# Patient Record
Sex: FEMALE | Race: BLACK OR AFRICAN AMERICAN | NOT HISPANIC OR LATINO | Employment: OTHER | URBAN - METROPOLITAN AREA
[De-identification: names, ages, dates, MRNs, and addresses within clinical notes are randomized per-mention and may not be internally consistent; named-entity substitution may affect disease eponyms.]

---

## 2017-01-04 ENCOUNTER — CLINICAL SUPPORT (OUTPATIENT)
Dept: REHABILITATION | Facility: HOSPITAL | Age: 72
End: 2017-01-04
Attending: ORTHOPAEDIC SURGERY
Payer: MEDICARE

## 2017-01-04 DIAGNOSIS — G89.29 CHRONIC PAIN OF LEFT KNEE: Primary | ICD-10-CM

## 2017-01-04 DIAGNOSIS — M25.562 CHRONIC PAIN OF LEFT KNEE: Primary | ICD-10-CM

## 2017-01-04 PROCEDURE — G8990 OTHER PT/OT CURRENT STATUS: HCPCS | Mod: CK

## 2017-01-04 PROCEDURE — G8991 OTHER PT/OT GOAL STATUS: HCPCS | Mod: CJ

## 2017-01-04 PROCEDURE — 97162 PT EVAL MOD COMPLEX 30 MIN: CPT

## 2017-01-04 NOTE — PROGRESS NOTES
Physical Therapy Evaluation    Name: Rosmery Cody [facility] Number: [id number]        Diagnosis: No diagnosis found.  Physician: Saulo Mendoza MD  Treatment Orders: PT Eval and Treat    Past Medical History   Diagnosis Date    Allergy     Anxiety     Cataract     Edema of leg 10/5/2012     bilateral     Fractures 2011     thumb R    GERD (gastroesophageal reflux disease)     Glaucoma     History of colonic polyps     Hx-TIA (transient ischemic attack) 8/13/2012 Jan 2012: LLE and left facial numbness lasting 1 hour     Hyperlipidemia     Hypertension     Left shoulder tendonitis 8/8/2015    Primary open-angle glaucoma, mild stage - Both Eyes 6/3/2013    TIA (transient ischemic attack) Jan 2012     LLE and left facial numbness lasting 1 hour     Current Outpatient Prescriptions   Medication Sig    aspirin (ECOTRIN) 81 MG EC tablet Take 81 mg by mouth once daily.      calcium 500 mg Tab Take 500 mg by mouth once daily.     CYANOCOBALAMIN, VITAMIN B-12, ORAL Place 1 drop under the tongue.    fexofenadine (ALLEGRA) 30 MG tablet Take 30 mg by mouth once daily.     fluticasone (FLONASE) 50 mcg/actuation nasal spray 2 sprays by Nasal route daily as needed.     hydrochlorothiazide (MICROZIDE) 12.5 mg capsule Take 1 capsule (12.5 mg total) by mouth once daily.    krill oil-omega-3-dha-epa 150-450 mg CpDR Take 1 tablet by mouth once daily.    lorazepam (ATIVAN) 1 MG tablet TAKE 1 TABLET (1 MG TOTAL) BY MOUTH NIGHTLY AS NEEDED (INSOMNIA).    losartan (COZAAR) 50 MG tablet TAKE 1 TABLET BY MOUTH EVERY DAY    moxifloxacin (VIGAMOX) 0.5 % ophthalmic solution Place 1 drop into the right eye 4 (four) times daily.    multivitamin Chew 1 lozenge.    prednisoLONE acetate (PRED FORTE) 1 % DrpS Place 1 drop into the right eye 4 (four) times daily.    timolol maleate 0.5% (TIMOPTIC-XE) 0.5 % SolG Place 1 drop into both eyes every morning. Timoptic XE or timolol GFS (gel forming solution)     XALATAN 0.005 % ophthalmic solution Place 1 drop into the right eye every evening. BRAND NAME XALATAN      No current facility-administered medications for this visit.      Review of patient's allergies indicates:   Allergen Reactions    Lisinopril Swelling    Amlodipine Edema    Combigan [brimonidine-timolol] Other (See Comments)     Causes blepharitis and itchy eyelids/contact dermatitis    Cosopt [dorzolamide-timolol] Other (See Comments)     Causes angular blepharitis around eyes    Pravastatin Other (See Comments)     Myalgia and elevated CPK     Precautions: standard    Evaluation Date: 01/04/2017  Visit # authorized: 1/1  Authorization expiration: 12/22/2017  Plan of Care expiration: 02/15/2017    Subjective     Onset/FAITH: sudden and gradual    Primary concern/ Chief complaints:    Rosmery is a 71 y.o. female with a PMH of anxiety, TIA, and bilateral lower extremity edema that presents to [facility] secondary to left knee pain.  Injury occurred on November 2016.  X-ray and MRI was taken and revealed DJD and a 0.7 cm cartilage defect in the weight-bearing portion of the lateral tibial condyle with underlying subchondral edema. Pt has been feeling better since she had a steroid shot on 12/22/2016, but was having buckling in the knee up to just before the steroid injection. Previous treatment included ibuprofen, steroid shot (hasn't been taking ibuprofen since she got her steroid shot), ice, and heat. Pt reports that before her steroid shot, long distance walking for exercise and stair management increases left knee pain and reports left knee pain at worst is a 1-2 on the VAS. Pt uses rest, ice and heat to control L knee symptoms. Pt has a decreased ability to perform ADLs such as walking for exercise, stair ascent in the community. Pt reports intermittent numbness and tingling in the RLE, but hasn't felt symptoms for over a year. No cultural, environmental, or spiritual barriers  identified to treatment or learning.    Pain Scale: Rosmery rates pain on a scale of 0-10 to be 10 at worst; n/a currently; n/a at best .    Stairs in the home: 0    Patient Goals: Pt would like to decrease pain and increase function so she can return to pain-free completion of all normal daily activities.    Objective     Observation: ambulated independently to clinic. Gait deviations include antalgic gait    Posture: WNL    Pitting edema: none    Range of Motion:   Knee Left Active Left Passive Right Active Right Passive   Flexion 120*stretching pain at kneecap 125*stretching pain at kneecap 120 126   Extension -1*aching pain -1 -1 -1   Patellar mobility WNL LLE in all directions, though infomcortable with inferior and mild medial patellar pain with anterior glide    Lower Extremity Strength  Right LE  Left LE    Knee extension: 4+/5 Knee extension: 4/5   Knee flexion: 5/5 Knee flexion: 5/5   Hip flexion: 4/5 Hip flexion:  *pain in L patella 4/5   Hip extension:  4+/5 Hip extension: 4+/5   Hip abduction: 4+/5 Hip abduction: 4+/5   Hip adduction: 4+/5 Hip adduction 4+/5   Ankle dorsiflexion: 5/5 Ankle dorsiflexion: 5/5   Ankle plantarflexion: 5/5 Ankle plantarflexion: 5/5     Special Tests:   Left Right   Valgus Stress Test - -   Varus Stress test - -   Lachman's test - -   Posterior Lachman - -     Joint Mobility: WNL into flexion and extension.  Patellar mobility WNL all directions in the LLE, though tender with inferior and superior patellar glides    Palpation: ttp inferomedially to the L patella    Sensation: WNL    Flexibility: decreased into knee flexion at end range due to stiffness in the L quadriceps and L patellar discomfort    G-code Reporting:  Category: Mobilty (Mobility, Self-Care, etc. )  Tool: FOTO knee  Score: 43% impairment  Goal:  CJ ( 20%-40% impairment)  Current:  CK ( 40%-60% impairment)    Patient History  (high) Examination  (high) Clinical Presentation  (mod) Clinical Decision  Making   Comorbidities:  Anxiety    Lower extremity edema    Personal Factors:  Decreased attendance with previous PT plans of care    Increased age     Activity and Participation Restriction:  Impaired community ambulation  Impaired ability to exercise recreationally    Body Systems:  MSK: ROM and strength  CV: Edema    Body Regions:  Lower extremities Evolving clinical presentation with changing clinical characteristics  (due to progression of knee pain and changing of edema presentation on a day to day basis)     Low   Mod  High         PT Evaluation Completed? Yes  Discussed Plan of Care with patient: Yes    TREATMENT:  Rosmery received therapeutic exercises to develop strength and endurance, flexibility for 15 minutes including:   HSS with strap 2x30 sec BLE  Heel slides with strap and 3 second overpressure stretch 2x10 LLE  Mini squats 1x8 with vc's and demonstration for correct technique    Instructed pt. Regarding: Proper technique with all exercises. Pt demo good understanding of the education provided. Rosmery demonstrated good return demonstration of activities.    Assessment     This is a 71 y.o. female referred to outpatient physical therapy and presents with a medical diagnosis of left knee pain and demonstrates limitations as described in the problem list. Pt will benefit from physical therapy services in order to maximize pain free and/or functional use of left knee. The following goals were discussed with the patient and patient is in agreement with them as to be addressed in the treatment plan. Pt was given a HEP consisting of heel slides, hamstring stretches and mini-squats (code - [id number]). Pt verbally understood the instructions as they were given and demonstrated proper form and technique during therapy. Pt was advised to perform these exercises free of pain, and to stop performing them if pain occurs.     Pt presents with significantly decreased strength at the L knee, and decreased range  of motion both into flexion and extension of the left knee. Pt would benefit from stretching of quadriceps and hamstrings in order to decrease patellar compression in the knee joint and to allow for increases of active and passive range of motion. Due to decreased activity tolerance with land activity, pt would benefit from a trial of aquatic therapy with the goal of progressing to land therapy as soon as possible for continued strengthening and return to completing land ADLs with decreased pain.     Patient History  (high) Examination  (high) Clinical Presentation  (mod) Clinical Decision Making   Comorbidities:  Anxiety    Lower extremity edema    Personal Factors:  Decreased attendance with previous PT plans of care    Increased age     Activity and Participation Restriction:  Impaired community ambulation  Impaired ability to exercise recreationally    Body Systems:  MSK: ROM and strength  CV: Edema    Body Regions:  Lower extremities Evolving clinical presentation with changing clinical characteristics  (due to progression of knee pain and changing of edema presentation on a day to day basis)     Low   Mod  High         Medical necessity is demonstrated by the following IMPAIRMENTS/PROBLEM LIST:   1)Increase in pain level limiting function   2)Decreased range of motion limiting function   3)Decreased strength limiting function   4)Impaired gait pattern   5)Lack of HEP    GOALS: Short Term Goals:  3 weeks  1. Report decreased left knee  pain  <   / =  5 /10 with community ambulation to demonstrate increased tolerance for completion of ADLs.  2. Pt to achieve pain free WNL flexion AROM in the L knee to demonstrate improvements in functional mobility.  3. Increased MMT for knee extension to 4+/5 in the LLE  to increase tolerance for ADL and work activities.  4. Pt to report a decreased amount of pain with stair ascent in order to demonstrate improvements in functional mobility.  5. Pt to tolerate HEP to improve  ROM and independence with ADL's    Long Term Goals: 6 weeks  1. Report decreased    Left knee    pain  <   / =  4  /10  with community ambulation to demonstrate increased tolerance for completion of ADLs.  2. Increased MMT  for  Knee extension to 5/5  to increase tolerance for ADL and work activities.  3. Increased MMT  for  Hip flexion to 4+/5  to increase tolerance for ADL and work activities.  4. Pt will report at CJ level (20%-40% impaired) on FOTO knee questionnaire to demonstrate increase in LE function with every day tasks.   5. Pt to be Independent with HEP to improve ROM and independence with ADL's      Plan     Pt will be treated by physical therapy 1-3 times a week for 6 weeks for Pt Education, HEP, therapeutic exercises, neuromuscular re-education, joint mobilizations, modalities prn to achieve established goals. brock may at times be seen by a PTA as part of the Rehab Team.     Cont PT for  6         weeks.     Sabrina Castaneda, DPT  1/4/2016    I certify the need for these services furnished under this plan of treatment and while under my care.______________________________ Physician/Referring Practitioner  Date of Signature

## 2017-01-05 ENCOUNTER — OFFICE VISIT (OUTPATIENT)
Dept: OPHTHALMOLOGY | Facility: CLINIC | Age: 72
End: 2017-01-05
Payer: MEDICARE

## 2017-01-05 DIAGNOSIS — H04.123 DRY EYE SYNDROME, BILATERAL: ICD-10-CM

## 2017-01-05 DIAGNOSIS — H40.1111 PRIMARY OPEN ANGLE GLAUCOMA OF RIGHT EYE, MILD STAGE: ICD-10-CM

## 2017-01-05 DIAGNOSIS — Z96.1 PSEUDOPHAKIA OF BOTH EYES: ICD-10-CM

## 2017-01-05 DIAGNOSIS — Z48.89 ENCOUNTER FOR POSTOPERATIVE CARE: Primary | ICD-10-CM

## 2017-01-05 DIAGNOSIS — H40.1122 PRIMARY OPEN ANGLE GLAUCOMA OF LEFT EYE, MODERATE STAGE: ICD-10-CM

## 2017-01-05 DIAGNOSIS — H01.009 ANGULAR BLEPHARITIS, UNSPECIFIED LATERALITY: ICD-10-CM

## 2017-01-05 PROBLEM — G89.29 CHRONIC PAIN OF LEFT KNEE: Status: ACTIVE | Noted: 2017-01-05

## 2017-01-05 PROBLEM — M25.562 CHRONIC PAIN OF LEFT KNEE: Status: ACTIVE | Noted: 2017-01-05

## 2017-01-05 PROCEDURE — 99499 UNLISTED E&M SERVICE: CPT | Mod: S$GLB,,, | Performed by: OPHTHALMOLOGY

## 2017-01-05 PROCEDURE — 99024 POSTOP FOLLOW-UP VISIT: CPT | Mod: S$GLB,,, | Performed by: OPHTHALMOLOGY

## 2017-01-05 PROCEDURE — 99999 PR PBB SHADOW E&M-EST. PATIENT-LVL III: CPT | Mod: PBBFAC,,, | Performed by: OPHTHALMOLOGY

## 2017-01-05 NOTE — PROGRESS NOTES
HPI     DLS: 12/08/16    Pt here for post phaco w/IOL OD- 11/30/16  S/P phaco w/IOL and [place] trabeculotomy- 11/30/16  Pt states no pain or discomfort to the OD but yesterday her OS felt like   something was in it and was painful no problems with it this am. Pt states   vision seems to be doing good at a distance but not too good up close.    Meds: Timolol GFS qam ou     1. Post operative stage  2. Primary open angle glaucoma of right eye, mild stage  3. Primary open angle glaucoma of left eye, moderate stage  4. Angular blepharitis, unspecified laterality  5. Dry eye syndrome, bilateral  6. Pseudophakia, both eyes        Last edited by Ana Aquino on 1/5/2017  8:52 AM.         Assessment /Plan     For exam results, see Encounter Report.    Encounter for postoperative care    Primary open angle glaucoma of right eye, mild stage    Primary open angle glaucoma of left eye, moderate stage    Dry eye syndrome, bilateral    Angular blepharitis, unspecified laterality    Pseudophakia of both eyes      Change in last name from Glo or Glo-ashish to Ramirez - 3/3/2014    Old pt of Dr. Eber Crouch  Sister is Marina Cortez (my pt.) she sent her to me      1. Early POAG-  Both Eyes   Dx. Years ago with Dr. Eber Crouch  -Strong family hx   -IOP previously well controlled (mid teens) on Xalatan but elevated on generic and switched to lumigan    Family history   STRONG (Mom,Dad, and 5 sisters)  Glaucoma meds   lumigan qhs OU, timolol gfs QAM OU  H/O adverse rxn to glaucoma drops - combigan - itchy lids  // cosopt - angular blepharitis   LASERS   S/p SLT 4/24/14 OD //  5/8/14 OS (good response 21/22 --> 12/15)  GLAUCOMA SURGERIES   None   OTHER EYE SURGERIES   none  CDR  0.8 / 0.7  Tbase  ??  Tmax  26 OU off all gtts (5/24/10)    Ttarget   16 OU  HVF - 5 VF '12 -16 -  OD IAD x3 now ; OS: IAD x 3 now  Gonio +3 OU  /540  OCT 3  test 2011 to 2016  - RNFL - OD:dec. S, shannon N/I // OS:shannon S/N (+ prog ou)   HRT  3 test -  2012 to 2016 -MR -  Bord S/N/I od // dec. S, bord N/T os /// CDR 0.69 od // 0.73 os  Disc photos  2012, 2015 - OIS     Ttoday 22/22  Test done today - pre-o phaco /IOL OD - 2nd eye // possible [place]     2. Nuclear sclerosis - Both Eyes  -VA 20/40; 20/30  -High BAT to 20/60 // 20/100   3. Hyperopia - Both Eyes -mild   4. Epiphora  - C/O constant tearing of the right eye x 6 months (8/2015      Plan:  -IOP 18 ou (target is 16 ou)   Still above target with addition of timolol gfs (she likes this gtt)    Off combigan - intolerant - itchy lids / blepharitis   Intol to cosopt - angular blepharitis   -Strong family hx  -Goal IOP 16 OU // above target and + VF prog     + IAD ou on VF last visit - (x 3 )     CSM    Target is 16 ou     Pt does not like generics - but is using latanoprost without problems (actually prefers it to lumigan)   Intol to combigan -blepharitis / itch lids   Intol to cosopt - angular blepharitis     Tolerating  timolol gfs // timoptic XE ou q am - but IOP still above target     Pt has tried name brand xalatan for 1 month but IOP still above target   Cont timoptic XE / gfs     F/U     Phaco / IOL OS Date: 10/6/2016 - CE alone - declined combined surgery // PCB00 24.0  POM # 2-3  - phaco/IOL   Doing well  Off steroids   Cont  AT's 4 x day     Cont timoptic XE ou q am    If IOP goes up can try switching pred acetate to vexol and adding diamox    S/P phaco/IOL and  [place] trabeculotomy OD 11/30/2016 - PCB00 22.5   Phaco / IOL OD Date: 11/30/2016  (combined [place])   POM # 1 - phaco/IOL - od - 2nd eye // [place]   Doing well  Begin Pred Acetate - finished   Begin vigamox QID - finish   Shield at night - 1 more week   AT's prn     F/U 1 month -  - final  AR/MR ou - still has a vicryl suture od - so will wait till next time for final refraction - wants bifocals     (( Her sister had  a DSEK with [place]raine in her 2nd eye - her sister  has glaucoma shunts - says she is not doing to well post op  ))    I have seen  and personally examined the patient.  I agree with the findings, assessment and plan of the resident and/or fellow.     Yesica Mike MD

## 2017-01-06 NOTE — PLAN OF CARE
Physical Therapy Evaluation    Name: Rosmery Cody [facility] Number: [id number]        Diagnosis: No diagnosis found.  Physician: Saulo Mendoza MD  Treatment Orders: PT Eval and Treat    Past Medical History   Diagnosis Date    Allergy     Anxiety     Cataract     Edema of leg 10/5/2012     bilateral     Fractures 2011     thumb R    GERD (gastroesophageal reflux disease)     Glaucoma     History of colonic polyps     Hx-TIA (transient ischemic attack) 8/13/2012 Jan 2012: LLE and left facial numbness lasting 1 hour     Hyperlipidemia     Hypertension     Left shoulder tendonitis 8/8/2015    Primary open-angle glaucoma, mild stage - Both Eyes 6/3/2013    TIA (transient ischemic attack) Jan 2012     LLE and left facial numbness lasting 1 hour     Current Outpatient Prescriptions   Medication Sig    aspirin (ECOTRIN) 81 MG EC tablet Take 81 mg by mouth once daily.      calcium 500 mg Tab Take 500 mg by mouth once daily.     CYANOCOBALAMIN, VITAMIN B-12, ORAL Place 1 drop under the tongue.    fexofenadine (ALLEGRA) 30 MG tablet Take 30 mg by mouth once daily.     fluticasone (FLONASE) 50 mcg/actuation nasal spray 2 sprays by Nasal route daily as needed.     hydrochlorothiazide (MICROZIDE) 12.5 mg capsule Take 1 capsule (12.5 mg total) by mouth once daily.    krill oil-omega-3-dha-epa 150-450 mg CpDR Take 1 tablet by mouth once daily.    lorazepam (ATIVAN) 1 MG tablet TAKE 1 TABLET (1 MG TOTAL) BY MOUTH NIGHTLY AS NEEDED (INSOMNIA).    losartan (COZAAR) 50 MG tablet TAKE 1 TABLET BY MOUTH EVERY DAY    moxifloxacin (VIGAMOX) 0.5 % ophthalmic solution Place 1 drop into the right eye 4 (four) times daily.    multivitamin Chew 1 lozenge.    prednisoLONE acetate (PRED FORTE) 1 % DrpS Place 1 drop into the right eye 4 (four) times daily.    timolol maleate 0.5% (TIMOPTIC-XE) 0.5 % SolG Place 1 drop into both eyes every morning. Timoptic XE or timolol GFS (gel forming solution)     XALATAN 0.005 % ophthalmic solution Place 1 drop into the right eye every evening. BRAND NAME XALATAN      No current facility-administered medications for this visit.      Review of patient's allergies indicates:   Allergen Reactions    Lisinopril Swelling    Amlodipine Edema    Combigan [brimonidine-timolol] Other (See Comments)     Causes blepharitis and itchy eyelids/contact dermatitis    Cosopt [dorzolamide-timolol] Other (See Comments)     Causes angular blepharitis around eyes    Pravastatin Other (See Comments)     Myalgia and elevated CPK     Precautions: standard    Evaluation Date: 01/04/2017  Visit # authorized: 1/1  Authorization expiration: 12/22/2017  Plan of Care expiration: 02/15/2017    Subjective     Onset/FAITH: sudden and gradual    Primary concern/ Chief complaints:    Rosmery is a 71 y.o. female with a PMH of anxiety, TIA, and bilateral lower extremity edema that presents to [facility] secondary to left knee pain.  Injury occurred on November 2016.  X-ray and MRI was taken and revealed DJD and a 0.7 cm cartilage defect in the weight-bearing portion of the lateral tibial condyle with underlying subchondral edema. Pt has been feeling better since she had a steroid shot on 12/22/2016, but was having buckling in the knee up to just before the steroid injection. Previous treatment included ibuprofen, steroid shot (hasn't been taking ibuprofen since she got her steroid shot), ice, and heat. Pt reports that before her steroid shot, long distance walking for exercise and stair management increases left knee pain and reports left knee pain at worst is a 1-2 on the VAS. Pt uses rest, ice and heat to control L knee symptoms. Pt has a decreased ability to perform ADLs such as walking for exercise, stair ascent in the community. Pt reports intermittent numbness and tingling in the RLE, but hasn't felt symptoms for over a year. No cultural, environmental, or spiritual barriers  identified to treatment or learning.    Pain Scale: Rosmery rates pain on a scale of 0-10 to be 10 at worst; n/a currently; n/a at best .    Stairs in the home: 0    Patient Goals: Pt would like to decrease pain and increase function so she can return to pain-free completion of all normal daily activities.    Objective     Observation: ambulated independently to clinic. Gait deviations include antalgic gait    Posture: WNL    Pitting edema: none    Range of Motion:   Knee Left Active Left Passive Right Active Right Passive   Flexion 120*stretching pain at kneecap 125*stretching pain at kneecap 120 126   Extension -1*aching pain -1 -1 -1   Patellar mobility WNL LLE in all directions, though infomcortable with inferior and mild medial patellar pain with anterior glide    Lower Extremity Strength  Right LE  Left LE    Knee extension: 4+/5 Knee extension: 4/5   Knee flexion: 5/5 Knee flexion: 5/5   Hip flexion: 4/5 Hip flexion:  *pain in L patella 4/5   Hip extension:  4+/5 Hip extension: 4+/5   Hip abduction: 4+/5 Hip abduction: 4+/5   Hip adduction: 4+/5 Hip adduction 4+/5   Ankle dorsiflexion: 5/5 Ankle dorsiflexion: 5/5   Ankle plantarflexion: 5/5 Ankle plantarflexion: 5/5     Special Tests:   Left Right   Valgus Stress Test - -   Varus Stress test - -   Lachman's test - -   Posterior Lachman - -     Joint Mobility: WNL into flexion and extension.  Patellar mobility WNL all directions in the LLE, though tender with inferior and superior patellar glides    Palpation: ttp inferomedially to the L patella    Sensation: WNL    Flexibility: decreased into knee flexion at end range due to stiffness in the L quadriceps and L patellar discomfort    G-code Reporting:  Category: Mobilty (Mobility, Self-Care, etc. )  Tool: FOTO knee  Score: 43% impairment  Goal:  CJ ( 20%-40% impairment)  Current:  CK ( 40%-60% impairment)    Patient History  (high) Examination  (high) Clinical Presentation  (mod) Clinical Decision  Making   Comorbidities:  Anxiety    Lower extremity edema    Personal Factors:  Decreased attendance with previous PT plans of care    Increased age     Activity and Participation Restriction:  Impaired community ambulation  Impaired ability to exercise recreationally    Body Systems:  MSK: ROM and strength  CV: Edema    Body Regions:  Lower extremities Evolving clinical presentation with changing clinical characteristics  (due to progression of knee pain and changing of edema presentation on a day to day basis)     Low   Mod  High         PT Evaluation Completed? Yes  Discussed Plan of Care with patient: Yes    TREATMENT:  Rosmery received therapeutic exercises to develop strength and endurance, flexibility for 15 minutes including:   HSS with strap 2x30 sec BLE  Heel slides with strap and 3 second overpressure stretch 2x10 LLE  Mini squats 1x8 with vc's and demonstration for correct technique    Instructed pt. Regarding: Proper technique with all exercises. Pt demo good understanding of the education provided. Rosmery demonstrated good return demonstration of activities.    Assessment     This is a 71 y.o. female referred to outpatient physical therapy and presents with a medical diagnosis of left knee pain and demonstrates limitations as described in the problem list. Pt will benefit from physical therapy services in order to maximize pain free and/or functional use of left knee. The following goals were discussed with the patient and patient is in agreement with them as to be addressed in the treatment plan. Pt was given a HEP consisting of heel slides, hamstring stretches and mini-squats (code - [id number]). Pt verbally understood the instructions as they were given and demonstrated proper form and technique during therapy. Pt was advised to perform these exercises free of pain, and to stop performing them if pain occurs.     Pt presents with significantly decreased strength at the L knee, and decreased range  of motion both into flexion and extension of the left knee. Pt would benefit from stretching of quadriceps and hamstrings in order to decrease patellar compression in the knee joint and to allow for increases of active and passive range of motion. Due to decreased activity tolerance with land activity, pt would benefit from a trial of aquatic therapy with the goal of progressing to land therapy as soon as possible for continued strengthening and return to completing land ADLs with decreased pain.     Patient History  (high) Examination  (high) Clinical Presentation  (mod) Clinical Decision Making   Comorbidities:  Anxiety    Lower extremity edema    Personal Factors:  Decreased attendance with previous PT plans of care    Increased age     Activity and Participation Restriction:  Impaired community ambulation  Impaired ability to exercise recreationally    Body Systems:  MSK: ROM and strength  CV: Edema    Body Regions:  Lower extremities Evolving clinical presentation with changing clinical characteristics  (due to progression of knee pain and changing of edema presentation on a day to day basis)     Low   Mod  High         Medical necessity is demonstrated by the following IMPAIRMENTS/PROBLEM LIST:   1)Increase in pain level limiting function   2)Decreased range of motion limiting function   3)Decreased strength limiting function   4)Impaired gait pattern   5)Lack of HEP    GOALS: Short Term Goals:  3 weeks  1. Report decreased left knee  pain  <   / =  5 /10 with community ambulation to demonstrate increased tolerance for completion of ADLs.  2. Pt to achieve pain free WNL flexion AROM in the L knee to demonstrate improvements in functional mobility.  3. Increased MMT for knee extension to 4+/5 in the LLE  to increase tolerance for ADL and work activities.  4. Pt to report a decreased amount of pain with stair ascent in order to demonstrate improvements in functional mobility.  5. Pt to tolerate HEP to improve  ROM and independence with ADL's    Long Term Goals: 6 weeks  1. Report decreased    Left knee    pain  <   / =  4  /10  with community ambulation to demonstrate increased tolerance for completion of ADLs.  2. Increased MMT  for  Knee extension to 5/5  to increase tolerance for ADL and work activities.  3. Increased MMT  for  Hip flexion to 4+/5  to increase tolerance for ADL and work activities.  4. Pt will report at CJ level (20%-40% impaired) on FOTO knee questionnaire to demonstrate increase in LE function with every day tasks.   5. Pt to be Independent with HEP to improve ROM and independence with ADL's      Plan     Pt will be treated by physical therapy 1-3 times a week for 6 weeks for Pt Education, HEP, therapeutic exercises, neuromuscular re-education, joint mobilizations, modalities prn to achieve established goals. brock may at times be seen by a PTA as part of the Rehab Team.     Cont PT for  6         weeks.     Sabrina Castaneda, DPT  1/4/2016    I certify the need for these services furnished under this plan of treatment and while under my care.______________________________ Physician/Referring Practitioner  Date of Signature

## 2017-01-11 ENCOUNTER — PATIENT MESSAGE (OUTPATIENT)
Dept: INTERNAL MEDICINE | Facility: CLINIC | Age: 72
End: 2017-01-11

## 2017-01-14 ENCOUNTER — IMMUNIZATION (OUTPATIENT)
Dept: INTERNAL MEDICINE | Facility: CLINIC | Age: 72
End: 2017-01-14
Payer: MEDICARE

## 2017-01-14 PROCEDURE — G0008 ADMIN INFLUENZA VIRUS VAC: HCPCS | Mod: S$GLB,,, | Performed by: FAMILY MEDICINE

## 2017-01-14 PROCEDURE — 90662 IIV NO PRSV INCREASED AG IM: CPT | Mod: S$GLB,,, | Performed by: FAMILY MEDICINE

## 2017-01-18 ENCOUNTER — CLINICAL SUPPORT (OUTPATIENT)
Dept: REHABILITATION | Facility: HOSPITAL | Age: 72
End: 2017-01-18
Attending: ORTHOPAEDIC SURGERY
Payer: MEDICARE

## 2017-01-18 DIAGNOSIS — M25.562 CHRONIC PAIN OF LEFT KNEE: Primary | ICD-10-CM

## 2017-01-18 DIAGNOSIS — G89.29 CHRONIC PAIN OF LEFT KNEE: Primary | ICD-10-CM

## 2017-01-18 PROCEDURE — 97113 AQUATIC THERAPY/EXERCISES: CPT

## 2017-01-18 NOTE — PROGRESS NOTES
"Total treatment time: 60    Time In: 7:00  Time Out: 8:00    Subjective  Rosmery states "that she is w/o knee pn at this time, and that she is performing HEP w/o difficulty.      Objective    Treatment: Rosmery was instructed in and performed therapeutic exercises to develop strength, endurance, ROM, flexibility, balance, posture and core stabilization for 60 minutes. Patient performed therapeutic exercises consisting of warm up laps without resistance, PROM/Stretching, LE strengthening, balance exercises, isometrics, Endurance, bike, march, step-ups and cool down.    Warm-up Laps 3 x each  FWD, BWD, Side    Stretches 3 x 20 sec ea.  HSS  Quad  GSS    LE exs 20x each  Mini Squats with QS  Heel Raise with GS  Hip flex/ext  Hip ABD/ADD  HS Curl  Step-ups  LAQ    Endurance 3 min each  Bicycle  Marching    Cool Down Laps 1 x each  FWD,BWD, Side     Patient was not issued HEP for pool.      Assessment  Patient's tolerance to treatment was good. Pt demonstrated good return with proper form, ROM, tech with exs post instruction. This is a 71 y.o. female referred to outpatient physical therapy and presents with a medical diagnosis of left knee pain and demonstrates limitations as described in the problem list. Pt will benefit from physical therapy services in order to maximize pain free and/or functional use of left knee..   Patient will continue to benefit from skilled PT intervention.    Rosmery is making good progress towards established goals.      Plan  Continue 2x per week.    "

## 2017-01-20 ENCOUNTER — CLINICAL SUPPORT (OUTPATIENT)
Dept: REHABILITATION | Facility: HOSPITAL | Age: 72
End: 2017-01-20
Attending: ORTHOPAEDIC SURGERY
Payer: MEDICARE

## 2017-01-20 DIAGNOSIS — G89.29 CHRONIC PAIN OF LEFT KNEE: Primary | ICD-10-CM

## 2017-01-20 DIAGNOSIS — M25.562 CHRONIC PAIN OF LEFT KNEE: Primary | ICD-10-CM

## 2017-01-20 PROCEDURE — 97113 AQUATIC THERAPY/EXERCISES: CPT

## 2017-01-20 NOTE — PROGRESS NOTES
"Total treatment time: 60    Time In: 7:00  Time Out: 8:00    Subjective  Rosmery again states "that she is w/o knee pn at this time, and that she is performing HEP w/o difficulty.      Objective    Treatment: Rosmery was instructed in and performed therapeutic exercises to develop strength, endurance, ROM, flexibility, balance, posture and core stabilization for 60 minutes, progressing with reps on this date w/o difficulty or complaint. Patient performed therapeutic exercises consisting of warm up laps without resistance, PROM/Stretching, LE strengthening, balance exercises, isometrics, Endurance, bike, march, step-ups and cool down.    Warm-up Laps 3 x each  FWD, BWD, Side    Stretches 3 x 20 sec ea.  HSS  Quad  GSS    LE exs 30x each  Mini Squats with QS  Heel Raise with GS  Hip flex/ext  Hip ABD/ADD  HS Curl  Step-ups  LAQ    Endurance 4 min each  Bicycle  Marching    Cool Down Laps 1 x each  FWD,BWD, Side     Patient was not issued HEP for pool.      Assessment  Patient's tolerance to treatment with progression of ther ex  was good. Pt demonstrated good return with proper form, ROM, tech with exs post instruction. This is a 71 y.o. female referred to outpatient physical therapy and presents with a medical diagnosis of left knee pain and demonstrates limitations as described in the problem list. Pt will benefit from physical therapy services in order to maximize pain free and/or functional use of left knee..   Patient will continue to benefit from skilled PT intervention.    Rosmery is making good progress towards established goals.      Plan  Continue 2x per week.  "

## 2017-01-23 ENCOUNTER — ANESTHESIA (OUTPATIENT)
Dept: ENDOSCOPY | Facility: HOSPITAL | Age: 72
End: 2017-01-23
Payer: MEDICARE

## 2017-01-23 ENCOUNTER — SURGERY (OUTPATIENT)
Age: 72
End: 2017-01-23

## 2017-01-23 ENCOUNTER — ANESTHESIA EVENT (OUTPATIENT)
Dept: ENDOSCOPY | Facility: HOSPITAL | Age: 72
End: 2017-01-23
Payer: MEDICARE

## 2017-01-23 VITALS — RESPIRATION RATE: 17 BRPM

## 2017-01-23 PROBLEM — Z86.0100 HISTORY OF COLON POLYPS: Status: ACTIVE | Noted: 2017-01-23

## 2017-01-23 PROBLEM — Z86.010 HISTORY OF COLON POLYPS: Status: ACTIVE | Noted: 2017-01-23

## 2017-01-23 PROCEDURE — 63600175 PHARM REV CODE 636 W HCPCS: Performed by: NURSE ANESTHETIST, CERTIFIED REGISTERED

## 2017-01-23 PROCEDURE — D9220A PRA ANESTHESIA: Mod: 33,ANES,, | Performed by: ANESTHESIOLOGY

## 2017-01-23 PROCEDURE — D9220A PRA ANESTHESIA: Mod: 33,CRNA,, | Performed by: NURSE ANESTHETIST, CERTIFIED REGISTERED

## 2017-01-23 PROCEDURE — 25000003 PHARM REV CODE 250: Performed by: NURSE ANESTHETIST, CERTIFIED REGISTERED

## 2017-01-23 RX ORDER — LIDOCAINE HCL/PF 100 MG/5ML
SYRINGE (ML) INTRAVENOUS
Status: DISCONTINUED | OUTPATIENT
Start: 2017-01-23 | End: 2017-01-23

## 2017-01-23 RX ORDER — PROPOFOL 10 MG/ML
INJECTION, EMULSION INTRAVENOUS CONTINUOUS PRN
Status: DISCONTINUED | OUTPATIENT
Start: 2017-01-23 | End: 2017-01-23

## 2017-01-23 RX ORDER — PROPOFOL 10 MG/ML
INJECTION, EMULSION INTRAVENOUS
Status: DISCONTINUED | OUTPATIENT
Start: 2017-01-23 | End: 2017-01-23

## 2017-01-23 RX ADMIN — PROPOFOL 150 MCG/KG/MIN: 10 INJECTION, EMULSION INTRAVENOUS at 08:01

## 2017-01-23 RX ADMIN — PROPOFOL 60 MG: 10 INJECTION, EMULSION INTRAVENOUS at 08:01

## 2017-01-23 RX ADMIN — LIDOCAINE HYDROCHLORIDE 50 MG: 20 INJECTION, SOLUTION INTRAVENOUS at 08:01

## 2017-01-23 NOTE — ANESTHESIA RELEASE NOTE
Anesthesia Release from PACU Note    Patient: Rosmery Cody Ramirez    Procedure(s) Performed: Procedure(s) (LRB):  COLONOSCOPY (N/A)    Anesthesia type: General    Post pain: Adequate analgesia    Post assessment: no apparent anesthetic complications    Last Vitals:   Vitals:    01/23/17 0846   BP: 133/77   Pulse: 61   Resp: 18   Temp:    SpO2: 95%       Post vital signs: stable    Level of consciousness: awake    Complications: none    Airway Patency: patent    Respiratory: spontaneous    Cardiovascular: stable    Hydration: euvolemic

## 2017-01-23 NOTE — ANESTHESIA PREPROCEDURE EVALUATION
01/23/2017  Rosmery Ramirez is a 71 y.o., female.    [facility] Anesthesia Evaluation         Review of Systems  Anesthesia Hx:  No problems with previous Anesthesia   Social:  Non-Smoker    Cardiovascular:   Exercise tolerance: good Hypertension Denies CAD.     Denies Angina.  Functional Capacity Can you climb two flights of stairs? ==> Yes    Pulmonary:   Denies Asthma.  Denies Recent URI.  Denies Sleep Apnea.    Renal/:  Renal/ Normal     Hepatic/GI:   Denies PUD. Denies Hiatal Hernia.  Denies GERD. Denies Liver Disease.  Denies Hepatitis.    Neurological:   Denies CVA. Denies Seizures.    Endocrine:   Denies Diabetes. Denies Hypothyroidism.        Physical Exam  General:  Well nourished    Airway/Jaw/Neck:  Airway Findings: Mouth Opening: Normal Tongue: Normal  General Airway Assessment: Adult  Mallampati: I  TM Distance: Normal, at least 6 cm  Jaw/Neck Findings:  Neck ROM: Normal ROM  Neck Findings:     Eyes/Ears/Nose:  EYES/EARS/NOSE FINDINGS: Normal   Dental:  Dental Findings: In tact   Chest/Lungs:  Chest/Lungs Findings: Clear to auscultation     Heart/Vascular:  Heart Findings: Rate: Normal  Rhythm: Regular Rhythm  Sounds: Normal        Mental Status:  Mental Status Findings:  Alert and Oriented         Anesthesia Plan  Type of Anesthesia, risks & benefits discussed:  Anesthesia Type:  general, MAC  Patient's Preference: Proceed with anesthesia understanding that the risks are very small but could be serious or life threatening.  Intra-op Monitoring Plan:   Intra-op Monitoring Plan Comments:   Post Op Pain Control Plan:   Post Op Pain Control Plan Comments:   Induction:   IV  Beta Blocker:  Patient is not currently on a Beta-Blocker (No further documentation required).       Informed Consent: Patient understands risks and agrees with Anesthesia plan.  Questions answered. Anesthesia consent  signed with patient.  ASA Score: 2     Day of Surgery Review of History & Physical: I have interviewed and examined the patient. I have reviewed the patient's H&P dated:            Ready For Surgery From Anesthesia Perspective.

## 2017-01-23 NOTE — TRANSFER OF CARE
"Anesthesia Transfer of Care Note    Patient: Rosmery Ramirez    Procedure(s) Performed: Procedure(s) (LRB):  COLONOSCOPY (N/A)    Patient location: PACU    Anesthesia Type: general    Transport from OR: Transported from OR on room air with adequate spontaneous ventilation    Post pain: adequate analgesia    Post assessment: no apparent anesthetic complications and tolerated procedure well    Post vital signs: stable    Level of consciousness: awake, alert and oriented    Nausea/Vomiting: no nausea/vomiting    Complications: none          Last vitals:   Visit Vitals    BP (!) 119/59 (BP Location: Left arm, Patient Position: Lying, BP Method: Automatic)    Pulse 70    Temp 36.6 °C (97.9 °F) (Axillary)    Resp 16    Ht 5' 5" (1.651 m)    Wt 94.8 kg (209 lb)    SpO2 95%    Breastfeeding No    BMI 34.78 kg/m2     "

## 2017-01-23 NOTE — ANESTHESIA POSTPROCEDURE EVALUATION
"Anesthesia Post Evaluation    Patient: Rosmery Ramirez    Procedure(s) Performed: Procedure(s) (LRB):  COLONOSCOPY (N/A)    Final Anesthesia Type: general  Patient location during evaluation: GI PACU  Patient participation: Yes- Able to Participate  Level of consciousness: awake and alert  Post-procedure vital signs: reviewed and stable  Pain management: adequate  Airway patency: patent  PONV status at discharge: No PONV  Anesthetic complications: no      Cardiovascular status: blood pressure returned to baseline  Respiratory status: unassisted  Hydration status: euvolemic  Follow-up not needed.        Visit Vitals    /77 (BP Location: Left arm, Patient Position: Sitting, BP Method: Automatic)    Pulse 61    Temp 36.6 °C (97.9 °F) (Axillary)    Resp 18    Ht 5' 5" (1.651 m)    Wt 94.8 kg (209 lb)    SpO2 95%    Breastfeeding No    BMI 34.78 kg/m2       Pain/Nichole Score: Pain Assessment Performed: Yes (1/23/2017  8:46 AM)  Presence of Pain: denies (1/23/2017  8:46 AM)  Pain Rating Prior to Med Admin: 0 (1/23/2017  7:10 AM)  Nichole Score: 9 (1/23/2017  8:16 AM)      "

## 2017-01-25 ENCOUNTER — CLINICAL SUPPORT (OUTPATIENT)
Dept: REHABILITATION | Facility: HOSPITAL | Age: 72
End: 2017-01-25
Attending: ORTHOPAEDIC SURGERY
Payer: MEDICARE

## 2017-01-25 DIAGNOSIS — M25.562 CHRONIC PAIN OF LEFT KNEE: Primary | ICD-10-CM

## 2017-01-25 DIAGNOSIS — G89.29 CHRONIC PAIN OF LEFT KNEE: Primary | ICD-10-CM

## 2017-01-25 PROCEDURE — 97113 AQUATIC THERAPY/EXERCISES: CPT

## 2017-01-25 NOTE — PROGRESS NOTES
"Total treatment time: 60    Time In: 7:00  Time Out: 8:00    Subjective  Rosmery again states "that she is w/o knee pn at this time, .      Objective    Treatment: Rosmery was instructed in and performed therapeutic exercises to develop strength, endurance, ROM, flexibility, balance, posture and core stabilization for 60 minutes, progressing with resistance on this date w/o difficulty or complaint. Patient performed therapeutic exercises consisting of warm up laps without resistance, PROM/Stretching, LE strengthening, balance exercises, isometrics, Endurance, bike, march, step-ups and cool down.    Warm-up Laps 3 x each  FWD, BWD, Side    Stretches 3 x 20 sec ea.  HSS  Quad  GSS    LE exs 30x each 2#  Mini Squats with QS  Heel Raise with GS  Hip flex/ext  Hip ABD/ADD  HS Curl  Step-ups  LAQ    Endurance 5 min each  Bicycle  Marching    Cool Down Laps 1 x each  FWD,BWD, Side     Patient was not issued HEP for pool.      Assessment  Patient's tolerance to treatment with progression of ther ex  was good. Pt demonstrated good return with proper form, ROM, tech with exs post instruction. This is a 71 y.o. female referred to outpatient physical therapy and presents with a medical diagnosis of left knee pain and demonstrates limitations as described in the problem list. Pt will benefit from physical therapy services in order to maximize pain free and/or functional use of left knee..   Patient will continue to benefit from skilled PT intervention.    Rosmery is making good progress towards established goals.      Plan  Pt to be reassessed by PT..  "

## 2017-01-30 ENCOUNTER — TELEPHONE (OUTPATIENT)
Dept: ENDOSCOPY | Facility: HOSPITAL | Age: 72
End: 2017-01-30

## 2017-02-10 ENCOUNTER — OFFICE VISIT (OUTPATIENT)
Dept: OPHTHALMOLOGY | Facility: CLINIC | Age: 72
End: 2017-02-10
Payer: MEDICARE

## 2017-02-10 DIAGNOSIS — H04.123 DRY EYE SYNDROME, BILATERAL: ICD-10-CM

## 2017-02-10 DIAGNOSIS — H01.009 ANGULAR BLEPHARITIS, UNSPECIFIED LATERALITY: ICD-10-CM

## 2017-02-10 DIAGNOSIS — H40.1122 PRIMARY OPEN ANGLE GLAUCOMA OF LEFT EYE, MODERATE STAGE: ICD-10-CM

## 2017-02-10 DIAGNOSIS — Z96.1 PSEUDOPHAKIA OF BOTH EYES: ICD-10-CM

## 2017-02-10 DIAGNOSIS — H40.1111 PRIMARY OPEN ANGLE GLAUCOMA OF RIGHT EYE, MILD STAGE: Primary | ICD-10-CM

## 2017-02-10 PROCEDURE — 99999 PR PBB SHADOW E&M-EST. PATIENT-LVL III: CPT | Mod: PBBFAC,,, | Performed by: OPHTHALMOLOGY

## 2017-02-10 PROCEDURE — 99024 POSTOP FOLLOW-UP VISIT: CPT | Mod: S$GLB,,, | Performed by: OPHTHALMOLOGY

## 2017-02-10 PROCEDURE — 99499 UNLISTED E&M SERVICE: CPT | Mod: S$GLB,,, | Performed by: OPHTHALMOLOGY

## 2017-02-10 RX ORDER — TIMOLOL MALEATE 5 MG/ML
1 SOLUTION OPHTHALMIC EVERY MORNING
Qty: 5 ML | Refills: 12 | Status: SHIPPED | OUTPATIENT
Start: 2017-02-10 | End: 2017-07-03 | Stop reason: SDUPTHER

## 2017-02-10 NOTE — PROGRESS NOTES
HPI     DLS: 1/05/17    Pt here for post phaco w/IOL OD- 11/30/16    Meds: Timolol GFS qam ou    1. Post operative state  2. Primary open angle glaucoma of right eye, mild stage  3. Primary open angle glaucoma of left eye, moderate stage  4. Angular blepharitis, unspecified laterality  5. Dry eye syndrome, bilateral  6. Pseudophakia, both eyes          Last edited by Ana Aquino on 2/10/2017  3:04 PM.         Assessment /Plan     For exam results, see Encounter Report.    Primary open angle glaucoma of right eye, mild stage  -     timolol maleate 0.5% (TIMOPTIC-XE) 0.5 % SolG; Place 1 drop into both eyes every morning. Timoptic XE or timolol GFS (gel forming solution)  Dispense: 5 mL; Refill: 12  -     [facility] (HRT) - OU - Both Eyes; Future    Primary open angle glaucoma of left eye, moderate stage  -     timolol maleate 0.5% (TIMOPTIC-XE) 0.5 % SolG; Place 1 drop into both eyes every morning. Timoptic XE or timolol GFS (gel forming solution)  Dispense: 5 mL; Refill: 12  -     [facility] (HRT) - OU - Both Eyes; Future    Dry eye syndrome, bilateral    Angular blepharitis, unspecified laterality    Pseudophakia of both eyes      Change in last name from Glo or Glo-ashish to Ramirez - 3/3/2014    Old pt of Dr. Eber Crouch  Sister is Marina Cortez (my pt.) she sent her to me      1. Early POAG-  Both Eyes   Dx. Years ago with Dr. Eber Crouch  -Strong family hx   -IOP previously well controlled (mid teens) on Xalatan but elevated on generic and switched to lumigan    Family history   STRONG (Mom,Dad, and 5 sisters)  Glaucoma meds   lumigan qhs OU, timolol gfs QAM OU  H/O adverse rxn to glaucoma drops - combigan - itchy lids  // cosopt - angular blepharitis   LASERS   S/p SLT 4/24/14 OD //  5/8/14 OS (good response 21/22 --> 12/15)  GLAUCOMA SURGERIES   [place] od 11/30/2016   OTHER EYE SURGERIES  Phaco /IOL os 10/6/2016 // phaco/IOL / [place] od 11/30/2016   CDR  0.8 /  0.7  Tbase  ??  Tmax  26 OU off all gtts (5/24/10)    Ttarget   16 OU  HVF - 5 VF '12 -16 -  OD IAD x3 now ; OS: IAD x 3 now  Gonio +3 OU  /540  OCT 3  test 2011 to 2016  - RNFL - OD:dec. S, bord N/I // OS:bord S/N (+ prog ou)   HRT  3 test - 2012 to 2016 -MR -  Bord S/N/I od // dec. S, bord N/T os /// CDR 0.69 od // 0.73 os  Disc photos  2012, 2015 - OIS     Ttoday 14/15  Test done today - post op phaco/[place] od    2. Nuclear sclerosis - Both Eyes  -VA 20/40; 20/30  -High BAT to 20/60 // 20/100   3. Hyperopia - Both Eyes -mild   4. Epiphora  - C/O constant tearing of the right eye x 6 months (8/2015      Plan:  -IOP 18 ou (target is 16 ou)   Still above target with addition of timolol gfs (she likes this gtt)    Off combigan - intolerant - itchy lids / blepharitis   Intol to cosopt - angular blepharitis   -Strong family hx  -Goal IOP 16 OU // above target and + VF prog     + IAD ou on VF last visit - (x 3 )     CSM    Target is 16 ou     Pt does not like generics - but is using latanoprost without problems (actually prefers it to lumigan)   Intol to combigan -blepharitis / itch lids   Intol to cosopt - angular blepharitis     Tolerating  timolol gfs // timoptic XE ou q am - but IOP still above target     Pt has tried name brand xalatan for 1 month but IOP still above target   Cont timoptic XE / gfs     F/U     Phaco / IOL OS Date: 10/6/2016 - CE alone - declined combined surgery // PCB00 24.0  POM # 5  - phaco/IOL   Doing well  Off steroids   Cont  AT's 4 x day     Cont timoptic XE ou q am      S/P phaco/IOL and  [place] trabeculotomy OD 11/30/2016 - PCB00 22.5   Phaco / IOL OD Date: 11/30/2016  (combined [place])   POM # 2.5 - phaco/IOL - od - 2nd eye // [place]   Doing well    Rx glasses given 2/2017     F/u with HRT / gonio - 4 months -- update photo file with surgeries ou - if not yet done        (( Her sister had  a DSEK with [place] in her 2nd eye - her sister  has glaucoma shunts - says she is not doing to  well post op  ))    I have seen and personally examined the patient.  I agree with the findings, assessment and plan of the resident and/or fellow.     Yesica Mike MD

## 2017-03-10 NOTE — PROGRESS NOTES
Name: Radhagaurav Cody James  Referring Provider:  PT Order: PT evaluate and treat  Clinical #: [id number]  Discharge Summary Date: 3/10/2017  Diagnosis: left knee pain    Patient was seen for 4 OP PT visits from 1/4/2017 to 1/25/2017. Pt missed/no visit 8 scheduled sessions. Treatment included: evaluation, HEP, pt education, aquatic therapy, joint mobilizations, ther ex, and stretching. PT unable to fully assess goal achievement as pt did not return for follow up sessions/did not reschedule follow up visits. This patient is discharged from OP PT Services.    Sabrina Castaneda DPT  3/10/2017

## 2017-03-23 RX ORDER — PANTOPRAZOLE SODIUM 40 MG/1
40 TABLET, DELAYED RELEASE ORAL DAILY
Refills: 6 | COMMUNITY
Start: 2017-03-08 | End: 2018-01-29

## 2017-03-30 ENCOUNTER — OFFICE VISIT (OUTPATIENT)
Dept: INTERNAL MEDICINE | Facility: CLINIC | Age: 72
End: 2017-03-30
Attending: FAMILY MEDICINE
Payer: MEDICARE

## 2017-03-30 VITALS
HEART RATE: 68 BPM | SYSTOLIC BLOOD PRESSURE: 138 MMHG | HEIGHT: 65 IN | WEIGHT: 211.19 LBS | BODY MASS INDEX: 35.18 KG/M2 | DIASTOLIC BLOOD PRESSURE: 80 MMHG

## 2017-03-30 DIAGNOSIS — E78.2 MIXED HYPERLIPIDEMIA: ICD-10-CM

## 2017-03-30 DIAGNOSIS — I10 ESSENTIAL HYPERTENSION: Primary | ICD-10-CM

## 2017-03-30 DIAGNOSIS — J06.9 ACUTE URI: ICD-10-CM

## 2017-03-30 PROCEDURE — 99999 PR PBB SHADOW E&M-EST. PATIENT-LVL III: CPT | Mod: PBBFAC,,, | Performed by: FAMILY MEDICINE

## 2017-03-30 PROCEDURE — 1160F RVW MEDS BY RX/DR IN RCRD: CPT | Mod: S$GLB,,, | Performed by: FAMILY MEDICINE

## 2017-03-30 PROCEDURE — 1159F MED LIST DOCD IN RCRD: CPT | Mod: S$GLB,,, | Performed by: FAMILY MEDICINE

## 2017-03-30 PROCEDURE — 1157F ADVNC CARE PLAN IN RCRD: CPT | Mod: S$GLB,,, | Performed by: FAMILY MEDICINE

## 2017-03-30 PROCEDURE — 96372 THER/PROPH/DIAG INJ SC/IM: CPT | Mod: S$GLB,,, | Performed by: FAMILY MEDICINE

## 2017-03-30 PROCEDURE — 1125F AMNT PAIN NOTED PAIN PRSNT: CPT | Mod: S$GLB,,, | Performed by: FAMILY MEDICINE

## 2017-03-30 PROCEDURE — 3079F DIAST BP 80-89 MM HG: CPT | Mod: S$GLB,,, | Performed by: FAMILY MEDICINE

## 2017-03-30 PROCEDURE — 99499 UNLISTED E&M SERVICE: CPT | Mod: S$GLB,,, | Performed by: FAMILY MEDICINE

## 2017-03-30 PROCEDURE — 99214 OFFICE O/P EST MOD 30 MIN: CPT | Mod: 25,S$GLB,, | Performed by: FAMILY MEDICINE

## 2017-03-30 PROCEDURE — 3075F SYST BP GE 130 - 139MM HG: CPT | Mod: S$GLB,,, | Performed by: FAMILY MEDICINE

## 2017-03-30 RX ORDER — AMOXICILLIN AND CLAVULANATE POTASSIUM 875; 125 MG/1; MG/1
1 TABLET, FILM COATED ORAL 2 TIMES DAILY
Qty: 20 TABLET | Refills: 0 | Status: SHIPPED | OUTPATIENT
Start: 2017-03-30 | End: 2018-02-05 | Stop reason: ALTCHOICE

## 2017-03-30 RX ORDER — TRIAMCINOLONE ACETONIDE 40 MG/ML
40 INJECTION, SUSPENSION INTRA-ARTICULAR; INTRAMUSCULAR
Status: COMPLETED | OUTPATIENT
Start: 2017-03-30 | End: 2017-03-30

## 2017-03-30 RX ADMIN — TRIAMCINOLONE ACETONIDE 40 MG: 40 INJECTION, SUSPENSION INTRA-ARTICULAR; INTRAMUSCULAR at 03:03

## 2017-03-30 NOTE — PROGRESS NOTES
Patient, Rosmery Ramirez (MRN #[id number]), presented with a recorded BMI of 35.15 kg/m^2 and a documented comorbidity(s):  - Hypertension  - Hyperlipidemia  to which the severe obesity is a contributing factor. This is consistent with the definition of severe obesity (BMI 35.0-35.9) with comorbidity (ICD-10 E66.01, Z68.35). The patient's severe obesity was monitored, evaluated, addressed and/or treated. This addendum to the medical record is made on 03/30/2017.

## 2017-03-30 NOTE — PROGRESS NOTES
"CHIEF COMPLAINT: 2 weeks of nasal congestion and cough in a patient with elevated blood pressure and bradycardia    HISTORY OF PRESENT ILLNESS: The patient is a 71 year-old black female.  The patient has been having a 2 week history of nasal congestion, chest congestion and just not feeling well.  No fevers chills nausea vomiting blood in the stool are noted.  Over-the-counter medications are not helping.  Overall things are getting worse.      The patient has a history of stable hypertension on current medications.  Patient denies chest pain or shortness of breath today.    The patient has a history of stable hyperlipidemia on current medications.  The patient denies chest pain or shortness of breath today.  The patient denies muscle aches or myalgias suggestive of myositis.    REVIEW OF SYSTEMS:  GENERAL: No fever, chills or weight loss.  SKIN: No rashes, itching or changes in color or texture of skin.  HEAD: No headaches or recent head trauma.  EYES: No photophobia, ocular pain or diplopia.  EARS: Denies ear pain, discharge or vertigo.  NOSE: No loss of smell, no epistaxis or postnasal drip.  MOUTH & THROAT: No hoarseness or change in voice. No excessive gum bleeding.  NODES: Denies swollen glands.  CHEST: Denies PARKS, cyanosis, wheezing, cough and sputum production.  CARDIOVASCULAR: Denies chest pain, PND, orthopnea or reduced exercise tolerance.  ABDOMEN: Appetite fine. No weight loss. Denies diarrhea, abdominal pain, hematemesis or blood in stool.  URINARY: No flank pain, dysuria or hematuria.  PERIPHERAL VASCULAR: No claudication or cyanosis.  MUSCULOSKELETAL: No joint stiffness or swelling. Denies back pain.  NEUROLOGIC: No history of seizures, paralysis, alteration of gait or coordination.    SOCIAL HISTORY: The patient does not smoke.  The patient consumes alcohol socially.      PHYSICAL EXAMINATION:     Blood pressure 138/80, pulse 68, height 5' 5" (1.651 m), weight 95.8 kg (211 lb 3.2 oz).    APPEARANCE: " Well nourished, well developed, in no acute distress.    HEAD: Normocephalic, atraumatic.  EYES: PERRL. EOMI.  Conjunctivae without injection and  anicteric  EARS: TM's intact. Light reflex normal. No retraction or perforation.    NOSE: Mucosa pink. Airway clear.  MOUTH & THROAT: No tonsillar enlargement. No pharyngeal erythema or exudate. No stridor.  NECK: Supple.   NODES: No cervical, axillary or inguinal lymph node enlargement.  CHEST: Lungs clear to auscultation.  No retractions are noted.  No rales or rhonchi are present.  CARDIOVASCULAR: Normal S1, S2. No rubs, murmurs or gallops.  ABDOMEN: Bowel sounds normal. Not distended. Soft. No tenderness or masses.  No ascites is noted.  MUSCULOSKELETAL:  There is no clubbing, cyanosis, or edema of the extremities x4.  There is full range of motion of the lumbar spine.  There is full range of motion of the extremities x4.  There is no deformity noted.    NEUROLOGIC:       Normal speech development.      Hearing normal.      Normal gait.      Motor and sensory exams grossly normal.      DTR's normal.  PSYCHIATRIC: Patient is alert and oriented x3.  Thought processes are all normal.  There is no homicidality.  There is no suicidality.  There is no evidence of psychosis.    LABORATORY/RADIOLOGY:   Chart reviewed.      ASSESSMENT:   URI symptoms for 2 weeks  ACE - I cough  Hypertension, condition is well controlled on current medication regimen  Hyperlipidemia, condition is well controlled on current medication regimen  Palpitations  History of TIA    PLAN:  A Kenalog injection was given in the clinic today.  I discussed the risks and benefits of steroid injection with the patient.  The risks include liponecrosis, exacerbation of diabetes, specifically elevated blood sugars, adrenal suppression, and markedly elevated mood and energy.  The patient is aware of and accepts these risks.  Augmentin    Losartan 50 mg by mouth daily  Return to clinic in 6 months

## 2017-03-30 NOTE — MR AVS SNAPSHOT
[facility]  [address]  [place] [address]  Phone: [phone]  Fax: [phone]                  Rosmery Ramirez   3/30/2017 2:40 PM   Office Visit    Description:  Female : 1945   Provider:  Deangelo Gann MD   Department:  [facility]           Reason for Visit     Fatigue     Nasal Congestion     Chest Congestion           Diagnoses this Visit        Comments    Essential hypertension    -  Primary     Acute URI         Mixed hyperlipidemia                To Do List           Future Appointments        Provider Department Dept Phone    3/31/2017 10:00 AM Deangelo Gann MD [facility] [phone]    2017 7:45 AM [facility] [phone]    2017 8:00 AM Yesica Mike MD [facility] [phone]      Goals (5 Years of Data)     None      Follow-Up and Disposition     Return in about 6 months (around 2017).       These Medications        Disp Refills Start End    amoxicillin-clavulanate 875-125mg (AUGMENTIN) 875-125 mg per tablet 20 tablet 0 3/30/2017     Take 1 tablet by mouth 2 (two) times daily. - Oral    Pharmacy: [facility]/pharmacy #[id number] - [place] - [address]  #: [phone]         [facility]     [facility] Nurse Care Line -  Assistance  Unless otherwise directed by your provider, please contact [facility], our nurse care line that is available for  assistance.     Registered nurses in the [facility] provide: appointment scheduling, clinical advisement, health education, and other advisory services.  Call: [phone] (toll free)               Medications           Message regarding Medications     Verify the changes and/or additions to your medication regime listed below are the same as discussed with your clinician today.  If any of these changes or additions are incorrect, please notify your  healthcare provider.        START taking these NEW medications        Refills    amoxicillin-clavulanate 875-125mg (AUGMENTIN) 875-125 mg per tablet 0    Sig: Take 1 tablet by mouth 2 (two) times daily.    Class: Normal    Route: Oral      These medications were administered today        Dose Freq    triamcinolone acetonide injection 40 mg 40 mg Clinic/HOD 1 time    Sig: Inject 1 mL (40 mg total) into the muscle one time.    Class: Normal    Route: Intramuscular           Verify that the below list of medications is an accurate representation of the medications you are currently taking.  If none reported, the list may be blank. If incorrect, please contact your healthcare provider. Carry this list with you in case of emergency.           Current Medications     aspirin (ECOTRIN) 81 MG EC tablet Take 81 mg by mouth once daily.      calcium 500 mg Tab Take 500 mg by mouth once daily.     CYANOCOBALAMIN, VITAMIN B-12, ORAL Place 1 drop under the tongue.    fexofenadine (ALLEGRA) 30 MG tablet Take 30 mg by mouth once daily.     fluticasone (FLONASE) 50 mcg/actuation nasal spray 2 sprays by Nasal route daily as needed.     hydrochlorothiazide (MICROZIDE) 12.5 mg capsule Take 1 capsule (12.5 mg total) by mouth once daily.    krill oil-omega-3-dha-epa 150-450 mg CpDR Take 1 tablet by mouth once daily.    lorazepam (ATIVAN) 1 MG tablet TAKE 1 TABLET (1 MG TOTAL) BY MOUTH NIGHTLY AS NEEDED (INSOMNIA).    losartan (COZAAR) 50 MG tablet TAKE 1 TABLET BY MOUTH EVERY DAY    multivitamin Chew 1 lozenge.    pantoprazole (PROTONIX) 40 MG tablet Take 40 mg by mouth once daily.    timolol maleate 0.5% (TIMOPTIC-XE) 0.5 % SolG Place 1 drop into both eyes every morning. Timoptic XE or timolol GFS (gel forming solution)    amoxicillin-clavulanate 875-125mg (AUGMENTIN) 875-125 mg per tablet Take 1 tablet by mouth 2 (two) times daily.           Clinical Reference Information           Your Vitals Were     BP Pulse Height Weight BMI     "138/80 68 5' 5" (1.651 m) 95.8 kg (211 lb 3.2 oz) 35.15 kg/m2      Blood Pressure          Most Recent Value    BP  138/80      Allergies as of 3/30/2017     Lisinopril    Amlodipine    Combigan [Brimonidine-timolol]    Cosopt [Dorzolamide-timolol]    Pravastatin      Immunizations Administered on Date of Encounter - 3/30/2017     None      Administrations This Visit     triamcinolone acetonide injection 40 mg     Admin Date Action Dose Route Administered By             03/30/2017 Given 40 mg Intramuscular Greta Campoverde LPN                      Instructions    Your test results will be communicated to you via: [facility], Telephone or Letter.  If you have not received your test results within one week. Please contact the clinic.           Language Assistance Services     ATTENTION: Language assistance services are available, free of charge. Please call [phone].      ATENCIÓN: Si habla español, tiene a rand disposición servicios gratuitos de asistencia lingüística. Llame al [phone].     CHÚ Ý: N?u b?n nói Ti?ng Vi?t, có các d?ch v? h? tr? ngôn ng? mi?n phí dành cho b?n. G?i s? [phone].         [facility] complies with applicable Federal civil rights laws and does not discriminate on the basis of race, color, national origin, age, disability, or sex.        "

## 2017-03-30 NOTE — PROGRESS NOTES
Patient given Kenalog 40mg IM in the [facility]. Patient tolerated well and Band-Aid was applied. Lot#[id number] Exp:9/2018. Patient advised to wait in the lobby for 15 min to make sure no adverse reactions occur. Patient states verbal understanding and has no further questions.

## 2017-06-09 ENCOUNTER — CLINICAL SUPPORT (OUTPATIENT)
Dept: OPHTHALMOLOGY | Facility: CLINIC | Age: 72
End: 2017-06-09
Attending: OPHTHALMOLOGY
Payer: MEDICARE

## 2017-06-09 DIAGNOSIS — H01.009 ANGULAR BLEPHARITIS, UNSPECIFIED LATERALITY: ICD-10-CM

## 2017-06-09 DIAGNOSIS — H40.1122 PRIMARY OPEN ANGLE GLAUCOMA OF LEFT EYE, MODERATE STAGE: ICD-10-CM

## 2017-06-09 DIAGNOSIS — H04.123 DRY EYE SYNDROME, BILATERAL: ICD-10-CM

## 2017-06-09 DIAGNOSIS — H40.1111 PRIMARY OPEN ANGLE GLAUCOMA OF RIGHT EYE, MILD STAGE: ICD-10-CM

## 2017-06-09 DIAGNOSIS — H04.201 EPIPHORA, RIGHT: ICD-10-CM

## 2017-06-09 DIAGNOSIS — Z96.1 PSEUDOPHAKIA OF BOTH EYES: ICD-10-CM

## 2017-06-09 DIAGNOSIS — H40.1111 PRIMARY OPEN ANGLE GLAUCOMA OF RIGHT EYE, MILD STAGE: Primary | ICD-10-CM

## 2017-06-09 PROCEDURE — 92012 INTRM OPH EXAM EST PATIENT: CPT | Mod: S$GLB,,, | Performed by: OPHTHALMOLOGY

## 2017-06-09 PROCEDURE — 99999 PR PBB SHADOW E&M-EST. PATIENT-LVL II: CPT | Mod: PBBFAC,,, | Performed by: OPHTHALMOLOGY

## 2017-06-09 PROCEDURE — 92134 CPTRZ OPH DX IMG PST SGM RTA: CPT | Mod: S$GLB,,, | Performed by: OPHTHALMOLOGY

## 2017-06-09 RX ORDER — IRBESARTAN 150 MG/1
TABLET ORAL
COMMUNITY
Start: 2017-04-17 | End: 2018-01-29

## 2017-06-09 RX ORDER — BENZONATATE 100 MG/1
CAPSULE ORAL
COMMUNITY
Start: 2017-06-03 | End: 2018-01-29

## 2017-06-09 RX ORDER — IRBESARTAN 300 MG/1
300 TABLET ORAL DAILY
COMMUNITY
Start: 2017-05-25 | End: 2018-07-03 | Stop reason: ALTCHOICE

## 2017-06-09 RX ORDER — AMOXICILLIN 875 MG/1
TABLET, FILM COATED ORAL
COMMUNITY
Start: 2017-06-03 | End: 2018-02-05 | Stop reason: ALTCHOICE

## 2017-06-09 RX ORDER — MINOCYCLINE HYDROCHLORIDE 100 MG/1
CAPSULE ORAL
COMMUNITY
Start: 2017-04-17 | End: 2018-01-29

## 2017-06-09 NOTE — PROGRESS NOTES
HPI     DLS: 2/10/17    Pt here for HRT review;    Meds: Timolol GFS qam ou    1. Primary open angle glaucoma of right eye, mild stage  2. Primary open angle glaucoma of left eye, moderate stage  3. Dry eye syndrome, bilateral  4. Angular blepharitis, unspecified laterality  5. Pseudophakia, both eyes      Last edited by Ana Aquino on 6/9/2017  8:07 AM. (History)            Assessment /Plan     For exam results, see Encounter Report.    Primary open angle glaucoma of right eye, mild stage    Primary open angle glaucoma of left eye, moderate stage    Dry eye syndrome, bilateral    Angular blepharitis, unspecified laterality    Pseudophakia of both eyes    Nuclear sclerosis, right    Pseudophakia of left eye    Epiphora, right    Old pt of Dr. Eber Crouch  Sister is Marina Cortez (my pt.) she sent her to me      1. Early POAG-  Both Eyes   Dx. Years ago with Dr. Eber Crouch  -Strong family hx   -IOP previously well controlled (mid teens) on Xalatan but elevated on generic and switched to lumigan    Family history   STRONG (Mom,Dad, and 5 sisters)  Glaucoma meds   lumigan qhs OU, timolol gfs QAM OU  H/O adverse rxn to glaucoma drops - combigan - itchy lids  // cosopt - angular blepharitis   LASERS   S/p SLT 4/24/14 OD //  5/8/14 OS (good response 21/22 --> 12/15)  GLAUCOMA SURGERIES   [place] od 11/30/2016   OTHER EYE SURGERIES  Phaco /IOL os 10/6/2016 // phaco/IOL / [place] od 11/30/2016   CDR  0.8 / 0.7  Tbase  ??  Tmax  26 OU off all gtts (5/24/10)    Ttarget   16 OU  HVF - 5 VF '12 -16 -  OD IAD x3 now ; OS: IAD x 3 now  Gonio +3 OU  /540  OCT 3  test 2011 to 2016  - RNFL - OD:dec. S, shannon N/I // OS:bord S/N (+ prog ou)   HRT  4 test - 2012 to 2017 - MR -  Dec. I, bord S/N od // dec. S, borjoe N/T os /// CDR 0.71 od // 0.73 os  Disc photos  2012, 2015 - OIS     Ttoday 17/16  Test done today - post op phaco/[place] od    2. Nuclear sclerosis - Both Eyes  -VA 20/40; 20/30  -High BAT to 20/60 // 20/100   3.  Hyperopia - Both Eyes -mild   4. Epiphora  - C/O constant tearing of the right eye x 6 months (8/2015      Plan:  -IOP 17/16 ou (target is 16 ou)   Still above target with addition of timolol gfs (she likes this gtt)    Off combigan - intolerant - itchy lids / blepharitis   Intol to cosopt - angular blepharitis   -Strong family hx  -Goal IOP 16 OU // above target and + VF prog     + IAD ou on VF last visit - (x 3 )     CSM    Target is 16 ou     Pt does not like generics - but is using latanoprost without problems (actually prefers it to lumigan)   Intol to combigan -blepharitis / itch lids   Intol to cosopt - angular blepharitis     Tolerating  timolol gfs // timoptic XE ou q am - but IOP still above target     Pt has tried name brand xalatan for 1 month but IOP still above target   Cont timoptic XE / gfs     F/U     Phaco / IOL OS Date: 10/6/2016 - CE alone - declined combined surgery // PCB00 24.0  POM # 5  - phaco/IOL   Doing well  Off steroids   Cont  AT's 4 x day     Cont timoptic XE ou q am      S/P phaco/IOL and  [place] trabeculotomy OD 11/30/2016 - PCB00 22.5   Phaco / IOL OD Date: 11/30/2016  (combined [place])   POM # 2.5 - phaco/IOL - od - 2nd eye // [place]   Doing well    Rx glasses given 2/2017     F/u with HRT / gonio - 4 months -- update photo file with surgeries ou - if not yet done        (( Her sister had  a DSEK with [place] in her 2nd eye - her sister  has glaucoma shunts - says she is not doing to well post op  ))    I have seen and personally examined the patient.  I agree with the findings, assessment and plan of the resident and/or fellow.     Yesica Mike MD

## 2017-07-03 ENCOUNTER — PATIENT MESSAGE (OUTPATIENT)
Dept: OPHTHALMOLOGY | Facility: CLINIC | Age: 72
End: 2017-07-03

## 2017-07-03 DIAGNOSIS — H40.1122 PRIMARY OPEN ANGLE GLAUCOMA OF LEFT EYE, MODERATE STAGE: ICD-10-CM

## 2017-07-03 DIAGNOSIS — H40.1111 PRIMARY OPEN ANGLE GLAUCOMA OF RIGHT EYE, MILD STAGE: ICD-10-CM

## 2017-07-03 RX ORDER — TIMOLOL MALEATE 5 MG/ML
1 SOLUTION OPHTHALMIC EVERY MORNING
Qty: 5 ML | Refills: 12 | Status: SHIPPED | OUTPATIENT
Start: 2017-07-03 | End: 2018-07-03 | Stop reason: SDUPTHER

## 2017-08-23 ENCOUNTER — PATIENT MESSAGE (OUTPATIENT)
Dept: OPHTHALMOLOGY | Facility: CLINIC | Age: 72
End: 2017-08-23

## 2017-08-24 ENCOUNTER — PATIENT MESSAGE (OUTPATIENT)
Dept: OPHTHALMOLOGY | Facility: CLINIC | Age: 72
End: 2017-08-24

## 2017-10-06 ENCOUNTER — OFFICE VISIT (OUTPATIENT)
Dept: OPHTHALMOLOGY | Facility: CLINIC | Age: 72
End: 2017-10-06
Attending: OPHTHALMOLOGY
Payer: MEDICARE

## 2017-10-06 DIAGNOSIS — H40.1111 PRIMARY OPEN ANGLE GLAUCOMA OF RIGHT EYE, MILD STAGE: ICD-10-CM

## 2017-10-06 DIAGNOSIS — H40.1122 PRIMARY OPEN ANGLE GLAUCOMA OF LEFT EYE, MODERATE STAGE: ICD-10-CM

## 2017-10-06 PROCEDURE — 92014 COMPRE OPH EXAM EST PT 1/>: CPT | Mod: S$GLB,,, | Performed by: OPHTHALMOLOGY

## 2017-10-06 PROCEDURE — 92083 EXTENDED VISUAL FIELD XM: CPT | Mod: S$GLB,,, | Performed by: OPHTHALMOLOGY

## 2017-10-06 PROCEDURE — 99999 PR PBB SHADOW E&M-EST. PATIENT-LVL II: CPT | Mod: PBBFAC,,, | Performed by: OPHTHALMOLOGY

## 2017-10-06 PROCEDURE — 92133 CPTRZD OPH DX IMG PST SGM ON: CPT | Mod: S$GLB,,, | Performed by: OPHTHALMOLOGY

## 2017-10-06 NOTE — PROGRESS NOTES
HPI     DLS: 6/09/17    Pt here for HVF review;    Meds: Timolol GFS qam ou            AT's prn ou    1. Primary open angle glaucoma of right eye, mild stage  2. Primary open angle glaucoma of left eye, moderate stage  3. Dry eye syndrome, bilateral  4. Angular blepharitis, unspecified laterality  5. Nuclear sclerosis, right   6. Pseudophakia, left eye  7. Epiphora, right     Last edited by Ana Aquino on 10/6/2017  9:13 AM. (History)            Assessment /Plan     For exam results, see Encounter Report.    Primary open angle glaucoma of right eye, mild stage  -     Posterior Segment OCT Optic Nerve- Both eyes    Primary open angle glaucoma of left eye, moderate stage  -     Posterior Segment OCT Optic Nerve- Both eyes    Old pt of Dr. Eber Crouch  Sister is Marina Cortez (my pt.) she sent her to me      1. Early POAG-  Both Eyes   Dx. Years ago with Dr. Eber Crouch  -Strong family hx   -IOP previously well controlled (mid teens) on Xalatan but elevated on generic and switched to lumigan    Family history   STRONG (Mom,Dad, and 5 sisters)  Glaucoma meds   lumigan qhs OU, timolol gfs QAM OU  H/O adverse rxn to glaucoma drops - combigan - itchy lids  // cosopt - angular blepharitis   LASERS   S/p SLT 4/24/14 OD //  5/8/14 OS (good response 21/22 --> 12/15)  GLAUCOMA SURGERIES   [place] od 11/30/2016   OTHER EYE SURGERIES  Phaco /IOL os 10/6/2016 // phaco/IOL / [place] od 11/30/2016   CDR  0.8 / 0.75  Tbase  ??  Tmax  26 OU off all gtts (5/24/10)    Ttarget   16 OU  HVF - 6 VF '12 -17 -  OD IAD x3 now ; OS: IAD x 3 now  Gonio +3 OU  /540  OCT 4  test 2011 to 2017  - RNFL - OD:dec. S/N/I  // OS dec S. Brianna N (+ prog ou)   HRT  5 test - 2012 to 2017 - MR -  Dec. Ibrianna S/N od // dec. Sbrianna N/T os /// CDR 0.71 od // 0.73 os  Disc photos  2012, 2015 - OIS     Ttoday 17/16  Test done today -, HVFs, DFE, OCT  IOP check   2. H/O - hyperopia - Both Eyes -mild - pre-cataract surgery    3. Epiphora  - C/O constant  tearing of the right eye x 6 months (8/2015   4.  PC IOL   OD - phaco/IOL / [place] 11/30 /2016 - PCB00 22.5  OS - Phaco/IOL - NO COLTON's - pt declined - 10/6/2016 - PCB00 24.0       Plan:  -IOP 19/17 ou (target is 16 ou)   Still above target with addition of timolol gfs (she likes this gtt)  -  She did not take her gtts this morning  Off combigan - intolerant - itchy lids / blepharitis   Intol to cosopt - angular blepharitis   -Strong family hx  -Goal IOP 16 OU // above target and + however no HVF progression noted today OU.    + IAD ou on VF last visit - (x 3 )     CSM    Target is 16 ou     Pt does not like generics - but has used  Latanoprost in past  without problems (actually prefers it to lumigan)   Intol to combigan -blepharitis / itch lids   Intol to cosopt - angular blepharitis     Tolerating  timolol gfs // timoptic XE ou q am - IOP higher than target today but forgot to take her drops this morning     Cont timoptic XE / gfs     Rx glasses given 2/2017     F/u  - 4 months -- IOP check other test are up to date - if IOP above target can add latanoprost back (has used in past) or try azopt - her sister is using azopt with good results and tolerance

## 2017-10-09 RX ORDER — HYDROCHLOROTHIAZIDE 12.5 MG/1
12.5 CAPSULE ORAL DAILY
Qty: 90 CAPSULE | Refills: 3 | Status: SHIPPED | OUTPATIENT
Start: 2017-10-09 | End: 2018-10-30 | Stop reason: SDUPTHER

## 2018-01-08 ENCOUNTER — PATIENT MESSAGE (OUTPATIENT)
Dept: OPHTHALMOLOGY | Facility: CLINIC | Age: 73
End: 2018-01-08

## 2018-01-15 ENCOUNTER — TELEPHONE (OUTPATIENT)
Dept: SPORTS MEDICINE | Facility: CLINIC | Age: 73
End: 2018-01-15

## 2018-01-15 DIAGNOSIS — Z71.3 NUTRITIONAL COUNSELING: Primary | ICD-10-CM

## 2018-01-23 ENCOUNTER — PATIENT MESSAGE (OUTPATIENT)
Dept: OPHTHALMOLOGY | Facility: CLINIC | Age: 73
End: 2018-01-23

## 2018-01-29 ENCOUNTER — OFFICE VISIT (OUTPATIENT)
Dept: UROLOGY | Facility: CLINIC | Age: 73
End: 2018-01-29
Payer: MEDICARE

## 2018-01-29 VITALS
HEIGHT: 65 IN | HEART RATE: 68 BPM | SYSTOLIC BLOOD PRESSURE: 139 MMHG | BODY MASS INDEX: 33.46 KG/M2 | WEIGHT: 200.81 LBS | DIASTOLIC BLOOD PRESSURE: 75 MMHG

## 2018-01-29 DIAGNOSIS — R10.2 PELVIC PAIN: Primary | ICD-10-CM

## 2018-01-29 PROCEDURE — 99999 PR PBB SHADOW E&M-EST. PATIENT-LVL III: CPT | Mod: PBBFAC,,, | Performed by: UROLOGY

## 2018-01-29 PROCEDURE — 99203 OFFICE O/P NEW LOW 30 MIN: CPT | Mod: S$GLB,,, | Performed by: UROLOGY

## 2018-01-29 PROCEDURE — 87086 URINE CULTURE/COLONY COUNT: CPT

## 2018-01-29 NOTE — PROGRESS NOTES
Subjective:       Patient ID: Rosmery Ramirez is a 72 y.o. female.    Chief Complaint: new patient (c/o urine hurts and she feel like here bladder is not empty out completely. she state her bladder feel full all the time.)    HPI patient is several day history of pelvic pain with bloating.  Feels that she doesn't empty her bladder however her urinalysis is clear and her postvoid residual is minimal.  She's not having any flank pain.  No recurrent urinary tract infections.  No fever chills dysuria.  Patient is taking Pyridium without much improvement.  She denies stress or urge incontinence or any other irritative symptoms.  No history of interstitial cystitis    Past Medical History:   Diagnosis Date    Allergy     Anxiety     Cataract     Edema of leg 10/5/2012    bilateral     Fractures 2011    thumb R    GERD (gastroesophageal reflux disease)     Glaucoma     History of colonic polyps     Hx-TIA (transient ischemic attack) 8/13/2012 Jan 2012: LLE and left facial numbness lasting 1 hour     Hyperlipidemia     Hypertension     Left shoulder tendonitis 8/8/2015    Left shoulder tendonitis     Primary open-angle glaucoma, mild stage - Both Eyes 6/3/2013    TIA (transient ischemic attack) Jan 2012    LLE and left facial numbness lasting 1 hour       Past Surgical History:   Procedure Laterality Date    BREAST SURGERY Left     lumpectomy    CATARACT EXTRACTION W/  INTRAOCULAR LENS IMPLANT Left 10/05/2016    OS (Dr. Mike)    CATARACT EXTRACTION W/  INTRAOCULAR LENS IMPLANT Right 11/30/2016    OD (Dr. Mike)    COLONOSCOPY N/A 1/23/2017    Procedure: COLONOSCOPY;  Surgeon: Hany Mosquera MD;  Location: [address]);  Service: Endoscopy;  Laterality: N/A;    HAND SURGERY Left 2011    thumb    HYSTERECTOMY  1980's    Total;    Left Breast Lumpectomy Left     performed in 1960s    SELECTIVE LASER TRABECUPLASTY  05/2014    OU w/ Dr. Mike    TUBAL LIGATION  1970's     VAGINAL DELIVERY      x3       Family History   Problem Relation Age of Onset    Breast cancer Mother     Glaucoma Mother     Alzheimer's disease Mother     Blindness Mother     Cataracts Mother     Glaucoma Father     Prostate cancer Father     Blindness Father     Cataracts Father     Glaucoma Sister     No Known Problems Brother     No Known Problems Brother     Alzheimer's disease Maternal Grandfather     Macular degeneration Neg Hx     Retinal detachment Neg Hx     Strabismus Neg Hx     Amblyopia Neg Hx     Heart attack Neg Hx     Heart disease Neg Hx        Social History     Social History    Marital status:      Spouse name: N/A    Number of children: 3    Years of education: N/A     Occupational History    Not on file.     Social History Main Topics    Smoking status: Never Smoker    Smokeless tobacco: Never Used    Alcohol use 0.0 oz/week      Comment: Rarely; 1 glass of wine    Drug use: No    Sexual activity: Yes     Birth control/ protection: Post-menopausal, See Surgical Hx     Other Topics Concern    Not on file     Social History Narrative    No narrative on file       Allergies:  Lisinopril; Amlodipine; Combigan [brimonidine-timolol]; Cosopt [dorzolamide-timolol]; and Pravastatin    Medications:    Current Outpatient Prescriptions:     aspirin (ECOTRIN) 81 MG EC tablet, Take 81 mg by mouth once daily.  , Disp: , Rfl:     calcium 500 mg Tab, Take 500 mg by mouth once daily. , Disp: , Rfl:     CYANOCOBALAMIN, VITAMIN B-12, ORAL, Place 1 drop under the tongue., Disp: , Rfl:     hydrochlorothiazide (MICROZIDE) 12.5 mg capsule, TAKE 1 CAPSULE (12.5 MG TOTAL) BY MOUTH ONCE DAILY., Disp: 90 capsule, Rfl: 3    irbesartan (AVAPRO) 300 MG tablet, , Disp: , Rfl:     krill oil-omega-3-dha-epa 150-450 mg CpDR, Take 1 tablet by mouth once daily., Disp: , Rfl:     lorazepam (ATIVAN) 1 MG tablet, TAKE 1 TABLET (1 MG TOTAL) BY MOUTH NIGHTLY AS NEEDED (INSOMNIA)., Disp: 30  tablet, Rfl: 0    losartan (COZAAR) 50 MG tablet, TAKE 1 TABLET BY MOUTH EVERY DAY, Disp: 90 tablet, Rfl: 3    multivitamin Chew, 1 lozenge., Disp: , Rfl:     timolol maleate 0.5% (TIMOPTIC-XE) 0.5 % SolG, Place 1 drop into both eyes every morning. Timoptic XE or timolol GFS (gel forming solution), Disp: 5 mL, Rfl: 12    amoxicillin (AMOXIL) 875 MG tablet, , Disp: , Rfl:     amoxicillin-clavulanate 875-125mg (AUGMENTIN) 875-125 mg per tablet, Take 1 tablet by mouth 2 (two) times daily., Disp: 20 tablet, Rfl: 0    Review of Systems   Constitutional: Negative.    HENT: Negative.    Eyes: Negative.    Respiratory: Negative.    Endocrine: Negative.    Genitourinary: Positive for pelvic pain.   Musculoskeletal: Negative.    Allergic/Immunologic: Negative.    Neurological: Negative.    Hematological: Negative.    Psychiatric/Behavioral: Negative.        Objective:      Physical Exam    Assessment:       1. Pelvic pain        Plan:       Rosmery was seen today for new patient.    Diagnoses and all orders for this visit:    Pelvic pain  -     US Retroperitoneal Complete (Kidney and; Future  -     US Pelvis Complete Non OB; Future  -     Cystoscopy; Future  -     Urine culture; Future        patient is status post hysterectomy and there is no evidence of cystocele

## 2018-01-30 LAB — BACTERIA UR CULT: NORMAL

## 2018-02-05 ENCOUNTER — OFFICE VISIT (OUTPATIENT)
Dept: OPHTHALMOLOGY | Facility: CLINIC | Age: 73
End: 2018-02-05
Payer: MEDICARE

## 2018-02-05 DIAGNOSIS — H40.1111 PRIMARY OPEN ANGLE GLAUCOMA OF RIGHT EYE, MILD STAGE: Primary | ICD-10-CM

## 2018-02-05 DIAGNOSIS — H04.201 EPIPHORA, RIGHT: ICD-10-CM

## 2018-02-05 DIAGNOSIS — H04.123 DRY EYE SYNDROME, BILATERAL: ICD-10-CM

## 2018-02-05 DIAGNOSIS — H40.1122 PRIMARY OPEN ANGLE GLAUCOMA OF LEFT EYE, MODERATE STAGE: ICD-10-CM

## 2018-02-05 DIAGNOSIS — Z96.1 PSEUDOPHAKIA OF BOTH EYES: ICD-10-CM

## 2018-02-05 DIAGNOSIS — H01.009 ANGULAR BLEPHARITIS, UNSPECIFIED LATERALITY: ICD-10-CM

## 2018-02-05 DIAGNOSIS — H40.89 OTHER GLAUCOMA OF BOTH EYES: ICD-10-CM

## 2018-02-05 PROCEDURE — 99999 PR PBB SHADOW E&M-EST. PATIENT-LVL II: CPT | Mod: PBBFAC,,, | Performed by: OPHTHALMOLOGY

## 2018-02-05 PROCEDURE — 92012 INTRM OPH EXAM EST PATIENT: CPT | Mod: S$GLB,,, | Performed by: OPHTHALMOLOGY

## 2018-02-05 NOTE — PROGRESS NOTES
HPI     Glaucoma    Additional comments: 4 month IOP ck today           Comments   DLS: 10/6/17    1) POAG  2) PCIOL OU  3) Hx Hyperopia  4) Epiphora    MEDS:  T1/2 GFS QAM OU  Systane PRN OU       Last edited by Alicia Felix MA on 2/5/2018  8:22 AM. (History)            Assessment /Plan     For exam results, see Encounter Report.    Primary open angle glaucoma of right eye, mild stage    Primary open angle glaucoma of left eye, moderate stage    Dry eye syndrome, bilateral    Angular blepharitis, unspecified laterality    Pseudophakia of both eyes    Epiphora, right        Old pt of Dr. Eber Crouch  Sister is Marina Cortez (my pt.) she sent her to me      1. Early POAG-  Both Eyes   Dx. Years ago with Dr. Eber Crouch  -Strong family hx   -IOP previously well controlled (mid teens) on Xalatan but elevated on generic and switched to lumigan    Family history   STRONG (Mom,Dad, and 5 sisters)  Glaucoma meds   lumigan qhs OU, timolol gfs QAM OU  H/O adverse rxn to glaucoma drops - combigan - itchy lids  // cosopt - angular blepharitis   LASERS   S/p SLT 4/24/14 OD //  5/8/14 OS (good response 21/22 --> 12/15)  GLAUCOMA SURGERIES   [place] od 11/30/2016   OTHER EYE SURGERIES  Phaco /IOL os 10/6/2016 // phaco/IOL / [place] od 11/30/2016   CDR  0.8 / 0.75  Tbase  ??  Tmax  26 OU off all gtts (5/24/10)    Ttarget   16 OU  HVF - 6 VF '12 -17 -  OD IAD x3 now ; OS: IAD x 3 now  Gonio +3 OU  /540  OCT 4  test 2011 to 2017  - RNFL - OD:dec. S/N/I  // OS dec S. Brianna N (+ prog ou)   HRT  5 test - 2012 to 2017 - MR -  Dec. Ibrianna S/N od // dec. Sbrianna N/T os /// CDR 0.71 od // 0.73 os  Disc photos  2012, 2015 - OIS     Ttoday 16/17  Test done today -  IOP check   2. H/O - hyperopia - Both Eyes -mild - pre-cataract surgery    3. Epiphora  - C/O constant tearing of the right eye x 6 months (8/2015   4.  PC IOL   OD - phaco/IOL / [place] 11/30 /2016 - PCB00 22.5  OS - Phaco/IOL - NO COLTON's - pt declined - 10/6/2016 - PCB00 24.0        Plan:  -IOP 16/17ou (target is 16 ou)   Off combigan - intolerant - itchy lids / blepharitis   Intol to cosopt - angular blepharitis   -Strong family hx  -Goal IOP 16 OU    CSM    Target is 16 ou     Pt does not like generics - but has used  Latanoprost in past  without problems (actually prefers it to lumigan)   Intol to combigan -blepharitis / itch lids   Intol to cosopt - angular blepharitis     Tolerating  timolol gfs // timoptic XE ou q am - IOP higher than target today but forgot to take her drops this morning     Cont timoptic XE / gfs     Rx glasses given 2/2017     F/u  - 4 months -- HRT / Gonio - if IOP above target can add latanoprost back (has used in past) or try azopt - her sister is using azopt with good results and tolerance

## 2018-02-15 NOTE — PROGRESS NOTES
This note was created by combination of typed  and Dragon dictation.  Transcription errors may be present.  If there are any questions, please contact me.    Assessment / Plan:   Lightheadedness  Vertigo  -By history she has symptoms that either indicate vertigo or lightheadedness, perhaps some combination thereof.  She was worried this was related to hypotension and her blood pressures are actually at goal and I would not decrease her blood pressure medicines further.  This does not sound like TIA type symptoms.  Nonetheless she was counseled that if she expenses unilateral weakness or focal neural deficits that she needs to call 911 immediately.  I gave her a home modified Epley maneuver handout in case this is vertigo.    Essential hypertension-stable on current regimen, no change, irbesartan 300, HCTZ 12.5    Need for shingles vaccine-prescription sent to pharmacy  -     zoster vaccine live, PF, (ZOSTAVAX, PF,) 19,400 unit/0.65 mL injection; Inject 19,400 Units into the skin once.  Dispense: 1 vial; Refill: 0              There are no discontinued medications.  Modified Medications    No medications on file     New Prescriptions    No medications on file         Follow Up: No Follow-up on file.      Subjective:     Chief Complaint   Patient presents with    Hypertension     running low due to weight loss       HPI  Rosmery is a 72 y.o. female, last appointment with this clinic was Visit date not found.    No LMP recorded. Patient has had a hysterectomy.    Has been seeing family medicine at the [facility].  Essential hypertension, losartan.  HCTZ.  History of irbesartan.  Mixed hyperlipidemia; hx pravastatin but notes SE so no longer on statin.  Obesity  Status post SANTY  History of colon polyps.    11/7/2011 colonoscopy normal repeat 5 years   1/23/2017 colonoscopy normal repeat 5 years  Glaucoma followed by ophthalmology  Pelvic pain and incomplete void sensation, recently seen by  urology.    Has been purposefully losing weight.  Smaller portions and better food choices. Motivated by abn labs done recently.  With [facility].  Estimated GFR was now in the high 50s (before was > 60).  Saw dietician with [facility].    But now more lightheaded feeling.  No longer eating sweets, drinking soft drinks.  With waking up lightheaded, and on occasion during the day lightheaded.  Home reading cuff < 120.  Here was elevated. The lightheaded sensation can last for 30 min and sometimes sensation can be hours.  Walking more.  But with walking the other day felt like she was lightheaded severely and felt like she might fall over.  She tells me she has an occasional feeling or she may want to fall to her left side.  Does not see stars.  On discussion she feels like an unsteadiness, as if the ground was unsteady.  She has no unilateral tingling or weakness, no slurred speech.  She has a remote history of TIA type symptoms manifesting with numbness on one side of the face and this is not consistent with such.    But then she also notes that this feeling may happen when she bends over to tie her shoes and when she sits back up she will get this sensation.  So she has features consistent with both vertigo but also lightheadedness.    Answers for HPI/ROS submitted by the patient on 2/10/2018   Hypertension  Chronicity: recurrent  Onset: more than 1 year ago  Progression since onset: gradually improving  Condition status: resistant  anxiety: No  peripheral edema: No  sweats: No  Agents associated with hypertension: no associated agents  CAD risks: diabetes mellitus, dyslipidemia, obesity  Compliance problems: diet  Past treatments: angiotensin blockers, diuretics  Improvement on treatment: no improvement      Patient Care Team:  Primary Doctor No as PCP - General    Patient Active Problem List    Diagnosis Date Noted    History of colon polyps 01/23/2017    Chronic pain of left knee 01/05/2017    Nuclear  sclerosis 11/30/2016    Cataract 09/07/2016    Obesity, Class II, BMI 35-39.9, with comorbidity 07/20/2016    Angular blepharitis 01/05/2016    POAG (primary open-angle glaucoma) 01/05/2016    HTN (hypertension) 08/13/2012    Hyperlipidemia 08/13/2012       PAST MEDICAL HISTORY:  Past Medical History:   Diagnosis Date    Allergy     Anxiety     Edema of leg 10/5/2012    bilateral     Fractures 2011    thumb R    GERD (gastroesophageal reflux disease)     Glaucoma     History of colonic polyps     Hx-TIA (transient ischemic attack) 8/13/2012 Jan 2012: LLE and left facial numbness lasting 1 hour     Hyperlipidemia     Hypertension     Left shoulder tendonitis 8/8/2015    Left shoulder tendonitis     Primary open-angle glaucoma, mild stage - Both Eyes 6/3/2013    TIA (transient ischemic attack) Jan 2012    LLE and left facial numbness lasting 1 hour       PAST SURGICAL HISTORY:  Past Surgical History:   Procedure Laterality Date    BREAST SURGERY Left     lumpectomy    CATARACT EXTRACTION W/  INTRAOCULAR LENS IMPLANT Left 10/05/2016    OS (Dr. Mike)    CATARACT EXTRACTION W/  INTRAOCULAR LENS IMPLANT Right 11/30/2016    OD (Dr. Mike)    COLONOSCOPY N/A 1/23/2017    Procedure: COLONOSCOPY;  Surgeon: Hany Mosquera MD;  Location: [facility] ([address] [place] [address]);  Service: Endoscopy;  Laterality: N/A;    HAND SURGERY Left 2011    thumb    HYSTERECTOMY  1980's    Total;    Left Breast Lumpectomy Left     performed in 1960s    SELECTIVE LASER TRABECUPLASTY  05/2014    OU w/ Dr. Mike    TUBAL LIGATION  1970's    VAGINAL DELIVERY      x3       SOCIAL HISTORY:  Social History     Social History    Marital status:      Spouse name: N/A    Number of children: 3    Years of education: N/A     Occupational History    Not on file.     Social History Main Topics    Smoking status: Never Smoker    Smokeless tobacco: Never Used    Alcohol use 0.0 oz/week      Comment: Rarely; 1 glass of  wine    Drug use: No    Sexual activity: Yes     Birth control/ protection: Post-menopausal, See Surgical Hx     Other Topics Concern    Not on file     Social History Narrative    No narrative on file       ALLERGIES AND MEDICATIONS: updated and reviewed.  Review of patient's allergies indicates:   Allergen Reactions    Lisinopril Swelling    Amlodipine Edema    Combigan [brimonidine-timolol]      Causes blepharitis and itchy eyelids/contact dermatitis    Cosopt [dorzolamide-timolol] Other (See Comments)     Causes angular blepharitis around eyes    Pravastatin      Myalgia and elevated CPK     Current Outpatient Prescriptions   Medication Sig Dispense Refill    aspirin (ECOTRIN) 81 MG EC tablet Take 81 mg by mouth once daily.        calcium 500 mg Tab Take 500 mg by mouth once daily.       CYANOCOBALAMIN, VITAMIN B-12, ORAL Place 1 drop under the tongue.      hydrochlorothiazide (MICROZIDE) 12.5 mg capsule TAKE 1 CAPSULE (12.5 MG TOTAL) BY MOUTH ONCE DAILY. 90 capsule 3    irbesartan (AVAPRO) 300 MG tablet Take 300 mg by mouth once daily.       krill oil-omega-3-dha-epa 150-450 mg CpDR Take 1 tablet by mouth once daily.      lorazepam (ATIVAN) 1 MG tablet TAKE 1 TABLET (1 MG TOTAL) BY MOUTH NIGHTLY AS NEEDED (INSOMNIA). 30 tablet 0    multivitamin Chew Take 1 tablet by mouth once daily.       timolol maleate 0.5% (TIMOPTIC-XE) 0.5 % SolG Place 1 drop into both eyes every morning. Timoptic XE or timolol GFS (gel forming solution) 5 mL 12    losartan (COZAAR) 50 MG tablet TAKE 1 TABLET BY MOUTH EVERY DAY 90 tablet 3     No current facility-administered medications for this visit.        Review of Systems   Constitutional: Negative for malaise/fatigue.   Eyes: Negative for blurred vision.   Respiratory: Negative for shortness of breath.    Cardiovascular: Negative for chest pain, palpitations, orthopnea and PND.   Musculoskeletal: Negative for neck pain.   Neurological: Negative for headaches.  "      Objective:   Physical Exam   Vitals:    02/16/18 1347 02/16/18 1413   BP: (!) 140/70 120/70   BP Location:  Left arm   Patient Position:  Sitting   BP Method:  Large (Manual)   Pulse: 70    Temp: 98.2 °F (36.8 °C)    SpO2: 95%    Weight: 90.5 kg (199 lb 6.5 oz)    Height: 5' 5" (1.651 m)     Body mass index is 33.18 kg/m².  Weight: 90.5 kg (199 lb 6.5 oz)   Height: 5' 5" (165.1 cm)     Physical Exam   Constitutional: She is oriented to person, place, and time. She appears well-developed and well-nourished. No distress.   HENT:   TMs grey/clear bilaterally.  OP no erythema no exudates   Eyes: EOM are normal.   Neck: Neck supple.   Cardiovascular: Normal rate, regular rhythm and normal heart sounds.    No murmur heard.  Pulmonary/Chest: Effort normal and breath sounds normal. She has no wheezes.   Musculoskeletal: Normal range of motion.   Lymphadenopathy:     She has no cervical adenopathy.   Neurological: She is alert and oriented to person, place, and time. Coordination normal.   CN 2-12 intact  everardo hallpike negative  Romberg negative, pronator drift negative.   Skin: Skin is warm and dry.   Psychiatric: She has a normal mood and affect. Her behavior is normal. Thought content normal.     "

## 2018-02-16 ENCOUNTER — OFFICE VISIT (OUTPATIENT)
Dept: FAMILY MEDICINE | Facility: CLINIC | Age: 73
End: 2018-02-16
Payer: MEDICARE

## 2018-02-16 VITALS
TEMPERATURE: 98 F | SYSTOLIC BLOOD PRESSURE: 120 MMHG | BODY MASS INDEX: 33.23 KG/M2 | DIASTOLIC BLOOD PRESSURE: 70 MMHG | HEIGHT: 65 IN | OXYGEN SATURATION: 95 % | HEART RATE: 70 BPM | WEIGHT: 199.44 LBS

## 2018-02-16 DIAGNOSIS — R42 LIGHTHEADEDNESS: Primary | ICD-10-CM

## 2018-02-16 DIAGNOSIS — Z23 NEED FOR SHINGLES VACCINE: ICD-10-CM

## 2018-02-16 DIAGNOSIS — I10 ESSENTIAL HYPERTENSION: ICD-10-CM

## 2018-02-16 DIAGNOSIS — R42 VERTIGO: ICD-10-CM

## 2018-02-16 PROCEDURE — 99999 PR PBB SHADOW E&M-EST. PATIENT-LVL III: CPT | Mod: PBBFAC,,, | Performed by: INTERNAL MEDICINE

## 2018-02-16 PROCEDURE — 3008F BODY MASS INDEX DOCD: CPT | Mod: S$GLB,,, | Performed by: INTERNAL MEDICINE

## 2018-02-16 PROCEDURE — 1159F MED LIST DOCD IN RCRD: CPT | Mod: S$GLB,,, | Performed by: INTERNAL MEDICINE

## 2018-02-16 PROCEDURE — 1126F AMNT PAIN NOTED NONE PRSNT: CPT | Mod: S$GLB,,, | Performed by: INTERNAL MEDICINE

## 2018-02-16 PROCEDURE — 99213 OFFICE O/P EST LOW 20 MIN: CPT | Mod: S$GLB,,, | Performed by: INTERNAL MEDICINE

## 2018-02-28 ENCOUNTER — OFFICE VISIT (OUTPATIENT)
Dept: PODIATRY | Facility: CLINIC | Age: 73
End: 2018-02-28
Payer: MEDICARE

## 2018-02-28 VITALS
SYSTOLIC BLOOD PRESSURE: 135 MMHG | WEIGHT: 195 LBS | BODY MASS INDEX: 32.49 KG/M2 | HEART RATE: 59 BPM | RESPIRATION RATE: 18 BRPM | DIASTOLIC BLOOD PRESSURE: 73 MMHG | HEIGHT: 65 IN

## 2018-02-28 DIAGNOSIS — D17.20 LIPOMA OF LOWER EXTREMITY, UNSPECIFIED LATERALITY: Primary | ICD-10-CM

## 2018-02-28 PROCEDURE — 99203 OFFICE O/P NEW LOW 30 MIN: CPT | Mod: S$GLB,,, | Performed by: PODIATRIST

## 2018-02-28 PROCEDURE — 99999 PR PBB SHADOW E&M-EST. PATIENT-LVL III: CPT | Mod: PBBFAC,,, | Performed by: PODIATRIST

## 2018-03-01 NOTE — PROGRESS NOTES
Subjective:      Patient ID: Rosmery Ramirez is a 72 y.o. female.    Chief Complaint: PCP (Ancelmo Sumner MD 2/16/18) and Foot Problem (both ankles )    Rosmery is a 72 y.o. female who presents to the podiatry clinic  with complaint of  bilateral ankle swelling. Onset of the symptoms was several months ago. Precipitating event: none known. Current symptoms include: none. Aggravating factors: none. Symptoms have stabilized. Patient has had no prior foot problems. Evaluation to date: none. Treatment to date: none. Patients rates pain 0/10 on pain scale.              Patient Active Problem List   Diagnosis    HTN (hypertension)    Hyperlipidemia    Angular blepharitis    POAG (primary open-angle glaucoma)    Obesity, Class II, BMI 35-39.9, with comorbidity    Cataract    Nuclear sclerosis    Chronic pain of left knee    History of colon polyps       Current Outpatient Prescriptions on File Prior to Visit   Medication Sig Dispense Refill    aspirin (ECOTRIN) 81 MG EC tablet Take 81 mg by mouth once daily.        calcium 500 mg Tab Take 500 mg by mouth once daily.       CYANOCOBALAMIN, VITAMIN B-12, ORAL Place 1 drop under the tongue.      hydrochlorothiazide (MICROZIDE) 12.5 mg capsule TAKE 1 CAPSULE (12.5 MG TOTAL) BY MOUTH ONCE DAILY. 90 capsule 3    irbesartan (AVAPRO) 300 MG tablet Take 300 mg by mouth once daily.       krill oil-omega-3-dha-epa 150-450 mg CpDR Take 1 tablet by mouth once daily.      lorazepam (ATIVAN) 1 MG tablet TAKE 1 TABLET (1 MG TOTAL) BY MOUTH NIGHTLY AS NEEDED (INSOMNIA). 30 tablet 0    multivitamin Chew Take 1 tablet by mouth once daily.       timolol maleate 0.5% (TIMOPTIC-XE) 0.5 % SolG Place 1 drop into both eyes every morning. Timoptic XE or timolol GFS (gel forming solution) 5 mL 12     No current facility-administered medications on file prior to visit.        Review of patient's allergies indicates:   Allergen Reactions    Lisinopril Swelling     Amlodipine Edema    Combigan [brimonidine-timolol]      Causes blepharitis and itchy eyelids/contact dermatitis    Cosopt [dorzolamide-timolol] Other (See Comments)     Causes angular blepharitis around eyes    Pravastatin      Myalgia and elevated CPK       Past Surgical History:   Procedure Laterality Date    BREAST SURGERY Left     lumpectomy    CATARACT EXTRACTION W/  INTRAOCULAR LENS IMPLANT Left 10/05/2016    OS (Dr. Mike)    CATARACT EXTRACTION W/  INTRAOCULAR LENS IMPLANT Right 11/30/2016    OD (Dr. Mike)    COLONOSCOPY N/A 1/23/2017    Procedure: COLONOSCOPY;  Surgeon: Hany Mosquera MD;  [facility] ([facility]);  Service: Endoscopy;  Laterality: N/A;    HAND SURGERY Left 2011    thumb    HYSTERECTOMY  1980's    Total;    Left Breast Lumpectomy Left     performed in 1960s    SELECTIVE LASER TRABECUPLASTY  05/2014    OU w/ Dr. Mike    TUBAL LIGATION  1970's    VAGINAL DELIVERY      x3       Family History   Problem Relation Age of Onset    Breast cancer Mother     Glaucoma Mother     Alzheimer's disease Mother     Blindness Mother     Cataracts Mother     Glaucoma Father     Prostate cancer Father     Blindness Father     Cataracts Father     Glaucoma Sister     No Known Problems Brother     No Known Problems Brother     Alzheimer's disease Maternal Grandfather     Macular degeneration Neg Hx     Retinal detachment Neg Hx     Strabismus Neg Hx     Amblyopia Neg Hx     Heart attack Neg Hx     Heart disease Neg Hx        Social History     Social History    Marital status:      Spouse name: N/A    Number of children: 3    Years of education: N/A     Occupational History    retired - formerly pastoral care with [employer]      Social History Main Topics    Smoking status: Never Smoker    Smokeless tobacco: Never Used    Alcohol use 0.0 oz/week      Comment: Rarely; 1 glass of wine    Drug use: No    Sexual activity: Yes     Birth control/  "protection: Post-menopausal, See Surgical Hx     Other Topics Concern    Not on file     Social History Narrative    No narrative on file           Review of Systems   Constitution: Negative for chills, decreased appetite and fever.   Cardiovascular: Negative for leg swelling.   Musculoskeletal: Negative for arthritis, joint pain, joint swelling and myalgias.   Gastrointestinal: Negative for nausea and vomiting.   Neurological: Negative for loss of balance, numbness and paresthesias.           Objective:       Vitals:    02/28/18 0909   BP: 135/73   Pulse: (!) 59   Resp: 18   Weight: 88.5 kg (195 lb)   Height: 5' 5" (1.651 m)   PainSc: 0-No pain        Physical Exam   Constitutional: She is oriented to person, place, and time. She appears well-developed and well-nourished.   Cardiovascular:   Pulses:       Dorsalis pedis pulses are 2+ on the right side, and 2+ on the left side.        Posterior tibial pulses are 2+ on the right side, and 2+ on the left side.   Musculoskeletal: She exhibits no edema or tenderness.        Right ankle: Normal.        Left ankle: Normal.        Right foot: There is no swelling, no crepitus and no deformity.        Left foot: There is no swelling, no crepitus and no deformity.        Feet:    Adequate joint range of motion without pain, limitation, nor crepitation Bilateral feet and ankle joints. Muscle strength is 5/5 in all groups bilaterally.         Lymphadenopathy:   No palpable lymph nodes   Neurological: She is alert and oriented to person, place, and time. She has normal strength.   Skin: Skin is warm, dry and intact. No rash noted. No erythema. Nails show no clubbing.   Psychiatric: She has a normal mood and affect. Her behavior is normal.             Assessment:       Encounter Diagnosis   Name Primary?    Lipoma of lower extremity, unspecified laterality Yes         Plan:       Rosmery was seen today for pcp and foot problem.    Diagnoses and all orders for this " visit:    Lipoma of lower extremity, unspecified laterality      I counseled the patient on her conditions, their implications and medical management.      Stable lateral ankle lipoma  No pain caused. Pt not interested in seeking further treatment options   Patient assurance

## 2018-03-05 ENCOUNTER — HOSPITAL ENCOUNTER (OUTPATIENT)
Dept: RADIOLOGY | Facility: HOSPITAL | Age: 73
Discharge: HOME | End: 2018-03-05
Attending: UROLOGY
Payer: MEDICARE

## 2018-03-05 ENCOUNTER — HOSPITAL ENCOUNTER (OUTPATIENT)
Dept: UROLOGY | Facility: HOSPITAL | Age: 73
Discharge: HOME | End: 2018-03-05
Attending: UROLOGY
Payer: MEDICARE

## 2018-03-05 VITALS
BODY MASS INDEX: 32.51 KG/M2 | RESPIRATION RATE: 18 BRPM | WEIGHT: 195.13 LBS | HEART RATE: 59 BPM | SYSTOLIC BLOOD PRESSURE: 136 MMHG | TEMPERATURE: 98 F | DIASTOLIC BLOOD PRESSURE: 75 MMHG | HEIGHT: 65 IN

## 2018-03-05 DIAGNOSIS — R10.2 PELVIC PAIN: ICD-10-CM

## 2018-03-05 PROCEDURE — 76830 TRANSVAGINAL US NON-OB: CPT | Mod: 26,,, | Performed by: INTERNAL MEDICINE

## 2018-03-05 PROCEDURE — 52000 CYSTOURETHROSCOPY: CPT | Mod: ,,, | Performed by: UROLOGY

## 2018-03-05 PROCEDURE — 76830 TRANSVAGINAL US NON-OB: CPT | Mod: TC

## 2018-03-05 PROCEDURE — 76770 US EXAM ABDO BACK WALL COMP: CPT | Mod: TC

## 2018-03-05 PROCEDURE — 76856 US EXAM PELVIC COMPLETE: CPT | Mod: 26,,, | Performed by: INTERNAL MEDICINE

## 2018-03-05 PROCEDURE — 74018 RADEX ABDOMEN 1 VIEW: CPT | Mod: 26,,, | Performed by: RADIOLOGY

## 2018-03-05 PROCEDURE — 76770 US EXAM ABDO BACK WALL COMP: CPT | Mod: 26,,, | Performed by: INTERNAL MEDICINE

## 2018-03-05 PROCEDURE — 74018 RADEX ABDOMEN 1 VIEW: CPT | Mod: TC,FY

## 2018-03-05 PROCEDURE — 52000 CYSTOURETHROSCOPY: CPT

## 2018-03-05 RX ORDER — LIDOCAINE HYDROCHLORIDE 20 MG/ML
JELLY TOPICAL ONCE
Status: COMPLETED | OUTPATIENT
Start: 2018-03-05 | End: 2018-03-05

## 2018-03-05 RX ORDER — CIPROFLOXACIN 500 MG/1
500 TABLET ORAL ONCE
Status: COMPLETED | OUTPATIENT
Start: 2018-03-05 | End: 2018-03-05

## 2018-03-05 RX ADMIN — LIDOCAINE HYDROCHLORIDE: 20 JELLY TOPICAL at 02:03

## 2018-03-05 RX ADMIN — CIPROFLOXACIN 500 MG: 500 TABLET ORAL at 02:03

## 2018-03-05 NOTE — PATIENT INSTRUCTIONS
What to Expect After a Cystoscopy  For the next 24-48 hours, you may feel a mild burning when you urinate. This burning is normal and expected. Drink plenty of water to dilute the urine to help relieve the burning sensation. You may also see a small amount of blood in your urine after the procedure.    Unless you are already taking antibiotics, you may be given an antibiotic after the test to prevent infection.    Signs and Symptoms to Report  Call the [facility] at [phone] if you develop any of the following:  · Fever of 101 degrees or higher  · Chills or persistent bleeding  · Inability to urinate

## 2018-03-07 NOTE — OP NOTE
DATE OF PROCEDURE:  03/05/2018.    PREOPERATIVE DIAGNOSIS:  Pelvic pain.    POSTOPERATIVE DIAGNOSIS:  Pelvic pain.    OPERATION PERFORMED:  Flexible cystoscopy.    INDICATIONS FOR PROCEDURE:  This patient had normal pelvic ultrasound and normal   upper tracts by ultrasound.    PROCEDURE IN DETAIL:  She was prepped and draped in dorsal lithotomy position.    Xylocaine jelly was instilled per urethra.  Her urine was clear today.  The   urethra was normal.  There were no diverticula noted.  Bladder neck was normal.    Both ureteral orifices were normal in size, shape, and position.  There were no   stones, tumors, foreign bodies, or active infections noted.  On examination of   the patient, she had some tenderness at her pubic symphysis.    IMPRESSION:  Pelvic pain, non-urological in etiology.    RECOMMENDATIONS:  We will check a KUB to rule out osteitis pubis.  Otherwise,   the patient may return to see her PCP.  I cannot identify any urological   etiology for her pain, but she does state that it is getting better.  I would   recommend NSAIDs of choice.      ZEE  dd: 03/05/2018 14:46:53 (CST)  td: 03/05/2018 17:04:28 (CST)  Doc ID   #[id number]  Job ID #[id number]    CC:

## 2018-03-19 ENCOUNTER — OFFICE VISIT (OUTPATIENT)
Dept: FAMILY MEDICINE | Facility: CLINIC | Age: 73
End: 2018-03-19
Payer: MEDICARE

## 2018-03-19 VITALS
SYSTOLIC BLOOD PRESSURE: 120 MMHG | DIASTOLIC BLOOD PRESSURE: 86 MMHG | WEIGHT: 194.75 LBS | BODY MASS INDEX: 32.45 KG/M2 | HEIGHT: 65 IN | HEART RATE: 59 BPM | OXYGEN SATURATION: 97 % | TEMPERATURE: 99 F

## 2018-03-19 DIAGNOSIS — S83.411A SPRAIN OF MEDIAL COLLATERAL LIGAMENT OF RIGHT KNEE, INITIAL ENCOUNTER: Primary | ICD-10-CM

## 2018-03-19 PROCEDURE — 3079F DIAST BP 80-89 MM HG: CPT | Mod: CPTII,S$GLB,, | Performed by: FAMILY MEDICINE

## 2018-03-19 PROCEDURE — 99213 OFFICE O/P EST LOW 20 MIN: CPT | Mod: S$GLB,,, | Performed by: FAMILY MEDICINE

## 2018-03-19 PROCEDURE — 3074F SYST BP LT 130 MM HG: CPT | Mod: CPTII,S$GLB,, | Performed by: FAMILY MEDICINE

## 2018-03-19 PROCEDURE — 99999 PR PBB SHADOW E&M-EST. PATIENT-LVL III: CPT | Mod: PBBFAC,,, | Performed by: FAMILY MEDICINE

## 2018-03-19 NOTE — PROGRESS NOTES
Office Visit    Patient Name: Rosmery Ramirez    : 1945  MRN: [id number]      Assessment/Plan:  Rosmery Ramirez is a 72 y.o. female who presents today for :    Sprain of medial collateral ligament of right knee, initial encounter    -activity modification d/w patient: avoid prolonged standing, avoid heavy lifting and provocative movements, advised icing and elevation  -knee ROM stretching and strengthening exercises demonstrated in clinic and handouts given to patient  -may use brace for additional support  -RTC in 4-6 weeks if not better, or sooner if pain worsens.    -patient advised to use OTC Tylenol (325mg tablets) once every 4-6 hours as needed for pain - avoid regular use of NSAIDs due to potential GI/BP side effects (may take a short-course and then stop).     F/u w/ Sports medicine physician      Follow-up for any evaluation as needed.     This note was created by combination of typed  and Dragon dictation.  Transcription errors may be present.  If there are any questions, please contact me.        ----------------------------------------------------------------------------------------------------------------------      HPI:  Rosmery is a 72 y.o. female who presents today for:    Right knee pain        This patient is new to me     This patient has multiple medical diagnoses as noted below.      Patient is here today for  Right knee pain  that started the past week. She has instituted a new exercise regimen the past month where she walks a mile daily.  She doesn't recall any recent injury/trauma to area. No slips/falls.  Pain has a sharp quality on the inner aspect of the right knee, worse with putting weight and walking. At worse, the pain is 5/10, tolerable for the most part. She has been taking Aleve twice daily  Resting makes the pain better  Pt is still able to comfortably perform daily routine  changes/tingling/numbness/weakness.  Denies pain radiation    Additional  ROS    No CP/SOB/palpitations/swelling  No cough/wheezing/SOB  No nausea/vomiting/abd pain  No rash, no history of allergies to any specific substances    Patient Active Problem List   Diagnosis    HTN (hypertension)    Hyperlipidemia    Angular blepharitis    POAG (primary open-angle glaucoma)    Obesity, Class II, BMI 35-39.9, with comorbidity    Cataract    Nuclear sclerosis    Chronic pain of left knee    History of colon polyps       Current Medications  Medications reviewed/updated.     Current Outpatient Prescriptions on File Prior to Visit   Medication Sig Dispense Refill    aspirin (ECOTRIN) 81 MG EC tablet Take 81 mg by mouth once daily.        calcium 500 mg Tab Take 500 mg by mouth once daily.       CYANOCOBALAMIN, VITAMIN B-12, ORAL Place 1 drop under the tongue.      hydrochlorothiazide (MICROZIDE) 12.5 mg capsule TAKE 1 CAPSULE (12.5 MG TOTAL) BY MOUTH ONCE DAILY. 90 capsule 3    irbesartan (AVAPRO) 300 MG tablet Take 300 mg by mouth once daily.       krill oil-omega-3-dha-epa 150-450 mg CpDR Take 1 tablet by mouth once daily.      lorazepam (ATIVAN) 1 MG tablet TAKE 1 TABLET (1 MG TOTAL) BY MOUTH NIGHTLY AS NEEDED (INSOMNIA). 30 tablet 0    multivitamin Chew Take 1 tablet by mouth once daily.       timolol maleate 0.5% (TIMOPTIC-XE) 0.5 % SolG Place 1 drop into both eyes every morning. Timoptic XE or timolol GFS (gel forming solution) 5 mL 12     No current facility-administered medications on file prior to visit.        Past Surgical History:   Procedure Laterality Date    BREAST SURGERY Left     lumpectomy    CATARACT EXTRACTION W/  INTRAOCULAR LENS IMPLANT Left 10/05/2016    OS (Dr. Mike)    CATARACT EXTRACTION W/  INTRAOCULAR LENS IMPLANT Right 11/30/2016    OD (Dr. Mike)    COLONOSCOPY N/A 1/23/2017    Procedure: COLONOSCOPY;  Surgeon: Hany Mosquera MD;  Location: [facility] ([address] [place] [address]);  Service: Endoscopy;  Laterality: N/A;    HAND SURGERY Left 2011    thumb     "HYSTERECTOMY  1980's    Total;    Left Breast Lumpectomy Left     performed in 1960s    SELECTIVE LASER TRABECUPLASTY  05/2014    OU w/ Dr. Mike    TUBAL LIGATION  1970's    VAGINAL DELIVERY      x3       Family History   Problem Relation Age of Onset    Breast cancer Mother     Glaucoma Mother     Alzheimer's disease Mother     Blindness Mother     Cataracts Mother     Glaucoma Father     Prostate cancer Father     Blindness Father     Cataracts Father     Glaucoma Sister     No Known Problems Brother     No Known Problems Brother     Alzheimer's disease Maternal Grandfather     Macular degeneration Neg Hx     Retinal detachment Neg Hx     Strabismus Neg Hx     Amblyopia Neg Hx     Heart attack Neg Hx     Heart disease Neg Hx        Social History     Social History    Marital status:      Spouse name: N/A    Number of children: 3    Years of education: N/A     Occupational History    retired - formerly pastoral care with [employer]      Social History Main Topics    Smoking status: Never Smoker    Smokeless tobacco: Never Used    Alcohol use 0.0 oz/week      Comment: Rarely; 1 glass of wine    Drug use: No    Sexual activity: Yes     Birth control/ protection: Post-menopausal, See Surgical Hx     Other Topics Concern    Not on file     Social History Narrative    No narrative on file           Allergies   Review of patient's allergies indicates:   Allergen Reactions    Lisinopril Swelling    Amlodipine Edema    Combigan [brimonidine-timolol]      Causes blepharitis and itchy eyelids/contact dermatitis    Cosopt [dorzolamide-timolol] Other (See Comments)     Causes angular blepharitis around eyes    Pravastatin      Myalgia and elevated CPK             Review of Systems  See HPI      Physical Exam  /86   Pulse (!) 59   Temp 98.5 °F (36.9 °C) (Oral)   Ht 5' 5" (1.651 m)   Wt 88.3 kg (194 lb 12.4 oz)   SpO2 97%   BMI 32.41 kg/m²     GEN: NAD, well " developed, pleasant, well nourished  LUNGS: CTAB, no w/r/r, no increased work of breathing   HEART: RRR, normal S1 and S2, no m/r/g, no edema  ABD: s/nt/nd, NABS  SKIN: normal turgor, no rashes  PSYCH: AOx3, appropriate mood and affect  MSK: warm/well perfused, normal ROM in all 4 extremities, no c/c/e.  KNEE: no gross abn on visual exam, bilateral knee with FROM.  RIGHT knee with mild tenderness to deep palpation over the MCL and medial joint line. +pain with Valgus stress on Right knee. NEG anterior/posterior drawer and Lachman's test. No laxity. Slight limping gait but able to walk across room on pt's own weight. No palpable chords/calf TTP  No swelling/redness.

## 2018-04-01 ENCOUNTER — PATIENT MESSAGE (OUTPATIENT)
Dept: OPHTHALMOLOGY | Facility: CLINIC | Age: 73
End: 2018-04-01

## 2018-04-02 ENCOUNTER — PATIENT MESSAGE (OUTPATIENT)
Dept: OPHTHALMOLOGY | Facility: CLINIC | Age: 73
End: 2018-04-02

## 2018-04-06 ENCOUNTER — OFFICE VISIT (OUTPATIENT)
Dept: OPHTHALMOLOGY | Facility: CLINIC | Age: 73
End: 2018-04-06
Payer: MEDICARE

## 2018-04-06 DIAGNOSIS — H04.123 DRY EYE SYNDROME, BILATERAL: ICD-10-CM

## 2018-04-06 DIAGNOSIS — H04.201 EPIPHORA, RIGHT: ICD-10-CM

## 2018-04-06 DIAGNOSIS — H01.009 ANGULAR BLEPHARITIS, UNSPECIFIED LATERALITY: ICD-10-CM

## 2018-04-06 DIAGNOSIS — H02.051 TRICHIASIS OF RIGHT UPPER EYELID: Primary | ICD-10-CM

## 2018-04-06 DIAGNOSIS — H40.1111 PRIMARY OPEN ANGLE GLAUCOMA OF RIGHT EYE, MILD STAGE: ICD-10-CM

## 2018-04-06 DIAGNOSIS — H40.1122 PRIMARY OPEN ANGLE GLAUCOMA OF LEFT EYE, MODERATE STAGE: ICD-10-CM

## 2018-04-06 DIAGNOSIS — Z96.1 PSEUDOPHAKIA OF BOTH EYES: ICD-10-CM

## 2018-04-06 PROCEDURE — 99499 UNLISTED E&M SERVICE: CPT | Mod: S$PBB,,, | Performed by: OPHTHALMOLOGY

## 2018-04-06 PROCEDURE — 99999 PR PBB SHADOW E&M-EST. PATIENT-LVL II: CPT | Mod: PBBFAC,,, | Performed by: OPHTHALMOLOGY

## 2018-04-06 PROCEDURE — 92012 INTRM OPH EXAM EST PATIENT: CPT | Mod: S$GLB,,, | Performed by: OPHTHALMOLOGY

## 2018-04-06 NOTE — PROGRESS NOTES
HPI     DLS: 2/05/18    Pt c/o on Sunday it felt like something got into her OD, she flushed it   out and was rubbing the eye and used Systane but really didn't help much.   Pt states on Monday it felt better with the AT's but still have the   scratchy feeling. Pt denies any vision changes.  Pt states the OD also tears constantly.     Meds: Systane bid ou            T 1/2 GFS qam ou    1) POAG   2) PCIOL OU   3) Hx Hyperopia   4) Epiphora       Last edited by Ana Aquino on 4/6/2018  3:09 PM. (History)            Assessment /Plan     For exam results, see Encounter Report.    Trichiasis of right upper eyelid    Primary open angle glaucoma of right eye, mild stage    Primary open angle glaucoma of left eye, moderate stage    Dry eye syndrome, bilateral    Angular blepharitis, unspecified laterality    Pseudophakia of both eyes    Epiphora, right        Old pt of Dr. Eber Crouch  Sister is Marina Dana (my pt.) she sent her to me      1. Early POAG-  Both Eyes   Dx. Years ago with Dr. Eber Crouch  -Strong family hx   -IOP previously well controlled (mid teens) on Xalatan but elevated on generic and switched to lumigan    Family history   STRONG (Mom,Dad, and 5 sisters)  Glaucoma meds   lumigan qhs OU, timolol gfs QAM OU  H/O adverse rxn to glaucoma drops - combigan - itchy lids  // cosopt - angular blepharitis   LASERS   S/p SLT 4/24/14 OD //  5/8/14 OS (good response 21/22 --> 12/15)  GLAUCOMA SURGERIES   [place] od 11/30/2016   OTHER EYE SURGERIES  Phaco /IOL os 10/6/2016 // phaco/IOL / [place] od 11/30/2016   CDR  0.8 / 0.75  Tbase  ??  Tmax  26 OU off all gtts (5/24/10)    Ttarget   16 OU  HVF - 6 VF '12 -17 -  OD IAD x3 now ; OS: IAD x 3 now  Gonio +3 OU  /540  OCT 4  test 2011 to 2017  - RNFL - OD:dec. S/N/I  // OS dec SFranco JACOBS (+ prog ou)   HRT  5 test - 2012 to 2017 - MR -  Dec. Ishannon S/N od // dec. S, shannon N/T os /// CDR 0.71 od // 0.73 os  Disc photos  2012, 2015 - OIS     Ttoday 16/17  Test done  today -  IOP check   2. H/O - hyperopia - Both Eyes -mild - pre-cataract surgery    3. Epiphora  - C/O constant tearing of the right eye x 6 months (8/2015   4.  PC IOL   OD - phaco/IOL / [place] 11/30 /2016 - PCB00 22.5  OS - Phaco/IOL - NO COLTON's - pt declined - 10/6/2016 - PCB00 24.0       Plan:  -IOP 16/17ou (target is 16 ou)   Off combigan - intolerant - itchy lids / blepharitis   Intol to cosopt - angular blepharitis   -Strong family hx  -Goal IOP 16 OU    CSM    Target is 16 ou     Pt does not like generics - but has used  Latanoprost in past  without problems (actually prefers it to lumigan)   Intol to combigan -blepharitis / itch lids   Intol to cosopt - angular blepharitis     Tolerating  timolol gfs // timoptic XE ou q am - IOP higher than target today but forgot to take her drops this morning     Cont timoptic XE / gfs     Rx glasses given 2/2017     UCARE - IRRITATED - TEARING EYE X 2 DAYS OD    ONE ABERRANT LASH - REMOVED WITH FORCEPS    USE AT'S QID X 2 WEEKS       KEEP PERVIOUS SCHEDULED APPT - 4 MONTHS FROM LAST VISIT FOR   F/u  - 4 months -- HRT / Gonio - if IOP above target can add latanoprost back (has used in past) or try azopt - her sister is using azopt with good results and tolerance

## 2018-04-27 ENCOUNTER — TELEPHONE (OUTPATIENT)
Dept: FAMILY MEDICINE | Facility: CLINIC | Age: 73
End: 2018-04-27

## 2018-04-27 DIAGNOSIS — Z12.31 ENCOUNTER FOR SCREENING MAMMOGRAM FOR MALIGNANT NEOPLASM OF BREAST: Primary | ICD-10-CM

## 2018-04-27 NOTE — TELEPHONE ENCOUNTER
----- Message from Elizabet Benedict sent at 4/27/2018  8:50 AM CDT -----  Contact: self  Patient requesting a mammogram. Please contact her at [phone].    Thanks!

## 2018-04-27 NOTE — TELEPHONE ENCOUNTER
----- Message from Elizabet Benedict sent at 4/27/2018  8:50 AM CDT -----  Contact: self  Patient requesting a mammogram. Please contact her at [phone].    Thanks!

## 2018-05-01 ENCOUNTER — HOSPITAL ENCOUNTER (OUTPATIENT)
Dept: RADIOLOGY | Facility: HOSPITAL | Age: 73
Discharge: HOME | End: 2018-05-01
Attending: INTERNAL MEDICINE
Payer: MEDICARE

## 2018-05-01 DIAGNOSIS — Z12.31 ENCOUNTER FOR SCREENING MAMMOGRAM FOR MALIGNANT NEOPLASM OF BREAST: ICD-10-CM

## 2018-05-01 PROCEDURE — 77067 SCR MAMMO BI INCL CAD: CPT | Mod: TC

## 2018-05-01 PROCEDURE — 77063 BREAST TOMOSYNTHESIS BI: CPT | Mod: 26,,, | Performed by: RADIOLOGY

## 2018-05-01 PROCEDURE — 77067 SCR MAMMO BI INCL CAD: CPT | Mod: 26,,, | Performed by: RADIOLOGY

## 2018-06-04 ENCOUNTER — OFFICE VISIT (OUTPATIENT)
Dept: OBSTETRICS AND GYNECOLOGY | Facility: CLINIC | Age: 73
End: 2018-06-04
Attending: OBSTETRICS & GYNECOLOGY
Payer: MEDICARE

## 2018-06-04 VITALS
DIASTOLIC BLOOD PRESSURE: 68 MMHG | BODY MASS INDEX: 31.96 KG/M2 | HEIGHT: 65 IN | WEIGHT: 191.81 LBS | SYSTOLIC BLOOD PRESSURE: 128 MMHG

## 2018-06-04 DIAGNOSIS — Z01.419 ENCOUNTER FOR GYNECOLOGICAL EXAMINATION WITHOUT ABNORMAL FINDING: Primary | ICD-10-CM

## 2018-06-04 PROCEDURE — G0101 CA SCREEN;PELVIC/BREAST EXAM: HCPCS | Mod: S$GLB,,, | Performed by: OBSTETRICS & GYNECOLOGY

## 2018-06-04 NOTE — PROGRESS NOTES
SUBJECTIVE:   72 y.o. female   for annual routine  checkup. No LMP recorded. Patient has had a hysterectomy..  She has no unusual complaints and has questions about her mammogram.        Past Medical History:   Diagnosis Date    Allergy     Anxiety     Breast cyst     Edema of leg 10/5/2012    bilateral     Fractures     thumb R    GERD (gastroesophageal reflux disease)     Glaucoma     History of colonic polyps     Hx-TIA (transient ischemic attack) 2012: LLE and left facial numbness lasting 1 hour     Hyperlipidemia     Hypertension     Left shoulder tendonitis 2015    Left shoulder tendonitis     Primary open-angle glaucoma, mild stage - Both Eyes 6/3/2013    TIA (transient ischemic attack) 2012    LLE and left facial numbness lasting 1 hour     Past Surgical History:   Procedure Laterality Date    BREAST BIOPSY Left     core    BREAST SURGERY Left     lumpectomy    CATARACT EXTRACTION W/  INTRAOCULAR LENS IMPLANT Left 10/05/2016    OS (Dr. Mike)    CATARACT EXTRACTION W/  INTRAOCULAR LENS IMPLANT Right 2016    OD (Dr. Mike)    COLONOSCOPY N/A 2017    Procedure: COLONOSCOPY;  Surgeon: Hany Mosquera MD;  Location: [facility] ([address] [place] [address]);  Service: Endoscopy;  Laterality: N/A;    HAND SURGERY Left     thumb    HYSTERECTOMY  's    Total;    Left Breast Lumpectomy Left     performed in 1960s    OOPHORECTOMY      SELECTIVE LASER TRABECUPLASTY  2014    OU w/ Dr. Mike    TUBAL LIGATION  s    VAGINAL DELIVERY      x3     Social History     Social History    Marital status:      Spouse name: N/A    Number of children: 3    Years of education: N/A     Occupational History    retired - formerly pastoral care with [employer]      Social [facility] Main Topics    Smoking status: Never Smoker    Smokeless tobacco: Never Used    Alcohol use 0.0 oz/week      Comment: Rarely; 1 glass of wine    Drug use: No     Sexual activity: Yes     Birth control/ protection: Post-menopausal, See Surgical Hx     Other Topics Concern    Not on file     Social History Narrative    No narrative on file     Family History   Problem Relation Age of Onset    Breast cancer Mother 50    Glaucoma Mother     Alzheimer's disease Mother     Blindness Mother     Cataracts Mother     Glaucoma Father     Prostate cancer Father     Blindness Father     Cataracts Father     Cancer Father 62        prostate    Glaucoma Sister     No Known Problems Brother     No Known Problems Brother     Alzheimer's disease Maternal Grandfather     Macular degeneration Neg Hx     Retinal detachment Neg Hx     Strabismus Neg Hx     Amblyopia Neg Hx     Heart attack Neg Hx     Heart disease Neg Hx     Ovarian cancer Neg Hx     Colon cancer Neg Hx      OB History    Para Term  AB Living   8 7 4   1     SAB TAB Ectopic Multiple Live Births   1              # Outcome Date GA Lbr Jose/2nd Weight Sex Delivery Anes PTL Lv   8 Term            7 Term            6 Term            5 Term            4 Para      Vag-Spont      3 Para      Vag-Spont      2 Para      Vag-Spont      1 SAB                       Current Outpatient Prescriptions   Medication Sig Dispense Refill    aspirin (ECOTRIN) 81 MG EC tablet Take 81 mg by mouth once daily.        calcium 500 mg Tab Take 500 mg by mouth once daily.       hydrochlorothiazide (MICROZIDE) 12.5 mg capsule TAKE 1 CAPSULE (12.5 MG TOTAL) BY MOUTH ONCE DAILY. 90 capsule 3    irbesartan (AVAPRO) 300 MG tablet Take 300 mg by mouth once daily.       krill oil-omega-3-dha-epa 150-450 mg CpDR Take 1 tablet by mouth once daily.      lorazepam (ATIVAN) 1 MG tablet TAKE 1 TABLET (1 MG TOTAL) BY MOUTH NIGHTLY AS NEEDED (INSOMNIA). 30 tablet 0    multivitamin Chew Take 1 tablet by mouth once daily.       timolol maleate 0.5% (TIMOPTIC-XE) 0.5 % SolG Place 1 drop into both eyes every morning. Timoptic XE or  timolol GFS (gel forming solution) 5 mL 12     No current facility-administered medications for this visit.      Allergies: Lisinopril; Amlodipine; Combigan [brimonidine-timolol]; Cosopt [dorzolamide-timolol]; and Pravastatin     The 10-year ASCVD risk score (Carly WASSERMAN Jr., et al., 2013) is: 9.9%    Values used to calculate the score:      Age: 72 years      Sex: Female      Is Non- : Yes      Diabetic: No      Tobacco smoker: No      Systolic Blood Pressure: 128 mmHg      Is BP treated: Yes      HDL Cholesterol: 39 mg/dL      Total Cholesterol: 162 mg/dL      ROS:  Constitutional: no weight loss, weight gain, fever, fatigue  Eyes:  No vision changes, glasses/contacts  ENT/Mouth: No ulcers, sinus problems, ears ringing, headache  Cardiovascular: No inability to lie flat, chest pain, exercise intolerance, swelling, heart palpitations  Respiratory: No wheezing, coughing blood, shortness of breath, or cough  Gastrointestinal: No diarrhea, bloody stool, nausea/vomiting, constipation, gas, hemorrhoids  Genitourinary: No blood in urine, painful urination, urgency of urination, frequency of urination, incomplete emptying, incontinence, abnormal bleeding, painful periods, heavy periods, vaginal discharge, vaginal odor, painful intercourse, sexual problems, bleeding after intercourse.  Musculoskeletal: No muscle weakness  Skin/Breast: No painful breasts, nipple discharge, masses, rash, ulcers  Neurological: No passing out, seizures, numbness, headache  Endocrine: No diabetes, hypothyroid, hyperthyroid, hot flashes, hair loss, abnormal hair growth, acne  Psychiatric: No depression, crying  Hematologic: No bruises, bleeding, swollen lymph nodes, anemia.      Physical Exam:   Constitutional: She is oriented to person, place, and time. She appears well-developed and well-nourished.      Neck: Normal range of motion. No tracheal deviation present. No thyromegaly present.    Cardiovascular: Exam reveals no  edema.     Pulmonary/Chest: Effort normal. She exhibits no mass, no tenderness, no deformity and no retraction. Right breast exhibits no inverted nipple, no mass, no nipple discharge, no skin change, no tenderness, presence, no bleeding and no swelling. Left breast exhibits no inverted nipple, no mass, no nipple discharge, no skin change, no tenderness, presence, no bleeding and no swelling. Breasts are symmetrical.        Abdominal: Soft. She exhibits no distension and no mass. There is no tenderness. There is no rebound and no guarding. No hernia. Hernia confirmed negative in the left inguinal area.     Genitourinary: Rectal exam shows no external hemorrhoid. There is no rash, tenderness or lesion on the right labia. There is no rash, tenderness or lesion on the left labia. Uterus is absent. No no adexnal prolapse. Right adnexum displays no mass, no tenderness and no fullness. Left adnexum displays no mass, no tenderness and no fullness. No tenderness, bleeding, rectocele, cystocele or unspecified prolapse of vaginal walls in the vagina. No vaginal discharge found. Vaginal cuff normal.Cervix exhibits absence.           Musculoskeletal: Normal range of motion and moves all extremeties. She exhibits no edema.      Lymphadenopathy:        Right: No inguinal adenopathy present.        Left: No inguinal adenopathy present.    Neurological: She is alert and oriented to person, place, and time.    Skin: No rash noted. No erythema. No pallor.    Psychiatric: She has a normal mood and affect. Her behavior is normal. Judgment and thought content normal.         ASSESSMENT:   well woman    PLAN:   Mammogram  Breast density and [place]- [employer] model explained- all of her questions were answered  return annually or prn

## 2018-06-19 ENCOUNTER — TELEPHONE (OUTPATIENT)
Dept: FAMILY MEDICINE | Facility: CLINIC | Age: 73
End: 2018-06-19

## 2018-06-19 ENCOUNTER — HOSPITAL ENCOUNTER (OUTPATIENT)
Dept: RADIOLOGY | Facility: HOSPITAL | Age: 73
Discharge: HOME | End: 2018-06-19
Attending: FAMILY MEDICINE
Payer: MEDICARE

## 2018-06-19 ENCOUNTER — OFFICE VISIT (OUTPATIENT)
Dept: FAMILY MEDICINE | Facility: CLINIC | Age: 73
End: 2018-06-19
Payer: MEDICARE

## 2018-06-19 VITALS
RESPIRATION RATE: 16 BRPM | HEIGHT: 65 IN | SYSTOLIC BLOOD PRESSURE: 124 MMHG | WEIGHT: 191 LBS | DIASTOLIC BLOOD PRESSURE: 66 MMHG | OXYGEN SATURATION: 96 % | TEMPERATURE: 98 F | BODY MASS INDEX: 31.82 KG/M2 | HEART RATE: 60 BPM
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
## 2018-06-19 DIAGNOSIS — R07.81 RIB PAIN ON LEFT SIDE: ICD-10-CM

## 2018-06-19 DIAGNOSIS — N28.1 RENAL CYST: ICD-10-CM

## 2018-06-19 PROCEDURE — 3078F DIAST BP <80 MM HG: CPT | Mod: CPTII,S$GLB,, | Performed by: FAMILY MEDICINE

## 2018-06-19 PROCEDURE — 99214 OFFICE O/P EST MOD 30 MIN: CPT | Mod: S$GLB,,, | Performed by: FAMILY MEDICINE

## 2018-06-19 PROCEDURE — 71100 X-RAY EXAM RIBS UNI 2 VIEWS: CPT | Mod: 26,,, | Performed by: RADIOLOGY

## 2018-06-19 PROCEDURE — 71100 X-RAY EXAM RIBS UNI 2 VIEWS: CPT | Mod: TC,FY,PO

## 2018-06-19 PROCEDURE — 3074F SYST BP LT 130 MM HG: CPT | Mod: CPTII,S$GLB,, | Performed by: FAMILY MEDICINE

## 2018-06-19 PROCEDURE — 99999 PR PBB SHADOW E&M-EST. PATIENT-LVL IV: CPT | Mod: PBBFAC,,, | Performed by: FAMILY MEDICINE

## 2018-06-19 NOTE — PROGRESS NOTES
Chief Complaint   Patient presents with    Rib Injury      L side all x 1 week    Flank Pain    Fall       HPI  Rosmery Ramirez is a 72 y.o. female with multiple medical diagnoses as listed in the medical history and problem list that presents for evaluation for one week of left rib pain that occurred one week ago while she was crossing her dog gate and her dog was caught. She fell on her left side and hit her face, head, and arm along with her side on the tile. She was taking aleve for several days but the pain went away the returned. She has pain with taking a deep breath or moving.     PAST MEDICAL HISTORY:  Past Medical History:   Diagnosis Date    Allergy     Anxiety     Breast cyst     Edema of leg 10/5/2012    bilateral     Fractures 2011    thumb R    GERD (gastroesophageal reflux disease)     Glaucoma     History of colonic polyps     Hx-TIA (transient ischemic attack) 8/13/2012 Jan 2012: LLE and left facial numbness lasting 1 hour     Hyperlipidemia     Hypertension     Left shoulder tendonitis 8/8/2015    Left shoulder tendonitis     Primary open-angle glaucoma, mild stage - Both Eyes 6/3/2013    TIA (transient ischemic attack) Jan 2012    LLE and left facial numbness lasting 1 hour       PAST SURGICAL HISTORY:  Past Surgical History:   Procedure Laterality Date    BREAST BIOPSY Left     core    BREAST SURGERY Left     lumpectomy    CATARACT EXTRACTION W/  INTRAOCULAR LENS IMPLANT Left 10/05/2016    OS (Dr. Mike)    CATARACT EXTRACTION W/  INTRAOCULAR LENS IMPLANT Right 11/30/2016    OD (Dr. Mike)    COLONOSCOPY N/A 1/23/2017    Procedure: COLONOSCOPY;  Surgeon: Hany Mosquera MD;  Location: [address]);  Service: Endoscopy;  Laterality: N/A;    HAND SURGERY Left 2011    thumb    HYSTERECTOMY  1980's    Total;    Left Breast Lumpectomy Left     performed in 1960s    OOPHORECTOMY      SELECTIVE LASER TRABECUPLASTY  05/2014    OU w/ Dr. Mike     TUBAL LIGATION  1970's    VAGINAL DELIVERY      x3       SOCIAL HISTORY:  Social History     Social History    Marital status:      Spouse name: N/A    Number of children: 3    Years of education: N/A     Occupational History    retired - formerly pastoral care with [employer]      Social History Main Topics    Smoking status: Never Smoker    Smokeless tobacco: Never Used    Alcohol use 0.0 oz/week      Comment: Rarely; 1 glass of wine    Drug use: No    Sexual activity: Yes     Birth control/ protection: Post-menopausal, See Surgical Hx     Other Topics Concern    Not on file     Social History Narrative    No narrative on file       FAMILY HISTORY:  Family History   Problem Relation Age of Onset    Breast cancer Mother 50    Glaucoma Mother     Alzheimer's disease Mother     Blindness Mother     Cataracts Mother     Glaucoma Father     Prostate cancer Father     Blindness Father     Cataracts Father     Cancer Father 62        prostate    Glaucoma Sister     No Known Problems Brother     No Known Problems Brother     Alzheimer's disease Maternal Grandfather     Macular degeneration Neg Hx     Retinal detachment Neg Hx     Strabismus Neg Hx     Amblyopia Neg Hx     Heart attack Neg Hx     Heart disease Neg Hx     Ovarian cancer Neg Hx     Colon cancer Neg Hx        ALLERGIES AND MEDICATIONS: updated and reviewed.  Review of patient's allergies indicates:   Allergen Reactions    Lisinopril Swelling    Amlodipine Edema    Combigan [brimonidine-timolol]      Causes blepharitis and itchy eyelids/contact dermatitis    Cosopt [dorzolamide-timolol] Other (See Comments)     Causes angular blepharitis around eyes    Pravastatin      Myalgia and elevated CPK     Current Outpatient Prescriptions   Medication Sig Dispense Refill    aspirin (ECOTRIN) 81 MG EC tablet Take 81 mg by mouth once daily.        calcium 500 mg Tab Take 500 mg by mouth once daily.        "hydrochlorothiazide (MICROZIDE) 12.5 mg capsule TAKE 1 CAPSULE (12.5 MG TOTAL) BY MOUTH ONCE DAILY. 90 capsule 3    irbesartan (AVAPRO) 300 MG tablet Take 300 mg by mouth once daily.       krill oil-omega-3-dha-epa 150-450 mg CpDR Take 1 tablet by mouth once daily.      lorazepam (ATIVAN) 1 MG tablet TAKE 1 TABLET (1 MG TOTAL) BY MOUTH NIGHTLY AS NEEDED (INSOMNIA). 30 tablet 0    multivitamin Chew Take 1 tablet by mouth once daily.       timolol maleate 0.5% (TIMOPTIC-XE) 0.5 % SolG Place 1 drop into both eyes every morning. Timoptic XE or timolol GFS (gel forming solution) 5 mL 12     No current facility-administered medications for this visit.        ROS  Review of Systems   Constitutional: Negative for chills, diaphoresis, fatigue, fever and unexpected weight change.   HENT: Negative for rhinorrhea, sinus pressure, sore throat and tinnitus.    Eyes: Negative for photophobia and visual disturbance.   Respiratory: Negative for cough, shortness of breath and wheezing.    Cardiovascular: Negative for chest pain and palpitations.   Gastrointestinal: Negative for abdominal pain, blood in stool, constipation, diarrhea, nausea and vomiting.   Genitourinary: Negative for dysuria, flank pain, frequency and vaginal discharge.   Musculoskeletal: Positive for myalgias. Negative for arthralgias and joint swelling.   Skin: Negative for rash.   Neurological: Negative for speech difficulty, weakness, light-headedness and headaches.   Psychiatric/Behavioral: Negative for behavioral problems and dysphoric mood.       Physical Exam  Vitals:    06/19/18 1022   BP: 124/66   Pulse: 60   Resp: 16   Temp: 98.1 °F (36.7 °C)   TempSrc: Oral   SpO2: 96%   Weight: 86.6 kg (191 lb)   Height: 5' 5" (1.651 m)    Body mass index is 31.78 kg/m².  Weight: 86.6 kg (191 lb)   Height: 5' 5" (165.1 cm)     Physical Exam   Constitutional: She is oriented to person, place, and time. She appears well-developed and well-nourished. No distress. "   Eyes: EOM are normal.   Neck: Neck supple.   Cardiovascular: Normal rate and regular rhythm.  Exam reveals no gallop and no friction rub.    No murmur heard.  Pulmonary/Chest: Effort normal and breath sounds normal. No respiratory distress. She has no wheezes. She has no rales. She exhibits tenderness.       Pain with palpation over ribs   Abdominal: Soft. Bowel sounds are normal.   Lymphadenopathy:     She has no cervical adenopathy.   Neurological: She is alert and oriented to person, place, and time.   Skin: Skin is warm and dry. No rash noted.   Psychiatric: She has a normal mood and affect. Her behavior is normal.   Nursing note and vitals reviewed.      Health Maintenance       Date Due Completion Date    Zoster Vaccine 06/28/2006 ---    Lipid Panel 07/26/2017 7/26/2016    Influenza Vaccine 08/01/2018 1/29/2018    DEXA SCAN 07/26/2019 7/26/2016    Mammogram 05/01/2020 5/1/2018    TETANUS VACCINE 07/20/2026 7/20/2016    Colonoscopy 01/23/2027 1/23/2017            ASSESSMENT     1. Fall, initial encounter    2. Rib pain on left side    3. Renal cyst        PLAN:     Problem List Items Addressed This Visit     None      Visit Diagnoses     Fall, initial encounter    -  Primary  -she is healing well, rule out a fracture or a contusion    Rib pain on left side      -apply ice, may continue NSAIDs for short term use, tylenol for pain    Relevant Orders    X-Ray Ribs 2 View Left (Completed)    Renal cyst      -reviewed imaging study from urology, discussed subcentimeter cyst and benign nature, she would like to see a specialist for further evaluation, will refer per pt request    Relevant Orders    Ambulatory consult to Nephrology            Reyna Marte MD  06/19/2018 10:56 AM        Follow-up if symptoms worsen or fail to improve.

## 2018-06-27 ENCOUNTER — HOSPITAL ENCOUNTER (OUTPATIENT)
Dept: CARDIOLOGY | Facility: CLINIC | Age: 73
Discharge: HOME | End: 2018-06-27
Payer: MEDICARE

## 2018-06-27 ENCOUNTER — OFFICE VISIT (OUTPATIENT)
Dept: NEPHROLOGY | Facility: CLINIC | Age: 73
End: 2018-06-27
Payer: MEDICARE

## 2018-06-27 VITALS
OXYGEN SATURATION: 98 % | SYSTOLIC BLOOD PRESSURE: 138 MMHG | HEART RATE: 58 BPM | HEIGHT: 65 IN | BODY MASS INDEX: 31.82 KG/M2 | WEIGHT: 191 LBS | DIASTOLIC BLOOD PRESSURE: 80 MMHG

## 2018-06-27 DIAGNOSIS — R00.1 BRADYCARDIA: ICD-10-CM

## 2018-06-27 DIAGNOSIS — I10 ESSENTIAL (PRIMARY) HYPERTENSION: ICD-10-CM

## 2018-06-27 DIAGNOSIS — N18.2 CKD (CHRONIC KIDNEY DISEASE) STAGE 2, GFR 60-89 ML/MIN: ICD-10-CM

## 2018-06-27 DIAGNOSIS — N18.2 CKD (CHRONIC KIDNEY DISEASE) STAGE 2, GFR 60-89 ML/MIN: Primary | ICD-10-CM

## 2018-06-27 DIAGNOSIS — R73.03 PRE-DIABETES: ICD-10-CM

## 2018-06-27 PROCEDURE — 93000 ELECTROCARDIOGRAM COMPLETE: CPT | Mod: S$GLB,,, | Performed by: INTERNAL MEDICINE

## 2018-06-27 PROCEDURE — 99201 PR OFFICE/OUTPT VISIT,NEW,LEVL I: CPT | Mod: GC,S$GLB,, | Performed by: INTERNAL MEDICINE

## 2018-06-27 PROCEDURE — 3075F SYST BP GE 130 - 139MM HG: CPT | Mod: CPTII,GC,S$GLB, | Performed by: INTERNAL MEDICINE

## 2018-06-27 PROCEDURE — 99999 PR PBB SHADOW E&M-EST. PATIENT-LVL III: CPT | Mod: PBBFAC,GC,, | Performed by: INTERNAL MEDICINE

## 2018-06-27 PROCEDURE — 3079F DIAST BP 80-89 MM HG: CPT | Mod: CPTII,GC,S$GLB, | Performed by: INTERNAL MEDICINE

## 2018-06-27 NOTE — PATIENT INSTRUCTIONS
Follow low sodium diet   Repeat labs Renal panel, CBC, lipid panel,   Repeat renal ultrasound in one year   Avoid NSAIDS  (ALEVE advil, ibuprofen, BC powder)  For pain use Tylenol  EKG for recent bradycardia   Cardiology follow up   Follow up depending on lab results

## 2018-06-27 NOTE — PROGRESS NOTES
Nephrology Consult  H&P      Consult Requested By: No att. providers found  Reason for Consult: Evaluation of renal ultrasound which presented with renal cyst     SUBJECTIVE:     History of Present Illness:  Patient is a 72 y.o. female presents for very first time to this clinic for evaluation of renal cyst on ultrasound and apparently had increased in sCr and decrease BUN on outside hospital lab work. She has a past medical history of HTN and HLD.  Renal ultrasound was done on 6/29/18 with simple renal cyst within the midpole of the right kidney, no evidence of hydronephrosis or mass. She doesn't have previous BMP which presented with increased creatinine. Denies any  fever, chills, cough, nausea, vomiting, diarrhea, dysuria or hematuria . Has used NSAIDs on occasions.      Review Of Systems:  Review of Systems - History obtained from chart review  General ROS: negative  ENT ROS: negative  Allergy and Immunology ROS: negative  Hematological and Lymphatic ROS: negative  Respiratory ROS: no cough, shortness of breath, or wheezing  Cardiovascular ROS: no chest pain or dyspnea on exertion  Gastrointestinal ROS: no abdominal pain, change in bowel habits, or black or bloody stools  Genito-Urinary ROS: no dysuria, trouble voiding, or hematuria  Musculoskeletal ROS: negative  Neurological ROS: no TIA or stroke symptoms      Past Medical History:   Diagnosis Date    Allergy     Anxiety     Breast cyst     Edema of leg 10/5/2012    bilateral     Fractures 2011    thumb R    GERD (gastroesophageal reflux disease)     Glaucoma     History of colonic polyps     Hx-TIA (transient ischemic attack) 8/13/2012 Jan 2012: LLE and left facial numbness lasting 1 hour     Hyperlipidemia     Hypertension     Left shoulder tendonitis 8/8/2015    Left shoulder tendonitis     Primary open-angle glaucoma, mild stage - Both Eyes 6/3/2013    TIA (transient ischemic attack) Jan 2012    LLE and left facial numbness lasting 1  hour     Past Surgical History:   Procedure Laterality Date    BREAST BIOPSY Left     core    BREAST SURGERY Left     lumpectomy    CATARACT EXTRACTION W/  INTRAOCULAR LENS IMPLANT Left 10/05/2016    OS (Dr. Mike)    CATARACT EXTRACTION W/  INTRAOCULAR LENS IMPLANT Right 11/30/2016    OD (Dr. Mike)    COLONOSCOPY N/A 1/23/2017    Procedure: COLONOSCOPY;  Surgeon: Hany Mosquera MD;  Location: [address]);  Service: Endoscopy;  Laterality: N/A;    HAND SURGERY Left 2011    thumb    HYSTERECTOMY  1980's    Total;    Left Breast Lumpectomy Left     performed in 1960s    OOPHORECTOMY      SELECTIVE LASER TRABECUPLASTY  05/2014    OU w/ Dr. Mike    TUBAL LIGATION  1970's    VAGINAL DELIVERY      x3     Family History   Problem Relation Age of Onset    Breast cancer Mother 50    Glaucoma Mother     Alzheimer's disease Mother     Blindness Mother     Cataracts Mother     Glaucoma Father     Prostate cancer Father     Blindness Father     Cataracts Father     Cancer Father 62        prostate    Glaucoma Sister     No Known Problems Brother     No Known Problems Brother     Alzheimer's disease Maternal Grandfather     Macular degeneration Neg Hx     Retinal detachment Neg Hx     Strabismus Neg Hx     Amblyopia Neg Hx     Heart attack Neg Hx     Heart disease Neg Hx     Ovarian cancer Neg Hx     Colon cancer Neg Hx      Social History   Substance Use Topics    Smoking status: Never Smoker    Smokeless tobacco: Never Used    Alcohol use 0.0 oz/week      Comment: Rarely; 1 glass of wine       Review of patient's allergies indicates:   Allergen Reactions    Lisinopril Swelling    Amlodipine Edema    Combigan [brimonidine-timolol]      Causes blepharitis and itchy eyelids/contact dermatitis    Cosopt [dorzolamide-timolol] Other (See Comments)     Causes angular blepharitis around eyes    Pravastatin      Myalgia and elevated CPK            OBJECTIVE:     Vital Signs  (Most Recent)  There were no vitals filed for this visit.      Medications:  Current Outpatient Prescriptions on File Prior to Visit   Medication Sig Dispense Refill    aspirin (ECOTRIN) 81 MG EC tablet Take 81 mg by mouth once daily.        calcium 500 mg Tab Take 500 mg by mouth once daily.       hydrochlorothiazide (MICROZIDE) 12.5 mg capsule TAKE 1 CAPSULE (12.5 MG TOTAL) BY MOUTH ONCE DAILY. 90 capsule 3    irbesartan (AVAPRO) 300 MG tablet Take 300 mg by mouth once daily.       krill oil-omega-3-dha-epa 150-450 mg CpDR Take 1 tablet by mouth once daily.      lorazepam (ATIVAN) 1 MG tablet TAKE 1 TABLET (1 MG TOTAL) BY MOUTH NIGHTLY AS NEEDED (INSOMNIA). 30 tablet 0    multivitamin Chew Take 1 tablet by mouth once daily.       timolol maleate 0.5% (TIMOPTIC-XE) 0.5 % SolG Place 1 drop into both eyes every morning. Timoptic XE or timolol GFS (gel forming solution) 5 mL 12     No current facility-administered medications on file prior to visit.            Physical Exam:  General appearance: well developed, well nourished  Head: normocephalic, atraumatic  Eyes:  conjunctivae/corneas clear. PERRL.  Neck: supple, symmetrical, trachea midline, no JVD and thyroid not enlarged, symmetric, no tenderness/mass/nodules  Lungs:  clear to auscultation bilaterally and normal respiratory effort  Chest wall: no tenderness,   Heart: regular rate and rhythm, S1, S2 normal, no murmur, click, rub or gallop + Bradycardia   Abdomen: soft, non-tender non-distented; bowel sounds normal; no masses,  no organomegaly  Extremities: no cyanosis or edema, or clubbing  Pulses: 2+ and symmetric  Skin: Skin color, texture, turgor normal. No rashes or lesions  Neurologic: Normal strength and tone. No focal numbness or weakness      Diagnostic Results:  X-Ray: Reviewed  US: Reviewed  Echo: Reviewed    ACCESS    ASSESSMENT/PLAN:     Patient Active Problem List   Diagnosis    HTN (hypertension)    Hyperlipidemia    Angular blepharitis     POAG (primary open-angle glaucoma)    Obesity, Class II, BMI 35-39.9, with comorbidity    Cataract    Nuclear sclerosis    Chronic pain of left knee    History of colon polyps     1) Patient will have Renal panel done today for evaluation if any evidence of CKD, in which results turned with no change of sCr from baseline of 0.8. Was oriented to avoid NSAIDs and if any pain try to use tylenol.     BMP  Lab Results   Component Value Date     06/30/2018    K 4.0 06/30/2018     06/30/2018    CO2 27 06/30/2018    BUN 16 06/30/2018    CREATININE 0.8 06/30/2018    CALCIUM 10.1 06/30/2018    ANIONGAP 9 06/30/2018    ESTGFRAFRICA >60.0 06/30/2018    EGFRNONAA >60.0 06/30/2018     2) HTN;  - Blood pressures stable 138/80 mmHg  - States that blood pressures at home have been stable also  - Continue to follow low sodium diet   - Continue with irbesartan and HCTZ     3) Renal cyst :  - Will only require to be monitored in a year if any change in size   - Renal ultrasound ordered to be done in One year    4) Bradycardia:  - Will send referral to cardiologist since this is something new for her.   - Ordered EKG to be evaluated by cardiologist    5) Pre diabetes:  - Follow diabetic diet, continue with exercise  - Manage per PCP     Lab Results   Component Value Date    HGBA1C 5.9 (H) 06/27/2018     6) HLD:    Lab Results   Component Value Date    CHOL 166 06/27/2018    CHOL 162 07/26/2016    CHOL 180 07/20/2015     Lab Results   Component Value Date    HDL 39 (L) 06/27/2018    HDL 39 (L) 07/26/2016    HDL 43 07/20/2015     Lab Results   Component Value Date    LDLCALC 89.8 06/27/2018    LDLCALC 102.4 07/26/2016    LDLCALC 110.4 07/20/2015     Lab Results   Component Value Date    TRIG 186 (H) 06/27/2018    TRIG 103 07/26/2016    TRIG 133 07/20/2015     Lab Results   Component Value Date    CHOLHDL 23.5 06/27/2018    CHOLHDL 24.1 07/26/2016    CHOLHDL 23.9 07/20/2015           Follow up in one year     Fernando  Glenda Russo  Nephrology  Fellow  [facility]    Pager [phone]

## 2018-06-28 ENCOUNTER — HOSPITAL ENCOUNTER (OUTPATIENT)
Dept: CARDIOLOGY | Facility: CLINIC | Age: 73
Discharge: HOME | End: 2018-06-28
Attending: INTERNAL MEDICINE
Payer: MEDICARE

## 2018-06-28 ENCOUNTER — OFFICE VISIT (OUTPATIENT)
Dept: CARDIOLOGY | Facility: CLINIC | Age: 73
End: 2018-06-28
Payer: MEDICARE

## 2018-06-28 VITALS
WEIGHT: 190.94 LBS | BODY MASS INDEX: 31.77 KG/M2 | DIASTOLIC BLOOD PRESSURE: 64 MMHG | HEART RATE: 64 BPM | OXYGEN SATURATION: 96 % | SYSTOLIC BLOOD PRESSURE: 102 MMHG | RESPIRATION RATE: 16 BRPM

## 2018-06-28 DIAGNOSIS — N18.2 CKD (CHRONIC KIDNEY DISEASE) STAGE 2, GFR 60-89 ML/MIN: ICD-10-CM

## 2018-06-28 DIAGNOSIS — R42 DIZZY: ICD-10-CM

## 2018-06-28 DIAGNOSIS — E78.2 MIXED HYPERLIPIDEMIA: ICD-10-CM

## 2018-06-28 DIAGNOSIS — R00.1 BRADYCARDIA: ICD-10-CM

## 2018-06-28 DIAGNOSIS — R00.1 BRADYCARDIA: Primary | ICD-10-CM

## 2018-06-28 DIAGNOSIS — I10 ESSENTIAL HYPERTENSION: ICD-10-CM

## 2018-06-28 LAB
DIASTOLIC DYSFUNCTION: NO
ESTIMATED PA SYSTOLIC PRESSURE: 17.29
MITRAL VALVE REGURGITATION: NORMAL
RETIRED EF AND QEF - SEE NOTES: 65 (ref 55–65)

## 2018-06-28 PROCEDURE — 99999 PR PBB SHADOW E&M-EST. PATIENT-LVL III: CPT | Mod: PBBFAC,,, | Performed by: INTERNAL MEDICINE

## 2018-06-28 PROCEDURE — 99499 UNLISTED E&M SERVICE: CPT | Mod: S$PBB,,, | Performed by: INTERNAL MEDICINE

## 2018-06-28 PROCEDURE — 3078F DIAST BP <80 MM HG: CPT | Mod: CPTII,S$GLB,, | Performed by: INTERNAL MEDICINE

## 2018-06-28 PROCEDURE — 99204 OFFICE O/P NEW MOD 45 MIN: CPT | Mod: S$GLB,,, | Performed by: INTERNAL MEDICINE

## 2018-06-28 PROCEDURE — 93000 ELECTROCARDIOGRAM COMPLETE: CPT | Mod: S$GLB,,, | Performed by: INTERNAL MEDICINE

## 2018-06-28 PROCEDURE — 93325 DOPPLER ECHO COLOR FLOW MAPG: CPT | Mod: S$GLB,,, | Performed by: INTERNAL MEDICINE

## 2018-06-28 PROCEDURE — 3074F SYST BP LT 130 MM HG: CPT | Mod: CPTII,S$GLB,, | Performed by: INTERNAL MEDICINE

## 2018-06-28 PROCEDURE — 93320 DOPPLER ECHO COMPLETE: CPT | Mod: S$GLB,,, | Performed by: INTERNAL MEDICINE

## 2018-06-28 PROCEDURE — 93351 STRESS TTE COMPLETE: CPT | Mod: S$GLB,,, | Performed by: INTERNAL MEDICINE

## 2018-06-28 NOTE — LETTER
June 28, 2018      Fernando Doshi MD  [address]  [facility] [address]           [facility] - Cardiology  [address] [facility] [address]  [place] [address]  Phone: [phone]          Patient: Rosmery Ramirez   MR Number: [id number]   YOB: 1945   Date of Visit: 6/28/2018       Dear Dr. Fernando Doshi:    Thank you for referring Rosmery Ramirez to me for evaluation. Attached you will find relevant portions of my assessment and plan of care.    If you have questions, please do not hesitate to call me. I look forward to following Rosmery Ramirez along with you.    Sincerely,    Michael Le MD    Enclosure  CC:  No Recipients    If you would like to receive this communication electronically, please contact [email or web address] or [phone] to request more information on [employer] access.    For providers and/or their staff who would like to refer a patient to [facility], please contact us through our one-stop-shop provider referral line, [facility], at [phone].    If you feel you have received this communication in error or would no longer like to receive these types of communications, please e-mail [email or web address]

## 2018-06-28 NOTE — PROGRESS NOTES
Subjective:    Patient ID:  Rosmery Ramirez is a 73 y.o. female who presents for evaluation of Consult (low heart rate )      HPI  Patient is here for cardiac evaluation.  She was referred from primary care physician's office for bradycardia and recent dizziness.  She says this mostly occurred after her blood pressure medications were adjusted.  She says her blood pressure is been running on the low side and she's been a little lightheaded.  She says she doesn't have some exertional shortness of breath has had chest pains on and off in the past.  Most recently she's had chest pains after a fall on her left side.  She said she had some bruises ribs but no other problems.  She describes no PND, orthopnea or lower edema.  She's described no dizziness to the point of presyncope or syncope.  She says she's had on and off palpitations in the past more Holter monitor in 2014 which was fairly unremarkable.  She says that she had diagnostic testing over 6 years ago when she worked at [employer].    Review of Systems   Constitution: Negative.   HENT: Negative.    Eyes: Negative.    Cardiovascular: Positive for dyspnea on exertion. Negative for chest pain, irregular heartbeat, leg swelling, near-syncope, orthopnea, palpitations, paroxysmal nocturnal dyspnea and syncope.   Respiratory: Negative for shortness of breath.    Skin: Negative.    Musculoskeletal: Negative.    Gastrointestinal: Negative for abdominal pain, constipation and diarrhea.   Genitourinary: Negative for dysuria.   Neurological: Positive for dizziness and light-headedness.   Psychiatric/Behavioral: Negative.      Past Medical History:   Diagnosis Date    Allergy     Anxiety     Breast cyst     Edema of leg 10/5/2012    bilateral     Fractures 2011    thumb R    GERD (gastroesophageal reflux disease)     Glaucoma     History of colonic polyps     Hx-TIA (transient ischemic attack) 8/13/2012 Jan 2012: LLE and left facial numbness  lasting 1 hour     Hyperlipidemia     Hypertension     Left shoulder tendonitis 8/8/2015    Left shoulder tendonitis     Primary open-angle glaucoma, mild stage - Both Eyes 6/3/2013    TIA (transient ischemic attack) Jan 2012    LLE and left facial numbness lasting 1 hour     Past Surgical History:   Procedure Laterality Date    BREAST BIOPSY Left     core    BREAST SURGERY Left     lumpectomy    CATARACT EXTRACTION W/  INTRAOCULAR LENS IMPLANT Left 10/05/2016    OS (Dr. Mike)    CATARACT EXTRACTION W/  INTRAOCULAR LENS IMPLANT Right 11/30/2016    OD (Dr. Mike)    COLONOSCOPY N/A 1/23/2017    Procedure: COLONOSCOPY;  Surgeon: Hany Mosquera MD;  Location: [facility] ENDO ([address] [place] [address]);  Service: Endoscopy;  Laterality: N/A;    HAND SURGERY Left 2011    thumb    HYSTERECTOMY  1980's    Total;    Left Breast Lumpectomy Left     performed in 1960s    OOPHORECTOMY      SELECTIVE LASER TRABECUPLASTY  05/2014    OU w/ Dr. Mike    TUBAL LIGATION  1970's    VAGINAL DELIVERY      x3     Social History   Substance Use Topics    Smoking status: Never Smoker    Smokeless tobacco: Never Used    Alcohol use 0.0 oz/week      Comment: Rarely; 1 glass of wine     Family History   Problem Relation Age of Onset    Breast cancer Mother 50    Glaucoma Mother     Alzheimer's disease Mother     Blindness Mother     Cataracts Mother     Glaucoma Father     Prostate cancer Father     Blindness Father     Cataracts Father     Cancer Father 62        prostate    Glaucoma Sister     No Known Problems Brother     No Known Problems Brother     Alzheimer's disease Maternal Grandfather     Macular degeneration Neg Hx     Retinal detachment Neg Hx     Strabismus Neg Hx     Amblyopia Neg Hx     Heart attack Neg Hx     Heart disease Neg Hx     Ovarian cancer Neg Hx     Colon cancer Neg Hx         Objective:    Physical Exam   Constitutional: She is oriented to person, place, and time. She appears  well-developed and well-nourished.   HENT:   Head: Normocephalic and atraumatic.   Eyes: Conjunctivae and EOM are normal. Pupils are equal, round, and reactive to light.   Neck: Normal range of motion. Neck supple. No thyromegaly present.   Cardiovascular: Normal rate and regular rhythm.    No murmur heard.  Pulmonary/Chest: Effort normal and breath sounds normal. No respiratory distress.   Abdominal: Soft. Bowel sounds are normal.   Musculoskeletal: She exhibits no edema.   Neurological: She is alert and oriented to person, place, and time.   Skin: Skin is warm and dry.   Psychiatric: She has a normal mood and affect. Her behavior is normal.       ekg sinus bradycardia    Assessment:       1. Bradycardia    2. CKD (chronic kidney disease) stage 2, GFR 60-89 ml/min    3. Essential hypertension    4. Mixed hyperlipidemia    5. Obesity, Class II, BMI 35-39.9, with comorbidity         Plan:       -Current symptoms and risk factors plan for baseline testing including stress echo and Holter  -Encouraged continued exercise  -Decrease irbesartan to 150 daily and continue hydrochlorothiazide  -Discussed baby aspirin    Return to clinic in one month with testing ASAP

## 2018-06-29 ENCOUNTER — HOSPITAL ENCOUNTER (OUTPATIENT)
Dept: CARDIOLOGY | Facility: HOSPITAL | Age: 73
Discharge: HOME | End: 2018-06-29
Attending: INTERNAL MEDICINE
Payer: MEDICARE

## 2018-06-29 ENCOUNTER — HOSPITAL ENCOUNTER (OUTPATIENT)
Dept: RADIOLOGY | Facility: HOSPITAL | Age: 73
Discharge: HOME | End: 2018-06-29
Attending: INTERNAL MEDICINE
Payer: MEDICARE

## 2018-06-29 DIAGNOSIS — N18.2 CKD (CHRONIC KIDNEY DISEASE) STAGE 2, GFR 60-89 ML/MIN: ICD-10-CM

## 2018-06-29 PROCEDURE — 93227 XTRNL ECG REC<48 HR R&I: CPT | Mod: ,,, | Performed by: INTERNAL MEDICINE

## 2018-06-29 PROCEDURE — 93225 XTRNL ECG REC<48 HRS REC: CPT

## 2018-06-29 PROCEDURE — 76770 US EXAM ABDO BACK WALL COMP: CPT | Mod: 26,,, | Performed by: RADIOLOGY

## 2018-06-29 PROCEDURE — 76770 US EXAM ABDO BACK WALL COMP: CPT | Mod: TC

## 2018-06-29 RX ORDER — IRBESARTAN 150 MG/1
150 TABLET ORAL NIGHTLY
Qty: 90 TABLET | Refills: 2 | Status: SHIPPED | OUTPATIENT
Start: 2018-06-29 | End: 2018-10-30 | Stop reason: SDUPTHER

## 2018-06-29 NOTE — TELEPHONE ENCOUNTER
----- Message from Cindy Caceres sent at 6/29/2018  9:40 AM CDT -----  Contact: Self   Patient says she need to speak with the doctor regarding her medication changes. Please call at [phone].

## 2018-06-30 ENCOUNTER — HOSPITAL ENCOUNTER (EMERGENCY)
Facility: HOSPITAL | Age: 73
Discharge: HOME | End: 2018-06-30
Attending: EMERGENCY MEDICINE
Payer: MEDICARE

## 2018-06-30 VITALS
TEMPERATURE: 99 F | WEIGHT: 190 LBS | SYSTOLIC BLOOD PRESSURE: 166 MMHG | RESPIRATION RATE: 16 BRPM | OXYGEN SATURATION: 97 % | BODY MASS INDEX: 31.65 KG/M2 | HEIGHT: 65 IN | HEART RATE: 54 BPM | DIASTOLIC BLOOD PRESSURE: 78 MMHG

## 2018-06-30 DIAGNOSIS — R07.9 CHEST PAIN: Primary | ICD-10-CM

## 2018-06-30 LAB
ANION GAP SERPL CALC-SCNC: 9 MMOL/L
BASOPHILS # BLD AUTO: 0.06 K/UL
BASOPHILS NFR BLD: 1 %
BUN SERPL-MCNC: 16 MG/DL
CALCIUM SERPL-MCNC: 10.1 MG/DL
CHLORIDE SERPL-SCNC: 103 MMOL/L
CO2 SERPL-SCNC: 27 MMOL/L
CREAT SERPL-MCNC: 0.8 MG/DL
D DIMER PPP IA.FEU-MCNC: 0.49 MG/L FEU
DIFFERENTIAL METHOD: NORMAL
EOSINOPHIL # BLD AUTO: 0.1 K/UL
EOSINOPHIL NFR BLD: 1.5 %
ERYTHROCYTE [DISTWIDTH] IN BLOOD BY AUTOMATED COUNT: 12.4 %
EST. GFR  (AFRICAN AMERICAN): >60 ML/MIN/1.73 M^2
EST. GFR  (NON AFRICAN AMERICAN): >60 ML/MIN/1.73 M^2
GLUCOSE SERPL-MCNC: 85 MG/DL
HCT VFR BLD AUTO: 38.7 %
HGB BLD-MCNC: 12.7 G/DL
IMM GRANULOCYTES # BLD AUTO: 0.02 K/UL
IMM GRANULOCYTES NFR BLD AUTO: 0.3 %
LYMPHOCYTES # BLD AUTO: 2.1 K/UL
LYMPHOCYTES NFR BLD: 34.1 %
MCH RBC QN AUTO: 29.2 PG
MCHC RBC AUTO-ENTMCNC: 32.8 G/DL
MCV RBC AUTO: 89 FL
MONOCYTES # BLD AUTO: 0.7 K/UL
MONOCYTES NFR BLD: 10.7 %
NEUTROPHILS # BLD AUTO: 3.2 K/UL
NEUTROPHILS NFR BLD: 52.4 %
NRBC BLD-RTO: 0 /100 WBC
PLATELET # BLD AUTO: 293 K/UL
PMV BLD AUTO: 10 FL
POTASSIUM SERPL-SCNC: 4 MMOL/L
RBC # BLD AUTO: 4.35 M/UL
SODIUM SERPL-SCNC: 139 MMOL/L
TROPONIN I SERPL DL<=0.01 NG/ML-MCNC: <0.006 NG/ML
WBC # BLD AUTO: 6.15 K/UL

## 2018-06-30 PROCEDURE — 99285 EMERGENCY DEPT VISIT HI MDM: CPT | Mod: ,,, | Performed by: EMERGENCY MEDICINE

## 2018-06-30 PROCEDURE — 99284 EMERGENCY DEPT VISIT MOD MDM: CPT | Mod: 25

## 2018-06-30 PROCEDURE — 93010 ELECTROCARDIOGRAM REPORT: CPT | Mod: ,,, | Performed by: INTERNAL MEDICINE

## 2018-06-30 PROCEDURE — 93005 ELECTROCARDIOGRAM TRACING: CPT

## 2018-06-30 PROCEDURE — 85379 FIBRIN DEGRADATION QUANT: CPT

## 2018-06-30 PROCEDURE — 80048 BASIC METABOLIC PNL TOTAL CA: CPT

## 2018-06-30 PROCEDURE — 85025 COMPLETE CBC W/AUTO DIFF WBC: CPT

## 2018-06-30 PROCEDURE — 84484 ASSAY OF TROPONIN QUANT: CPT

## 2018-06-30 NOTE — ED NOTES
Pt resting on stretcher in NAD, respirations even and unlabored. Stretcher locked and in lowest position, side rails x 2, call bell within reach. Cardiac monitor, pulse ox and BP cuff applied cycling Q 30 minute vitals. Pt updated on wait for lab results and reevaluation. Will continue to monitor and update pt on plan of care.

## 2018-06-30 NOTE — ED TRIAGE NOTES
Pt c/o chest pain, dizziness, and diaphoresis. Pt states pain is mid-sternal, intermittent, sharp, and radiating to back; Started about 9 a.m. This morning. Pt A&O x4 during triage. Pt denies any SOB, abdominal pain, N/V/D, fevers, or chills at home. Pt states dizziness has been intermittent for past few weeks. States only occurs when she stands from sitting or laying position. Has had recent blood work and stress test.

## 2018-06-30 NOTE — PROVIDER PROGRESS NOTES - EMERGENCY DEPT.
Encounter Date: 6/30/2018    ED Physician Progress Notes         EKG - STEMI Decision  Initial Reading: No STEMI present.      SCRIBE NOTE: Himanshu SMITH, am scribing for, and in the presence of, Harpal Chandler MD.

## 2018-06-30 NOTE — ED PROVIDER NOTES
SCRIBE #1 NOTE: I, Bernadette Mabry, am scribing for, and in the presence of, Fern Bonilla MD.       CC: Chest Pain (diaphoretic and dizzy. Off and  this last week - seen in clinic and just removed a holter monitor.C/O sternal CP radiating thru to back. Pain is intermittent. Pain occurs during rest. Had a stress test 2 days ago. States pain is different today b/c it goes thru to her back. )      HPI: Rosmery Ramirez is a 73 y.o.  female with HTN, HLD, GERD, anxiety and history of TIA who presents to the ED complaining of episodic left-sided chest pain that lasts for a couple of minutes since Mon. Pain is described as positional, non-exertional, mostly instigated with movement, non-radiating, reproducible with pressing over area, and insensitive to tylenol. Also, patient states CP comes without movement at times. Associated symptoms include lightheadedness on standing and diaphoresis. At last appointment with PCP approximately 10 days ago, patient described similar complains and reported a fall onto her left side. Trauma was thought to be the cause of her pain; CXR was negative for fracture. Also, patient was noted to be bradycardic and dizzy and so referred to Cardiology; work up was without acute abnormality. Patient's irbesartan decreased to 150mg daily at night, with which she endorses compliance. She denies SOB/PARKS, back pain, arm pain, lower extremity edema, HA, syncope, or palpitations in the last 5 days.         Past Medical History:   Diagnosis Date    Allergy     Anxiety     Breast cyst     Edema of leg 10/5/2012    bilateral     Fractures 2011    thumb R    GERD (gastroesophageal reflux disease)     Glaucoma     History of colonic polyps     Hx-TIA (transient ischemic attack) 8/13/2012 Jan 2012: LLE and left facial numbness lasting 1 hour     Hyperlipidemia     Hypertension     Left shoulder tendonitis 8/8/2015    Left shoulder tendonitis     Primary open-angle glaucoma, mild stage  - Both Eyes 6/3/2013    TIA (transient ischemic attack) Jan 2012    LLE and left facial numbness lasting 1 hour     Past Surgical History:   Procedure Laterality Date    BREAST BIOPSY Left     core    BREAST SURGERY Left     lumpectomy    CATARACT EXTRACTION W/  INTRAOCULAR LENS IMPLANT Left 10/05/2016    OS (Dr. Mike)    CATARACT EXTRACTION W/  INTRAOCULAR LENS IMPLANT Right 11/30/2016    OD (Dr. Mike)    COLONOSCOPY N/A 1/23/2017    Procedure: COLONOSCOPY;  Surgeon: Hany Mosquera MD;  Location: [facility] ([address] [place] [address]);  Service: Endoscopy;  Laterality: N/A;    HAND SURGERY Left 2011    thumb    HYSTERECTOMY  1980's    Total;    Left Breast Lumpectomy Left     performed in 1960s    OOPHORECTOMY      SELECTIVE LASER TRABECUPLASTY  05/2014    OU w/ Dr. Mike    TUBAL LIGATION  1970's    VAGINAL DELIVERY      x3     Family History   Problem Relation Age of Onset    Breast cancer Mother 50    Glaucoma Mother     Alzheimer's disease Mother     Blindness Mother     Cataracts Mother     Glaucoma Father     Prostate cancer Father     Blindness Father     Cataracts Father     Cancer Father 62        prostate    Glaucoma Sister     No Known Problems Brother     No Known Problems Brother     Alzheimer's disease Maternal Grandfather     Macular degeneration Neg Hx     Retinal detachment Neg Hx     Strabismus Neg Hx     Amblyopia Neg Hx     Heart attack Neg Hx     Heart disease Neg Hx     Ovarian cancer Neg Hx     Colon cancer Neg Hx      No current facility-administered medications on file prior to encounter.      Current Outpatient Prescriptions on File Prior to Encounter   Medication Sig Dispense Refill    aspirin (ECOTRIN) 81 MG EC tablet Take 81 mg by mouth once daily.        calcium 500 mg Tab Take 500 mg by mouth once daily.       hydrochlorothiazide (MICROZIDE) 12.5 mg capsule TAKE 1 CAPSULE (12.5 MG TOTAL) BY MOUTH ONCE DAILY. 90 capsule 3    irbesartan (AVAPRO) 150 MG  tablet Take 1 tablet (150 mg total) by mouth every evening. 90 tablet 2    irbesartan (AVAPRO) 300 MG tablet Take 300 mg by mouth once daily.       krill oil-omega-3-dha-epa 150-450 mg CpDR Take 1 tablet by mouth once daily.      lorazepam (ATIVAN) 1 MG tablet TAKE 1 TABLET (1 MG TOTAL) BY MOUTH NIGHTLY AS NEEDED (INSOMNIA). 30 tablet 0    multivitamin Chew Take 1 tablet by mouth once daily.       timolol maleate 0.5% (TIMOPTIC-XE) 0.5 % SolG Place 1 drop into both eyes every morning. Timoptic XE or timolol GFS (gel forming solution) 5 mL 12     Lisinopril; Amlodipine; Combigan [brimonidine-timolol]; Cosopt [dorzolamide-timolol]; and Pravastatin  Social History     Social History    Marital status:      Spouse name: N/A    Number of children: 3    Years of education: N/A     Occupational History    retired - formerly pastoral care with [employer]      Social History Main Topics    Smoking status: Never Smoker    Smokeless tobacco: Never Used    Alcohol use 0.0 oz/week      Comment: Rarely; 1 glass of wine    Drug use: No    Sexual activity: Yes     Birth control/ protection: Post-menopausal, See Surgical Hx     Other Topics Concern    None     Social History Narrative    None       ROS:     Constitutional: Diaphoresis.  HEENT neg  Resp: Left-sided chest pain.   Cardiac  neg  GI neg   neg  Neuro Lightheadedness on standing.   Heme/Immune: neg  Endo neg  Skin neg    PHYSICAL EXAM:  Vitals:    06/30/18 1456   BP: (!) 166/78   Pulse: (!) 54   Resp:    Temp:          PHYSICAL EXAM:   general: comfortable, in no acute distress  VS: triage VS reviewed  HEENT: NC/AT, PERRL, EOMI  Neck: trachea midline  CV: RRR, no m/r/g, no LE pitting edema  Resp: CTAB  ABD:  ND, + normal BS, NT  Renal: No CVAT  Neuro: AAO x 3, 5/5 muscle strength in upper and lower extremities, sensation grossly intact, face symmetric, speech normal  Ext: no deformity, +2 symmetrical peripheral pulses      DATA &  INTERVENTIONS:    LABS reviewed:  Labs Reviewed   BASIC METABOLIC PANEL   CBC W/ AUTO DIFFERENTIAL   TROPONIN I   D DIMER, QUANTITATIVE       RADIOLOGY reviewed:  Imaging Results          X-Ray Chest PA And Lateral (Final result)  Result time 06/30/18 15:19:11    Final result by Treasure Medina MD (06/30/18 15:19:11)                 Impression:      Normal exam.      Electronically signed by: Treasure Medina MD  Date:    06/30/2018  Time:    15:19             Narrative:    EXAMINATION:  XR CHEST PA AND LATERAL    CLINICAL HISTORY:  Chest pain, unspecified    TECHNIQUE:  PA and lateral views of the chest were performed.    COMPARISON:  06/19/2018    FINDINGS:  Stable elevation of the right hemidiaphragm.The lungs are clear, with normal appearance of pulmonary vasculature and no pleural effusion or pneumothorax.    The cardiac silhouette is normal in size. The hilar and mediastinal contours are unremarkable.    The osseous and soft tissue structures are normal.                                MEDICATIONS/FLUIDS:  Medications - No data to display      MDM: Patient is a 73 y.o. female presenting with lightheadness, diaphoresis, and intermitent CP for few miniutes every 15 to 20 minutes. Pain is not exertional, but positional and thought to be related to recent fall. Lightheadedness is noted only with standing.   Ddx:   Will order cardiac work up, ddimer, and CXR.      EKG interpreted; shows Q-wave in V3 and poor R-wave progression in lateral leads. NSR at rate of 60 bpm. Findings are comparable to previous EKGs on 6/27/18 and 6/28/2018, which showed Q-waves in V3 and aVF.    1640:  Labs reviewed; all are WNL (including trop and d-dimer). CXR interpreted; reveals no acute abnormalities. Presentation unlikely ao dissection. Chest pain reproduced by movement and by direct palpation. Will discharge with follow up instructions and return precautions.      Chart reviewed: On 6/28/2018, patient was evaluated by Cardiology  for bradycardia and episodic lightheadedness. Plan was for stress echo and Holter monitoring as reduction in daily irbesartan to 150 mg. Stress echo on 6/28 demonstrated: max HR of 133 and negative for ischemia. LVEF is normal.       IMPRESSION:  1.) Chest pain, non-cardiac      Dispo: Discharge       Fern Bonilla MD  07/01/18 1103

## 2018-07-01 NOTE — PROGRESS NOTES
Assessment /Plan     For exam results, see Encounter Report.    Primary open angle glaucoma of right eye, mild stage    Primary open angle glaucoma of left eye, moderate stage    Dry eye syndrome, bilateral    Angular blepharitis, unspecified laterality    Epiphora, right    Trichiasis of right upper eyelid    Pseudophakia of both eyes      Old pt of Dr. Eber Crouch  Sister is Marina Cortez (my pt.) she sent her to me      1. Early POAG-  Both Eyes   Dx. Years ago with Dr. Eber Crouch  -Strong family hx   -IOP previously well controlled (mid teens) on Xalatan but elevated on generic and switched to lumigan    Family history   STRONG (Mom,Dad, and 5 sisters)  Glaucoma meds   lumigan qhs OU, timolol gfs QAM OU  H/O adverse rxn to glaucoma drops - combigan - itchy lids  // cosopt - angular blepharitis   LASERS   S/p SLT 4/24/14 OD //  5/8/14 OS (good response 21/22 --> 12/15)  GLAUCOMA SURGERIES   [place] od 11/30/2016   OTHER EYE SURGERIES  Phaco /IOL os 10/6/2016 // phaco/IOL / [place] od 11/30/2016   CDR  0.8 / 0.75  Tbase  ??  Tmax  26 OU off all gtts (5/24/10)    Ttarget   16 OU  HVF - 6 VF '12 -17 -  OD IAD x3 now ; OS: IAD x 3 now  Gonio +3 OU  /540  OCT 4  test 2011 to 2017  - RNFL - OD:dec. S/N/I  // OS dec SFranco Reed N (+ prog ou)   HRT  6 test - 2012 to 2018 -   Disc photos  2012, 2015 - MR -  Bord S/N/I od // dec. Sshannon I/N/T os /// CDR 0.684 od // 0.748 osOIS     Ttoday 24/20  Test done today -  IOP check  / HRT    2. H/O - hyperopia - Both Eyes -mild - pre-cataract surgery    3. Epiphora  - C/O constant tearing of the right eye x 6 months (8/2015   4.  PC IOL   OD - phaco/IOL / [place] 11/30 /2016 - PCB00 22.5  OS - Phaco/IOL - NO COLTON's - pt declined - 10/6/2016 - PCB00 24.0       Plan:  -IOP 24/20 ou (target is 16 ou)   Off combigan - intolerant - itchy lids / blepharitis   Intol to cosopt - angular blepharitis   -Strong family hx  -Goal IOP 16 OU    CSM    Target is 16 ou    Pt does not like  generics - but has used  Latanoprost in past  without problems (actually prefers it to lumigan)   Intol to combigan -blepharitis / itch lids   Intol to cosopt - angular blepharitis     Tolerating  timolol gfs // timoptic XE ou q am - IOP higher than target today   Add back lumigan ou q hs - used pre - surgery withut difficulty     Rx glasses given 2/2017     KEEP PERVIOUS SCHEDULED APPT - 4 MONTHS FROM LAST VISIT FOR   F/u  - 4 months -- with HVF / DFE / photos // IOP check back on lumigan

## 2018-07-03 ENCOUNTER — OFFICE VISIT (OUTPATIENT)
Dept: OPHTHALMOLOGY | Facility: CLINIC | Age: 73
End: 2018-07-03
Payer: MEDICARE

## 2018-07-03 ENCOUNTER — CLINICAL SUPPORT (OUTPATIENT)
Dept: OPHTHALMOLOGY | Facility: CLINIC | Age: 73
End: 2018-07-03
Payer: MEDICARE

## 2018-07-03 DIAGNOSIS — H04.123 DRY EYE SYNDROME, BILATERAL: ICD-10-CM

## 2018-07-03 DIAGNOSIS — H04.201 EPIPHORA, RIGHT: ICD-10-CM

## 2018-07-03 DIAGNOSIS — H40.89 OTHER GLAUCOMA OF BOTH EYES: ICD-10-CM

## 2018-07-03 DIAGNOSIS — H01.009 ANGULAR BLEPHARITIS, UNSPECIFIED LATERALITY: ICD-10-CM

## 2018-07-03 DIAGNOSIS — Z96.1 PSEUDOPHAKIA OF BOTH EYES: ICD-10-CM

## 2018-07-03 DIAGNOSIS — H40.1122 PRIMARY OPEN ANGLE GLAUCOMA OF LEFT EYE, MODERATE STAGE: ICD-10-CM

## 2018-07-03 DIAGNOSIS — H40.1111 PRIMARY OPEN ANGLE GLAUCOMA OF RIGHT EYE, MILD STAGE: Primary | ICD-10-CM

## 2018-07-03 DIAGNOSIS — H02.051 TRICHIASIS OF RIGHT UPPER EYELID: ICD-10-CM

## 2018-07-03 DIAGNOSIS — H40.1111 PRIMARY OPEN ANGLE GLAUCOMA OF RIGHT EYE, MILD STAGE: ICD-10-CM

## 2018-07-03 PROCEDURE — 92133 CPTRZD OPH DX IMG PST SGM ON: CPT | Mod: S$GLB,,, | Performed by: OPHTHALMOLOGY

## 2018-07-03 PROCEDURE — 92012 INTRM OPH EXAM EST PATIENT: CPT | Mod: S$GLB,,, | Performed by: OPHTHALMOLOGY

## 2018-07-03 PROCEDURE — 99999 PR PBB SHADOW E&M-EST. PATIENT-LVL II: CPT | Mod: PBBFAC,,, | Performed by: OPHTHALMOLOGY

## 2018-07-03 RX ORDER — TIMOLOL MALEATE 5 MG/ML
1 SOLUTION OPHTHALMIC EVERY MORNING
Qty: 5 ML | Refills: 12 | Status: SHIPPED | OUTPATIENT
Start: 2018-07-03 | End: 2018-09-17 | Stop reason: SDUPTHER

## 2018-07-03 RX ORDER — BIMATOPROST 0.1 MG/ML
1 SOLUTION/ DROPS OPHTHALMIC NIGHTLY
Qty: 2.5 ML | Refills: 12 | Status: SHIPPED | OUTPATIENT
Start: 2018-07-03 | End: 2019-01-07 | Stop reason: SDUPTHER

## 2018-07-05 ENCOUNTER — TELEPHONE (OUTPATIENT)
Dept: NEPHROLOGY | Facility: HOSPITAL | Age: 73
End: 2018-07-05

## 2018-07-05 NOTE — TELEPHONE ENCOUNTER
Patient was called to discuss result of renal ultrasound in which present normal size kidney with a right simple cyst. Will require to have follow up renal ultrasound in one year.     Fernando Russo  Nephrology  Fellow  [facility]    Pager [phone]

## 2018-07-20 PROBLEM — Z90.710 STATUS POST HYSTERECTOMY: Status: ACTIVE | Noted: 2018-07-20

## 2018-07-20 PROBLEM — G72.0 STATIN MYOPATHY: Status: ACTIVE | Noted: 2018-07-20

## 2018-07-20 PROBLEM — T46.6X5A STATIN MYOPATHY: Status: ACTIVE | Noted: 2018-07-20

## 2018-07-20 PROBLEM — R00.1 BRADYCARDIA: Status: ACTIVE | Noted: 2018-07-20

## 2018-07-27 ENCOUNTER — OFFICE VISIT (OUTPATIENT)
Dept: CARDIOLOGY | Facility: CLINIC | Age: 73
End: 2018-07-27
Payer: MEDICARE

## 2018-07-27 VITALS
RESPIRATION RATE: 20 BRPM | OXYGEN SATURATION: 98 % | SYSTOLIC BLOOD PRESSURE: 122 MMHG | DIASTOLIC BLOOD PRESSURE: 64 MMHG | BODY MASS INDEX: 31.55 KG/M2 | HEART RATE: 69 BPM | WEIGHT: 189.63 LBS

## 2018-07-27 DIAGNOSIS — E78.2 MIXED HYPERLIPIDEMIA: ICD-10-CM

## 2018-07-27 DIAGNOSIS — R42 DIZZY: ICD-10-CM

## 2018-07-27 DIAGNOSIS — R00.1 BRADYCARDIA: Primary | ICD-10-CM

## 2018-07-27 PROCEDURE — 3074F SYST BP LT 130 MM HG: CPT | Mod: CPTII,S$GLB,, | Performed by: INTERNAL MEDICINE

## 2018-07-27 PROCEDURE — 99999 PR PBB SHADOW E&M-EST. PATIENT-LVL III: CPT | Mod: PBBFAC,,, | Performed by: INTERNAL MEDICINE

## 2018-07-27 PROCEDURE — 99214 OFFICE O/P EST MOD 30 MIN: CPT | Mod: S$GLB,,, | Performed by: INTERNAL MEDICINE

## 2018-07-27 PROCEDURE — 3078F DIAST BP <80 MM HG: CPT | Mod: CPTII,S$GLB,, | Performed by: INTERNAL MEDICINE

## 2018-07-27 NOTE — PROGRESS NOTES
Subjective:    Patient ID:  Rosmery Ramirez is a 73 y.o. female who presents for follow-up of No chief complaint on file.      HPI  Patient is here for follow-up of bradycardia and dizziness.  She underwent diagnostic testing as below.  This is within normal limits.  She has had no worsening cardiopulmonary complaints.  Mainly provided reassurance.  Summer bradycardia could be related to ophthalmological drops otherwise she has been stable.  She had nice average heart rate and fair exercise tolerance for her age.  She denies any PND, orthopnea or lower [place].  She has not experienced any dizziness to the point of presyncope or syncope.    Review of Systems   Constitution: Negative.   HENT: Negative.    Eyes: Negative.    Cardiovascular: Negative for chest pain, dyspnea on exertion, irregular heartbeat, leg swelling, near-syncope, orthopnea, palpitations, paroxysmal nocturnal dyspnea and syncope.   Respiratory: Negative for shortness of breath.    Skin: Negative.    Musculoskeletal: Negative.    Gastrointestinal: Negative for abdominal pain, constipation and diarrhea.   Genitourinary: Negative for dysuria.   Neurological: Positive for dizziness.   Psychiatric/Behavioral: Negative.         Objective:    Physical Exam   Constitutional: She is oriented to person, place, and time. She appears well-developed and well-nourished.   HENT:   Head: Normocephalic and atraumatic.   Eyes: Conjunctivae and EOM are normal. Pupils are equal, round, and reactive to light.   Neck: Normal range of motion. Neck supple. No thyromegaly present.   Cardiovascular: Normal rate and regular rhythm.    No murmur heard.  Pulmonary/Chest: Effort normal and breath sounds normal. No respiratory distress.   Abdominal: Soft. Bowel sounds are normal.   Musculoskeletal: She exhibits no edema.   Neurological: She is alert and oriented to person, place, and time.   Skin: Skin is warm and dry.   Psychiatric: She has a normal mood and affect. Her  behavior is normal.       REINA:  7-18  CONCLUSIONS     1 - Normal left ventricular systolic function (EF 60-65%).     2 - No wall motion abnormalities.     3 - Normal left ventricular diastolic function.     4 - Normal right ventricular systolic function .     5 - Trivial mitral regurgitation.     6 - The estimated PA systolic pressure is 17 mmHg.     No evidence of stress induced myocardial ischemia.   *ran for 6 min without symptoms    Holter within normal limits    Assessment:       1. Bradycardia    2. Mixed hyperlipidemia    3. Obesity, Class II, BMI 35-39.9, with comorbidity    4. Dizzy         Plan:       -mainly reassurance in light of testing  -continue risk factor modification Ie. diet and exercise  -call for any worsening symptoms    Return to clinic in 6 months

## 2018-09-17 DIAGNOSIS — H40.1122 PRIMARY OPEN ANGLE GLAUCOMA OF LEFT EYE, MODERATE STAGE: ICD-10-CM

## 2018-09-17 DIAGNOSIS — H40.1111 PRIMARY OPEN ANGLE GLAUCOMA OF RIGHT EYE, MILD STAGE: ICD-10-CM

## 2018-09-17 RX ORDER — TIMOLOL MALEATE 5 MG/ML
1 SOLUTION OPHTHALMIC EVERY MORNING
Qty: 5 ML | Refills: 4 | Status: SHIPPED | OUTPATIENT
Start: 2018-09-17 | End: 2019-01-07 | Stop reason: SDUPTHER

## 2018-09-21 ENCOUNTER — OFFICE VISIT (OUTPATIENT)
Dept: FAMILY MEDICINE | Facility: CLINIC | Age: 73
End: 2018-09-21
Payer: MEDICARE

## 2018-09-21 VITALS
HEART RATE: 61 BPM | OXYGEN SATURATION: 96 % | WEIGHT: 189.06 LBS | DIASTOLIC BLOOD PRESSURE: 90 MMHG | HEIGHT: 65 IN | TEMPERATURE: 98 F | BODY MASS INDEX: 31.5 KG/M2 | SYSTOLIC BLOOD PRESSURE: 136 MMHG

## 2018-09-21 DIAGNOSIS — I10 ESSENTIAL HYPERTENSION: ICD-10-CM

## 2018-09-21 DIAGNOSIS — R51.9 LEFT-SIDED HEADACHE: Primary | ICD-10-CM

## 2018-09-21 PROCEDURE — 1100F PTFALLS ASSESS-DOCD GE2>/YR: CPT | Mod: CPTII,,, | Performed by: FAMILY MEDICINE

## 2018-09-21 PROCEDURE — 3080F DIAST BP >= 90 MM HG: CPT | Mod: CPTII,,, | Performed by: FAMILY MEDICINE

## 2018-09-21 PROCEDURE — 3075F SYST BP GE 130 - 139MM HG: CPT | Mod: CPTII,,, | Performed by: FAMILY MEDICINE

## 2018-09-21 PROCEDURE — 99999 PR PBB SHADOW E&M-EST. PATIENT-LVL III: CPT | Mod: PBBFAC,,, | Performed by: FAMILY MEDICINE

## 2018-09-21 PROCEDURE — 99213 OFFICE O/P EST LOW 20 MIN: CPT | Mod: PBBFAC,PO | Performed by: FAMILY MEDICINE

## 2018-09-21 PROCEDURE — 99214 OFFICE O/P EST MOD 30 MIN: CPT | Mod: S$PBB,,, | Performed by: FAMILY MEDICINE

## 2018-09-21 PROCEDURE — 3288F FALL RISK ASSESSMENT DOCD: CPT | Mod: CPTII,,, | Performed by: FAMILY MEDICINE

## 2018-09-21 NOTE — PROGRESS NOTES
Office Visit    Patient Name: Rosmery Ramirez    : 1945  MRN: [id number]      Assessment/Plan:  Rosmery Ramirez is a 73 y.o. female who presents today for :    Left-sided headache  -resolved, neuro exam unremarkable, given history, may likely be due to timing of taking her medications  -no red flags, will monitor closely  -advised adequate water intake daily  -may use PRN medications (Tylenol/NSAIDs) for Sx management    Essential hypertension  - continue current medication  - ?advised DASH diet, portion control, regular cardiovascular exercises  - ?counseled on weight loss  -f/u with BP logs in 2 weeks if BP is not consistently <150/90    Follow-up for worsening Sx. Urgent care/ED precautions provided.     This note was created by combination of typed  and Dragon dictation.  Transcription errors may be present.  If there are any questions, please contact me.          At least 25 minutes were spent today with the patient in the office, which more than half the time was spent in counseling regarding management as above, as well as answering questions and addressing patient's concerns. Pt voices understanding of what was discussed and has no other questions at this time.      ----------------------------------------------------------------------------------------------------------------------      HPI:  Rosmery is a 73 y.o. female who presents today for:  Headache        Patient Care Team:  Ancelmo Sumner MD as PCP - General (Internal Medicine)  Ancelmo Sumner MD as PCP - Erika BLANCO/Magnus Attributed    This patient has multiple medical diagnoses as noted below.    This patient is new to me     Patient is here today for eval of   Headache - had one episode yesterday with a sharp left side headache over the top of her head that lasted for about 15 minutes while she was driving. Patient states that when she got home, she took her Ativan and went to sleep and the HA has resolved. No  apparent triggers - no radiation. Patient denies any new medications. However, she did note that she took her usual morning medications in the afternoon yesterday, as well as consuming a soda for lunch that she previously has been off of for the past several months, and started to have her HA sx about an hour and a half later. Since that episode, she has not had any HA. She denies any F/C/N/V/palpitations/CP/PARKS/SOB/vision changes/photophobia or phonophobia/syncope/facial or muscular weakness/neck stiffness/tingling/numbness/abd pain/swelling/bowel or bladder changes.   No slurring of speech/confusion/falling or gait changes. Her BP at home is usually in the 130s/80s and she has been compliant with her medications as prescribed      Additional ROS  No vision changes  No F/C/wt changes/fatigue  No dysphagia/sore throat/rhinorrhea  No CP/SOB/palpitations/swelling  No cough/wheezing/SOB  No nausea/vomiting/abd pain  No muscle aches/joint pain   No rashes  No weakness/tingling/numbness  No anxiety/depression        Patient Active Problem List   Diagnosis    Essential hypertension    Hyperlipidemia; statin myalgias; 6/28/2018 exercise stress echo neg for ischemia    Angular blepharitis    POAG (primary open-angle glaucoma)    Obesity, Class II, BMI 35-39.9, with comorbidity    Cataract    Nuclear sclerosis    Chronic pain of left knee    History of colon polyps; 1/23/2017 colonoscopy normal repeat 5 years    Bradycardia 6/2018 holter negative    Status post hysterectomy    Statin myopathy       Current Medications  Medications reviewed and updated.       Current Outpatient Medications:     aspirin (ECOTRIN) 81 MG EC tablet, Take 81 mg by mouth once daily.  , Disp: , Rfl:     calcium 500 mg Tab, Take 500 mg by mouth once daily. , Disp: , Rfl:     hydrochlorothiazide (MICROZIDE) 12.5 mg capsule, TAKE 1 CAPSULE (12.5 MG TOTAL) BY MOUTH ONCE DAILY., Disp: 90 capsule, Rfl: 3    irbesartan (AVAPRO) 150 MG  tablet, Take 1 tablet (150 mg total) by mouth every evening., Disp: 90 tablet, Rfl: 2    krill oil-omega-3-dha-epa 150-450 mg CpDR, Take 1 tablet by mouth once daily., Disp: , Rfl:     lorazepam (ATIVAN) 1 MG tablet, TAKE 1 TABLET (1 MG TOTAL) BY MOUTH NIGHTLY AS NEEDED (INSOMNIA)., Disp: 30 tablet, Rfl: 0    LUMIGAN 0.01 % Drop, Place 1 drop into both eyes every evening., Disp: 2.5 mL, Rfl: 12    multivitamin Chew, Take 1 tablet by mouth once daily. , Disp: , Rfl:     timolol maleate 0.5% (TIMOPTIC-XE) 0.5 % SolG, PLACE 1 DROP INTO BOTH EYES EVERY MORNING. TIMOPTIC XE OR TIMOLOL GFS (GEL FORMING SOLUTION), Disp: 5 mL, Rfl: 4    Past Surgical History:   Procedure Laterality Date    BREAST BIOPSY Left     core    BREAST SURGERY Left     lumpectomy    CATARACT EXTRACTION W/  INTRAOCULAR LENS IMPLANT Left 10/05/2016    Dr. Mike    CATARACT EXTRACTION W/  INTRAOCULAR LENS IMPLANT Right 11/30/2016    With Elizabethoonidhi (Dr. Mike)    COLONOSCOPY N/A 1/23/2017    Procedure: COLONOSCOPY;  Surgeon: Hany Mosquera MD;  Location: [facility] (4TH FLR);  Service: Endoscopy;  Laterality: N/A;    COLONOSCOPY N/A 1/23/2017    Performed by Hany Mosquera MD at [facility] (4TH FLR)    HAND SURGERY Left 2011    thumb    HYSTERECTOMY  1980's    Total;    INSERTION-INTRAOCULAR LENS (IOL) Right 11/30/2016    [facility]formed [facility] at [facility] OR 1ST FLR    INSERTION-INTRAOCULAR LENS (IOL) Left 10/5/2016    [facility] at [facility] F[facility]    [place] GONIOTOMY Right 11/30/2016    WITH CE ()    Left Breast Lumpectomy Left     performed in 1960s    OOPHORECTOMY      PHACOEMULSIFICATION-ASPIRATION-CATARACT Left 10/5/2016    [facility] FLR    PHACOEMULSIFICATION-ASPIRATION-CATARACT WITH TRABEULOTOMY Right 11/30/2016    [facility] at [facility]ST FLR    SELECTIVE LASER TRABECUPLASTY  05/2014    OU w/ Dr. Mike     TUBAL LIGATION  1970's    VAGINAL DELIVERY      x3       Family History   Problem Relation Age of Onset    Breast cancer Mother 50    Glaucoma Mother     Alzheimer's disease Mother     Blindness Mother     Cataracts Mother     Glaucoma Father     Prostate cancer Father     Blindness Father     Cataracts Father     Cancer Father 62        prostate    Glaucoma Sister     No Known Problems Brother     No Known Problems Brother     Alzheimer's disease Maternal Grandfather     Macular degeneration Neg Hx     Retinal detachment Neg Hx     Strabismus Neg Hx     Amblyopia Neg Hx     Heart attack Neg Hx     Heart disease Neg Hx     Ovarian cancer Neg Hx     Colon cancer Neg Hx        Social History     Socioeconomic History    Marital status:      Spouse name: Not on file    Number of children: 3    Years of education: Not on file    Highest education level: Not on file   Social Needs    Financial resource strain: Not on file    Food insecurity - worry: Not on file    Food insecurity - inability: Not on file    Transportation needs - medical: Not on file    Transportation needs - non-medical: Not on file   Occupational History    Occupation: retired - formerly pastoral care with [employer]   Tobacco Use    Smoking status: Never Smoker    Smokeless tobacco: Never Used   Substance and Sexual Activity    Alcohol use: Yes     Alcohol/week: 0.0 oz     Comment: Rarely; 1 glass of wine    Drug use: No    Sexual activity: Yes     Birth control/protection: Post-menopausal, See Surgical Hx   Other Topics Concern    Not on file   Social History Narrative    Not on file           Allergies   Review of patient's allergies indicates:   Allergen Reactions    Lisinopril Swelling    Amlodipine Edema    Combigan [brimonidine-timolol]      Causes itchy eyelids and contact dermatitis    Cosopt [dorzolamide-timolol]      Causes angular blepharitis of eyelids    Pravastatin      Myalgia and  "elevated CPK             Review of Systems  See HPI      Physical Exam  BP (!) 136/90   Pulse 61   Temp 97.9 °F (36.6 °C) (Oral)   Ht 5' 5" (1.651 m)   Wt 85.7 kg (189 lb 0.7 oz)   SpO2 96%   BMI 31.46 kg/m²     GEN: NAD, well developed, pleasant, well nourished  HEENT: NCAT, PERRLA, EOMI, sclera clear, anicteric, O/P clear, MMM with no lesions  NECK: normal, supple with midline trachea, no LAD, no thyromegaly  LUNGS: CTAB, no w/r/r, no increased work of breathing   HEART: RRR, normal S1 and S2, no m/r/g, no edema  ABD: s/nt/nd, NABS  SKIN: normal turgor, no rashes  PSYCH: AOx3, appropriate mood and affect  MSK: warm/well perfused, normal ROM in all extremities, no c/c/e.  NEURO: normal without focal findings, CN II-XII are intact.  Sensation/strength grossly normal, gait and station normal. Normal hearing test. No light sensitivity. No facial muscle weakness or drooping.  Reflexes 2+ for biceps, brachial, patellar and achilles bilateral and symmetric, finger to nose and cerebellar exam normal, no tremors.      "

## 2018-10-30 ENCOUNTER — OFFICE VISIT (OUTPATIENT)
Dept: FAMILY MEDICINE | Facility: CLINIC | Age: 73
End: 2018-10-30
Payer: MEDICARE

## 2018-10-30 VITALS
TEMPERATURE: 98 F | BODY MASS INDEX: 31.5 KG/M2 | HEART RATE: 78 BPM | WEIGHT: 189.06 LBS | OXYGEN SATURATION: 98 % | HEIGHT: 65 IN | DIASTOLIC BLOOD PRESSURE: 80 MMHG | SYSTOLIC BLOOD PRESSURE: 136 MMHG

## 2018-10-30 DIAGNOSIS — S39.012A STRAIN OF LUMBAR REGION, INITIAL ENCOUNTER: Primary | ICD-10-CM

## 2018-10-30 DIAGNOSIS — Z23 NEEDS FLU SHOT: ICD-10-CM

## 2018-10-30 DIAGNOSIS — Z23 NEED FOR SHINGLES VACCINE: ICD-10-CM

## 2018-10-30 DIAGNOSIS — I10 ESSENTIAL HYPERTENSION: ICD-10-CM

## 2018-10-30 PROCEDURE — 3075F SYST BP GE 130 - 139MM HG: CPT | Mod: CPTII,HCNC,S$GLB, | Performed by: INTERNAL MEDICINE

## 2018-10-30 PROCEDURE — G0008 ADMIN INFLUENZA VIRUS VAC: HCPCS | Mod: HCNC,S$GLB,, | Performed by: INTERNAL MEDICINE

## 2018-10-30 PROCEDURE — 99999 PR PBB SHADOW E&M-EST. PATIENT-LVL IV: CPT | Mod: PBBFAC,HCNC,, | Performed by: INTERNAL MEDICINE

## 2018-10-30 PROCEDURE — 99214 OFFICE O/P EST MOD 30 MIN: CPT | Mod: HCNC,25,S$GLB, | Performed by: INTERNAL MEDICINE

## 2018-10-30 PROCEDURE — 1101F PT FALLS ASSESS-DOCD LE1/YR: CPT | Mod: CPTII,HCNC,S$GLB, | Performed by: INTERNAL MEDICINE

## 2018-10-30 PROCEDURE — 90662 IIV NO PRSV INCREASED AG IM: CPT | Mod: HCNC,S$GLB,, | Performed by: INTERNAL MEDICINE

## 2018-10-30 PROCEDURE — 3079F DIAST BP 80-89 MM HG: CPT | Mod: CPTII,HCNC,S$GLB, | Performed by: INTERNAL MEDICINE

## 2018-10-30 RX ORDER — HYDROCHLOROTHIAZIDE 12.5 MG/1
12.5 CAPSULE ORAL DAILY
Qty: 90 CAPSULE | Refills: 3 | Status: SHIPPED | OUTPATIENT
Start: 2018-10-30 | End: 2019-01-31 | Stop reason: SDUPTHER

## 2018-10-30 RX ORDER — IRBESARTAN 150 MG/1
150 TABLET ORAL NIGHTLY
Qty: 90 TABLET | Refills: 3 | Status: SHIPPED | OUTPATIENT
Start: 2018-10-30 | End: 2019-01-31 | Stop reason: SDUPTHER

## 2018-10-30 RX ORDER — TIZANIDINE 2 MG/1
2 TABLET ORAL EVERY 8 HOURS PRN
Qty: 15 TABLET | Refills: 0 | Status: SHIPPED | OUTPATIENT
Start: 2018-10-30 | End: 2018-11-09

## 2018-10-30 NOTE — PROGRESS NOTES
This note was created by combination of typed  and Dragon dictation.  Transcription errors may be present.  If there are any questions, please contact me.    Assessment / Plan:   Strain of lumbar region, initial encounter  -she'd like to minimize meds.  Tylenol. Tizanidine for PRN use. Referral to PT/OT.  -     Ambulatory Referral to Physical/Occupational Therapy  -     tiZANidine (ZANAFLEX) 2 MG tablet; Take 1 tablet (2 mg total) by mouth every 8 (eight) hours as needed.  Dispense: 15 tablet; Refill: 0    Needs flu shot  -     Influenza - High Dose (65+) (PF) (IM)    Essential hypertension  -refilled HCTZ and irbesartan. Losing weight purposefully with diet and physical activity modification  -     hydroCHLOROthiazide (MICROZIDE) 12.5 mg capsule; Take 1 capsule (12.5 mg total) by mouth once daily.  Dispense: 90 capsule; Refill: 3  -     irbesartan (AVAPRO) 150 MG tablet; Take 1 tablet (150 mg total) by mouth every evening.  Dispense: 90 tablet; Refill: 3    Need for shingles vaccine  -     varicella-zoster gE-AS01B, PF, (SHINGRIX, PF,) 50 mcg/0.5 mL injection; Inject 0.5 mLs into the muscle once. Repeat in 2 months for 1 dose  Dispense: 0.5 mL; Refill: 1    Insomnia - periodic use of alprazolam, she'll call for refills.       Medications Discontinued During This Encounter   Medication Reason    hydrochlorothiazide (MICROZIDE) 12.5 mg capsule Reorder    irbesartan (AVAPRO) 150 MG tablet Reorder       meds sent this encounter:  Medications Ordered This Encounter   Medications    hydroCHLOROthiazide (MICROZIDE) 12.5 mg capsule     Sig: Take 1 capsule (12.5 mg total) by mouth once daily.     Dispense:  90 capsule     Refill:  3    irbesartan (AVAPRO) 150 MG tablet     Sig: Take 1 tablet (150 mg total) by mouth every evening.     Dispense:  90 tablet     Refill:  3    tiZANidine (ZANAFLEX) 2 MG tablet     Sig: Take 1 tablet (2 mg total) by mouth every 8 (eight) hours as needed.     Dispense:  15 tablet      Refill:  0    varicella-zoster gE-AS01B, PF, (SHINGRIX, PF,) 50 mcg/0.5 mL injection     Sig: Inject 0.5 mLs into the muscle once. Repeat in 2 months for 1 dose     Dispense:  0.5 mL     Refill:  1       Follow Up: No Follow-up on file.      Subjective:     Chief Complaint   Patient presents with    Back Pain       OVI Botello is a 73 y.o. female, last appointment with this clinic was 9/21/2018.    No LMP recorded. Patient has had a hysterectomy.    Essential hypertension, losartan.  HCTZ.  History of irbesartan.  Mixed hyperlipidemia; hx pravastatin but notes SE so no longer on statin.  Obesity  Status post SANTY  History of colon polyps.    11/7/2011 colonoscopy normal repeat 5 years   1/23/2017 colonoscopy normal repeat 5 years  Glaucoma followed by ophthalmology  Pelvic pain and incomplete void sensation, recently seen by urology.  Renal cyst seen on US  Abn glucose 6/2018  Hx of breast lumpectomy  Bradycardia/lightheadedness, 6/29/18 stress echo neg for ischemia LVEF 60-65%; holter negative.    Seen by cardiology - lightheadedness, bradycardia.  Irbesartan dropped from 300 to 150    Back pain x Sunday. Stiff across back down to buttocks.  Triggered by a lot of house cleaning.  No fevers no chills no abdominal pain no dysuria.  No radiation past the buttocks.  Took ibuprofen that day but has not been continuing to take so because she was concerned about adverse reaction.  Compared to Sunday it is much less intense.  She would be interested in physical therapy.  She does do stretching on a fairly regular basis.    She has lost weight through diet and exercise modification.  Walking for physical activity.    She needs a refill on her blood pressure medications.  Denies any obvious side effects of the medicines.    She has insomnia and takes intermittent benzos at night.  Thirty pills last 60-90 days.  She does not need a refill at this time.    Patient Care Team:  Ancelmo Sumner MD as PCP - General (Internal  Medicine)  Ancelmo Sumner MD as PCP - Erika BLANCO/Magnus Attributed    Patient Active Problem List    Diagnosis Date Noted    Bradycardia 6/2018 holter negative 07/20/2018    Status post hysterectomy 07/20/2018    Statin myopathy 07/20/2018    History of colon polyps; 1/23/2017 colonoscopy normal repeat 5 years 01/23/2017    Chronic pain of left knee 01/05/2017    Nuclear sclerosis 11/30/2016    Cataract 09/07/2016    Obesity, Class II, BMI 35-39.9, with comorbidity 07/20/2016    Angular blepharitis 01/05/2016    POAG (primary open-angle glaucoma) 01/05/2016    Essential hypertension 08/13/2012    Hyperlipidemia; statin myalgias; 6/28/2018 exercise stress echo neg for ischemia 08/13/2012 6/28/2018 exercise stress echo CONCLUSIONS   1 - Normal left ventricular systolic function (EF 60-65%).   2 - No wall motion abnormalities.   3 - Normal left ventricular diastolic function.   4 - Normal right ventricular systolic function .          PAST MEDICAL HISTORY:  Past Medical History:   Diagnosis Date    Allergy     Anxiety     Breast cyst     Edema of leg 10/5/2012    bilateral     Fractures 2011    thumb R    GERD (gastroesophageal reflux disease)     Glaucoma     History of colonic polyps     Hx-TIA (transient ischemic attack) 8/13/2012 Jan 2012: LLE and left facial numbness lasting 1 hour     Hyperlipidemia     Hypertension     Left shoulder tendonitis 8/8/2015    Primary open-angle glaucoma, mild stage - Both Eyes 6/3/2013       PAST SURGICAL HISTORY:  Past Surgical History:   Procedure Laterality Date    BREAST BIOPSY Left     core    BREAST SURGERY Left     lumpectomy    CATARACT EXTRACTION W/  INTRAOCULAR LENS IMPLANT Left 10/05/2016    Dr. Mike    CATARACT EXTRACTION W/  INTRAOCULAR LENS IMPLANT Right 11/30/2016    With Quinten (Dr. Mike)    COLONOSCOPY N/A 1/23/2017    Procedure: COLONOSCOPY;  Surgeon: Hany Mosquera MD;  Location: [facility] ([address] [place] [address]);  Service: Endoscopy;   Laterality: N/A;    COLONOSCOPY N/A 1/23/2017    Performed by [facility]era [facility] at [facility] (4TH FLR)    HAND SURGERY Left 2011    thumb    HYSTERECTOMY  1980's    Total;    INSERTION-INTRAOCULAR LENS (IOL) Right 11/30/2016    Performed by Yesica Mike MD at [facility] OR 1ST FLR    INSERTION-INTRAOCULAR LENS (IOL) Left 10/5/2016    [facility]ica [facility] at [facility] 1ST FLR    KAHOOK GONIOTOMY Right 11/30/2016    WITH CE ()    Left Breast Lumpectomy Left     performed in 1960s    OOPHORECTOMY      PHACOEMULSIFICATION-ASPIRATION-CATARACT Left 10/5/2016    Per[facility]ica Miek [facility] at [facility] 1ST FLR    PHACOEMULSIFICATION-ASPIRATION-CATARACT WITH TRABEULOTOMY Right 11/30/2016    Per[facility]ica Mike [facility] at [facility] 1ST FLR    SELECTIVE LASER TRABECUPLASTY  05/2014    [facility] w/ Dr. Mike    TUBAL LIGATION  1970's    VAGINAL DELIVERY      x3       SOCIAL HISTORY:  Social History     Socioeconomic History    Marital status:      Spouse name: Not on file    Number of children: 3    Years of education: Not on file    Highest education level: Not on file   Social Needs    Financial resource strain: Not on file    Food insecurity - worry: Not on file    Food insecurity - inability: Not on file    Transportation needs - medical: Not on file    Transportation needs - non-medical: Not on file   Occupational History    Occupation: retired - formerly pastoral care with [employer]   Tobacco Use    Smoking status: Never Smoker    Smokeless tobacco: Never Used   Substance and Sexual Activity    Alcohol use: Yes     Alcohol/week: 0.0 oz     Comment: Rarely; 1 glass of wine    Drug use: No    Sexual activity: Yes     Birth control/protection: Post-menopausal, See Surgical Hx   Other Topics Concern    Not on file   Social History Narrative    Not on file       ALLERGIES AND MEDICATIONS: updated and reviewed.  Review of patient's allergies  "indicates:   Allergen Reactions    Lisinopril Swelling    Amlodipine Edema    Combigan [brimonidine-timolol]      Causes itchy eyelids and contact dermatitis    Cosopt [dorzolamide-timolol]      Causes angular blepharitis of eyelids    Pravastatin      Myalgia and elevated CPK     Current Outpatient Medications   Medication Sig Dispense Refill    aspirin (ECOTRIN) 81 MG EC tablet Take 81 mg by mouth once daily.        calcium 500 mg Tab Take 500 mg by mouth once daily.       hydrochlorothiazide (MICROZIDE) 12.5 mg capsule TAKE 1 CAPSULE (12.5 MG TOTAL) BY MOUTH ONCE DAILY. 90 capsule 3    irbesartan (AVAPRO) 150 MG tablet Take 1 tablet (150 mg total) by mouth every evening. 90 tablet 2    krill oil-omega-3-dha-epa 150-450 mg CpDR Take 1 tablet by mouth once daily.      lorazepam (ATIVAN) 1 MG tablet TAKE 1 TABLET (1 MG TOTAL) BY MOUTH NIGHTLY AS NEEDED (INSOMNIA). 30 tablet 0    LUMIGAN 0.01 % Drop Place 1 drop into both eyes every evening. 2.5 mL 12    multivitamin Chew Take 1 tablet by mouth once daily.       timolol maleate 0.5% (TIMOPTIC-XE) 0.5 % SolG PLACE 1 DROP INTO BOTH EYES EVERY MORNING. TIMOPTIC XE OR TIMOLOL GFS (GEL FORMING SOLUTION) 5 mL 4     No current facility-administered medications for this visit.        Review of Systems   All other systems reviewed and are negative.      Objective:   Physical Exam   Vitals:    10/30/18 0839 10/30/18 0915   BP: (!) 144/94 136/80   BP Location:  Left arm   Patient Position:  Sitting   BP Method:  Large (Manual)   Pulse: 78    Temp: 98.3 °F (36.8 °C)    SpO2: 98%    Weight: 85.7 kg (189 lb 0.7 oz)    Height: 5' 5" (1.651 m)     Body mass index is 31.46 kg/m².  Weight: 85.7 kg (189 lb 0.7 oz)   Height: 5' 5" (165.1 cm)     Physical Exam   Constitutional: She is oriented to person, place, and time. She appears well-developed and well-nourished. No distress.   HENT:   Head: Normocephalic and atraumatic.   Eyes: No scleral icterus.   Pulmonary/Chest: " Effort normal.   Musculoskeletal:   She has some mild tenderness on palpation of the lower lumbar spine but more so in the paraspinal muscles left and right.  Straight leg raise without pain.  Able to ambulate on her tiptoes and able to ambulate on her heels without issue.  No deformity, no swelling.   Neurological: She is alert and oriented to person, place, and time.   Skin: Skin is warm and dry.   Psychiatric: She has a normal mood and affect. Her behavior is normal. Thought content normal.

## 2018-10-30 NOTE — MEDICAL/APP STUDENT
Subjective:       Patient ID: Rosmery Ramirez is a 73 y.o. female.    Chief Complaint: Back Pain    Ms. Rosmery Ramirez is a 74 y/o female who presents with 3d of back pain.     Patient was cleaning and doing other housework over the weekend, then woke up Sunday with 10/10 back pain. She describes the pain as dull, and states that the pain is across her lower back, radiating down to her pelvic area. The pain is worse with movement and resolves with sitting still. She has had this pain periodically before over the last several years, always brought on by an increase in activity and relieved by rest and NSAIDs. The pain has never radiated down her legs, she denies bowel and bladder incontinence or saddle anesthesia, she further denies recent fevers, chills, and weight loss.     Of note she was recently seen by nephrology and urology for an increase in creatinine on routine labs and UTI sxs and found to have a renal cyst on renal u/s. She was advised to avoid NSAIDs and follow up in a year to monitor the cyst. In addition, a KUB was performed which showed some incidental degenerative disc disease.     She has previously been treated for knee pain with PT and found it to be helpful, and asks about seeing PT for her back.       Review of Systems   Constitutional: Negative for activity change, appetite change, chills, diaphoresis, fatigue, fever and unexpected weight change.   Respiratory: Negative for cough, shortness of breath and wheezing.    Cardiovascular: Negative for chest pain, palpitations and leg swelling.   Gastrointestinal: Negative for abdominal pain, constipation, diarrhea, nausea and vomiting.   Genitourinary: Negative for dysuria and hematuria.   Musculoskeletal: Positive for back pain. Negative for gait problem.   Neurological: Negative for dizziness, syncope, weakness and headaches.       Objective:      Physical Exam   Constitutional: She is oriented to person, place, and time. No distress.    Cardiovascular: Normal rate, regular rhythm, normal heart sounds and intact distal pulses.   No murmur heard.  Pulmonary/Chest: Effort normal and breath sounds normal. No respiratory distress. She has no wheezes.   Musculoskeletal: Normal range of motion. She exhibits tenderness (some tenderness with palpation of the lumbar spine ). She exhibits no edema or deformity.   Neurological: She is alert and oriented to person, place, and time. She has normal strength. She displays no atrophy. No sensory deficit (in the lower limbs ). She exhibits normal muscle tone. Gait normal. GCS eye subscore is 4. GCS verbal subscore is 5. GCS motor subscore is 6.   Reflex Scores:       Patellar reflexes are 2+ on the right side and 2+ on the left side.  Skin: She is not diaphoretic.   Psychiatric: She has a normal mood and affect. Her behavior is normal. Judgment and thought content normal.       Assessment:       1. LBP sxs consistent with muscular strain; ddx includes OA of the spine, lumbar stenosis, and compression # of the lumbar spine    Plan:       1. LBP   -Recent renal function tests have been WNL, advised pt that she can take Tylenol or NSAIDs for acute pain relief   -tizanidine 2mg q8h PRN   -Ambulatory referral to PT  -Heat and stretching as needed   -No alarm sxs on presentation, patient does not require imaging at this time; however consider imaging in the future if sxs become worse or do not resolve with conservative mgmt    Bernadette Perry MS4

## 2018-11-02 ENCOUNTER — CLINICAL SUPPORT (OUTPATIENT)
Dept: REHABILITATION | Facility: OTHER | Age: 73
End: 2018-11-02
Attending: INTERNAL MEDICINE
Payer: MEDICARE

## 2018-11-02 DIAGNOSIS — M89.9 PUBIC BONE PAIN: ICD-10-CM

## 2018-11-02 DIAGNOSIS — G89.29 CHRONIC BILATERAL LOW BACK PAIN WITHOUT SCIATICA: ICD-10-CM

## 2018-11-02 DIAGNOSIS — M54.50 CHRONIC BILATERAL LOW BACK PAIN WITHOUT SCIATICA: ICD-10-CM

## 2018-11-02 PROCEDURE — 97110 THERAPEUTIC EXERCISES: CPT | Mod: 59,HCNC,PN | Performed by: PHYSICAL THERAPIST

## 2018-11-02 PROCEDURE — G8979 MOBILITY GOAL STATUS: HCPCS | Mod: CK,HCNC,PN | Performed by: PHYSICAL THERAPIST

## 2018-11-02 PROCEDURE — 97161 PT EVAL LOW COMPLEX 20 MIN: CPT | Mod: HCNC,PN | Performed by: PHYSICAL THERAPIST

## 2018-11-02 PROCEDURE — G8978 MOBILITY CURRENT STATUS: HCPCS | Mod: CL,HCNC,PN | Performed by: PHYSICAL THERAPIST

## 2018-11-02 NOTE — PLAN OF CARE
[facility]  Physical Therapy Initial Evaluation    Name: Rosmery Ramirez  Clinic Number: [id number]    Therapy Diagnosis:   Encounter Diagnoses   Name Primary?    Pubic bone pain     Chronic bilateral low back pain without sciatica      Physician: Ancelmo Sumner MD    Physician Orders: PT Eval and Treat   Medical Diagnosis from Referral: S39.012A (ICD-10-CM) - Strain of lumbar region, initial encounter     Evaluation Date: 11/2/2018  Authorization Period Expiration: 12/31/2018   Plan of Care Expiration: 12/28/2018  Visit # / Visits authorized: 1/ 20    Time In: 1010  Time Out: 1100  Total Billable Time: 50 minutes    Precautions: Standard    Subjective   Date of onset: on and off for years with recent exacerbation on Sunday  History of current condition - Rosmery reports: When the pain first started it was in the front near pubic bone and went to gyn urologist. Cleared of UTI.  All test came back clear with exception of renal cyst. Pain wraps around across low back. Recent exacerbation with cleaning house on Sunday. She has been unable to do her usually walking routine and disturbs her sleep at night. She is a belly sleeper but it feels more comfortable to lie on her back. Denies any saddle anesthesia, B/B dysfunction, weakness.      Past Medical History:   Diagnosis Date    Allergy     Anxiety     Breast cyst     Edema of leg 10/5/2012    bilateral     Fractures 2011    thumb R    GERD (gastroesophageal reflux disease)     Glaucoma     History of colonic polyps     Hx-TIA (transient ischemic attack) 8/13/2012 Jan 2012: LLE and left facial numbness lasting 1 hour     Hyperlipidemia     Hypertension     Left shoulder tendonitis 8/8/2015    Primary open-angle glaucoma, mild stage - Both Eyes 6/3/2013     Rosmery Ramirez  has a past surgical history that includes Left Breast Lumpectomy (Left); Hand surgery (Left, 2011); SELECTIVE LASER TRABECUPLASTY  (05/2014); Hysterectomy (1980's); Tubal ligation (1970's); Vaginal delivery; Breast surgery (Left); Breast biopsy (Left); Oophorectomy; Cataract extraction w/  intraocular lens implant (Left, 10/05/2016); Cataract extraction w/  intraocular lens implant (Right, 11/30/2016); KAHOOK GONIOTOMY (Right, 11/30/2016); COLONOSCOPY (N/A, 1/23/2017); PHACOEMULSIFICATION-ASPIRATION-CATARACT WITH TRABEULOTOMY (Right, 11/30/2016); INSERTION-INTRAOCULAR LENS (IOL) (Right, 11/30/2016); PHACOEMULSIFICATION-ASPIRATION-CATARACT (Left, 10/5/2016); and INSERTION-INTRAOCULAR LENS (IOL) (Left, 10/5/2016).    Rosmery has a current medication list which includes the following prescription(s): aspirin, calcium, hydrochlorothiazide, irbesartan, lorazepam, lumigan, timolol maleate 0.5%, and tizanidine.    Review of patient's allergies indicates:   Allergen Reactions    Lisinopril Swelling    Amlodipine Edema    Combigan [brimonidine-timolol]      Causes itchy eyelids and contact dermatitis    Cosopt [dorzolamide-timolol]      Causes angular blepharitis of eyelids    Pravastatin      Myalgia and elevated CPK        Imaging,none    Prior Therapy: yes for her knee  Social History:  Adult children  Occupation: works in billing at medical office  Prior Level of Function: independent, walking 30 minutes 3 times per week. Member at [employer]  Current Level of Function: difficulty with house chores, long walks, sleep disturbed     Pain:  Current 310, worst 10/10, best 0/10   Location: bilateral low back, pubic   Description: Dull and Throbbing  Aggravating Factors: lying on stomach, housechores  Easing Factors: supine, sitting    Pts goals: To get back to her usual walking routine    Objective     Posture: anterior pelvic tilt  Palpation: TTP pubic symphysis, PSIS, spinous process L3-5.     Range of Motion/Strength:      Lumbar Spine Active ROM, measured in degrees with inclinometers at T12/L1 and S2, * Indicates pain with  "movement    Flexion: 90/60: 30*  Extension: 20/10: 10  Left Side Bend: 20  Right Side Bend: 15*    Thoracolumbar rotation: 30% L and 50% R and painful B    MMT    Left  Right    Hip:  Flexion    4/5  4-/5  Extension   4/5  4/5  Abduction   4/5  4-/5  Adduction   4/5  4-/5  External Rotation  4+/5  4+/5  Internal rotation  5/5  5/5    Knee:  Flexion    5/5  5/5  Extension   5/5  5/5    Ankle:  Dorsiflexion   5/5  5/5    Flexibility: Hamstring flexibility: good  Rectus femoris: moderate tightness R, minimal tightness L    Gait: Without AD  Analysis: Assistance indepedent  Bed Mobility:Independent  Transfers: Independent  Special Tests:   SLR: negative  RIAN:positive B  FADIR: positive B  Sacral thrust: positive  SI jt distraction/ compression: positive  Supine to sit: positive for R anterior rotated innominate       CMS Impairment/Limitation/Restriction for FOTO lumbar Survey    Therapist reviewed FOTO scores for Rosmery Ramirez on 11/2/2018.   FOTO documents entered into [employer] - see Media section.    Limitation Score: 63%  Category: Mobility    Current : CL = least 60% but < 80% impaired, limited or restricted  Goal: CK = at least 40% but < 60% impaired, limited or restricted           TREATMENT   Treatment Time In: 1045  Treatment Time Out: 1100  Total Treatment time separate from Evaluation: 15 minutes    Rosmery received therapeutic exercises to develop strength, posture and core stabilization for 15 minutes including:  Piriformis stretch 15" x 4  HL hip adductor with Tva 10" x 10  HL clams orange TB x 3 min  MET R anterior rotated innominate 6 sec x 3    Home Exercises and Patient Education Provided    Education provided:   - workplace posture    Written Home Exercises Provided: yes.  Exercises were reviewed and Rosmery was able to demonstrate them prior to the end of the session.  Rosmery demonstrated good  understanding of the education provided.     See EMR under Patient Instructions for " exercises provided 11/2/2018.    Assessment   Rosmery is a 73 y.o. female referred to outpatient Physical Therapy with a medical diagnosis of lumbar strain and main complaint low back and pubic pain. Pt presents with SI joint dysfunction, decreased lumbar spine AROM, poor postural awareness, core weakness, and decreased B hip ROM.     Pt prognosis is Good.   Pt will benefit from skilled outpatient Physical Therapy to address the deficits stated above and in the chart below, provide pt/family education, and to maximize pt's level of independence.     Plan of care discussed with patient: Yes  Pt's spiritual, cultural and educational needs considered and patient is agreeable to the plan of care and goals as stated below:     Anticipated Barriers for therapy: none    Medical Necessity is demonstrated by the following  History  Co-morbidities and personal factors that may impact the plan of care Co-morbidities:   None      Personal Factors:   no deficits     low   Examination  Body Structures and Functions, activity limitations and participation restrictions that may impact the plan of care Body Regions:   lower extremities  trunk    Body Systems:    ROM  transfers    Participation Restrictions:   Walking, sleeping    Activity limitations:   Learning and applying knowledge  no deficits    General Tasks and Commands  no deficits    Communication  no deficits    Mobility  lifting and carrying objects  walking    Self care  no deficits    Domestic Life  doing house work (cleaning house, washing dishes, laundry)    Interactions/Relationships  no deficits    Life Areas  no deficits    Community and Social Life  recreation and leisure         high   Clinical Presentation evolving clinical presentation with changing clinical characteristics moderate   Decision Making/ Complexity Score: low     Goals:  Short Term GOALS: 4 weeks  1. Patient demonstrates independence with Postural Awareness.   2. Patient demonstrates  independence with body mechanics.   3. Patient to report decreased pain in low back and pelvis region by 30% or greater    Long Term GOALS: 8 weeks  1. Patient demonstrates increased lumbar spine AROM to WFL to improve tolerance to functional activities pain free.   2. Patient demonstrates increased strength BLE's to 5/5 or greater to improve tolerance to functional activities pain free.   3. Patient to have decreased subjective report of disability as noted by a score of 40% or less on the FOTO questionnaire   4. Patient to be independent with home exercise program for improved self management of condition    Plan   Plan of care Certification: 11/2/2018 to 12/28/2018.    Outpatient Physical Therapy 2 times weekly for 8 weeks to include the following interventions: Manual Therapy, Moist Heat/ Ice, Neuromuscular Re-ed, Patient Education, Therapeutic Activites, Therapeutic Exercise and dry needling.     Yesica Leyva, PT

## 2018-11-06 ENCOUNTER — CLINICAL SUPPORT (OUTPATIENT)
Dept: OPHTHALMOLOGY | Facility: CLINIC | Age: 73
End: 2018-11-06
Payer: MEDICARE

## 2018-11-06 ENCOUNTER — OFFICE VISIT (OUTPATIENT)
Dept: OPHTHALMOLOGY | Facility: CLINIC | Age: 73
End: 2018-11-06
Payer: MEDICARE

## 2018-11-06 DIAGNOSIS — H04.123 DRY EYE SYNDROME, BILATERAL: ICD-10-CM

## 2018-11-06 DIAGNOSIS — H40.1111 PRIMARY OPEN ANGLE GLAUCOMA OF RIGHT EYE, MILD STAGE: Primary | ICD-10-CM

## 2018-11-06 DIAGNOSIS — Z96.1 PSEUDOPHAKIA OF BOTH EYES: ICD-10-CM

## 2018-11-06 DIAGNOSIS — H02.051 TRICHIASIS OF RIGHT UPPER EYELID: ICD-10-CM

## 2018-11-06 DIAGNOSIS — H40.1122 PRIMARY OPEN ANGLE GLAUCOMA OF LEFT EYE, MODERATE STAGE: ICD-10-CM

## 2018-11-06 DIAGNOSIS — H04.201 EPIPHORA, RIGHT: ICD-10-CM

## 2018-11-06 DIAGNOSIS — H01.009 ANGULAR BLEPHARITIS, UNSPECIFIED LATERALITY: ICD-10-CM

## 2018-11-06 PROCEDURE — 92014 COMPRE OPH EXAM EST PT 1/>: CPT | Mod: HCNC,S$GLB,, | Performed by: OPHTHALMOLOGY

## 2018-11-06 PROCEDURE — 99999 PR PBB SHADOW E&M-EST. PATIENT-LVL II: CPT | Mod: PBBFAC,HCNC,, | Performed by: OPHTHALMOLOGY

## 2018-11-06 PROCEDURE — 92083 EXTENDED VISUAL FIELD XM: CPT | Mod: HCNC,S$GLB,, | Performed by: OPHTHALMOLOGY

## 2018-11-06 PROCEDURE — 92250 FUNDUS PHOTOGRAPHY W/I&R: CPT | Mod: HCNC,S$GLB,, | Performed by: OPHTHALMOLOGY

## 2018-11-06 NOTE — PROGRESS NOTES
HPI     DLS: 7/03/18    Pt here for HVF review;  Pt c/o having trouble seeing up close with her reading materials.     Meds: T1/2 GFS QAM OU   Systane PRN OU     1) POAG   2) PCIOL OU   3) Hx Hyperopia   4) Epiphora     Last edited by Ana Aquino on 11/6/2018  9:01 AM. (History)            Assessment /Plan     For exam results, see Encounter Report.    Primary open angle glaucoma of right eye, mild stage    Primary open angle glaucoma of left eye, moderate stage    Dry eye syndrome, bilateral    Angular blepharitis, unspecified laterality    Epiphora, right    Trichiasis of right upper eyelid    Pseudophakia of both eyes        Old pt of Dr. Eber Crouch  Sister is Marina Dana (my pt.) she sent her to me      1. Early POAG-  Both Eyes   Dx. Years ago with Dr. Eber Crouch  -Strong family hx   -IOP previously well controlled (mid teens) on Xalatan but elevated on generic and switched to lumigan    Family history   STRONG (Mom,Dad, and 5 sisters)  Glaucoma meds   lumigan qhs OU, timolol gfs QAM OU  H/O adverse rxn to glaucoma drops - combigan - itchy lids  // cosopt - angular blepharitis   LASERS   S/p SLT 4/24/14 OD //  5/8/14 OS (good response 21/22 --> 12/15)  GLAUCOMA SURGERIES   [place] od 11/30/2016   OTHER EYE SURGERIES  Phaco /IOL os 10/6/2016 // phaco/IOL / [place] od 11/30/2016   CDR  0.75 / 0.7  Tbase  ??  Tmax  26 OU off all gtts (5/24/10)    Ttarget   16 OU  HVF - 7 VF '12 -18 -  OD SNS/IAD ; OS: IAD   Gonio +3 OU  /540  OCT 4  test 2011 to 2017  - RNFL - OD:dec. S/N/I  // OS dec SFranco JACOBS (+ prog ou)   HRT  6 test - 2012 to 2018 -   Disc photos  2012, 2015 - MR -  Brianna S/N/I od // dec. Sbrianna I/N/T os /// CDR 0.684 od // 0.748 osOIS     Ttoday 15/15  Test done today -  HVF / DFE / photos //      2. H/O - hyperopia - Both Eyes -mild - pre-cataract surgery    3. Epiphora  - C/O constant tearing of the right eye x 6 months (8/2015   4.  PC IOL   OD - phaco/IOL / [place] 11/30 /2016 - PCB00 22.5  OS -  Phaco/IOL - NO COLTON's - pt declined - 10/6/2016 - PCB00 24.0       Plan:  -IOP 24/20 ou (target is 16 ou)   Off combigan - intolerant - itchy lids / blepharitis   Intol to cosopt - angular blepharitis   -Strong family hx  -Goal IOP 16 OU    CSM    Target is 16 ou    Pt does not like generics - but has used  Latanoprost in past  without problems (actually prefers it to lumigan)   Intol to combigan -blepharitis / itch lids   Intol to cosopt - angular blepharitis     Tolerating  timolol gfs // timoptic XE ou q am - IOP higher than target today   Add back lumigan ou q hs - used pre - surgery withut difficulty     Rx glasses given 2/2017     F/U yag cap os - ?? Affecting her near vision os // may need refraction after that

## 2018-11-16 ENCOUNTER — CLINICAL SUPPORT (OUTPATIENT)
Dept: REHABILITATION | Facility: OTHER | Age: 73
End: 2018-11-16
Payer: MEDICARE

## 2018-11-16 DIAGNOSIS — M89.9 PUBIC BONE PAIN: ICD-10-CM

## 2018-11-16 DIAGNOSIS — M54.50 CHRONIC BILATERAL LOW BACK PAIN WITHOUT SCIATICA: ICD-10-CM

## 2018-11-16 DIAGNOSIS — G89.29 CHRONIC BILATERAL LOW BACK PAIN WITHOUT SCIATICA: ICD-10-CM

## 2018-11-16 PROCEDURE — 97110 THERAPEUTIC EXERCISES: CPT | Mod: HCNC,PN

## 2018-11-16 NOTE — PROGRESS NOTES
"  Physical Therapy Daily Treatment Note     Name: Rosmery [place]  Clinic Number: [id number]    Therapy Diagnosis:   Encounter Diagnoses   Name Primary?    Pubic bone pain     Chronic bilateral low back pain without sciatica      Physician: Ancelmo Sumner MD    Visit Date: 11/16/2018    Physician Orders: PT Eval and Treat  Medical Diagnosis: S39.012A (ICD-10-CM) - Strain of lumbar region, initial encounter    Evaluation Date: 11/02/18  Authorization Period Expiration: 12/31/2018  Plan of Care Certification Period: 12/28/2018  Visit #/Visits authorized: 2/20     Time In: 9:05 AM  Time Out: 9:55 AM  Total Billable Time: 50 minutes    Precautions: Standard    Subjective     Pt reports: Pt stated that she does not have pain while she is in supine position, but only when she is walking or doing chores. Stated that her R lumbar region is in more pain than L.   She was compliant with home exercise program.  Response to previous treatment: good, relief  Functional change: no new changes    Pain: 3/10  Location: right back / pubic bone    Objective     Rosmery received therapeutic exercises to develop strength, endurance, ROM, flexibility, posture and core stabilization for 50 minutes including:    Piriformis stretch 15" x 4  HL hip adductor with Tva 10" x 10  HL clams orange TB x 3 min  SLR 2 x 10  Bridge 5" x 10  SL clamshells 5" x 10  SL hip abd 10x  Push / Pull 6 secs x 3    Rosmery received the following manual therapy techniques: Joint mobilizations, Myofacial release and Soft tissue Mobilization were applied to the: 0 for 0 minutes, including:  --    Home Exercises Provided and Patient Education Provided     Education provided:   - Push / Pull to perform at home  - Proper technique and form for hip therex    Written Home Exercises Provided: Patient instructed to cont prior HEP.  Exercises were reviewed and Rosmery was able to demonstrate them prior to the end of the session.  Rosmery demonstrated good "  understanding of the education provided.     See EMR under Media for exercises provided prior visit.    Assessment     Patient completed treatment session without any increased pain to yana lumbar region. Pt arrived to treatment session with pelvic malalignment (R anteriorly innominate, L posteriorly innominate). Pelvic in alignment after push / pull. Biomechanical faults identified, pelvis rolling back during SL clamshells and corrected with tactile / verbal cueing. Reports of decr pain following intervention.    Rosmery is progressing well towards her goals.   Pt prognosis is Good.     Pt will continue to benefit from skilled outpatient physical therapy to address the deficits listed in the problem list box on initial evaluation, provide pt/family education and to maximize pt's level of independence in the home and community environment.     Pt's spiritual, cultural and educational needs considered and pt agreeable to plan of care and goals.    Anticipated barriers to physical therapy: none    Goals:     Short Term GOALS: 4 weeks  1. Patient demonstrates independence with Postural Awareness. (progressing, not met)  2. Patient demonstrates independence with body mechanics. (progressing, not met)   3. Patient to report decreased pain in low back and pelvis region by 30% or greater (progressing, not met)     Long Term GOALS: 8 weeks   1. Patient demonstrates increased lumbar spine AROM to WFL to improve tolerance to functional activities pain free. (progressing, not met)    2. Patient demonstrates increased strength BLE's to 5/5 or greater to improve tolerance to functional activities pain free. (progressing, not met)   3. Patient to have decreased subjective report of disability as noted by a score of 40% or less on the FOTO questionnaire  (progressing, not met)   4. Patient to be independent with home exercise program for improved self management of condition (progressing, not met)     Plan     Continue with  established PT Plan of Care and focus on core stabilization / hip strengthening.    Khai Guzman, PTA

## 2018-11-26 ENCOUNTER — CLINICAL SUPPORT (OUTPATIENT)
Dept: REHABILITATION | Facility: OTHER | Age: 73
End: 2018-11-26
Payer: MEDICARE

## 2018-11-26 DIAGNOSIS — M89.9 PUBIC BONE PAIN: ICD-10-CM

## 2018-11-26 DIAGNOSIS — G89.29 CHRONIC BILATERAL LOW BACK PAIN WITHOUT SCIATICA: ICD-10-CM

## 2018-11-26 DIAGNOSIS — M54.50 CHRONIC BILATERAL LOW BACK PAIN WITHOUT SCIATICA: ICD-10-CM

## 2018-11-26 PROCEDURE — 97110 THERAPEUTIC EXERCISES: CPT | Mod: HCNC,PN

## 2018-11-26 NOTE — PROGRESS NOTES
"  Physical Therapy Daily Treatment Note     Name: Rosmery Cody [place]  Clinic Number: [id number]    Therapy Diagnosis:   Encounter Diagnoses   Name Primary?    Pubic bone pain     Chronic bilateral low back pain without sciatica      Physician: Ancelmo Sumner MD    Visit Date: 11/26/2018    Physician Orders: PT Eval and Treat  Medical Diagnosis: S39.012A (ICD-10-CM) - Strain of lumbar region, initial encounter    Evaluation Date: 11/02/18  Authorization Period Expiration: 12/31/2018  Plan of Care Certification Period: 12/28/2018  Visit #/Visits authorized: 3/20    Time In: 3:00 PM  Time Out: 4:00 PM  Total Billable Time: 55 minutes    Precautions: Standard    Subjective     Pt reports: Pt stated that her pain has gotten better and it only hurts when she is constantly on her feet doing housework.  She was compliant with home exercise program.  Response to previous treatment: good, less pain  Functional change: no new changes    Pain: 1/10  Location: right back / pubic bone    Objective     Rosmery received therapeutic exercises to develop strength, endurance, ROM, flexibility, posture and core stabilization for 55 minutes including:    Piriformis stretch 15" x 4  HL hip adductor with Tva 10" x 10  Knee fall-outs w/ OTB 2 x 10  HL clams orange TB x 3 min  SLR 2 x 10  Bridge 5" x 10  SL clamshells w/ OTB 5" x 10  Reverse clamshells 5"x 10  SL hip abd 10x  Push / Pull 6 secs x 3    Rosmery received the following manual therapy techniques: Joint mobilizations, Myofacial release and Soft tissue Mobilization were applied to the: 0 for 0 minutes, including:  --    Home Exercises Provided and Patient Education Provided     Education provided:   - Push / Pull to perform at home  - Proper technique and form for hip therex    Written Home Exercises Provided: Patient instructed to cont prior HEP.  Exercises were reviewed and Rosmery was able to demonstrate them prior to the end of the session.  Rosmery demonstrated " good  understanding of the education provided.     See EMR under Media for exercises provided prior visit.    Assessment   Pt arrived to treatment session with pelvic malalignment (R anteriorly innominate, L posteriorly innominate). Push/pull to promote pelvic alignment. Pt with c/o of muscular fatigue / soreness following intervention.    Rosmery is progressing well towards her goals.   Pt prognosis is Good.     Pt will continue to benefit from skilled outpatient physical therapy to address the deficits listed in the problem list box on initial evaluation, provide pt/family education and to maximize pt's level of independence in the home and community environment.     Pt's spiritual, cultural and educational needs considered and pt agreeable to plan of care and goals.    Anticipated barriers to physical therapy: none    Goals:     Short Term GOALS: 4 weeks  1. Patient demonstrates independence with Postural Awareness. (progressing, not met)  2. Patient demonstrates independence with body mechanics. (progressing, not met)   3. Patient to report decreased pain in low back and pelvis region by 30% or greater (progressing, not met)     Long Term GOALS: 8 weeks   1. Patient demonstrates increased lumbar spine AROM to WFL to improve tolerance to functional activities pain free. (progressing, not met)    2. Patient demonstrates increased strength BLE's to 5/5 or greater to improve tolerance to functional activities pain free. (progressing, not met)   3. Patient to have decreased subjective report of disability as noted by a score of 40% or less on the FOTO questionnaire  (progressing, not met)   4. Patient to be independent with home exercise program for improved self management of condition (progressing, not met)     Plan     Continue with established PT Plan of Care and focus on core stabilization / hip strengthening.    Khai Guzman, PTA

## 2018-11-28 NOTE — PROGRESS NOTES
Assessment /Plan     For exam results, see Encounter Report.    Primary open angle glaucoma of right eye, mild stage    Primary open angle glaucoma of left eye, moderate stage    Dry eye syndrome, bilateral    Angular blepharitis, unspecified laterality    Epiphora, right    Trichiasis of right upper eyelid    Pseudophakia of both eyes      Old pt of Dr. Eber Crouch  Sister is Marina Cortez (my pt.) she sent her to me      1. Early POAG-  Both Eyes   Dx. Years ago with Dr. Eber Crouch  -Strong family hx   -IOP previously well controlled (mid teens) on Xalatan but elevated on generic and switched to lumigan    Family history   STRONG (Mom,Dad, and 5 sisters)  Glaucoma meds   lumigan qhs OU, timolol gfs QAM OU  H/O adverse rxn to glaucoma drops - combigan - itchy lids  // cosopt - angular blepharitis   LASERS   S/p SLT 4/24/14 OD //  5/8/14 OS (good response 21/22 --> 12/15)  GLAUCOMA SURGERIES   [place] od 11/30/2016   OTHER EYE SURGERIES  [place]aco /IOL os 10/6/2016 // phaco/IOL / [place] od 11/30/2016   CDR  0.75 / 0.7  Tbase  ??  Tmax  26 OU off all gtts (5/24/10)    Ttarget   16 OU  HVF - 7 VF '12 -18 -  OD SNS/IAD ; OS: IAD   Gonio +3 OU  /540  OCT 4  test 2011 to 2017  - RNFL - OD:dec. S/N/I  // OS dec S. Brianna N (+ prog ou)   HRT  6 test - 2012 to 2018 -   Disc photos  2012, 2015 - MR -  Bord S/N/I od // dec. Sbrianna I/N/T os /// CDR 0.684 od // 0.748 osOIS     Ttoday 15/15  Test done today -  HVF / DFE / photos //      2. H/O - hyperopia - Both Eyes -mild - pre-cataract surgery    3. Epiphora  - C/O constant tearing of the right eye x 6 months (8/2015   4.  PC IOL   OD - phaco/IOL / [place] 11/30 /2016 - PCB00 22.5  OS - Phaco/IOL - NO COLTON's - pt declined - 10/6/2016 - PCB00 24.0       Plan:  -IOP 24/20 ou (target is 16 ou)   Off combigan - intolerant - itchy lids / blepharitis   Intol to cosopt - angular blepharitis   -Strong family hx  -Goal IOP 16 OU    CSM    Target is 16 ou    Pt does not like  generics - but has used  Latanoprost in past  without problems (actually prefers it to lumigan)   Intol to combigan -blepharitis / itch lids   Intol to cosopt - angular blepharitis     Tolerating  timolol gfs // timoptic XE ou q am - IOP higher than target today   Add back lumigan ou q hs - used pre - surgery withut difficulty     Rx glasses given 2/2017     F/U yag cap os - ?? Affecting her near vision os // may need refraction after that     YAG CAP OS -  - done 11/29/2018   Steroid taper - durezol 3 x day for 2 days then 1 x day until sample runs out     F/U 2 months with AR/MR post yag cap os - ? If vision improved

## 2018-11-29 ENCOUNTER — OFFICE VISIT (OUTPATIENT)
Dept: OPHTHALMOLOGY | Facility: CLINIC | Age: 73
End: 2018-11-29
Payer: MEDICARE

## 2018-11-29 VITALS — DIASTOLIC BLOOD PRESSURE: 74 MMHG | SYSTOLIC BLOOD PRESSURE: 128 MMHG | HEART RATE: 57 BPM

## 2018-11-29 DIAGNOSIS — Z96.1 PSEUDOPHAKIA OF BOTH EYES: ICD-10-CM

## 2018-11-29 DIAGNOSIS — H01.009 ANGULAR BLEPHARITIS, UNSPECIFIED LATERALITY: ICD-10-CM

## 2018-11-29 DIAGNOSIS — H26.492 PCO (POSTERIOR CAPSULAR OPACIFICATION), LEFT: Primary | ICD-10-CM

## 2018-11-29 DIAGNOSIS — H40.1111 PRIMARY OPEN ANGLE GLAUCOMA OF RIGHT EYE, MILD STAGE: ICD-10-CM

## 2018-11-29 DIAGNOSIS — H04.201 EPIPHORA, RIGHT: ICD-10-CM

## 2018-11-29 DIAGNOSIS — H04.123 DRY EYE SYNDROME, BILATERAL: ICD-10-CM

## 2018-11-29 DIAGNOSIS — H40.1122 PRIMARY OPEN ANGLE GLAUCOMA OF LEFT EYE, MODERATE STAGE: ICD-10-CM

## 2018-11-29 DIAGNOSIS — H02.051 TRICHIASIS OF RIGHT UPPER EYELID: ICD-10-CM

## 2018-11-29 PROCEDURE — 99499 UNLISTED E&M SERVICE: CPT | Mod: HCNC,S$GLB,, | Performed by: OPHTHALMOLOGY

## 2018-11-29 PROCEDURE — 66821 AFTER CATARACT LASER SURGERY: CPT | Mod: HCNC,LT,S$GLB, | Performed by: OPHTHALMOLOGY

## 2018-11-29 PROCEDURE — 99999 PR PBB SHADOW E&M-EST. PATIENT-LVL II: CPT | Mod: PBBFAC,HCNC,, | Performed by: OPHTHALMOLOGY

## 2018-12-11 ENCOUNTER — PATIENT MESSAGE (OUTPATIENT)
Dept: OPHTHALMOLOGY | Facility: CLINIC | Age: 73
End: 2018-12-11

## 2019-01-02 ENCOUNTER — PATIENT MESSAGE (OUTPATIENT)
Dept: FAMILY MEDICINE | Facility: CLINIC | Age: 74
End: 2019-01-02

## 2019-01-03 ENCOUNTER — TELEPHONE (OUTPATIENT)
Dept: FAMILY MEDICINE | Facility: CLINIC | Age: 74
End: 2019-01-03

## 2019-01-03 NOTE — TELEPHONE ENCOUNTER
----- Message from Marley Jackson sent at 1/3/2019  9:41 AM CST -----  Contact: self  Pt calling to discuss blood pressure medications. Please call [phone].

## 2019-01-07 ENCOUNTER — OFFICE VISIT (OUTPATIENT)
Dept: OPHTHALMOLOGY | Facility: CLINIC | Age: 74
End: 2019-01-07
Payer: MEDICARE

## 2019-01-07 DIAGNOSIS — H40.1122 PRIMARY OPEN ANGLE GLAUCOMA OF LEFT EYE, MODERATE STAGE: ICD-10-CM

## 2019-01-07 DIAGNOSIS — H01.009 ANGULAR BLEPHARITIS, UNSPECIFIED LATERALITY: ICD-10-CM

## 2019-01-07 DIAGNOSIS — Z96.1 PSEUDOPHAKIA OF BOTH EYES: ICD-10-CM

## 2019-01-07 DIAGNOSIS — H04.123 DRY EYE SYNDROME, BILATERAL: ICD-10-CM

## 2019-01-07 DIAGNOSIS — H40.1111 PRIMARY OPEN ANGLE GLAUCOMA OF RIGHT EYE, MILD STAGE: Primary | ICD-10-CM

## 2019-01-07 PROCEDURE — 92012 PR EYE EXAM, EST PATIENT,INTERMED: ICD-10-PCS | Mod: HCNC,S$GLB,, | Performed by: OPHTHALMOLOGY

## 2019-01-07 PROCEDURE — 99999 PR PBB SHADOW E&M-EST. PATIENT-LVL II: CPT | Mod: PBBFAC,HCNC,, | Performed by: OPHTHALMOLOGY

## 2019-01-07 PROCEDURE — 99499 RISK ADDL DX/OHS AUDIT: ICD-10-PCS | Mod: HCNC,S$GLB,, | Performed by: OPHTHALMOLOGY

## 2019-01-07 PROCEDURE — 92012 INTRM OPH EXAM EST PATIENT: CPT | Mod: HCNC,S$GLB,, | Performed by: OPHTHALMOLOGY

## 2019-01-07 PROCEDURE — 99999 PR PBB SHADOW E&M-EST. PATIENT-LVL II: ICD-10-PCS | Mod: PBBFAC,HCNC,, | Performed by: OPHTHALMOLOGY

## 2019-01-07 PROCEDURE — 99499 UNLISTED E&M SERVICE: CPT | Mod: HCNC,S$GLB,, | Performed by: OPHTHALMOLOGY

## 2019-01-07 RX ORDER — BIMATOPROST 0.1 MG/ML
1 SOLUTION/ DROPS OPHTHALMIC NIGHTLY
Qty: 2.5 ML | Refills: 12 | Status: SHIPPED | OUTPATIENT
Start: 2019-01-07 | End: 2019-12-09 | Stop reason: SDUPTHER

## 2019-01-07 RX ORDER — TIMOLOL MALEATE 5 MG/ML
1 SOLUTION OPHTHALMIC EVERY MORNING
Qty: 5 ML | Refills: 12 | Status: SHIPPED | OUTPATIENT
Start: 2019-01-07 | End: 2019-12-09 | Stop reason: SDUPTHER

## 2019-01-07 NOTE — PROGRESS NOTES
HPI     Glaucoma      Additional comments: 2 month ck today              Comments     DLS: 11/29/18    1) POAG OU  2) PCIOL OU  3) Hx Hyperopia  4) Epiphora    MEDS:  T1/2 GFS QAM OU  Lumigan QHS OU  Systane PRN OU          Last edited by Alicia Felix MA on 1/7/2019  8:16 AM. (History)            Assessment /Plan     For exam results, see Encounter Report.    Primary open angle glaucoma of right eye, mild stage    Primary open angle glaucoma of left eye, moderate stage    Dry eye syndrome, bilateral    Angular blepharitis, unspecified laterality    Pseudophakia of both eyes        Old pt of Dr. Eber Crouch  Sister is Marina Cortez (my pt.) she sent her to me      1. Early POAG-  Both Eyes   Dx. Years ago with Dr. Eber Crouch  -Strong family hx   -IOP previously well controlled (mid teens) on Xalatan but elevated on generic and switched to lumigan    Family history   STRONG (Mom,Dad, and 5 sisters)  Glaucoma meds   lumigan qhs OU, timolol gfs QAM OU  H/O adverse rxn to glaucoma drops - combigan - itchy lids  // cosopt - angular blepharitis   LASERS   S/p SLT 4/24/14 OD //  5/8/14 OS (good response 21/22 --> 12/15)  GLAUCOMA SURGERIES   [place] od 11/30/2016   OTHER EYE SURGERIES  [place]aco /IOL os 10/6/2016 // phaco/IOL / [place] od 11/30/2016   CDR  0.75 / 0.7  Tbase  ??  Tmax  26 OU off all gtts (5/24/10)    Ttarget   16 OU  HVF - 7 VF '12 -18 -  OD SNS/IAD ; OS: IAD   Gonio +3 OU  /540  OCT 4  test 2011 to 2017  - RNFL - OD:dec. S/N/I  // OS dec S. Brianna N (+ prog ou)   HRT  6 test - 2012 to 2018 -   Disc photos  2012, 2015 - MR -  Bord S/N/I od // dec. Sbrianna I/N/T os /// CDR 0.684 od // 0.748 osOIS     Ttoday 16/16  Test done today -  AR/MR/ s/p YAG      2. H/O - hyperopia - Both Eyes -mild - pre-cataract surgery    3. Epiphora  - C/O constant tearing of the right eye x 6 months (8/2015   4.  PC IOL   OD - phaco/IOL / [place] 11/30 /2016 - PCB00 22.5  OS - Phaco/IOL - NO COLTON's - pt declined - 10/6/2016 - PCB00  24.0       Plan:  -IOP 24/20 ou (target is 16 ou)   Off combigan - intolerant - itchy lids / blepharitis   Intol to cosopt - angular blepharitis   -Strong family hx  -Goal IOP 16 OU    CSM    Target is 16 ou    Pt does not like generics - but has used  Latanoprost in past  without problems (actually prefers it to lumigan)   Intol to combigan -blepharitis / itch lids   Intol to cosopt - angular blepharitis     Tolerating  timolol gfs // timoptic XE ou q am - IOP higher than target today   Add back lumigan ou q hs - used pre - surgery withut difficulty     Rx glasses given 2/2017     F/U yag cap os - ?? Affecting her near vision os // may need refraction after that     YAG CAP OS -  - done 11/29/2018   Steroid taper - durezol 3 x day for 2 days then 1 x day until sample runs out     F/U 4 months IOP/Gonio

## 2019-01-29 ENCOUNTER — TELEPHONE (OUTPATIENT)
Dept: OPHTHALMOLOGY | Facility: CLINIC | Age: 74
End: 2019-01-29

## 2019-01-29 NOTE — TELEPHONE ENCOUNTER
Faxed ok for 3 refills w/90 day supply for lumigan, and timolol to [facility] pharmacy, [phone]

## 2019-01-31 DIAGNOSIS — I10 ESSENTIAL HYPERTENSION: ICD-10-CM

## 2019-01-31 RX ORDER — IRBESARTAN 150 MG/1
150 TABLET ORAL NIGHTLY
Qty: 90 TABLET | Refills: 2 | Status: SHIPPED | OUTPATIENT
Start: 2019-01-31 | End: 2019-02-06 | Stop reason: SDUPTHER

## 2019-01-31 RX ORDER — HYDROCHLOROTHIAZIDE 12.5 MG/1
12.5 CAPSULE ORAL DAILY
Qty: 90 CAPSULE | Refills: 2 | Status: SHIPPED | OUTPATIENT
Start: 2019-01-31 | End: 2019-02-06 | Stop reason: SDUPTHER

## 2019-02-01 ENCOUNTER — OFFICE VISIT (OUTPATIENT)
Dept: CARDIOLOGY | Facility: CLINIC | Age: 74
End: 2019-02-01
Payer: MEDICARE

## 2019-02-01 VITALS
HEART RATE: 66 BPM | RESPIRATION RATE: 20 BRPM | DIASTOLIC BLOOD PRESSURE: 72 MMHG | BODY MASS INDEX: 31.92 KG/M2 | SYSTOLIC BLOOD PRESSURE: 126 MMHG | OXYGEN SATURATION: 98 % | WEIGHT: 191.81 LBS

## 2019-02-01 DIAGNOSIS — N18.2 CKD (CHRONIC KIDNEY DISEASE) STAGE 2, GFR 60-89 ML/MIN: ICD-10-CM

## 2019-02-01 DIAGNOSIS — R42 DIZZY: ICD-10-CM

## 2019-02-01 DIAGNOSIS — I10 ESSENTIAL HYPERTENSION: ICD-10-CM

## 2019-02-01 DIAGNOSIS — E78.2 MIXED HYPERLIPIDEMIA: Primary | ICD-10-CM

## 2019-02-01 DIAGNOSIS — R00.1 BRADYCARDIA: ICD-10-CM

## 2019-02-01 PROCEDURE — 3078F PR MOST RECENT DIASTOLIC BLOOD PRESSURE < 80 MM HG: ICD-10-PCS | Mod: HCNC,CPTII,S$GLB, | Performed by: INTERNAL MEDICINE

## 2019-02-01 PROCEDURE — 99214 PR OFFICE/OUTPT VISIT, EST, LEVL IV, 30-39 MIN: ICD-10-PCS | Mod: HCNC,S$GLB,, | Performed by: INTERNAL MEDICINE

## 2019-02-01 PROCEDURE — 3074F PR MOST RECENT SYSTOLIC BLOOD PRESSURE < 130 MM HG: ICD-10-PCS | Mod: HCNC,CPTII,S$GLB, | Performed by: INTERNAL MEDICINE

## 2019-02-01 PROCEDURE — 99999 PR PBB SHADOW E&M-EST. PATIENT-LVL III: ICD-10-PCS | Mod: PBBFAC,HCNC,, | Performed by: INTERNAL MEDICINE

## 2019-02-01 PROCEDURE — 1101F PR PT FALLS ASSESS DOC 0-1 FALLS W/OUT INJ PAST YR: ICD-10-PCS | Mod: HCNC,CPTII,S$GLB, | Performed by: INTERNAL MEDICINE

## 2019-02-01 PROCEDURE — 99214 OFFICE O/P EST MOD 30 MIN: CPT | Mod: HCNC,S$GLB,, | Performed by: INTERNAL MEDICINE

## 2019-02-01 PROCEDURE — 1101F PT FALLS ASSESS-DOCD LE1/YR: CPT | Mod: HCNC,CPTII,S$GLB, | Performed by: INTERNAL MEDICINE

## 2019-02-01 PROCEDURE — 93000 ELECTROCARDIOGRAM COMPLETE: CPT | Mod: HCNC,S$GLB,, | Performed by: INTERNAL MEDICINE

## 2019-02-01 PROCEDURE — 93000 EKG 12-LEAD: ICD-10-PCS | Mod: HCNC,S$GLB,, | Performed by: INTERNAL MEDICINE

## 2019-02-01 PROCEDURE — 99999 PR PBB SHADOW E&M-EST. PATIENT-LVL III: CPT | Mod: PBBFAC,HCNC,, | Performed by: INTERNAL MEDICINE

## 2019-02-01 PROCEDURE — 3074F SYST BP LT 130 MM HG: CPT | Mod: HCNC,CPTII,S$GLB, | Performed by: INTERNAL MEDICINE

## 2019-02-01 PROCEDURE — 3078F DIAST BP <80 MM HG: CPT | Mod: HCNC,CPTII,S$GLB, | Performed by: INTERNAL MEDICINE

## 2019-02-01 NOTE — PROGRESS NOTES
Subjective:    Patient ID:  Rosmery Ramirez is a 73 y.o. female who presents for follow-up of Bradycardia (6 mo fu)      HPI   Previous history:  Patient is here for follow-up of bradycardia and dizziness.  She underwent diagnostic testing as below.  This is within normal limits.  She has had no worsening cardiopulmonary complaints.  Mainly provided reassurance.  Summer bradycardia could be related to ophthalmological drops otherwise she has been stable.  She had nice average heart rate and fair exercise tolerance for her age.  She denies any PND, orthopnea or lower [place].  She has not experienced any dizziness to the point of presyncope or syncope.    Today:  Here for follow-up of bradycardia and dizziness.  She has had no cardiopulmonary complaints.  She has been dual all the daily activities without any problems.  She denies any chest pain, shortness of breath or palpitations.  She has experienced no PND, orthopnea or lower extremity edema.  She denies any dizziness, presyncope or syncope.  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    Review of Systems   Constitution: Negative.   HENT: Negative.    Eyes: Negative.    Cardiovascular: Negative for chest pain, dyspnea on exertion, irregular heartbeat, leg swelling, near-syncope, orthopnea, palpitations, paroxysmal nocturnal dyspnea and syncope.   Respiratory: Negative for shortness of breath.    Skin: Negative.    Musculoskeletal: Negative.    Gastrointestinal: Negative for abdominal pain, constipation and diarrhea.   Genitourinary: Negative for dysuria.   Neurological: Positive for dizziness.   Psychiatric/Behavioral: Negative.         Objective:    Physical Exam   Constitutional: She is oriented to person, place, and time. She appears well-developed and well-nourished.   HENT:   Head: Normocephalic and atraumatic.   Eyes: Conjunctivae and EOM are normal. Pupils are equal, round, and reactive to light.   Neck: Normal range of  motion. Neck supple. No thyromegaly present.   Cardiovascular: Normal rate and regular rhythm.   No murmur heard.  Pulmonary/Chest: Effort normal and breath sounds normal. No respiratory distress.   Abdominal: Soft. Bowel sounds are normal.   Musculoskeletal: She exhibits no edema.   Neurological: She is alert and oriented to person, place, and time.   Skin: Skin is warm and dry.   Psychiatric: She has a normal mood and affect. Her behavior is normal.       REINA:  7-18  CONCLUSIONS     1 - Normal left ventricular systolic function (EF 60-65%).     2 - No wall motion abnormalities.     3 - Normal left ventricular diastolic function.     4 - Normal right ventricular systolic function .     5 - Trivial mitral regurgitation.     6 - The estimated PA systolic pressure is 17 mmHg.     No evidence of stress induced myocardial ischemia.   *ran for 6 min without symptoms    Holter within normal limits    LDL-90   6-18    Assessment:       1. Mixed hyperlipidemia    2. Dizzy    3. CKD (chronic kidney disease) stage 2, GFR 60-89 ml/min    4. Essential hypertension    5. Bradycardia         Plan:       -mainly reassurance in light of testing  -continue risk factor modification Ie. diet and exercise  -call for any worsening symptoms    Return to clinic in 12 months

## 2019-02-06 DIAGNOSIS — I10 ESSENTIAL HYPERTENSION: ICD-10-CM

## 2019-02-07 RX ORDER — HYDROCHLOROTHIAZIDE 12.5 MG/1
12.5 CAPSULE ORAL DAILY
Qty: 90 CAPSULE | Refills: 3 | Status: SHIPPED | OUTPATIENT
Start: 2019-02-07 | End: 2020-03-10

## 2019-02-07 RX ORDER — IRBESARTAN 150 MG/1
150 TABLET ORAL NIGHTLY
Qty: 90 TABLET | Refills: 3 | Status: SHIPPED | OUTPATIENT
Start: 2019-02-07 | End: 2019-05-03 | Stop reason: SDUPTHER

## 2019-02-11 ENCOUNTER — OFFICE VISIT (OUTPATIENT)
Dept: FAMILY MEDICINE | Facility: CLINIC | Age: 74
End: 2019-02-11
Payer: MEDICARE

## 2019-02-11 VITALS
OXYGEN SATURATION: 96 % | WEIGHT: 189.94 LBS | SYSTOLIC BLOOD PRESSURE: 130 MMHG | HEIGHT: 65 IN | BODY MASS INDEX: 31.65 KG/M2 | HEART RATE: 88 BPM | DIASTOLIC BLOOD PRESSURE: 80 MMHG | TEMPERATURE: 100 F

## 2019-02-11 DIAGNOSIS — E78.2 MIXED HYPERLIPIDEMIA: ICD-10-CM

## 2019-02-11 DIAGNOSIS — R68.89 FLU-LIKE SYMPTOMS: Primary | ICD-10-CM

## 2019-02-11 DIAGNOSIS — T46.6X5A STATIN MYOPATHY: ICD-10-CM

## 2019-02-11 DIAGNOSIS — G72.0 STATIN MYOPATHY: ICD-10-CM

## 2019-02-11 PROBLEM — M89.9 PUBIC BONE PAIN: Status: RESOLVED | Noted: 2018-11-02 | Resolved: 2019-02-11

## 2019-02-11 PROCEDURE — 3075F PR MOST RECENT SYSTOLIC BLOOD PRESS GE 130-139MM HG: ICD-10-PCS | Mod: HCNC,CPTII,S$GLB, | Performed by: INTERNAL MEDICINE

## 2019-02-11 PROCEDURE — 99214 PR OFFICE/OUTPT VISIT, EST, LEVL IV, 30-39 MIN: ICD-10-PCS | Mod: HCNC,S$GLB,, | Performed by: INTERNAL MEDICINE

## 2019-02-11 PROCEDURE — 3079F DIAST BP 80-89 MM HG: CPT | Mod: HCNC,CPTII,S$GLB, | Performed by: INTERNAL MEDICINE

## 2019-02-11 PROCEDURE — 99214 OFFICE O/P EST MOD 30 MIN: CPT | Mod: HCNC,S$GLB,, | Performed by: INTERNAL MEDICINE

## 2019-02-11 PROCEDURE — 3075F SYST BP GE 130 - 139MM HG: CPT | Mod: HCNC,CPTII,S$GLB, | Performed by: INTERNAL MEDICINE

## 2019-02-11 PROCEDURE — 99999 PR PBB SHADOW E&M-EST. PATIENT-LVL IV: ICD-10-PCS | Mod: PBBFAC,HCNC,, | Performed by: INTERNAL MEDICINE

## 2019-02-11 PROCEDURE — 3079F PR MOST RECENT DIASTOLIC BLOOD PRESSURE 80-89 MM HG: ICD-10-PCS | Mod: HCNC,CPTII,S$GLB, | Performed by: INTERNAL MEDICINE

## 2019-02-11 PROCEDURE — 1101F PR PT FALLS ASSESS DOC 0-1 FALLS W/OUT INJ PAST YR: ICD-10-PCS | Mod: HCNC,CPTII,S$GLB, | Performed by: INTERNAL MEDICINE

## 2019-02-11 PROCEDURE — 99999 PR PBB SHADOW E&M-EST. PATIENT-LVL IV: CPT | Mod: PBBFAC,HCNC,, | Performed by: INTERNAL MEDICINE

## 2019-02-11 PROCEDURE — 1101F PT FALLS ASSESS-DOCD LE1/YR: CPT | Mod: HCNC,CPTII,S$GLB, | Performed by: INTERNAL MEDICINE

## 2019-02-11 RX ORDER — CHOLECALCIFEROL (VITAMIN D3) 25 MCG
1000 TABLET ORAL DAILY
COMMUNITY
End: 2020-08-18 | Stop reason: ALTCHOICE

## 2019-02-11 RX ORDER — IPRATROPIUM BROMIDE 42 UG/1
2 SPRAY, METERED NASAL 3 TIMES DAILY
Qty: 30 ML | Refills: 0 | Status: SHIPPED | OUTPATIENT
Start: 2019-02-11 | End: 2019-02-18

## 2019-02-11 RX ORDER — OSELTAMIVIR PHOSPHATE 75 MG/1
75 CAPSULE ORAL 2 TIMES DAILY
Qty: 10 CAPSULE | Refills: 0 | Status: SHIPPED | OUTPATIENT
Start: 2019-02-11 | End: 2019-02-16

## 2019-02-11 RX ORDER — BENZONATATE 100 MG/1
100 CAPSULE ORAL 3 TIMES DAILY PRN
Qty: 30 CAPSULE | Refills: 0 | Status: SHIPPED | OUTPATIENT
Start: 2019-02-11 | End: 2019-02-21

## 2019-02-11 NOTE — PROGRESS NOTES
This note was created by combination of typed  and Dragon dictation.  Transcription errors may be present.  If there are any questions, please contact me.    Assessment / Plan:   Flu-like symptoms  -with known sick contact with influenza.  Empiric treatment with Tamiflu.  Tessalon and Atrovent for cough/supportive care.  -     oseltamivir (TAMIFLU) 75 MG capsule; Take 1 capsule (75 mg total) by mouth 2 (two) times daily. for 5 days  Dispense: 10 capsule; Refill: 0  -     benzonatate (TESSALON) 100 MG capsule; Take 1 capsule (100 mg total) by mouth 3 (three) times daily as needed for Cough.  Dispense: 30 capsule; Refill: 0  -     ipratropium (ATROVENT) 42 mcg (0.06 %) nasal spray; 2 sprays by Nasal route 3 (three) times daily. AS NEEDED FOR NASAL CONGESTION AND DRIP for 7 days  Dispense: 30 mL; Refill: 0    Statin myopathy  Mixed hyperlipidemia  -intolerant of statin therapy historically.  Has been taking vitamin D 1000 units daily.  No recent vitamin-D level in lab testing.  With next labs I would check vitamin-D level and if low increase the dose.  She is currently on a 1000.  In which case I may rechallenge her on statin therapy.          There are no discontinued medications.    meds sent this encounter:  Medications Ordered This Encounter   Medications    benzonatate (TESSALON) 100 MG capsule     Sig: Take 1 capsule (100 mg total) by mouth 3 (three) times daily as needed for Cough.     Dispense:  30 capsule     Refill:  0    ipratropium (ATROVENT) 42 mcg (0.06 %) nasal spray     Si sprays by Nasal route 3 (three) times daily. AS NEEDED FOR NASAL CONGESTION AND DRIP for 7 days     Dispense:  30 mL     Refill:  0    oseltamivir (TAMIFLU) 75 MG capsule     Sig: Take 1 capsule (75 mg total) by mouth 2 (two) times daily. for 5 days     Dispense:  10 capsule     Refill:  0       Follow Up: No Follow-up on file.      Subjective:     Chief Complaint   Patient presents with    Cough    Shortness of  Breath    Sore Throat    Fever       OVI Botello is a 73 y.o. female, last appointment with this clinic was 10/30/2018.    No LMP recorded. Patient has had a hysterectomy.    Sister with flu dx'd last week; pt started with sx on Saturday morning and worsening sx last night. Initial sore throat and headache and the face discomfort then running temp.  otc tylenol and robitussin for cough. Throat hurts. Last tylenol was last night. Cough with chest congestion; sinus congestion. Some dyspnea.     Statin myalgias.  Takes vit D3 1000 IU and has done that longstanding.  Even with statin challenge.  But no level tests in system.    Patient Care Team:  Ancelmo Sumner MD as PCP - General (Internal Medicine)    Patient Active Problem List    Diagnosis Date Noted    Pubic bone pain 11/02/2018    Chronic bilateral low back pain without sciatica 11/02/2018    Bradycardia 6/2018 holter negative 07/20/2018    Status post hysterectomy 07/20/2018    Statin myopathy 07/20/2018    History of colon polyps; 1/23/2017 colonoscopy normal repeat 5 years 01/23/2017    Chronic pain of left knee 01/05/2017    Nuclear sclerosis 11/30/2016    Cataract 09/07/2016    Obesity, Class II, BMI 35-39.9, with comorbidity 07/20/2016    Angular blepharitis 01/05/2016    POAG (primary open-angle glaucoma) 01/05/2016    Essential hypertension 08/13/2012    Hyperlipidemia; statin myalgias; 6/28/2018 exercise stress echo neg for ischemia 08/13/2012 6/28/2018 exercise stress echo CONCLUSIONS   1 - Normal left ventricular systolic function (EF 60-65%).   2 - No wall motion abnormalities.   3 - Normal left ventricular diastolic function.   4 - Normal right ventricular systolic function .          PAST MEDICAL HISTORY:  Past Medical History:   Diagnosis Date    Allergy     Anxiety     Breast cyst     Edema of leg 10/5/2012    bilateral     Fractures 2011    thumb R    GERD (gastroesophageal reflux disease)     Glaucoma     History  of colonic polyps     Hx-TIA (transient ischemic attack) 8/13/2012 Jan 2012: LLE and left facial numbness lasting 1 hour     Hyperlipidemia     Hypertension     Left shoulder tendonitis 8/8/2015    Primary open-angle glaucoma, mild stage - Both Eyes 6/3/2013       PAST SURGICAL HISTORY:  Past Surgical History:   Procedure Laterality Date    BREAST BIOPSY Left     core    BREAST SURGERY Left     lumpectomy    CATARACT EXTRACTION W/  INTRAOCULAR LENS IMPLANT Left 10/05/2016    Dr. Mike    CATARACT EXTRACTION W/  INTRAOCULAR LENS IMPLANT Right 11/30/2016    With [place] (Dr. Mike)    COLONOSCOPY N/A 1/23/2017    [facility] (4TH FLR)    HAND SURGERY Left 2011    thumb    HYSTERECTOMY  1980's    Total;    INSERTION-INTRAOCULAR LENS (IOL) Right 11/30/2016    Performed by Yesica [facility] at [facility] OR 1ST FLR    INSERTION-INTRAOCULAR LENS (IOL) Left 10/5/2016    [facility] at [facility]ST FLR    KAHOOK GONIOTOMY Right 11/30/2016    WITH CE ()    Left Breast Lumpectomy Left     performed in 1960s    OOPHORECTOMY      PHACOEMULSIFICATION-ASPIRATION-CATARACT Left 10/5/2016    [facility] at [facility] FLR    PHACOEMULSIFICATION-ASPIRATION-CATARACT WITH TRABEULOTOMY Right 11/30/2016    [facility]formed [facility] at [facility]ST FLR    SELECTIVE LASER TRABECUPLASTY  05/2014    OU w/ Dr. Mike    TUBAL LIGATION  1970's    VAGINAL DELIVERY      x3    YAG CAPSULOTOMY Left 11/29/2018           SOCIAL HISTORY:  Social History     Socioeconomic History    Marital status:      Spouse name: Not on file    Number of children: 3    Years of education: Not on file    Highest education level: Not on file   Social Needs    Financial resource strain: Not on file    Food insecurity - worry: Not on file    Food insecurity - inability: Not on file    Transportation needs -  medical: Not on file    Transportation needs - non-medical: Not on file   Occupational History    Occupation: retired - formerly pastoral care with [employer]   Tobacco Use    Smoking status: Never Smoker    Smokeless tobacco: Never Used   Substance and Sexual Activity    Alcohol use: Yes     Alcohol/week: 0.0 oz     Comment: Rarely; 1 glass of wine    Drug use: No    Sexual activity: Yes     Birth control/protection: Post-menopausal, See Surgical Hx   Other Topics Concern    Not on file   Social History Narrative    Not on file       ALLERGIES AND MEDICATIONS: updated and reviewed.  Review of patient's allergies indicates:   Allergen Reactions    Lisinopril Swelling    Amlodipine Edema    Combigan [brimonidine-timolol]      Causes itchy eyelids and contact dermatitis    Cosopt [dorzolamide-timolol]      Causes angular blepharitis of eyelids    Pravastatin      Myalgia and elevated CPK     Current Outpatient Medications   Medication Sig Dispense Refill    aspirin (ECOTRIN) 81 MG EC tablet Take 81 mg by mouth once daily.        calcium 500 mg Tab Take 500 mg by mouth once daily.       hydroCHLOROthiazide (MICROZIDE) 12.5 mg capsule Take 1 capsule (12.5 mg total) by mouth once daily. 90 capsule 3    irbesartan (AVAPRO) 150 MG tablet Take 1 tablet (150 mg total) by mouth every evening. 90 tablet 3    LUMIGAN 0.01 % Drop Place 1 drop into both eyes every evening. 2.5 mL 12    timolol maleate 0.5% (TIMOPTIC-XE) 0.5 % SolG Place 1 drop into both eyes every morning. Timoptic XE or timolol GFS (gel forming solution) 5 mL 12     No current facility-administered medications for this visit.        Review of Systems   Constitutional: Positive for chills. Negative for fever and weight loss.   HENT: Positive for sore throat. Negative for ear pain.    Respiratory: Positive for wheezing. Negative for hemoptysis and shortness of breath.    Cardiovascular: Negative for chest pain.   Gastrointestinal: Negative  "for heartburn.   Musculoskeletal: Positive for myalgias.   Skin: Negative for rash.   Neurological: Negative for headaches.   Endo/Heme/Allergies: Positive for environmental allergies.       Objective:   Physical Exam   Vitals:    02/11/19 1104 02/11/19 1117 02/11/19 1127   BP: (!) 130/90 130/80    BP Location:  Left arm    Patient Position:  Sitting    BP Method:  Large (Manual)    Pulse: 88     Temp: (!) 100.4 °F (38 °C)     SpO2: (!) 93%  96%   Weight: 86.1 kg (189 lb 14.8 oz)     Height: 5' 5" (1.651 m)      Body mass index is 31.61 kg/m².  Weight: 86.1 kg (189 lb 14.8 oz)   Height: 5' 5" (165.1 cm)     Physical Exam   Constitutional: She is oriented to person, place, and time. She appears well-developed and well-nourished.   HENT:   TMs grey/clear bilaterally.  OP no erythema no exudates   Eyes: EOM are normal.   Neck: Neck supple.   Cardiovascular: Normal rate, regular rhythm and normal heart sounds.   Pulmonary/Chest: Effort normal and breath sounds normal. She has no wheezes.   Lymphadenopathy:     She has no cervical adenopathy.   Neurological: She is alert and oriented to person, place, and time.   Skin: Skin is warm and dry.   Psychiatric: She has a normal mood and affect. Her behavior is normal.     "

## 2019-03-07 ENCOUNTER — DOCUMENTATION ONLY (OUTPATIENT)
Dept: REHABILITATION | Facility: OTHER | Age: 74
End: 2019-03-07

## 2019-03-07 NOTE — PROGRESS NOTES
Outpatient Therapy Discharge Summary     Name: Rosmery Ramirez  Clinic Number: [id number]    Therapy Diagnosis:    Pubic bone pain     Chronic bilateral low back pain without sciatica      Physician: Ancelmo Sumner MD      Physician Orders: PT Eval and Treat  Medical Diagnosis: S39.012A (ICD-10-CM) - Strain of lumbar region, initial encounter    Evaluation Date: 11/02/18      Date of Last visit: 11/26/2018  Total Visits Received: 3  Cancelled Visits: 4  No Show Visits: 0    Assessment    Goals: not met    Discharge reason: Patient has not attended therapy since 11/26/2018    Plan   This patient is discharged from Physical Therapy    Yesica Leyva, PT

## 2019-03-19 RX ORDER — IRBESARTAN 150 MG/1
150 TABLET ORAL NIGHTLY
Qty: 90 TABLET | Refills: 2 | Status: SHIPPED | OUTPATIENT
Start: 2019-03-19 | End: 2019-12-06 | Stop reason: SDUPTHER

## 2019-04-02 ENCOUNTER — OFFICE VISIT (OUTPATIENT)
Dept: OBSTETRICS AND GYNECOLOGY | Facility: CLINIC | Age: 74
End: 2019-04-02
Attending: OBSTETRICS & GYNECOLOGY
Payer: MEDICARE

## 2019-04-02 VITALS
DIASTOLIC BLOOD PRESSURE: 70 MMHG | BODY MASS INDEX: 31.48 KG/M2 | SYSTOLIC BLOOD PRESSURE: 140 MMHG | HEIGHT: 65 IN | WEIGHT: 188.94 LBS

## 2019-04-02 DIAGNOSIS — N64.4 BREAST PAIN IN FEMALE: Primary | ICD-10-CM

## 2019-04-02 PROCEDURE — 3078F DIAST BP <80 MM HG: CPT | Mod: HCNC,CPTII,S$GLB, | Performed by: OBSTETRICS & GYNECOLOGY

## 2019-04-02 PROCEDURE — 3078F PR MOST RECENT DIASTOLIC BLOOD PRESSURE < 80 MM HG: ICD-10-PCS | Mod: HCNC,CPTII,S$GLB, | Performed by: OBSTETRICS & GYNECOLOGY

## 2019-04-02 PROCEDURE — 1101F PT FALLS ASSESS-DOCD LE1/YR: CPT | Mod: HCNC,CPTII,S$GLB, | Performed by: OBSTETRICS & GYNECOLOGY

## 2019-04-02 PROCEDURE — 99999 PR PBB SHADOW E&M-EST. PATIENT-LVL III: CPT | Mod: PBBFAC,HCNC,, | Performed by: OBSTETRICS & GYNECOLOGY

## 2019-04-02 PROCEDURE — 99213 OFFICE O/P EST LOW 20 MIN: CPT | Mod: HCNC,S$GLB,, | Performed by: OBSTETRICS & GYNECOLOGY

## 2019-04-02 PROCEDURE — 3077F PR MOST RECENT SYSTOLIC BLOOD PRESSURE >= 140 MM HG: ICD-10-PCS | Mod: HCNC,CPTII,S$GLB, | Performed by: OBSTETRICS & GYNECOLOGY

## 2019-04-02 PROCEDURE — 99213 PR OFFICE/OUTPT VISIT, EST, LEVL III, 20-29 MIN: ICD-10-PCS | Mod: HCNC,S$GLB,, | Performed by: OBSTETRICS & GYNECOLOGY

## 2019-04-02 PROCEDURE — 1101F PR PT FALLS ASSESS DOC 0-1 FALLS W/OUT INJ PAST YR: ICD-10-PCS | Mod: HCNC,CPTII,S$GLB, | Performed by: OBSTETRICS & GYNECOLOGY

## 2019-04-02 PROCEDURE — 99999 PR PBB SHADOW E&M-EST. PATIENT-LVL III: ICD-10-PCS | Mod: PBBFAC,HCNC,, | Performed by: OBSTETRICS & GYNECOLOGY

## 2019-04-02 PROCEDURE — 3077F SYST BP >= 140 MM HG: CPT | Mod: HCNC,CPTII,S$GLB, | Performed by: OBSTETRICS & GYNECOLOGY

## 2019-04-02 RX ORDER — MENTHOL 5.8 MG/1
LOZENGE ORAL
COMMUNITY
Start: 2019-03-01 | End: 2020-08-18 | Stop reason: ALTCHOICE

## 2019-04-02 RX ORDER — NEOMYCIN/POLYMYXIN B/PRAMOXINE 3.5-10K-1
CREAM (GRAM) TOPICAL
COMMUNITY
Start: 2019-03-01 | End: 2019-04-22

## 2019-04-02 NOTE — PROGRESS NOTES
Subjective:       Patient ID: Rosmery Ramirez is a 73 y.o. female.    Chief Complaint:  Breast Pain (left breast pain on and off, sharp)      History of Present Illness  HPI  The patient presents today with complaints of intermittent sharp Left breast pain which has occurred intermittently during the past year. She reports that it is not persistent and denies any palpable masses or breast discharge. She has had a normal mammogram in May 2018.    GYN & OB History  No LMP recorded. Patient has had a hysterectomy.   Date of Last Pap: No result found    OB History    Para Term  AB Living   8 7 4   1     SAB TAB Ectopic Multiple Live Births   1              # Outcome Date GA Lbr Jose/2nd Weight Sex Delivery Anes PTL Lv   8 Term            7 Term            6 Term            5 Term            4 Para      Vag-Spont      3 Para      Vag-Spont      2 Para      Vag-Spont      1 SAB                Past Medical History:   Diagnosis Date    Allergy     Anxiety     Breast cyst     Edema of leg 10/5/2012    bilateral     Fractures     thumb R    GERD (gastroesophageal reflux disease)     Glaucoma     History of colonic polyps     Hx-TIA (transient ischemic attack) 2012: LLE and left facial numbness lasting 1 hour     Hyperlipidemia     Hypertension     Left shoulder tendonitis 2015    Primary open-angle glaucoma, mild stage - Both Eyes 6/3/2013       Past Surgical History:   Procedure Laterality Date    BREAST BIOPSY Left     core    BREAST SURGERY Left     lumpectomy    CATARACT EXTRACTION W/  INTRAOCULAR LENS IMPLANT Left 10/05/2016    Dr. Mike    CATARACT EXTRACTION W/  INTRAOCULAR LENS IMPLANT Right 2016    With [place] (Dr. Mike)    COLONOSCOPY N/A 2017    [facility] (4[facility] F[facility])    HAND SURGERY Left     thumb    HYSTERECTOMY      Total;    INSERTION-INTRAOCULAR LENS (IOL) Right 2016    Performed  "by [facility] at [facility]ST FLR    INSERTION-INTRAOCULAR LENS (IOL) Left 10/5/2016    [facility]formed [facility] at [facility] 1ST FLR    KAHOOK GONIOTOMY Right 11/30/2016    WITH CE ()    Left Breast Lumpectomy Left     performed in 1960s    OOPHORECTOMY      PHACOEMULSIFICATION-ASPIRATION-CATARACT Left 10/5/2016    [facility] at [facility] 1ST FLR    PHACOEMULSIFICATION-ASPIRATION-CATARACT WITH TRABEULOTOMY Right 11/30/2016    [facility]formed [facility] at [facility] 1ST FLR    SELECTIVE LASER TRABECUPLASTY  05/2014    OU w/ Dr. Mike    TUBAL LIGATION  1970's    VAGINAL DELIVERY      x3    YAG CAPSULOTOMY Left 11/29/2018           Review of Systems  Review of Systems   Integumentary:  Negative for breast mass and nipple discharge.   Breast: Positive for breast self exam and mastodynia.Negative for lump, mass and nipple discharge          Objective:      BP (!) 140/70   Ht 5' 5" (1.651 m)   Wt 85.7 kg (188 lb 15 oz)   BMI 31.44 kg/m²   Physical Exam:        Pulmonary/Chest:   BREASTS:  no mass, no tenderness, no deformity and no retraction. Right breast exhibits no inverted nipple, no mass, no nipple discharge, no skin change, no tenderness, no bleeding and no swelling. Left breast exhibits no inverted nipple, no mass, no nipple discharge, no skin change, no tenderness, no bleeding and no swelling. Breasts are symmetrical.      Large and pendulous bilaterally.                                  Assessment:        1. Breast pain in female               Plan:      1. Breast pain in female    - US Breast Left Limited; Future  - Mammo Digital Diagnostic Left w/ [place]; Future       Follow up if symptoms worsen or fail to improve.       "

## 2019-04-04 ENCOUNTER — PATIENT MESSAGE (OUTPATIENT)
Dept: OPHTHALMOLOGY | Facility: CLINIC | Age: 74
End: 2019-04-04

## 2019-04-04 ENCOUNTER — HOSPITAL ENCOUNTER (OUTPATIENT)
Dept: RADIOLOGY | Facility: HOSPITAL | Age: 74
Discharge: HOME | End: 2019-04-04
Attending: OBSTETRICS & GYNECOLOGY
Payer: MEDICARE

## 2019-04-04 DIAGNOSIS — N64.4 BREAST PAIN IN FEMALE: ICD-10-CM

## 2019-04-04 PROBLEM — E55.9 VITAMIN D DEFICIENCY: Status: ACTIVE | Noted: 2019-04-04

## 2019-04-04 PROCEDURE — 77061 BREAST TOMOSYNTHESIS UNI: CPT | Mod: 26,HCNC,LT, | Performed by: RADIOLOGY

## 2019-04-04 PROCEDURE — 76642 ULTRASOUND BREAST LIMITED: CPT | Mod: 26,HCNC,LT, | Performed by: RADIOLOGY

## 2019-04-04 PROCEDURE — 77061 MAMMO DIGITAL DIAGNOSTIC LEFT WITH TOMOSYNTHESIS_CAD: ICD-10-PCS | Mod: 26,HCNC,LT, | Performed by: RADIOLOGY

## 2019-04-04 PROCEDURE — 77065 DX MAMMO INCL CAD UNI: CPT | Mod: TC,HCNC,PO,LT

## 2019-04-04 PROCEDURE — 77065 MAMMO DIGITAL DIAGNOSTIC LEFT WITH TOMOSYNTHESIS_CAD: ICD-10-PCS | Mod: 26,HCNC,LT, | Performed by: RADIOLOGY

## 2019-04-04 PROCEDURE — 76642 US BREAST LEFT LIMITED: ICD-10-PCS | Mod: 26,HCNC,LT, | Performed by: RADIOLOGY

## 2019-04-04 PROCEDURE — 77065 DX MAMMO INCL CAD UNI: CPT | Mod: 26,HCNC,LT, | Performed by: RADIOLOGY

## 2019-04-04 PROCEDURE — 76642 ULTRASOUND BREAST LIMITED: CPT | Mod: TC,HCNC,PO,LT

## 2019-04-04 PROCEDURE — 77061 BREAST TOMOSYNTHESIS UNI: CPT | Mod: TC,HCNC,PO,LT

## 2019-04-18 PROBLEM — R73.09 ABNORMAL GLUCOSE: Status: ACTIVE | Noted: 2019-04-18

## 2019-04-18 NOTE — PROGRESS NOTES
This note was created by combination of typed  and Dragon dictation.  Transcription errors may be present.  If there are any questions, please contact me.    Assessment / Plan:   Normal physical exam    Abnormal glucose  -she has made improvements in overall diet.  Check A1c  -     Hemoglobin A1c; Future; Expected date: 04/22/2019    Other hyperlipidemia  -history of myalgias.  Check vitamin-D.  If low, increase vitamin-D but I would also challenge her on low-dose Lipitor 20.  If she develops myalgias despite adequate levels of vitamin-D, next step would be coenzyme Q10.  -     Lipid panel; Future; Expected date: 04/22/2019  -     Basic metabolic panel; Future; Expected date: 04/22/2019    Essential hypertension  -component of white coat syndrome?  Not quite to goal today.  Has a home brachial cuff.  Nurse visit in 2 weeks to verify BP cuff.  If accurate, white coat syndrome.  If inaccurate increase the irbesartan.  -     Basic metabolic panel; Future; Expected date: 04/22/2019    Vitamin D deficiency  -check levels on 1000 daily.  Advised to take a calcium vitamin-D combination supplement as she does not sound like she is getting enough calcium in her daily diet.  -     Vitamin D; Future; Expected date: 04/22/2019    Thyroid nodule  -incidental as part of workup for sensation dysphagia with ENT.  Maximum size nodule 1.2, recommendations on the reports were to monitor, FNA only if greater than 1.5 cm.  Plan to repeat thyroid ultrasound next year    Breast cyst, left  -stable since 2014 but she has a family history of breast cancer and she is continued concerned about this.  Referral to breast specialist for evaluation  -     Ambulatory Referral to Breast Surgery      Medications Discontinued During This Encounter   Medication Reason    melatonin 5 mg/15 mL Liqd        meds sent this encounter:       Follow Up: No follow-ups on file. plan for repeat labs 6 months.      Subjective:     Chief Complaint  "  Patient presents with    Annual Exam       HPI  Rosmery is a 73 y.o. female, last appointment with this clinic was 2/11/2019.    No LMP recorded. Patient has had a hysterectomy.    Statin myalgias, plan is replete vitamin-D and then rechallenge on statin.  Abnormal glucose last A1c in June 5.9    Allergies acting up - she saw ENT - did imaging of the sinuses - normal - thinks it's allergies.  Dr. Valentin.    Ordered thyroid US - 4/12/19 showed 2 nodules max is 1.2 cm TR4 which means rec's are FNA only for 1.5 cm or higher.    Left breast pain - saw [facility] - went to get mammo and US. Reportedly had a "lump"/abn finding and rec's were repeat 1 year.  But she had hx of lumpectomy on the same breast.  Sister and mother s/p mastectomy.  The US report: There is a 3 mm complicated cyst in the left breast retroareolar region that has been stable mammographically since at least 2014 exam.    flonase and zyrtec.    BP high on intake. Home readings systolic 130s. Has not verified cuff accuracy. Home brachial cuff.    Never tried CoQ10.    Diet - improved - less sugar/no soda.  No salt added/sean Chachere salt free.  Physical activity - walking TIW    She is taking vit D3 only.  Rare calcium.   Silk milk - 1 glass daily.  So sounds like inadequate calcium dietaryintake.    BP high on intake high on repeat.  Need to verify cuff.     Patient Care Team:  Ancelmo Sumner MD as PCP - General (Internal Medicine)    Patient Active Problem List    Diagnosis Date Noted    Abnormal glucose 04/18/2019    Vitamin D deficiency 04/04/2019    Chronic bilateral low back pain without sciatica 11/02/2018    Bradycardia 6/2018 holter negative 07/20/2018    Status post hysterectomy 07/20/2018    Statin myopathy 07/20/2018    History of colon polyps; 1/23/2017 colonoscopy normal repeat 5 years 01/23/2017    Chronic pain of left knee 01/05/2017    Nuclear sclerosis 11/30/2016    Cataract 09/07/2016    Obesity, Class II, BMI 35-39.9, " with comorbidity 07/20/2016    POAG (primary open-angle glaucoma) 01/05/2016    Essential hypertension 08/13/2012    Hyperlipidemia; statin myalgias; 6/28/2018 exercise stress echo neg for ischemia 08/13/2012 6/28/2018 exercise stress echo CONCLUSIONS   1 - Normal left ventricular systolic function (EF 60-65%).   2 - No wall motion abnormalities.   3 - Normal left ventricular diastolic function.   4 - Normal right ventricular systolic function .          PAST MEDICAL HISTORY:  Past Medical History:   Diagnosis Date    Allergy     Anxiety     Breast cyst     Edema of leg 10/5/2012    bilateral     Fractures 2011    thumb R    GERD (gastroesophageal reflux disease)     Glaucoma     History of colonic polyps     Hx-TIA (transient ischemic attack) 8/13/2012 Jan 2012: LLE and left facial numbness lasting 1 hour     Hyperlipidemia     Hypertension     Left shoulder tendonitis 8/8/2015    Primary open-angle glaucoma, mild stage - Both Eyes 6/3/2013       PAST SURGICAL HISTORY:  Past Surgical History:   Procedure Laterality Date    BREAST BIOPSY Left     core    BREAST SURGERY Left     lumpectomy    CATARACT EXTRACTION W/  INTRAOCULAR LENS IMPLANT Left 10/05/2016    Dr. Mike    CATARACT EXTRACTION W/  INTRAOCULAR LENS IMPLANT Right 11/30/2016    With [facility] (Dr. Mike)    COLONOSCOPY N/A 1/23/2017    [facility] (4TH FLR)    HAND SURGERY Left 2011    thumb    HYSTERECTOMY  1980's    Total;    INSERTION-INTRAOCULAR LENS (IOL) Right 11/30/2016    [facility] at [facility] OR 1ST FLR    INSERTION-INTRAOCULAR LENS (IOL) Left 10/5/2016    [facility] at [facility] 1ST FLR    [facility] GONIOTOMY Right 11/30/2016    WITH CE ()    Left Breast Lumpectomy Left     performed in 1960s    OOPHORECTOMY      PHACOEMULSIFICATION-ASPIRATION-CATARACT Left 10/5/2016    [facility] at [facility] OR 1ST  FLR    PHACOEMULSIFICATION-ASPIRATION-CATARACT WITH TRABEULOTOMY Right 11/30/2016    Performed [facility] 1ST FLR    SELECTIVE LASER TRABECUPLASTY  05/2014    [facility] w/ Dr. Mike    TUBAL LIGATION  1970's    VAGINAL DELIVERY      x3    YAG CAPSULOTOMY Left 11/29/2018           SOCIAL HISTORY:  Social History     Socioeconomic History    Marital status:      Spouse name: Not on file    Number of children: 3    Years of education: Not on file    Highest education level: Not on file   Occupational History    Occupation: retired - formerly pastoral care with [employer]    Financial resource strain: Not on file    Food insecurity:     Worry: Not on file     Inability: Not on file    Transportation needs:     Medical: Not on file     Non-medical: Not on file   Tobacco Use    Smoking status: Never Smoker    Smokeless tobacco: Never Used   Substance and Sexual Activity    Alcohol use: Yes     Alcohol/week: 0.0 oz     Comment: Rarely; 1 glass of wine    Drug use: No    Sexual activity: Yes     Partners: Female, Male     Birth control/protection: Post-menopausal, See Surgical Hx   Lifestyle    Physical activity:     Days per week: Not on file     Minutes per session: Not on file    Stress: Not on file   Relationships    Social connections:     Talks on phone: Not on file     Gets together: Not on file     Attends Yazdanism service: Not on file     Active member of club or organization: Not on file     Attends meetings of clubs or organizations: Not on file     Relationship status: Not on file   Other Topics Concern    Not on file   Social History Narrative    Not on file        ALLERGIES AND MEDICATIONS: updated and reviewed.  Review of patient's allergies indicates:   Allergen Reactions    Lisinopril Swelling    Amlodipine Edema    Combigan [brimonidine-timolol]      Causes itchy eyelids and contact dermatitis    Cosopt  "[dorzolamide-timolol]      Causes angular blepharitis of eyelids    Pravastatin      Myalgia and elevated CPK     Current Outpatient Medications   Medication Sig Dispense Refill    aspirin (ECOTRIN) 81 MG EC tablet Take 81 mg by mouth once daily.        calcium 500 mg Tab Take 500 mg by mouth once daily.       hydroCHLOROthiazide (MICROZIDE) 12.5 mg capsule Take 1 capsule (12.5 mg total) by mouth once daily. 90 capsule 3    irbesartan (AVAPRO) 150 MG tablet Take 1 tablet (150 mg total) by mouth every evening. 90 tablet 3    irbesartan (AVAPRO) 150 MG tablet TAKE 1 TABLET (150 MG TOTAL) BY MOUTH EVERY EVENING. 90 tablet 2    LUMIGAN 0.01 % Drop Place 1 drop into both eyes every evening. 2.5 mL 12    timolol maleate 0.5% (TIMOPTIC-XE) 0.5 % SolG Place 1 drop into both eyes every morning. Timoptic XE or timolol GFS (gel forming solution) 5 mL 12    VITAMIN B-12 100 MCG tablet       vitamin D (VITAMIN D3) 1000 units Tab Take 1,000 Units by mouth once daily.      fluticasone (FLONASE) 50 mcg/actuation nasal spray        No current facility-administered medications for this visit.        Review of Systems   Constitutional: Negative for fever, malaise/fatigue and weight loss.   HENT: Negative for congestion.    Eyes: Negative for blurred vision and pain.   Respiratory: Negative for shortness of breath and wheezing.    Cardiovascular: Negative for chest pain, palpitations and leg swelling.   Gastrointestinal: Negative for abdominal pain, blood in stool, constipation, diarrhea and heartburn.   Genitourinary: Negative for dysuria.   Neurological: Negative for headaches.       Objective:   Physical Exam   Vitals:    04/22/19 0851   BP: (!) 142/86   Temp: 98.2 °F (36.8 °C)   TempSrc: Oral   Weight: 86.7 kg (191 lb 4 oz)   Height: 5' 5" (1.651 m)    Body mass index is 31.83 kg/m².  Weight: 86.7 kg (191 lb 4 oz)   Height: 5' 5" (165.1 cm)     Physical Exam   Constitutional: She is oriented to person, place, and time. " She appears well-developed and well-nourished. No distress.   Eyes: EOM are normal.   Neck: No thyromegaly present.   Cardiovascular: Normal rate, regular rhythm and normal heart sounds.   No murmur heard.  Pulmonary/Chest: Effort normal and breath sounds normal.   Abdominal: She exhibits no distension and no mass. There is no tenderness. There is no guarding.   Musculoskeletal: Normal range of motion. She exhibits no edema.   Lymphadenopathy:     She has no cervical adenopathy.   Neurological: She is alert and oriented to person, place, and time. Coordination normal.   Skin: Skin is warm and dry.   Psychiatric: She has a normal mood and affect. Her behavior is normal. Thought content normal.

## 2019-04-22 ENCOUNTER — LAB VISIT (OUTPATIENT)
Dept: LAB | Facility: HOSPITAL | Age: 74
End: 2019-04-22
Attending: INTERNAL MEDICINE
Payer: MEDICARE

## 2019-04-22 ENCOUNTER — OFFICE VISIT (OUTPATIENT)
Dept: FAMILY MEDICINE | Facility: CLINIC | Age: 74
End: 2019-04-22
Payer: MEDICARE

## 2019-04-22 VITALS
DIASTOLIC BLOOD PRESSURE: 86 MMHG | TEMPERATURE: 98 F | BODY MASS INDEX: 31.86 KG/M2 | WEIGHT: 191.25 LBS | HEIGHT: 65 IN | SYSTOLIC BLOOD PRESSURE: 142 MMHG

## 2019-04-22 DIAGNOSIS — E55.9 VITAMIN D DEFICIENCY: ICD-10-CM

## 2019-04-22 DIAGNOSIS — E78.49 OTHER HYPERLIPIDEMIA: ICD-10-CM

## 2019-04-22 DIAGNOSIS — I10 ESSENTIAL HYPERTENSION: ICD-10-CM

## 2019-04-22 DIAGNOSIS — N60.02 BREAST CYST, LEFT: ICD-10-CM

## 2019-04-22 DIAGNOSIS — Z00.00 NORMAL PHYSICAL EXAM: Primary | ICD-10-CM

## 2019-04-22 DIAGNOSIS — E04.1 THYROID NODULE: ICD-10-CM

## 2019-04-22 DIAGNOSIS — R73.09 ABNORMAL GLUCOSE: ICD-10-CM

## 2019-04-22 LAB
25(OH)D3+25(OH)D2 SERPL-MCNC: 36 NG/ML (ref 30–96)
ANION GAP SERPL CALC-SCNC: 12 MMOL/L (ref 8–16)
BUN SERPL-MCNC: 14 MG/DL (ref 8–23)
CALCIUM SERPL-MCNC: 10.1 MG/DL (ref 8.7–10.5)
CHLORIDE SERPL-SCNC: 104 MMOL/L (ref 95–110)
CHOLEST SERPL-MCNC: 165 MG/DL (ref 120–199)
CHOLEST/HDLC SERPL: 3.4 {RATIO} (ref 2–5)
CO2 SERPL-SCNC: 23 MMOL/L (ref 23–29)
CREAT SERPL-MCNC: 0.8 MG/DL (ref 0.5–1.4)
EST. GFR  (AFRICAN AMERICAN): >60 ML/MIN/1.73 M^2
EST. GFR  (NON AFRICAN AMERICAN): >60 ML/MIN/1.73 M^2
ESTIMATED AVG GLUCOSE: 120 MG/DL (ref 68–131)
GLUCOSE SERPL-MCNC: 94 MG/DL (ref 70–110)
HBA1C MFR BLD HPLC: 5.8 % (ref 4–5.6)
HDLC SERPL-MCNC: 49 MG/DL (ref 40–75)
HDLC SERPL: 29.7 % (ref 20–50)
LDLC SERPL CALC-MCNC: 99.8 MG/DL (ref 63–159)
NONHDLC SERPL-MCNC: 116 MG/DL
POTASSIUM SERPL-SCNC: 4 MMOL/L (ref 3.5–5.1)
SODIUM SERPL-SCNC: 139 MMOL/L (ref 136–145)
TRIGL SERPL-MCNC: 81 MG/DL (ref 30–150)

## 2019-04-22 PROCEDURE — 80048 BASIC METABOLIC PNL TOTAL CA: CPT | Mod: HCNC

## 2019-04-22 PROCEDURE — 3077F SYST BP >= 140 MM HG: CPT | Mod: HCNC,CPTII,S$GLB, | Performed by: INTERNAL MEDICINE

## 2019-04-22 PROCEDURE — 80061 LIPID PANEL: CPT | Mod: HCNC

## 2019-04-22 PROCEDURE — 3079F DIAST BP 80-89 MM HG: CPT | Mod: HCNC,CPTII,S$GLB, | Performed by: INTERNAL MEDICINE

## 2019-04-22 PROCEDURE — 3079F PR MOST RECENT DIASTOLIC BLOOD PRESSURE 80-89 MM HG: ICD-10-PCS | Mod: HCNC,CPTII,S$GLB, | Performed by: INTERNAL MEDICINE

## 2019-04-22 PROCEDURE — 99999 PR PBB SHADOW E&M-EST. PATIENT-LVL IV: CPT | Mod: PBBFAC,HCNC,, | Performed by: INTERNAL MEDICINE

## 2019-04-22 PROCEDURE — 83036 HEMOGLOBIN GLYCOSYLATED A1C: CPT | Mod: HCNC

## 2019-04-22 PROCEDURE — 99397 PER PM REEVAL EST PAT 65+ YR: CPT | Mod: HCNC,S$GLB,, | Performed by: INTERNAL MEDICINE

## 2019-04-22 PROCEDURE — 1101F PR PT FALLS ASSESS DOC 0-1 FALLS W/OUT INJ PAST YR: ICD-10-PCS | Mod: HCNC,CPTII,S$GLB, | Performed by: INTERNAL MEDICINE

## 2019-04-22 PROCEDURE — 99397 PR PREVENTIVE VISIT,EST,65 & OVER: ICD-10-PCS | Mod: HCNC,S$GLB,, | Performed by: INTERNAL MEDICINE

## 2019-04-22 PROCEDURE — 99999 PR PBB SHADOW E&M-EST. PATIENT-LVL IV: ICD-10-PCS | Mod: PBBFAC,HCNC,, | Performed by: INTERNAL MEDICINE

## 2019-04-22 PROCEDURE — 36415 COLL VENOUS BLD VENIPUNCTURE: CPT | Mod: HCNC,PO

## 2019-04-22 PROCEDURE — 3077F PR MOST RECENT SYSTOLIC BLOOD PRESSURE >= 140 MM HG: ICD-10-PCS | Mod: HCNC,CPTII,S$GLB, | Performed by: INTERNAL MEDICINE

## 2019-04-22 PROCEDURE — 82306 VITAMIN D 25 HYDROXY: CPT | Mod: HCNC

## 2019-04-22 PROCEDURE — 1101F PT FALLS ASSESS-DOCD LE1/YR: CPT | Mod: HCNC,CPTII,S$GLB, | Performed by: INTERNAL MEDICINE

## 2019-04-22 RX ORDER — FLUTICASONE PROPIONATE 50 MCG
1 SPRAY, SUSPENSION (ML) NASAL DAILY PRN
COMMUNITY
Start: 2019-04-03 | End: 2019-06-03

## 2019-04-22 NOTE — PATIENT INSTRUCTIONS
You should be getting 1200 mg of calcium every day, either in your food or with a supplement.  Dairy (milk, cheese, yogurt, etc.) is the main source of calcium. A serving of dairy has about 300-400 mg of calcium.  So you should be getting 3-4 servings of dairy every day.  If you are not able to, then you should take a calcium supplement to get to 1200 mg total.  For example, if you drink 2 cups of milk in a day (600 mg of calcium), you should take a calcium pill 600 mg a day.  If you do not eat or drink any dairy, you should take 2 calcium pills to make 1200 mg a day.  \

## 2019-04-23 DIAGNOSIS — E78.00 PURE HYPERCHOLESTEROLEMIA: Primary | ICD-10-CM

## 2019-04-23 RX ORDER — ATORVASTATIN CALCIUM 10 MG/1
10 TABLET, FILM COATED ORAL DAILY
Qty: 90 TABLET | Refills: 3 | Status: SHIPPED | OUTPATIENT
Start: 2019-04-23 | End: 2019-04-24 | Stop reason: SDUPTHER

## 2019-04-23 NOTE — PROGRESS NOTES
Lipid profile ok  BMP normal  Vitamin-D good  A1c pre diabetes 5.8 from 5.9    Plan at ov was rechallenge on statin if vit d good.  Will start low dose lipitor 10.  If myalgias - try otc coQ10. OV 6 months fasting.

## 2019-04-24 ENCOUNTER — PATIENT MESSAGE (OUTPATIENT)
Dept: FAMILY MEDICINE | Facility: CLINIC | Age: 74
End: 2019-04-24

## 2019-04-24 ENCOUNTER — TELEPHONE (OUTPATIENT)
Dept: FAMILY MEDICINE | Facility: CLINIC | Age: 74
End: 2019-04-24

## 2019-04-24 DIAGNOSIS — E78.00 PURE HYPERCHOLESTEROLEMIA: Primary | ICD-10-CM

## 2019-04-24 DIAGNOSIS — E78.00 PURE HYPERCHOLESTEROLEMIA: ICD-10-CM

## 2019-04-24 RX ORDER — ATORVASTATIN CALCIUM 10 MG/1
10 TABLET, FILM COATED ORAL DAILY
Qty: 90 TABLET | Refills: 3 | Status: SHIPPED | OUTPATIENT
Start: 2019-04-24 | End: 2019-05-24 | Stop reason: SDUPTHER

## 2019-04-24 RX ORDER — ATORVASTATIN CALCIUM 10 MG/1
10 TABLET, FILM COATED ORAL DAILY
Qty: 90 TABLET | Refills: 3 | Status: SHIPPED | OUTPATIENT
Start: 2019-04-24 | End: 2019-04-24

## 2019-04-24 NOTE — TELEPHONE ENCOUNTER
----- Message from Layne Fonseca sent at 4/24/2019  2:09 PM CDT -----  Contact: self  Type:  New Prescription for Lipitor     Patient need to speak with nurse regarding new prescription      Patient states received message from [facility] that doctor is starting her on Lipitor   Patient states need doctor to call prescription  for Lipitor to [facility] in [place].     Patient states need brand name    Refill or New Rx:new   RX Name and Strength:Lipitor  How is the patient currently taking it? (ex. 1XDay)  Is this a 30 day or 90 day RX    Preferred Pharmacy with phone number:     [facility] [address] [address] phone number [phone]    Patient states need brand name medication      Ordering Provider: Dr Sumner  Would the patient rather a call back or a response via [facility]?call  Best Call Back Number: [phone]  Additional Information:

## 2019-04-25 ENCOUNTER — TELEPHONE (OUTPATIENT)
Dept: SURGERY | Facility: CLINIC | Age: 74
End: 2019-04-25

## 2019-04-25 NOTE — TELEPHONE ENCOUNTER
----- Message from Cherise Burch sent at 4/25/2019  1:18 PM CDT -----  Patient Requesting Sooner Appointment.     Reason for sooner appt.:Natividad fernando  When is the first available appointment?8/22/19  Communication Preference:[phone]  Additional Information:Pt prefer [facility] location.

## 2019-04-26 NOTE — TELEPHONE ENCOUNTER
Returned call to patient, pt c/o left breast pain near nipple, pt denies redness/warmth/swelling or nipple discharge, pt had negative imaging 4-4-19 showing simple cyst, Dr. Delong referring patient to breast surgery, appt scheduled and confirmed

## 2019-04-29 ENCOUNTER — PATIENT MESSAGE (OUTPATIENT)
Dept: FAMILY MEDICINE | Facility: CLINIC | Age: 74
End: 2019-04-29

## 2019-05-03 ENCOUNTER — OFFICE VISIT (OUTPATIENT)
Dept: OPHTHALMOLOGY | Facility: CLINIC | Age: 74
End: 2019-05-03
Payer: MEDICARE

## 2019-05-03 DIAGNOSIS — H04.123 DRY EYE SYNDROME, BILATERAL: ICD-10-CM

## 2019-05-03 DIAGNOSIS — Z96.1 PSEUDOPHAKIA OF BOTH EYES: ICD-10-CM

## 2019-05-03 DIAGNOSIS — H01.009 ANGULAR BLEPHARITIS, UNSPECIFIED LATERALITY: ICD-10-CM

## 2019-05-03 DIAGNOSIS — H40.043 STEROID RESPONDER, BILATERAL: ICD-10-CM

## 2019-05-03 DIAGNOSIS — H40.1122 PRIMARY OPEN ANGLE GLAUCOMA OF LEFT EYE, MODERATE STAGE: ICD-10-CM

## 2019-05-03 DIAGNOSIS — H40.1111 PRIMARY OPEN ANGLE GLAUCOMA OF RIGHT EYE, MILD STAGE: Primary | ICD-10-CM

## 2019-05-03 PROCEDURE — 92012 INTRM OPH EXAM EST PATIENT: CPT | Mod: HCNC,S$GLB,, | Performed by: OPHTHALMOLOGY

## 2019-05-03 PROCEDURE — 92020 PR SPECIAL EYE EVAL,GONIOSCOPY: ICD-10-PCS | Mod: HCNC,S$GLB,, | Performed by: OPHTHALMOLOGY

## 2019-05-03 PROCEDURE — 92020 GONIOSCOPY: CPT | Mod: HCNC,S$GLB,, | Performed by: OPHTHALMOLOGY

## 2019-05-03 PROCEDURE — 99999 PR PBB SHADOW E&M-EST. PATIENT-LVL II: ICD-10-PCS | Mod: PBBFAC,HCNC,, | Performed by: OPHTHALMOLOGY

## 2019-05-03 PROCEDURE — 99999 PR PBB SHADOW E&M-EST. PATIENT-LVL II: CPT | Mod: PBBFAC,HCNC,, | Performed by: OPHTHALMOLOGY

## 2019-05-03 PROCEDURE — 92012 PR EYE EXAM, EST PATIENT,INTERMED: ICD-10-PCS | Mod: HCNC,S$GLB,, | Performed by: OPHTHALMOLOGY

## 2019-05-03 RX ORDER — UBIDECARENONE 30 MG
100 CAPSULE ORAL DAILY
COMMUNITY
Start: 2019-04-24 | End: 2022-03-14

## 2019-05-03 NOTE — PROGRESS NOTES
HPI     Glaucoma      Additional comments: 4 month ck today              Comments     DLS: 1/7/19    1) POAG OU  2) PCIOL OU  3) Hx Hyperopia  4) Epiphora    MEDS:  T1/2 GFS QAM OU  Lumigan QHS OU  Systane PRN OU          Last edited by Alicia Felix MA on 5/3/2019  2:34 PM. (History)            Assessment /Plan     For exam results, see Encounter Report.    Primary open angle glaucoma of right eye, mild stage    Primary open angle glaucoma of left eye, moderate stage    Dry eye syndrome, bilateral    Angular blepharitis, unspecified laterality    Pseudophakia of both eyes    Steroid responder, bilateral          Old pt of Dr. Eber Crouch  Sister is Marina Cortez (my pt.) she sent her to me      1. Early POAG-  Both Eyes   Dx. Years ago with Dr. Eber Crouch  -Strong family hx   -IOP previously well controlled (mid teens) on Xalatan but elevated on generic and switched to lumigan    Family history   STRONG (Mom,Dad, and 5 sisters)  Glaucoma meds   lumigan qhs OU, timolol gfs QAM OU  H/O adverse rxn to glaucoma drops - combigan - itchy lids  // cosopt - angular blepharitis   LASERS   S/p SLT 4/24/14 OD //  5/8/14 OS (good response 21/22 --> 12/15)// yag cap OS 11/29/2018   GLAUCOMA SURGERIES   [facility] od 11/30/2016   OTHER EYE SURGERIES  Phaco /IOL os 10/6/2016 // phaco/IOL / [facility] od 11/30/2016   CDR  0.75 / 0.7  Tbase  ??  Tmax  26 OU off all gtts (5/24/10)    Ttarget   16 OU  HVF - 7 VF '12 -18 -  OD SNS/IAD ; OS: IAD   Gonio +3 OU  /540  OCT 4  test 2011 to 2017  - RNFL - OD:dec. S/N/I  // OS dec SFranco JACOBS (+ prog ou)   HRT  6 test - 2012 to 2018 -   Disc photos  2012, 2015 - MR -  Brianna S/N/I od // dec. Sbrianna I/N/T os /// CDR 0.684 od // 0.748 osOIS     Ttoday 20-21 / 19-20 (using flonase prn)   Test done today - gonio      2. H/O - hyperopia - Both Eyes -mild - pre-cataract surgery    3. Epiphora  - C/O constant tearing of the right eye x 6 months (8/2015   4.  PC IOL   OD - phaco/IOL / [facility] 11/30 /2016  - PCB00 22.5  OS - Phaco/IOL - NO COLTON's - pt declined - 10/6/2016 - PCB00 24.0       Plan:  -IOP 20-21 / 19-20  ou (target is 16 ou)   ++ use of flonase - inhaled steroids - ?? Steroid response   Off combigan - intolerant - itchy lids / blepharitis   Intol to cosopt - angular blepharitis   -Strong family hx  -Goal IOP 16 OU    CSM  Above target - using flonase - ? steroid response .   Target is 16 ou    Pt does not like generics - but has used  Latanoprost in past  without problems (actually prefers it to lumigan)   Intol to combigan -blepharitis / itch lids   Intol to cosopt - angular blepharitis     Tolerating  timolol gfs // timoptic XE ou q am - IOP higher than target today   Add back lumigan ou q hs - used pre - surgery withut difficulty     Rx glasses given 2/2017     F/U 4 months HRT - pt is going to try to stay away from flonase or other steroid inhalers

## 2019-05-06 ENCOUNTER — OFFICE VISIT (OUTPATIENT)
Dept: SURGERY | Facility: CLINIC | Age: 74
End: 2019-05-06
Payer: MEDICARE

## 2019-05-06 ENCOUNTER — TELEPHONE (OUTPATIENT)
Dept: OBSTETRICS AND GYNECOLOGY | Facility: CLINIC | Age: 74
End: 2019-05-06

## 2019-05-06 ENCOUNTER — CLINICAL SUPPORT (OUTPATIENT)
Dept: FAMILY MEDICINE | Facility: CLINIC | Age: 74
End: 2019-05-06
Payer: MEDICARE

## 2019-05-06 VITALS
TEMPERATURE: 98 F | SYSTOLIC BLOOD PRESSURE: 138 MMHG | DIASTOLIC BLOOD PRESSURE: 80 MMHG | HEIGHT: 65 IN | BODY MASS INDEX: 31.85 KG/M2 | WEIGHT: 191.13 LBS | HEART RATE: 78 BPM

## 2019-05-06 VITALS — HEART RATE: 57 BPM | SYSTOLIC BLOOD PRESSURE: 120 MMHG | OXYGEN SATURATION: 98 % | DIASTOLIC BLOOD PRESSURE: 74 MMHG

## 2019-05-06 DIAGNOSIS — I10 ESSENTIAL HYPERTENSION: Primary | ICD-10-CM

## 2019-05-06 DIAGNOSIS — Z12.31 SCREENING MAMMOGRAM, ENCOUNTER FOR: Primary | ICD-10-CM

## 2019-05-06 DIAGNOSIS — N64.4 BREAST PAIN: Primary | ICD-10-CM

## 2019-05-06 PROCEDURE — 99204 OFFICE O/P NEW MOD 45 MIN: CPT | Mod: HCNC,S$GLB,, | Performed by: SURGERY

## 2019-05-06 PROCEDURE — 99499 NO LOS: ICD-10-PCS | Mod: HCNC,S$GLB,, | Performed by: INTERNAL MEDICINE

## 2019-05-06 PROCEDURE — 99499 UNLISTED E&M SERVICE: CPT | Mod: HCNC,S$GLB,, | Performed by: INTERNAL MEDICINE

## 2019-05-06 PROCEDURE — 99999 PR PBB SHADOW E&M-EST. PATIENT-LVL III: ICD-10-PCS | Mod: PBBFAC,HCNC,,

## 2019-05-06 PROCEDURE — 99999 PR PBB SHADOW E&M-EST. PATIENT-LVL III: CPT | Mod: PBBFAC,HCNC,,

## 2019-05-06 PROCEDURE — 3079F DIAST BP 80-89 MM HG: CPT | Mod: HCNC,CPTII,S$GLB, | Performed by: SURGERY

## 2019-05-06 PROCEDURE — 99204 PR OFFICE/OUTPT VISIT, NEW, LEVL IV, 45-59 MIN: ICD-10-PCS | Mod: HCNC,S$GLB,, | Performed by: SURGERY

## 2019-05-06 PROCEDURE — 3075F PR MOST RECENT SYSTOLIC BLOOD PRESS GE 130-139MM HG: ICD-10-PCS | Mod: HCNC,CPTII,S$GLB, | Performed by: SURGERY

## 2019-05-06 PROCEDURE — 99999 PR PBB SHADOW E&M-EST. PATIENT-LVL III: ICD-10-PCS | Mod: PBBFAC,HCNC,, | Performed by: SURGERY

## 2019-05-06 PROCEDURE — 1101F PT FALLS ASSESS-DOCD LE1/YR: CPT | Mod: HCNC,CPTII,S$GLB, | Performed by: SURGERY

## 2019-05-06 PROCEDURE — 3079F PR MOST RECENT DIASTOLIC BLOOD PRESSURE 80-89 MM HG: ICD-10-PCS | Mod: HCNC,CPTII,S$GLB, | Performed by: SURGERY

## 2019-05-06 PROCEDURE — 3075F SYST BP GE 130 - 139MM HG: CPT | Mod: HCNC,CPTII,S$GLB, | Performed by: SURGERY

## 2019-05-06 PROCEDURE — 1101F PR PT FALLS ASSESS DOC 0-1 FALLS W/OUT INJ PAST YR: ICD-10-PCS | Mod: HCNC,CPTII,S$GLB, | Performed by: SURGERY

## 2019-05-06 PROCEDURE — 99999 PR PBB SHADOW E&M-EST. PATIENT-LVL III: CPT | Mod: PBBFAC,HCNC,, | Performed by: SURGERY

## 2019-05-06 NOTE — LETTER
May 6, 2019      Brooke Delong MD  [address]  [address]  [place] [address]           [facility]  [address] [facility] [address]  [facility] [facility] [address]  Phone: [phone]  Fax: [phone]          Patient: Rosmery Ramirez   MR Number: [id number]   YOB: 1945   Date of Visit: 5/6/2019       Dear Dr. Brooke Delong:    Thank you for referring Rosmery Ramirez to me for evaluation. Attached you will find relevant portions of my assessment and plan of care.    If you have questions, please do not hesitate to call me. I look forward to following Rosmery Ramirez along with you.    Sincerely,    Michelle Dalal MD    Enclosure  CC:  No Recipients    If you would like to receive this communication electronically, please contact [email or web address] or [phone] to request more information on [employer] access.    For providers and/or their staff who would like to refer a patient to [facility], please contact us through our one-stop-shop provider referral line, [facility], at [phone].    If you feel you have received this communication in error or would no longer like to receive these types of communications, please e-mail [email or web address]

## 2019-05-06 NOTE — PROGRESS NOTES
New Patient Evaluation  Date of Service: 2019    Chief complaint:  Left breast pain  Chief Complaint   Patient presents with    Breast Pain    Breast Mass       HISTORY OF PRESENT ILLNESS:   Rosmery Ramirez is a 73 y.o. female who presents for evaluation of left breast pain for about 1 year on and off.  Worse recently. Had MMG and u/s showed stable cysts since .      Patient has not noted palpable masses, nipple or skin changes, or nipple discharge. Patient denies previous breast biopsy. Patient denies a personal history of breast cancer.    Mother breast cancer 45,  80.  Sister breast cancer 76, genetics negative.  TC score 8.1% calculated by me.    GYNECOLOGIC HISTORY:   Patient underwent menarche at age 18.Age of menopause was 36. Patient reports hormonal therapy, stopped after about 5 years (around age 41).   She is  and first live birth was at age 21.      IMAGING:   reviewed     HISTORY:     Past Medical History:   Diagnosis Date    Allergy     Anxiety     Breast cyst     Edema of leg 10/5/2012    bilateral     Fractures     thumb R    GERD (gastroesophageal reflux disease)     Glaucoma     History of colonic polyps     Hx-TIA (transient ischemic attack) 2012: LLE and left facial numbness lasting 1 hour     Hyperlipidemia     Hypertension     Left shoulder tendonitis 2015    Primary open-angle glaucoma, mild stage - Both Eyes 6/3/2013     Past Surgical History:   Procedure Laterality Date    BREAST BIOPSY Left     core    BREAST SURGERY Left     lumpectomy    CATARACT EXTRACTION W/  INTRAOCULAR LENS IMPLANT Left 10/05/2016    Dr. Mike    CATARACT EXTRACTION W/  INTRAOCULAR LENS IMPLANT Right 2016    With [place] (Dr. Mike)    COLONOSCOPY N/A 2017    [facility] ([facility] F[facility])    HAND SURGERY Left     thumb    HYSTERECTOMY  's    Total;    INSERTION-INTRAOCULAR LENS (IOL) Right 2016     [facility] at [facility] FLR    INSERTION-INTRAOCULAR LENS (IOL) Left 10/5/2016    [facility] at [facility]ST FLR    KAHOOK GONIOTOMY Right 11/30/2016    WITH CE ()    Left Breast Lumpectomy Left     performed in 1960s    OOPHORECTOMY      PHACOEMULSIFICATION-ASPIRATION-CATARACT Left 10/5/2016    [facility] at [facility]ST [facility]LR    PHACOEMULSIFICATION-ASPIRATION-CATARACT WITH TRABEULOTOMY Right 11/30/2016    Performed [facility] at [facility] 1ST FLR    SELECTIVE LASER TRABECUPLASTY  05/2014    [facility] w/ Dr. Mike    TUBAL LIGATION  1970's    VAGINAL DELIVERY      x3    YAG CAPSULOTOMY Left 11/29/2018         Current Outpatient Medications on File Prior to Visit   Medication Sig Dispense Refill    aspirin (ECOTRIN) 81 MG EC tablet Take 81 mg by mouth once daily.        atorvastatin (LIPITOR) 10 MG tablet Take 1 tablet (10 mg total) by mouth once daily. Brand only 90 tablet 3    calcium 500 mg Tab Take 500 mg by mouth once daily.       co-enzyme Q-10 30 mg capsule Take 100 mg by mouth once daily.       fluticasone (FLONASE) 50 mcg/actuation nasal spray 1 spray by Each Nare route daily as needed.       hydroCHLOROthiazide (MICROZIDE) 12.5 mg capsule Take 1 capsule (12.5 mg total) by mouth once daily. 90 capsule 3    irbesartan (AVAPRO) 150 MG tablet TAKE 1 TABLET (150 MG TOTAL) BY MOUTH EVERY EVENING. 90 tablet 2    LUMIGAN 0.01 % Drop Place 1 drop into both eyes every evening. 2.5 mL 12    timolol maleate 0.5% (TIMOPTIC-XE) 0.5 % SolG Place 1 drop into both eyes every morning. Timoptic XE or timolol GFS (gel forming solution) 5 mL 12    VITAMIN B-12 100 MCG tablet       vitamin D (VITAMIN D3) 1000 units Tab Take 1,000 Units by mouth once daily.       No current facility-administered medications on file prior to visit.      Social History     Socioeconomic History    Marital status:       Spouse name: Not on file    Number of children: 3    Years of education: Not on file    Highest education level: Not on file   Occupational History    Occupation: retired - formerly pastoral care with [employer]   Social Needs    Financial resource strain: Not on file    Food insecurity:     Worry: Not on file     Inability: Not on file    Transportation needs:     Medical: Not on file     Non-medical: Not on file   Tobacco Use    Smoking status: Never Smoker    Smokeless tobacco: Never Used   Substance and Sexual Activity    Alcohol use: Yes     Alcohol/week: 0.0 oz     Comment: Rarely; 1 glass of wine    Drug use: No    Sexual activity: Yes     Partners: Female, Male     Birth control/protection: Post-menopausal, See Surgical Hx   Lifestyle    Physical activity:     Days per week: Not on file     Minutes per session: Not on file    Stress: Not on file   Relationships    Social connections:     Talks on phone: Not on file     Gets together: Not on file     Attends Jewish service: Not on file     Active member of club or organization: Not on file     Attends meetings of clubs or organizations: Not on file     Relationship status: Not on file   Other Topics Concern    Not on file   Social History Narrative    Not on file     Family History   Problem Relation Age of Onset    Breast cancer Mother 50    Glaucoma Mother     Alzheimer's disease Mother     Blindness Mother     Cataracts Mother     Glaucoma Father     Prostate cancer Father     Blindness Father     Cataracts Father     Cancer Father 62        prostate    Glaucoma Sister     No Known Problems Brother     No Known Problems Brother     Alzheimer's disease Maternal Grandfather     Macular degeneration Neg Hx     Retinal detachment Neg Hx     Strabismus Neg Hx     Amblyopia Neg Hx     Heart attack Neg Hx     Heart disease Neg Hx     Ovarian cancer Neg Hx     Colon cancer Neg Hx         REVIEW OF SYSTEMS:  "  Review of Systems    PHYSICAL EXAM:   /80 (BP Location: Left arm, Patient Position: Sitting, BP Method: Large (Manual))   Pulse 78   Temp 98.3 °F (36.8 °C) (Oral)   Ht 5' 5" (1.651 m)   Wt 86.7 kg (191 lb 2.2 oz)   BMI 31.81 kg/m²   General: The patient appears well and is in no acute distress.   Neuro: Alert & oriented x3.   HEENT: nontraumatic   Cardiovascular: Regular rate  Breasts: The exam was done with the patient seated and supine. No nipple retraction or dimpling, No nipple discharge or bleeding, No axillary or supraclavicular adenopathy, positive findings: nothing suspicious.  Pulmonary: normal effort, nonlabored breathing  Abdomen: soft, nontender  Extremities:  No pedal edema.    ASSESSMENT:     This is a 73 y.o. female with left breast pain.     PLAN:     We discussed the options for management of breast pain.    We discussed supportive measures with a good support bra, anti-inflammatories (ibuprofen) as needed, warm compresses.  Reassurance was given.    Also, discussed evening primrose oil supplement.  Explained that this will take abut 6-8 weeks to see effect.  If it works, it tends to work very well, however it does not work for everyone.  We will try evening primrose oil and I will see her back in 3 months to reassess.  "

## 2019-05-06 NOTE — PROGRESS NOTES
Rosmery Ramirez 73 y.o. female is here today for Blood Pressure check.   History of HTN yes.    Review of patient's allergies indicates:   Allergen Reactions    Lisinopril Swelling    Amlodipine Edema    Combigan [brimonidine-timolol]      Causes itchy eyelids and contact dermatitis    Cosopt [dorzolamide-timolol]      Causes angular blepharitis of eyelids    Pravastatin      Myalgia and elevated CPK     Creatinine   Date Value Ref Range Status   04/22/2019 0.8 0.5 - 1.4 mg/dL Final     Sodium   Date Value Ref Range Status   04/22/2019 139 136 - 145 mmol/L Final     Potassium   Date Value Ref Range Status   04/22/2019 4.0 3.5 - 5.1 mmol/L Final   ]  Patient verifies taking blood pressure medications on a regular basis at the same time of the day.     Current Outpatient Medications:     aspirin (ECOTRIN) 81 MG EC tablet, Take 81 mg by mouth once daily.  , Disp: , Rfl:     atorvastatin (LIPITOR) 10 MG tablet, Take 1 tablet (10 mg total) by mouth once daily. Brand only, Disp: 90 tablet, Rfl: 3    calcium 500 mg Tab, Take 500 mg by mouth once daily. , Disp: , Rfl:     co-enzyme Q-10 30 mg capsule, Take 1 tablet by mouth once daily., Disp: , Rfl:     hydroCHLOROthiazide (MICROZIDE) 12.5 mg capsule, Take 1 capsule (12.5 mg total) by mouth once daily., Disp: 90 capsule, Rfl: 3    irbesartan (AVAPRO) 150 MG tablet, TAKE 1 TABLET (150 MG TOTAL) BY MOUTH EVERY EVENING., Disp: 90 tablet, Rfl: 2    LUMIGAN 0.01 % Drop, Place 1 drop into both eyes every evening., Disp: 2.5 mL, Rfl: 12    timolol maleate 0.5% (TIMOPTIC-XE) 0.5 % SolG, Place 1 drop into both eyes every morning. Timoptic XE or timolol GFS (gel forming solution), Disp: 5 mL, Rfl: 12    VITAMIN B-12 100 MCG tablet, , Disp: , Rfl:     vitamin D (VITAMIN D3) 1000 units Tab, Take 1,000 Units by mouth once daily., Disp: , Rfl:     fluticasone (FLONASE) 50 mcg/actuation nasal spray, 1 spray by Each Nare route daily as needed. , Disp: , Rfl:   Does  patient have record of home blood pressure readings yes. Readings have been averaging 130's/70's.   Last dose of blood pressure medication was taken at approx 8 am.  Patient is asymptomatic.   Complains of none.    Vitals:    05/06/19 0923   BP: 120/74   BP Location: Left arm   Patient Position: Sitting   BP Method: Medium (Manual)   Pulse: (!) 57   SpO2: 98%     Patient's home monitor read pressure as 137/72 pulse 57. After letting patient sit a few minutes I retook her pressure manually.     Dr. Sumner notified.

## 2019-05-06 NOTE — TELEPHONE ENCOUNTER
----- Message from Latoya Kasper sent at 5/6/2019 12:11 PM CDT -----  Contact: Pt   Name of Who is Calling: LAUREN PHILLIPS [[id number]]    What is the request in detail: Pt is requesting an order for her mammo. Please advise.     Can the clinic reply by [facility]: No     What [facility] to [facility] Back [facility]: [phone]

## 2019-05-14 ENCOUNTER — TELEPHONE (OUTPATIENT)
Dept: FAMILY MEDICINE | Facility: CLINIC | Age: 74
End: 2019-05-14

## 2019-05-20 ENCOUNTER — HOSPITAL ENCOUNTER (OUTPATIENT)
Dept: RADIOLOGY | Facility: HOSPITAL | Age: 74
Discharge: HOME | End: 2019-05-20
Attending: OBSTETRICS & GYNECOLOGY
Payer: MEDICARE

## 2019-05-20 DIAGNOSIS — Z12.31 SCREENING MAMMOGRAM, ENCOUNTER FOR: ICD-10-CM

## 2019-05-20 PROCEDURE — 77067 MAMMO DIGITAL SCREENING BILAT WITH TOMOSYNTHESIS_CAD: ICD-10-PCS | Mod: 26,HCNC,, | Performed by: RADIOLOGY

## 2019-05-20 PROCEDURE — 77067 SCR MAMMO BI INCL CAD: CPT | Mod: TC,HCNC

## 2019-05-20 PROCEDURE — 77063 MAMMO DIGITAL SCREENING BILAT WITH TOMOSYNTHESIS_CAD: ICD-10-PCS | Mod: 26,HCNC,, | Performed by: RADIOLOGY

## 2019-05-20 PROCEDURE — 77063 BREAST TOMOSYNTHESIS BI: CPT | Mod: 26,HCNC,, | Performed by: RADIOLOGY

## 2019-05-20 PROCEDURE — 77067 SCR MAMMO BI INCL CAD: CPT | Mod: 26,HCNC,, | Performed by: RADIOLOGY

## 2019-05-22 ENCOUNTER — PATIENT MESSAGE (OUTPATIENT)
Dept: OPHTHALMOLOGY | Facility: CLINIC | Age: 74
End: 2019-05-22

## 2019-05-24 DIAGNOSIS — E78.00 PURE HYPERCHOLESTEROLEMIA: ICD-10-CM

## 2019-05-24 RX ORDER — ATORVASTATIN CALCIUM 10 MG/1
10 TABLET, FILM COATED ORAL DAILY
Qty: 90 TABLET | Refills: 3 | Status: SHIPPED | OUTPATIENT
Start: 2019-05-24 | End: 2019-10-07 | Stop reason: SDUPTHER

## 2019-06-03 ENCOUNTER — OFFICE VISIT (OUTPATIENT)
Dept: FAMILY MEDICINE | Facility: CLINIC | Age: 74
End: 2019-06-03
Payer: MEDICARE

## 2019-06-03 ENCOUNTER — LAB VISIT (OUTPATIENT)
Dept: LAB | Facility: HOSPITAL | Age: 74
End: 2019-06-03
Attending: INTERNAL MEDICINE
Payer: MEDICARE

## 2019-06-03 VITALS
WEIGHT: 190.94 LBS | SYSTOLIC BLOOD PRESSURE: 134 MMHG | BODY MASS INDEX: 30.69 KG/M2 | TEMPERATURE: 98 F | DIASTOLIC BLOOD PRESSURE: 62 MMHG | HEIGHT: 66 IN | HEART RATE: 62 BPM | OXYGEN SATURATION: 97 %

## 2019-06-03 DIAGNOSIS — R20.0 ANESTHESIA OF SKIN: ICD-10-CM

## 2019-06-03 DIAGNOSIS — G62.9 NEUROPATHY: Primary | ICD-10-CM

## 2019-06-03 DIAGNOSIS — Z23 NEED FOR SHINGLES VACCINE: ICD-10-CM

## 2019-06-03 DIAGNOSIS — G62.9 NEUROPATHY: ICD-10-CM

## 2019-06-03 DIAGNOSIS — G60.9 NEUROPATHY, PERIPHERAL, IDIOPATHIC: Primary | ICD-10-CM

## 2019-06-03 DIAGNOSIS — E78.00 PURE HYPERCHOLESTEROLEMIA: ICD-10-CM

## 2019-06-03 LAB
ALBUMIN SERPL BCP-MCNC: 3.9 G/DL (ref 3.5–5.2)
ALP SERPL-CCNC: 82 U/L (ref 55–135)
ALT SERPL W/O P-5'-P-CCNC: 21 U/L (ref 10–44)
ANION GAP SERPL CALC-SCNC: 8 MMOL/L (ref 8–16)
AST SERPL-CCNC: 21 U/L (ref 10–40)
BILIRUB SERPL-MCNC: 0.6 MG/DL (ref 0.1–1)
BUN SERPL-MCNC: 14 MG/DL (ref 8–23)
CALCIUM SERPL-MCNC: 10 MG/DL (ref 8.7–10.5)
CHLORIDE SERPL-SCNC: 104 MMOL/L (ref 95–110)
CHOLEST SERPL-MCNC: 117 MG/DL (ref 120–199)
CHOLEST/HDLC SERPL: 2.9 {RATIO} (ref 2–5)
CO2 SERPL-SCNC: 28 MMOL/L (ref 23–29)
CREAT SERPL-MCNC: 0.8 MG/DL (ref 0.5–1.4)
EST. GFR  (AFRICAN AMERICAN): >60 ML/MIN/1.73 M^2
EST. GFR  (NON AFRICAN AMERICAN): >60 ML/MIN/1.73 M^2
GLUCOSE SERPL-MCNC: 77 MG/DL (ref 70–110)
HDLC SERPL-MCNC: 41 MG/DL (ref 40–75)
HDLC SERPL: 35 % (ref 20–50)
LDLC SERPL CALC-MCNC: 56.8 MG/DL (ref 63–159)
NONHDLC SERPL-MCNC: 76 MG/DL
POTASSIUM SERPL-SCNC: 4.2 MMOL/L (ref 3.5–5.1)
PROT SERPL-MCNC: 7.3 G/DL (ref 6–8.4)
SODIUM SERPL-SCNC: 140 MMOL/L (ref 136–145)
TRIGL SERPL-MCNC: 96 MG/DL (ref 30–150)
TSH SERPL DL<=0.005 MIU/L-ACNC: 2.78 UIU/ML (ref 0.4–4)
VIT B12 SERPL-MCNC: 1376 PG/ML (ref 210–950)

## 2019-06-03 PROCEDURE — 99214 PR OFFICE/OUTPT VISIT, EST, LEVL IV, 30-39 MIN: ICD-10-PCS | Mod: HCNC,S$GLB,, | Performed by: INTERNAL MEDICINE

## 2019-06-03 PROCEDURE — 99214 OFFICE O/P EST MOD 30 MIN: CPT | Mod: HCNC,S$GLB,, | Performed by: INTERNAL MEDICINE

## 2019-06-03 PROCEDURE — 3078F DIAST BP <80 MM HG: CPT | Mod: HCNC,CPTII,S$GLB, | Performed by: INTERNAL MEDICINE

## 2019-06-03 PROCEDURE — 99999 PR PBB SHADOW E&M-EST. PATIENT-LVL III: ICD-10-PCS | Mod: PBBFAC,HCNC,, | Performed by: INTERNAL MEDICINE

## 2019-06-03 PROCEDURE — 1101F PT FALLS ASSESS-DOCD LE1/YR: CPT | Mod: HCNC,CPTII,S$GLB, | Performed by: INTERNAL MEDICINE

## 2019-06-03 PROCEDURE — 3078F PR MOST RECENT DIASTOLIC BLOOD PRESSURE < 80 MM HG: ICD-10-PCS | Mod: HCNC,CPTII,S$GLB, | Performed by: INTERNAL MEDICINE

## 2019-06-03 PROCEDURE — 3075F SYST BP GE 130 - 139MM HG: CPT | Mod: HCNC,CPTII,S$GLB, | Performed by: INTERNAL MEDICINE

## 2019-06-03 PROCEDURE — 82607 VITAMIN B-12: CPT | Mod: HCNC

## 2019-06-03 PROCEDURE — 1101F PR PT FALLS ASSESS DOC 0-1 FALLS W/OUT INJ PAST YR: ICD-10-PCS | Mod: HCNC,CPTII,S$GLB, | Performed by: INTERNAL MEDICINE

## 2019-06-03 PROCEDURE — 80061 LIPID PANEL: CPT | Mod: HCNC

## 2019-06-03 PROCEDURE — 99999 PR PBB SHADOW E&M-EST. PATIENT-LVL III: CPT | Mod: PBBFAC,HCNC,, | Performed by: INTERNAL MEDICINE

## 2019-06-03 PROCEDURE — 80053 COMPREHEN METABOLIC PANEL: CPT | Mod: HCNC

## 2019-06-03 PROCEDURE — 3075F PR MOST RECENT SYSTOLIC BLOOD PRESS GE 130-139MM HG: ICD-10-PCS | Mod: HCNC,CPTII,S$GLB, | Performed by: INTERNAL MEDICINE

## 2019-06-03 PROCEDURE — 36415 COLL VENOUS BLD VENIPUNCTURE: CPT | Mod: HCNC,PO

## 2019-06-03 PROCEDURE — 84443 ASSAY THYROID STIM HORMONE: CPT | Mod: HCNC

## 2019-06-03 RX ORDER — GABAPENTIN 100 MG/1
100 CAPSULE ORAL NIGHTLY
Qty: 30 CAPSULE | Refills: 11 | Status: SHIPPED | OUTPATIENT
Start: 2019-06-03 | End: 2019-09-13

## 2019-06-03 RX ORDER — FERROUS SULFATE, DRIED 160(50) MG
1 TABLET, EXTENDED RELEASE ORAL 2 TIMES DAILY WITH MEALS
COMMUNITY

## 2019-06-03 NOTE — PROGRESS NOTES
This note was created by combination of typed  and Dragon dictation.  Transcription errors may be present.  If there are any questions, please contact me.    Assessment / Plan:   Neuropathy  Anesthesia of skin   -just of the plantar toes, mainly noticeable in the evening.  Could be present during the day but she maybe just distracted.  Normal physical exam.  Recent labs with A1c in the pre diabetes range.  I will check a TSH and I will check a B12.  She is already taking B12 over-the-counter supplement.  If normal, would recommend trial of symptomatic treatment with gabapentin 100 mg at night.  Unclear the ultimate etiology.  The remainder of her previous labs were normal including CBC and CMP.  Total protein was normal.  Would try her with gabapentin 100 to start.  -     TSH; Future; Expected date: 06/03/2019  -     Vitamin B12; Future; Expected date: 06/03/2019    Need for shingles vaccine  -     varicella-zoster [id number], PF, (SHINGRIX, PF,) 50 mcg/0.5 mL injection; Inject 0.5 mLs into the muscle once. Repeat in 2 months for 1 dose  Dispense: 0.5 mL; Refill: 1    Medications Discontinued During This Encounter   Medication Reason    fluticasone (FLONASE) 50 mcg/actuation nasal spray Patient no longer taking    calcium 500 mg Tab Patient no longer taking       meds sent this encounter:       Follow Up: No follow-ups on file.      Subjective:     Chief Complaint   Patient presents with    Numbness     Both feet        OVI Botello is a 73 y.o. female, last appointment with this clinic was 5/6/2019.    No LMP recorded. Patient has had a hysterectomy.    Last seen in April for physical exam.  Thyroid nodule repeat ultrasound next year  Hypertension possible white coat syndrome.  Has a home brachial cuff.  Statin myalgias  Labs A1c pre diabetes    Plan last visit was rechallenge on Lipitor 10.  If myalgias resume trial of coenzyme Q10.  Vitamin-D was good.    Balls of her feet - numb/funny feeling; not  the entire plantar foot; not all the MTP heads.  Mainly the 2nd MTP head both feet.  But it is the distal phalanx of all the toes of both feet.  Used to walk barefoot and stopped when this started up. Would come and go and now it's more constant.  Rubbing it without relief.  The rest of her foot is normal in its sensation.  Mainly noticeable at night when she goes to sleep and in the morning when she wakes up but otherwise during the day she does not really notice it.    Did not get shingrix yet. I had sent in rx to pharmacy    Answers for HPI/ROS submitted by the patient on 6/3/2019   activity change: Yes  unexpected weight change: No  rhinorrhea: No  trouble swallowing: No  visual disturbance: No  chest tightness: No  polyuria: No  difficulty urinating: No  menstrual problem: No  joint swelling: No  arthralgias: No  confusion: No  dysphoric mood: No      Patient Care Team:  Ancelmo Sumner MD as PCP - General (Internal Medicine)  Brooke Delong MD as Obstetrician (Obstetrics)  Arnoldo Valentin MD as Consulting Physician (Otolaryngology)    Patient Active Problem List    Diagnosis Date Noted    Thyroid nodule on US 4/2019 largest 1.2 cm repeat 1 year 04/22/2019    Abnormal glucose 04/18/2019    Vitamin D deficiency 04/04/2019    Chronic bilateral low back pain without sciatica 11/02/2018    Bradycardia 6/2018 holter negative 07/20/2018    Status post hysterectomy 07/20/2018    Statin myopathy 07/20/2018    History of colon polyps; 1/23/2017 colonoscopy normal repeat 5 years 01/23/2017    Chronic pain of left knee 01/05/2017    Nuclear sclerosis 11/30/2016    Cataract 09/07/2016    Obesity, Class II, BMI 35-39.9, with comorbidity 07/20/2016    POAG (primary open-angle glaucoma) 01/05/2016    Essential hypertension 08/13/2012    Hyperlipidemia; statin myalgias; 6/28/2018 exercise stress echo neg for ischemia 08/13/2012 6/28/2018 exercise stress echo CONCLUSIONS   1 - Normal left  ventricular systolic function (EF 60-65%).   2 - No wall motion abnormalities.   3 - Normal left ventricular diastolic function.   4 - Normal right ventricular systolic function .          PAST MEDICAL HISTORY:  Past Medical History:   Diagnosis Date    Allergy     Anxiety     Breast cyst     Edema of leg 10/5/2012    bilateral     Fractures 2011    thumb R    GERD (gastroesophageal reflux disease)     Glaucoma     History of colonic polyps     Hx-TIA (transient ischemic attack) 8/13/2012 Jan 2012: LLE and left facial numbness lasting 1 hour     Hyperlipidemia     Hypertension     Left shoulder tendonitis 8/8/2015    Primary open-angle glaucoma, mild stage - Both Eyes 6/3/2013       PAST SURGICAL HISTORY:  Past Surgical History:   Procedure Laterality Date    BREAST BIOPSY Left     core    BREAST SURGERY Left     lumpectomy    CATARACT EXTRACTION W/  INTRAOCULAR LENS IMPLANT Left 10/05/2016    Dr. Mike    CATARACT EXTRACTION W/  INTRAOCULAR LENS IMPLANT Right 11/30/2016    With [place] (Dr. Mike)    COLONOSCOPY N/A 1/23/2017    [facility] by [facility] (4TH FLR)    HAND SURGERY Left 2011    thumb    HYSTERECTOMY  1980's    Total;    INSERTION-INTRAOCULAR LENS (IOL) Right 11/30/2016    Performed by Yes[facility] at [facility] OR 1ST FLR    INSERTION-INTRAOCULAR LENS (IOL) Left 10/5/2016    [facility] by [facility] at [facility] 1ST FLR    KAHOOK GONIOTOMY Right 11/30/2016    WITH CE ()    Left Breast Lumpectomy Left     performed in 1960s    OOPHORECTOMY      PHACOEMULSIFICATION-ASPIRATION-CATARACT Left 10/5/2016    [facility] 1ST FLR    PHACOEMULSIFICATION-ASPIRATION-CATARACT WITH TRABEULOTOMY Right 11/30/2016    [facility] at [facility] 1ST FLR    SELECTIVE LASER TRABECUPLASTY  05/2014    OU w/ Dr. Mike    TUBAL LIGATION  1970's    VAGINAL DELIVERY      x3    YAG  CAPSULOTOMY Left 11/29/2018           SOCIAL HISTORY:  Social History     Socioeconomic History    Marital status:      Spouse name: Not on file    Number of children: 3    Years of education: Not on file    Highest education level: Not on file   Occupational History    Occupation: retired - formerly pastoral care with [employer]   Social Needs    Financial resource strain: Not hard at all    Food insecurity:     Worry: Never true     Inability: Never true    Transportation needs:     Medical: No     Non-medical: No   Tobacco Use    Smoking status: Never Smoker    Smokeless tobacco: Never Used   Substance and Sexual Activity    Alcohol use: Yes     Alcohol/week: 0.0 oz     Frequency: Never     Binge frequency: Never     Comment: Rarely; 1 glass of wine    Drug use: No    Sexual activity: Yes     Partners: Female, Male     Birth control/protection: Post-menopausal, See Surgical Hx   Lifestyle    Physical activity:     Days per week: 3 days     Minutes per session: 30 min    Stress: Not at all   Relationships    Social connections:     Talks on phone: More than three times a week     Gets together: More than three times a week     Attends Gnosticist service: Not on file     Active member of club or organization: No     Attends meetings of clubs or organizations: Never     Relationship status:    Other Topics Concern    Not on file   Social History Narrative    Not on file        ALLERGIES AND MEDICATIONS: updated and reviewed.  Review of patient's allergies indicates:   Allergen Reactions    Lisinopril Swelling    Amlodipine Edema    Combigan [brimonidine-timolol]      Causes itchy eyelids and contact dermatitis    Cosopt [dorzolamide-timolol]      Causes angular blepharitis of eyelids    Pravastatin      Myalgia and elevated CPK     Current Outpatient Medications   Medication Sig Dispense Refill    aspirin (ECOTRIN) 81 MG EC tablet Take 81 mg by mouth once daily.    "     atorvastatin (LIPITOR) 10 MG tablet Take 1 tablet (10 mg total) by mouth once daily. Brand only 90 tablet 3    calcium-vitamin D3 (OS-LETY 500 + D3) 500 mg(1,250mg) -200 unit per tablet Take 1 tablet by mouth 2 (two) times daily with meals.      co-enzyme Q-10 30 mg capsule Take 100 mg by mouth once daily.       hydroCHLOROthiazide (MICROZIDE) 12.5 mg capsule Take 1 capsule (12.5 mg total) by mouth once daily. 90 capsule 3    irbesartan (AVAPRO) 150 MG tablet TAKE 1 TABLET (150 MG TOTAL) BY MOUTH EVERY EVENING. 90 tablet 2    LUMIGAN 0.01 % Drop Place 1 drop into both eyes every evening. 2.5 mL 12    timolol maleate 0.5% (TIMOPTIC-XE) 0.5 % SolG Place 1 drop into both eyes every morning. Timoptic XE or timolol GFS (gel forming solution) 5 mL 12    VITAMIN B-12 100 MCG tablet       vitamin D (VITAMIN D3) 1000 units Tab Take 1,000 Units by mouth once daily.       No current facility-administered medications for this visit.        Review of Systems   HENT: Negative for hearing loss.    Eyes: Negative for discharge.   Respiratory: Negative for wheezing.    Cardiovascular: Negative for chest pain and palpitations.   Gastrointestinal: Negative for blood in stool, constipation, diarrhea and vomiting.   Genitourinary: Negative for dysuria and hematuria.   Musculoskeletal: Negative for neck pain.   Neurological: Negative for weakness and headaches.   Endo/Heme/Allergies: Negative for polydipsia.       Objective:   Physical Exam   Vitals:    06/03/19 1318   BP: 134/62   BP Location: Left arm   Patient Position: Sitting   BP Method: Large (Manual)   Pulse: 62   Temp: 97.7 °F (36.5 °C)   TempSrc: Oral   SpO2: 97%   Weight: 86.6 kg (190 lb 14.7 oz)   Height: 5' 6" (1.676 m)    Body mass index is 30.81 kg/m².  Weight: 86.6 kg (190 lb 14.7 oz)   Height: 5' 6" (167.6 cm)     Physical Exam   Constitutional: She is oriented to person, place, and time. She appears well-developed and well-nourished. No distress.   HENT: "   Head: Normocephalic and atraumatic.   Eyes: No scleral icterus.   Pulmonary/Chest: Effort normal.   Musculoskeletal:   PT and DP pulses 3+ bilaterally; no edema  Normal gross sensation to the plantar toes, the plantar MTP heads without defomrity or swelling or induration or tenderness   Neurological: She is alert and oriented to person, place, and time.   Skin: Skin is warm and dry.   Capillary refill less than 2 sec   Psychiatric: She has a normal mood and affect. Her behavior is normal. Thought content normal.

## 2019-06-04 ENCOUNTER — HOSPITAL ENCOUNTER (OUTPATIENT)
Dept: RADIOLOGY | Facility: HOSPITAL | Age: 74
Discharge: HOME | End: 2019-06-04
Attending: ORTHOPAEDIC SURGERY
Payer: MEDICARE

## 2019-06-04 ENCOUNTER — OFFICE VISIT (OUTPATIENT)
Dept: SPORTS MEDICINE | Facility: CLINIC | Age: 74
End: 2019-06-04
Payer: MEDICARE

## 2019-06-04 VITALS
HEART RATE: 58 BPM | SYSTOLIC BLOOD PRESSURE: 116 MMHG | BODY MASS INDEX: 30.53 KG/M2 | HEIGHT: 66 IN | WEIGHT: 190 LBS | DIASTOLIC BLOOD PRESSURE: 70 MMHG

## 2019-06-04 DIAGNOSIS — M25.562 LEFT KNEE PAIN, UNSPECIFIED CHRONICITY: ICD-10-CM

## 2019-06-04 DIAGNOSIS — M17.12 DEGENERATIVE ARTHRITIS OF LEFT KNEE: ICD-10-CM

## 2019-06-04 DIAGNOSIS — M25.562 LEFT KNEE PAIN, UNSPECIFIED CHRONICITY: Primary | ICD-10-CM

## 2019-06-04 PROCEDURE — 3078F DIAST BP <80 MM HG: CPT | Mod: HCNC,CPTII,S$GLB, | Performed by: ORTHOPAEDIC SURGERY

## 2019-06-04 PROCEDURE — 1101F PR PT FALLS ASSESS DOC 0-1 FALLS W/OUT INJ PAST YR: ICD-10-PCS | Mod: HCNC,CPTII,S$GLB, | Performed by: ORTHOPAEDIC SURGERY

## 2019-06-04 PROCEDURE — 3078F PR MOST RECENT DIASTOLIC BLOOD PRESSURE < 80 MM HG: ICD-10-PCS | Mod: HCNC,CPTII,S$GLB, | Performed by: ORTHOPAEDIC SURGERY

## 2019-06-04 PROCEDURE — 3074F SYST BP LT 130 MM HG: CPT | Mod: HCNC,CPTII,S$GLB, | Performed by: ORTHOPAEDIC SURGERY

## 2019-06-04 PROCEDURE — 73564 X-RAY EXAM KNEE 4 OR MORE: CPT | Mod: 26,50,HCNC, | Performed by: RADIOLOGY

## 2019-06-04 PROCEDURE — 99214 PR OFFICE/OUTPT VISIT, EST, LEVL IV, 30-39 MIN: ICD-10-PCS | Mod: HCNC,S$GLB,, | Performed by: ORTHOPAEDIC SURGERY

## 2019-06-04 PROCEDURE — 99999 PR PBB SHADOW E&M-EST. PATIENT-LVL IV: CPT | Mod: PBBFAC,HCNC,, | Performed by: ORTHOPAEDIC SURGERY

## 2019-06-04 PROCEDURE — 99214 OFFICE O/P EST MOD 30 MIN: CPT | Mod: HCNC,S$GLB,, | Performed by: ORTHOPAEDIC SURGERY

## 2019-06-04 PROCEDURE — 1101F PT FALLS ASSESS-DOCD LE1/YR: CPT | Mod: HCNC,CPTII,S$GLB, | Performed by: ORTHOPAEDIC SURGERY

## 2019-06-04 PROCEDURE — 73564 XR KNEE ORTHO BILAT WITH FLEXION: ICD-10-PCS | Mod: 26,50,HCNC, | Performed by: RADIOLOGY

## 2019-06-04 PROCEDURE — 3074F PR MOST RECENT SYSTOLIC BLOOD PRESSURE < 130 MM HG: ICD-10-PCS | Mod: HCNC,CPTII,S$GLB, | Performed by: ORTHOPAEDIC SURGERY

## 2019-06-04 PROCEDURE — 99999 PR PBB SHADOW E&M-EST. PATIENT-LVL IV: ICD-10-PCS | Mod: PBBFAC,HCNC,, | Performed by: ORTHOPAEDIC SURGERY

## 2019-06-04 PROCEDURE — 73564 X-RAY EXAM KNEE 4 OR MORE: CPT | Mod: TC,50,HCNC,FY,PO

## 2019-06-04 NOTE — PROGRESS NOTES
CC: Left knee pain    73 y.o. Female with a history of Left pain. She was last seen 2 years ago for her knee pain.  She is quite therapy that time and her pain completely resolved.  She started having pain again 2 weeks ago after she was cleaning things out of house she was trying to sell.  She states the pain is worse with prolonged standing.  She denies mechanical symptoms.  She denies swelling.  She states the pain is in her left lower leg mainly in her lateral calf.  She states today it is not too painful but it can be at times. Denies instability. [place] and she is worried this affecting her trip.      Is affecting ADLs.      Review of Systems   Constitution: Negative. Negative for chills, fever and night sweats.   HENT: Negative for congestion and headaches.    Eyes: Negative for blurred vision, left vision loss and right vision loss.   Cardiovascular: Negative for chest pain and syncope.   Respiratory: Negative for cough and shortness of breath.    Endocrine: Negative for polydipsia, polyphagia and polyuria.   Hematologic/Lymphatic: Negative for bleeding problem. Does not bruise/bleed easily.   Skin: Negative for dry skin, itching and rash.   Musculoskeletal: Negative for falls. Positive for knee pain and muscle weakness.   Gastrointestinal: Negative for abdominal pain and bowel incontinence.   Genitourinary: Negative for bladder incontinence and nocturia.   Neurological: Negative for disturbances in coordination, loss of balance and seizures.   Psychiatric/Behavioral: Negative for depression. The patient does not have insomnia.    Allergic/Immunologic: Negative for hives and persistent infections.     PAST MEDICAL HISTORY:   Past Medical History:   Diagnosis Date    Allergy     Anxiety     Breast cyst     Edema of leg 10/5/2012    bilateral     Fractures 2011    thumb R    GERD (gastroesophageal reflux disease)     Glaucoma     History of colonic polyps     Hx-TIA  (transient ischemic attack) 8/13/2012 Jan 2012: LLE and left facial numbness lasting 1 hour     Hyperlipidemia     Hypertension     Left shoulder tendonitis 8/8/2015    Primary open-angle glaucoma, mild stage - Both Eyes 6/3/2013     PAST SURGICAL HISTORY:   Past Surgical History:   Procedure Laterality Date    BREAST BIOPSY Left     core    BREAST SURGERY Left     lumpectomy    CATARACT EXTRACTION W/  INTRAOCULAR LENS IMPLANT Left 10/05/2016    Dr. Mike    CATARACT EXTRACTION W/  INTRAOCULAR LENS IMPLANT Right 11/30/2016    With [place] (Dr. Mike)    COLONOSCOPY N/A 1/23/2017    Per[facility] by [facility]era [facility] at [facility] END[facility] (4TH FLR)    HAND SURGERY Left 2011    thumb    HYSTERECTOMY  1980's    Total;    INSERTION-INTRAOCULAR LENS (IOL) Right 11/30/2016    Performed by Yesica Mike MD at [facility] OR 1ST FLR    INSERTION-INTRAOCULAR LENS (IOL) Left 10/5/2016    [facility] at [facility] OR 1ST FLR    KAHOOK GONIOTOMY Right 11/30/2016    WITH CE ()    Left Breast Lumpectomy Left     performed in 1960s    OOPHORECTOMY      PHACOEMULSIFICATION-ASPIRATION-CATARACT Left 10/5/2016    [facility] OR 1ST FLR    PHACOEMULSIFICATION-ASPIRATION-CATARACT WITH TRABEULOTOMY Right 11/30/2016    [facility] at [facility] OR [facility]ST FLR    SELECTIVE LASER TRABECUPLASTY  05/2014    [facility] w/ Dr. Mike    TUBAL LIGATION  1970's    VAGINAL DELIVERY      x3    YAG CAPSULOTOMY Left 11/29/2018         FAMILY HISTORY:   Family History   Problem Relation Age of Onset    Breast cancer Mother 50    Glaucoma Mother     Alzheimer's disease Mother     Blindness Mother     Cataracts Mother     Glaucoma Father     Prostate cancer Father     Blindness Father     Cataracts Father     Cancer Father 62        prostate    Glaucoma Sister     No Known Problems Brother     No Known Problems Brother     Alzheimer's  disease Maternal Grandfather     Macular degeneration Neg Hx     Retinal detachment Neg Hx     Strabismus Neg Hx     Amblyopia Neg Hx     Heart attack Neg Hx     Heart disease Neg Hx     Ovarian cancer Neg Hx     Colon cancer Neg Hx      SOCIAL HISTORY:   Social History     Socioeconomic History    Marital status:      Spouse name: Not on file    Number of children: 3    Years of education: Not on file    Highest education level: Not on file   Occupational History    Occupation: retired - formerly pastoral care with [employer]   [employer] Needs    Financial resource strain: Not hard at all    Food insecurity:     Worry: Never true     Inability: Never true    Transportation needs:     Medical: No     Non-medical: No   Tobacco Use    Smoking status: Never Smoker    Smokeless tobacco: Never Used   Substance and Sexual Activity    Alcohol use: Yes     Alcohol/week: 0.0 oz     Frequency: Never     Binge frequency: Never     Comment: Rarely; 1 glass of wine    Drug use: No    Sexual activity: Yes     Partners: Female, Male     Birth control/protection: Post-menopausal, See Surgical Hx   Lifestyle    Physical activity:     Days per week: 3 days     Minutes per session: 30 min    Stress: Not at all   Relationships    Social connections:     Talks on phone: More than three times a week     Gets together: More than three times a week     Attends Mu-ism service: Not on file     Active member of club or organization: No     Attends meetings of clubs or organizations: Never     Relationship status:    Other Topics Concern    Not on file   Social History Narrative    Not on file       MEDICATIONS:   Current Outpatient Medications:     aspirin (ECOTRIN) 81 MG EC tablet, Take 81 mg by mouth once daily.  , Disp: , Rfl:     atorvastatin (LIPITOR) 10 MG tablet, Take 1 tablet (10 mg total) by mouth once daily. Brand only, Disp: 90 tablet, Rfl: 3    calcium-vitamin D3 (OS-LETY 500 + D3) 500  "mg(1,250mg) -200 unit per tablet, Take 1 tablet by mouth 2 (two) times daily with meals., Disp: , Rfl:     co-enzyme Q-10 30 mg capsule, Take 100 mg by mouth once daily. , Disp: , Rfl:     gabapentin (NEURONTIN) 100 MG capsule, Take 1 capsule (100 mg total) by mouth every evening., Disp: 30 capsule, Rfl: 11    hydroCHLOROthiazide (MICROZIDE) 12.5 mg capsule, Take 1 capsule (12.5 mg total) by mouth once daily., Disp: 90 capsule, Rfl: 3    irbesartan (AVAPRO) 150 MG tablet, TAKE 1 TABLET (150 MG TOTAL) BY MOUTH EVERY EVENING., Disp: 90 tablet, Rfl: 2    LUMIGAN 0.01 % Drop, Place 1 drop into both eyes every evening., Disp: 2.5 mL, Rfl: 12    timolol maleate 0.5% (TIMOPTIC-XE) 0.5 % SolG, Place 1 drop into both eyes every morning. Timoptic XE or timolol GFS (gel forming solution), Disp: 5 mL, Rfl: 12    VITAMIN B-12 100 MCG tablet, , Disp: , Rfl:     vitamin D (VITAMIN D3) 1000 units Tab, Take 1,000 Units by mouth once daily., Disp: , Rfl:   ALLERGIES:   Review of patient's allergies indicates:   Allergen Reactions    Lisinopril Swelling    Amlodipine Edema    Combigan [brimonidine-timolol]      Causes itchy eyelids and contact dermatitis    Cosopt [dorzolamide-timolol]      Causes angular blepharitis of eyelids    Pravastatin      Myalgia and elevated CPK       VITAL SIGNS: /70   Pulse (!) 58   Ht 5' 6" (1.676 m)   Wt 86.2 kg (190 lb)   BMI 30.67 kg/m²      PHYSICAL EXAMINATION  VITAL SIGNS: /70   Pulse (!) 58   Ht 5' 6" (1.676 m)   Wt 86.2 kg (190 lb)   BMI 30.67 kg/m²    General:  The patient is alert and oriented x 3.  Mood is pleasant.  Observation of ears, eyes and nose reveal no gross abnormalities.  HEENT: NCAT, sclera nonicteric  Lungs: Respirations are equal and unlabored.    Left KNEE EXAMINATION     OBSERVATION / INSPECTION   Gait:   Nonantalgic   Alignment:  Neutral   Scars:   None   Muscle atrophy: Mild  Effusion:  None   Warmth:  None   Discoloration:   none "     TENDERNESS / CREPITUS (T / C):          T / C      T / C   Patella   - / -   Lateral joint line   - / -    Peripatellar medial  -  Medial joint line    - / -    Peripatellar lateral -  Medial plica   - / -    Patellar tendon -   Popliteal fossa  - / -    Quad tendon   -   Gastrocnemius   -   Prepatellar Bursa - / -   Quadricep   -   Tibial tubercle  -  Thigh/hamstring  -   Pes anserine/HS -  Fibula    -   ITB   - / -  Tibia     -   Tib/fib joint  - / -  LCL    -     MFC   - / -   MCL: Proximal  -    LFC   - / -    Distal   -          ROM: (* = pain)  PASSIVE   ACTIVE    Left :   5 / 0 / 145   5 / 0 / 145     Right :    5 / 0 / 145   5 / 0 / 145    Patellofemoral examination:  See above noted areas of tenderness.   Patella position    Subluxation / dislocation: Centered           Sup. / Inf;   Normal   Crepitus (PF):    Absent   Patellar Mobility:       Medial-lateral:   Normal    Superior-inferior:  Normal    Inferior tilt   Normal    Patellar tendon:  Normal   Lateral tilt:    Normal   J-sign:     None   Patellofemoral grind:   No pain       MENISCAL SIGNS:     Pain on terminal extension:  -  Pain on terminal flexion:  -  Donatos maneuver:  - for pain  Squat     - posterior joint pain    LIGAMENT EXAMINATION:  ACL / Lachman:  normal (-1 to 2mm)    PCL-Post.  drawer: normal 0 to 2mm  MCL- Valgus:  normal 0 to 2mm  LCL- Varus:  normal 0 to 2mm  Pivot shift: normal (Equal)   Dial Test: difference c/w other side   At 30° flexion: normal (< 5°)    At 90° flexion: normal (< 5°)   Reverse Pivot Shift:   normal (Equal)     STRENGTH: (* = with pain) PAINFUL SIDE   Quadricep   5/5   Hamstrin/5    EXTREMITY NEURO-VASCULAR EXAMINATION:   Sensation:  Grossly intact to light touch all dermatomal regions.   Motor Function:  Fully intact motor function at hip, knee, foot and ankle    DTRs;  quadriceps and  achilles 2+.  No clonus and downgoing Babinski.    Vascular status:  DP and PT pulses 2+, brisk capillary  refill, symmetric.     Other Findings:       X-rays:  including standing, weight bearing AP and flexion bilateral knees, lateral and merchant views ordered and images reviewed by me show:  No fracture, dislocation mild DJD to the medial lateral and patellofemoral compartment     ASSESSMENT:    Left Knee pain    PLAN:   Aquatic therapy/PT  Follow-up p.r.n.  All questions were answered, pt will contact us for questions or concerns in the interim.

## 2019-06-04 NOTE — PROGRESS NOTES
b12 normal TSH WNL  CMP WNL  Lipid good on statin    Results to pt.  Neuropathy unclear etiology. Will treat with gabapentin. Stay on statin

## 2019-06-18 ENCOUNTER — CLINICAL SUPPORT (OUTPATIENT)
Dept: REHABILITATION | Facility: OTHER | Age: 74
End: 2019-06-18
Attending: ORTHOPAEDIC SURGERY
Payer: MEDICARE

## 2019-06-18 DIAGNOSIS — Z74.09 IMPAIRED FUNCTIONAL MOBILITY, BALANCE, GAIT, AND ENDURANCE: ICD-10-CM

## 2019-06-18 DIAGNOSIS — R29.898 WEAKNESS OF BOTH HIPS: ICD-10-CM

## 2019-06-18 DIAGNOSIS — Z74.09 DECREASED INDEPENDENCE WITH TRANSFERS: ICD-10-CM

## 2019-06-18 DIAGNOSIS — M25.562 ACUTE PAIN OF LEFT KNEE: ICD-10-CM

## 2019-06-18 PROCEDURE — G8978 MOBILITY CURRENT STATUS: HCPCS | Mod: CK,HCNC,PN

## 2019-06-18 PROCEDURE — 97162 PT EVAL MOD COMPLEX 30 MIN: CPT | Mod: HCNC,PN

## 2019-06-18 PROCEDURE — 97110 THERAPEUTIC EXERCISES: CPT | Mod: HCNC,PN

## 2019-06-18 PROCEDURE — G8979 MOBILITY GOAL STATUS: HCPCS | Mod: CK,HCNC,PN

## 2019-06-18 NOTE — PLAN OF CARE
[facility]  Physical Therapy Initial Evaluation    Name: Rosmery Ramirez  Clinic Number: [id number]    Therapy Diagnosis:   Encounter Diagnoses   Name Primary?    Acute pain of left knee     Weakness of both hips     Decreased independence with transfers     Impaired functional mobility, balance, gait, and endurance      Physician: Jamison Shaw,*    Physician Orders: PT Eval and Treat   Medical Diagnosis: M25.562 (ICD-10-CM) - Left knee pain, unspecified chronicity  Evaluation Date: 6/18/2019  Authorization Period Expiration: 12/31/19  Plan of Care Certification Period: 8/13/19  Visit # / Visits authorized: 1/ 20    Time In: 3:15 PM  Time Out: 4:00 PM  Total Billable Time: 45 minutes    Precautions: Standard    Subjective   States she doesn't have any pain today. States she has been using heat and cold. States when her knee started hurting, the knee was giving out, so she is afraid of putting weight on the L LE. States this weekend she had episode of L knee instability. States she is going on vacation on July 17th.    Date of onset: 5/2019  History of current condition - Rosmery is a 72 yo F with hx of chronic L knee pain who 2 weeks prior to MD visit on 6/4/19 had an acute exacerbation of her L knee pain following cleaning up her house. States she did not have the L knee pain while she was cleaning up, but had it the following day. States she has performed aquatic therapy. States she is interested in aquatic       Past Medical History:   Diagnosis Date    Allergy     Anxiety     Breast cyst     Edema of leg 10/5/2012    bilateral     Fractures 2011    thumb R    GERD (gastroesophageal reflux disease)     Glaucoma     History of colonic polyps     Hx-TIA (transient ischemic attack) 8/13/2012 Jan 2012: LLE and left facial numbness lasting 1 hour     Hyperlipidemia     Hypertension     Left shoulder tendonitis 8/8/2015    Primary open-angle glaucoma,  mild stage - Both Eyes 6/3/2013     Rosmery Ramirez  has a past surgical history that includes Left Breast Lumpectomy (Left); Hand surgery (Left, ); SELECTIVE LASER TRABECUPLASTY (2014); Hysterectomy (s); Tubal ligation (s); Vaginal delivery; Breast surgery (Left); Colonoscopy (N/A, 2017); Breast biopsy (Left); Oophorectomy; KAHOOK GONIOTOMY (Right, 2016); YAG CAPSULOTOMY (Left, 2018); Cataract extraction w/  intraocular lens implant (Left, 10/05/2016); and Cataract extraction w/  intraocular lens implant (Right, 2016).    Rosmery has a current medication list which includes the following prescription(s): aspirin, atorvastatin, calcium-vitamin d3, co-enzyme q-10, gabapentin, hydrochlorothiazide, irbesartan, lumigan, timolol maleate 0.5%, vitamin b-12, and vitamin d.    Review of patient's allergies indicates:   Allergen Reactions    Lisinopril Swelling    Amlodipine Edema    Combigan [brimonidine-timolol]      Causes itchy eyelids and contact dermatitis    Cosopt [dorzolamide-timolol]      Causes angular blepharitis of eyelids    Pravastatin      Myalgia and elevated CPK        Imagin19  X-ray  FINDINGS:  Right: No fracture dislocation bone destruction is seen.  There is DJD and spurs on the patella and tibial tuberosity.    Left: No fracture dislocation bone destruction seen.  There is DJD.  There are spurs on the patella and tibial tuberosity.    Prior Therapy: OP PT for back. Aquatic therapy for knee.  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  Prior Level of Function: I with amb and ADL  Current Level of Function: I with amb and ADL.  A for in/out of tub.    Pain:  Current 0/10, worst 5/10, best 0/10   Location: left knee   Description: Aching  Aggravating Factors: walking, mopping, negotiating stairs at work, and getting out of the car.  Easing Factors: ice and heating pad    Pts goals: to walk  "and not be afraid of the knee giving out.    Objective       Functional assessment:   - walking:   - sit to stand:     AROM  LE MMT  R  L    Hip flexion  5/5  4+/5    Hip abduction  3+/5  /5    Hip extension  5/5  3+/5    Hip ER  5/5  5/5    Hip IR  5/5  5/5    Knee extension  5/5  5/5    Knee flexion  5/5  5/5    Ankle dorsiflexion  5/5  5/5    Ankle plantar flexion  5/5  5/5        Flexibility testing:  - hamstrings:         B: WNL  - gastrocnemius:   R: WNL, L: tight, L: neutral  - piriformis:            R: WNL, L: tight, decreased 50%  - quadriceps:        R: WFL, L: tight,  decreased 50%  - hip adductors:    B: tight, decreased 50%  - hip flexors:          B: WNL  - IT bands:            B: tight, R: -25 %, L: WNL    Joint mobility:  R knee AROM: 0 to 122 deg  L knee AROM: 0 to 120 deg    Palpation: tenderness in L lateral knee.      CMS Impairment/Limitation/Restriction for FOTO Knee Survey    Therapist reviewed FOTO scores for Rosmery Ramirez on 6/18/2019.   FOTO documents entered into [facility]ADS - see Media section.    Limitation Score: 52%  Category: Mobility    Current : CK = at least 40% but < 60% impaired, limited or restricted  Goal: CK = at least 40% but < 60% impaired, limited or restricted       TREATMENT   Treatment Time In: 3:48 PM  Treatment Time Out: 3:58 PM  Total Treatment time separate from Evaluation time: 10 minutes    Rosmery received therapeutic exercises to develop strength and flexibility for 10 minutes including:  Quad sets 10 x 5"  Piriformis stretch x 30"  Bridging 10 x 5"  Side lying hip abd 10 x 5"  Side lying clams 10 x 5"      Home Exercises Provided and Patient Education Provided     Education provided:   - Discussed the role of the PTA on the Rehab Team. Also discussed the use of the [facility] Portal for communication.    Quad sets  Piriformis stretch  Bridging  Side lying hip abd  Side lying clams    Written Home Exercises Provided: yes.  Exercises were reviewed and " "Rosmery was able to demonstrate them prior to the end of the session.  Rosmery demonstrated good  understanding of the education provided.     See EMR under Patient Instructions for exercises provided 6/18/2019.  Assessment   Rosmery is a 73 y.o. female referred to outpatient Physical Therapy with a medical diagnosis of M25.562 (ICD-10-CM) - Left knee pain, unspecified chronicity. Pt presents with decreased B LE strength and flexibility affecting L knee pain, safe transfers into her tub, and negotiating stairs at work. Will initiate land based therapy to increase B hip strength, B LE flexibility, balance/coordination training of L LE/knee. f patient unable to tolerate land based therapy, will transfer for aquatic therapy. Patient has participated in aquatic therapy in the past. PT reviewed aquatic therapy information and precautions with patient. PT cleared pt for aquatic therapy. Patient has concern for L knee stability as she feels it could "give out" on her at any moment.    Pt prognosis is Good.   Pt will benefit from skilled outpatient Physical Therapy to address the deficits stated above and in the chart below, provide pt/family education, and to maximize pt's level of independence.     Plan of care discussed with patient: Yes  Pt's spiritual, cultural and educational needs considered and patient is agreeable to the plan of care and goals as stated below:     Anticipated Barriers for therapy:hx of previous knee pain/therapy    Medical Necessity is demonstrated by the following  History  Co-morbidities and personal factors that may impact the plan of care Co-morbidities:   hx of L knee pain    Personal Factors:   no deficits     moderate   Examination  Body Structures and Functions, activity limitations and participation restrictions that may impact the plan of care Body Regions:   lower extremities    Body Systems:    strength  balance  gait  transfers    Participation Restrictions:   none    Activity " limitations:   Learning and applying knowledge  no deficits    General Tasks and Commands  no deficits    Communication  no deficits    Mobility  walking  negotiating stairs    Self care  transfers into tub    Domestic Life  no deficits    Interactions/Relationships  no deficits    Life Areas  no deficits    Community and Social Life  no deficits         moderate   Clinical Presentation evolving clinical presentation with changing clinical characteristics moderate   Decision Making/ Complexity Score: moderate     Goals:  Short Term Goals: 4 weeks   1. Independent with HEP.  2. Report decreased L knee pain < or =  3/10 with adls such as walking, mopping, negotiating stairs at work, and getting out of the car.   3. Increased MMT for B LE by 1 muscle grade to promote proper pelvic stability to decrease L knee pain < or =  3/10 with adls such as walking, mopping, negotiating stairs at work, and getting out of the car.       Long Term Goals: 8 weeks   4. Report decreased L knee pain < or = 1 /10 with adls such as walking, mopping, negotiating stairs at work, and getting out of the car  5. Increased MMT for B LE to 5/5 muscle grade to promote proper pelvic stability to decrease L knee pain < or = 1 /10 with adls such as walking, mopping, negotiating stairs at work, and getting out of the car.  6.  I tub transfers  7. I negotiating steps at work utilizing alternating step pattern.  8. Increased flexibility in B hip adductors, L piriformis, B IT bands, and L quads to promote proper pelvic stability to decrease L knee pain < or = 1 /10 with adls such as walking, mopping, negotiating stairs at work, and getting out of the car   9. Patient to achieve CK (at least 40% < 60% impaired, limited or restricted) level at 41% functional limitation on the FOTO Outcomes Measurement System.    Plan   Certification Period/Plan of care expiration: 6/18/2019 to 8/13/19.    Outpatient Physical Therapy 2 times weekly for 8 weeks to include  the following interventions: Manual Therapy, Moist Heat/ Ice, Neuromuscular Re-ed, Patient Education, Therapeutic Exercise and Aquatic therapy.     Deangelo Toure, PT

## 2019-06-18 NOTE — PATIENT INSTRUCTIONS
"Quad Sets        Squeeze pelvic floor and hold. Tighten top of left thigh. Hold for _5_ seconds. Repeat _20__ times. Do _1-2__ times a day. Repeat with other leg.      Copyright © I. All rights reserved.     Hip External Rotation - Piriformis Stretching            - Lie on your back, one knee bent and the other straight  - Grab the bent leg behind the knee with the opposite arm and pull the bent knee towards your opposite armpit  - The stretch may be felt in the outside buttock region or relatively deep in the pelvis.    The opposite knee can be bent or straight at your therapists discretion. Perform 3 reps holding for 30 seconds.    Perform twice a day.    Copyright © 9190-7071 [employer]      BRIDGING        While lying on your back, tighten your lower abdominals, squeeze your buttocks and then raise your buttocks off the floor/bed as creating a "Bridge" with your body. Hold _5__ seconds. Repeat _20__ times. Do _1-2_ sessions per day.      Copyright © 6027-2577 [employer]    HIP ABDUCTION - SIDELYING            While lying on your side, slowly raise up your top leg to the side. Keep your knee straight and maintain your toes pointed forward the entire time. Keep your leg in-line with your body.  The bottom leg can be bent to stabilize your body.    Hold and then lower yourself and repeat. Perform 2 sets of 10 reps holding for 3-5 seconds.    Perform 1-2 times a day.    Copyright © 6669-2558 [employer]    CLAM SHELLS      While lying on your side with your knees bent, draw up the top knee while keeping contact of your feet together.    Do not let your pelvis roll back during the lifting movement.    Hold and then lower yourself and repeat. Perform 20 reps holding for 5 seconds.    Perform 1-2 times a day.    Copyright © 9982-1928 [employer]        "

## 2019-06-26 ENCOUNTER — CLINICAL SUPPORT (OUTPATIENT)
Dept: REHABILITATION | Facility: OTHER | Age: 74
End: 2019-06-26
Payer: MEDICARE

## 2019-06-26 DIAGNOSIS — R29.898 WEAKNESS OF BOTH HIPS: ICD-10-CM

## 2019-06-26 DIAGNOSIS — M25.562 ACUTE PAIN OF LEFT KNEE: Primary | ICD-10-CM

## 2019-06-26 DIAGNOSIS — Z74.09 IMPAIRED FUNCTIONAL MOBILITY, BALANCE, GAIT, AND ENDURANCE: ICD-10-CM

## 2019-06-26 DIAGNOSIS — Z74.09 DECREASED INDEPENDENCE WITH TRANSFERS: ICD-10-CM

## 2019-06-26 PROCEDURE — 97110 THERAPEUTIC EXERCISES: CPT | Mod: HCNC,PN

## 2019-06-26 NOTE — PROGRESS NOTES
"                            Physical Therapy Daily Treatment Note     Name: Rosmery Cody [place]  Clinic Number: [id number]    Therapy Diagnosis:   Encounter Diagnoses   Name Primary?    Acute pain of left knee Yes    Weakness of both hips     Decreased independence with transfers     Impaired functional mobility, balance, gait, and endurance      Physician: Jamison Shaw,*    Visit Date: 6/26/2019  Physician Orders: PT Eval and Treat   Medical Diagnosis: M25.562 (ICD-10-CM) - Left knee pain, unspecified chronicity  Evaluation Date: 6/18/2019  Authorization Period Expiration: 12/31/19  Plan of Care Certification Period: 8/13/19  Visit # / Visits authorized: 2/ 20      Time In: 3:58 PM  Time Out: 4:45 PM  Total Billable Time: 47 minutes    Precautions: Standard    Subjective   Pt reports she is using heat and ice to control any pain she has. States her knee feels better.  She was compliant with home exercise program.  Response to previous treatment: some increased knee pain  Functional change: can now go up steps without pulling on the handrail. Still has difficulty getting out of the tub.    Pain: 0/10  Location: knee  left    Objective     Rosmery received therapeutic exercises to develop strength, ROM, flexibility, posture and core stabilization for 44 minutes including:  Quad sets 20 x 5"  Piriformis stretch 3 x 30"  Bridging 10 x 5"  IT band stretch 3 x 30" with strap  Side lying hip abd 2 x 10 x 5"  Side lying clams 20 x 5"  Prone quad stretch x 2' with strap  Prone hip ext 10 x 3"    Rosmery received the following manual therapy techniques: Joint mobilizations were applied to the: L knee for 5 minutes, including:  Patellar mobilizations  Patellar tendon mobilizations            Home Exercises Provided and Patient Education Provided     Education provided:   - continue use of heat or ice for pain reduction. Continue with previous HEP    Written Home Exercises Provided: Patient instructed to " cont prior HEP.  Exercises were reviewed and Rosmery was able to demonstrate them prior to the end of the session.  Rosmery demonstrated good  understanding of the education provided.     See EMR under Patient Instructions for exercises provided 6/26/2019.    Assessment     Experiencing decreased L knee pain and improvement in negotiating stairs. Still having difficulty getting in and out of tub.    Rosmery is progressing well towards her goals.   Pt prognosis is Good.     Pt will continue to benefit from skilled outpatient physical therapy to address the deficits listed in the problem list box on initial evaluation, provide pt/family education and to maximize pt's level of independence in the home and community environment.     Pt's spiritual, cultural and educational needs considered and pt agreeable to plan of care and goals.    Anticipated barriers to physical therapy: hx of previous knee pain/therapy       Goals:   Short Term Goals: 4 weeks   1. Independent with HEP.  2. Report decreased L knee pain < or =  3/10 with adls such as walking, mopping, negotiating stairs at work, and getting out of the car.   3. Increased MMT for B LE by 1 muscle grade to promote proper pelvic stability to decrease L knee pain < or =  3/10 with adls such as walking, mopping, negotiating stairs at work, and getting out of the car.        Long Term Goals: 8 weeks   4. Report decreased L knee pain < or = 1 /10 with adls such as walking, mopping, negotiating stairs at work, and getting out of the car  5. Increased MMT for B LE to 5/5 muscle grade to promote proper pelvic stability to decrease L knee pain < or = 1 /10 with adls such as walking, mopping, negotiating stairs at work, and getting out of the car.  6.  I tub transfers  7. I negotiating steps at work utilizing alternating step pattern.  8. Increased flexibility in B hip adductors, L piriformis, B IT bands, and L quads to promote proper pelvic stability to decrease L knee  pain < or = 1 /10 with adls such as walking, mopping, negotiating stairs at work, and getting out of the car   9. Patient to achieve CK (at least 40% < 60% impaired, limited or restricted) level at 41% functional limitation on the FOTO Outcomes Measurement System.      Plan     Continue with B LE strengthening. Progress to standing exercises and add shuttle.    Deangelo Toure, PT

## 2019-07-02 ENCOUNTER — DOCUMENTATION ONLY (OUTPATIENT)
Dept: REHABILITATION | Facility: OTHER | Age: 74
End: 2019-07-02

## 2019-07-02 NOTE — PROGRESS NOTES
PT/PTA met face to face to discuss pt's treatment plan and progress towards established goals. Pt will be seen by a physical therapist minimally every 6th visit or every 30 days.      Deangelo Toure, PT    Khai Guzman PTA

## 2019-07-08 NOTE — PROGRESS NOTES
PT/PTA met face to face to discuss pt's treatment plan and progress towards established goals. Pt will be seen by a physical therapist minimally every 6th visit or every 30 days.       Deangelo Toure, PT

## 2019-07-09 ENCOUNTER — CLINICAL SUPPORT (OUTPATIENT)
Dept: REHABILITATION | Facility: OTHER | Age: 74
End: 2019-07-09
Payer: MEDICARE

## 2019-07-09 DIAGNOSIS — Z74.09 DECREASED INDEPENDENCE WITH TRANSFERS: ICD-10-CM

## 2019-07-09 DIAGNOSIS — M25.562 ACUTE PAIN OF LEFT KNEE: Primary | ICD-10-CM

## 2019-07-09 DIAGNOSIS — R29.898 WEAKNESS OF BOTH HIPS: ICD-10-CM

## 2019-07-09 DIAGNOSIS — Z74.09 IMPAIRED FUNCTIONAL MOBILITY, BALANCE, GAIT, AND ENDURANCE: ICD-10-CM

## 2019-07-09 PROCEDURE — 97110 THERAPEUTIC EXERCISES: CPT | Mod: HCNC,PN

## 2019-07-09 NOTE — PROGRESS NOTES
"                            Physical Therapy Daily Treatment Note     Name: Rosmery Cody [place]  Clinic Number: [id number]    Therapy Diagnosis:   Encounter Diagnoses   Name Primary?    Acute pain of left knee Yes    Weakness of both hips     Decreased independence with transfers     Impaired functional mobility, balance, gait, and endurance      Physician: Jamison Shaw,*    Visit Date: 7/9/2019  Physician Orders: PT Eval and Treat   Medical Diagnosis: M25.562 (ICD-10-CM) - Left knee pain, unspecified chronicity  Evaluation Date: 6/18/2019  Authorization Period Expiration: 12/31/19  Plan of Care Certification Period: 8/13/19  Visit # / Visits authorized: 3/ 20      Time In: 9:10 AM  Time Out: 10:00 AM  Total Billable Time: 45 minutes    Precautions: Standard    Subjective   Pt reports that her knee gives out when trying to walk after waking up in the morning. Stated that she is having some low back pain today.  She was compliant with home exercise program.  Response to previous treatment: some increased knee pain  Functional change: can now go up steps without pulling on the handrail. Still has difficulty getting out of the tub.    Pain: 0/10  Location: knee  left    Objective     Rosmery received therapeutic exercises to develop strength, ROM, flexibility, posture and core stabilization for 44 minutes including:  Push/pull 3 x 3"  Quad sets 20 x 5"  Piriformis stretch 3 x 30"  Prone hamstring curls w/ ankle pumps 2 x 10  Bridging 15 x 5"  IT band stretch 3 x 30" with strap (manually today by PTA)  Side lying hip abd 2 x 10 x 5"  Side lying clams 20 x 5"  Prone quad stretch x 2' with strap (manually today)  Prone hip ext 15 x 3"    Rosmery received the following manual therapy techniques: Joint mobilizations were applied to the: L knee for 5 minutes, including:  Patellar mobilizations  Patellar tendon mobilizations      Home Exercises Provided and Patient Education Provided     Education provided: "   - continue use of heat or ice for pain reduction. Prone hamstring curls w/ AP for increased blood flow before getting out of bed    Written Home Exercises Provided: Patient instructed to cont prior HEP.  Exercises were reviewed and Rosmery was able to demonstrate them prior to the end of the session.  Rosmery demonstrated good  understanding of the education provided.     See EMR under Patient Instructions for exercises provided 6/26/2019.    Assessment     Good tolerance to therex without reports of increased pain. Pt required TC / VC for proper pelvic position during SL clamshells / abd.     Rosmery is progressing well towards her goals.   Pt prognosis is Good.     Pt will continue to benefit from skilled outpatient physical therapy to address the deficits listed in the problem list box on initial evaluation, provide pt/family education and to maximize pt's level of independence in the home and community environment.     Pt's spiritual, cultural and educational needs considered and pt agreeable to plan of care and goals.    Anticipated barriers to physical therapy: hx of previous knee pain/therapy       Goals:   Short Term Goals: 4 weeks   1. Independent with HEP.  2. Report decreased L knee pain < or =  3/10 with adls such as walking, mopping, negotiating stairs at work, and getting out of the car.   3. Increased MMT for B LE by 1 muscle grade to promote proper pelvic stability to decrease L knee pain < or =  3/10 with adls such as walking, mopping, negotiating stairs at work, and getting out of the car.        Long Term Goals: 8 weeks   4. Report decreased L knee pain < or = 1 /10 with adls such as walking, mopping, negotiating stairs at work, and getting out of the car  5. Increased MMT for B LE to 5/5 muscle grade to promote proper pelvic stability to decrease L knee pain < or = 1 /10 with adls such as walking, mopping, negotiating stairs at work, and getting out of the car.  6.  I tub transfers  7. I  negotiating steps at work utilizing alternating step pattern.  8. Increased flexibility in B hip adductors, L piriformis, B IT bands, and L quads to promote proper pelvic stability to decrease L knee pain < or = 1 /10 with adls such as walking, mopping, negotiating stairs at work, and getting out of the car   9. Patient to achieve CK (at least 40% < 60% impaired, limited or restricted) level at 41% functional limitation on the FOTO Outcomes Measurement System.      Plan     Continue with B LE strengthening. Progress to standing exercises and add shuttle.    Khai Guzman, PTA

## 2019-07-24 ENCOUNTER — DOCUMENTATION ONLY (OUTPATIENT)
Dept: REHABILITATION | Facility: OTHER | Age: 74
End: 2019-07-24

## 2019-07-29 ENCOUNTER — OFFICE VISIT (OUTPATIENT)
Dept: PODIATRY | Facility: CLINIC | Age: 74
End: 2019-07-29
Payer: MEDICARE

## 2019-07-29 VITALS
WEIGHT: 196.19 LBS | HEIGHT: 66 IN | BODY MASS INDEX: 31.53 KG/M2 | DIASTOLIC BLOOD PRESSURE: 75 MMHG | HEART RATE: 55 BPM | SYSTOLIC BLOOD PRESSURE: 134 MMHG | RESPIRATION RATE: 18 BRPM

## 2019-07-29 DIAGNOSIS — G60.9 IDIOPATHIC PERIPHERAL NEUROPATHY: Primary | ICD-10-CM

## 2019-07-29 PROCEDURE — 3078F PR MOST RECENT DIASTOLIC BLOOD PRESSURE < 80 MM HG: ICD-10-PCS | Mod: HCNC,CPTII,S$GLB, | Performed by: PODIATRIST

## 2019-07-29 PROCEDURE — 3075F PR MOST RECENT SYSTOLIC BLOOD PRESS GE 130-139MM HG: ICD-10-PCS | Mod: HCNC,CPTII,S$GLB, | Performed by: PODIATRIST

## 2019-07-29 PROCEDURE — 3078F DIAST BP <80 MM HG: CPT | Mod: HCNC,CPTII,S$GLB, | Performed by: PODIATRIST

## 2019-07-29 PROCEDURE — 3075F SYST BP GE 130 - 139MM HG: CPT | Mod: HCNC,CPTII,S$GLB, | Performed by: PODIATRIST

## 2019-07-29 PROCEDURE — 99214 OFFICE O/P EST MOD 30 MIN: CPT | Mod: HCNC,S$GLB,, | Performed by: PODIATRIST

## 2019-07-29 PROCEDURE — 1101F PT FALLS ASSESS-DOCD LE1/YR: CPT | Mod: HCNC,CPTII,S$GLB, | Performed by: PODIATRIST

## 2019-07-29 PROCEDURE — 99999 PR PBB SHADOW E&M-EST. PATIENT-LVL III: CPT | Mod: PBBFAC,HCNC,, | Performed by: PODIATRIST

## 2019-07-29 PROCEDURE — 1101F PR PT FALLS ASSESS DOC 0-1 FALLS W/OUT INJ PAST YR: ICD-10-PCS | Mod: HCNC,CPTII,S$GLB, | Performed by: PODIATRIST

## 2019-07-29 PROCEDURE — 99214 PR OFFICE/OUTPT VISIT, EST, LEVL IV, 30-39 MIN: ICD-10-PCS | Mod: HCNC,S$GLB,, | Performed by: PODIATRIST

## 2019-07-29 PROCEDURE — 99999 PR PBB SHADOW E&M-EST. PATIENT-LVL III: ICD-10-PCS | Mod: PBBFAC,HCNC,, | Performed by: PODIATRIST

## 2019-07-30 NOTE — PROGRESS NOTES
Subjective:      Patient ID: Rosmery Ramirez is a 74 y.o. female.    Chief Complaint: PCP (Ancelmo Sumner 6/03/19) and Foot Problem ([place] )    Rosmery is a 74 y.o. female who presents to the podiatry clinic  with complaint of  left foot numbness. Onset of the symptoms was several months ago. Precipitating event: none known. Current symptoms include: worsening symptoms after a period of inactivity. Aggravating factors: inactivity. Symptoms have been intermittent. Patient has had no prior foot problems. Evaluation to date: lab work: normal. Treatment to date: gabapentin 100 g qhs x 1 month which she then d/c due to infectiveness .         Review of Systems   Constitution: Negative for chills, decreased appetite and fever.   Cardiovascular: Negative for leg swelling.   Musculoskeletal: Negative for arthritis, joint pain, joint swelling and myalgias.   Gastrointestinal: Negative for nausea and vomiting.   Neurological: Positive for numbness and paresthesias. Negative for loss of balance.           Patient Active Problem List   Diagnosis    Essential hypertension    Hyperlipidemia; statin myalgias; 6/28/2018 exercise stress echo neg for ischemia    POAG (primary open-angle glaucoma)    Obesity, Class II, BMI 35-39.9, with comorbidity    Cataract    Nuclear sclerosis    Left knee pain    History of colon polyps; 1/23/2017 colonoscopy normal repeat 5 years    Bradycardia 6/2018 holter negative    Status post hysterectomy    Statin myopathy    Chronic bilateral low back pain without sciatica    Vitamin D deficiency    Abnormal glucose    Thyroid nodule on US 4/2019 largest 1.2 cm repeat 1 year    Idiopathic peripheral neuropathy normal B12, TSH; A1c pre-DM. 6/2019 gabapentin    Acute pain of left knee    Weakness of both hips    Decreased independence with transfers    Impaired functional mobility, balance, gait, and endurance       Current Outpatient Medications on File Prior to Visit    Medication Sig Dispense Refill    aspirin (ECOTRIN) 81 MG EC tablet Take 81 mg by mouth once daily.        atorvastatin (LIPITOR) 10 MG tablet Take 1 tablet (10 mg total) by mouth once daily. Brand only 90 tablet 3    calcium-vitamin D3 (OS-LETY 500 + D3) 500 mg(1,250mg) -200 unit per tablet Take 1 tablet by mouth 2 (two) times daily with meals.      co-enzyme Q-10 30 mg capsule Take 100 mg by mouth once daily.       hydroCHLOROthiazide (MICROZIDE) 12.5 mg capsule Take 1 capsule (12.5 mg total) by mouth once daily. 90 capsule 3    irbesartan (AVAPRO) 150 MG tablet TAKE 1 TABLET (150 MG TOTAL) BY MOUTH EVERY EVENING. 90 tablet 2    LUMIGAN 0.01 % Drop Place 1 drop into both eyes every evening. 2.5 mL 12    timolol maleate 0.5% (TIMOPTIC-XE) 0.5 % SolG Place 1 drop into both eyes every morning. Timoptic XE or timolol GFS (gel forming solution) 5 mL 12    VITAMIN B-12 100 MCG tablet       vitamin D (VITAMIN D3) 1000 units Tab Take 1,000 Units by mouth once daily.      gabapentin (NEURONTIN) 100 MG capsule Take 1 capsule (100 mg total) by mouth every evening. 30 capsule 11     No current facility-administered medications on file prior to visit.        Review of patient's allergies indicates:   Allergen Reactions    Lisinopril Swelling    Amlodipine Edema    Combigan [brimonidine-timolol]      Causes itchy eyelids and contact dermatitis    Cosopt [dorzolamide-timolol]      Causes angular blepharitis of eyelids    Pravastatin      Myalgia and elevated CPK       Past Surgical History:   Procedure Laterality Date    BREAST BIOPSY Left     core    BREAST SURGERY Left     lumpectomy    CATARACT EXTRACTION W/  INTRAOCULAR LENS IMPLANT Left 10/05/2016    Dr. Mike    CATARACT EXTRACTION W/  INTRAOCULAR LENS IMPLANT Right 11/30/2016    With Quinten (Dr. Mike)    COLONOSCOPY N/A 1/23/2017    [facility])    HAND SURGERY Left 2011    thumb    HYSTERECTOMY  1980's     Total;    INSERTION-INTRAOCULAR LENS (IOL) Right 11/30/2016    [facility]    INSERTION-INTRAOCULAR LENS (IOL) Left 10/5/2016    [facility]    KAHOOK GONIOTOMY Right 11/30/2016    WITH CE ()    Left Breast Lumpectomy Left     performed in 1960s    OOPHORECTOMY      PHACOEMULSIFICATION-ASPIRATION-CATARACT Left 10/5/2016    [facility]    PHACOEMULSIFICATION-ASPIRATION-CATARACT WITH TRABEULOTOMY Right 11/30/2016    [facility]    SELECTIVE LASER TRABECUPLASTY  05/2014    OU w/ Dr. Mike    TUBAL LIGATION  1970's    VAGINAL DELIVERY      x3    YAG CAPSULOTOMY Left 11/29/2018           Family History   Problem Relation Age of Onset    Breast cancer Mother 50    Glaucoma Mother     Alzheimer's disease Mother     Blindness Mother     Cataracts Mother     Glaucoma Father     Prostate cancer Father     Blindness Father     Cataracts Father     Cancer Father 62        prostate    Glaucoma Sister     No Known Problems Brother     No Known Problems Brother     Alzheimer's disease Maternal Grandfather     Macular degeneration Neg Hx     Retinal detachment Neg Hx     Strabismus Neg Hx     Amblyopia Neg Hx     Heart attack Neg Hx     Heart disease Neg Hx     Ovarian cancer Neg Hx     Colon cancer Neg Hx        Social History     Socioeconomic History    Marital status:      Spouse name: Not on file    Number of children: 3    Years of education: Not on file    Highest education level: Not on file   Occupational History    Occupation: retired - formerly pastoral care with [employer]   Social Needs    Financial resource strain: Not hard at all    Food insecurity:     Worry: Never true     Inability: Never true    Transportation needs:     Medical: No     Non-medical: No  "  Tobacco Use    Smoking status: Never Smoker    Smokeless tobacco: Never Used   Substance and Sexual Activity    Alcohol use: Yes     Alcohol/week: 0.0 oz     Frequency: Never     Binge frequency: Never     Comment: Rarely; 1 glass of wine    Drug use: No    Sexual activity: Yes     Partners: Female, Male     Birth control/protection: Post-menopausal, See Surgical Hx   Lifestyle    Physical activity:     Days per week: 3 days     Minutes per session: 30 min    Stress: Not at all   Relationships    Social connections:     Talks on phone: More than three times a week     Gets together: More than three times a week     Attends Islam service: Not on file     Active member of club or organization: No     Attends meetings of clubs or organizations: Never     Relationship status:    Other Topics Concern    Not on file   Social History Narrative    Not on file               Objective:       Vitals:    07/29/19 1510   BP: 134/75   Pulse: (!) 55   Resp: 18   Weight: 89 kg (196 lb 3.4 oz)   Height: 5' 6" (1.676 m)   PainSc: 0-No pain        Physical Exam   Constitutional: She is oriented to person, place, and time. She appears well-developed and well-nourished.   Cardiovascular:   Pulses:       Dorsalis pedis pulses are 2+ on the right side, and 2+ on the left side.        Posterior tibial pulses are 2+ on the right side, and 2+ on the left side.   Musculoskeletal: She exhibits no edema or tenderness.        Right ankle: Normal.        Left ankle: Normal.        Right foot: There is no swelling, no crepitus and no deformity.        Left foot: There is no swelling, no crepitus and no deformity.   Adequate joint range of motion without pain, limitation, nor crepitation Bilateral feet and ankle joints. Muscle strength is 5/5 in all groups bilaterally.         Feet:   Right Foot:   Protective Sensation: 5 sites tested. 5 sites sensed.   Left Foot:   Protective Sensation: 5 sites tested. 5 sites sensed. "   Lymphadenopathy:   No palpable lymph nodes   Neurological: She is alert and oriented to person, place, and time. She has normal strength.   Skin: Skin is warm, dry and intact. No rash noted. No erythema. Nails show no clubbing.   Psychiatric: She has a normal mood and affect. Her behavior is normal.             Assessment:       Encounter Diagnosis   Name Primary?    Idiopathic peripheral neuropathy Yes         Plan:       Rosmery was seen today for pcp and foot problem.    Diagnoses and all orders for this visit:    Idiopathic peripheral neuropathy      I counseled the patient on her conditions, their implications and medical management.      Discussed potential etiologies of neuropathy   I do not currently have a  clear underlying cause   We did discuss EMG however pt declines. I did advise that it would not necessarily change the treatment plan   We did discuss re starting gabapentin at 300 mg qhs.   She wishes to hold off for now and potentially try some otc options   Discussed use of over the counter Vitamin B Complex, +/- Alpha Lipoic Acid      .

## 2019-09-11 NOTE — PROGRESS NOTES
HPI     DLS: 5/03/19    Pt here for HRT review; (HRT machine not working)    Meds:   T1/2 GFS QAM OU   Lumigan QHS OU   Systane PRN OU    1) POAG OU   2) PCIOL OU   3) Hx Hyperopia   4) Epiphora     Last edited by Ana Aquino on 9/13/2019  8:31 AM. (History)              Assessment /Plan     For exam results, see Encounter Report.    Primary open angle glaucoma of right eye, mild stage    Primary open angle glaucoma of left eye, moderate stage    Dry eye syndrome, bilateral    Angular blepharitis, unspecified laterality    Steroid responder, bilateral    Epiphora, right    Trichiasis of right upper eyelid    PCO (posterior capsular opacification), left    Pseudophakia of both eyes        Old pt of Dr. Eber Crouch  Sister is Marina Cortez (my pt.) she sent her to me      1. Early POAG-  Both Eyes   Dx. Years ago with Dr. Eber Crouch  -Strong family hx   -IOP previously well controlled (mid teens) on Xalatan but elevated on generic and switched to lumigan    Family history   STRONG (Mom,Dad, and 5 sisters)  Glaucoma meds   lumigan qhs OU, timolol gfs QAM OU  H/O adverse rxn to glaucoma drops - combigan - itchy lids  // cosopt - angular blepharitis   LASERS   S/p SLT 4/24/14 OD //  5/8/14 OS (good response 21/22 --> 12/15)// yag cap OS 11/29/2018   GLAUCOMA SURGERIES   [place] od 11/30/2016   OTHER EYE SURGERIES  Phaco /IOL os 10/6/2016 // phaco/IOL / [place] od 11/30/2016   CDR  0.75 / 0.7  Tbase  ??  Tmax  26 OU off all gtts (5/24/10)    Ttarget   16 OU  HVF - 7 VF '12 -18 -  OD SNS/IAD ; OS: IAD   Gonio +3 OU  /540  OCT 4  test 2011 to 2017  - RNFL - OD:dec. S/N/I  // OS dec S. Brianna JACOBS (+ prog ou)   HRT  6 test - 2012 to 2018 -   Disc photos  2012, 2015 - MR -  Brianna S/N/I od // dec. Sbrianna I/N/T os /// CDR 0.684 od // 0.748 osOIS     Ttoday 20 / 18 (using flonase prn  Test done today - gonio     2. H/O - hyperopia - Both Eyes -mild - pre-cataract surgery    3. Epiphora  - C/O constant tearing of the right eye  x 6 months (8/2015   4.  PC IOL   OD - phaco/IOL / [place] 11/30 /2016 - PCB00 22.5  OS - Phaco/IOL - NO COLTON's - pt declined - 10/6/2016 - PCB00 24.0       Plan:  -IOP 20-21 / 19-20  ou (target is 16 ou)   ++ use of flonase - inhaled steroids - ?? Steroid response   Off combigan - intolerant - itchy lids / blepharitis   Intol to cosopt - angular blepharitis   -Strong family hx  -Goal IOP 16 OU    CSM  Above target - using flonase - ? steroid response .   Target is 16 ou    Pt does not like generics - but has used  Latanoprost in past  without problems (actually prefers it to lumigan)   Intol to combigan -blepharitis / itch lids   Intol to cosopt - angular blepharitis     Tolerating  timolol gfs // timoptic XE ou q am - IOP higher than target today   Cont lumigan ou q hs - used pre - surgery without difficulty    rec repeat slt ou od then os - can do 270 od / skip nasal area 2/2 old kahook  //  os - no zen    Good response in 2014     Rx glasses given 2/2017     F/U repeat SLT od - 270 / h/o nasal [place] // repeat slt os - 360 - no zen

## 2019-09-13 ENCOUNTER — OFFICE VISIT (OUTPATIENT)
Dept: OPHTHALMOLOGY | Facility: CLINIC | Age: 74
End: 2019-09-13
Payer: MEDICARE

## 2019-09-13 ENCOUNTER — OFFICE VISIT (OUTPATIENT)
Dept: CARDIOLOGY | Facility: CLINIC | Age: 74
End: 2019-09-13
Payer: MEDICARE

## 2019-09-13 VITALS
WEIGHT: 195.44 LBS | OXYGEN SATURATION: 96 % | SYSTOLIC BLOOD PRESSURE: 133 MMHG | RESPIRATION RATE: 16 BRPM | DIASTOLIC BLOOD PRESSURE: 80 MMHG | BODY MASS INDEX: 31.54 KG/M2 | HEART RATE: 62 BPM

## 2019-09-13 DIAGNOSIS — Z96.1 PSEUDOPHAKIA OF BOTH EYES: ICD-10-CM

## 2019-09-13 DIAGNOSIS — H01.009 ANGULAR BLEPHARITIS, UNSPECIFIED LATERALITY: ICD-10-CM

## 2019-09-13 DIAGNOSIS — H04.201 EPIPHORA, RIGHT: ICD-10-CM

## 2019-09-13 DIAGNOSIS — H40.043 STEROID RESPONDER, BILATERAL: ICD-10-CM

## 2019-09-13 DIAGNOSIS — H40.1122 PRIMARY OPEN ANGLE GLAUCOMA OF LEFT EYE, MODERATE STAGE: ICD-10-CM

## 2019-09-13 DIAGNOSIS — H04.123 DRY EYE SYNDROME, BILATERAL: ICD-10-CM

## 2019-09-13 DIAGNOSIS — H40.1111 PRIMARY OPEN ANGLE GLAUCOMA OF RIGHT EYE, MILD STAGE: Primary | ICD-10-CM

## 2019-09-13 DIAGNOSIS — H26.492 PCO (POSTERIOR CAPSULAR OPACIFICATION), LEFT: ICD-10-CM

## 2019-09-13 DIAGNOSIS — R20.2 PARESTHESIA: Primary | ICD-10-CM

## 2019-09-13 DIAGNOSIS — H02.051 TRICHIASIS OF RIGHT UPPER EYELID: ICD-10-CM

## 2019-09-13 PROCEDURE — 92020 PR SPECIAL EYE EVAL,GONIOSCOPY: ICD-10-PCS | Mod: HCNC,S$GLB,, | Performed by: OPHTHALMOLOGY

## 2019-09-13 PROCEDURE — 1101F PT FALLS ASSESS-DOCD LE1/YR: CPT | Mod: HCNC,CPTII,S$GLB, | Performed by: INTERNAL MEDICINE

## 2019-09-13 PROCEDURE — 3079F DIAST BP 80-89 MM HG: CPT | Mod: HCNC,CPTII,S$GLB, | Performed by: INTERNAL MEDICINE

## 2019-09-13 PROCEDURE — 99999 PR PBB SHADOW E&M-EST. PATIENT-LVL III: CPT | Mod: PBBFAC,HCNC,, | Performed by: INTERNAL MEDICINE

## 2019-09-13 PROCEDURE — 99999 PR PBB SHADOW E&M-EST. PATIENT-LVL II: ICD-10-PCS | Mod: PBBFAC,HCNC,, | Performed by: OPHTHALMOLOGY

## 2019-09-13 PROCEDURE — 99214 OFFICE O/P EST MOD 30 MIN: CPT | Mod: HCNC,S$GLB,, | Performed by: INTERNAL MEDICINE

## 2019-09-13 PROCEDURE — 99214 PR OFFICE/OUTPT VISIT, EST, LEVL IV, 30-39 MIN: ICD-10-PCS | Mod: HCNC,S$GLB,, | Performed by: INTERNAL MEDICINE

## 2019-09-13 PROCEDURE — 99999 PR PBB SHADOW E&M-EST. PATIENT-LVL II: CPT | Mod: PBBFAC,HCNC,, | Performed by: OPHTHALMOLOGY

## 2019-09-13 PROCEDURE — 3075F SYST BP GE 130 - 139MM HG: CPT | Mod: HCNC,CPTII,S$GLB, | Performed by: INTERNAL MEDICINE

## 2019-09-13 PROCEDURE — 3075F PR MOST RECENT SYSTOLIC BLOOD PRESS GE 130-139MM HG: ICD-10-PCS | Mod: HCNC,CPTII,S$GLB, | Performed by: INTERNAL MEDICINE

## 2019-09-13 PROCEDURE — 3079F PR MOST RECENT DIASTOLIC BLOOD PRESSURE 80-89 MM HG: ICD-10-PCS | Mod: HCNC,CPTII,S$GLB, | Performed by: INTERNAL MEDICINE

## 2019-09-13 PROCEDURE — 99999 PR PBB SHADOW E&M-EST. PATIENT-LVL III: ICD-10-PCS | Mod: PBBFAC,HCNC,, | Performed by: INTERNAL MEDICINE

## 2019-09-13 PROCEDURE — 92020 GONIOSCOPY: CPT | Mod: HCNC,S$GLB,, | Performed by: OPHTHALMOLOGY

## 2019-09-13 PROCEDURE — 1101F PR PT FALLS ASSESS DOC 0-1 FALLS W/OUT INJ PAST YR: ICD-10-PCS | Mod: HCNC,CPTII,S$GLB, | Performed by: INTERNAL MEDICINE

## 2019-09-13 PROCEDURE — 92012 PR EYE EXAM, EST PATIENT,INTERMED: ICD-10-PCS | Mod: HCNC,S$GLB,, | Performed by: OPHTHALMOLOGY

## 2019-09-13 PROCEDURE — 92012 INTRM OPH EXAM EST PATIENT: CPT | Mod: HCNC,S$GLB,, | Performed by: OPHTHALMOLOGY

## 2019-09-13 NOTE — PROGRESS NOTES
CARDIOVASCULAR CONSULTATION    REASON FOR CONSULT:   Rosmery Ramirez is a 74 y.o. female who presents for follow-up.      HISTORY OF PRESENT ILLNESS:     Patient is a pleasant 74-year-old lady here for follow-up.  Denies any chest pains at rest on exertion, orthopnea, PND.  Complains of paresthesias in bilateral feet and requesting referral to Neurology for that.  Blood pressure well controlled.  No other cardiovascular complaints.       PAST MEDICAL HISTORY:     Past Medical History:   Diagnosis Date    Allergy     Anxiety     Breast cyst     Edema of leg 10/5/2012    bilateral     Fractures 2011    thumb R    GERD (gastroesophageal reflux disease)     Glaucoma     History of colonic polyps     Hx-TIA (transient ischemic attack) 8/13/2012 Jan 2012: LLE and left facial numbness lasting 1 hour     Hyperlipidemia     Hypertension     Left shoulder tendonitis 8/8/2015    Primary open-angle glaucoma, mild stage - Both Eyes 6/3/2013       PAST SURGICAL HISTORY:     Past Surgical History:   Procedure Laterality Date    BREAST BIOPSY Left     core    BREAST SURGERY Left     lumpectomy    CATARACT EXTRACTION W/  INTRAOCULAR LENS IMPLANT Left 10/05/2016    Dr. Mike    CATARACT EXTRACTION W/  INTRAOCULAR LENS IMPLANT Right 11/30/2016    With [place] (Dr. Mike)    COLONOSCOPY N/A 1/23/2017    Performed by Hany Mosquera MD at [facility] ENDO (4TH FLR)    HAND SURGERY Left 2011    thumb    HYSTERECTOMY  1980's    Total;    INSERTION-INTRAOCULAR LENS (IOL) Right 11/30/2016    Performed by Yesica Mike MD at [facility] OR 1ST FLR    INSERTION-INTRAOCULAR LENS (IOL) Left 10/5/2016    Performed by Yesica Mike MD at [facility] OR 1ST FLR    [facility] GONIOTOMY Right 11/30/2016    WITH CE ()    Left Breast Lumpectomy Left     performed in 1960s    OOPHORECTOMY      PHACOEMULSIFICATION-ASPIRATION-CATARACT Left 10/5/2016    Performed by Yesica Mike MD at [facility] OR 1ST FLR     PHACOEMULSIFICATION-ASPIRATION-CATARACT WITH TRABEULOTOMY Right 11/30/2016    [facility] at [facility] OR [address] [place] [address]    SELECTIVE LASER TRABECUPLASTY  05/2014    OU w/ Dr. Mike    TUBAL LIGATION  1970's    VAGINAL DELIVERY      x3    YAG CAPSULOTOMY Left 11/29/2018           ALLERGIES AND MEDICATION:     Review of patient's allergies indicates:   Allergen Reactions    Lisinopril Swelling    Amlodipine Edema    Combigan [brimonidine-timolol]      Causes itchy eyelids and contact dermatitis    Cosopt [dorzolamide-timolol]      Causes angular blepharitis of eyelids    Pravastatin      Myalgia and elevated CPK        Medication List           Accurate as of 9/13/19  3:32 PM. If you have any questions, ask your nurse or doctor.               CONTINUE taking these medications    aspirin 81 MG EC tablet  Commonly known as:  ECOTRIN     atorvastatin 10 MG tablet  Commonly known as:  LIPITOR  Take 1 tablet (10 mg total) by mouth once daily. Brand only     calcium-vitamin D3 500 mg(1,250mg) -200 unit per tablet  Commonly known as:  OS-LETY 500 + D3     co-enzyme Q-10 30 mg capsule     hydroCHLOROthiazide 12.5 mg capsule  Commonly known as:  MICROZIDE  Take 1 capsule (12.5 mg total) by mouth once daily.     irbesartan 150 MG tablet  Commonly known as:  AVAPRO  TAKE 1 TABLET (150 MG TOTAL) BY MOUTH EVERY EVENING.     LUMIGAN 0.01 % Drop  Generic drug:  bimatoprost  Place 1 drop into both eyes every evening.     timolol maleate 0.5% 0.5 % Solg  Commonly known as:  TIMOPTIC-XE  Place 1 drop into both eyes every morning. Timoptic XE or timolol GFS (gel forming solution)     VITAMIN B-12 100 MCG tablet  Generic drug:  cyanocobalamin     vitamin D 1000 units Tab  Commonly known as:  VITAMIN D3        STOP taking these medications    gabapentin 100 MG capsule  Commonly known as:  NEURONTIN  Stopped by:  Yesica Mike MD            SOCIAL HISTORY:     Social History     Socioeconomic  History    Marital status:      Spouse name: Not on file    Number of children: 3    Years of education: Not on file    Highest education level: Not on file   Occupational History    Occupation: retired - formerly pastoral care with [employer]    Financial resource strain: Not hard at all    Food insecurity:     Worry: Never true     Inability: Never true    Transportation needs:     Medical: No     Non-medical: No   Tobacco Use    Smoking status: Never Smoker    Smokeless tobacco: Never Used   Substance and Sexual Activity    Alcohol use: Yes     Alcohol/week: 0.0 oz     Frequency: Never     Binge frequency: Never     Comment: Rarely; 1 glass of wine    Drug use: No    Sexual activity: Yes     Partners: Female, Male     Birth control/protection: Post-menopausal, See Surgical Hx   Lifestyle    Physical activity:     Days per week: 3 days     Minutes per session: 30 min    Stress: Not at all   Relationships    Social connections:     Talks on phone: More than three times a week     Gets together: More than three times a week     Attends Yarsani service: Not on file     Active member of club or organization: No     Attends meetings of clubs or organizations: Never     Relationship status:    Other Topics Concern    Not on file   Social History Narrative    Not on file       FAMILY HISTORY:     Family History   Problem Relation Age of Onset    Breast cancer Mother 50    Glaucoma Mother     Alzheimer's disease Mother     Blindness Mother     Cataracts Mother     Glaucoma Father     Prostate cancer Father     Blindness Father     Cataracts Father     Cancer Father 62        prostate    Glaucoma Sister     No Known Problems Brother     No Known Problems Brother     Alzheimer's disease Maternal Grandfather     Macular degeneration Neg Hx     Retinal detachment Neg Hx     Strabismus Neg Hx     Amblyopia Neg Hx     Heart attack Neg Hx     Heart disease  Neg Hx     Ovarian cancer Neg Hx     Colon cancer Neg Hx        REVIEW OF SYSTEMS:   Review of Systems   Constitutional: Negative.    HENT: Negative.    Eyes: Negative.    Respiratory: Negative.    Cardiovascular: Negative.    Gastrointestinal: Negative.    Genitourinary: Negative.    Musculoskeletal: Negative.    Skin: Negative.    Neurological: Positive for tingling.   Endo/Heme/Allergies: Negative.        A 10 point review of systems was performed and all the pertinent positives have been mentioned. Rest of review of systems was negative.        PHYSICAL EXAM:     Vitals:    09/13/19 1519   BP: 133/80   Pulse: 62   Resp: 16    Body mass index is 31.54 kg/m².  Weight: 88.6 kg (195 lb 7 oz)         Physical Exam   Constitutional: She is oriented to person, place, and time. She appears well-developed and well-nourished. She is active.   HENT:   Head: Normocephalic and atraumatic.   Right Ear: Hearing normal.   Left Ear: Hearing normal.   Nose: Nose normal.   Mouth/Throat: Mucous membranes are normal.   Eyes: Pupils are equal, round, and reactive to light. Conjunctivae and lids are normal.   Neck: Normal range of motion. Neck supple.   Cardiovascular: Normal rate, regular rhythm, normal heart sounds, intact distal pulses and normal pulses.   Pulmonary/Chest: Effort normal and breath sounds normal.   Abdominal: Soft. Normal appearance. There is no tenderness.   Musculoskeletal: She exhibits no edema or deformity.   Neurological: She is alert and oriented to person, place, and time.   Skin: Skin is warm, dry and intact.   Psychiatric: She has a normal mood and affect. Her speech is normal.   Nursing note and vitals reviewed.        DATA:     Laboratory:  CBC:  Recent Labs   Lab 06/27/18  1601 06/30/18  1457   WBC 6.00 6.15   Hemoglobin 12.4 12.7   Hematocrit 38.3 38.7   Platelets 302 293       CHEMISTRIES:  Recent Labs   Lab 06/30/18  1457 04/22/19  1015 06/03/19  1402   Glucose 85 94 77   Sodium 139 139 140    Potassium 4.0 4.0 4.2   BUN, Bld 16 14 14   Creatinine 0.8 0.8 0.8   eGFR if African American >60.0 >60.0 >60.0   eGFR if non African American >60.0 >60.0 >60.0   Calcium 10.1 10.1 10.0       CARDIAC BIOMARKERS:  Recent Labs   Lab 06/30/18  1457   Troponin I <0.006       COAGS:        LIPIDS/LFTS:  Recent Labs   Lab 10/14/16  1339 06/27/18  1601 04/22/19  1015 06/03/19  1402   Cholesterol  --  166 165 117 L   Triglycerides  --  186 H 81 96   HDL  --  39 L 49 41   LDL Cholesterol  --  89.8 99.8 56.8 L   Non-HDL Cholesterol  --  127 116 76   AST 21  --   --  21   ALT 22  --   --  21       Hemoglobin A1C   Date Value Ref Range Status   04/22/2019 5.8 (H) 4.0 - 5.6 % Final     Comment:     ADA Screening Guidelines:  5.7-6.4%  Consistent with prediabetes  >or=6.5%  Consistent with diabetes  High levels of fetal hemoglobin interfere with the HbA1C  assay. Heterozygous hemoglobin variants (HbS, HgC, etc)do  not significantly interfere with this assay.   However, presence of multiple variants may affect accuracy.     06/27/2018 5.9 (H) 4.0 - 5.6 % Final     Comment:     ADA Screening Guidelines:  5.7-6.4%  Consistent with prediabetes  >or=6.5%  Consistent with diabetes  High levels of fetal hemoglobin interfere with the HbA1C  assay. Heterozygous hemoglobin variants (HbS, HgC, etc)do  not significantly interfere with this assay.   However, presence of multiple variants may affect accuracy.     04/24/2014 6.5 (H) 4.5 - 6.2 % Final       TSH  Recent Labs   Lab 06/03/19  1402   TSH 2.784       The ASCVD Risk score ([place] LISY Jr., et al., 2013) failed to calculate for the following reasons:    The valid total cholesterol range is 130 to 320 mg/dL           Cardiovascular Testing:    Reviewed      ASSESSMENT AND PLAN     Patient Active Problem List   Diagnosis    Essential hypertension    Hyperlipidemia; statin myalgias; 6/28/2018 exercise stress echo neg for ischemia    POAG (primary open-angle glaucoma)    Class 2 obesity  due to excess calories in adult    Cataract    Nuclear sclerosis    Left knee pain    History of colon polyps; 1/23/2017 colonoscopy normal repeat 5 years    Bradycardia 6/2018 holter negative    Status post hysterectomy    Statin myopathy    Chronic bilateral low back pain without sciatica    Vitamin D deficiency    Abnormal glucose    Thyroid nodule on US 4/2019 largest 1.2 cm repeat 1 year    Idiopathic peripheral neuropathy normal B12, TSH; A1c pre-DM. 6/2019 gabapentin    Acute pain of left knee    Weakness of both hips    Decreased independence with transfers    Impaired functional mobility, balance, gait, and endurance       1.  Patient here for follow-up.  No significant cardiovascular symptomatology.  Blood pressure well controlled on current therapy.  Complaining of paresthesias in bilateral feet.  Requesting referral to neurology.  Referral placed.      Follow-up in 1 year          Thank you very much for involving me in the care of your patient.  Please do not hesitate to contact me if there are any questions.      Julio Dye MD, FACC, [facility]  Interventional Cardiologist, [facility].           This note was dictated with the help of speech recognition software.  There might be un-intended errors and/or substitutions.

## 2019-09-22 ENCOUNTER — PATIENT MESSAGE (OUTPATIENT)
Dept: OPHTHALMOLOGY | Facility: CLINIC | Age: 74
End: 2019-09-22

## 2019-09-24 NOTE — PROGRESS NOTES
HPI     DLS: 9/13/19    Pt here for SLT OD;  Pt states no eye pain or discomfort this AM.    Meds:   T1/2 GFS QAM OU   Lumigan QHS OU   Systane PRN OU     1) POAG OU   2) PCIOL OU   3) Hx Hyperopia   4) Epiphora     Last edited by Ana Aquino on 9/26/2019  9:25 AM. (History)              Assessment /Plan     For exam results, see Encounter Report.    Primary open angle glaucoma of right eye, mild stage    Primary open angle glaucoma of left eye, moderate stage    Dry eye syndrome, bilateral    Angular blepharitis, unspecified laterality    Steroid responder, bilateral    Epiphora, right    Trichiasis of right upper eyelid    PCO (posterior capsular opacification), left    Pseudophakia of both eyes      Old pt of Dr. Eber Crouch  Sister is Marina Cortez (my pt.) she sent her to me      1. Early POAG-  Both Eyes   Dx. Years ago with Dr. Eber Crouch  -Strong family hx   -IOP previously well controlled (mid teens) on Xalatan but elevated on generic and switched to lumigan    Family history   STRONG (Mom,Dad, and 5 sisters)  Glaucoma meds   lumigan qhs OU, timolol gfs QAM OU  H/O adverse rxn to glaucoma drops - combigan - itchy lids  // cosopt - angular blepharitis   LASERS   S/p SLT 4/24/14 OD //  5/8/14 OS (good response 21/22 --> 12/15)// yag cap OS 11/29/2018   GLAUCOMA SURGERIES   [place] od 11/30/2016   OTHER EYE SURGERIES  Phaco /IOL os 10/6/2016 // phaco/IOL / [place] od 11/30/2016   CDR  0.75 / 0.7  Tbase  ??  Tmax  26 OU off all gtts (5/24/10)    Ttarget   16 OU  HVF - 7 VF '12 -18 -  OD SNS/IAD ; OS: IAD   Gonio +3 OU  /540  OCT 4  test 2011 to 2017  - RNFL - OD:dec. S/N/I  // OS dec S. Brianna N (+ prog ou)   HRT  6 test - 2012 to 2018 -   Disc photos  2012, 2015 - MR -  Brianna S/N/I od // dec. Sbrianna I/N/T os /// CDR 0.684 od // 0.748 osOIS     Ttoday 20 / 17 (using flonase prn  Test done today - slt od      2. H/O - hyperopia - Both Eyes -mild - pre-cataract surgery    3. Epiphora  - C/O constant  tearing of the right eye x 6 months (8/2015   4.  PC IOL   OD - phaco/IOL / [place] 11/30 /2016 - PCB00 22.5  OS - Phaco/IOL - NO COLTON's - pt declined - 10/6/2016 - PCB00 24.0       Plan:  -IOP 20-21 / 19-20  ou (target is 16 ou)   ++ use of flonase - inhaled steroids - ?? Steroid response   Off combigan - intolerant - itchy lids / blepharitis   Intol to cosopt - angular blepharitis   -Strong family hx  -Goal IOP 16 OU    CSM  Above target - using flonase - ? steroid response .   Target is 16 ou    Pt does not like generics - but has used  Latanoprost in past  without problems (actually prefers it to lumigan)   Intol to combigan -blepharitis / itch lids   Intol to cosopt - angular blepharitis     Tolerating  timolol gfs // timoptic XE ou q am - IOP higher than target today   Cont lumigan ou q hs - used pre - surgery without difficulty    SLT od - 9/26/2019 - steroid taper   SLT os - pending     rec repeat slt ou od then os - can do 270 od / skip nasal area 2/2 old [place]  //  os - no zen    Good response in 2014     Rx glasses given 2/2017

## 2019-09-25 ENCOUNTER — PATIENT OUTREACH (OUTPATIENT)
Dept: ADMINISTRATIVE | Facility: OTHER | Age: 74
End: 2019-09-25

## 2019-09-26 ENCOUNTER — OFFICE VISIT (OUTPATIENT)
Dept: OPHTHALMOLOGY | Facility: CLINIC | Age: 74
End: 2019-09-26
Payer: MEDICARE

## 2019-09-26 ENCOUNTER — PATIENT MESSAGE (OUTPATIENT)
Dept: OPHTHALMOLOGY | Facility: CLINIC | Age: 74
End: 2019-09-26

## 2019-09-26 VITALS — HEART RATE: 60 BPM | DIASTOLIC BLOOD PRESSURE: 77 MMHG | SYSTOLIC BLOOD PRESSURE: 125 MMHG

## 2019-09-26 DIAGNOSIS — H04.123 DRY EYE SYNDROME, BILATERAL: ICD-10-CM

## 2019-09-26 DIAGNOSIS — H40.1111 PRIMARY OPEN ANGLE GLAUCOMA OF RIGHT EYE, MILD STAGE: Primary | ICD-10-CM

## 2019-09-26 DIAGNOSIS — H04.201 EPIPHORA, RIGHT: ICD-10-CM

## 2019-09-26 DIAGNOSIS — H02.051 TRICHIASIS OF RIGHT UPPER EYELID: ICD-10-CM

## 2019-09-26 DIAGNOSIS — H40.1122 PRIMARY OPEN ANGLE GLAUCOMA OF LEFT EYE, MODERATE STAGE: ICD-10-CM

## 2019-09-26 DIAGNOSIS — H40.043 STEROID RESPONDER, BILATERAL: ICD-10-CM

## 2019-09-26 DIAGNOSIS — H01.009 ANGULAR BLEPHARITIS, UNSPECIFIED LATERALITY: ICD-10-CM

## 2019-09-26 DIAGNOSIS — Z96.1 PSEUDOPHAKIA OF BOTH EYES: ICD-10-CM

## 2019-09-26 DIAGNOSIS — H26.492 PCO (POSTERIOR CAPSULAR OPACIFICATION), LEFT: ICD-10-CM

## 2019-09-26 PROCEDURE — 65855 TRABECULOPLASTY LASER SURG: CPT | Mod: HCNC,RT,S$GLB, | Performed by: OPHTHALMOLOGY

## 2019-09-26 PROCEDURE — 65855 ARGON TRABECULOPLASTY - OD - RIGHT EYE: ICD-10-PCS | Mod: HCNC,RT,S$GLB, | Performed by: OPHTHALMOLOGY

## 2019-09-26 PROCEDURE — 99499 UNLISTED E&M SERVICE: CPT | Mod: HCNC,S$GLB,, | Performed by: OPHTHALMOLOGY

## 2019-09-26 PROCEDURE — 99999 PR PBB SHADOW E&M-EST. PATIENT-LVL III: CPT | Mod: PBBFAC,HCNC,, | Performed by: OPHTHALMOLOGY

## 2019-09-26 PROCEDURE — 99499 NO LOS: ICD-10-PCS | Mod: HCNC,S$GLB,, | Performed by: OPHTHALMOLOGY

## 2019-09-26 PROCEDURE — 99999 PR PBB SHADOW E&M-EST. PATIENT-LVL III: ICD-10-PCS | Mod: PBBFAC,HCNC,, | Performed by: OPHTHALMOLOGY

## 2019-10-07 DIAGNOSIS — E78.00 PURE HYPERCHOLESTEROLEMIA: ICD-10-CM

## 2019-10-07 RX ORDER — ATORVASTATIN CALCIUM 10 MG/1
10 TABLET, FILM COATED ORAL DAILY
Qty: 90 TABLET | Refills: 3 | Status: SHIPPED | OUTPATIENT
Start: 2019-10-07 | End: 2019-10-11 | Stop reason: SDUPTHER

## 2019-10-07 NOTE — TELEPHONE ENCOUNTER
----- Message from Pita Hankins sent at 10/7/2019  4:15 PM CDT -----  Contact: [facility] [phone]  .Type: RX Refill Request    Who Called:  [facility] [phone]    Have you contacted your pharmacy: yes     Refill or New Rx: refill     RX Name and Strength: atorvastatin (LIPITOR) 10 MG tablet    Is this a 30 day or 90 day RX: 90    Preferred Pharmacy with phone number:  .  [facility] - [place], [place] - [address]  [address] [address]  Phone: [phone] Fax: [phone]    Local or Mail Order: mail      Would the patient rather a call back or a response via [facility]?  Call back     Best Call Back Number: [phone]

## 2019-10-10 ENCOUNTER — PATIENT MESSAGE (OUTPATIENT)
Dept: FAMILY MEDICINE | Facility: CLINIC | Age: 74
End: 2019-10-10

## 2019-10-10 DIAGNOSIS — E78.00 PURE HYPERCHOLESTEROLEMIA: ICD-10-CM

## 2019-10-11 RX ORDER — ATORVASTATIN CALCIUM 10 MG/1
10 TABLET, FILM COATED ORAL DAILY
Qty: 90 TABLET | Refills: 3 | Status: SHIPPED | OUTPATIENT
Start: 2019-10-11 | End: 2019-10-23

## 2019-10-14 NOTE — PROGRESS NOTES
HPI     Glaucoma      Additional comments: Here for SLT OS              Comments     DLS: 9/26/19    Pt here for SLT OS;    Meds:   T1/2 GFS QAM OU   Lumigan QHS OU   Systane PRN OU     1) POAG OU   2) PCIOL OU   3) Hx Hyperopia   4) Epiphora   S/p SLT OD- 9/26/2019          Last edited by Dimitrios Thompson on 10/17/2019  9:42 AM. (History)              Assessment /Plan     For exam results, see Encounter Report.    Primary open angle glaucoma of right eye, mild stage    Primary open angle glaucoma of left eye, moderate stage    Dry eye syndrome, bilateral    Angular blepharitis, unspecified laterality    Steroid responder, bilateral    Epiphora, right    Trichiasis of right upper eyelid    PCO (posterior capsular opacification), left    Pseudophakia of both eyes        Old pt of Dr. Eber Crouch  Sister is Marina Cortez (my pt.) she sent her to me      1. Early POAG-  Both Eyes   Dx. Years ago with Dr. Eber Crouch  -Strong family hx   -IOP previously well controlled (mid teens) on Xalatan but elevated on generic and switched to lumigan    Family history   STRONG (Mom,Dad, and 5 sisters)  Glaucoma meds   lumigan qhs OU, timolol gfs QAM OU  H/O adverse rxn to glaucoma drops - combigan - itchy lids  // cosopt - angular blepharitis   LASERS   S/p SLT 4/24/14 OD //  5/8/14 OS (good response 21/22 --> 12/15)// yag cap OS 11/29/2018             Repeat SLT od 9/26/2019-  (good resp, 20-->15) // Repeat SLT os -10/17/2019 -     GLAUCOMA SURGERIES   [place] od 11/30/2016   OTHER EYE SURGERIES  Phaco /IOL os 10/6/2016 // phaco/IOL / [place] od 11/30/2016   CDR  0.75 / 0.7  Tbase  ??  Tmax  26 OU off all gtts (5/24/10)    Ttarget   16 OU  HVF - 7 VF '12 -18 -  OD SNS/IAD ; OS: IAD   Gonio +3 OU  /540  OCT 4  test 2011 to 2017  - RNFL - OD:dec. S/N/I  // OS dec NURYS Reed N (+ prog ou)   HRT  6 test - 2012 to 2018 -   Disc photos  2012, 2015 - MR -  Brianna S/N/I od // dec. S, brianna I/N/T os /// CDR 0.684 od // 0.748 Song Nam  15/15  Test done today - SLT OS today // F/U slt od      2. H/O - hyperopia - Both Eyes -mild - pre-cataract surgery    3. Epiphora  - C/O constant tearing of the right eye x 6 months (8/2015   4.  PC IOL   OD - phaco/IOL / [place] 11/30 /2016 - PCB00 22.5  OS - Phaco/IOL - NO COLTON's - pt declined - 10/6/2016 - PCB00 24.0       Plan:  -IOP 20-21 / 19-20  ou (target is 16 ou)   ++ use of flonase - inhaled steroids - ?? Steroid response   Off combigan - intolerant - itchy lids / blepharitis   Intol to cosopt - angular blepharitis   -Strong family hx  -Goal IOP 16 OU    CSM  Above target - using flonase - ? steroid response .   Target is 16 ou    Pt does not like generics - but has used  Latanoprost in past  without problems (actually prefers it to lumigan)   Intol to combigan -blepharitis / itch lids   Intol to cosopt - angular blepharitis     Tolerating  timolol gfs // timoptic XE ou q am - IOP higher than target today   Cont lumigan ou q hs - used pre - surgery without difficulty    SLT od - 9/26/2019 - good response 20-->15 // repeat   SLT os -  10/17/2019 - steroid taper // repeat     rec repeat slt ou od then os - can do 270 od / skip nasal area 2/2 old [place]  //  os - no zen    Good response in 2014     Rx glasses given 2/2017     F/U 6 weeks - IOP check ou post repeat SLT's

## 2019-10-15 ENCOUNTER — PATIENT OUTREACH (OUTPATIENT)
Dept: ADMINISTRATIVE | Facility: OTHER | Age: 74
End: 2019-10-15

## 2019-10-17 ENCOUNTER — OFFICE VISIT (OUTPATIENT)
Dept: OPHTHALMOLOGY | Facility: CLINIC | Age: 74
End: 2019-10-17
Payer: MEDICARE

## 2019-10-17 DIAGNOSIS — H04.123 DRY EYE SYNDROME, BILATERAL: ICD-10-CM

## 2019-10-17 DIAGNOSIS — H40.1122 PRIMARY OPEN ANGLE GLAUCOMA OF LEFT EYE, MODERATE STAGE: Primary | ICD-10-CM

## 2019-10-17 DIAGNOSIS — H01.009 ANGULAR BLEPHARITIS, UNSPECIFIED LATERALITY: ICD-10-CM

## 2019-10-17 DIAGNOSIS — H02.051 TRICHIASIS OF RIGHT UPPER EYELID: ICD-10-CM

## 2019-10-17 DIAGNOSIS — H40.1111 PRIMARY OPEN ANGLE GLAUCOMA OF RIGHT EYE, MILD STAGE: ICD-10-CM

## 2019-10-17 DIAGNOSIS — H40.043 STEROID RESPONDER, BILATERAL: ICD-10-CM

## 2019-10-17 DIAGNOSIS — H04.201 EPIPHORA, RIGHT: ICD-10-CM

## 2019-10-17 DIAGNOSIS — Z96.1 PSEUDOPHAKIA OF BOTH EYES: ICD-10-CM

## 2019-10-17 DIAGNOSIS — H26.492 PCO (POSTERIOR CAPSULAR OPACIFICATION), LEFT: ICD-10-CM

## 2019-10-17 PROCEDURE — 99999 PR PBB SHADOW E&M-EST. PATIENT-LVL II: ICD-10-PCS | Mod: PBBFAC,HCNC,, | Performed by: OPHTHALMOLOGY

## 2019-10-17 PROCEDURE — 99999 PR PBB SHADOW E&M-EST. PATIENT-LVL II: CPT | Mod: PBBFAC,HCNC,, | Performed by: OPHTHALMOLOGY

## 2019-10-17 PROCEDURE — 99499 UNLISTED E&M SERVICE: CPT | Mod: HCNC,S$GLB,, | Performed by: OPHTHALMOLOGY

## 2019-10-17 PROCEDURE — 99499 NO LOS: ICD-10-PCS | Mod: HCNC,S$GLB,, | Performed by: OPHTHALMOLOGY

## 2019-10-17 PROCEDURE — 65855 TRABECULOPLASTY LASER SURG: CPT | Mod: HCNC,LT,S$GLB, | Performed by: OPHTHALMOLOGY

## 2019-10-17 PROCEDURE — 65855 ARGON TRABECULOPLASTY: ICD-10-PCS | Mod: HCNC,LT,S$GLB, | Performed by: OPHTHALMOLOGY

## 2019-10-23 ENCOUNTER — LAB VISIT (OUTPATIENT)
Dept: LAB | Facility: HOSPITAL | Age: 74
End: 2019-10-23
Attending: INTERNAL MEDICINE
Payer: MEDICARE

## 2019-10-23 ENCOUNTER — TELEPHONE (OUTPATIENT)
Dept: NEUROLOGY | Facility: CLINIC | Age: 74
End: 2019-10-23

## 2019-10-23 ENCOUNTER — OFFICE VISIT (OUTPATIENT)
Dept: FAMILY MEDICINE | Facility: CLINIC | Age: 74
End: 2019-10-23
Payer: MEDICARE

## 2019-10-23 VITALS
WEIGHT: 194 LBS | OXYGEN SATURATION: 97 % | BODY MASS INDEX: 32.32 KG/M2 | DIASTOLIC BLOOD PRESSURE: 80 MMHG | SYSTOLIC BLOOD PRESSURE: 128 MMHG | TEMPERATURE: 98 F | HEIGHT: 65 IN | HEART RATE: 60 BPM

## 2019-10-23 DIAGNOSIS — E78.00 PURE HYPERCHOLESTEROLEMIA: ICD-10-CM

## 2019-10-23 DIAGNOSIS — S93.402A SPRAIN OF LEFT ANKLE, UNSPECIFIED LIGAMENT, INITIAL ENCOUNTER: ICD-10-CM

## 2019-10-23 DIAGNOSIS — M79.651 PAIN OF RIGHT THIGH: Primary | ICD-10-CM

## 2019-10-23 DIAGNOSIS — M79.651 PAIN OF RIGHT THIGH: ICD-10-CM

## 2019-10-23 DIAGNOSIS — Z79.899 ON STATIN THERAPY: ICD-10-CM

## 2019-10-23 LAB — CK SERPL-CCNC: 116 U/L (ref 20–180)

## 2019-10-23 PROCEDURE — 99999 PR PBB SHADOW E&M-EST. PATIENT-LVL III: ICD-10-PCS | Mod: PBBFAC,HCNC,, | Performed by: INTERNAL MEDICINE

## 2019-10-23 PROCEDURE — 99999 PR PBB SHADOW E&M-EST. PATIENT-LVL III: CPT | Mod: PBBFAC,HCNC,, | Performed by: INTERNAL MEDICINE

## 2019-10-23 PROCEDURE — 3074F PR MOST RECENT SYSTOLIC BLOOD PRESSURE < 130 MM HG: ICD-10-PCS | Mod: HCNC,CPTII,S$GLB, | Performed by: INTERNAL MEDICINE

## 2019-10-23 PROCEDURE — 82550 ASSAY OF CK (CPK): CPT | Mod: HCNC

## 2019-10-23 PROCEDURE — 3079F DIAST BP 80-89 MM HG: CPT | Mod: HCNC,CPTII,S$GLB, | Performed by: INTERNAL MEDICINE

## 2019-10-23 PROCEDURE — 1101F PR PT FALLS ASSESS DOC 0-1 FALLS W/OUT INJ PAST YR: ICD-10-PCS | Mod: HCNC,CPTII,S$GLB, | Performed by: INTERNAL MEDICINE

## 2019-10-23 PROCEDURE — 36415 COLL VENOUS BLD VENIPUNCTURE: CPT | Mod: HCNC,PO

## 2019-10-23 PROCEDURE — 3079F PR MOST RECENT DIASTOLIC BLOOD PRESSURE 80-89 MM HG: ICD-10-PCS | Mod: HCNC,CPTII,S$GLB, | Performed by: INTERNAL MEDICINE

## 2019-10-23 PROCEDURE — 1101F PT FALLS ASSESS-DOCD LE1/YR: CPT | Mod: HCNC,CPTII,S$GLB, | Performed by: INTERNAL MEDICINE

## 2019-10-23 PROCEDURE — 99214 PR OFFICE/OUTPT VISIT, EST, LEVL IV, 30-39 MIN: ICD-10-PCS | Mod: HCNC,S$GLB,, | Performed by: INTERNAL MEDICINE

## 2019-10-23 PROCEDURE — 3074F SYST BP LT 130 MM HG: CPT | Mod: HCNC,CPTII,S$GLB, | Performed by: INTERNAL MEDICINE

## 2019-10-23 PROCEDURE — 99214 OFFICE O/P EST MOD 30 MIN: CPT | Mod: HCNC,S$GLB,, | Performed by: INTERNAL MEDICINE

## 2019-10-23 RX ORDER — ATORVASTATIN CALCIUM 10 MG/1
10 TABLET, FILM COATED ORAL EVERY OTHER DAY
Qty: 90 TABLET | Refills: 3
Start: 2019-10-23 | End: 2020-08-18 | Stop reason: SDUPTHER

## 2019-10-23 NOTE — PROGRESS NOTES
This note was created by combination of typed  and M-Modal dictation.  Transcription errors may be present.  If there are any questions, please contact me.    Assessment / Plan:   Pain of right thigh  On statin therapy  Pure hypercholesterolemia  -she recalls having similar symptomatology back in 2012 with statin myalgias, at that time she was on pravastatin with elevated CPK that improved after stopping statin.  At the time she was having pain from her thigh on distally and this is only involving her quadriceps area.  I wonder if this is a muscle strain.  I will check a CPK.  In the interim she can decrease the Lipitor to every other day.  If CPK high, see how she does with every other day.  If CPK normal would stay on Lipitor every other day.  If pain persists after few weeks she can try stopping the Lipitor completely.  Would like for her to stay on statin therapy ideally because of a history of TIA.  -     CK; Future; Expected date: 10/23/2019  -     atorvastatin (LIPITOR) 10 MG tablet; Take 1 tablet (10 mg total) by mouth every other day. Brand only  Dispense: 90 tablet; Refill: 3    Sprain of left ankle, unspecified ligament, initial encounter  -pain that started after a prolonged car ride.  No unilateral edema, no calf pain, I do not think this is DVT.  Seems to be more like sprain though it is unclear the mechanism of this.  Rest, cold packs, range-of-motion exercises discussed.    Medications Discontinued During This Encounter   Medication Reason    atorvastatin (LIPITOR) 10 MG tablet        meds sent this encounter:  Medications Ordered This Encounter   Medications    atorvastatin (LIPITOR) 10 MG tablet     Sig: Take 1 tablet (10 mg total) by mouth every other day. Brand only     Dispense:  90 tablet     Refill:  3       Follow Up: No follow-ups on file.      Subjective:     Chief Complaint   Patient presents with    Muscle Pain     in right thigh for a few weeks    Ankle Pain     left ankle  pain started this weekend after 4 hour drive from [place]       OVI Botello is a 74 y.o. female, last appointment with this clinic was 6/3/2019.    No LMP recorded. Patient has had a hysterectomy.    Hypertension, hyperlipidemia, bradycardia.  Followed by Cardiology.  Seen in September.  No significant CV symptoms.  BP was controlled.  She had complained of paresthesia and she was referred to Neurology.  I had seen her back in June for neuropathy mainly of just the plantar toes.  Labs were normal.  Started on gabapentin.  Restarted on Lipitor.    4 hour drive to [place] this weekend - [place].  After the drive, stepped out of the car, and immediately had pain in her left lateral ankle.  couldn't walk b/c of pain in the ankle. No obvious swelling. Pain with plantar flexion of the foot.  Sitting at rest no pain.     And the right thigh muscle - hurting in the past few weeks. Wonders if I's due to statin.  Has been on statin for the past 3 months.  Mainly if she tries to sit cross legged.  If she walks, goes up and down stairs, no pain.  But if she tries to cross to put on socks - hurts.     2012 CPK was high in the 500 range, on statin, dropped down with stopping statin. At the time was on pravastatin I believe. 3/2012 CPIK high and on subsequent testing 4/2012 and 5/2012 normalized.    Hx of statin therapy with myalgias, that had previously affected the right leg.  It was the thigh on distally. This symptoms - just the thigh.    She is taking CoQ10 100 mg daily.    She was referred to Neurology by Cardiology and she was never contacted for the consult.    Answers for HPI/ROS submitted by the patient on 10/23/2019   activity change: No  unexpected weight change: No  rhinorrhea: No  trouble swallowing: No  visual disturbance: No  chest tightness: No  polyuria: No  difficulty urinating: No  menstrual problem: No  joint swelling: Yes  arthralgias: Yes  confusion: No  dysphoric mood: No      Patient Care Team:  Ancelmo  STEPHANY Sumner MD as PCP - General (Internal Medicine)  Brooke Delong MD as Obstetrician (Obstetrics)  Arnoldo Valentin MD as Consulting Physician (Otolaryngology)  Tim Freeman MA as Care Coordinator    Patient Active Problem List    Diagnosis Date Noted    Acute pain of left knee 06/18/2019    Weakness of both hips 06/18/2019    Decreased independence with transfers 06/18/2019    Impaired functional mobility, balance, gait, and endurance 06/18/2019    Idiopathic peripheral neuropathy normal B12, TSH; A1c pre-DM. 6/2019 gabapentin 06/03/2019    Thyroid nodule on US 4/2019 largest 1.2 cm repeat 1 year 04/22/2019    Abnormal glucose 04/18/2019    Vitamin D deficiency 04/04/2019    Chronic bilateral low back pain without sciatica 11/02/2018    Bradycardia 6/2018 holter negative 07/20/2018    Status post hysterectomy 07/20/2018    Statin myopathy 07/20/2018    History of colon polyps; 1/23/2017 colonoscopy normal repeat 5 years 01/23/2017    Left knee pain 01/05/2017    Nuclear sclerosis 11/30/2016    Cataract 09/07/2016     Dx updated per 2019 IMO Load      Class 2 obesity due to excess calories in adult 07/20/2016    POAG (primary open-angle glaucoma) 01/05/2016    Essential hypertension 08/13/2012    Hyperlipidemia; statin myalgias; 6/28/2018 exercise stress echo neg for ischemia 08/13/2012 6/28/2018 exercise stress echo CONCLUSIONS   1 - Normal left ventricular systolic function (EF 60-65%).   2 - No wall motion abnormalities.   3 - Normal left ventricular diastolic function.   4 - Normal right ventricular systolic function .          PAST MEDICAL HISTORY:  Past Medical History:   Diagnosis Date    Allergy     Anxiety     Breast cyst     Edema of leg 10/5/2012    bilateral     Fractures 2011    thumb R    GERD (gastroesophageal reflux disease)     Glaucoma     History of colonic polyps     Hx-TIA (transient ischemic attack) 8/13/2012 Jan 2012: LLE and left facial  numbness lasting 1 hour     Hyperlipidemia     Hypertension     Left shoulder tendonitis 8/8/2015    Primary open-angle glaucoma, mild stage - Both Eyes 6/3/2013       PAST SURGICAL HISTORY:  Past Surgical History:   Procedure Laterality Date    BREAST BIOPSY Left     core    BREAST SURGERY Left     lumpectomy    CATARACT EXTRACTION W/  INTRAOCULAR LENS IMPLANT Left 10/05/2016    Dr. Mike    CATARACT EXTRACTION W/  INTRAOCULAR LENS IMPLANT Right 11/30/2016    With [place] (Dr. Mike)    COLONOSCOPY N/A 1/23/2017    Procedure: COLONOSCOPY;  Surgeon: Hany Mosquera MD;  Location: [facility] ([address] [place] [address]);  Service: Endoscopy;  Laterality: N/A;    HAND SURGERY Left 2011    thumb    HYSTERECTOMY  1980's    Total;    KAHOOK GONIOTOMY Right 11/30/2016    WITH CE ()    Left Breast Lumpectomy Left     performed in 1960s    OOPHORECTOMY      SELECTIVE LASER TRABECUPLASTY  05/2014    OU w/ Dr. iMke    TUBAL LIGATION  1970's    VAGINAL DELIVERY      x3    YAG CAPSULOTOMY Left 11/29/2018           SOCIAL HISTORY:  Social History     Socioeconomic History    Marital status:      Spouse name: Not on file    Number of children: 3    Years of education: Not on file    Highest education level: Not on file   Occupational History    Occupation: retired - formerly pastoral care with [employer]    Financial resource strain: Not hard at all    Food insecurity:     Worry: Never true     Inability: Never true    Transportation needs:     Medical: No     Non-medical: No   Tobacco Use    Smoking status: Never Smoker    Smokeless tobacco: Never Used   Substance and Sexual Activity    Alcohol use: Yes     Alcohol/week: 0.0 standard drinks     Frequency: Never     Binge frequency: Never     Comment: Rarely; 1 glass of wine    Drug use: No    Sexual activity: Yes     Partners: Female, Male     Birth control/protection: Post-menopausal, See Surgical Hx    Lifestyle    Physical activity:     Days per week: 3 days     Minutes per session: 30 min    Stress: Not at all   Relationships    Social connections:     Talks on phone: More than three times a week     Gets together: More than three times a week     Attends Rastafarian service: Not on file     Active member of club or organization: No     Attends meetings of clubs or organizations: Never     Relationship status:    Other Topics Concern    Not on file   Social History Narrative    Not on file        ALLERGIES AND MEDICATIONS: updated and reviewed.  Review of patient's allergies indicates:   Allergen Reactions    Lisinopril Swelling    Amlodipine Edema    Combigan [brimonidine-timolol]      Causes itchy eyelids and contact dermatitis    Cosopt [dorzolamide-timolol]      Causes angular blepharitis of eyelids    Pravastatin      Myalgia and elevated CPK     Current Outpatient Medications   Medication Sig Dispense Refill    aspirin (ECOTRIN) 81 MG EC tablet Take 81 mg by mouth once daily.        atorvastatin (LIPITOR) 10 MG tablet Take 1 tablet (10 mg total) by mouth once daily. Brand only 90 tablet 3    calcium-vitamin D3 (OS-LETY 500 + D3) 500 mg(1,250mg) -200 unit per tablet Take 1 tablet by mouth 2 (two) times daily with meals.      co-enzyme Q-10 30 mg capsule Take 100 mg by mouth once daily.       hydroCHLOROthiazide (MICROZIDE) 12.5 mg capsule Take 1 capsule (12.5 mg total) by mouth once daily. 90 capsule 3    irbesartan (AVAPRO) 150 MG tablet TAKE 1 TABLET (150 MG TOTAL) BY MOUTH EVERY EVENING. 90 tablet 2    LUMIGAN 0.01 % Drop Place 1 drop into both eyes every evening. 2.5 mL 12    timolol maleate 0.5% (TIMOPTIC-XE) 0.5 % SolG Place 1 drop into both eyes every morning. Timoptic XE or timolol GFS (gel forming solution) 5 mL 12    VITAMIN B-12 100 MCG tablet       vitamin D (VITAMIN D3) 1000 units Tab Take 1,000 Units by mouth once daily.       No current facility-administered  "medications for this visit.        Review of Systems   HENT: Negative for hearing loss.    Eyes: Negative for discharge.   Respiratory: Negative for wheezing.    Cardiovascular: Negative for chest pain and palpitations.   Gastrointestinal: Negative for blood in stool, constipation, diarrhea and vomiting.   Genitourinary: Negative for dysuria and hematuria.   Musculoskeletal: Negative for neck pain.   Neurological: Negative for weakness and headaches.   Endo/Heme/Allergies: Negative for polydipsia.       Objective:   Physical Exam   Vitals:    10/23/19 1007   BP: 128/80   Pulse: 60   Temp: 98 °F (36.7 °C)   TempSrc: Oral   SpO2: 97%   Weight: 88 kg (194 lb 0.1 oz)   Height: 5' 5" (1.651 m)    Body mass index is 32.28 kg/m².  Weight: 88 kg (194 lb 0.1 oz)   Height: 5' 5" (165.1 cm)     Physical Exam   Constitutional: She is oriented to person, place, and time. She appears well-developed and well-nourished. No distress.   HENT:   Head: Normocephalic and atraumatic.   Eyes: No scleral icterus.   Pulmonary/Chest: Effort normal.   Musculoskeletal: She exhibits no edema.   Calf muscles are unremarkable bilaterally, no tenderness no cord like hardness.  Ankles without swelling, no deformity, tender on the lateral anterior ankle on the left, without deformity or redness or induration.  Passive dorsiflexion of the ankle elicits discomfort.  The right thigh, she has some tenderness with full flexion of the knee pointing towards the thighs area of discomfort, hip inversion and eversion without pain in the hip joint area   Neurological: She is alert and oriented to person, place, and time.   Skin: Skin is warm and dry.   Psychiatric: She has a normal mood and affect. Her behavior is normal. Thought content normal.     "

## 2019-10-23 NOTE — TELEPHONE ENCOUNTER
----- Message from Ancelmo Sumner MD sent at 10/23/2019 11:02 AM CDT -----  Regarding: can we assist pt with neuro referral?  Hello,    Pt was seen by cardiology Dr. Dye's office, he submitted referral to see neuro for neuropathy in the feet, I see that it was approved but no one reached out to her to schedule.  Would you be able to schedule this pt for consult?    Thanks,    Ancelmo Sumner      Spoke to patient booked appointment on 11/4 with Dr Moreno

## 2019-10-24 NOTE — PROGRESS NOTES
CPK normal.  Done for complaints of right leg/thigh pain and history of abnormal CPK on statin.  Could be quad muscle strain.  Plan at office visit was trial of atorvastatin every other day.  Stick with the plan.  Results to patient via my [facility].

## 2019-10-29 ENCOUNTER — IMMUNIZATION (OUTPATIENT)
Dept: PHARMACY | Facility: CLINIC | Age: 74
End: 2019-10-29
Payer: MEDICARE

## 2019-10-30 ENCOUNTER — PATIENT OUTREACH (OUTPATIENT)
Dept: ADMINISTRATIVE | Facility: OTHER | Age: 74
End: 2019-10-30

## 2019-11-04 ENCOUNTER — OFFICE VISIT (OUTPATIENT)
Dept: NEUROLOGY | Facility: CLINIC | Age: 74
End: 2019-11-04
Payer: MEDICARE

## 2019-11-04 VITALS
HEART RATE: 54 BPM | DIASTOLIC BLOOD PRESSURE: 66 MMHG | SYSTOLIC BLOOD PRESSURE: 140 MMHG | HEIGHT: 65 IN | BODY MASS INDEX: 32.61 KG/M2 | WEIGHT: 195.75 LBS

## 2019-11-04 DIAGNOSIS — R20.2 PARESTHESIAS: Primary | ICD-10-CM

## 2019-11-04 DIAGNOSIS — R20.2 LEFT LEG PARESTHESIAS: ICD-10-CM

## 2019-11-04 PROCEDURE — 1101F PR PT FALLS ASSESS DOC 0-1 FALLS W/OUT INJ PAST YR: ICD-10-PCS | Mod: HCNC,CPTII,S$GLB, | Performed by: PSYCHIATRY & NEUROLOGY

## 2019-11-04 PROCEDURE — 3078F DIAST BP <80 MM HG: CPT | Mod: HCNC,CPTII,S$GLB, | Performed by: PSYCHIATRY & NEUROLOGY

## 2019-11-04 PROCEDURE — 99204 OFFICE O/P NEW MOD 45 MIN: CPT | Mod: HCNC,S$GLB,, | Performed by: PSYCHIATRY & NEUROLOGY

## 2019-11-04 PROCEDURE — 3078F PR MOST RECENT DIASTOLIC BLOOD PRESSURE < 80 MM HG: ICD-10-PCS | Mod: HCNC,CPTII,S$GLB, | Performed by: PSYCHIATRY & NEUROLOGY

## 2019-11-04 PROCEDURE — 1101F PT FALLS ASSESS-DOCD LE1/YR: CPT | Mod: HCNC,CPTII,S$GLB, | Performed by: PSYCHIATRY & NEUROLOGY

## 2019-11-04 PROCEDURE — 3077F PR MOST RECENT SYSTOLIC BLOOD PRESSURE >= 140 MM HG: ICD-10-PCS | Mod: HCNC,CPTII,S$GLB, | Performed by: PSYCHIATRY & NEUROLOGY

## 2019-11-04 PROCEDURE — 99999 PR PBB SHADOW E&M-EST. PATIENT-LVL III: ICD-10-PCS | Mod: PBBFAC,HCNC,, | Performed by: PSYCHIATRY & NEUROLOGY

## 2019-11-04 PROCEDURE — 99204 PR OFFICE/OUTPT VISIT, NEW, LEVL IV, 45-59 MIN: ICD-10-PCS | Mod: HCNC,S$GLB,, | Performed by: PSYCHIATRY & NEUROLOGY

## 2019-11-04 PROCEDURE — 99999 PR PBB SHADOW E&M-EST. PATIENT-LVL III: CPT | Mod: PBBFAC,HCNC,, | Performed by: PSYCHIATRY & NEUROLOGY

## 2019-11-04 PROCEDURE — 3077F SYST BP >= 140 MM HG: CPT | Mod: HCNC,CPTII,S$GLB, | Performed by: PSYCHIATRY & NEUROLOGY

## 2019-11-04 RX ORDER — GABAPENTIN 300 MG/1
300 CAPSULE ORAL NIGHTLY
Qty: 30 CAPSULE | Refills: 1 | Status: SHIPPED | OUTPATIENT
Start: 2019-11-04 | End: 2019-11-04

## 2019-11-04 RX ORDER — GABAPENTIN 300 MG/1
300 CAPSULE ORAL NIGHTLY
Qty: 90 CAPSULE | Refills: 0 | Status: SHIPPED | OUTPATIENT
Start: 2019-11-04 | End: 2020-03-24

## 2019-11-04 NOTE — PROGRESS NOTES
Neurology Consult Note    Chief Complaint: numbness and tingling in feet and paresthesias in left leg    HPI:   Rosmery Ramirez is a 74 y.o. female with medical conditions as outlined below who presents for further evaluation of numbness and tingling in her feet. She states about a year ago, she developed numbness at the bottom of her feet which now has progressed to a numbness and tingling in the bottom and top of her foot. She states she was tried on gabapentin 100mg at bedtime, but this was not doing anything for her, so she stopped taking this. She states she has chronic low back pain and she will get a 5/10 low back pain when sitting or standing for long periods of time. She states at times, she will get numbness and tingling in her left hip all the way down to her toes. She denies neck pain or symptoms in her hands. She denies bowel or bladder problems. She denies recent falls. She states three weeks ago, she developed aching at the top of her left foot when walking which is now improved. She has no further complaints.     Past Medical History:  Past Medical History:   Diagnosis Date    Allergy     Anxiety     Breast cyst     Edema of leg 10/5/2012    bilateral     Fractures 2011    thumb R    GERD (gastroesophageal reflux disease)     Glaucoma     History of colonic polyps     Hx-TIA (transient ischemic attack) 8/13/2012 Jan 2012: LLE and left facial numbness lasting 1 hour     Hyperlipidemia     Hypertension     Left shoulder tendonitis 8/8/2015    Primary open-angle glaucoma, mild stage - Both Eyes 6/3/2013       Past Surgical History:  Past Surgical History:   Procedure Laterality Date    BREAST BIOPSY Left     core    BREAST SURGERY Left     lumpectomy    CATARACT EXTRACTION W/  INTRAOCULAR LENS IMPLANT Left 10/05/2016    Dr. Mike    CATARACT EXTRACTION W/  INTRAOCULAR LENS IMPLANT Right 11/30/2016    With [place] (Dr. Mike)    COLONOSCOPY N/A 1/23/2017    Procedure:  COLONOSCOPY;  Surgeon: Hany Mosquera MD;  Location: [facility] ([address] [place] [address]);  Service: Endoscopy;  Laterality: N/A;    HAND SURGERY Left 2011    thumb    HYSTERECTOMY  1980's    Total;    KAHOOK GONIOTOMY Right 11/30/2016    WITH CE ()    Left Breast Lumpectomy Left     performed in 1960s    OOPHORECTOMY      SELECTIVE LASER TRABECUPLASTY  05/2014    OU w/ Dr. Mike    TUBAL LIGATION  1970's    VAGINAL DELIVERY      x3    YAG CAPSULOTOMY Left 11/29/2018           Social History:  Social History     Socioeconomic History    Marital status:      Spouse name: Not on file    Number of children: 3    Years of education: Not on file    Highest education level: Not on file   Occupational History    Occupation: retired - formerly pastoral care with [employer]    Financial resource strain: Not hard at all    Food insecurity:     Worry: Never true     Inability: Never true    Transportation needs:     Medical: No     Non-medical: No   Tobacco Use    Smoking status: Never Smoker    Smokeless tobacco: Never Used   Substance and Sexual Activity    Alcohol use: Yes     Alcohol/week: 0.0 standard drinks     Frequency: Never     Binge frequency: Never     Comment: Rarely; 1 glass of wine    Drug use: No    Sexual activity: Yes     Partners: Female, Male     Birth control/protection: Post-menopausal, See Surgical Hx   Lifestyle    Physical activity:     Days per week: 3 days     Minutes per session: 30 min    Stress: Not at all   Relationships    Social connections:     Talks on phone: More than three times a week     Gets together: More than three times a week     Attends Rastafari service: Not on file     Active member of club or organization: No     Attends meetings of clubs or organizations: Never     Relationship status:    Other Topics Concern    Not on file   Social History Narrative    Not on file       Family History:  Family History   Problem  Relation Age of Onset    Breast cancer Mother 50    Glaucoma Mother     Alzheimer's disease Mother     Blindness Mother     Cataracts Mother     Glaucoma Father     Prostate cancer Father     Blindness Father     Cataracts Father     Cancer Father 62        prostate    Glaucoma Sister     No Known Problems Brother     No Known Problems Brother     Alzheimer's disease Maternal Grandfather     Macular degeneration Neg Hx     Retinal detachment Neg Hx     Strabismus Neg Hx     Amblyopia Neg Hx     Heart attack Neg Hx     Heart disease Neg Hx     Ovarian cancer Neg Hx     Colon cancer Neg Hx        Medications:  Current Outpatient Medications   Medication Sig Dispense Refill    aspirin (ECOTRIN) 81 MG EC tablet Take 81 mg by mouth once daily.        atorvastatin (LIPITOR) 10 MG tablet Take 1 tablet (10 mg total) by mouth every other day. Brand only 90 tablet 3    calcium-vitamin D3 (OS-LETY 500 + D3) 500 mg(1,250mg) -200 unit per tablet Take 1 tablet by mouth 2 (two) times daily with meals.      co-enzyme Q-10 30 mg capsule Take 100 mg by mouth once daily.       hydroCHLOROthiazide (MICROZIDE) 12.5 mg capsule Take 1 capsule (12.5 mg total) by mouth once daily. 90 capsule 3    irbesartan (AVAPRO) 150 MG tablet TAKE 1 TABLET (150 MG TOTAL) BY MOUTH EVERY EVENING. 90 tablet 2    LUMIGAN 0.01 % Drop Place 1 drop into both eyes every evening. 2.5 mL 12    timolol maleate 0.5% (TIMOPTIC-XE) 0.5 % SolG Place 1 drop into both eyes every morning. Timoptic XE or timolol GFS (gel forming solution) 5 mL 12    VITAMIN B-12 100 MCG tablet       vitamin D (VITAMIN D3) 1000 units Tab Take 1,000 Units by mouth once daily.       No current facility-administered medications for this visit.        Allergies:  Review of patient's allergies indicates:   Allergen Reactions    Lisinopril Swelling    Amlodipine Edema    Combigan [brimonidine-timolol]      Causes itchy eyelids and contact dermatitis    Cosopt  [dorzolamide-timolol]      Causes angular blepharitis of eyelids    Pravastatin      Myalgia and elevated CPK       ROS:  A 12 point review of system was negative aside from pertinent positives and negatives as outlined above.    Physical Exam  Vitals:    11/04/19 1025   BP: (!) 140/66   Pulse: (!) 54       General: well nourished, well developed  Eyes: no scleral icterus   Nose: nasal turbinates intact  Neck: supple, ROM intact  Skin: no rash or ecchymosis  Joints: no swelling or erythema  Cardiac: regular rate and rhythm  Lungs: clear to auscultation bilaterally    Neuro:  Mental status: AAO x 3, no dysarthria, no aphasia, communicating appropriately  CN: PERRL, EOMI, VFF, V1-V3 sensation intact, no facial asymmetry, hearing grossly intact, tongue midline  Motor:   Deltoid R 5/5 L 5/5   Biceps R 5/5 L 5/5   Triceps R 5/5 L 5/5   Wrist flexion R 5/5 L 5/5   Wrist extension R 5/5 L 5/5   Finger abduction  R 5/5 L 5/5   Thumb abduction R 5/5 L 5/5     Hip flexion R 5/5 L 5/5   Knee extension R 5/5 L 5/5   Knee flexion R 5/5 L 5/5   Foot dorsiflexion R 5/5 L 5/5   Foot plantar flexion R 5/5 L 5/5   Foot inversion R 5/5 L 5/5   Foot eversion R 5/5 L 5/5       Normal bulk and tone    Reflexes: absent ankle jerks bilaterally, 1+ bilateral knee jerks bilaterally, 1+ bilateral biceps bilaterally, toes equivocal bilaterally  Sensory: intact to light touch, pinprick, position and vibration sense throughout  Coordination: no dysmetria on FTN  Gait: steady    Prior Imaging/Labs:  No recent neuroimaging available for review      Assessment and Plan:    74 y.o. female with slowly progressive paresthesias in feet likely 2/2 an idiopathic generalized neuropathy. Left leg paresthesias could be due to an overlying lumbar radiculopathy. Will obtain MRI L spine w/o contrast and an EMG/NCS to further evaluate.    1. Paresthesias  - EMG W/ ULTRASOUND AND NERVE CONDUCTION TEST 2 Extremities; Future  - gabapentin (NEURONTIN) 300 MG  capsule; Take 1 capsule (300 mg total) by mouth every evening.  Dispense: 90 capsule; Refill: 0    2. Left leg paresthesias  - MRI Lumbar Spine Without Contrast; Future      Patient was made aware of side effects of medication(s) prescribed and was advised to notify me if she experiences any.        Patient was advised to notify me for worsening symptoms. I will see patient back after MRI L spine and EMG/NCS or sooner if necessary.     Thank you for this consultation.    Amanda Pulido DO  [facility]  [address] [facility] [address]  [place] [address]  [phone]

## 2019-11-04 NOTE — LETTER
November 4, 2019      Julio Dye MD  [address] [facility] [address]  [address]  [facility] [address]           [facility]  [address]  [facility] [address]  Phone: [phone]  Fax: [phone]          Patient: Rosmery Ramirez   MR Number: [id number]   YOB: 1945   Date of Visit: 11/4/2019       Dear Dr. Julio Dye:    Thank you for referring Rosmery Ramirez to me for evaluation. Attached you will find relevant portions of my assessment and plan of care.    If you have questions, please do not hesitate to call me. I look forward to following Rosmery Ramirez along with you.    Sincerely,    Amanda Pulido,     Enclosure  CC:  No Recipients    If you would like to receive this communication electronically, please contact [email or web address] or [phone] to request more information on [facility] access.    For providers and/or their staff who would like to refer a patient to [facility], please contact us through our one-stop-shop provider referral line, [facility], at [phone].    If you feel you have received this communication in error or would no longer like to receive these types of communications, please e-mail [email or web address]

## 2019-11-07 ENCOUNTER — PROCEDURE VISIT (OUTPATIENT)
Dept: NEUROLOGY | Facility: CLINIC | Age: 74
End: 2019-11-07
Payer: MEDICARE

## 2019-11-07 VITALS — HEIGHT: 65 IN | BODY MASS INDEX: 32.49 KG/M2 | WEIGHT: 195 LBS

## 2019-11-07 DIAGNOSIS — R20.2 PARESTHESIAS: ICD-10-CM

## 2019-11-07 PROCEDURE — 95909 NRV CNDJ TST 5-6 STUDIES: CPT | Mod: HCNC,S$GLB,, | Performed by: PSYCHIATRY & NEUROLOGY

## 2019-11-07 PROCEDURE — 95909 PR NERVE CONDUCTION STUDY; 5-6 STUDIES: ICD-10-PCS | Mod: HCNC,S$GLB,, | Performed by: PSYCHIATRY & NEUROLOGY

## 2019-11-07 PROCEDURE — 95886 PR EMG COMPLETE, W/ NERVE CONDUCTION STUDIES, 5+ MUSCLES: ICD-10-PCS | Mod: HCNC,S$GLB,, | Performed by: PSYCHIATRY & NEUROLOGY

## 2019-11-07 PROCEDURE — 95886 MUSC TEST DONE W/N TEST COMP: CPT | Mod: HCNC,S$GLB,, | Performed by: PSYCHIATRY & NEUROLOGY

## 2019-11-08 ENCOUNTER — HOSPITAL ENCOUNTER (OUTPATIENT)
Dept: RADIOLOGY | Facility: HOSPITAL | Age: 74
Discharge: HOME | End: 2019-11-08
Attending: PSYCHIATRY & NEUROLOGY
Payer: MEDICARE

## 2019-11-08 DIAGNOSIS — R20.2 LEFT LEG PARESTHESIAS: ICD-10-CM

## 2019-11-08 PROCEDURE — 72148 MRI LUMBAR SPINE W/O DYE: CPT | Mod: TC,HCNC

## 2019-11-08 PROCEDURE — 72148 MRI LUMBAR SPINE WITHOUT CONTRAST: ICD-10-PCS | Mod: 26,HCNC,, | Performed by: RADIOLOGY

## 2019-11-08 PROCEDURE — 72148 MRI LUMBAR SPINE W/O DYE: CPT | Mod: 26,HCNC,, | Performed by: RADIOLOGY

## 2019-11-11 ENCOUNTER — LAB VISIT (OUTPATIENT)
Dept: LAB | Facility: HOSPITAL | Age: 74
End: 2019-11-11
Attending: PSYCHIATRY & NEUROLOGY
Payer: MEDICARE

## 2019-11-11 ENCOUNTER — OFFICE VISIT (OUTPATIENT)
Dept: NEUROLOGY | Facility: CLINIC | Age: 74
End: 2019-11-11
Payer: MEDICARE

## 2019-11-11 VITALS
WEIGHT: 195 LBS | HEIGHT: 65 IN | SYSTOLIC BLOOD PRESSURE: 130 MMHG | DIASTOLIC BLOOD PRESSURE: 72 MMHG | BODY MASS INDEX: 32.49 KG/M2

## 2019-11-11 DIAGNOSIS — R20.2 PARESTHESIAS: ICD-10-CM

## 2019-11-11 DIAGNOSIS — M54.42 CHRONIC LEFT-SIDED LOW BACK PAIN WITH LEFT-SIDED SCIATICA: Primary | ICD-10-CM

## 2019-11-11 DIAGNOSIS — G89.29 CHRONIC LEFT-SIDED LOW BACK PAIN WITH LEFT-SIDED SCIATICA: Primary | ICD-10-CM

## 2019-11-11 PROCEDURE — 84165 PROTEIN E-PHORESIS SERUM: CPT | Mod: 26,HCNC,, | Performed by: PATHOLOGY

## 2019-11-11 PROCEDURE — 99214 OFFICE O/P EST MOD 30 MIN: CPT | Mod: HCNC,S$GLB,, | Performed by: PSYCHIATRY & NEUROLOGY

## 2019-11-11 PROCEDURE — 36415 COLL VENOUS BLD VENIPUNCTURE: CPT | Mod: HCNC

## 2019-11-11 PROCEDURE — 1101F PT FALLS ASSESS-DOCD LE1/YR: CPT | Mod: HCNC,CPTII,S$GLB, | Performed by: PSYCHIATRY & NEUROLOGY

## 2019-11-11 PROCEDURE — 3075F SYST BP GE 130 - 139MM HG: CPT | Mod: HCNC,CPTII,S$GLB, | Performed by: PSYCHIATRY & NEUROLOGY

## 2019-11-11 PROCEDURE — 3078F DIAST BP <80 MM HG: CPT | Mod: HCNC,CPTII,S$GLB, | Performed by: PSYCHIATRY & NEUROLOGY

## 2019-11-11 PROCEDURE — 3075F PR MOST RECENT SYSTOLIC BLOOD PRESS GE 130-139MM HG: ICD-10-PCS | Mod: HCNC,CPTII,S$GLB, | Performed by: PSYCHIATRY & NEUROLOGY

## 2019-11-11 PROCEDURE — 1101F PR PT FALLS ASSESS DOC 0-1 FALLS W/OUT INJ PAST YR: ICD-10-PCS | Mod: HCNC,CPTII,S$GLB, | Performed by: PSYCHIATRY & NEUROLOGY

## 2019-11-11 PROCEDURE — 99214 PR OFFICE/OUTPT VISIT, EST, LEVL IV, 30-39 MIN: ICD-10-PCS | Mod: HCNC,S$GLB,, | Performed by: PSYCHIATRY & NEUROLOGY

## 2019-11-11 PROCEDURE — 84165 PROTEIN E-PHORESIS SERUM: CPT | Mod: HCNC

## 2019-11-11 PROCEDURE — 99999 PR PBB SHADOW E&M-EST. PATIENT-LVL IV: ICD-10-PCS | Mod: PBBFAC,HCNC,, | Performed by: PSYCHIATRY & NEUROLOGY

## 2019-11-11 PROCEDURE — 84165 PATHOLOGIST INTERPRETATION SPE: ICD-10-PCS | Mod: 26,HCNC,, | Performed by: PATHOLOGY

## 2019-11-11 PROCEDURE — 3078F PR MOST RECENT DIASTOLIC BLOOD PRESSURE < 80 MM HG: ICD-10-PCS | Mod: HCNC,CPTII,S$GLB, | Performed by: PSYCHIATRY & NEUROLOGY

## 2019-11-11 PROCEDURE — 99999 PR PBB SHADOW E&M-EST. PATIENT-LVL IV: CPT | Mod: PBBFAC,HCNC,, | Performed by: PSYCHIATRY & NEUROLOGY

## 2019-11-11 NOTE — PROGRESS NOTES
Neurology Follow Up Note    Chief Complaint: follow up MRI and EMG results    Interval History:  Since last visit, patient feels the gabapentin 300mg at bedtime is helping with her left leg paresthesias. She still feels a numbness at the bottom of her feet. She states her low back has not been bothering her recently, but if she mops or does a lot of activity she will experience low back pain radiating down her left leg. She had an MRI L spine which showed an L4-5 disc with mild stenosis and moderate bilateral neural foraminal narrowing. She denies weakness, recent falls or bowel or bladder problems. She had an EMG/NCS of the lower extremities which was normal. She has no further complaints.     Initial Visit 11/4/19:  Rosmery Ramirez is a 74 y.o. female with medical conditions as outlined below who presents for further evaluation of numbness and tingling in her feet. She states about a year ago, she developed numbness at the bottom of her feet which now has progressed to a numbness and tingling in the bottom and top of her foot. She states she was tried on gabapentin 100mg at bedtime, but this was not doing anything for her, so she stopped taking this. She states she has chronic low back pain and she will get a 5/10 low back pain when sitting or standing for long periods of time. She states at times, she will get numbness and tingling in her left hip all the way down to her toes. She denies neck pain or symptoms in her hands. She denies bowel or bladder problems. She denies recent falls. She states three weeks ago, she developed aching at the top of her left foot when walking which is now improved. She has no further complaints.        Past Medical History:  Past Medical History:   Diagnosis Date    Allergy     Anxiety     Breast cyst     Edema of leg 10/5/2012    bilateral     Fractures 2011    thumb R    GERD (gastroesophageal reflux disease)     Glaucoma     History of colonic polyps     Hx-TIA  (transient ischemic attack) 8/13/2012 Jan 2012: LLE and left facial numbness lasting 1 hour     Hyperlipidemia     Hypertension     Left shoulder tendonitis 8/8/2015    Primary open-angle glaucoma, mild stage - Both Eyes 6/3/2013       Past Surgical History:  Past Surgical History:   Procedure Laterality Date    BREAST BIOPSY Left     core    BREAST SURGERY Left     lumpectomy    CATARACT EXTRACTION W/  INTRAOCULAR LENS IMPLANT Left 10/05/2016    Dr. Mike    CATARACT EXTRACTION W/  INTRAOCULAR LENS IMPLANT Right 11/30/2016    With [place] (Dr. Mike)    COLONOSCOPY N/A 1/23/2017    Procedure: COLONOSCOPY;  Surgeon: Hany Mosquera MD;  Location: [facility] ([address] [place] [address]);  Service: Endoscopy;  Laterality: N/A;    HAND SURGERY Left 2011    thumb    HYSTERECTOMY  1980's    Total;    [facility] GONIOTOMY Right 11/30/2016    WITH CE ()    Left Breast Lumpectomy Left     performed in 1960s    OOPHORECTOMY      SELECTIVE LASER TRABECUPLASTY  05/2014    OU w/ Dr. Mike    TUBAL LIGATION  1970's    VAGINAL DELIVERY      x3    YAG CAPSULOTOMY Left 11/29/2018           Social History:  Social History     Socioeconomic History    Marital status:      Spouse name: Not on file    Number of children: 3    Years of education: Not on file    Highest education level: Not on file   Occupational History    Occupation: retired - formerly pastoral care with [employer]    Financial resource strain: Not hard at all    Food insecurity:     Worry: Never true     Inability: Never true    Transportation needs:     Medical: No     Non-medical: No   Tobacco Use    Smoking status: Never Smoker    Smokeless tobacco: Never Used   Substance and Sexual Activity    Alcohol use: Yes     Alcohol/week: 0.0 standard drinks     Frequency: Never     Binge frequency: Never     Comment: Rarely; 1 glass of wine    Drug use: No    Sexual activity: Yes     Partners: Female, Male      Birth control/protection: Post-menopausal, See Surgical Hx   Lifestyle    Physical activity:     Days per week: 3 days     Minutes per session: 30 min    Stress: Not at all   Relationships    Social connections:     Talks on phone: More than three times a week     Gets together: More than three times a week     Attends [employer] service: Not on file     Active member of club or organization: No     Attends meetings of clubs or organizations: Never     Relationship status:    Other Topics Concern    Not on file   Social History Narrative    Not on file       Family History:  Family History   Problem Relation Age of Onset    Breast cancer Mother 50    Glaucoma Mother     Alzheimer's disease Mother     Blindness Mother     Cataracts Mother     Glaucoma Father     Prostate cancer Father     Blindness Father     Cataracts Father     Cancer Father 62        prostate    Glaucoma Sister     No Known Problems Brother     No Known Problems Brother     Alzheimer's disease Maternal Grandfather     Macular degeneration Neg Hx     Retinal detachment Neg Hx     Strabismus Neg Hx     Amblyopia Neg Hx     Heart attack Neg Hx     Heart disease Neg Hx     Ovarian cancer Neg Hx     Colon cancer Neg Hx        Medications:  Current Outpatient Medications   Medication Sig Dispense Refill    aspirin (ECOTRIN) 81 MG EC tablet Take 81 mg by mouth once daily.        atorvastatin (LIPITOR) 10 MG tablet Take 1 tablet (10 mg total) by mouth every other day. Brand only 90 tablet 3    calcium-vitamin D3 (OS-LETY 500 + D3) 500 mg(1,250mg) -200 unit per tablet Take 1 tablet by mouth 2 (two) times daily with meals.      co-enzyme Q-10 30 mg capsule Take 100 mg by mouth once daily.       gabapentin (NEURONTIN) 300 MG capsule Take 1 capsule (300 mg total) by mouth every evening. 90 capsule 0    hydroCHLOROthiazide (MICROZIDE) 12.5 mg capsule Take 1 capsule (12.5 mg total) by mouth once daily. 90 capsule 3     irbesartan (AVAPRO) 150 MG tablet TAKE 1 TABLET (150 MG TOTAL) BY MOUTH EVERY EVENING. 90 tablet 2    LUMIGAN 0.01 % Drop Place 1 drop into both eyes every evening. 2.5 mL 12    timolol maleate 0.5% (TIMOPTIC-XE) 0.5 % SolG Place 1 drop into both eyes every morning. Timoptic XE or timolol GFS (gel forming solution) 5 mL 12    VITAMIN B-12 100 MCG tablet       vitamin D (VITAMIN D3) 1000 units Tab Take 1,000 Units by mouth once daily.       No current facility-administered medications for this visit.        Allergies:  Review of patient's allergies indicates:   Allergen Reactions    Lisinopril Swelling    Amlodipine Edema    Combigan [brimonidine-timolol]      Causes itchy eyelids and contact dermatitis    Cosopt [dorzolamide-timolol]      Causes angular blepharitis of eyelids    Pravastatin      Myalgia and elevated CPK       ROS:  A 12 point review of system was negative aside from pertinent positives and negatives as outlined above.    Physical Exam  Vitals:    11/11/19 1312   BP: 130/72       General: well nourished, well developed  Eyes: no scleral icterus   Nose: nasal turbinates intact  Neck: supple, ROM intact  Skin: no rash or ecchymosis  Joints: no swelling or erythema  Cardiac: regular rate and rhythm  Lungs: clear to auscultation bilaterally    Neuro:  Mental status: AAO x 3, no dysarthria, no aphasia, communicating appropriately  CN: PERRL, EOMI, VFF, V1-V3 sensation intact, no facial asymmetry, hearing grossly intact, tongue midline  Motor:   Deltoid R 5/5 L 5/5   Biceps R 5/5 L 5/5   Triceps R 5/5 L 5/5   Wrist flexion R 5/5 L 5/5   Wrist extension R 5/5 L 5/5   Finger abduction  R 5/5 L 5/5   Thumb abduction R 5/5 L 5/5      Hip flexion R 5/5 L 5/5   Knee extension R 5/5 L 5/5   Knee flexion R 5/5 L 5/5   Foot dorsiflexion R 5/5 L 5/5   Foot plantar flexion R 5/5 L 5/5   Foot inversion R 5/5 L 5/5   Foot eversion R 5/5 L 5/5         Normal bulk and tone     Reflexes: absent ankle jerks  bilaterally, 1+ bilateral knee jerks bilaterally, 1+ bilateral biceps bilaterally, toes equivocal bilaterally  Sensory: intact to light touch, pinprick, position and vibration sense throughout  Coordination: no dysmetria on FTN  Gait: steady    Prior Imaging/Labs:  Reviewed      Assessment and Plan:    74 y.o. female with paresthesias in feet worse on left likely 2/2 lumbar radiculopathy. NCS was unrevealing for neuropathy. We discussed numbness in feet could be due to a small fiber neuropathy. She was offered skin biopsy to evaluate for this, but she would like to hold off on this as management likely would not change which is a reasonable option.     1. Paresthesias  - Protein electrophoresis, serum; Future  C/w gabapentin 300mg at bedtime    2. Chronic left-sided low back pain with left-sided sciatica  MRI results were discussed with patient in detail  C/w gabapentin 300mg at bedtime  - Ambulatory consult to Physical Therapy    Patient was made aware of side effects of medication(s) prescribed and was advised to notify me if she experiences any.          Patient was advised to notify me for worsening symptoms. I will see patient back in 3 months or sooner if necessary.       Amanda Pulido DO  [facility]  [address] [facility] [address]  [place] [address]  [phone]

## 2019-11-13 LAB
ALBUMIN SERPL ELPH-MCNC: 3.84 G/DL (ref 3.35–5.55)
ALPHA1 GLOB SERPL ELPH-MCNC: 0.41 G/DL (ref 0.17–0.41)
ALPHA2 GLOB SERPL ELPH-MCNC: 0.81 G/DL (ref 0.43–0.99)
B-GLOBULIN SERPL ELPH-MCNC: 0.82 G/DL (ref 0.5–1.1)
GAMMA GLOB SERPL ELPH-MCNC: 1.12 G/DL (ref 0.67–1.58)
PATHOLOGIST INTERPRETATION SPE: NORMAL
PROT SERPL-MCNC: 7 G/DL (ref 6–8.4)

## 2019-12-06 RX ORDER — IRBESARTAN 150 MG/1
TABLET ORAL
Qty: 90 TABLET | Refills: 1 | Status: SHIPPED | OUTPATIENT
Start: 2019-12-06 | End: 2020-07-27

## 2019-12-09 ENCOUNTER — OFFICE VISIT (OUTPATIENT)
Dept: OPHTHALMOLOGY | Facility: CLINIC | Age: 74
End: 2019-12-09
Payer: MEDICARE

## 2019-12-09 DIAGNOSIS — H40.1122 PRIMARY OPEN ANGLE GLAUCOMA OF LEFT EYE, MODERATE STAGE: ICD-10-CM

## 2019-12-09 DIAGNOSIS — H04.201 EPIPHORA, RIGHT: ICD-10-CM

## 2019-12-09 DIAGNOSIS — H04.123 DRY EYE SYNDROME, BILATERAL: ICD-10-CM

## 2019-12-09 DIAGNOSIS — H26.492 PCO (POSTERIOR CAPSULAR OPACIFICATION), LEFT: ICD-10-CM

## 2019-12-09 DIAGNOSIS — H40.043 STEROID RESPONDER, BILATERAL: ICD-10-CM

## 2019-12-09 DIAGNOSIS — H01.009 ANGULAR BLEPHARITIS, UNSPECIFIED LATERALITY: ICD-10-CM

## 2019-12-09 DIAGNOSIS — H02.051 TRICHIASIS OF RIGHT UPPER EYELID: ICD-10-CM

## 2019-12-09 DIAGNOSIS — Z96.1 PSEUDOPHAKIA OF BOTH EYES: ICD-10-CM

## 2019-12-09 DIAGNOSIS — H40.1111 PRIMARY OPEN ANGLE GLAUCOMA OF RIGHT EYE, MILD STAGE: Primary | ICD-10-CM

## 2019-12-09 PROCEDURE — 99999 PR PBB SHADOW E&M-EST. PATIENT-LVL II: CPT | Mod: PBBFAC,HCNC,, | Performed by: OPHTHALMOLOGY

## 2019-12-09 PROCEDURE — 99999 PR PBB SHADOW E&M-EST. PATIENT-LVL II: ICD-10-PCS | Mod: PBBFAC,HCNC,, | Performed by: OPHTHALMOLOGY

## 2019-12-09 PROCEDURE — 92012 INTRM OPH EXAM EST PATIENT: CPT | Mod: HCNC,S$GLB,, | Performed by: OPHTHALMOLOGY

## 2019-12-09 PROCEDURE — 92012 PR EYE EXAM, EST PATIENT,INTERMED: ICD-10-PCS | Mod: HCNC,S$GLB,, | Performed by: OPHTHALMOLOGY

## 2019-12-09 RX ORDER — TIMOLOL MALEATE 5 MG/ML
1 SOLUTION OPHTHALMIC EVERY MORNING
Qty: 15 ML | Refills: 3 | Status: SHIPPED | OUTPATIENT
Start: 2019-12-09 | End: 2021-01-19 | Stop reason: CLARIF

## 2019-12-09 RX ORDER — BIMATOPROST 0.1 MG/ML
1 SOLUTION/ DROPS OPHTHALMIC NIGHTLY
Qty: 3 BOTTLE | Refills: 3 | Status: SHIPPED | OUTPATIENT
Start: 2019-12-09 | End: 2020-06-02

## 2019-12-09 NOTE — PROGRESS NOTES
HPI     DLS: 10/17/19    Pt here for 8 week IOP check;  S/P SLT OS- 10/17/19  S/P SLT OD- 9/26/19    Pt states no problems after the SLT's OU.     Meds:   T1/2 GFS QAM OU   Lumigan QHS OU   Systane PRN OU     1) POAG OU   2) PCIOL OU   3) Hx Hyperopia   4) Epiphora   5) S/p SLT OD- 9/26/2019 // S/P SLT OS - 10/17/2019      Last edited by Yesica Mike MD on 12/9/2019  8:38 AM. (History)            Assessment /Plan     For exam results, see Encounter Report.    Primary open angle glaucoma of right eye, mild stage    Primary open angle glaucoma of left eye, moderate stage    Dry eye syndrome, bilateral    Angular blepharitis, unspecified laterality    Steroid responder, bilateral    Epiphora, right    Trichiasis of right upper eyelid    PCO (posterior capsular opacification), left    Pseudophakia of both eyes        Old pt of Dr. Eber Crouch  Sister is Marina Dana (my pt.) she sent her to me      1. Early POAG-  Both Eyes   Dx. Years ago with Dr. Eber Crouch  -Strong family hx   -IOP previously well controlled (mid teens) on Xalatan but elevated on generic and switched to lumigan    Family history   STRONG (Mom,Dad, and 5 sisters)  Glaucoma meds   lumigan qhs OU, timolol gfs QAM OU  H/O adverse rxn to glaucoma drops - combigan - itchy lids  // cosopt - angular blepharitis   LASERS   S/p SLT 4/24/14 OD //  5/8/14 OS (good response 21/22 --> 12/15)// yag cap OS 11/29/2018             Repeat SLT od 9/26/2019-  (good resp, 20-->15) // Repeat SLT os -10/17/2019 -(+ resp 19--> 13 )  GLAUCOMA SURGERIES   [place] od 11/30/2016   OTHER EYE SURGERIES  Phaco /IOL os 10/6/2016 // phaco/IOL / [place] od 11/30/2016   CDR  0.75 / 0.7  Tbase  ??  Tmax  26 OU off all gtts (5/24/10)    Ttarget   16 OU  HVF - 7 VF '12 -18 -  OD SNS/IAD ; OS: IAD   Gonio +3 OU  /540  OCT 4  test 2011 to 2017  - RNFL - OD:dec. S/N/I  // OS dec NURYS JACOBS (+ prog ou)   HRT  6 test - 2012 to 2018 -   Disc photos  2012, 2015 - MR Shirley Reed S/N/I  od // dec. S, bord I/N/T os /// CDR 0.684 od // 0.748 osOIS     Ttoday 15/13  Test done today - F/U repeat slt ou  20/19 --> 15/13     2. H/O - hyperopia - Both Eyes -mild - pre-cataract surgery    3. Epiphora  - C/O constant tearing of the right eye x 6 months (8/2015   4.  PC IOL   OD - phaco/IOL / [place] 11/30 /2016 - PCB00 22.5  OS - Phaco/IOL - NO COLTON's - pt declined - 10/6/2016 - PCB00 24.0       Plan:  -IOP at target post repeat slt ou ( 10/2019)  ou (target is 16 ou)   ++ use of flonase - inhaled steroids - ?? Steroid response   Off combigan - intolerant - itchy lids / blepharitis   Intol to cosopt - angular blepharitis   -Strong family hx  -Goal IOP 16 OU    CSM  Target is 16 ou    Intol to combigan -blepharitis / itch lids   Intol to cosopt - angular blepharitis     Tolerating Lumigan ou   Tolerating  timolol gfs // timoptic XE ou q am -      SLT od - 9/26/2019 - good response 20-->15 // repeat   SLT os -  10/17/2019 - good resp 19--> 13 // repeat     Good response to primary SLT  in 2014     Rx glasses given 2/2017     F/U 3-4 months with HVF / DFE / OCT

## 2020-01-24 ENCOUNTER — OFFICE VISIT (OUTPATIENT)
Dept: ENDOCRINOLOGY | Facility: CLINIC | Age: 75
End: 2020-01-24
Payer: MEDICARE

## 2020-01-24 VITALS
SYSTOLIC BLOOD PRESSURE: 124 MMHG | DIASTOLIC BLOOD PRESSURE: 72 MMHG | HEIGHT: 65 IN | BODY MASS INDEX: 33.35 KG/M2 | WEIGHT: 200.19 LBS | HEART RATE: 60 BPM | RESPIRATION RATE: 18 BRPM

## 2020-01-24 DIAGNOSIS — R53.82 CHRONIC FATIGUE: ICD-10-CM

## 2020-01-24 DIAGNOSIS — E04.1 THYROID NODULE: Primary | ICD-10-CM

## 2020-01-24 PROCEDURE — 3078F DIAST BP <80 MM HG: CPT | Mod: HCNC,CPTII,S$GLB, | Performed by: INTERNAL MEDICINE

## 2020-01-24 PROCEDURE — 99999 PR PBB SHADOW E&M-EST. PATIENT-LVL IV: ICD-10-PCS | Mod: PBBFAC,HCNC,, | Performed by: INTERNAL MEDICINE

## 2020-01-24 PROCEDURE — 1101F PR PT FALLS ASSESS DOC 0-1 FALLS W/OUT INJ PAST YR: ICD-10-PCS | Mod: HCNC,CPTII,S$GLB, | Performed by: INTERNAL MEDICINE

## 2020-01-24 PROCEDURE — 3074F SYST BP LT 130 MM HG: CPT | Mod: HCNC,CPTII,S$GLB, | Performed by: INTERNAL MEDICINE

## 2020-01-24 PROCEDURE — 1126F PR PAIN SEVERITY QUANTIFIED, NO PAIN PRESENT: ICD-10-PCS | Mod: HCNC,S$GLB,, | Performed by: INTERNAL MEDICINE

## 2020-01-24 PROCEDURE — 99204 PR OFFICE/OUTPT VISIT, NEW, LEVL IV, 45-59 MIN: ICD-10-PCS | Mod: HCNC,S$GLB,, | Performed by: INTERNAL MEDICINE

## 2020-01-24 PROCEDURE — 1159F PR MEDICATION LIST DOCUMENTED IN MEDICAL RECORD: ICD-10-PCS | Mod: HCNC,S$GLB,, | Performed by: INTERNAL MEDICINE

## 2020-01-24 PROCEDURE — 99999 PR PBB SHADOW E&M-EST. PATIENT-LVL IV: CPT | Mod: PBBFAC,HCNC,, | Performed by: INTERNAL MEDICINE

## 2020-01-24 PROCEDURE — 99204 OFFICE O/P NEW MOD 45 MIN: CPT | Mod: HCNC,S$GLB,, | Performed by: INTERNAL MEDICINE

## 2020-01-24 PROCEDURE — 3078F PR MOST RECENT DIASTOLIC BLOOD PRESSURE < 80 MM HG: ICD-10-PCS | Mod: HCNC,CPTII,S$GLB, | Performed by: INTERNAL MEDICINE

## 2020-01-24 PROCEDURE — 3074F PR MOST RECENT SYSTOLIC BLOOD PRESSURE < 130 MM HG: ICD-10-PCS | Mod: HCNC,CPTII,S$GLB, | Performed by: INTERNAL MEDICINE

## 2020-01-24 PROCEDURE — 1159F MED LIST DOCD IN RCRD: CPT | Mod: HCNC,S$GLB,, | Performed by: INTERNAL MEDICINE

## 2020-01-24 PROCEDURE — 1126F AMNT PAIN NOTED NONE PRSNT: CPT | Mod: HCNC,S$GLB,, | Performed by: INTERNAL MEDICINE

## 2020-01-24 PROCEDURE — 1101F PT FALLS ASSESS-DOCD LE1/YR: CPT | Mod: HCNC,CPTII,S$GLB, | Performed by: INTERNAL MEDICINE

## 2020-01-24 NOTE — ASSESSMENT & PLAN NOTE
Reports fatigue is worsened after starting neurontin. I encouraged her to discuss with her neurologist about alternatives (Cymbalta?). I reassured her this is not related to her thyroid nodules.

## 2020-01-24 NOTE — ASSESSMENT & PLAN NOTE
I have reviewed management options including observation, FNA or surgery.  All of the patients questions were answered and handout was provided.     Discussed indications for a FNA  Discussed possible FNA results (benign, FLUS,  follicular or hurthle lesion, suspicious for cancer, cancer and non diagnostic)     Reviewed that a non diagnostic or FLUS would require a repeat FNA or molecular marker testing.    Repeat ultrasound in a few weeks to determine need for biopsy.

## 2020-01-24 NOTE — PROGRESS NOTES
Subjective:      Chief Complaint: Thyroid Nodule    HPI: Rosmery Ramirez is a 74 y.o. female who is here for an initial evaluation for thyroid nodules    With regards to the thyroid nodule:  Presents with nodule that was diagnosed by ultrasound    Preexisting thyroid disease?: no    Compressiveymptoms:  Anterior neck pressure?: no  Dysphagia?: no  Voice changes?: no    Risk Factors:  Radiation to head or neck for any treatment of cancer or exposure to radiation?: no  Personal history of colon or breast cancer?: no  Tobacco use?: no  Family history of thyroid cancer?: no  Two sisters with thyroidectomy due to nodules, but benign path.    Symptoms of hyper or hypothyroidism:  Weight changes?: no  Diarrhea or Constipation?: no  Heat or cold intolerance?: no  Hair, nail or skin changes?: no  Chest pain, palpations or shortness of breath?: no   Mental fog?: no   Fatigue?: Yes - chronic; going to gym helps. Felt it is worse on neurontin, which she started recently for neuropathic pain.    Previous biopsy results: None    Last ultrasound: 4/12/2019      Reviewed past medical, family, social history and updated as appropriate.    Review of Systems   Constitutional: Positive for fatigue (chronic). Negative for unexpected weight change.   Eyes: Negative for visual disturbance.   Respiratory: Negative for shortness of breath.    Cardiovascular: Negative for chest pain.   Gastrointestinal: Negative for abdominal pain.   Genitourinary: Negative for urgency.   Musculoskeletal: Negative for arthralgias.   Skin: Negative for wound.   Neurological: Negative for headaches.   Hematological: Does not bruise/bleed easily.   Psychiatric/Behavioral: Negative for sleep disturbance.     Objective:     Vitals:    01/24/20 1355   BP: 124/72   Pulse: 60   Resp: 18     BP Readings from Last 5 Encounters:   01/24/20 124/72   11/11/19 130/72   11/04/19 (!) 140/66   10/23/19 128/80   09/26/19 125/77         Physical Exam   Constitutional:  She is oriented to person, place, and time. She appears well-developed.   HENT:   Right Ear: External ear normal.   Left Ear: External ear normal.   Nose: Nose normal.   Hearing grossly normal  Dentition grossly normal   Neck: No tracheal deviation present. No thyromegaly (small bilateral thyroid nodules) present.   Cardiovascular: Normal rate.   No murmur heard.  Pulmonary/Chest: Effort normal and breath sounds normal.   Abdominal: Soft. She exhibits no mass. There is no tenderness.   Musculoskeletal: She exhibits no edema.   No digital clubbing or extremity cyanosis   Neurological: She is alert and oriented to person, place, and time. Coordination normal.   Skin: No rash noted.   No subcutaneous nodules noted.   Psychiatric: She has a normal mood and affect. Her behavior is normal.   Alert and oriented to person, place, and situation.   Nursing note and vitals reviewed.      Wt Readings from Last 10 Encounters:   01/24/20 1355 90.8 kg (200 lb 2.8 oz)   11/11/19 1312 88.5 kg (195 lb)   11/07/19 0940 88.5 kg (195 lb)   11/04/19 1025 88.8 kg (195 lb 12.3 oz)   10/23/19 1007 88 kg (194 lb 0.1 oz)   09/13/19 1519 88.6 kg (195 lb 7 oz)   07/29/19 1510 89 kg (196 lb 3.4 oz)   06/04/19 1100 86.2 kg (190 lb)   06/03/19 1318 86.6 kg (190 lb 14.7 oz)   05/06/19 1114 86.7 kg (191 lb 2.2 oz)       Lab Results   Component Value Date    HGBA1C 5.8 (H) 04/22/2019    HGBA1C 5.9 (H) 06/27/2018    HGBA1C 6.5 (H) 04/24/2014    HGBA1C 6.3 (H) 01/09/2012     Lab Results   Component Value Date    CHOL 117 (L) 06/03/2019    CHOL 165 04/22/2019    HDL 41 06/03/2019    HDL 49 04/22/2019    LDLCALC 56.8 (L) 06/03/2019    LDLCALC 99.8 04/22/2019    TRIG 96 06/03/2019    TRIG 81 04/22/2019    CHOLHDL 35.0 06/03/2019    CHOLHDL 29.7 04/22/2019     Lab Results   Component Value Date     06/03/2019    K 4.2 06/03/2019     06/03/2019    CO2 28 06/03/2019    GLU 77 06/03/2019    BUN 14 06/03/2019    CREATININE 0.8 06/03/2019    CALCIUM  10.0 06/03/2019    PROT 7.3 06/03/2019    ALBUMIN 3.9 06/03/2019    BILITOT 0.6 06/03/2019    ALKPHOS 82 06/03/2019    AST 21 06/03/2019    ALT 21 06/03/2019    ANIONGAP 8 06/03/2019    ESTGFRAFRICA >60.0 06/03/2019    EGFRNONAA >60.0 06/03/2019    TSH 2.784 06/03/2019      No results found for: LABMICR, CREATRANDUR, MICALBCREAT    Assessment/Plan:     Thyroid nodule on US 4/2019 largest 1.2 cm repeat 1 year  I have reviewed management options including observation, FNA or surgery.  All of the patients questions were answered and handout was provided.     Discussed indications for a FNA  Discussed possible FNA results (benign, FLUS,  follicular or hurthle lesion, suspicious for cancer, cancer and non diagnostic)     Reviewed that a non diagnostic or FLUS would require a repeat FNA or molecular marker testing.    Repeat ultrasound in a few weeks to determine need for biopsy.    Chronic fatigue  Reports fatigue is worsened after starting neurontin. I encouraged her to discuss with her neurologist about alternatives (Cymbalta?). I reassured her this is not related to her thyroid nodules.      RTC in 12 months

## 2020-02-21 ENCOUNTER — HOSPITAL ENCOUNTER (OUTPATIENT)
Dept: ENDOCRINOLOGY | Facility: CLINIC | Age: 75
Discharge: HOME | End: 2020-02-21
Attending: INTERNAL MEDICINE
Payer: MEDICARE

## 2020-02-21 DIAGNOSIS — E04.1 THYROID NODULE: ICD-10-CM

## 2020-02-21 PROCEDURE — 76536 US SOFT TISSUE HEAD NECK THYROID: ICD-10-PCS | Mod: HCNC,S$GLB,, | Performed by: INTERNAL MEDICINE

## 2020-02-21 PROCEDURE — 76536 US EXAM OF HEAD AND NECK: CPT | Mod: HCNC,S$GLB,, | Performed by: INTERNAL MEDICINE

## 2020-03-09 DIAGNOSIS — I10 ESSENTIAL HYPERTENSION: ICD-10-CM

## 2020-03-10 RX ORDER — HYDROCHLOROTHIAZIDE 12.5 MG/1
12.5 CAPSULE ORAL DAILY
Qty: 90 CAPSULE | Refills: 1 | Status: SHIPPED | OUTPATIENT
Start: 2020-03-10 | End: 2020-07-27

## 2020-03-13 ENCOUNTER — PATIENT MESSAGE (OUTPATIENT)
Dept: OPHTHALMOLOGY | Facility: CLINIC | Age: 75
End: 2020-03-13

## 2020-03-16 ENCOUNTER — TELEPHONE (OUTPATIENT)
Dept: OPHTHALMOLOGY | Facility: CLINIC | Age: 75
End: 2020-03-16

## 2020-03-16 RX ORDER — TIMOLOL MALEATE 5 MG/ML
1 SOLUTION/ DROPS OPHTHALMIC 2 TIMES DAILY
COMMUNITY
Start: 2020-03-16 | End: 2021-02-12 | Stop reason: ALTCHOICE

## 2020-03-16 NOTE — TELEPHONE ENCOUNTER
"Mrs. Ramirez called in and stated that her Timolol gel drops went up on price "double" for 2020 and wanted to switch drops due to this issue. Dr. Mike stated it was okay for pt to switch over to Timolol drops bid ou and pt agreed to this and wanted me to call in her prescription to [facility] on [address] for a one month supply.   "

## 2020-03-23 ENCOUNTER — PATIENT MESSAGE (OUTPATIENT)
Dept: ADMINISTRATIVE | Facility: OTHER | Age: 75
End: 2020-03-23

## 2020-03-23 ENCOUNTER — PATIENT MESSAGE (OUTPATIENT)
Dept: NEUROLOGY | Facility: CLINIC | Age: 75
End: 2020-03-23

## 2020-03-24 DIAGNOSIS — R20.2 PARESTHESIAS: Primary | ICD-10-CM

## 2020-03-24 RX ORDER — GABAPENTIN 100 MG/1
CAPSULE ORAL
Qty: 60 CAPSULE | Refills: 0 | Status: SHIPPED | OUTPATIENT
Start: 2020-03-24 | End: 2020-08-18 | Stop reason: ALTCHOICE

## 2020-04-07 ENCOUNTER — PATIENT MESSAGE (OUTPATIENT)
Dept: FAMILY MEDICINE | Facility: CLINIC | Age: 75
End: 2020-04-07

## 2020-06-05 DIAGNOSIS — I10 ESSENTIAL HYPERTENSION: ICD-10-CM

## 2020-06-08 ENCOUNTER — TELEPHONE (OUTPATIENT)
Dept: OPHTHALMOLOGY | Facility: CLINIC | Age: 75
End: 2020-06-08

## 2020-06-08 ENCOUNTER — OFFICE VISIT (OUTPATIENT)
Dept: OPHTHALMOLOGY | Facility: CLINIC | Age: 75
End: 2020-06-08
Payer: MEDICARE

## 2020-06-08 DIAGNOSIS — H40.1122 PRIMARY OPEN ANGLE GLAUCOMA OF LEFT EYE, MODERATE STAGE: ICD-10-CM

## 2020-06-08 DIAGNOSIS — H04.123 DRY EYE SYNDROME, BILATERAL: ICD-10-CM

## 2020-06-08 DIAGNOSIS — H40.1111 PRIMARY OPEN ANGLE GLAUCOMA OF RIGHT EYE, MILD STAGE: Primary | ICD-10-CM

## 2020-06-08 DIAGNOSIS — H40.043 STEROID RESPONDER, BILATERAL: ICD-10-CM

## 2020-06-08 PROCEDURE — 99999 PR PBB SHADOW E&M-EST. PATIENT-LVL II: ICD-10-PCS | Mod: PBBFAC,HCNC,, | Performed by: OPHTHALMOLOGY

## 2020-06-08 PROCEDURE — 92012 INTRM OPH EXAM EST PATIENT: CPT | Mod: HCNC,S$GLB,, | Performed by: OPHTHALMOLOGY

## 2020-06-08 PROCEDURE — 99499 RISK ADDL DX/OHS AUDIT: ICD-10-PCS | Mod: HCNC,S$GLB,, | Performed by: OPHTHALMOLOGY

## 2020-06-08 PROCEDURE — 99999 PR PBB SHADOW E&M-EST. PATIENT-LVL II: CPT | Mod: PBBFAC,HCNC,, | Performed by: OPHTHALMOLOGY

## 2020-06-08 PROCEDURE — 99499 UNLISTED E&M SERVICE: CPT | Mod: HCNC,S$GLB,, | Performed by: OPHTHALMOLOGY

## 2020-06-08 PROCEDURE — 92012 PR EYE EXAM, EST PATIENT,INTERMED: ICD-10-PCS | Mod: HCNC,S$GLB,, | Performed by: OPHTHALMOLOGY

## 2020-06-08 RX ORDER — DORZOLAMIDE HCL 20 MG/ML
1 SOLUTION/ DROPS OPHTHALMIC 3 TIMES DAILY
Qty: 10 ML | Refills: 12 | Status: SHIPPED | OUTPATIENT
Start: 2020-06-08 | End: 2020-08-05 | Stop reason: SDUPTHER

## 2020-06-08 NOTE — TELEPHONE ENCOUNTER
I spoke with pt this morning she stated that yesterday she was frosting her windows with white paint and thought maybe she got some in her OS. She stated that she washed her OS out and put some Systane eye drops in and OS is feeling much better she stated that she will call back if she feels like she needs to come in.

## 2020-06-08 NOTE — PROGRESS NOTES
HPI     Patient states that she got spray paint in her left eye. She states that   the left eye burned yesterday, but patient denies any burning sensation   today. Patient c/o blurry vision os and denies any other complaints.   Patient used systane gtts and tap water to rinse out left eye for about 5   min.    timolol maleate 0.5% (TIMOPTIC) 0.5 % Drop (yellow) qhs ou  LUMIGAN 0.01 % Drop (teal) qhs ou    POAG, mild stage OD  POAG, moderate stage OS  HTN  Thyroid nodule  cataract      Last edited by Zan Hope on 6/8/2020  2:09 PM. (History)            Assessment /Plan     For exam results, see Encounter Report.    Primary open angle glaucoma of right eye, mild stage  -     dorzolamide (TRUSOPT) 2 % ophthalmic solution; Place 1 drop into both eyes 3 (three) times daily.  Dispense: 10 mL; Refill: 12    Primary open angle glaucoma of left eye, moderate stage  -     dorzolamide (TRUSOPT) 2 % ophthalmic solution; Place 1 drop into both eyes 3 (three) times daily.  Dispense: 10 mL; Refill: 12    Dry eye syndrome, bilateral    Steroid responder, bilateral              Old pt of Dr. Eber Crouch  Sister is Marina Cortez (my pt.) she sent her to [facility] - 6/8/2020 - + SPRAY PAINT TO LEFT EYE YESTERDAY   RINSED AFTER - [facility] - slightly blurry       1. Early POAG-  Both Eyes   Dx. Years ago with Dr. Eber Crouch  -Strong family hx   -IOP previously well controlled (mid teens) on Xalatan but elevated on generic and switched to lumigan    Family history   STRONG (Mom,Dad, and 5 sisters)  Glaucoma meds   lumigan qhs OU, timolol gfs QAM OU  H/O adverse rxn to glaucoma drops - combigan - itchy lids  // cosopt - angular blepharitis   LASERS   S/p SLT 4/24/14 OD //  5/8/14 OS (good response 21/22 --> 12/15)// yag cap OS 11/29/2018             Repeat SLT od 9/26/2019-  (good resp, 20-->15) // Repeat SLT os -10/17/2019 -(+ resp 19--> 13 )  GLAUCOMA SURGERIES   [place] od 11/30/2016   OTHER EYE SURGERIES  Phaco /IOL os  10/6/2016 // phaco/IOL / [place] od 11/30/2016   CDR  0.75 / 0.7  Tbase  ??  Tmax  26 OU off all gtts (5/24/10)    Ttarget   16 OU  HVF - 7 VF '12 -18 -  OD SNS/IAD ; OS: IAD   Gonio +3 OU  /540  OCT 4  test 2011 to 2017  - RNFL - OD:dec. S/N/I  // OS dec S. Corried N (+ prog ou)   HRT  6 test - 2012 to 2018 -   Disc photos  2012, 2015 - MR -  Bord S/N/I od // dec. S, bord I/N/T os /// CDR 0.684 od // 0.748 osOIS     Ttoday  28 (by applanation and clare ) // 25 by appl and clare) // up from 15/13 - denies any steroid exposure                  Use to use flonase - but no longer using   Test done today - [facility] - spray pain in left eye      2. H/O - hyperopia - Both Eyes -mild - pre-cataract surgery    3. Epiphora  - C/O constant tearing of the right eye x 6 months (8/2015   4.  PC IOL   OD - phaco/IOL / [place] 11/30 /2016 - PCB00 22.5  OS - Phaco/IOL - NO COLTON's - pt declined - 10/6/2016 - PCB00 24.0       Plan:  [facility] for spray paint in left eye - no damage - but IOP found to be up today   -IOP above ou   - S/P repeat slt ou ( 10/2019)  ou (target is 16 ou)   ++ use of flonase - inhaled steroids - ?? Steroid response   Off combigan - intolerant - itchy lids / blepharitis   Intol to cosopt - angular blepharitis   -Strong family hx  -Goal IOP 16 OU    CSM  Target is 16 ou    Intol to combigan -blepharitis / itch lids   Intol to cosopt - angular blepharitis     Tolerating Lumigan ou   Tolerating  timolol gfs // timoptic XE ou q am -  Re-challenge with dorzolamide tid ou     SLT od - 9/26/2019 - good response 20-->15 // repeat - good initial response   SLT os -  10/17/2019 - good resp 19--> 13 // repeat - good initial response     Good response to primary SLT  in 2014     Rx glasses given 2/2017     Keep previously scheduled appt in 1 month with  HVF / DFE / OCT - and IOP check with addition of dorzolamide - ? Tolerating // consider rhopressa trial // may need incisional filtration surgery - check to be sure she has not  re-started flonase or some other inhaled steroid - update flow sheet if there is one

## 2020-06-11 ENCOUNTER — LAB VISIT (OUTPATIENT)
Dept: LAB | Facility: HOSPITAL | Age: 75
End: 2020-06-11
Attending: INTERNAL MEDICINE
Payer: MEDICARE

## 2020-06-11 ENCOUNTER — PATIENT MESSAGE (OUTPATIENT)
Dept: FAMILY MEDICINE | Facility: CLINIC | Age: 75
End: 2020-06-11

## 2020-06-11 DIAGNOSIS — I10 ESSENTIAL HYPERTENSION: ICD-10-CM

## 2020-06-11 DIAGNOSIS — E78.00 PURE HYPERCHOLESTEROLEMIA: Primary | ICD-10-CM

## 2020-06-11 LAB
ALBUMIN SERPL BCP-MCNC: 3.9 G/DL (ref 3.5–5.2)
ALP SERPL-CCNC: 87 U/L (ref 55–135)
ALT SERPL W/O P-5'-P-CCNC: 19 U/L (ref 10–44)
ANION GAP SERPL CALC-SCNC: 7 MMOL/L (ref 8–16)
AST SERPL-CCNC: 21 U/L (ref 10–40)
BILIRUB SERPL-MCNC: 0.8 MG/DL (ref 0.1–1)
BUN SERPL-MCNC: 11 MG/DL (ref 8–23)
CALCIUM SERPL-MCNC: 9.9 MG/DL (ref 8.7–10.5)
CHLORIDE SERPL-SCNC: 104 MMOL/L (ref 95–110)
CO2 SERPL-SCNC: 29 MMOL/L (ref 23–29)
CREAT SERPL-MCNC: 0.8 MG/DL (ref 0.5–1.4)
EST. GFR  (AFRICAN AMERICAN): >60 ML/MIN/1.73 M^2
EST. GFR  (NON AFRICAN AMERICAN): >60 ML/MIN/1.73 M^2
GLUCOSE SERPL-MCNC: 96 MG/DL (ref 70–110)
POTASSIUM SERPL-SCNC: 3.8 MMOL/L (ref 3.5–5.1)
PROT SERPL-MCNC: 7.1 G/DL (ref 6–8.4)
SODIUM SERPL-SCNC: 140 MMOL/L (ref 136–145)

## 2020-06-11 PROCEDURE — 36415 COLL VENOUS BLD VENIPUNCTURE: CPT | Mod: HCNC,PO

## 2020-06-11 PROCEDURE — 80061 LIPID PANEL: CPT | Mod: HCNC

## 2020-06-11 PROCEDURE — 80053 COMPREHEN METABOLIC PANEL: CPT | Mod: HCNC

## 2020-06-11 NOTE — PROGRESS NOTES
CMP normal.  On HCTZ and irbesartan.  Trying to add on lipid profile  Results to patient via my chart. F/u 6 months.

## 2020-06-12 LAB
CHOLEST SERPL-MCNC: 124 MG/DL (ref 120–199)
CHOLEST/HDLC SERPL: 3 {RATIO} (ref 2–5)
HDLC SERPL-MCNC: 42 MG/DL (ref 40–75)
HDLC SERPL: 33.9 % (ref 20–50)
LDLC SERPL CALC-MCNC: 62.8 MG/DL (ref 63–159)
NONHDLC SERPL-MCNC: 82 MG/DL
TRIGL SERPL-MCNC: 96 MG/DL (ref 30–150)

## 2020-06-13 DIAGNOSIS — R73.09 ABNORMAL GLUCOSE: ICD-10-CM

## 2020-06-13 DIAGNOSIS — E04.1 THYROID NODULE: ICD-10-CM

## 2020-06-13 DIAGNOSIS — I10 ESSENTIAL HYPERTENSION: ICD-10-CM

## 2020-06-13 DIAGNOSIS — E78.00 PURE HYPERCHOLESTEROLEMIA: Primary | ICD-10-CM

## 2020-06-13 NOTE — PROGRESS NOTES
CMP, lipid good on statin.  No changes.  Results to patient via my chart.  Follow-up 6 months. previsit CBC CMP TSH lipid A1c

## 2020-08-04 NOTE — PROGRESS NOTES
HPI     Glaucoma      Additional comments: HVF and OCT review today              Comments     DLS: 6/8/20    1) POAG OU  2) PCIOL OU  3) Hx Hyperopia  4) Epiphora    MEDS:  T1/2 GFS QAM OU  Dorzolamide TID OU  Lumigan QHS OU  Systane PRN OU          Last edited by Alicia Felix MA on 8/6/2020  2:29 PM. (History)              Assessment /Plan     For exam results, see Encounter Report.    Primary open angle glaucoma of right eye, mild stage    Primary open angle glaucoma of left eye, moderate stage    Dry eye syndrome, bilateral    Steroid responder, bilateral    Angular blepharitis, unspecified laterality    Epiphora, right    Trichiasis of right upper eyelid    PCO (posterior capsular opacification), left    Pseudophakia of both eyes          Old pt of Dr. Eber Crouch  Sister is Marina Cortez (my pt.) she sent her to [facility] - 6/8/2020 - + SPRAY PAINT TO LEFT EYE YESTERDAY   [facility] - [facility] bl[facility]       1. Early POAG-  Both Eyes   Dx. Years ago with Dr. Eber Crouch  -Strong family hx   -IOP previously well controlled (mid teens) on Xalatan but elevated on generic and switched to lumigan    Family history   STRONG (Mom,Dad, and 5 sisters)  Glaucoma meds   lumigan qhs OU, timolol gfs QAM OU  H/O adverse rxn to glaucoma drops - combigan - itchy lids  // cosopt - angular blepharitis   LASERS   S/p SLT 4/24/14 OD //  5/8/14 OS (good response 21/22 --> 12/15)// yag cap OS 11/29/2018             Repeat SLT od 9/26/2019-  (good resp, 20-->15) // Repeat SLT os -10/17/2019 -(+ resp 19--> 13 )  GLAUCOMA SURGERIES   [place] od 11/30/2016   OTHER EYE SURGERIES  Phaco /IOL os 10/6/2016 // phaco/IOL / [place] od 11/30/2016   CDR  0.75 / 0.7  Tbase  ??  Tmax  26 OU off all gtts (5/24/10)    Ttarget   16 OU  HVF - 7 VF '12 -18 -  OD SNS/IAD ; OS: IAD   Gonio +3 OU  /540  OCT 4  test 2011 to 2017  - RNFL - OD:dec. S/N/I  // OS dec SFranco JACOBS (+ prog ou)   HRT  6 test - 2012 to 2018 -   Disc photos  2012, 2015 -  MR -  Bord S/N/I od // dec. S, bord I/N/T os /// CDR 0.684 od // 0.748 osOIS     Ttoday  28/18 (by applanation)  - denies any steroid exposure                  Use to use flonase - but no longer using   Test done today -  IOP check with addition of dorzolaimde - IOP better os // still high od      2. H/O - hyperopia - Both Eyes -mild - pre-cataract surgery    3. Epiphora  - C/O constant tearing of the right eye x 6 months (8/2015   4.  PC IOL   OD - phaco/IOL / [place] 11/30 /2016 - PCB00 22.5  OS - Phaco/IOL - NO COLTON's - pt declined - 10/6/2016 - PCB00 24.0       Plan:    -IOP above target OU (was up last visit also)    - S/P repeat slt ou ( 10/2019)  ou (target is 16 ou)   ++ use of flonase -  Off all steroids   Off combigan - intolerant - itchy lids / blepharitis   Intol to cosopt - angular blepharitis   -Strong family hx  -Goal IOP 16 OU    CSM  Target is 16 ou    Intol to combigan -blepharitis / itch lids   Intol to cosopt - angular blepharitis     Tolerating Lumigan ou   Tolerating  timolol gfs // timoptic XE ou q am -  Tolerating  dorzolamide tid ou   Add a trial of rhopressa ou q day     SLT od - 9/26/2019 - good response 20-->15 // repeat - good initial response   SLT os -  10/17/2019 - good resp 19--> 13 // repeat - good initial response     Good response to primary SLT  in 2014     Rx glasses given 2/2017     F/U 4-6 weeks - IOP check with addition of rhopressa // may need filtering surgery / ?? If a candidate for diamox

## 2020-08-05 DIAGNOSIS — H40.1122 PRIMARY OPEN ANGLE GLAUCOMA OF LEFT EYE, MODERATE STAGE: ICD-10-CM

## 2020-08-05 DIAGNOSIS — H40.1111 PRIMARY OPEN ANGLE GLAUCOMA OF RIGHT EYE, MILD STAGE: ICD-10-CM

## 2020-08-05 RX ORDER — DORZOLAMIDE HCL 20 MG/ML
1 SOLUTION/ DROPS OPHTHALMIC 3 TIMES DAILY
Qty: 10 ML | Refills: 12 | Status: SHIPPED | OUTPATIENT
Start: 2020-08-05 | End: 2021-08-05

## 2020-08-06 ENCOUNTER — CLINICAL SUPPORT (OUTPATIENT)
Dept: OPHTHALMOLOGY | Facility: CLINIC | Age: 75
End: 2020-08-06
Payer: MEDICARE

## 2020-08-06 ENCOUNTER — OFFICE VISIT (OUTPATIENT)
Dept: OPHTHALMOLOGY | Facility: CLINIC | Age: 75
End: 2020-08-06
Payer: MEDICARE

## 2020-08-06 DIAGNOSIS — Z96.1 PSEUDOPHAKIA OF BOTH EYES: ICD-10-CM

## 2020-08-06 DIAGNOSIS — H26.492 PCO (POSTERIOR CAPSULAR OPACIFICATION), LEFT: ICD-10-CM

## 2020-08-06 DIAGNOSIS — H40.1122 PRIMARY OPEN ANGLE GLAUCOMA OF LEFT EYE, MODERATE STAGE: ICD-10-CM

## 2020-08-06 DIAGNOSIS — H02.051 TRICHIASIS OF RIGHT UPPER EYELID: ICD-10-CM

## 2020-08-06 DIAGNOSIS — H40.1111 PRIMARY OPEN ANGLE GLAUCOMA OF RIGHT EYE, MILD STAGE: Primary | ICD-10-CM

## 2020-08-06 DIAGNOSIS — H40.043 STEROID RESPONDER, BILATERAL: ICD-10-CM

## 2020-08-06 DIAGNOSIS — H01.009 ANGULAR BLEPHARITIS, UNSPECIFIED LATERALITY: ICD-10-CM

## 2020-08-06 DIAGNOSIS — H04.123 DRY EYE SYNDROME, BILATERAL: ICD-10-CM

## 2020-08-06 DIAGNOSIS — H04.201 EPIPHORA, RIGHT: ICD-10-CM

## 2020-08-06 PROCEDURE — 99999 PR PBB SHADOW E&M-EST. PATIENT-LVL III: ICD-10-PCS | Mod: PBBFAC,HCNC,, | Performed by: OPHTHALMOLOGY

## 2020-08-06 PROCEDURE — 92133 POSTERIOR SEGMENT OCT OPTIC NERVE(OCULAR COHERENCE TOMOGRAPHY) - OU - BOTH EYES: ICD-10-PCS | Mod: HCNC,S$GLB,, | Performed by: OPHTHALMOLOGY

## 2020-08-06 PROCEDURE — 92133 CPTRZD OPH DX IMG PST SGM ON: CPT | Mod: HCNC,S$GLB,, | Performed by: OPHTHALMOLOGY

## 2020-08-06 PROCEDURE — 99999 PR PBB SHADOW E&M-EST. PATIENT-LVL I: CPT | Mod: PBBFAC,HCNC,,

## 2020-08-06 PROCEDURE — 92083 EXTENDED VISUAL FIELD XM: CPT | Mod: HCNC,S$GLB,, | Performed by: OPHTHALMOLOGY

## 2020-08-06 PROCEDURE — 92014 PR EYE EXAM, EST PATIENT,COMPREHESV: ICD-10-PCS | Mod: HCNC,S$GLB,, | Performed by: OPHTHALMOLOGY

## 2020-08-06 PROCEDURE — 92014 COMPRE OPH EXAM EST PT 1/>: CPT | Mod: HCNC,S$GLB,, | Performed by: OPHTHALMOLOGY

## 2020-08-06 PROCEDURE — 99999 PR PBB SHADOW E&M-EST. PATIENT-LVL I: ICD-10-PCS | Mod: PBBFAC,HCNC,,

## 2020-08-06 PROCEDURE — 99999 PR PBB SHADOW E&M-EST. PATIENT-LVL III: CPT | Mod: PBBFAC,HCNC,, | Performed by: OPHTHALMOLOGY

## 2020-08-06 PROCEDURE — 92083 HUMPHREY VISUAL FIELD - OU - BOTH EYES: ICD-10-PCS | Mod: HCNC,S$GLB,, | Performed by: OPHTHALMOLOGY

## 2020-08-06 RX ORDER — IBUPROFEN 100 MG/5ML
1000 SUSPENSION, ORAL (FINAL DOSE FORM) ORAL DAILY
COMMUNITY

## 2020-08-06 RX ORDER — NETARSUDIL 0.2 MG/ML
1 SOLUTION/ DROPS OPHTHALMIC; TOPICAL DAILY
Qty: 2.5 ML | Refills: 12 | Status: SHIPPED | OUTPATIENT
Start: 2020-08-06 | End: 2021-05-06 | Stop reason: SDUPTHER

## 2020-08-06 NOTE — PROGRESS NOTES
HVF done/rel/fix/coop. Good ou./chart checked and asked patient regarding latex allergy, none noted./Rx used: -.25 + .50 x 60/OD -.50 + .50 x 80/OS.-[facility]

## 2020-08-15 ENCOUNTER — PATIENT MESSAGE (OUTPATIENT)
Dept: FAMILY MEDICINE | Facility: CLINIC | Age: 75
End: 2020-08-15

## 2020-08-15 DIAGNOSIS — Z12.31 ENCOUNTER FOR SCREENING MAMMOGRAM FOR MALIGNANT NEOPLASM OF BREAST: Primary | ICD-10-CM

## 2020-08-18 ENCOUNTER — LAB VISIT (OUTPATIENT)
Dept: LAB | Facility: HOSPITAL | Age: 75
End: 2020-08-18
Attending: INTERNAL MEDICINE
Payer: MEDICARE

## 2020-08-18 ENCOUNTER — OFFICE VISIT (OUTPATIENT)
Dept: FAMILY MEDICINE | Facility: CLINIC | Age: 75
End: 2020-08-18
Payer: MEDICARE

## 2020-08-18 VITALS
OXYGEN SATURATION: 96 % | BODY MASS INDEX: 31.16 KG/M2 | WEIGHT: 187 LBS | TEMPERATURE: 98 F | DIASTOLIC BLOOD PRESSURE: 62 MMHG | HEIGHT: 65 IN | SYSTOLIC BLOOD PRESSURE: 110 MMHG | HEART RATE: 80 BPM

## 2020-08-18 DIAGNOSIS — T14.8XXA BRUISING: ICD-10-CM

## 2020-08-18 DIAGNOSIS — R73.09 ABNORMAL GLUCOSE: ICD-10-CM

## 2020-08-18 DIAGNOSIS — E78.00 PURE HYPERCHOLESTEROLEMIA: ICD-10-CM

## 2020-08-18 DIAGNOSIS — G60.9 IDIOPATHIC PERIPHERAL NEUROPATHY: ICD-10-CM

## 2020-08-18 DIAGNOSIS — I10 ESSENTIAL HYPERTENSION: ICD-10-CM

## 2020-08-18 DIAGNOSIS — E55.9 VITAMIN D DEFICIENCY: ICD-10-CM

## 2020-08-18 DIAGNOSIS — T14.8XXA BRUISING: Primary | ICD-10-CM

## 2020-08-18 DIAGNOSIS — E04.1 THYROID NODULE: ICD-10-CM

## 2020-08-18 LAB
BASOPHILS # BLD AUTO: 0.04 K/UL (ref 0–0.2)
BASOPHILS NFR BLD: 0.8 % (ref 0–1.9)
DIFFERENTIAL METHOD: ABNORMAL
EOSINOPHIL # BLD AUTO: 0 K/UL (ref 0–0.5)
EOSINOPHIL NFR BLD: 0 % (ref 0–8)
ERYTHROCYTE [DISTWIDTH] IN BLOOD BY AUTOMATED COUNT: 13.2 % (ref 11.5–14.5)
HCT VFR BLD AUTO: 39 % (ref 37–48.5)
HGB BLD-MCNC: 12.3 G/DL (ref 12–16)
IMM GRANULOCYTES # BLD AUTO: 0.02 K/UL (ref 0–0.04)
IMM GRANULOCYTES NFR BLD AUTO: 0.4 % (ref 0–0.5)
LYMPHOCYTES # BLD AUTO: 2 K/UL (ref 1–4.8)
LYMPHOCYTES NFR BLD: 37.5 % (ref 18–48)
MCH RBC QN AUTO: 28.9 PG (ref 27–31)
MCHC RBC AUTO-ENTMCNC: 31.5 G/DL (ref 32–36)
MCV RBC AUTO: 92 FL (ref 82–98)
MONOCYTES # BLD AUTO: 0.4 K/UL (ref 0.3–1)
MONOCYTES NFR BLD: 7.7 % (ref 4–15)
NEUTROPHILS # BLD AUTO: 2.8 K/UL (ref 1.8–7.7)
NEUTROPHILS NFR BLD: 53.6 % (ref 38–73)
NRBC BLD-RTO: 0 /100 WBC
PLATELET # BLD AUTO: 327 K/UL (ref 150–350)
PMV BLD AUTO: 11.3 FL (ref 9.2–12.9)
RBC # BLD AUTO: 4.25 M/UL (ref 4–5.4)
WBC # BLD AUTO: 5.22 K/UL (ref 3.9–12.7)

## 2020-08-18 PROCEDURE — 85025 COMPLETE CBC W/AUTO DIFF WBC: CPT | Mod: HCNC

## 2020-08-18 PROCEDURE — 3008F BODY MASS INDEX DOCD: CPT | Mod: HCNC,CPTII,S$GLB, | Performed by: INTERNAL MEDICINE

## 2020-08-18 PROCEDURE — 3074F SYST BP LT 130 MM HG: CPT | Mod: HCNC,CPTII,S$GLB, | Performed by: INTERNAL MEDICINE

## 2020-08-18 PROCEDURE — 84443 ASSAY THYROID STIM HORMONE: CPT | Mod: HCNC

## 2020-08-18 PROCEDURE — 80053 COMPREHEN METABOLIC PANEL: CPT | Mod: HCNC

## 2020-08-18 PROCEDURE — 3008F PR BODY MASS INDEX (BMI) DOCUMENTED: ICD-10-PCS | Mod: HCNC,CPTII,S$GLB, | Performed by: INTERNAL MEDICINE

## 2020-08-18 PROCEDURE — 3078F PR MOST RECENT DIASTOLIC BLOOD PRESSURE < 80 MM HG: ICD-10-PCS | Mod: HCNC,CPTII,S$GLB, | Performed by: INTERNAL MEDICINE

## 2020-08-18 PROCEDURE — 99999 PR PBB SHADOW E&M-EST. PATIENT-LVL V: ICD-10-PCS | Mod: PBBFAC,HCNC,, | Performed by: INTERNAL MEDICINE

## 2020-08-18 PROCEDURE — 1126F PR PAIN SEVERITY QUANTIFIED, NO PAIN PRESENT: ICD-10-PCS | Mod: HCNC,S$GLB,, | Performed by: INTERNAL MEDICINE

## 2020-08-18 PROCEDURE — 36415 COLL VENOUS BLD VENIPUNCTURE: CPT | Mod: HCNC,PO

## 2020-08-18 PROCEDURE — 1101F PT FALLS ASSESS-DOCD LE1/YR: CPT | Mod: HCNC,CPTII,S$GLB, | Performed by: INTERNAL MEDICINE

## 2020-08-18 PROCEDURE — 83036 HEMOGLOBIN GLYCOSYLATED A1C: CPT | Mod: HCNC

## 2020-08-18 PROCEDURE — 99214 PR OFFICE/OUTPT VISIT, EST, LEVL IV, 30-39 MIN: ICD-10-PCS | Mod: HCNC,S$GLB,, | Performed by: INTERNAL MEDICINE

## 2020-08-18 PROCEDURE — 3078F DIAST BP <80 MM HG: CPT | Mod: HCNC,CPTII,S$GLB, | Performed by: INTERNAL MEDICINE

## 2020-08-18 PROCEDURE — 1159F PR MEDICATION LIST DOCUMENTED IN MEDICAL RECORD: ICD-10-PCS | Mod: HCNC,S$GLB,, | Performed by: INTERNAL MEDICINE

## 2020-08-18 PROCEDURE — 80061 LIPID PANEL: CPT | Mod: HCNC

## 2020-08-18 PROCEDURE — 1159F MED LIST DOCD IN RCRD: CPT | Mod: HCNC,S$GLB,, | Performed by: INTERNAL MEDICINE

## 2020-08-18 PROCEDURE — 1101F PR PT FALLS ASSESS DOC 0-1 FALLS W/OUT INJ PAST YR: ICD-10-PCS | Mod: HCNC,CPTII,S$GLB, | Performed by: INTERNAL MEDICINE

## 2020-08-18 PROCEDURE — 99999 PR PBB SHADOW E&M-EST. PATIENT-LVL V: CPT | Mod: PBBFAC,HCNC,, | Performed by: INTERNAL MEDICINE

## 2020-08-18 PROCEDURE — 3074F PR MOST RECENT SYSTOLIC BLOOD PRESSURE < 130 MM HG: ICD-10-PCS | Mod: HCNC,CPTII,S$GLB, | Performed by: INTERNAL MEDICINE

## 2020-08-18 PROCEDURE — 99214 OFFICE O/P EST MOD 30 MIN: CPT | Mod: HCNC,S$GLB,, | Performed by: INTERNAL MEDICINE

## 2020-08-18 PROCEDURE — 1126F AMNT PAIN NOTED NONE PRSNT: CPT | Mod: HCNC,S$GLB,, | Performed by: INTERNAL MEDICINE

## 2020-08-18 RX ORDER — IRBESARTAN 150 MG/1
150 TABLET ORAL NIGHTLY
Qty: 90 TABLET | Refills: 3 | Status: SHIPPED | OUTPATIENT
Start: 2020-08-18 | End: 2021-05-14 | Stop reason: SDUPTHER

## 2020-08-18 RX ORDER — HYDROCHLOROTHIAZIDE 12.5 MG/1
12.5 CAPSULE ORAL DAILY
Qty: 90 CAPSULE | Refills: 3 | Status: SHIPPED | OUTPATIENT
Start: 2020-08-18 | End: 2021-05-14 | Stop reason: SDUPTHER

## 2020-08-18 RX ORDER — ATORVASTATIN CALCIUM 10 MG/1
10 TABLET, FILM COATED ORAL EVERY OTHER DAY
Qty: 90 TABLET | Refills: 3 | Status: SHIPPED | OUTPATIENT
Start: 2020-08-18 | End: 2021-05-14 | Stop reason: SDUPTHER

## 2020-08-18 NOTE — PROGRESS NOTES
This note was created by combination of typed  and M-Modal dictation.  Transcription errors may be present.  If there are any questions, please contact me.    Assessment and Plan:   Bruising  -no worrisome features.  Maybe a combination of aspirin as well as omega-3.  Stop the Omega 3.  Stay on aspirin with history of TIA.  Check labs, CBC to check RBC as well as platelet count.  If all normal no further action  -     CBC auto differential; Future; Expected date: 08/18/2020    Essential hypertension  -stable on irbesartan and HCTZ, refilled to mail order pharmacy  -     irbesartan (AVAPRO) 150 MG tablet; Take 1 tablet (150 mg total) by mouth every evening.  Dispense: 90 tablet; Refill: 3  -     hydroCHLOROthiazide (MICROZIDE) 12.5 mg capsule; Take 1 capsule (12.5 mg total) by mouth once daily.  Dispense: 90 capsule; Refill: 3    Vitamin D deficiency.  -last bone density scan 2016 normal BMD.  On a vitamin-D supplement only.  Not taking any calcium supplement.  No dairy intake.  Recommended she take calcium vitamin-D combination, 1200 mg of calcium, 1000 of vitamin-D    Idiopathic peripheral neuropathy normal B12, TSH; A1c pre-DM. 6/2019 gabapentin  -did not find gabapentin helpful so that was tapered off.  Her neurologist is no longer with the system.  Referral back to Neurology for evaluation.  -     Ambulatory referral/consult to Neurology; Future; Expected date: 08/25/2020    Pure hypercholesterolemia  -last lipid profile good.  Currently on Lipitor every other day.  No change, refill to pharmacy  -     atorvastatin (LIPITOR) 10 MG tablet; Take 1 tablet (10 mg total) by mouth every other day. Brand only  Dispense: 90 tablet; Refill: 3    Medications Discontinued During This Encounter   Medication Reason    omega-3/dha/epa/fish oil (OMEGA-3 FISH OIL ORAL) Therapy completed    VITAMIN B-12 100 MCG tablet Therapy completed    vitamin D (VITAMIN D3) 1000 units Tab Therapy completed    gabapentin  (NEURONTIN) 100 MG capsule Therapy completed    atorvastatin (LIPITOR) 10 MG tablet Reorder    irbesartan (AVAPRO) 150 MG tablet Reorder    hydroCHLOROthiazide (MICROZIDE) 12.5 mg capsule Reorder       meds sent this encounter:  Medications Ordered This Encounter   Medications    atorvastatin (LIPITOR) 10 MG tablet     Sig: Take 1 tablet (10 mg total) by mouth every other day. Brand only     Dispense:  90 tablet     Refill:  3    hydroCHLOROthiazide (MICROZIDE) 12.5 mg capsule     Sig: Take 1 capsule (12.5 mg total) by mouth once daily.     Dispense:  90 capsule     Refill:  3     .    irbesartan (AVAPRO) 150 MG tablet     Sig: Take 1 tablet (150 mg total) by mouth every evening.     Dispense:  90 tablet     Refill:  3     .       Follow Up: No follow-ups on file.  Plan for follow-up 6 months with pre visit labs to be ordered.  Recall entered.      Subjective:     Chief Complaint   Patient presents with    Bleeding/Bruising       OVI Botello is a 75 y.o. female, last appointment with this clinic was Visit date not found.    No LMP recorded. Patient has had a hysterectomy.    6/11 labs CMP WNL  Lipid good  Message to me 7/28, rash on the leg, looked like a bruise.    She has another bruise that seems to be resolving on her left upper inner arm, over the bicep.  No recollection of any trauma or injury.  Does not carry anything that would hit her arm that she can recall.  The lesion on her right anterior lower leg has resolved.  Does not recall any trauma to that area either.    She walks on treadmill regularly but otherwise does not do anything that would cause injury to her knowledge.  She has no issues with hemoptysis, epistaxis, blood in the stool, blood in the urine.    She takes aspirin 81 mg ever since her TIA in 2012.  Lipitor QOD  hctz and irbesartan    She takes omega-3 and I have recommended she go ahead and stop that.    Tapered off the gabapentin.  Was not helping. Still with numbness.  Her  neurologist is no longer with the system and I need to refer her back to new Neurology.    On review, she is no longer taking B12 supplement.  She takes a vitamin-D supplement only, stop taking a calcium supplement.  Does not eat dairy so I have recommended she take a calcium and vitamin-D combination.    Answers for HPI/ROS submitted by the patient on 8/17/2020   activity change: No  unexpected weight change: No  rhinorrhea: No  trouble swallowing: No  visual disturbance: No  chest tightness: No  polyuria: No  difficulty urinating: No  menstrual problem: No  joint swelling: No  arthralgias: No  confusion: No  dysphoric mood: No      Patient Care Team:  Ancelmo Sumner MD as PCP - General (Internal Medicine)  Brooke Delong MD as Obstetrician (Obstetrics)  Arnoldo Valentin MD as Consulting Physician (Otolaryngology)  Tim Freeman MA as Care Coordinator    Patient Active Problem List    Diagnosis Date Noted    Chronic fatigue 01/24/2020    Acute pain of left knee 06/18/2019    Weakness of both hips 06/18/2019    Decreased independence with transfers 06/18/2019    Impaired functional mobility, balance, gait, and endurance 06/18/2019    Idiopathic peripheral neuropathy normal B12, TSH; A1c pre-DM. 6/2019 gabapentin 06/03/2019    Thyroid nodule on US annual US; followed by endo 04/22/2019 2/21/20 thyroid US: 1.)  Thyroid gland is upper limits of normal in size with heterogeneous echotexture and normal vascularity  2.) 1.31 x 1.07 x 0.91 cm solid, heterogeneous predominately isoechoic nodule is seen in the right inferior pole  3.) 0.72 x 0.48 x 0.50 cm solid, hyperechoic nodule is seen in the right inferior pole  4.) 0.85 x 0.49 x 0.72 cm solid, isoechoic nodule is seen in the right inferior pole  RECOMMENDATIONS:  Recommend repeat thyroid ultrasound in 1 year.      Abnormal glucose 04/18/2019    Vitamin D deficiency 04/04/2019    Chronic bilateral low back pain without sciatica 11/02/2018     Bradycardia 6/2018 holter negative 07/20/2018    Status post hysterectomy 07/20/2018    Statin myopathy 07/20/2018    History of colon polyps; 1/23/2017 colonoscopy normal repeat 5 years 01/23/2017    Left knee pain 01/05/2017    Nuclear sclerosis 11/30/2016    Cataract 09/07/2016     Dx updated per 2019 IMO Load      Class 2 obesity due to excess calories in adult 07/20/2016    POAG (primary open-angle glaucoma) 01/05/2016    Essential hypertension 08/13/2012    Hyperlipidemia; statin myalgias; 6/28/2018 exercise stress echo neg for ischemia 08/13/2012 6/28/2018 exercise stress echo CONCLUSIONS   1 - Normal left ventricular systolic function (EF 60-65%).   2 - No wall motion abnormalities.   3 - Normal left ventricular diastolic function.   4 - Normal right ventricular systolic function .          PAST MEDICAL HISTORY:  Past Medical History:   Diagnosis Date    Allergy     Anxiety     Breast cyst     Edema of leg 10/5/2012    bilateral     Fractures 2011    thumb R    GERD (gastroesophageal reflux disease)     Glaucoma     History of colonic polyps     Hx-TIA (transient ischemic attack) 8/13/2012 Jan 2012: LLE and left facial numbness lasting 1 hour     Hyperlipidemia     Hypertension     Left shoulder tendonitis 8/8/2015    Primary open-angle glaucoma, mild stage - Both Eyes 6/3/2013       PAST SURGICAL HISTORY:  Past Surgical History:   Procedure Laterality Date    BREAST BIOPSY Left     core    BREAST SURGERY Left     lumpectomy    CATARACT EXTRACTION W/  INTRAOCULAR LENS IMPLANT Left 10/05/2016    Dr. Mike    CATARACT EXTRACTION W/  INTRAOCULAR LENS IMPLANT Right 11/30/2016    With Quinten (Dr. Mike)    COLONOSCOPY N/A 1/23/2017    Procedure: COLONOSCOPY;  Surgeon: Hany Mosquera MD;  Location: [facility] ([address] [place] [address]);  Service: Endoscopy;  Laterality: N/A;    HAND SURGERY Left 2011    thumb    HYSTERECTOMY  1980's    Total;    [place] GONIOTOMY Right 11/30/2016    WITH  OSMANY ()    Left Breast Lumpectomy Left     performed in 1960s    OOPHORECTOMY      SELECTIVE LASER TRABECUPLASTY  05/2014    OU w/ Dr. Mike    TUBAL LIGATION  1970's    VAGINAL DELIVERY      x3    YAG CAPSULOTOMY Left 11/29/2018           SOCIAL HISTORY:  Social History     Socioeconomic History    Marital status:      Spouse name: Not on file    Number of children: 3    Years of education: Not on file    Highest education level: Not on file   Occupational History    Occupation: retired - formerly pastoral care with [employer]    Financial resource strain: Not hard at all    Food insecurity     Worry: Never true     Inability: Never true    Transportation needs     Medical: No     Non-medical: No   Tobacco Use    Smoking status: Never Smoker    Smokeless tobacco: Never Used   Substance and Sexual Activity    Alcohol use: Yes     Alcohol/week: 0.0 standard drinks     Frequency: Never     Binge frequency: Never     Comment: Rarely; 1 glass of wine    Drug use: No    Sexual activity: Yes     Partners: Female, Male     Birth control/protection: Post-menopausal, See Surgical Hx   Lifestyle    Physical activity     Days per week: 3 days     Minutes per session: 30 min    Stress: Not at all   Relationships    Social connections     Talks on phone: More than three times a week     Gets together: Never     Attends Restoration[employer] service: More than 4 times per year     Active member of club or organization: No     Attends meetings of clubs or organizations: Never     Relationship status:    Other Topics Concern    Not on file   Social History Narrative    Not on file        ALLERGIES AND MEDICATIONS: updated and reviewed.  Review of patient's allergies indicates:   Allergen Reactions    Lisinopril Swelling    Amlodipine Edema    Combigan [brimonidine-timolol]      Causes itchy eyelids and contact dermatitis    Cosopt [dorzolamide-timolol]       Causes angular blepharitis of eyelids    Pravastatin      Myalgia and elevated CPK       Medication List with Changes/Refills   Current Medications    ASCORBIC ACID, VITAMIN C, (VITAMIN C) 1000 MG TABLET    Take 1,000 mg by mouth once daily.    ASPIRIN (ECOTRIN) 81 MG EC TABLET    Take 81 mg by mouth once daily.      ATORVASTATIN (LIPITOR) 10 MG TABLET    Take 1 tablet (10 mg total) by mouth every other day. Brand only    CALCIUM-VITAMIN D3 (OS-LETY 500 + D3) 500 MG(1,250MG) -200 UNIT PER TABLET    Take 1 tablet by mouth 2 (two) times daily with meals.    CO-ENZYME Q-10 30 MG CAPSULE    Take 100 mg by mouth once daily.     DORZOLAMIDE (TRUSOPT) 2 % OPHTHALMIC SOLUTION    Place 1 drop into both eyes 3 (three) times daily.    GABAPENTIN (NEURONTIN) 100 MG CAPSULE    Take 200mg at bedtime for a week, then take 100mg at bedtime for 1 wk, then decrease to 100mg every other night for a wk and stop    HYDROCHLOROTHIAZIDE (MICROZIDE) 12.5 MG CAPSULE    TAKE 1 CAPSULE EVERY DAY    IRBESARTAN (AVAPRO) 150 MG TABLET    TAKE 1 TABLET EVERY EVENING    LUMIGAN 0.01 % DROP    INSTILL 1 DROP INTO BOTH EYES EVERY EVENING    NETARSUDIL (RHOPRESSA) 0.02 % OPHTHALMIC SOLUTION    Place 1 drop into both eyes once daily.    OMEGA-3/DHA/EPA/FISH OIL (OMEGA-3 FISH OIL ORAL)    Take 2 capsules by mouth once daily.    TIMOLOL MALEATE 0.5% (TIMOPTIC) 0.5 % DROP    Place 1 drop into both eyes 2 (two) times daily.    TIMOLOL MALEATE 0.5% (TIMOPTIC-XE) 0.5 % SOLG    Place 1 drop into both eyes every morning. Timoptic XE or timolol GFS (gel forming solution)    VITAMIN B-12 100 MCG TABLET        VITAMIN D (VITAMIN D3) 1000 UNITS TAB    Take 1,000 Units by mouth once daily.       Review of Systems   HENT: Negative for hearing loss.    Eyes: Negative for discharge.   Respiratory: Negative for wheezing.    Cardiovascular: Negative for chest pain and palpitations.   Gastrointestinal: Negative for blood in stool, constipation, diarrhea and vomiting.  "  Genitourinary: Negative for dysuria and hematuria.   Musculoskeletal: Negative for neck pain.   Neurological: Negative for weakness and headaches.   Endo/Heme/Allergies: Negative for polydipsia.       Objective:   Physical Exam   Vitals:    08/18/20 1407   BP: 110/62   BP Location: Left arm   Patient Position: Sitting   BP Method: Medium (Manual)   Pulse: 80   Temp: 97.7 °F (36.5 °C)   TempSrc: Temporal   SpO2: 96%   Weight: 84.8 kg (187 lb)   Height: 5' 5" (1.651 m)    Body mass index is 31.12 kg/m².  Weight: 84.8 kg (187 lb)   Height: 5' 5" (165.1 cm)     Physical Exam  Constitutional:       General: She is not in acute distress.     Appearance: She is well-developed.   Cardiovascular:      Rate and Rhythm: Normal rate and regular rhythm.      Heart sounds: Normal heart sounds. No murmur.   Pulmonary:      Effort: Pulmonary effort is normal.      Breath sounds: Normal breath sounds.   Musculoskeletal: Normal range of motion.   Skin:     General: Skin is warm and dry.      Comments: She has healing area of ecchymosis on her left upper arm over the biceps, without swelling or induration, no deformity  The area of previous ecchymosis on the right anterior lower leg, has resolved.  No overlying varicose vein.  No tenderness no swelling no deformity   Neurological:      Mental Status: She is alert and oriented to person, place, and time.      Coordination: Coordination normal.   Psychiatric:         Behavior: Behavior normal.         Thought Content: Thought content normal.         "

## 2020-08-19 DIAGNOSIS — E78.2 MIXED HYPERLIPIDEMIA: Primary | ICD-10-CM

## 2020-08-19 LAB
ALBUMIN SERPL BCP-MCNC: 4.1 G/DL (ref 3.5–5.2)
ALP SERPL-CCNC: 80 U/L (ref 55–135)
ALT SERPL W/O P-5'-P-CCNC: 29 U/L (ref 10–44)
ANION GAP SERPL CALC-SCNC: 9 MMOL/L (ref 8–16)
AST SERPL-CCNC: 30 U/L (ref 10–40)
BILIRUB SERPL-MCNC: 0.7 MG/DL (ref 0.1–1)
BUN SERPL-MCNC: 16 MG/DL (ref 8–23)
CALCIUM SERPL-MCNC: 9.8 MG/DL (ref 8.7–10.5)
CHLORIDE SERPL-SCNC: 106 MMOL/L (ref 95–110)
CHOLEST SERPL-MCNC: 131 MG/DL (ref 120–199)
CHOLEST/HDLC SERPL: 2.8 {RATIO} (ref 2–5)
CO2 SERPL-SCNC: 26 MMOL/L (ref 23–29)
CREAT SERPL-MCNC: 0.9 MG/DL (ref 0.5–1.4)
EST. GFR  (AFRICAN AMERICAN): >60 ML/MIN/1.73 M^2
EST. GFR  (NON AFRICAN AMERICAN): >60 ML/MIN/1.73 M^2
ESTIMATED AVG GLUCOSE: 126 MG/DL (ref 68–131)
GLUCOSE SERPL-MCNC: 97 MG/DL (ref 70–110)
HBA1C MFR BLD HPLC: 6 % (ref 4–5.6)
HDLC SERPL-MCNC: 47 MG/DL (ref 40–75)
HDLC SERPL: 35.9 % (ref 20–50)
LDLC SERPL CALC-MCNC: 63 MG/DL (ref 63–159)
NONHDLC SERPL-MCNC: 84 MG/DL
POTASSIUM SERPL-SCNC: 3.7 MMOL/L (ref 3.5–5.1)
PROT SERPL-MCNC: 7.4 G/DL (ref 6–8.4)
SODIUM SERPL-SCNC: 141 MMOL/L (ref 136–145)
TRIGL SERPL-MCNC: 105 MG/DL (ref 30–150)
TSH SERPL DL<=0.005 MIU/L-ACNC: 1.8 UIU/ML (ref 0.4–4)

## 2020-08-19 NOTE — PROGRESS NOTES
CBC, CMP normal  TSH normal  Lipid profile good on statin  A1c pending  Results to patient via my chart.  Plan for follow-up 6 months

## 2020-08-20 DIAGNOSIS — R73.09 ABNORMAL GLUCOSE: Primary | ICD-10-CM

## 2020-08-20 NOTE — PROGRESS NOTES
CBC, CMP normal  TSH normal  Lipid profile good on statin  A1c 6.0 from 5.8 pre DM  Results to patient via my chart.  Plan for follow-up 6 months

## 2020-08-28 ENCOUNTER — TELEPHONE (OUTPATIENT)
Dept: FAMILY MEDICINE | Facility: CLINIC | Age: 75
End: 2020-08-28

## 2020-08-28 ENCOUNTER — HOSPITAL ENCOUNTER (OUTPATIENT)
Dept: RADIOLOGY | Facility: HOSPITAL | Age: 75
Discharge: HOME | End: 2020-08-28
Attending: INTERNAL MEDICINE
Payer: MEDICARE

## 2020-08-28 DIAGNOSIS — Z12.31 ENCOUNTER FOR SCREENING MAMMOGRAM FOR MALIGNANT NEOPLASM OF BREAST: ICD-10-CM

## 2020-08-28 PROCEDURE — 77067 SCR MAMMO BI INCL CAD: CPT | Mod: TC,HCNC

## 2020-08-28 PROCEDURE — 77063 BREAST TOMOSYNTHESIS BI: CPT | Mod: 26,HCNC,, | Performed by: RADIOLOGY

## 2020-08-28 PROCEDURE — 77063 MAMMO DIGITAL SCREENING BILAT WITH TOMOSYNTHESIS_CAD: ICD-10-PCS | Mod: 26,HCNC,, | Performed by: RADIOLOGY

## 2020-08-28 PROCEDURE — 77067 MAMMO DIGITAL SCREENING BILAT WITH TOMOSYNTHESIS_CAD: ICD-10-PCS | Mod: 26,HCNC,, | Performed by: RADIOLOGY

## 2020-08-28 PROCEDURE — 77067 SCR MAMMO BI INCL CAD: CPT | Mod: 26,HCNC,, | Performed by: RADIOLOGY

## 2020-08-28 NOTE — TELEPHONE ENCOUNTER
----- Message from Ancelmo Sumner MD sent at 8/27/2020  6:09 PM CDT -----  Regarding: generic is fine  Contact: Angela ([facility])  Generic lipitor is fine  ----- Message -----  From: Alice Glover  Sent: 8/27/2020   5:24 PM CDT  To: [facility] Staff    Type:  Pharmacy Calling to Clarify an RX    Name of Caller: Angela   Pharmacy Name: [facility] - [address] [phone] (Phone)  [phone] (Fax)  Prescription Name: atorvastatin (LIPITOR) 10 MG tablet  What do they need to clarify?: brand or generic?  Best Call Back Number: [phone]  Additional Information:  none

## 2020-09-02 ENCOUNTER — TELEPHONE (OUTPATIENT)
Dept: SPORTS MEDICINE | Facility: CLINIC | Age: 75
End: 2020-09-02

## 2020-09-02 ENCOUNTER — PATIENT OUTREACH (OUTPATIENT)
Dept: ADMINISTRATIVE | Facility: OTHER | Age: 75
End: 2020-09-02

## 2020-09-02 NOTE — TELEPHONE ENCOUNTER
Spoke to patient, informed her that Dr. Mendoza doesn't treat for hip injuries. Patient was rescheduled to the appropriate provider.

## 2020-09-02 NOTE — TELEPHONE ENCOUNTER
----- Message from Kristen Kelly sent at 9/2/2020  7:12 AM CDT -----  Hey, can you move this patient to [facility]?  Thanks, Kristen

## 2020-09-04 ENCOUNTER — TELEPHONE (OUTPATIENT)
Dept: SPORTS MEDICINE | Facility: CLINIC | Age: 75
End: 2020-09-04

## 2020-09-04 NOTE — TELEPHONE ENCOUNTER
Called pt to reschedule. She stated she has some things coming up and she will reschedule through the portal.

## 2020-09-15 NOTE — PROGRESS NOTES
HPI       DLS: 8/06/20    Pt here for 6 week IOP with Rhopressa eye drops;  Pt c/o off and on pain to her OD. Pt states since she's been using the   Rhopressa eye drops her vision been blurry.    Meds:  T1/2 GFS QAM OU   Dorzolamide TID OU   Lumigan QHS OU   Systane PRN OU   Rhopressa QDAY OU    1) POAG OU   2) PCIOL OU   3) Hx Hyperopia   4) Epiphora    Last edited by Ana Aquino on 9/17/2020  1:24 PM. (History)              Assessment /Plan     For exam results, see Encounter Report.    Primary open angle glaucoma of right eye, mild stage    Primary open angle glaucoma of left eye, moderate stage    Dry eye syndrome, bilateral    Steroid responder, bilateral    Angular blepharitis, unspecified laterality    Epiphora, right    Trichiasis of right upper eyelid    PCO (posterior capsular opacification), left    Pseudophakia of both eyes        Old pt of Dr. Eber Crouch  Sister is Marina Cortez (my pt.) she sent her to [facility] - 6/8/2020 - + SPRAY PAINT TO LEFT EYE YESTERDAY   [facility] - slightly blurry       1. Early POAG-  Both Eyes   Dx. Years ago with Dr. Eber Crouch  -Strong family hx   -IOP previously well controlled (mid teens) on Xalatan but elevated on generic and switched to lumigan    Family history   STRONG (Mom,Dad, and 5 sisters)  Glaucoma meds   lumigan qhs OU, timolol gfs QAM OU  H/O adverse rxn to glaucoma drops - combigan - itchy lids  // cosopt - angular blepharitis   LASERS   S/p SLT 4/24/14 OD //  5/8/14 OS (good response 21/22 --> 12/15)// yag cap OS 11/29/2018             Repeat SLT od 9/26/2019-  (good resp, 20-->15) // Repeat SLT os -10/17/2019 -(+ resp 19--> 13 )  GLAUCOMA SURGERIES   [place] od 11/30/2016   OTHER EYE SURGERIES  Phaco /IOL os 10/6/2016 // phaco/IOL / [place] od 11/30/2016   CDR  0.75 / 0.7  Tbase  ??  Tmax  26 OU off all gtts (5/24/10)    Ttarget   16 OU  HVF - 7 VF '12 -18 -  OD SNS/IAD ; OS: IAD   Gonio +3 OU  /540  OCT 4  test 2011 to 2017  - RNFL -  OD:dec. S/N/I  // OS dec S. Brianna N (+ prog ou)   HRT  6 test - 2012 to 2018 -   Disc photos  2012, 2015 - MR -  Brianna S/N/I od // dec. Sbrianna I/N/T os /// CDR 0.684 od // 0.748 osOIS     Ttoday 19/17 (down from  28/18 )                  Use to use flonase - but no longer using   Test done today -  IOP check with addition of rhopressa      2. H/O - hyperopia - Both Eyes -mild - pre-cataract surgery    3. Epiphora  - C/O constant tearing of the right eye x 6 months (8/2015   4.  PC IOL   OD - phaco/IOL / [place] 11/30 /2016 - PCB00 22.5  OS - Phaco/IOL - NO COLTON's - pt declined - 10/6/2016 - PCB00 24.0       Plan:    -IOP above target OU (was up last visit also)    - S/P repeat slt ou ( 10/2019)  ou (target is 16 ou)   ++ use of flonase -  Off all steroids   Off combigan - intolerant - itchy lids / blepharitis   Intol to cosopt - angular blepharitis   -Strong family hx  -Goal IOP 16 OU    CSM  Target is 16 ou    Intol to combigan -blepharitis / itch lids   Intol to cosopt - angular blepharitis     Tolerating Lumigan ou   Tolerating  timolol gfs // timoptic XE ou q am -  Tolerating  dorzolamide tid ou   Good resp to  rhopressa ou q day - tolerating ok -- 28/19--> 19/17    SLT od - 9/26/2019 - good response 20-->15 // repeat - good initial response   SLT os -  10/17/2019 - good resp 19--> 13 // repeat - good initial response     Good response to primary SLT  in 2014     Rx glasses given 2/2017     IOP much better with addition of rhopressa ou   Cost $40 a bottle   Mild blurring of vision   IOP still slightly above target - but juanita much better - that will watch for now     F/U 2 months for IOP check / HRT / gonio - if needed consider diamox vs filtering surgery w/ [facility] - ?? xen candidate   Monitor HVF q 6-9 months for now as IOP is up above ideal target

## 2020-09-17 ENCOUNTER — OFFICE VISIT (OUTPATIENT)
Dept: OPHTHALMOLOGY | Facility: CLINIC | Age: 75
End: 2020-09-17
Payer: MEDICARE

## 2020-09-17 DIAGNOSIS — H26.492 PCO (POSTERIOR CAPSULAR OPACIFICATION), LEFT: ICD-10-CM

## 2020-09-17 DIAGNOSIS — Z96.1 PSEUDOPHAKIA OF BOTH EYES: ICD-10-CM

## 2020-09-17 DIAGNOSIS — H40.1122 PRIMARY OPEN ANGLE GLAUCOMA OF LEFT EYE, MODERATE STAGE: ICD-10-CM

## 2020-09-17 DIAGNOSIS — H40.043 STEROID RESPONDER, BILATERAL: ICD-10-CM

## 2020-09-17 DIAGNOSIS — H01.009 ANGULAR BLEPHARITIS, UNSPECIFIED LATERALITY: ICD-10-CM

## 2020-09-17 DIAGNOSIS — H40.1111 PRIMARY OPEN ANGLE GLAUCOMA OF RIGHT EYE, MILD STAGE: Primary | ICD-10-CM

## 2020-09-17 DIAGNOSIS — H02.051 TRICHIASIS OF RIGHT UPPER EYELID: ICD-10-CM

## 2020-09-17 DIAGNOSIS — H04.201 EPIPHORA, RIGHT: ICD-10-CM

## 2020-09-17 DIAGNOSIS — H04.123 DRY EYE SYNDROME, BILATERAL: ICD-10-CM

## 2020-09-17 PROCEDURE — 99999 PR PBB SHADOW E&M-EST. PATIENT-LVL II: CPT | Mod: PBBFAC,HCNC,, | Performed by: OPHTHALMOLOGY

## 2020-09-17 PROCEDURE — 99999 PR PBB SHADOW E&M-EST. PATIENT-LVL II: ICD-10-PCS | Mod: PBBFAC,HCNC,, | Performed by: OPHTHALMOLOGY

## 2020-09-17 PROCEDURE — 92012 INTRM OPH EXAM EST PATIENT: CPT | Mod: HCNC,S$GLB,, | Performed by: OPHTHALMOLOGY

## 2020-09-17 PROCEDURE — 92012 PR EYE EXAM, EST PATIENT,INTERMED: ICD-10-PCS | Mod: HCNC,S$GLB,, | Performed by: OPHTHALMOLOGY

## 2020-09-29 ENCOUNTER — PATIENT MESSAGE (OUTPATIENT)
Dept: OTHER | Facility: OTHER | Age: 75
End: 2020-09-29

## 2020-10-05 ENCOUNTER — PATIENT MESSAGE (OUTPATIENT)
Dept: FAMILY MEDICINE | Facility: CLINIC | Age: 75
End: 2020-10-05

## 2020-10-05 DIAGNOSIS — Z23 NEEDS FLU SHOT: Primary | ICD-10-CM

## 2020-10-20 ENCOUNTER — IMMUNIZATION (OUTPATIENT)
Dept: PHARMACY | Facility: CLINIC | Age: 75
End: 2020-10-20
Payer: MEDICARE

## 2020-11-19 ENCOUNTER — PES CALL (OUTPATIENT)
Dept: ADMINISTRATIVE | Facility: CLINIC | Age: 75
End: 2020-11-19

## 2020-11-21 ENCOUNTER — PATIENT MESSAGE (OUTPATIENT)
Dept: OPHTHALMOLOGY | Facility: CLINIC | Age: 75
End: 2020-11-21

## 2020-12-02 ENCOUNTER — OFFICE VISIT (OUTPATIENT)
Dept: FAMILY MEDICINE | Facility: CLINIC | Age: 75
End: 2020-12-02
Payer: MEDICARE

## 2020-12-02 VITALS
BODY MASS INDEX: 31.82 KG/M2 | WEIGHT: 191 LBS | OXYGEN SATURATION: 97 % | TEMPERATURE: 98 F | DIASTOLIC BLOOD PRESSURE: 76 MMHG | HEART RATE: 81 BPM | HEIGHT: 65 IN | SYSTOLIC BLOOD PRESSURE: 138 MMHG

## 2020-12-02 DIAGNOSIS — Z23 NEED FOR SHINGLES VACCINE: ICD-10-CM

## 2020-12-02 DIAGNOSIS — M54.50 ACUTE MIDLINE LOW BACK PAIN WITHOUT SCIATICA: Primary | ICD-10-CM

## 2020-12-02 PROCEDURE — 3008F BODY MASS INDEX DOCD: CPT | Mod: HCNC,CPTII,S$GLB, | Performed by: INTERNAL MEDICINE

## 2020-12-02 PROCEDURE — 1159F MED LIST DOCD IN RCRD: CPT | Mod: HCNC,S$GLB,, | Performed by: INTERNAL MEDICINE

## 2020-12-02 PROCEDURE — 3078F DIAST BP <80 MM HG: CPT | Mod: HCNC,CPTII,S$GLB, | Performed by: INTERNAL MEDICINE

## 2020-12-02 PROCEDURE — 1125F PR PAIN SEVERITY QUANTIFIED, PAIN PRESENT: ICD-10-PCS | Mod: HCNC,S$GLB,, | Performed by: INTERNAL MEDICINE

## 2020-12-02 PROCEDURE — 3078F PR MOST RECENT DIASTOLIC BLOOD PRESSURE < 80 MM HG: ICD-10-PCS | Mod: HCNC,CPTII,S$GLB, | Performed by: INTERNAL MEDICINE

## 2020-12-02 PROCEDURE — 99213 PR OFFICE/OUTPT VISIT, EST, LEVL III, 20-29 MIN: ICD-10-PCS | Mod: HCNC,S$GLB,, | Performed by: INTERNAL MEDICINE

## 2020-12-02 PROCEDURE — 1125F AMNT PAIN NOTED PAIN PRSNT: CPT | Mod: HCNC,S$GLB,, | Performed by: INTERNAL MEDICINE

## 2020-12-02 PROCEDURE — 3075F PR MOST RECENT SYSTOLIC BLOOD PRESS GE 130-139MM HG: ICD-10-PCS | Mod: HCNC,CPTII,S$GLB, | Performed by: INTERNAL MEDICINE

## 2020-12-02 PROCEDURE — 3008F PR BODY MASS INDEX (BMI) DOCUMENTED: ICD-10-PCS | Mod: HCNC,CPTII,S$GLB, | Performed by: INTERNAL MEDICINE

## 2020-12-02 PROCEDURE — 99213 OFFICE O/P EST LOW 20 MIN: CPT | Mod: HCNC,S$GLB,, | Performed by: INTERNAL MEDICINE

## 2020-12-02 PROCEDURE — 1159F PR MEDICATION LIST DOCUMENTED IN MEDICAL RECORD: ICD-10-PCS | Mod: HCNC,S$GLB,, | Performed by: INTERNAL MEDICINE

## 2020-12-02 PROCEDURE — 99999 PR PBB SHADOW E&M-EST. PATIENT-LVL V: ICD-10-PCS | Mod: PBBFAC,HCNC,, | Performed by: INTERNAL MEDICINE

## 2020-12-02 PROCEDURE — 99999 PR PBB SHADOW E&M-EST. PATIENT-LVL V: CPT | Mod: PBBFAC,HCNC,, | Performed by: INTERNAL MEDICINE

## 2020-12-02 PROCEDURE — 3075F SYST BP GE 130 - 139MM HG: CPT | Mod: HCNC,CPTII,S$GLB, | Performed by: INTERNAL MEDICINE

## 2020-12-02 RX ORDER — CYCLOBENZAPRINE HCL 5 MG
5 TABLET ORAL NIGHTLY
Qty: 10 TABLET | Refills: 0 | Status: SHIPPED | OUTPATIENT
Start: 2020-12-02 | End: 2020-12-12

## 2020-12-02 RX ORDER — ZOSTER VACCINE RECOMBINANT, ADJUVANTED 50 MCG/0.5
0.5 KIT INTRAMUSCULAR ONCE
Qty: 1 EACH | Refills: 1 | Status: SHIPPED | OUTPATIENT
Start: 2020-12-02 | End: 2020-12-02

## 2020-12-02 RX ORDER — IBUPROFEN 600 MG/1
600 TABLET ORAL EVERY 8 HOURS PRN
Qty: 30 TABLET | Refills: 0 | Status: SHIPPED | OUTPATIENT
Start: 2020-12-02 | End: 2020-12-12

## 2020-12-02 NOTE — PROGRESS NOTES
This note was created by combination of typed  and M-Modal dictation.  Transcription errors may be present.  If there are any questions, please contact me.    Assessment and Plan:   Acute midline low back pain without sciatica  -ibuprofen 600 t.i.d. p.r.n. with food.  Home physical therapy handout.  Heat packs and cold packs that she has been doing.  Flexeril for nighttime use p.r.n..  -     ibuprofen (ADVIL,MOTRIN) 600 MG tablet; Take 1 tablet (600 mg total) by mouth every 8 (eight) hours as needed for Pain.  Dispense: 30 tablet; Refill: 0  -     cyclobenzaprine (FLEXERIL) 5 MG tablet; Take 1 tablet (5 mg total) by mouth nightly. for 10 days  Dispense: 10 tablet; Refill: 0    Need for shingles vaccine  -prescription printed for her to take to pharmacy  -     varicella-zoster gE-AS01B, PF, (SHINGRIX, PF,) 50 mcg/0.5 mL injection; Inject 0.5 mLs into the muscle once. for 1 dose  Dispense: 1 each; Refill: 1          There are no discontinued medications.    meds sent this encounter:  Medications Ordered This Encounter   Medications    cyclobenzaprine (FLEXERIL) 5 MG tablet     Sig: Take 1 tablet (5 mg total) by mouth nightly. for 10 days     Dispense:  10 tablet     Refill:  0    ibuprofen (ADVIL,MOTRIN) 600 MG tablet     Sig: Take 1 tablet (600 mg total) by mouth every 8 (eight) hours as needed for Pain.     Dispense:  30 tablet     Refill:  0    varicella-zoster gE-AS01B, PF, (SHINGRIX, PF,) 50 mcg/0.5 mL injection     Sig: Inject 0.5 mLs into the muscle once. for 1 dose     Dispense:  1 each     Refill:  1       Follow Up: No follow-ups on file.      Subjective:     Chief Complaint   Patient presents with    Back Pain     lower since monday. took extra strengh tylenol    Constipation       HPI  Rosmery is a 75 y.o. female, last appointment with this clinic was 8/18/2020.    No LMP recorded. Patient has had a hysterectomy.    Last visit with me, bruising, normal exam.    Numbness and tingling,  lory without improvement. Was supposed to see a new neuro (previous neuro left the system).  EMG was normal 11/2019 11/8/2019 MRI L spine:  Multilevel degenerative changes of the lumbar spine, worst at the level of L4-L5 where there is a diffuse disc bulge with severe bilateral facet arthropathy and ligamentum flavum hypertrophy resulting in mild spinal canal stenosis and moderate bilateral neural foraminal narrowing.    After Thanksgiving she got constipated and she was already having some chronic back pain and with the sitting and straining she aggravated her back.  It got so bad she had to have someone help her get off of the toilet.  It is quite painful, quite stiff, lower back, nonradiating, no pain with urination or bowel movements.  No saddle dysesthesia.  No distal radiation.  No incontinence no fevers no chills.    Her constipation has improved.  She has been eating a lot of fruit.    Answers for HPI/ROS submitted by the patient on 12/1/2020   Back pain  Chronicity: new  Onset: in the past 7 days  Frequency: constantly  Progression since onset: rapidly worsening  Pain location: sacro-iliac  Pain quality: aching  Radiates to: does not radiate  Pain - numeric: 8/10  Pain is: worse during the day  Aggravated by: bending, standing, twisting  Stiffness is present: all day  pelvic pain: Yes  Pain severity: severe  Treatments tried: NSAIDs  Improvement on treatment: no relief      Patient Care Team:  Ancelmo Sumner MD as PCP - General (Internal Medicine)  Brooke Delong MD as Obstetrician (Obstetrics)  Arnoldo Valentin MD as Consulting Physician (Otolaryngology)  Tim Freeman MA as Care Coordinator    Patient Active Problem List    Diagnosis Date Noted    Chronic fatigue 01/24/2020    Acute pain of left knee 06/18/2019    Weakness of both hips 06/18/2019    Decreased independence with transfers 06/18/2019    Impaired functional mobility, balance, gait, and endurance 06/18/2019     Idiopathic peripheral neuropathy normal B12, TSH; A1c pre-DM. 6/2019 gabapentin 06/03/2019    Thyroid nodule on US annual US; followed by endo 04/22/2019 2/21/20 thyroid US: 1.)  Thyroid gland is upper limits of normal in size with heterogeneous echotexture and normal vascularity  2.) 1.31 x 1.07 x 0.91 cm solid, heterogeneous predominately isoechoic nodule is seen in the right inferior pole  3.) 0.72 x 0.48 x 0.50 cm solid, hyperechoic nodule is seen in the right inferior pole  4.) 0.85 x 0.49 x 0.72 cm solid, isoechoic nodule is seen in the right inferior pole  RECOMMENDATIONS:  Recommend repeat thyroid ultrasound in 1 year.      Abnormal glucose 04/18/2019    Vitamin D deficiency 04/04/2019    Chronic bilateral low back pain without sciatica 11/02/2018    Bradycardia 6/2018 holter negative 07/20/2018    Status post hysterectomy 07/20/2018    Statin myopathy 07/20/2018    History of colon polyps; 1/23/2017 colonoscopy normal repeat 5 years 01/23/2017    Left knee pain 01/05/2017    Nuclear sclerosis 11/30/2016    Cataract 09/07/2016     Dx updated per 2019 IMO Load      Class 2 obesity due to excess calories in adult 07/20/2016    POAG (primary open-angle glaucoma) 01/05/2016    Essential hypertension 08/13/2012    Hyperlipidemia; statin myalgias; 6/28/2018 exercise stress echo neg for ischemia 08/13/2012 6/28/2018 exercise stress echo CONCLUSIONS   1 - Normal left ventricular systolic function (EF 60-65%).   2 - No wall motion abnormalities.   3 - Normal left ventricular diastolic function.   4 - Normal right ventricular systolic function .          PAST MEDICAL HISTORY:  Past Medical History:   Diagnosis Date    Allergy     Anxiety     Breast cyst     Edema of leg 10/5/2012    bilateral     Fractures 2011    thumb R    GERD (gastroesophageal reflux disease)     Glaucoma     History of colonic polyps     Hx-TIA (transient ischemic attack) 8/13/2012 Jan 2012: LLE and left facial  numbness lasting 1 hour     Hyperlipidemia     Hypertension     Left shoulder tendonitis 8/8/2015    Primary open-angle glaucoma, mild stage - Both Eyes 6/3/2013       PAST SURGICAL HISTORY:  Past Surgical History:   Procedure Laterality Date    BREAST BIOPSY Left     core    BREAST SURGERY Left     lumpectomy    CATARACT EXTRACTION W/  INTRAOCULAR LENS IMPLANT Left 10/05/2016    Dr. Mike    CATARACT EXTRACTION W/  INTRAOCULAR LENS IMPLANT Right 11/30/2016    With [place] (Dr. Mike)    COLONOSCOPY N/A 1/23/2017    Procedure: COLONOSCOPY;  Surgeon: Hany Mosquera MD;  Location: [facility] ([address] [place] [address]);  Service: Endoscopy;  Laterality: N/A;    HAND SURGERY Left 2011    thumb    HYSTERECTOMY  1980's    Total;    KAHOOK GONIOTOMY Right 11/30/2016    WITH CE ()    Left Breast Lumpectomy Left     performed in 1960s    OOPHORECTOMY      SELECTIVE LASER TRABECUPLASTY  05/2014    OU w/ Dr. Mike    TUBAL LIGATION  1970's    VAGINAL DELIVERY      x3    YAG CAPSULOTOMY Left 11/29/2018           SOCIAL HISTORY:  Social History     Socioeconomic History    Marital status:      Spouse name: Not on file    Number of children: 3    Years of education: Not on file    Highest education level: Not on file   Occupational History    Occupation: retired - formerly pastoral care with [employer]   Social [facility]    Financial resource strain: Not hard at all    Food insecurity     Worry: Never true     Inability: Never true    Transportation needs     Medical: No     Non-medical: No   Tobacco Use    Smoking status: Never Smoker    Smokeless tobacco: Never Used   Substance and Sexual Activity    Alcohol use: Yes     Alcohol/week: 0.0 standard drinks     Frequency: Never     Binge frequency: Never     Comment: Rarely; 1 glass of wine    Drug use: No    Sexual activity: Yes     Partners: Female, Male     Birth control/protection: Post-menopausal, See Surgical Hx   Lifestyle     Physical activity     Days per week: 3 days     Minutes per session: 30 min    Stress: Not at all   Relationships    Social connections     Talks on phone: More than three times a week     Gets together: Never     Attends [employer] service: More than 4 times per year     Active member of club or organization: No     Attends meetings of clubs or organizations: Never     Relationship status:    Other Topics Concern    Not on file   Social History Narrative    Not on file        ALLERGIES AND MEDICATIONS: updated and reviewed.  Review of patient's allergies indicates:   Allergen Reactions    Lisinopril Swelling    Amlodipine Edema    Combigan [brimonidine-timolol]      Causes itchy eyelids and contact dermatitis    Cosopt [dorzolamide-timolol]      Causes angular blepharitis of eyelids    Pravastatin      Myalgia and elevated CPK       Medication List with Changes/Refills   Current Medications    ASCORBIC ACID, VITAMIN C, (VITAMIN C) 1000 MG TABLET    Take 1,000 mg by mouth once daily.    ASPIRIN (ECOTRIN) 81 MG EC TABLET    Take 81 mg by mouth once daily.      ATORVASTATIN (LIPITOR) 10 MG TABLET    Take 1 tablet (10 mg total) by mouth every other day. Brand only    CALCIUM-VITAMIN D3 (OS-LETY 500 + D3) 500 MG(1,250MG) -200 UNIT PER TABLET    Take 1 tablet by mouth 2 (two) times daily with meals.    CO-ENZYME Q-10 30 MG CAPSULE    Take 100 mg by mouth once daily.     DORZOLAMIDE (TRUSOPT) 2 % OPHTHALMIC SOLUTION    Place 1 drop into both eyes 3 (three) times daily.    HYDROCHLOROTHIAZIDE (MICROZIDE) 12.5 MG CAPSULE    Take 1 capsule (12.5 mg total) by mouth once daily.    IRBESARTAN (AVAPRO) 150 MG TABLET    Take 1 tablet (150 mg total) by mouth every evening.    LUMIGAN 0.01 % DROP    INSTILL 1 DROP INTO BOTH EYES EVERY EVENING    NETARSUDIL (RHOPRESSA) 0.02 % OPHTHALMIC SOLUTION    Place 1 drop into both eyes once daily.    TIMOLOL MALEATE 0.5% (TIMOPTIC) 0.5 % DROP    Place 1 drop into both eyes 2  "(two) times daily.    TIMOLOL MALEATE 0.5% (TIMOPTIC-XE) 0.5 % SOLG    Place 1 drop into both eyes every morning. Timoptic XE or timolol GFS (gel forming solution)       Review of Systems   Musculoskeletal: Positive for back pain.       Objective:   Physical Exam   Vitals:    12/02/20 1527   BP: 138/76   BP Location: Left arm   Patient Position: Sitting   BP Method: Medium (Manual)   Pulse: 81   Temp: 97.7 °F (36.5 °C)   TempSrc: Temporal   SpO2: 97%   Weight: 86.6 kg (191 lb)   Height: 5' 5" (1.651 m)    Body mass index is 31.78 kg/m².            Physical Exam  Constitutional:       General: She is not in acute distress.     Appearance: She is well-developed.   HENT:      Head: Normocephalic and atraumatic.   Eyes:      General: No scleral icterus.  Pulmonary:      Effort: Pulmonary effort is normal.   Musculoskeletal:      Right lower leg: No edema.      Left lower leg: No edema.      Comments: Notes some tenderness on gentle palpation of the lower lumbar spine without deformity or swelling or asymmetry or induration  Seated straight leg raise elicits mild pain in the lower back  Plantar flexion 5/5 symmetric  Knee extension 5/5 symmetric  Hip flexion 5/5 symmetric with some discomfort  Patella DTR 1+ symmetric  Toe dorsiflexion 5/5 symmetric   Skin:     General: Skin is warm and dry.   Neurological:      Mental Status: She is alert and oriented to person, place, and time.   Psychiatric:         Behavior: Behavior normal.         Thought Content: Thought content normal.       "

## 2020-12-11 ENCOUNTER — PATIENT MESSAGE (OUTPATIENT)
Dept: OTHER | Facility: OTHER | Age: 75
End: 2020-12-11

## 2020-12-15 ENCOUNTER — OFFICE VISIT (OUTPATIENT)
Dept: OPHTHALMOLOGY | Facility: CLINIC | Age: 75
End: 2020-12-15
Payer: MEDICARE

## 2020-12-15 DIAGNOSIS — H02.051 TRICHIASIS OF RIGHT UPPER EYELID: ICD-10-CM

## 2020-12-15 DIAGNOSIS — H26.492 PCO (POSTERIOR CAPSULAR OPACIFICATION), LEFT: ICD-10-CM

## 2020-12-15 DIAGNOSIS — H40.1122 PRIMARY OPEN ANGLE GLAUCOMA OF LEFT EYE, MODERATE STAGE: ICD-10-CM

## 2020-12-15 DIAGNOSIS — Z96.1 PSEUDOPHAKIA OF BOTH EYES: ICD-10-CM

## 2020-12-15 DIAGNOSIS — H04.201 EPIPHORA, RIGHT: ICD-10-CM

## 2020-12-15 DIAGNOSIS — H04.123 DRY EYE SYNDROME, BILATERAL: ICD-10-CM

## 2020-12-15 DIAGNOSIS — H40.043 STEROID RESPONDER, BILATERAL: ICD-10-CM

## 2020-12-15 DIAGNOSIS — H40.1111 PRIMARY OPEN ANGLE GLAUCOMA OF RIGHT EYE, MILD STAGE: Primary | ICD-10-CM

## 2020-12-15 PROCEDURE — 92012 PR EYE EXAM, EST PATIENT,INTERMED: ICD-10-PCS | Mod: HCNC,S$GLB,, | Performed by: OPHTHALMOLOGY

## 2020-12-15 PROCEDURE — 3288F PR FALLS RISK ASSESSMENT DOCUMENTED: ICD-10-PCS | Mod: HCNC,CPTII,S$GLB, | Performed by: OPHTHALMOLOGY

## 2020-12-15 PROCEDURE — 92133 HEIDELBERG RETINA TOMOGRAPHY (HRT) - OU - BOTH EYES: ICD-10-PCS | Mod: HCNC,S$GLB,, | Performed by: OPHTHALMOLOGY

## 2020-12-15 PROCEDURE — 99999 PR PBB SHADOW E&M-EST. PATIENT-LVL III: ICD-10-PCS | Mod: PBBFAC,HCNC,, | Performed by: OPHTHALMOLOGY

## 2020-12-15 PROCEDURE — 1101F PT FALLS ASSESS-DOCD LE1/YR: CPT | Mod: HCNC,CPTII,S$GLB, | Performed by: OPHTHALMOLOGY

## 2020-12-15 PROCEDURE — 92133 CPTRZD OPH DX IMG PST SGM ON: CPT | Mod: HCNC,S$GLB,, | Performed by: OPHTHALMOLOGY

## 2020-12-15 PROCEDURE — 3288F FALL RISK ASSESSMENT DOCD: CPT | Mod: HCNC,CPTII,S$GLB, | Performed by: OPHTHALMOLOGY

## 2020-12-15 PROCEDURE — 99999 PR PBB SHADOW E&M-EST. PATIENT-LVL III: CPT | Mod: PBBFAC,HCNC,, | Performed by: OPHTHALMOLOGY

## 2020-12-15 PROCEDURE — 92012 INTRM OPH EXAM EST PATIENT: CPT | Mod: HCNC,S$GLB,, | Performed by: OPHTHALMOLOGY

## 2020-12-15 PROCEDURE — 1101F PR PT FALLS ASSESS DOC 0-1 FALLS W/OUT INJ PAST YR: ICD-10-PCS | Mod: HCNC,CPTII,S$GLB, | Performed by: OPHTHALMOLOGY

## 2020-12-15 PROCEDURE — 1126F AMNT PAIN NOTED NONE PRSNT: CPT | Mod: HCNC,S$GLB,, | Performed by: OPHTHALMOLOGY

## 2020-12-15 PROCEDURE — 1126F PR PAIN SEVERITY QUANTIFIED, NO PAIN PRESENT: ICD-10-PCS | Mod: HCNC,S$GLB,, | Performed by: OPHTHALMOLOGY

## 2020-12-15 RX ORDER — CYCLOBENZAPRINE HCL 5 MG
5 TABLET ORAL 3 TIMES DAILY PRN
COMMUNITY
End: 2021-02-12 | Stop reason: ALTCHOICE

## 2020-12-21 ENCOUNTER — OFFICE VISIT (OUTPATIENT)
Dept: ORTHOPEDICS | Facility: CLINIC | Age: 75
End: 2020-12-21
Payer: MEDICARE

## 2020-12-21 ENCOUNTER — HOSPITAL ENCOUNTER (OUTPATIENT)
Dept: RADIOLOGY | Facility: HOSPITAL | Age: 75
Discharge: HOME | End: 2020-12-21
Attending: ORTHOPAEDIC SURGERY
Payer: MEDICARE

## 2020-12-21 VITALS — WEIGHT: 196.88 LBS | BODY MASS INDEX: 32.8 KG/M2 | HEIGHT: 65 IN

## 2020-12-21 DIAGNOSIS — M43.10 SPONDYLOLISTHESIS, ACQUIRED: Primary | ICD-10-CM

## 2020-12-21 DIAGNOSIS — M51.36 DDD (DEGENERATIVE DISC DISEASE), LUMBAR: ICD-10-CM

## 2020-12-21 PROCEDURE — 99204 OFFICE O/P NEW MOD 45 MIN: CPT | Mod: HCNC,S$GLB,, | Performed by: ORTHOPAEDIC SURGERY

## 2020-12-21 PROCEDURE — 72100 X-RAY EXAM L-S SPINE 2/3 VWS: CPT | Mod: 26,HCNC,, | Performed by: RADIOLOGY

## 2020-12-21 PROCEDURE — 1159F PR MEDICATION LIST DOCUMENTED IN MEDICAL RECORD: ICD-10-PCS | Mod: HCNC,S$GLB,, | Performed by: ORTHOPAEDIC SURGERY

## 2020-12-21 PROCEDURE — 3288F FALL RISK ASSESSMENT DOCD: CPT | Mod: HCNC,CPTII,S$GLB, | Performed by: ORTHOPAEDIC SURGERY

## 2020-12-21 PROCEDURE — 99204 PR OFFICE/OUTPT VISIT, NEW, LEVL IV, 45-59 MIN: ICD-10-PCS | Mod: HCNC,S$GLB,, | Performed by: ORTHOPAEDIC SURGERY

## 2020-12-21 PROCEDURE — 72120 XR LUMBAR SPINE AP AND LAT WITH FLEX/EXT: ICD-10-PCS | Mod: 26,HCNC,, | Performed by: RADIOLOGY

## 2020-12-21 PROCEDURE — 72100 X-RAY EXAM L-S SPINE 2/3 VWS: CPT | Mod: TC,HCNC

## 2020-12-21 PROCEDURE — 1125F PR PAIN SEVERITY QUANTIFIED, PAIN PRESENT: ICD-10-PCS | Mod: HCNC,S$GLB,, | Performed by: ORTHOPAEDIC SURGERY

## 2020-12-21 PROCEDURE — 1101F PR PT FALLS ASSESS DOC 0-1 FALLS W/OUT INJ PAST YR: ICD-10-PCS | Mod: HCNC,CPTII,S$GLB, | Performed by: ORTHOPAEDIC SURGERY

## 2020-12-21 PROCEDURE — 99999 PR PBB SHADOW E&M-EST. PATIENT-LVL III: CPT | Mod: PBBFAC,HCNC,, | Performed by: ORTHOPAEDIC SURGERY

## 2020-12-21 PROCEDURE — 99999 PR PBB SHADOW E&M-EST. PATIENT-LVL III: ICD-10-PCS | Mod: PBBFAC,HCNC,, | Performed by: ORTHOPAEDIC SURGERY

## 2020-12-21 PROCEDURE — 3008F PR BODY MASS INDEX (BMI) DOCUMENTED: ICD-10-PCS | Mod: HCNC,CPTII,S$GLB, | Performed by: ORTHOPAEDIC SURGERY

## 2020-12-21 PROCEDURE — 1101F PT FALLS ASSESS-DOCD LE1/YR: CPT | Mod: HCNC,CPTII,S$GLB, | Performed by: ORTHOPAEDIC SURGERY

## 2020-12-21 PROCEDURE — 1159F MED LIST DOCD IN RCRD: CPT | Mod: HCNC,S$GLB,, | Performed by: ORTHOPAEDIC SURGERY

## 2020-12-21 PROCEDURE — 72100 XR LUMBAR SPINE AP AND LAT WITH FLEX/EXT: ICD-10-PCS | Mod: 26,HCNC,, | Performed by: RADIOLOGY

## 2020-12-21 PROCEDURE — 3288F PR FALLS RISK ASSESSMENT DOCUMENTED: ICD-10-PCS | Mod: HCNC,CPTII,S$GLB, | Performed by: ORTHOPAEDIC SURGERY

## 2020-12-21 PROCEDURE — 3008F BODY MASS INDEX DOCD: CPT | Mod: HCNC,CPTII,S$GLB, | Performed by: ORTHOPAEDIC SURGERY

## 2020-12-21 PROCEDURE — 1125F AMNT PAIN NOTED PAIN PRSNT: CPT | Mod: HCNC,S$GLB,, | Performed by: ORTHOPAEDIC SURGERY

## 2020-12-21 PROCEDURE — 72120 X-RAY BEND ONLY L-S SPINE: CPT | Mod: 26,HCNC,, | Performed by: RADIOLOGY

## 2020-12-29 ENCOUNTER — OFFICE VISIT (OUTPATIENT)
Dept: CARDIOLOGY | Facility: CLINIC | Age: 75
End: 2020-12-29
Payer: MEDICARE

## 2020-12-29 VITALS
WEIGHT: 197.75 LBS | BODY MASS INDEX: 32.91 KG/M2 | SYSTOLIC BLOOD PRESSURE: 140 MMHG | DIASTOLIC BLOOD PRESSURE: 90 MMHG

## 2020-12-29 DIAGNOSIS — E78.2 MIXED HYPERLIPIDEMIA: ICD-10-CM

## 2020-12-29 DIAGNOSIS — T46.6X5A STATIN MYOPATHY: ICD-10-CM

## 2020-12-29 DIAGNOSIS — R73.09 ABNORMAL GLUCOSE: ICD-10-CM

## 2020-12-29 DIAGNOSIS — I10 ESSENTIAL HYPERTENSION: ICD-10-CM

## 2020-12-29 DIAGNOSIS — R53.82 CHRONIC FATIGUE: ICD-10-CM

## 2020-12-29 DIAGNOSIS — R42 DIZZINESS: Primary | ICD-10-CM

## 2020-12-29 DIAGNOSIS — E66.01 CLASS 2 SEVERE OBESITY DUE TO EXCESS CALORIES WITH SERIOUS COMORBIDITY IN ADULT, UNSPECIFIED BMI: ICD-10-CM

## 2020-12-29 DIAGNOSIS — G72.0 STATIN MYOPATHY: ICD-10-CM

## 2020-12-29 DIAGNOSIS — E55.9 VITAMIN D DEFICIENCY: ICD-10-CM

## 2020-12-29 DIAGNOSIS — R00.1 BRADYCARDIA: ICD-10-CM

## 2020-12-29 PROCEDURE — 99999 PR PBB SHADOW E&M-EST. PATIENT-LVL III: ICD-10-PCS | Mod: PBBFAC,HCNC,, | Performed by: INTERNAL MEDICINE

## 2020-12-29 PROCEDURE — 1126F PR PAIN SEVERITY QUANTIFIED, NO PAIN PRESENT: ICD-10-PCS | Mod: HCNC,S$GLB,, | Performed by: INTERNAL MEDICINE

## 2020-12-29 PROCEDURE — 3288F FALL RISK ASSESSMENT DOCD: CPT | Mod: HCNC,CPTII,S$GLB, | Performed by: INTERNAL MEDICINE

## 2020-12-29 PROCEDURE — 3008F PR BODY MASS INDEX (BMI) DOCUMENTED: ICD-10-PCS | Mod: HCNC,CPTII,S$GLB, | Performed by: INTERNAL MEDICINE

## 2020-12-29 PROCEDURE — 99214 OFFICE O/P EST MOD 30 MIN: CPT | Mod: HCNC,S$GLB,, | Performed by: INTERNAL MEDICINE

## 2020-12-29 PROCEDURE — 3080F DIAST BP >= 90 MM HG: CPT | Mod: HCNC,CPTII,S$GLB, | Performed by: INTERNAL MEDICINE

## 2020-12-29 PROCEDURE — 1101F PT FALLS ASSESS-DOCD LE1/YR: CPT | Mod: HCNC,CPTII,S$GLB, | Performed by: INTERNAL MEDICINE

## 2020-12-29 PROCEDURE — 1126F AMNT PAIN NOTED NONE PRSNT: CPT | Mod: HCNC,S$GLB,, | Performed by: INTERNAL MEDICINE

## 2020-12-29 PROCEDURE — 1159F MED LIST DOCD IN RCRD: CPT | Mod: HCNC,S$GLB,, | Performed by: INTERNAL MEDICINE

## 2020-12-29 PROCEDURE — 1101F PR PT FALLS ASSESS DOC 0-1 FALLS W/OUT INJ PAST YR: ICD-10-PCS | Mod: HCNC,CPTII,S$GLB, | Performed by: INTERNAL MEDICINE

## 2020-12-29 PROCEDURE — 3008F BODY MASS INDEX DOCD: CPT | Mod: HCNC,CPTII,S$GLB, | Performed by: INTERNAL MEDICINE

## 2020-12-29 PROCEDURE — 3077F PR MOST RECENT SYSTOLIC BLOOD PRESSURE >= 140 MM HG: ICD-10-PCS | Mod: HCNC,CPTII,S$GLB, | Performed by: INTERNAL MEDICINE

## 2020-12-29 PROCEDURE — 99214 PR OFFICE/OUTPT VISIT, EST, LEVL IV, 30-39 MIN: ICD-10-PCS | Mod: HCNC,S$GLB,, | Performed by: INTERNAL MEDICINE

## 2020-12-29 PROCEDURE — 3080F PR MOST RECENT DIASTOLIC BLOOD PRESSURE >= 90 MM HG: ICD-10-PCS | Mod: HCNC,CPTII,S$GLB, | Performed by: INTERNAL MEDICINE

## 2020-12-29 PROCEDURE — 3288F PR FALLS RISK ASSESSMENT DOCUMENTED: ICD-10-PCS | Mod: HCNC,CPTII,S$GLB, | Performed by: INTERNAL MEDICINE

## 2020-12-29 PROCEDURE — 99999 PR PBB SHADOW E&M-EST. PATIENT-LVL III: CPT | Mod: PBBFAC,HCNC,, | Performed by: INTERNAL MEDICINE

## 2020-12-29 PROCEDURE — 1159F PR MEDICATION LIST DOCUMENTED IN MEDICAL RECORD: ICD-10-PCS | Mod: HCNC,S$GLB,, | Performed by: INTERNAL MEDICINE

## 2020-12-29 PROCEDURE — 3077F SYST BP >= 140 MM HG: CPT | Mod: HCNC,CPTII,S$GLB, | Performed by: INTERNAL MEDICINE

## 2020-12-29 NOTE — PROGRESS NOTES
CARDIOVASCULAR CONSULTATION    REASON FOR CONSULT:   Rosmery Ramirez is a 75 y.o. female who presents for follow-up.      HISTORY OF PRESENT ILLNESS:     Patient is a pleasant 74-year-old lady here for follow-up.  Denies any chest pains at rest on exertion, orthopnea, PND.  Complains of paresthesias in bilateral feet and requesting referral to Neurology for that.  Blood pressure well controlled.  No other cardiovascular complaints.     Notes from December 2020:  Patient here for follow-up.  Complains of occasional dizziness.  Can occur once a week or less frequently than that.  No postural relationship.  Denies any chest pains at rest on exertion, orthopnea, PND.      PAST MEDICAL HISTORY:     Past Medical History:   Diagnosis Date    Allergy     Anxiety     Breast cyst     Edema of leg 10/5/2012    bilateral     Fractures 2011    thumb R    GERD (gastroesophageal reflux disease)     Glaucoma     History of colonic polyps     Hx-TIA (transient ischemic attack) 8/13/2012 Jan 2012: LLE and left facial numbness lasting 1 hour     Hyperlipidemia     Hypertension     Left shoulder tendonitis 8/8/2015    Primary open-angle glaucoma, mild stage - Both Eyes 6/3/2013       PAST SURGICAL HISTORY:     Past Surgical History:   Procedure Laterality Date    BREAST BIOPSY Left     core    BREAST SURGERY Left     lumpectomy    CATARACT EXTRACTION W/  INTRAOCULAR LENS IMPLANT Left 10/05/2016    Dr. Mike    CATARACT EXTRACTION W/  INTRAOCULAR LENS IMPLANT Right 11/30/2016    With [place] (Dr. Mike)    COLONOSCOPY N/A 1/23/2017    Procedure: COLONOSCOPY;  Surgeon: Hany Mosquera MD;  Location: [address]);  Service: Endoscopy;  Laterality: N/A;    HAND SURGERY Left 2011    thumb    HYSTERECTOMY  1980's    Total;    KAHOOK GONIOTOMY Right 11/30/2016    WITH CE ()    Left Breast Lumpectomy Left     performed in 1960s    OOPHORECTOMY      SELECTIVE LASER TRABECUPLASTY  05/2014     OU w/ Dr. Mike    TUBAL LIGATION  1970's    VAGINAL DELIVERY      x3    YAG CAPSULOTOMY Left 11/29/2018           ALLERGIES AND MEDICATION:     Review of patient's allergies indicates:   Allergen Reactions    Lisinopril Swelling    Amlodipine Edema    Combigan [brimonidine-timolol]      Causes itchy eyelids and contact dermatitis    Cosopt [dorzolamide-timolol]      Causes angular blepharitis of eyelids    Pravastatin      Myalgia and elevated CPK        Medication List          Accurate as of December 29, 2020  3:28 PM. If you have any questions, ask your nurse or doctor.            CONTINUE taking these medications    aspirin 81 MG EC tablet  Commonly known as: ECOTRIN     atorvastatin 10 MG tablet  Commonly known as: LIPITOR  Take 1 tablet (10 mg total) by mouth every other day. Brand only     calcium-vitamin D3 500 mg(1,250mg) -200 unit per tablet  Commonly known as: OS-LETY 500 + D3     co-enzyme Q-10 30 mg capsule     cyclobenzaprine 5 MG tablet  Commonly known as: FLEXERIL     dorzolamide 2 % ophthalmic solution  Commonly known as: TRUSOPT  Place 1 drop into both eyes 3 (three) times daily.     hydroCHLOROthiazide 12.5 mg capsule  Commonly known as: MICROZIDE  Take 1 capsule (12.5 mg total) by mouth once daily.     irbesartan 150 MG tablet  Commonly known as: AVAPRO  Take 1 tablet (150 mg total) by mouth every evening.     LUMIGAN 0.01 % Drop  Generic drug: bimatoprost  INSTILL 1 DROP INTO BOTH EYES EVERY EVENING     netarsudiL-latanoprost 0.02-0.005 % Drop  Place 1 drop into both eyes every evening.     RHOPRESSA 0.02 % ophthalmic solution  Generic drug: netarsudiL  Place 1 drop into both eyes once daily.     * timolol maleate 0.5% 0.5 % Solg  Commonly known as: TIMOPTIC-XE  Place 1 drop into both eyes every morning. Timoptic XE or timolol GFS (gel forming solution)     * timolol maleate 0.5% 0.5 % Drop  Commonly known as: TIMOPTIC     VITAMIN C 1000 MG tablet  Generic drug:  ascorbic acid (vitamin C)         * This list has 2 medication(s) that are the same as other medications prescribed for you. Read the directions carefully, and ask your doctor or other care provider to review them with you.                SOCIAL HISTORY:     Social History     Socioeconomic History    Marital status:      Spouse name: Not on file    Number of children: 3    Years of education: Not on file    Highest education level: Not on file   Occupational History    Occupation: retired - formerly pastoral care with [employer]   Social [facility]    Financial resource strain: Not hard at all    Food insecurity     Worry: Never true     Inability: Never true    Transportation needs     Medical: No     Non-medical: No   Tobacco Use    Smoking status: Never Smoker    Smokeless tobacco: Never Used   Substance and Sexual Activity    Alcohol use: Yes     Alcohol/week: 0.0 standard drinks     Frequency: Never     Binge frequency: Never     Comment: Rarely; 1 glass of wine    Drug use: No    Sexual activity: Yes     Partners: Female, Male     Birth control/protection: Post-menopausal, See Surgical Hx   Lifestyle    Physical activity     Days per week: 3 days     Minutes per session: 30 min    Stress: Not at all   Relationships    Social connections     Talks on phone: More than three times a week     Gets together: Never     Attends [employer] service: More than 4 times per year     Active member of club or organization: No     Attends meetings of clubs or organizations: Never     Relationship status:    Other Topics Concern    Not on file   Social History Narrative    Not on file       FAMILY HISTORY:     Family History   Problem Relation Age of Onset    Breast cancer Mother 50    Glaucoma Mother     Alzheimer's disease Mother     Blindness Mother     Cataracts Mother     Glaucoma Father     Prostate cancer Father     Blindness Father     Cataracts Father     Cancer Father 62         prostate    Glaucoma Sister     No Known Problems Brother     No Known Problems Brother     Alzheimer's disease Maternal Grandfather     Macular degeneration Neg Hx     Retinal detachment Neg Hx     Strabismus Neg Hx     Amblyopia Neg Hx     Heart attack Neg Hx     Heart disease Neg Hx     Ovarian cancer Neg Hx     Colon cancer Neg Hx        REVIEW OF SYSTEMS:   Review of Systems   Constitutional: Negative.    HENT: Negative.    Eyes: Negative.    Respiratory: Negative.    Cardiovascular: Negative.    Gastrointestinal: Negative.    Genitourinary: Negative.    Musculoskeletal: Negative.    Skin: Negative.    Neurological: Positive for tingling.   Endo/Heme/Allergies: Negative.        A 10 point review of systems was performed and all the pertinent positives have been mentioned. Rest of review of systems was negative.        PHYSICAL EXAM:     Vitals:    12/29/20 1522   BP: (!) 140/90    Body mass index is 32.91 kg/m².  Weight: 89.7 kg (197 lb 12 oz)         Physical Exam  Vitals signs and nursing note reviewed.   Constitutional:       Appearance: Normal appearance. She is well-developed.   HENT:      Head: Normocephalic and atraumatic.      Right Ear: Hearing normal.      Left Ear: Hearing normal.      Nose: Nose normal.   Eyes:      General: Lids are normal.      Conjunctiva/sclera: Conjunctivae normal.      Pupils: Pupils are equal, round, and reactive to light.   Neck:      Musculoskeletal: Normal range of motion and neck supple.   Cardiovascular:      Rate and Rhythm: Normal rate and regular rhythm.      Pulses: Normal pulses.      Heart sounds: Normal heart sounds.   Pulmonary:      Effort: Pulmonary effort is normal.      Breath sounds: Normal breath sounds.   Abdominal:      Palpations: Abdomen is soft.      Tenderness: There is no abdominal tenderness.   Musculoskeletal:         General: No deformity.   Skin:     General: Skin is warm and dry.   Neurological:      Mental Status: She is alert and  oriented to person, place, and time.   Psychiatric:         Speech: Speech normal.           DATA:     Laboratory:  CBC:  Recent Labs   Lab 06/27/18  1601 06/30/18  1457 08/18/20  1438   WBC 6.00 6.15 5.22   Hemoglobin 12.4 12.7 12.3   Hematocrit 38.3 38.7 39.0   Platelets 302 293 327       CHEMISTRIES:  Recent Labs   Lab 06/03/19  1402 06/11/20  0900 08/18/20  1438   Glucose 77 96 97   Sodium 140 140 141   Potassium 4.2 3.8 3.7   BUN 14 11 16   Creatinine 0.8 0.8 0.9   eGFR if African American >60.0 >60.0 >60.0   eGFR if non African American >60.0 >60.0 >60.0   Calcium 10.0 9.9 9.8       CARDIAC BIOMARKERS:  Recent Labs   Lab 06/30/18  1457 10/23/19  1105   CPK  --  116   Troponin I <0.006  --        COAGS:        LIPIDS/LFTS:  Recent Labs   Lab 06/03/19  1402 06/11/20  0900 08/18/20  1438   Cholesterol 117 L 124 131   Triglycerides 96 96 105   HDL 41 42 47   LDL Cholesterol 56.8 L 62.8 L 63.0   Non-HDL Cholesterol 76 82 84   AST 21 21 30   ALT 21 19 29       Hemoglobin A1C   Date Value Ref Range Status   08/18/2020 6.0 (H) 4.0 - 5.6 % Final     Comment:     ADA Screening Guidelines:  5.7-6.4%  Consistent with prediabetes  >or=6.5%  Consistent with diabetes  High levels of fetal hemoglobin interfere with the HbA1C  assay. Heterozygous hemoglobin variants (HbS, HgC, etc)do  not significantly interfere with this assay.   However, presence of multiple variants may affect accuracy.     04/22/2019 5.8 (H) 4.0 - 5.6 % Final     Comment:     ADA Screening Guidelines:  5.7-6.4%  Consistent with prediabetes  >or=6.5%  Consistent with diabetes  High levels of fetal hemoglobin interfere with the HbA1C  assay. Heterozygous hemoglobin variants (HbS, HgC, etc)do  not significantly interfere with this assay.   However, presence of multiple variants may affect accuracy.     06/27/2018 5.9 (H) 4.0 - 5.6 % Final     Comment:     ADA Screening Guidelines:  5.7-6.4%  Consistent with prediabetes  >or=6.5%  Consistent with  diabetes  High levels of fetal hemoglobin interfere with the HbA1C  assay. Heterozygous hemoglobin variants (HbS, HgC, etc)do  not significantly interfere with this assay.   However, presence of multiple variants may affect accuracy.         TSH  Recent Labs   Lab 06/03/19  1402 08/18/20  1438   TSH 2.784 1.797       The 10-year ASCVD risk score (Carly WASSERMAN Jr., et al., 2013) is: 11%    Values used to calculate the score:      Age: 75 years      Sex: Female      Is Non- : Yes      Diabetic: No      Tobacco smoker: No      Systolic Blood Pressure: 140 mmHg      Is BP treated: Yes      HDL Cholesterol: 47 mg/dL      Total Cholesterol: 131 mg/dL           Cardiovascular Testing:    Reviewed      ASSESSMENT AND PLAN     Patient Active Problem List   Diagnosis    Essential hypertension    Hyperlipidemia; statin myalgias; 6/28/2018 exercise stress echo neg for ischemia    POAG (primary open-angle glaucoma)    Class 2 obesity due to excess calories in adult    Cataract    Nuclear sclerosis    Left knee pain    History of colon polyps; 1/23/2017 colonoscopy normal repeat 5 years    Bradycardia 6/2018 holter negative    Status post hysterectomy    Statin myopathy    Chronic bilateral low back pain without sciatica    Vitamin D deficiency    Abnormal glucose    Thyroid nodule on US annual US; followed by endo    Idiopathic peripheral neuropathy normal B12, TSH; A1c pre-DM. 6/2019 gabapentin    Acute pain of left knee    Weakness of both hips    Decreased independence with transfers    Impaired functional mobility, balance, gait, and endurance    Chronic fatigue       Dizziness.  Further evaluation with the help of event monitor and echocardiogram    Follow-up after testing          Thank you very much for involving me in the care of your patient.  Please do not hesitate to contact me if there are any questions.      Julio Dye MD, FACC, [facility]  Interventional Cardiologist,  [facility].           This note was dictated with the help of speech recognition software.  There might be un-intended errors and/or substitutions.

## 2021-01-08 ENCOUNTER — OFFICE VISIT (OUTPATIENT)
Dept: ENDOCRINOLOGY | Facility: CLINIC | Age: 76
End: 2021-01-08
Payer: MEDICARE

## 2021-01-08 VITALS
HEART RATE: 69 BPM | SYSTOLIC BLOOD PRESSURE: 130 MMHG | HEIGHT: 65 IN | DIASTOLIC BLOOD PRESSURE: 70 MMHG | BODY MASS INDEX: 32.99 KG/M2 | WEIGHT: 198 LBS | OXYGEN SATURATION: 98 % | RESPIRATION RATE: 18 BRPM

## 2021-01-08 DIAGNOSIS — E04.1 THYROID NODULE: Primary | ICD-10-CM

## 2021-01-08 DIAGNOSIS — R53.82 CHRONIC FATIGUE: ICD-10-CM

## 2021-01-08 PROCEDURE — 3075F PR MOST RECENT SYSTOLIC BLOOD PRESS GE 130-139MM HG: ICD-10-PCS | Mod: CPTII,S$GLB,, | Performed by: INTERNAL MEDICINE

## 2021-01-08 PROCEDURE — 3008F PR BODY MASS INDEX (BMI) DOCUMENTED: ICD-10-PCS | Mod: CPTII,S$GLB,, | Performed by: INTERNAL MEDICINE

## 2021-01-08 PROCEDURE — 3008F BODY MASS INDEX DOCD: CPT | Mod: CPTII,S$GLB,, | Performed by: INTERNAL MEDICINE

## 2021-01-08 PROCEDURE — 1101F PT FALLS ASSESS-DOCD LE1/YR: CPT | Mod: CPTII,S$GLB,, | Performed by: INTERNAL MEDICINE

## 2021-01-08 PROCEDURE — 99999 PR PBB SHADOW E&M-EST. PATIENT-LVL V: ICD-10-PCS | Mod: PBBFAC,,, | Performed by: INTERNAL MEDICINE

## 2021-01-08 PROCEDURE — 1159F MED LIST DOCD IN RCRD: CPT | Mod: S$GLB,,, | Performed by: INTERNAL MEDICINE

## 2021-01-08 PROCEDURE — 1101F PR PT FALLS ASSESS DOC 0-1 FALLS W/OUT INJ PAST YR: ICD-10-PCS | Mod: CPTII,S$GLB,, | Performed by: INTERNAL MEDICINE

## 2021-01-08 PROCEDURE — 99213 PR OFFICE/OUTPT VISIT, EST, LEVL III, 20-29 MIN: ICD-10-PCS | Mod: S$GLB,,, | Performed by: INTERNAL MEDICINE

## 2021-01-08 PROCEDURE — 3075F SYST BP GE 130 - 139MM HG: CPT | Mod: CPTII,S$GLB,, | Performed by: INTERNAL MEDICINE

## 2021-01-08 PROCEDURE — 99213 OFFICE O/P EST LOW 20 MIN: CPT | Mod: S$GLB,,, | Performed by: INTERNAL MEDICINE

## 2021-01-08 PROCEDURE — 1126F AMNT PAIN NOTED NONE PRSNT: CPT | Mod: S$GLB,,, | Performed by: INTERNAL MEDICINE

## 2021-01-08 PROCEDURE — 3288F FALL RISK ASSESSMENT DOCD: CPT | Mod: CPTII,S$GLB,, | Performed by: INTERNAL MEDICINE

## 2021-01-08 PROCEDURE — 99999 PR PBB SHADOW E&M-EST. PATIENT-LVL V: CPT | Mod: PBBFAC,,, | Performed by: INTERNAL MEDICINE

## 2021-01-08 PROCEDURE — 1126F PR PAIN SEVERITY QUANTIFIED, NO PAIN PRESENT: ICD-10-PCS | Mod: S$GLB,,, | Performed by: INTERNAL MEDICINE

## 2021-01-08 PROCEDURE — 3078F PR MOST RECENT DIASTOLIC BLOOD PRESSURE < 80 MM HG: ICD-10-PCS | Mod: CPTII,S$GLB,, | Performed by: INTERNAL MEDICINE

## 2021-01-08 PROCEDURE — 3078F DIAST BP <80 MM HG: CPT | Mod: CPTII,S$GLB,, | Performed by: INTERNAL MEDICINE

## 2021-01-08 PROCEDURE — 1159F PR MEDICATION LIST DOCUMENTED IN MEDICAL RECORD: ICD-10-PCS | Mod: S$GLB,,, | Performed by: INTERNAL MEDICINE

## 2021-01-08 PROCEDURE — 3288F PR FALLS RISK ASSESSMENT DOCUMENTED: ICD-10-PCS | Mod: CPTII,S$GLB,, | Performed by: INTERNAL MEDICINE

## 2021-01-13 ENCOUNTER — HOSPITAL ENCOUNTER (OUTPATIENT)
Dept: CARDIOLOGY | Facility: HOSPITAL | Age: 76
Discharge: HOME | End: 2021-01-13
Attending: INTERNAL MEDICINE
Payer: MEDICARE

## 2021-01-13 VITALS
SYSTOLIC BLOOD PRESSURE: 131 MMHG | BODY MASS INDEX: 32.82 KG/M2 | HEIGHT: 65 IN | WEIGHT: 197 LBS | DIASTOLIC BLOOD PRESSURE: 90 MMHG

## 2021-01-13 DIAGNOSIS — R42 DIZZINESS: ICD-10-CM

## 2021-01-13 LAB
AORTIC ROOT ANNULUS: 2.61 CM
AORTIC VALVE CUSP SEPERATION: 1.97 CM
ASCENDING AORTA: 2.94 CM
AV INDEX (PROSTH): 0.65
AV MEAN GRADIENT: 6 MMHG
AV PEAK GRADIENT: 10 MMHG
AV VALVE AREA: 1.93 CM2
AV VELOCITY RATIO: 0.74
BSA FOR ECHO PROCEDURE: 2.02 M2
CV ECHO LV RWT: 0.3 CM
DOP CALC AO PEAK VEL: 1.55 M/S
DOP CALC AO VTI: 40.21 CM
DOP CALC LVOT AREA: 3 CM2
DOP CALC LVOT DIAMETER: 1.94 CM
DOP CALC LVOT PEAK VEL: 1.15 M/S
DOP CALC LVOT STROKE VOLUME: 77.55 CM3
DOP CALCLVOT PEAK VEL VTI: 26.25 CM
E WAVE DECELERATION TIME: 253.56 MSEC
E/A RATIO: 0.69
ECHO LV POSTERIOR WALL: 0.77 CM (ref 0.6–1.1)
FRACTIONAL SHORTENING: 33 % (ref 28–44)
INTERVENTRICULAR SEPTUM: 0.94 CM (ref 0.6–1.1)
IVRT: 79.58 MSEC
LA MAJOR: 4.58 CM
LA MINOR: 4.89 CM
LA WIDTH: 4.82 CM
LEFT ATRIUM SIZE: 3.43 CM
LEFT ATRIUM VOLUME INDEX: 33.8 ML/M2
LEFT ATRIUM VOLUME: 66.47 CM3
LEFT INTERNAL DIMENSION IN SYSTOLE: 3.45 CM (ref 2.1–4)
LEFT VENTRICLE DIASTOLIC VOLUME INDEX: 63.62 ML/M2
LEFT VENTRICLE DIASTOLIC VOLUME: 125.05 ML
LEFT VENTRICLE MASS INDEX: 78 G/M2
LEFT VENTRICLE SYSTOLIC VOLUME INDEX: 25 ML/M2
LEFT VENTRICLE SYSTOLIC VOLUME: 49.07 ML
LEFT VENTRICULAR INTERNAL DIMENSION IN DIASTOLE: 5.12 CM (ref 3.5–6)
LEFT VENTRICULAR MASS: 154.02 G
MV PEAK A VEL: 1.06 M/S
MV PEAK E VEL: 0.73 M/S
PISA TR MAX VEL: 2.04 M/S
PULM VEIN S/D RATIO: 1.51
PV PEAK D VEL: 0.43 M/S
PV PEAK S VEL: 0.65 M/S
PV PEAK VELOCITY: 0.93 CM/S
RA MAJOR: 3.63 CM
RA PRESSURE: 3 MMHG
RA WIDTH: 2.64 CM
RIGHT VENTRICULAR END-DIASTOLIC DIMENSION: 2.91 CM
RV TISSUE DOPPLER FREE WALL SYSTOLIC VELOCITY 1 (APICAL 4 CHAMBER VIEW): 7.85 CM/S
SINUS: 3.11 CM
STJ: 2.77 CM
TR MAX PG: 17 MMHG
TRICUSPID ANNULAR PLANE SYSTOLIC EXCURSION: 2.08 CM
TV REST PULMONARY ARTERY PRESSURE: 20 MMHG

## 2021-01-13 PROCEDURE — 93306 TTE W/DOPPLER COMPLETE: CPT | Mod: 26,,, | Performed by: INTERNAL MEDICINE

## 2021-01-13 PROCEDURE — 93306 ECHO (CUPID ONLY): ICD-10-PCS | Mod: 26,,, | Performed by: INTERNAL MEDICINE

## 2021-01-13 PROCEDURE — 93306 TTE W/DOPPLER COMPLETE: CPT

## 2021-01-19 ENCOUNTER — TELEPHONE (OUTPATIENT)
Dept: CARDIOLOGY | Facility: HOSPITAL | Age: 76
End: 2021-01-19

## 2021-01-19 ENCOUNTER — OFFICE VISIT (OUTPATIENT)
Dept: OPHTHALMOLOGY | Facility: CLINIC | Age: 76
End: 2021-01-19
Payer: MEDICARE

## 2021-01-19 DIAGNOSIS — H04.123 DRY EYE SYNDROME, BILATERAL: ICD-10-CM

## 2021-01-19 DIAGNOSIS — H40.1111 PRIMARY OPEN ANGLE GLAUCOMA OF RIGHT EYE, MILD STAGE: Primary | ICD-10-CM

## 2021-01-19 DIAGNOSIS — Z96.1 PSEUDOPHAKIA OF BOTH EYES: ICD-10-CM

## 2021-01-19 DIAGNOSIS — H26.492 PCO (POSTERIOR CAPSULAR OPACIFICATION), LEFT: ICD-10-CM

## 2021-01-19 DIAGNOSIS — H02.051 TRICHIASIS OF RIGHT UPPER EYELID: ICD-10-CM

## 2021-01-19 DIAGNOSIS — H01.009 ANGULAR BLEPHARITIS, UNSPECIFIED LATERALITY: ICD-10-CM

## 2021-01-19 DIAGNOSIS — H40.043 STEROID RESPONDER, BILATERAL: ICD-10-CM

## 2021-01-19 DIAGNOSIS — H04.201 EPIPHORA, RIGHT: ICD-10-CM

## 2021-01-19 DIAGNOSIS — H40.1122 PRIMARY OPEN ANGLE GLAUCOMA OF LEFT EYE, MODERATE STAGE: ICD-10-CM

## 2021-01-19 PROCEDURE — 1126F PR PAIN SEVERITY QUANTIFIED, NO PAIN PRESENT: ICD-10-PCS | Mod: S$GLB,,, | Performed by: OPHTHALMOLOGY

## 2021-01-19 PROCEDURE — 99999 PR PBB SHADOW E&M-EST. PATIENT-LVL III: ICD-10-PCS | Mod: PBBFAC,,, | Performed by: OPHTHALMOLOGY

## 2021-01-19 PROCEDURE — 1101F PT FALLS ASSESS-DOCD LE1/YR: CPT | Mod: CPTII,S$GLB,, | Performed by: OPHTHALMOLOGY

## 2021-01-19 PROCEDURE — 1101F PR PT FALLS ASSESS DOC 0-1 FALLS W/OUT INJ PAST YR: ICD-10-PCS | Mod: CPTII,S$GLB,, | Performed by: OPHTHALMOLOGY

## 2021-01-19 PROCEDURE — 92012 INTRM OPH EXAM EST PATIENT: CPT | Mod: S$GLB,,, | Performed by: OPHTHALMOLOGY

## 2021-01-19 PROCEDURE — 1126F AMNT PAIN NOTED NONE PRSNT: CPT | Mod: S$GLB,,, | Performed by: OPHTHALMOLOGY

## 2021-01-19 PROCEDURE — 3288F PR FALLS RISK ASSESSMENT DOCUMENTED: ICD-10-PCS | Mod: CPTII,S$GLB,, | Performed by: OPHTHALMOLOGY

## 2021-01-19 PROCEDURE — 3288F FALL RISK ASSESSMENT DOCD: CPT | Mod: CPTII,S$GLB,, | Performed by: OPHTHALMOLOGY

## 2021-01-19 PROCEDURE — 99999 PR PBB SHADOW E&M-EST. PATIENT-LVL III: CPT | Mod: PBBFAC,,, | Performed by: OPHTHALMOLOGY

## 2021-01-19 PROCEDURE — 92012 PR EYE EXAM, EST PATIENT,INTERMED: ICD-10-PCS | Mod: S$GLB,,, | Performed by: OPHTHALMOLOGY

## 2021-01-20 ENCOUNTER — PATIENT MESSAGE (OUTPATIENT)
Dept: FAMILY MEDICINE | Facility: CLINIC | Age: 76
End: 2021-01-20

## 2021-01-20 ENCOUNTER — PATIENT MESSAGE (OUTPATIENT)
Dept: CARDIOLOGY | Facility: CLINIC | Age: 76
End: 2021-01-20

## 2021-01-20 ENCOUNTER — CLINICAL SUPPORT (OUTPATIENT)
Dept: CARDIOLOGY | Facility: HOSPITAL | Age: 76
End: 2021-01-20
Attending: INTERNAL MEDICINE
Payer: MEDICARE

## 2021-01-20 DIAGNOSIS — R42 DIZZINESS: ICD-10-CM

## 2021-01-20 PROCEDURE — 93271 ECG/MONITORING AND ANALYSIS: CPT

## 2021-01-22 ENCOUNTER — PATIENT MESSAGE (OUTPATIENT)
Dept: ADMINISTRATIVE | Facility: OTHER | Age: 76
End: 2021-01-22

## 2021-02-01 ENCOUNTER — PATIENT MESSAGE (OUTPATIENT)
Dept: OPHTHALMOLOGY | Facility: CLINIC | Age: 76
End: 2021-02-01

## 2021-02-03 ENCOUNTER — TELEPHONE (OUTPATIENT)
Dept: OPHTHALMOLOGY | Facility: CLINIC | Age: 76
End: 2021-02-03

## 2021-02-03 DIAGNOSIS — H40.1111 PRIMARY OPEN ANGLE GLAUCOMA OF RIGHT EYE, MILD STAGE: Primary | ICD-10-CM

## 2021-02-03 DIAGNOSIS — Z13.9 SCREENING PROCEDURE: ICD-10-CM

## 2021-02-10 ENCOUNTER — PATIENT MESSAGE (OUTPATIENT)
Dept: FAMILY MEDICINE | Facility: CLINIC | Age: 76
End: 2021-02-10

## 2021-02-12 ENCOUNTER — OFFICE VISIT (OUTPATIENT)
Dept: FAMILY MEDICINE | Facility: CLINIC | Age: 76
End: 2021-02-12
Payer: MEDICARE

## 2021-02-12 VITALS
OXYGEN SATURATION: 99 % | BODY MASS INDEX: 32.99 KG/M2 | DIASTOLIC BLOOD PRESSURE: 66 MMHG | HEIGHT: 65 IN | SYSTOLIC BLOOD PRESSURE: 118 MMHG | WEIGHT: 198 LBS | TEMPERATURE: 98 F | HEART RATE: 69 BPM

## 2021-02-12 DIAGNOSIS — E78.2 MIXED HYPERLIPIDEMIA: ICD-10-CM

## 2021-02-12 DIAGNOSIS — R00.1 BRADYCARDIA: ICD-10-CM

## 2021-02-12 DIAGNOSIS — R73.09 ABNORMAL GLUCOSE: ICD-10-CM

## 2021-02-12 DIAGNOSIS — H40.1130 PRIMARY OPEN ANGLE GLAUCOMA OF BOTH EYES, UNSPECIFIED GLAUCOMA STAGE: ICD-10-CM

## 2021-02-12 DIAGNOSIS — E66.01 CLASS 2 SEVERE OBESITY DUE TO EXCESS CALORIES WITH SERIOUS COMORBIDITY IN ADULT, UNSPECIFIED BMI: ICD-10-CM

## 2021-02-12 DIAGNOSIS — Z01.810 PREOP CARDIOVASCULAR EXAM: Primary | ICD-10-CM

## 2021-02-12 DIAGNOSIS — I10 ESSENTIAL HYPERTENSION: ICD-10-CM

## 2021-02-12 PROCEDURE — 3078F DIAST BP <80 MM HG: CPT | Mod: CPTII,S$GLB,, | Performed by: INTERNAL MEDICINE

## 2021-02-12 PROCEDURE — 3008F BODY MASS INDEX DOCD: CPT | Mod: CPTII,S$GLB,, | Performed by: INTERNAL MEDICINE

## 2021-02-12 PROCEDURE — 3288F PR FALLS RISK ASSESSMENT DOCUMENTED: ICD-10-PCS | Mod: CPTII,S$GLB,, | Performed by: INTERNAL MEDICINE

## 2021-02-12 PROCEDURE — 99214 OFFICE O/P EST MOD 30 MIN: CPT | Mod: S$GLB,,, | Performed by: INTERNAL MEDICINE

## 2021-02-12 PROCEDURE — 3074F SYST BP LT 130 MM HG: CPT | Mod: CPTII,S$GLB,, | Performed by: INTERNAL MEDICINE

## 2021-02-12 PROCEDURE — 1159F PR MEDICATION LIST DOCUMENTED IN MEDICAL RECORD: ICD-10-PCS | Mod: S$GLB,,, | Performed by: INTERNAL MEDICINE

## 2021-02-12 PROCEDURE — 99499 UNLISTED E&M SERVICE: CPT | Mod: S$GLB,,, | Performed by: INTERNAL MEDICINE

## 2021-02-12 PROCEDURE — 3288F FALL RISK ASSESSMENT DOCD: CPT | Mod: CPTII,S$GLB,, | Performed by: INTERNAL MEDICINE

## 2021-02-12 PROCEDURE — 1101F PR PT FALLS ASSESS DOC 0-1 FALLS W/OUT INJ PAST YR: ICD-10-PCS | Mod: CPTII,S$GLB,, | Performed by: INTERNAL MEDICINE

## 2021-02-12 PROCEDURE — 1159F MED LIST DOCD IN RCRD: CPT | Mod: S$GLB,,, | Performed by: INTERNAL MEDICINE

## 2021-02-12 PROCEDURE — 99999 PR PBB SHADOW E&M-EST. PATIENT-LVL IV: ICD-10-PCS | Mod: PBBFAC,,, | Performed by: INTERNAL MEDICINE

## 2021-02-12 PROCEDURE — 3008F PR BODY MASS INDEX (BMI) DOCUMENTED: ICD-10-PCS | Mod: CPTII,S$GLB,, | Performed by: INTERNAL MEDICINE

## 2021-02-12 PROCEDURE — 99499 RISK ADDL DX/OHS AUDIT: ICD-10-PCS | Mod: S$GLB,,, | Performed by: INTERNAL MEDICINE

## 2021-02-12 PROCEDURE — 1101F PT FALLS ASSESS-DOCD LE1/YR: CPT | Mod: CPTII,S$GLB,, | Performed by: INTERNAL MEDICINE

## 2021-02-12 PROCEDURE — 99999 PR PBB SHADOW E&M-EST. PATIENT-LVL IV: CPT | Mod: PBBFAC,,, | Performed by: INTERNAL MEDICINE

## 2021-02-12 PROCEDURE — 99214 PR OFFICE/OUTPT VISIT, EST, LEVL IV, 30-39 MIN: ICD-10-PCS | Mod: S$GLB,,, | Performed by: INTERNAL MEDICINE

## 2021-02-12 PROCEDURE — 1126F PR PAIN SEVERITY QUANTIFIED, NO PAIN PRESENT: ICD-10-PCS | Mod: S$GLB,,, | Performed by: INTERNAL MEDICINE

## 2021-02-12 PROCEDURE — 3078F PR MOST RECENT DIASTOLIC BLOOD PRESSURE < 80 MM HG: ICD-10-PCS | Mod: CPTII,S$GLB,, | Performed by: INTERNAL MEDICINE

## 2021-02-12 PROCEDURE — 3074F PR MOST RECENT SYSTOLIC BLOOD PRESSURE < 130 MM HG: ICD-10-PCS | Mod: CPTII,S$GLB,, | Performed by: INTERNAL MEDICINE

## 2021-02-12 PROCEDURE — 1126F AMNT PAIN NOTED NONE PRSNT: CPT | Mod: S$GLB,,, | Performed by: INTERNAL MEDICINE

## 2021-02-25 ENCOUNTER — HOSPITAL ENCOUNTER (OUTPATIENT)
Dept: ENDOCRINOLOGY | Facility: CLINIC | Age: 76
Discharge: HOME | End: 2021-02-25
Attending: INTERNAL MEDICINE
Payer: MEDICARE

## 2021-02-25 DIAGNOSIS — E04.1 THYROID NODULE: ICD-10-CM

## 2021-02-25 PROCEDURE — 76536 US EXAM OF HEAD AND NECK: CPT | Mod: S$GLB,,, | Performed by: INTERNAL MEDICINE

## 2021-02-25 PROCEDURE — 76536 US SOFT TISSUE HEAD NECK THYROID: ICD-10-PCS | Mod: S$GLB,,, | Performed by: INTERNAL MEDICINE

## 2021-02-26 ENCOUNTER — OFFICE VISIT (OUTPATIENT)
Dept: INTERNAL MEDICINE | Facility: CLINIC | Age: 76
End: 2021-02-26
Payer: MEDICARE

## 2021-02-26 VITALS
BODY MASS INDEX: 32.44 KG/M2 | DIASTOLIC BLOOD PRESSURE: 86 MMHG | HEIGHT: 65 IN | WEIGHT: 194.69 LBS | OXYGEN SATURATION: 99 % | HEART RATE: 65 BPM | SYSTOLIC BLOOD PRESSURE: 124 MMHG

## 2021-02-26 DIAGNOSIS — H69.90 DYSFUNCTION OF EUSTACHIAN TUBE, UNSPECIFIED LATERALITY: Primary | ICD-10-CM

## 2021-02-26 PROCEDURE — 96372 PR INJECTION,THERAP/PROPH/DIAG2ST, IM OR SUBCUT: ICD-10-PCS | Mod: S$GLB,,, | Performed by: INTERNAL MEDICINE

## 2021-02-26 PROCEDURE — 3079F DIAST BP 80-89 MM HG: CPT | Mod: CPTII,S$GLB,, | Performed by: INTERNAL MEDICINE

## 2021-02-26 PROCEDURE — 1126F AMNT PAIN NOTED NONE PRSNT: CPT | Mod: S$GLB,,, | Performed by: INTERNAL MEDICINE

## 2021-02-26 PROCEDURE — 3074F SYST BP LT 130 MM HG: CPT | Mod: CPTII,S$GLB,, | Performed by: INTERNAL MEDICINE

## 2021-02-26 PROCEDURE — 3288F FALL RISK ASSESSMENT DOCD: CPT | Mod: CPTII,S$GLB,, | Performed by: INTERNAL MEDICINE

## 2021-02-26 PROCEDURE — 1126F PR PAIN SEVERITY QUANTIFIED, NO PAIN PRESENT: ICD-10-PCS | Mod: S$GLB,,, | Performed by: INTERNAL MEDICINE

## 2021-02-26 PROCEDURE — 1159F PR MEDICATION LIST DOCUMENTED IN MEDICAL RECORD: ICD-10-PCS | Mod: S$GLB,,, | Performed by: INTERNAL MEDICINE

## 2021-02-26 PROCEDURE — 99999 PR PBB SHADOW E&M-EST. PATIENT-LVL IV: ICD-10-PCS | Mod: PBBFAC,,, | Performed by: INTERNAL MEDICINE

## 2021-02-26 PROCEDURE — 99999 PR PBB SHADOW E&M-EST. PATIENT-LVL IV: CPT | Mod: PBBFAC,,, | Performed by: INTERNAL MEDICINE

## 2021-02-26 PROCEDURE — 1101F PT FALLS ASSESS-DOCD LE1/YR: CPT | Mod: CPTII,S$GLB,, | Performed by: INTERNAL MEDICINE

## 2021-02-26 PROCEDURE — 3079F PR MOST RECENT DIASTOLIC BLOOD PRESSURE 80-89 MM HG: ICD-10-PCS | Mod: CPTII,S$GLB,, | Performed by: INTERNAL MEDICINE

## 2021-02-26 PROCEDURE — 99213 OFFICE O/P EST LOW 20 MIN: CPT | Mod: 25,S$GLB,, | Performed by: INTERNAL MEDICINE

## 2021-02-26 PROCEDURE — 3288F PR FALLS RISK ASSESSMENT DOCUMENTED: ICD-10-PCS | Mod: CPTII,S$GLB,, | Performed by: INTERNAL MEDICINE

## 2021-02-26 PROCEDURE — 1159F MED LIST DOCD IN RCRD: CPT | Mod: S$GLB,,, | Performed by: INTERNAL MEDICINE

## 2021-02-26 PROCEDURE — 3074F PR MOST RECENT SYSTOLIC BLOOD PRESSURE < 130 MM HG: ICD-10-PCS | Mod: CPTII,S$GLB,, | Performed by: INTERNAL MEDICINE

## 2021-02-26 PROCEDURE — 3008F PR BODY MASS INDEX (BMI) DOCUMENTED: ICD-10-PCS | Mod: CPTII,S$GLB,, | Performed by: INTERNAL MEDICINE

## 2021-02-26 PROCEDURE — 3008F BODY MASS INDEX DOCD: CPT | Mod: CPTII,S$GLB,, | Performed by: INTERNAL MEDICINE

## 2021-02-26 PROCEDURE — 99213 PR OFFICE/OUTPT VISIT, EST, LEVL III, 20-29 MIN: ICD-10-PCS | Mod: 25,S$GLB,, | Performed by: INTERNAL MEDICINE

## 2021-02-26 PROCEDURE — 96372 THER/PROPH/DIAG INJ SC/IM: CPT | Mod: S$GLB,,, | Performed by: INTERNAL MEDICINE

## 2021-02-26 PROCEDURE — 1101F PR PT FALLS ASSESS DOC 0-1 FALLS W/OUT INJ PAST YR: ICD-10-PCS | Mod: CPTII,S$GLB,, | Performed by: INTERNAL MEDICINE

## 2021-02-26 RX ORDER — TIMOLOL MALEATE 5 MG/ML
1 SOLUTION/ DROPS OPHTHALMIC 2 TIMES DAILY
COMMUNITY
Start: 2021-02-15 | End: 2021-04-19 | Stop reason: SDUPTHER

## 2021-02-26 RX ORDER — BETAMETHASONE SODIUM PHOSPHATE AND BETAMETHASONE ACETATE 3; 3 MG/ML; MG/ML
6 INJECTION, SUSPENSION INTRA-ARTICULAR; INTRALESIONAL; INTRAMUSCULAR; SOFT TISSUE
Status: COMPLETED | OUTPATIENT
Start: 2021-02-26 | End: 2021-02-26

## 2021-02-26 RX ADMIN — BETAMETHASONE SODIUM PHOSPHATE AND BETAMETHASONE ACETATE 6 MG: 3; 3 INJECTION, SUSPENSION INTRA-ARTICULAR; INTRALESIONAL; INTRAMUSCULAR; SOFT TISSUE at 04:02

## 2021-03-02 DIAGNOSIS — E78.2 MIXED HYPERLIPIDEMIA: Primary | ICD-10-CM

## 2021-03-02 DIAGNOSIS — R73.09 ABNORMAL GLUCOSE: ICD-10-CM

## 2021-03-05 ENCOUNTER — OFFICE VISIT (OUTPATIENT)
Dept: OPHTHALMOLOGY | Facility: CLINIC | Age: 76
End: 2021-03-05
Payer: MEDICARE

## 2021-03-05 DIAGNOSIS — Z96.1 PSEUDOPHAKIA OF BOTH EYES: ICD-10-CM

## 2021-03-05 DIAGNOSIS — H40.043 STEROID RESPONDER, BILATERAL: ICD-10-CM

## 2021-03-05 DIAGNOSIS — H26.492 PCO (POSTERIOR CAPSULAR OPACIFICATION), LEFT: ICD-10-CM

## 2021-03-05 DIAGNOSIS — H04.201 EPIPHORA, RIGHT: ICD-10-CM

## 2021-03-05 DIAGNOSIS — H01.009 ANGULAR BLEPHARITIS, UNSPECIFIED LATERALITY: ICD-10-CM

## 2021-03-05 DIAGNOSIS — H40.1122 PRIMARY OPEN ANGLE GLAUCOMA OF LEFT EYE, MODERATE STAGE: ICD-10-CM

## 2021-03-05 DIAGNOSIS — H04.123 DRY EYE SYNDROME, BILATERAL: ICD-10-CM

## 2021-03-05 DIAGNOSIS — H02.051 TRICHIASIS OF RIGHT UPPER EYELID: ICD-10-CM

## 2021-03-05 DIAGNOSIS — H40.1111 PRIMARY OPEN ANGLE GLAUCOMA OF RIGHT EYE, MILD STAGE: Primary | ICD-10-CM

## 2021-03-05 PROCEDURE — 99999 PR PBB SHADOW E&M-EST. PATIENT-LVL III: ICD-10-PCS | Mod: PBBFAC,,, | Performed by: OPHTHALMOLOGY

## 2021-03-05 PROCEDURE — 1126F PR PAIN SEVERITY QUANTIFIED, NO PAIN PRESENT: ICD-10-PCS | Mod: S$GLB,,, | Performed by: OPHTHALMOLOGY

## 2021-03-05 PROCEDURE — 99499 NO LOS: ICD-10-PCS | Mod: S$GLB,,, | Performed by: OPHTHALMOLOGY

## 2021-03-05 PROCEDURE — 99999 PR PBB SHADOW E&M-EST. PATIENT-LVL III: CPT | Mod: PBBFAC,,, | Performed by: OPHTHALMOLOGY

## 2021-03-05 PROCEDURE — 3288F PR FALLS RISK ASSESSMENT DOCUMENTED: ICD-10-PCS | Mod: CPTII,S$GLB,, | Performed by: OPHTHALMOLOGY

## 2021-03-05 PROCEDURE — 99499 UNLISTED E&M SERVICE: CPT | Mod: S$GLB,,, | Performed by: OPHTHALMOLOGY

## 2021-03-05 PROCEDURE — 1101F PR PT FALLS ASSESS DOC 0-1 FALLS W/OUT INJ PAST YR: ICD-10-PCS | Mod: CPTII,S$GLB,, | Performed by: OPHTHALMOLOGY

## 2021-03-05 PROCEDURE — 1126F AMNT PAIN NOTED NONE PRSNT: CPT | Mod: S$GLB,,, | Performed by: OPHTHALMOLOGY

## 2021-03-05 PROCEDURE — 3288F FALL RISK ASSESSMENT DOCD: CPT | Mod: CPTII,S$GLB,, | Performed by: OPHTHALMOLOGY

## 2021-03-05 PROCEDURE — 1101F PT FALLS ASSESS-DOCD LE1/YR: CPT | Mod: CPTII,S$GLB,, | Performed by: OPHTHALMOLOGY

## 2021-03-05 RX ORDER — MULTIVITAMIN
1 TABLET ORAL DAILY
COMMUNITY

## 2021-03-06 RX ORDER — MOXIFLOXACIN 5 MG/ML
1 SOLUTION/ DROPS OPHTHALMIC
Status: CANCELLED | OUTPATIENT
Start: 2021-03-06

## 2021-03-06 RX ORDER — SODIUM CHLORIDE 0.9 % (FLUSH) 0.9 %
10 SYRINGE (ML) INJECTION
Status: CANCELLED | OUTPATIENT
Start: 2021-03-06

## 2021-03-14 ENCOUNTER — LAB VISIT (OUTPATIENT)
Dept: SPORTS MEDICINE | Facility: CLINIC | Age: 76
End: 2021-03-14
Payer: MEDICARE

## 2021-03-14 DIAGNOSIS — Z13.9 SCREENING PROCEDURE: ICD-10-CM

## 2021-03-14 PROCEDURE — U0003 INFECTIOUS AGENT DETECTION BY NUCLEIC ACID (DNA OR RNA); SEVERE ACUTE RESPIRATORY SYNDROME CORONAVIRUS 2 (SARS-COV-2) (CORONAVIRUS DISEASE [COVID-19]), AMPLIFIED PROBE TECHNIQUE, MAKING USE OF HIGH THROUGHPUT TECHNOLOGIES AS DESCRIBED BY CMS-2020-01-R: HCPCS | Performed by: OPHTHALMOLOGY

## 2021-03-14 PROCEDURE — U0005 INFEC AGEN DETEC AMPLI PROBE: HCPCS | Performed by: OPHTHALMOLOGY

## 2021-03-15 LAB — SARS-COV-2 RNA RESP QL NAA+PROBE: NOT DETECTED

## 2021-03-17 ENCOUNTER — ANESTHESIA (OUTPATIENT)
Dept: SURGERY | Facility: HOSPITAL | Age: 76
End: 2021-03-17
Payer: MEDICARE

## 2021-03-17 ENCOUNTER — HOSPITAL ENCOUNTER (OUTPATIENT)
Facility: HOSPITAL | Age: 76
Discharge: HOME | End: 2021-03-17
Attending: OPHTHALMOLOGY | Admitting: OPHTHALMOLOGY
Payer: MEDICARE

## 2021-03-17 ENCOUNTER — ANESTHESIA EVENT (OUTPATIENT)
Dept: SURGERY | Facility: HOSPITAL | Age: 76
End: 2021-03-17
Payer: MEDICARE

## 2021-03-17 VITALS
HEIGHT: 65 IN | BODY MASS INDEX: 31.32 KG/M2 | TEMPERATURE: 98 F | OXYGEN SATURATION: 97 % | DIASTOLIC BLOOD PRESSURE: 65 MMHG | RESPIRATION RATE: 18 BRPM | SYSTOLIC BLOOD PRESSURE: 156 MMHG | WEIGHT: 188 LBS | HEART RATE: 51 BPM

## 2021-03-17 DIAGNOSIS — H40.1111 PRIMARY OPEN ANGLE GLAUCOMA OF RIGHT EYE, MILD STAGE: Primary | ICD-10-CM

## 2021-03-17 PROCEDURE — 37000009 HC ANESTHESIA EA ADD 15 MINS: Performed by: OPHTHALMOLOGY

## 2021-03-17 PROCEDURE — 37000008 HC ANESTHESIA 1ST 15 MINUTES: Performed by: OPHTHALMOLOGY

## 2021-03-17 PROCEDURE — 25000003 PHARM REV CODE 250

## 2021-03-17 PROCEDURE — D9220A PRA ANESTHESIA: Mod: ANES,,, | Performed by: ANESTHESIOLOGY

## 2021-03-17 PROCEDURE — D9220A PRA ANESTHESIA: Mod: CRNA,,, | Performed by: NURSE ANESTHETIST, CERTIFIED REGISTERED

## 2021-03-17 PROCEDURE — 63600175 PHARM REV CODE 636 W HCPCS: Performed by: NURSE ANESTHETIST, CERTIFIED REGISTERED

## 2021-03-17 PROCEDURE — 36000707: Performed by: OPHTHALMOLOGY

## 2021-03-17 PROCEDURE — 25000003 PHARM REV CODE 250: Performed by: NURSE ANESTHETIST, CERTIFIED REGISTERED

## 2021-03-17 PROCEDURE — 99499 UNLISTED E&M SERVICE: CPT | Mod: ,,, | Performed by: STUDENT IN AN ORGANIZED HEALTH CARE EDUCATION/TRAINING PROGRAM

## 2021-03-17 PROCEDURE — 66183 INSERT ANT DRAINAGE DEVICE: CPT | Mod: RT,,, | Performed by: OPHTHALMOLOGY

## 2021-03-17 PROCEDURE — D9220A PRA ANESTHESIA: ICD-10-PCS | Mod: ANES,,, | Performed by: ANESTHESIOLOGY

## 2021-03-17 PROCEDURE — 71000044 HC DOSC ROUTINE RECOVERY FIRST HOUR: Performed by: OPHTHALMOLOGY

## 2021-03-17 PROCEDURE — D9220A PRA ANESTHESIA: ICD-10-PCS | Mod: CRNA,,, | Performed by: NURSE ANESTHETIST, CERTIFIED REGISTERED

## 2021-03-17 PROCEDURE — 66183 PR INSERT ANT SEGMENT DRAIN WO RESERVOIR, EXTERNAL APPROACH: ICD-10-PCS | Mod: RT,,, | Performed by: OPHTHALMOLOGY

## 2021-03-17 PROCEDURE — 63600175 PHARM REV CODE 636 W HCPCS: Performed by: OPHTHALMOLOGY

## 2021-03-17 PROCEDURE — 63600175 PHARM REV CODE 636 W HCPCS: Performed by: ANESTHESIOLOGY

## 2021-03-17 PROCEDURE — 71000015 HC POSTOP RECOV 1ST HR: Performed by: OPHTHALMOLOGY

## 2021-03-17 PROCEDURE — C1783 OCULAR IMP, AQUEOUS DRAIN DE: HCPCS | Performed by: OPHTHALMOLOGY

## 2021-03-17 PROCEDURE — 25000003 PHARM REV CODE 250: Performed by: OPHTHALMOLOGY

## 2021-03-17 PROCEDURE — 36000706: Performed by: OPHTHALMOLOGY

## 2021-03-17 PROCEDURE — 99499 NO LOS: ICD-10-PCS | Mod: ,,, | Performed by: STUDENT IN AN ORGANIZED HEALTH CARE EDUCATION/TRAINING PROGRAM

## 2021-03-17 DEVICE — SYS XEN GLAUCOMA TREATMENT 6MM: Type: IMPLANTABLE DEVICE | Site: EYE | Status: FUNCTIONAL

## 2021-03-17 RX ORDER — PROPOFOL 10 MG/ML
VIAL (ML) INTRAVENOUS
Status: DISCONTINUED | OUTPATIENT
Start: 2021-03-17 | End: 2021-03-17

## 2021-03-17 RX ORDER — PROCHLORPERAZINE EDISYLATE 5 MG/ML
5 INJECTION INTRAMUSCULAR; INTRAVENOUS EVERY 30 MIN PRN
Status: DISCONTINUED | OUTPATIENT
Start: 2021-03-17 | End: 2021-03-17 | Stop reason: HOSPADM

## 2021-03-17 RX ORDER — MOXIFLOXACIN 5 MG/ML
1 SOLUTION/ DROPS OPHTHALMIC
Status: DISCONTINUED | OUTPATIENT
Start: 2021-03-17 | End: 2021-03-17 | Stop reason: HOSPADM

## 2021-03-17 RX ORDER — ATROPINE SULFATE 10 MG/ML
SOLUTION/ DROPS OPHTHALMIC
Status: DISCONTINUED | OUTPATIENT
Start: 2021-03-17 | End: 2021-03-17 | Stop reason: HOSPADM

## 2021-03-17 RX ORDER — LIDOCAINE HYDROCHLORIDE 20 MG/ML
JELLY TOPICAL
Status: DISCONTINUED | OUTPATIENT
Start: 2021-03-17 | End: 2021-03-17 | Stop reason: HOSPADM

## 2021-03-17 RX ORDER — LIDOCAINE HYDROCHLORIDE 40 MG/ML
INJECTION, SOLUTION RETROBULBAR
Status: DISCONTINUED
Start: 2021-03-17 | End: 2021-03-17 | Stop reason: HOSPADM

## 2021-03-17 RX ORDER — PREDNISOLONE ACETATE 10 MG/ML
SUSPENSION/ DROPS OPHTHALMIC
Status: DISCONTINUED
Start: 2021-03-17 | End: 2021-03-17 | Stop reason: HOSPADM

## 2021-03-17 RX ORDER — LIDOCAINE HYDROCHLORIDE 10 MG/ML
INJECTION, SOLUTION EPIDURAL; INFILTRATION; INTRACAUDAL; PERINEURAL
Status: DISCONTINUED
Start: 2021-03-17 | End: 2021-03-17 | Stop reason: HOSPADM

## 2021-03-17 RX ORDER — SODIUM CHLORIDE 0.9 % (FLUSH) 0.9 %
10 SYRINGE (ML) INJECTION
Status: DISCONTINUED | OUTPATIENT
Start: 2021-03-17 | End: 2021-03-17 | Stop reason: HOSPADM

## 2021-03-17 RX ORDER — PREDNISOLONE ACETATE 10 MG/ML
SUSPENSION/ DROPS OPHTHALMIC
Status: DISCONTINUED | OUTPATIENT
Start: 2021-03-17 | End: 2021-03-17 | Stop reason: HOSPADM

## 2021-03-17 RX ORDER — HYDROMORPHONE HYDROCHLORIDE 1 MG/ML
0.2 INJECTION, SOLUTION INTRAMUSCULAR; INTRAVENOUS; SUBCUTANEOUS EVERY 5 MIN PRN
Status: DISCONTINUED | OUTPATIENT
Start: 2021-03-17 | End: 2021-03-17 | Stop reason: HOSPADM

## 2021-03-17 RX ORDER — OXYCODONE HYDROCHLORIDE 5 MG/1
5 TABLET ORAL
Status: DISCONTINUED | OUTPATIENT
Start: 2021-03-17 | End: 2021-03-17 | Stop reason: HOSPADM

## 2021-03-17 RX ORDER — MOXIFLOXACIN 5 MG/ML
SOLUTION/ DROPS OPHTHALMIC
Status: DISCONTINUED | OUTPATIENT
Start: 2021-03-17 | End: 2021-03-17 | Stop reason: HOSPADM

## 2021-03-17 RX ORDER — MOXIFLOXACIN 5 MG/ML
SOLUTION/ DROPS OPHTHALMIC
Status: COMPLETED
Start: 2021-03-17 | End: 2021-03-17

## 2021-03-17 RX ORDER — MIDAZOLAM HYDROCHLORIDE 1 MG/ML
INJECTION, SOLUTION INTRAMUSCULAR; INTRAVENOUS
Status: DISCONTINUED | OUTPATIENT
Start: 2021-03-17 | End: 2021-03-17

## 2021-03-17 RX ORDER — FENTANYL CITRATE 50 UG/ML
25 INJECTION, SOLUTION INTRAMUSCULAR; INTRAVENOUS EVERY 5 MIN PRN
Status: DISCONTINUED | OUTPATIENT
Start: 2021-03-17 | End: 2021-03-17 | Stop reason: HOSPADM

## 2021-03-17 RX ORDER — LIDOCAINE HYDROCHLORIDE 20 MG/ML
JELLY TOPICAL
Status: DISCONTINUED
Start: 2021-03-17 | End: 2021-03-17 | Stop reason: HOSPADM

## 2021-03-17 RX ORDER — LIDOCAINE HYDROCHLORIDE 20 MG/ML
INJECTION INTRAVENOUS
Status: DISCONTINUED | OUTPATIENT
Start: 2021-03-17 | End: 2021-03-17

## 2021-03-17 RX ORDER — ATROPINE SULFATE 10 MG/ML
SOLUTION/ DROPS OPHTHALMIC
Status: DISCONTINUED
Start: 2021-03-17 | End: 2021-03-17 | Stop reason: HOSPADM

## 2021-03-17 RX ORDER — DEXAMETHASONE SODIUM PHOSPHATE 4 MG/ML
INJECTION, SOLUTION INTRA-ARTICULAR; INTRALESIONAL; INTRAMUSCULAR; INTRAVENOUS; SOFT TISSUE
Status: DISCONTINUED | OUTPATIENT
Start: 2021-03-17 | End: 2021-03-17 | Stop reason: HOSPADM

## 2021-03-17 RX ORDER — DEXAMETHASONE SODIUM PHOSPHATE 4 MG/ML
INJECTION, SOLUTION INTRA-ARTICULAR; INTRALESIONAL; INTRAMUSCULAR; INTRAVENOUS; SOFT TISSUE
Status: DISCONTINUED
Start: 2021-03-17 | End: 2021-03-17 | Stop reason: HOSPADM

## 2021-03-17 RX ORDER — FENTANYL CITRATE 50 UG/ML
INJECTION, SOLUTION INTRAMUSCULAR; INTRAVENOUS
Status: DISCONTINUED | OUTPATIENT
Start: 2021-03-17 | End: 2021-03-17

## 2021-03-17 RX ADMIN — FENTANYL CITRATE 25 MCG: 50 INJECTION, SOLUTION INTRAMUSCULAR; INTRAVENOUS at 10:03

## 2021-03-17 RX ADMIN — SODIUM CHLORIDE: 9 INJECTION, SOLUTION INTRAVENOUS at 09:03

## 2021-03-17 RX ADMIN — MIDAZOLAM HYDROCHLORIDE 0.5 MG: 1 INJECTION, SOLUTION INTRAMUSCULAR; INTRAVENOUS at 10:03

## 2021-03-17 RX ADMIN — FENTANYL CITRATE 25 MCG: 50 INJECTION INTRAMUSCULAR; INTRAVENOUS at 10:03

## 2021-03-17 RX ADMIN — MOXIFLOXACIN 1 DROP: 5 SOLUTION/ DROPS OPHTHALMIC at 08:03

## 2021-03-17 RX ADMIN — PROPOFOL 20 MG: 10 INJECTION, EMULSION INTRAVENOUS at 10:03

## 2021-03-17 RX ADMIN — LIDOCAINE HYDROCHLORIDE 40 MG: 20 INJECTION, SOLUTION INTRAVENOUS at 10:03

## 2021-03-18 ENCOUNTER — OFFICE VISIT (OUTPATIENT)
Dept: OPHTHALMOLOGY | Facility: CLINIC | Age: 76
End: 2021-03-18
Payer: MEDICARE

## 2021-03-18 ENCOUNTER — PATIENT MESSAGE (OUTPATIENT)
Dept: OPHTHALMOLOGY | Facility: CLINIC | Age: 76
End: 2021-03-18

## 2021-03-18 VITALS — DIASTOLIC BLOOD PRESSURE: 75 MMHG | HEART RATE: 55 BPM | SYSTOLIC BLOOD PRESSURE: 178 MMHG

## 2021-03-18 DIAGNOSIS — H40.1122 PRIMARY OPEN ANGLE GLAUCOMA OF LEFT EYE, MODERATE STAGE: ICD-10-CM

## 2021-03-18 DIAGNOSIS — H04.201 EPIPHORA, RIGHT: ICD-10-CM

## 2021-03-18 DIAGNOSIS — H02.051 TRICHIASIS OF RIGHT UPPER EYELID: ICD-10-CM

## 2021-03-18 DIAGNOSIS — H40.043 STEROID RESPONDER, BILATERAL: ICD-10-CM

## 2021-03-18 DIAGNOSIS — H04.123 DRY EYE SYNDROME, BILATERAL: ICD-10-CM

## 2021-03-18 DIAGNOSIS — Z98.83 GLAUCOMA FILTERING BLEB OF RIGHT EYE: ICD-10-CM

## 2021-03-18 DIAGNOSIS — Z48.89 ENCOUNTER FOR POSTOPERATIVE CARE: Primary | ICD-10-CM

## 2021-03-18 DIAGNOSIS — H01.009 ANGULAR BLEPHARITIS, UNSPECIFIED LATERALITY: ICD-10-CM

## 2021-03-18 DIAGNOSIS — H26.492 PCO (POSTERIOR CAPSULAR OPACIFICATION), LEFT: ICD-10-CM

## 2021-03-18 DIAGNOSIS — H40.1111 PRIMARY OPEN ANGLE GLAUCOMA OF RIGHT EYE, MILD STAGE: ICD-10-CM

## 2021-03-18 PROCEDURE — 99999 PR PBB SHADOW E&M-EST. PATIENT-LVL III: ICD-10-PCS | Mod: PBBFAC,,, | Performed by: OPHTHALMOLOGY

## 2021-03-18 PROCEDURE — 3288F FALL RISK ASSESSMENT DOCD: CPT | Mod: CPTII,S$GLB,, | Performed by: OPHTHALMOLOGY

## 2021-03-18 PROCEDURE — 1125F PR PAIN SEVERITY QUANTIFIED, PAIN PRESENT: ICD-10-PCS | Mod: S$GLB,,, | Performed by: OPHTHALMOLOGY

## 2021-03-18 PROCEDURE — 3288F PR FALLS RISK ASSESSMENT DOCUMENTED: ICD-10-PCS | Mod: CPTII,S$GLB,, | Performed by: OPHTHALMOLOGY

## 2021-03-18 PROCEDURE — 1101F PT FALLS ASSESS-DOCD LE1/YR: CPT | Mod: CPTII,S$GLB,, | Performed by: OPHTHALMOLOGY

## 2021-03-18 PROCEDURE — 99024 PR POST-OP FOLLOW-UP VISIT: ICD-10-PCS | Mod: S$GLB,,, | Performed by: OPHTHALMOLOGY

## 2021-03-18 PROCEDURE — 99999 PR PBB SHADOW E&M-EST. PATIENT-LVL III: CPT | Mod: PBBFAC,,, | Performed by: OPHTHALMOLOGY

## 2021-03-18 PROCEDURE — 1125F AMNT PAIN NOTED PAIN PRSNT: CPT | Mod: S$GLB,,, | Performed by: OPHTHALMOLOGY

## 2021-03-18 PROCEDURE — 1101F PR PT FALLS ASSESS DOC 0-1 FALLS W/OUT INJ PAST YR: ICD-10-PCS | Mod: CPTII,S$GLB,, | Performed by: OPHTHALMOLOGY

## 2021-03-18 PROCEDURE — 99024 POSTOP FOLLOW-UP VISIT: CPT | Mod: S$GLB,,, | Performed by: OPHTHALMOLOGY

## 2021-03-22 ENCOUNTER — PATIENT MESSAGE (OUTPATIENT)
Dept: OPHTHALMOLOGY | Facility: CLINIC | Age: 76
End: 2021-03-22

## 2021-03-22 DIAGNOSIS — T81.509A POSTOPERATIVE FOREIGN BODY, INITIAL ENCOUNTER: Primary | ICD-10-CM

## 2021-03-22 RX ORDER — ERYTHROMYCIN 5 MG/G
OINTMENT OPHTHALMIC
COMMUNITY
Start: 2021-03-22 | End: 2021-03-22 | Stop reason: SDUPTHER

## 2021-03-22 RX ORDER — ERYTHROMYCIN 5 MG/G
OINTMENT OPHTHALMIC
Qty: 1 TUBE | Refills: 3 | Status: SHIPPED | OUTPATIENT
Start: 2021-03-22 | End: 2021-11-11 | Stop reason: ALTCHOICE

## 2021-03-24 ENCOUNTER — PATIENT MESSAGE (OUTPATIENT)
Dept: OPHTHALMOLOGY | Facility: CLINIC | Age: 76
End: 2021-03-24

## 2021-03-25 ENCOUNTER — OFFICE VISIT (OUTPATIENT)
Dept: OPHTHALMOLOGY | Facility: CLINIC | Age: 76
End: 2021-03-25
Payer: MEDICARE

## 2021-03-25 DIAGNOSIS — Z96.1 PSEUDOPHAKIA OF BOTH EYES: ICD-10-CM

## 2021-03-25 DIAGNOSIS — H40.1111 PRIMARY OPEN ANGLE GLAUCOMA OF RIGHT EYE, MILD STAGE: ICD-10-CM

## 2021-03-25 DIAGNOSIS — Z48.89 ENCOUNTER FOR POSTOPERATIVE CARE: Primary | ICD-10-CM

## 2021-03-25 DIAGNOSIS — H02.051 TRICHIASIS OF RIGHT UPPER EYELID: ICD-10-CM

## 2021-03-25 DIAGNOSIS — H40.043 STEROID RESPONDER, BILATERAL: ICD-10-CM

## 2021-03-25 DIAGNOSIS — H04.123 DRY EYE SYNDROME, BILATERAL: ICD-10-CM

## 2021-03-25 DIAGNOSIS — Z98.83 GLAUCOMA FILTERING BLEB OF RIGHT EYE: ICD-10-CM

## 2021-03-25 DIAGNOSIS — H40.1122 PRIMARY OPEN ANGLE GLAUCOMA OF LEFT EYE, MODERATE STAGE: ICD-10-CM

## 2021-03-25 DIAGNOSIS — H04.201 EPIPHORA, RIGHT: ICD-10-CM

## 2021-03-25 DIAGNOSIS — H26.492 PCO (POSTERIOR CAPSULAR OPACIFICATION), LEFT: ICD-10-CM

## 2021-03-25 DIAGNOSIS — H01.009 ANGULAR BLEPHARITIS, UNSPECIFIED LATERALITY: ICD-10-CM

## 2021-03-25 PROCEDURE — 3288F FALL RISK ASSESSMENT DOCD: CPT | Mod: CPTII,S$GLB,, | Performed by: OPHTHALMOLOGY

## 2021-03-25 PROCEDURE — 99999 PR PBB SHADOW E&M-EST. PATIENT-LVL III: ICD-10-PCS | Mod: PBBFAC,,, | Performed by: OPHTHALMOLOGY

## 2021-03-25 PROCEDURE — 3288F PR FALLS RISK ASSESSMENT DOCUMENTED: ICD-10-PCS | Mod: CPTII,S$GLB,, | Performed by: OPHTHALMOLOGY

## 2021-03-25 PROCEDURE — 99024 POSTOP FOLLOW-UP VISIT: CPT | Mod: S$GLB,,, | Performed by: OPHTHALMOLOGY

## 2021-03-25 PROCEDURE — 99999 PR PBB SHADOW E&M-EST. PATIENT-LVL III: CPT | Mod: PBBFAC,,, | Performed by: OPHTHALMOLOGY

## 2021-03-25 PROCEDURE — 99024 PR POST-OP FOLLOW-UP VISIT: ICD-10-PCS | Mod: S$GLB,,, | Performed by: OPHTHALMOLOGY

## 2021-03-25 PROCEDURE — 1101F PT FALLS ASSESS-DOCD LE1/YR: CPT | Mod: CPTII,S$GLB,, | Performed by: OPHTHALMOLOGY

## 2021-03-25 PROCEDURE — 1101F PR PT FALLS ASSESS DOC 0-1 FALLS W/OUT INJ PAST YR: ICD-10-PCS | Mod: CPTII,S$GLB,, | Performed by: OPHTHALMOLOGY

## 2021-03-25 RX ORDER — PREDNISONE 20 MG/1
60 TABLET ORAL DAILY
Qty: 40 TABLET | Refills: 0 | Status: SHIPPED | OUTPATIENT
Start: 2021-03-25 | End: 2021-04-15 | Stop reason: ALTCHOICE

## 2021-03-26 ENCOUNTER — OFFICE VISIT (OUTPATIENT)
Dept: OPHTHALMOLOGY | Facility: CLINIC | Age: 76
End: 2021-03-26
Payer: MEDICARE

## 2021-03-26 DIAGNOSIS — H40.1111 PRIMARY OPEN ANGLE GLAUCOMA OF RIGHT EYE, MILD STAGE: ICD-10-CM

## 2021-03-26 DIAGNOSIS — H40.043 STEROID RESPONDER, BILATERAL: ICD-10-CM

## 2021-03-26 DIAGNOSIS — H04.201 EPIPHORA, RIGHT: ICD-10-CM

## 2021-03-26 DIAGNOSIS — H04.123 DRY EYE SYNDROME, BILATERAL: ICD-10-CM

## 2021-03-26 DIAGNOSIS — H02.051 TRICHIASIS OF RIGHT UPPER EYELID: ICD-10-CM

## 2021-03-26 DIAGNOSIS — H01.009 ANGULAR BLEPHARITIS, UNSPECIFIED LATERALITY: ICD-10-CM

## 2021-03-26 DIAGNOSIS — H26.492 PCO (POSTERIOR CAPSULAR OPACIFICATION), LEFT: ICD-10-CM

## 2021-03-26 DIAGNOSIS — Z98.83 GLAUCOMA FILTERING BLEB OF RIGHT EYE: ICD-10-CM

## 2021-03-26 DIAGNOSIS — Z48.89 ENCOUNTER FOR POSTOPERATIVE CARE: Primary | ICD-10-CM

## 2021-03-26 DIAGNOSIS — H40.1122 PRIMARY OPEN ANGLE GLAUCOMA OF LEFT EYE, MODERATE STAGE: ICD-10-CM

## 2021-03-26 DIAGNOSIS — Z96.1 PSEUDOPHAKIA OF BOTH EYES: ICD-10-CM

## 2021-03-26 PROCEDURE — 3288F FALL RISK ASSESSMENT DOCD: CPT | Mod: CPTII,S$GLB,, | Performed by: OPHTHALMOLOGY

## 2021-03-26 PROCEDURE — 99999 PR PBB SHADOW E&M-EST. PATIENT-LVL II: CPT | Mod: PBBFAC,,, | Performed by: OPHTHALMOLOGY

## 2021-03-26 PROCEDURE — 1101F PR PT FALLS ASSESS DOC 0-1 FALLS W/OUT INJ PAST YR: ICD-10-PCS | Mod: CPTII,S$GLB,, | Performed by: OPHTHALMOLOGY

## 2021-03-26 PROCEDURE — 99024 POSTOP FOLLOW-UP VISIT: CPT | Mod: S$GLB,,, | Performed by: OPHTHALMOLOGY

## 2021-03-26 PROCEDURE — 99999 PR PBB SHADOW E&M-EST. PATIENT-LVL II: ICD-10-PCS | Mod: PBBFAC,,, | Performed by: OPHTHALMOLOGY

## 2021-03-26 PROCEDURE — 99024 PR POST-OP FOLLOW-UP VISIT: ICD-10-PCS | Mod: S$GLB,,, | Performed by: OPHTHALMOLOGY

## 2021-03-26 PROCEDURE — 3288F PR FALLS RISK ASSESSMENT DOCUMENTED: ICD-10-PCS | Mod: CPTII,S$GLB,, | Performed by: OPHTHALMOLOGY

## 2021-03-26 PROCEDURE — 1101F PT FALLS ASSESS-DOCD LE1/YR: CPT | Mod: CPTII,S$GLB,, | Performed by: OPHTHALMOLOGY

## 2021-03-29 ENCOUNTER — OFFICE VISIT (OUTPATIENT)
Dept: OPHTHALMOLOGY | Facility: CLINIC | Age: 76
End: 2021-03-29
Payer: MEDICARE

## 2021-03-29 DIAGNOSIS — H04.123 DRY EYE SYNDROME, BILATERAL: ICD-10-CM

## 2021-03-29 DIAGNOSIS — H40.043 STEROID RESPONDER, BILATERAL: ICD-10-CM

## 2021-03-29 DIAGNOSIS — Z98.83 GLAUCOMA FILTERING BLEB OF RIGHT EYE: ICD-10-CM

## 2021-03-29 DIAGNOSIS — Z48.89 ENCOUNTER FOR POSTOPERATIVE CARE: Primary | ICD-10-CM

## 2021-03-29 DIAGNOSIS — H40.1111 PRIMARY OPEN ANGLE GLAUCOMA OF RIGHT EYE, MILD STAGE: ICD-10-CM

## 2021-03-29 DIAGNOSIS — Z96.1 PSEUDOPHAKIA OF BOTH EYES: ICD-10-CM

## 2021-03-29 DIAGNOSIS — H40.1122 PRIMARY OPEN ANGLE GLAUCOMA OF LEFT EYE, MODERATE STAGE: ICD-10-CM

## 2021-03-29 PROCEDURE — 99024 POSTOP FOLLOW-UP VISIT: CPT | Mod: S$GLB,,, | Performed by: OPHTHALMOLOGY

## 2021-03-29 PROCEDURE — 99999 PR PBB SHADOW E&M-EST. PATIENT-LVL III: ICD-10-PCS | Mod: PBBFAC,,, | Performed by: OPHTHALMOLOGY

## 2021-03-29 PROCEDURE — 99024 PR POST-OP FOLLOW-UP VISIT: ICD-10-PCS | Mod: S$GLB,,, | Performed by: OPHTHALMOLOGY

## 2021-03-29 PROCEDURE — 1126F PR PAIN SEVERITY QUANTIFIED, NO PAIN PRESENT: ICD-10-PCS | Mod: S$GLB,,, | Performed by: OPHTHALMOLOGY

## 2021-03-29 PROCEDURE — 3288F PR FALLS RISK ASSESSMENT DOCUMENTED: ICD-10-PCS | Mod: CPTII,S$GLB,, | Performed by: OPHTHALMOLOGY

## 2021-03-29 PROCEDURE — 1126F AMNT PAIN NOTED NONE PRSNT: CPT | Mod: S$GLB,,, | Performed by: OPHTHALMOLOGY

## 2021-03-29 PROCEDURE — 1101F PR PT FALLS ASSESS DOC 0-1 FALLS W/OUT INJ PAST YR: ICD-10-PCS | Mod: CPTII,S$GLB,, | Performed by: OPHTHALMOLOGY

## 2021-03-29 PROCEDURE — 3288F FALL RISK ASSESSMENT DOCD: CPT | Mod: CPTII,S$GLB,, | Performed by: OPHTHALMOLOGY

## 2021-03-29 PROCEDURE — 99999 PR PBB SHADOW E&M-EST. PATIENT-LVL III: CPT | Mod: PBBFAC,,, | Performed by: OPHTHALMOLOGY

## 2021-03-29 PROCEDURE — 1101F PT FALLS ASSESS-DOCD LE1/YR: CPT | Mod: CPTII,S$GLB,, | Performed by: OPHTHALMOLOGY

## 2021-03-29 RX ORDER — ATROPINE SULFATE 10 MG/ML
1 SOLUTION/ DROPS OPHTHALMIC 3 TIMES DAILY
COMMUNITY
End: 2021-04-15 | Stop reason: ALTCHOICE

## 2021-03-29 RX ORDER — PREDNISOLONE ACETATE 10 MG/ML
1 SUSPENSION/ DROPS OPHTHALMIC 2 TIMES DAILY
COMMUNITY
End: 2021-04-22 | Stop reason: SDUPTHER

## 2021-04-01 ENCOUNTER — OFFICE VISIT (OUTPATIENT)
Dept: OPHTHALMOLOGY | Facility: CLINIC | Age: 76
End: 2021-04-01
Payer: MEDICARE

## 2021-04-01 DIAGNOSIS — H40.1122 PRIMARY OPEN ANGLE GLAUCOMA OF LEFT EYE, MODERATE STAGE: ICD-10-CM

## 2021-04-01 DIAGNOSIS — Z98.83 GLAUCOMA FILTERING BLEB OF RIGHT EYE: ICD-10-CM

## 2021-04-01 DIAGNOSIS — Z48.89 ENCOUNTER FOR POSTOPERATIVE CARE: Primary | ICD-10-CM

## 2021-04-01 DIAGNOSIS — H40.1111 PRIMARY OPEN ANGLE GLAUCOMA OF RIGHT EYE, MILD STAGE: ICD-10-CM

## 2021-04-01 DIAGNOSIS — H40.043 STEROID RESPONDER, BILATERAL: ICD-10-CM

## 2021-04-01 DIAGNOSIS — Z96.1 PSEUDOPHAKIA OF BOTH EYES: ICD-10-CM

## 2021-04-01 DIAGNOSIS — H04.123 DRY EYE SYNDROME, BILATERAL: ICD-10-CM

## 2021-04-01 PROCEDURE — 1101F PT FALLS ASSESS-DOCD LE1/YR: CPT | Mod: CPTII,S$GLB,, | Performed by: OPHTHALMOLOGY

## 2021-04-01 PROCEDURE — 1126F PR PAIN SEVERITY QUANTIFIED, NO PAIN PRESENT: ICD-10-PCS | Mod: S$GLB,,, | Performed by: OPHTHALMOLOGY

## 2021-04-01 PROCEDURE — 99024 POSTOP FOLLOW-UP VISIT: CPT | Mod: S$GLB,,, | Performed by: OPHTHALMOLOGY

## 2021-04-01 PROCEDURE — 1126F AMNT PAIN NOTED NONE PRSNT: CPT | Mod: S$GLB,,, | Performed by: OPHTHALMOLOGY

## 2021-04-01 PROCEDURE — 3288F PR FALLS RISK ASSESSMENT DOCUMENTED: ICD-10-PCS | Mod: CPTII,S$GLB,, | Performed by: OPHTHALMOLOGY

## 2021-04-01 PROCEDURE — 99999 PR PBB SHADOW E&M-EST. PATIENT-LVL III: CPT | Mod: PBBFAC,,, | Performed by: OPHTHALMOLOGY

## 2021-04-01 PROCEDURE — 3288F FALL RISK ASSESSMENT DOCD: CPT | Mod: CPTII,S$GLB,, | Performed by: OPHTHALMOLOGY

## 2021-04-01 PROCEDURE — 99024 PR POST-OP FOLLOW-UP VISIT: ICD-10-PCS | Mod: S$GLB,,, | Performed by: OPHTHALMOLOGY

## 2021-04-01 PROCEDURE — 99999 PR PBB SHADOW E&M-EST. PATIENT-LVL III: ICD-10-PCS | Mod: PBBFAC,,, | Performed by: OPHTHALMOLOGY

## 2021-04-01 PROCEDURE — 1101F PR PT FALLS ASSESS DOC 0-1 FALLS W/OUT INJ PAST YR: ICD-10-PCS | Mod: CPTII,S$GLB,, | Performed by: OPHTHALMOLOGY

## 2021-04-02 ENCOUNTER — PATIENT MESSAGE (OUTPATIENT)
Dept: OPHTHALMOLOGY | Facility: CLINIC | Age: 76
End: 2021-04-02

## 2021-04-08 ENCOUNTER — OFFICE VISIT (OUTPATIENT)
Dept: OPHTHALMOLOGY | Facility: CLINIC | Age: 76
End: 2021-04-08
Payer: MEDICARE

## 2021-04-08 DIAGNOSIS — Z98.83 GLAUCOMA FILTERING BLEB OF RIGHT EYE: ICD-10-CM

## 2021-04-08 DIAGNOSIS — Z96.1 PSEUDOPHAKIA OF BOTH EYES: ICD-10-CM

## 2021-04-08 DIAGNOSIS — H04.123 DRY EYE SYNDROME, BILATERAL: ICD-10-CM

## 2021-04-08 DIAGNOSIS — Z48.89 ENCOUNTER FOR POSTOPERATIVE CARE: Primary | ICD-10-CM

## 2021-04-08 DIAGNOSIS — H40.043 STEROID RESPONDER, BILATERAL: ICD-10-CM

## 2021-04-08 DIAGNOSIS — H40.1122 PRIMARY OPEN ANGLE GLAUCOMA OF LEFT EYE, MODERATE STAGE: ICD-10-CM

## 2021-04-08 DIAGNOSIS — H40.1111 PRIMARY OPEN ANGLE GLAUCOMA OF RIGHT EYE, MILD STAGE: ICD-10-CM

## 2021-04-08 DIAGNOSIS — H04.201 EPIPHORA, RIGHT: ICD-10-CM

## 2021-04-08 PROCEDURE — 1101F PT FALLS ASSESS-DOCD LE1/YR: CPT | Mod: CPTII,S$GLB,, | Performed by: OPHTHALMOLOGY

## 2021-04-08 PROCEDURE — 1126F AMNT PAIN NOTED NONE PRSNT: CPT | Mod: S$GLB,,, | Performed by: OPHTHALMOLOGY

## 2021-04-08 PROCEDURE — 99024 PR POST-OP FOLLOW-UP VISIT: ICD-10-PCS | Mod: S$GLB,,, | Performed by: OPHTHALMOLOGY

## 2021-04-08 PROCEDURE — 99999 PR PBB SHADOW E&M-EST. PATIENT-LVL III: CPT | Mod: PBBFAC,,, | Performed by: OPHTHALMOLOGY

## 2021-04-08 PROCEDURE — 99024 POSTOP FOLLOW-UP VISIT: CPT | Mod: S$GLB,,, | Performed by: OPHTHALMOLOGY

## 2021-04-08 PROCEDURE — 1101F PR PT FALLS ASSESS DOC 0-1 FALLS W/OUT INJ PAST YR: ICD-10-PCS | Mod: CPTII,S$GLB,, | Performed by: OPHTHALMOLOGY

## 2021-04-08 PROCEDURE — 3288F FALL RISK ASSESSMENT DOCD: CPT | Mod: CPTII,S$GLB,, | Performed by: OPHTHALMOLOGY

## 2021-04-08 PROCEDURE — 1126F PR PAIN SEVERITY QUANTIFIED, NO PAIN PRESENT: ICD-10-PCS | Mod: S$GLB,,, | Performed by: OPHTHALMOLOGY

## 2021-04-08 PROCEDURE — 99999 PR PBB SHADOW E&M-EST. PATIENT-LVL III: ICD-10-PCS | Mod: PBBFAC,,, | Performed by: OPHTHALMOLOGY

## 2021-04-08 PROCEDURE — 3288F PR FALLS RISK ASSESSMENT DOCUMENTED: ICD-10-PCS | Mod: CPTII,S$GLB,, | Performed by: OPHTHALMOLOGY

## 2021-04-15 ENCOUNTER — OFFICE VISIT (OUTPATIENT)
Dept: OPHTHALMOLOGY | Facility: CLINIC | Age: 76
End: 2021-04-15
Payer: MEDICARE

## 2021-04-15 DIAGNOSIS — H40.1122 PRIMARY OPEN ANGLE GLAUCOMA OF LEFT EYE, MODERATE STAGE: ICD-10-CM

## 2021-04-15 DIAGNOSIS — Z98.83 GLAUCOMA FILTERING BLEB OF RIGHT EYE: ICD-10-CM

## 2021-04-15 DIAGNOSIS — H40.1111 PRIMARY OPEN ANGLE GLAUCOMA OF RIGHT EYE, MILD STAGE: ICD-10-CM

## 2021-04-15 DIAGNOSIS — Z96.1 PSEUDOPHAKIA OF BOTH EYES: ICD-10-CM

## 2021-04-15 DIAGNOSIS — Z48.89 ENCOUNTER FOR POSTOPERATIVE CARE: Primary | ICD-10-CM

## 2021-04-15 DIAGNOSIS — H40.043 STEROID RESPONDER, BILATERAL: ICD-10-CM

## 2021-04-15 DIAGNOSIS — H04.123 DRY EYE SYNDROME, BILATERAL: ICD-10-CM

## 2021-04-15 PROCEDURE — 1126F PR PAIN SEVERITY QUANTIFIED, NO PAIN PRESENT: ICD-10-PCS | Mod: S$GLB,,, | Performed by: OPHTHALMOLOGY

## 2021-04-15 PROCEDURE — 99024 PR POST-OP FOLLOW-UP VISIT: ICD-10-PCS | Mod: S$GLB,,, | Performed by: OPHTHALMOLOGY

## 2021-04-15 PROCEDURE — 1101F PT FALLS ASSESS-DOCD LE1/YR: CPT | Mod: CPTII,S$GLB,, | Performed by: OPHTHALMOLOGY

## 2021-04-15 PROCEDURE — 1101F PR PT FALLS ASSESS DOC 0-1 FALLS W/OUT INJ PAST YR: ICD-10-PCS | Mod: CPTII,S$GLB,, | Performed by: OPHTHALMOLOGY

## 2021-04-15 PROCEDURE — 99999 PR PBB SHADOW E&M-EST. PATIENT-LVL III: CPT | Mod: PBBFAC,,, | Performed by: OPHTHALMOLOGY

## 2021-04-15 PROCEDURE — 3288F PR FALLS RISK ASSESSMENT DOCUMENTED: ICD-10-PCS | Mod: CPTII,S$GLB,, | Performed by: OPHTHALMOLOGY

## 2021-04-15 PROCEDURE — 3288F FALL RISK ASSESSMENT DOCD: CPT | Mod: CPTII,S$GLB,, | Performed by: OPHTHALMOLOGY

## 2021-04-15 PROCEDURE — 1126F AMNT PAIN NOTED NONE PRSNT: CPT | Mod: S$GLB,,, | Performed by: OPHTHALMOLOGY

## 2021-04-15 PROCEDURE — 99999 PR PBB SHADOW E&M-EST. PATIENT-LVL III: ICD-10-PCS | Mod: PBBFAC,,, | Performed by: OPHTHALMOLOGY

## 2021-04-15 PROCEDURE — 99024 POSTOP FOLLOW-UP VISIT: CPT | Mod: S$GLB,,, | Performed by: OPHTHALMOLOGY

## 2021-04-17 ENCOUNTER — PATIENT MESSAGE (OUTPATIENT)
Dept: OPHTHALMOLOGY | Facility: CLINIC | Age: 76
End: 2021-04-17

## 2021-04-19 RX ORDER — TIMOLOL MALEATE 5 MG/ML
1 SOLUTION/ DROPS OPHTHALMIC 2 TIMES DAILY
Qty: 5 ML | Refills: 6 | Status: SHIPPED | OUTPATIENT
Start: 2021-04-19 | End: 2021-08-05

## 2021-04-21 ENCOUNTER — PATIENT MESSAGE (OUTPATIENT)
Dept: OPHTHALMOLOGY | Facility: CLINIC | Age: 76
End: 2021-04-21

## 2021-04-22 ENCOUNTER — PATIENT MESSAGE (OUTPATIENT)
Dept: OPHTHALMOLOGY | Facility: CLINIC | Age: 76
End: 2021-04-22

## 2021-04-22 RX ORDER — PREDNISOLONE ACETATE 10 MG/ML
1 SUSPENSION/ DROPS OPHTHALMIC 3 TIMES DAILY
Qty: 5 ML | Refills: 2 | Status: SHIPPED | OUTPATIENT
Start: 2021-04-22 | End: 2021-05-06 | Stop reason: ALTCHOICE

## 2021-04-23 ENCOUNTER — OFFICE VISIT (OUTPATIENT)
Dept: OPHTHALMOLOGY | Facility: CLINIC | Age: 76
End: 2021-04-23
Payer: MEDICARE

## 2021-04-23 DIAGNOSIS — H40.1111 PRIMARY OPEN ANGLE GLAUCOMA OF RIGHT EYE, MILD STAGE: ICD-10-CM

## 2021-04-23 DIAGNOSIS — Z96.1 PSEUDOPHAKIA OF BOTH EYES: ICD-10-CM

## 2021-04-23 DIAGNOSIS — H04.123 DRY EYE SYNDROME, BILATERAL: ICD-10-CM

## 2021-04-23 DIAGNOSIS — H40.043 STEROID RESPONDER, BILATERAL: ICD-10-CM

## 2021-04-23 DIAGNOSIS — H40.1122 PRIMARY OPEN ANGLE GLAUCOMA OF LEFT EYE, MODERATE STAGE: ICD-10-CM

## 2021-04-23 DIAGNOSIS — Z98.83 GLAUCOMA FILTERING BLEB OF RIGHT EYE: ICD-10-CM

## 2021-04-23 DIAGNOSIS — Z48.89 ENCOUNTER FOR POSTOPERATIVE CARE: Primary | ICD-10-CM

## 2021-04-23 PROCEDURE — 1101F PR PT FALLS ASSESS DOC 0-1 FALLS W/OUT INJ PAST YR: ICD-10-PCS | Mod: CPTII,S$GLB,, | Performed by: OPHTHALMOLOGY

## 2021-04-23 PROCEDURE — 99024 POSTOP FOLLOW-UP VISIT: CPT | Mod: S$GLB,,, | Performed by: OPHTHALMOLOGY

## 2021-04-23 PROCEDURE — 99999 PR PBB SHADOW E&M-EST. PATIENT-LVL III: CPT | Mod: PBBFAC,,, | Performed by: OPHTHALMOLOGY

## 2021-04-23 PROCEDURE — 1101F PT FALLS ASSESS-DOCD LE1/YR: CPT | Mod: CPTII,S$GLB,, | Performed by: OPHTHALMOLOGY

## 2021-04-23 PROCEDURE — 99024 PR POST-OP FOLLOW-UP VISIT: ICD-10-PCS | Mod: S$GLB,,, | Performed by: OPHTHALMOLOGY

## 2021-04-23 PROCEDURE — 99999 PR PBB SHADOW E&M-EST. PATIENT-LVL III: ICD-10-PCS | Mod: PBBFAC,,, | Performed by: OPHTHALMOLOGY

## 2021-04-23 PROCEDURE — 1125F AMNT PAIN NOTED PAIN PRSNT: CPT | Mod: S$GLB,,, | Performed by: OPHTHALMOLOGY

## 2021-04-23 PROCEDURE — 3288F PR FALLS RISK ASSESSMENT DOCUMENTED: ICD-10-PCS | Mod: CPTII,S$GLB,, | Performed by: OPHTHALMOLOGY

## 2021-04-23 PROCEDURE — 3288F FALL RISK ASSESSMENT DOCD: CPT | Mod: CPTII,S$GLB,, | Performed by: OPHTHALMOLOGY

## 2021-04-23 PROCEDURE — 1125F PR PAIN SEVERITY QUANTIFIED, PAIN PRESENT: ICD-10-PCS | Mod: S$GLB,,, | Performed by: OPHTHALMOLOGY

## 2021-04-23 RX ORDER — BRIMONIDINE TARTRATE 1 MG/ML
1 SOLUTION/ DROPS OPHTHALMIC 3 TIMES DAILY
Qty: 5 ML | Refills: 0
Start: 2021-04-23 | End: 2021-05-25 | Stop reason: SDUPTHER

## 2021-04-30 ENCOUNTER — OFFICE VISIT (OUTPATIENT)
Dept: OPHTHALMOLOGY | Facility: CLINIC | Age: 76
End: 2021-04-30
Payer: MEDICARE

## 2021-04-30 DIAGNOSIS — Z96.1 PSEUDOPHAKIA OF BOTH EYES: ICD-10-CM

## 2021-04-30 DIAGNOSIS — H40.1111 PRIMARY OPEN ANGLE GLAUCOMA OF RIGHT EYE, MILD STAGE: ICD-10-CM

## 2021-04-30 DIAGNOSIS — H40.1122 PRIMARY OPEN ANGLE GLAUCOMA OF LEFT EYE, MODERATE STAGE: ICD-10-CM

## 2021-04-30 DIAGNOSIS — Z48.89 ENCOUNTER FOR POSTOPERATIVE CARE: Primary | ICD-10-CM

## 2021-04-30 DIAGNOSIS — H40.043 STEROID RESPONDER, BILATERAL: ICD-10-CM

## 2021-04-30 DIAGNOSIS — H04.123 DRY EYE SYNDROME, BILATERAL: ICD-10-CM

## 2021-04-30 DIAGNOSIS — Z98.83 GLAUCOMA FILTERING BLEB OF RIGHT EYE: ICD-10-CM

## 2021-04-30 PROCEDURE — 1126F PR PAIN SEVERITY QUANTIFIED, NO PAIN PRESENT: ICD-10-PCS | Mod: S$GLB,,, | Performed by: OPHTHALMOLOGY

## 2021-04-30 PROCEDURE — 1101F PT FALLS ASSESS-DOCD LE1/YR: CPT | Mod: CPTII,S$GLB,, | Performed by: OPHTHALMOLOGY

## 2021-04-30 PROCEDURE — 1101F PR PT FALLS ASSESS DOC 0-1 FALLS W/OUT INJ PAST YR: ICD-10-PCS | Mod: CPTII,S$GLB,, | Performed by: OPHTHALMOLOGY

## 2021-04-30 PROCEDURE — 99024 PR POST-OP FOLLOW-UP VISIT: ICD-10-PCS | Mod: S$GLB,,, | Performed by: OPHTHALMOLOGY

## 2021-04-30 PROCEDURE — 3288F PR FALLS RISK ASSESSMENT DOCUMENTED: ICD-10-PCS | Mod: CPTII,S$GLB,, | Performed by: OPHTHALMOLOGY

## 2021-04-30 PROCEDURE — 1126F AMNT PAIN NOTED NONE PRSNT: CPT | Mod: S$GLB,,, | Performed by: OPHTHALMOLOGY

## 2021-04-30 PROCEDURE — 99999 PR PBB SHADOW E&M-EST. PATIENT-LVL III: CPT | Mod: PBBFAC,,, | Performed by: OPHTHALMOLOGY

## 2021-04-30 PROCEDURE — 3288F FALL RISK ASSESSMENT DOCD: CPT | Mod: CPTII,S$GLB,, | Performed by: OPHTHALMOLOGY

## 2021-04-30 PROCEDURE — 99024 POSTOP FOLLOW-UP VISIT: CPT | Mod: S$GLB,,, | Performed by: OPHTHALMOLOGY

## 2021-04-30 PROCEDURE — 99999 PR PBB SHADOW E&M-EST. PATIENT-LVL III: ICD-10-PCS | Mod: PBBFAC,,, | Performed by: OPHTHALMOLOGY

## 2021-04-30 RX ORDER — ACETAZOLAMIDE 250 MG/1
250 TABLET ORAL 3 TIMES DAILY
Qty: 90 TABLET | Refills: 0 | Status: SHIPPED | OUTPATIENT
Start: 2021-04-30 | End: 2021-07-07 | Stop reason: SINTOL

## 2021-04-30 RX ORDER — ACETAZOLAMIDE 250 MG/1
250 TABLET ORAL 3 TIMES DAILY
Qty: 90 TABLET | Refills: 0 | Status: SHIPPED | OUTPATIENT
Start: 2021-04-30 | End: 2021-04-30 | Stop reason: SDUPTHER

## 2021-05-03 ENCOUNTER — PATIENT MESSAGE (OUTPATIENT)
Dept: OPHTHALMOLOGY | Facility: CLINIC | Age: 76
End: 2021-05-03

## 2021-05-06 ENCOUNTER — OFFICE VISIT (OUTPATIENT)
Dept: OPHTHALMOLOGY | Facility: CLINIC | Age: 76
End: 2021-05-06
Payer: MEDICARE

## 2021-05-06 DIAGNOSIS — H40.1122 PRIMARY OPEN ANGLE GLAUCOMA OF LEFT EYE, MODERATE STAGE: ICD-10-CM

## 2021-05-06 DIAGNOSIS — Z48.89 ENCOUNTER FOR POSTOPERATIVE CARE: Primary | ICD-10-CM

## 2021-05-06 DIAGNOSIS — Z98.83 GLAUCOMA FILTERING BLEB OF RIGHT EYE: ICD-10-CM

## 2021-05-06 DIAGNOSIS — Z96.1 PSEUDOPHAKIA OF BOTH EYES: ICD-10-CM

## 2021-05-06 DIAGNOSIS — H40.043 STEROID RESPONDER, BILATERAL: ICD-10-CM

## 2021-05-06 DIAGNOSIS — H40.1111 PRIMARY OPEN ANGLE GLAUCOMA OF RIGHT EYE, MILD STAGE: ICD-10-CM

## 2021-05-06 DIAGNOSIS — H04.123 DRY EYE SYNDROME, BILATERAL: ICD-10-CM

## 2021-05-06 PROCEDURE — 99024 POSTOP FOLLOW-UP VISIT: CPT | Mod: S$GLB,,, | Performed by: OPHTHALMOLOGY

## 2021-05-06 PROCEDURE — 99999 PR PBB SHADOW E&M-EST. PATIENT-LVL III: CPT | Mod: PBBFAC,,, | Performed by: OPHTHALMOLOGY

## 2021-05-06 PROCEDURE — 99999 PR PBB SHADOW E&M-EST. PATIENT-LVL III: ICD-10-PCS | Mod: PBBFAC,,, | Performed by: OPHTHALMOLOGY

## 2021-05-06 PROCEDURE — 3288F FALL RISK ASSESSMENT DOCD: CPT | Mod: CPTII,S$GLB,, | Performed by: OPHTHALMOLOGY

## 2021-05-06 PROCEDURE — 1101F PT FALLS ASSESS-DOCD LE1/YR: CPT | Mod: CPTII,S$GLB,, | Performed by: OPHTHALMOLOGY

## 2021-05-06 PROCEDURE — 1126F AMNT PAIN NOTED NONE PRSNT: CPT | Mod: S$GLB,,, | Performed by: OPHTHALMOLOGY

## 2021-05-06 PROCEDURE — 3288F PR FALLS RISK ASSESSMENT DOCUMENTED: ICD-10-PCS | Mod: CPTII,S$GLB,, | Performed by: OPHTHALMOLOGY

## 2021-05-06 PROCEDURE — 1126F PR PAIN SEVERITY QUANTIFIED, NO PAIN PRESENT: ICD-10-PCS | Mod: S$GLB,,, | Performed by: OPHTHALMOLOGY

## 2021-05-06 PROCEDURE — 99024 PR POST-OP FOLLOW-UP VISIT: ICD-10-PCS | Mod: S$GLB,,, | Performed by: OPHTHALMOLOGY

## 2021-05-06 PROCEDURE — 1101F PR PT FALLS ASSESS DOC 0-1 FALLS W/OUT INJ PAST YR: ICD-10-PCS | Mod: CPTII,S$GLB,, | Performed by: OPHTHALMOLOGY

## 2021-05-06 RX ORDER — NETARSUDIL 0.2 MG/ML
1 SOLUTION/ DROPS OPHTHALMIC; TOPICAL DAILY
Qty: 2.5 ML | Refills: 12 | Status: SHIPPED | OUTPATIENT
Start: 2021-05-06 | End: 2021-08-05

## 2021-05-06 RX ORDER — FLUOROMETHOLONE 1 MG/ML
1 SUSPENSION/ DROPS OPHTHALMIC 2 TIMES DAILY
COMMUNITY
Start: 2021-04-30 | End: 2021-07-16

## 2021-05-06 RX ORDER — KETOROLAC TROMETHAMINE 5 MG/ML
1 SOLUTION OPHTHALMIC 4 TIMES DAILY
COMMUNITY
Start: 2021-04-30 | End: 2021-07-16

## 2021-05-07 ENCOUNTER — PATIENT MESSAGE (OUTPATIENT)
Dept: OPHTHALMOLOGY | Facility: CLINIC | Age: 76
End: 2021-05-07

## 2021-05-07 DIAGNOSIS — H40.1122 PRIMARY OPEN ANGLE GLAUCOMA OF LEFT EYE, MODERATE STAGE: ICD-10-CM

## 2021-05-07 DIAGNOSIS — H40.1111 PRIMARY OPEN ANGLE GLAUCOMA OF RIGHT EYE, MILD STAGE: ICD-10-CM

## 2021-05-07 RX ORDER — BIMATOPROST 0.1 MG/ML
SOLUTION/ DROPS OPHTHALMIC
Qty: 3 BOTTLE | Refills: 3 | Status: SHIPPED | OUTPATIENT
Start: 2021-05-07 | End: 2021-08-05

## 2021-05-14 ENCOUNTER — OFFICE VISIT (OUTPATIENT)
Dept: FAMILY MEDICINE | Facility: CLINIC | Age: 76
End: 2021-05-14
Payer: MEDICARE

## 2021-05-14 VITALS
HEART RATE: 63 BPM | BODY MASS INDEX: 32.12 KG/M2 | SYSTOLIC BLOOD PRESSURE: 128 MMHG | WEIGHT: 192.81 LBS | TEMPERATURE: 98 F | OXYGEN SATURATION: 97 % | HEIGHT: 65 IN | DIASTOLIC BLOOD PRESSURE: 82 MMHG

## 2021-05-14 DIAGNOSIS — E78.00 PURE HYPERCHOLESTEROLEMIA: ICD-10-CM

## 2021-05-14 DIAGNOSIS — T88.7XXA SIDE EFFECT OF MEDICATION: Primary | ICD-10-CM

## 2021-05-14 DIAGNOSIS — I10 ESSENTIAL HYPERTENSION: ICD-10-CM

## 2021-05-14 DIAGNOSIS — E04.1 THYROID NODULE: ICD-10-CM

## 2021-05-14 DIAGNOSIS — R20.0 NUMBNESS AND TINGLING: ICD-10-CM

## 2021-05-14 DIAGNOSIS — B02.9 HERPES ZOSTER WITHOUT COMPLICATION: ICD-10-CM

## 2021-05-14 DIAGNOSIS — Z23 NEED FOR SHINGLES VACCINE: ICD-10-CM

## 2021-05-14 DIAGNOSIS — R20.2 NUMBNESS AND TINGLING: ICD-10-CM

## 2021-05-14 DIAGNOSIS — R73.09 ABNORMAL GLUCOSE: ICD-10-CM

## 2021-05-14 PROCEDURE — 1159F PR MEDICATION LIST DOCUMENTED IN MEDICAL RECORD: ICD-10-PCS | Mod: S$GLB,,, | Performed by: INTERNAL MEDICINE

## 2021-05-14 PROCEDURE — 3008F BODY MASS INDEX DOCD: CPT | Mod: CPTII,S$GLB,, | Performed by: INTERNAL MEDICINE

## 2021-05-14 PROCEDURE — 99214 OFFICE O/P EST MOD 30 MIN: CPT | Mod: S$GLB,,, | Performed by: INTERNAL MEDICINE

## 2021-05-14 PROCEDURE — 1126F AMNT PAIN NOTED NONE PRSNT: CPT | Mod: S$GLB,,, | Performed by: INTERNAL MEDICINE

## 2021-05-14 PROCEDURE — 1101F PR PT FALLS ASSESS DOC 0-1 FALLS W/OUT INJ PAST YR: ICD-10-PCS | Mod: CPTII,S$GLB,, | Performed by: INTERNAL MEDICINE

## 2021-05-14 PROCEDURE — 3288F FALL RISK ASSESSMENT DOCD: CPT | Mod: CPTII,S$GLB,, | Performed by: INTERNAL MEDICINE

## 2021-05-14 PROCEDURE — 3008F PR BODY MASS INDEX (BMI) DOCUMENTED: ICD-10-PCS | Mod: CPTII,S$GLB,, | Performed by: INTERNAL MEDICINE

## 2021-05-14 PROCEDURE — 99214 PR OFFICE/OUTPT VISIT, EST, LEVL IV, 30-39 MIN: ICD-10-PCS | Mod: S$GLB,,, | Performed by: INTERNAL MEDICINE

## 2021-05-14 PROCEDURE — 3288F PR FALLS RISK ASSESSMENT DOCUMENTED: ICD-10-PCS | Mod: CPTII,S$GLB,, | Performed by: INTERNAL MEDICINE

## 2021-05-14 PROCEDURE — 99999 PR PBB SHADOW E&M-EST. PATIENT-LVL IV: ICD-10-PCS | Mod: PBBFAC,,, | Performed by: INTERNAL MEDICINE

## 2021-05-14 PROCEDURE — 1159F MED LIST DOCD IN RCRD: CPT | Mod: S$GLB,,, | Performed by: INTERNAL MEDICINE

## 2021-05-14 PROCEDURE — 1126F PR PAIN SEVERITY QUANTIFIED, NO PAIN PRESENT: ICD-10-PCS | Mod: S$GLB,,, | Performed by: INTERNAL MEDICINE

## 2021-05-14 PROCEDURE — 99999 PR PBB SHADOW E&M-EST. PATIENT-LVL IV: CPT | Mod: PBBFAC,,, | Performed by: INTERNAL MEDICINE

## 2021-05-14 PROCEDURE — 1101F PT FALLS ASSESS-DOCD LE1/YR: CPT | Mod: CPTII,S$GLB,, | Performed by: INTERNAL MEDICINE

## 2021-05-14 RX ORDER — ATORVASTATIN CALCIUM 10 MG/1
10 TABLET, FILM COATED ORAL EVERY OTHER DAY
Qty: 90 TABLET | Refills: 3 | Status: SHIPPED | OUTPATIENT
Start: 2021-05-14 | End: 2021-09-11

## 2021-05-14 RX ORDER — VALACYCLOVIR HYDROCHLORIDE 1 G/1
1000 TABLET, FILM COATED ORAL 3 TIMES DAILY
Qty: 21 TABLET | Refills: 0 | Status: SHIPPED | OUTPATIENT
Start: 2021-05-14 | End: 2021-07-16 | Stop reason: ALTCHOICE

## 2021-05-14 RX ORDER — ZOSTER VACCINE RECOMBINANT, ADJUVANTED 50 MCG/0.5
0.5 KIT INTRAMUSCULAR ONCE
Qty: 1 EACH | Refills: 1 | Status: SHIPPED | OUTPATIENT
Start: 2021-05-14 | End: 2021-05-14

## 2021-05-14 RX ORDER — IRBESARTAN 150 MG/1
150 TABLET ORAL NIGHTLY
Qty: 90 TABLET | Refills: 3 | Status: SHIPPED | OUTPATIENT
Start: 2021-05-14 | End: 2021-10-12

## 2021-05-14 RX ORDER — HYDROCHLOROTHIAZIDE 12.5 MG/1
12.5 CAPSULE ORAL DAILY
Qty: 90 CAPSULE | Refills: 3 | Status: SHIPPED | OUTPATIENT
Start: 2021-05-14 | End: 2021-10-12

## 2021-05-25 ENCOUNTER — PATIENT MESSAGE (OUTPATIENT)
Dept: OPHTHALMOLOGY | Facility: CLINIC | Age: 76
End: 2021-05-25

## 2021-05-25 DIAGNOSIS — H40.1122 PRIMARY OPEN ANGLE GLAUCOMA OF LEFT EYE, MODERATE STAGE: ICD-10-CM

## 2021-05-25 RX ORDER — BRIMONIDINE TARTRATE 1 MG/ML
1 SOLUTION/ DROPS OPHTHALMIC 3 TIMES DAILY
Qty: 5 ML | Refills: 0 | Status: SHIPPED | OUTPATIENT
Start: 2021-05-25 | End: 2021-08-05

## 2021-05-26 ENCOUNTER — TELEPHONE (OUTPATIENT)
Dept: OPHTHALMOLOGY | Facility: CLINIC | Age: 76
End: 2021-05-26

## 2021-05-26 DIAGNOSIS — Z48.89 ENCOUNTER FOR POSTOPERATIVE CARE: Primary | ICD-10-CM

## 2021-05-27 ENCOUNTER — PATIENT MESSAGE (OUTPATIENT)
Dept: OPHTHALMOLOGY | Facility: CLINIC | Age: 76
End: 2021-05-27

## 2021-05-27 ENCOUNTER — PATIENT MESSAGE (OUTPATIENT)
Dept: CARDIOLOGY | Facility: CLINIC | Age: 76
End: 2021-05-27

## 2021-05-27 DIAGNOSIS — R00.2 PALPITATIONS: Primary | ICD-10-CM

## 2021-05-28 ENCOUNTER — TELEPHONE (OUTPATIENT)
Dept: CARDIOLOGY | Facility: HOSPITAL | Age: 76
End: 2021-05-28

## 2021-05-30 ENCOUNTER — PATIENT MESSAGE (OUTPATIENT)
Dept: OPHTHALMOLOGY | Facility: CLINIC | Age: 76
End: 2021-05-30

## 2021-05-31 ENCOUNTER — CLINICAL SUPPORT (OUTPATIENT)
Dept: CARDIOLOGY | Facility: HOSPITAL | Age: 76
End: 2021-05-31
Attending: INTERNAL MEDICINE
Payer: MEDICARE

## 2021-05-31 DIAGNOSIS — R00.2 PALPITATIONS: ICD-10-CM

## 2021-05-31 PROCEDURE — 93271 ECG/MONITORING AND ANALYSIS: CPT

## 2021-05-31 PROCEDURE — 93272 CARDIAC EVENT MONITOR (CUPID ONLY): ICD-10-PCS | Mod: ,,, | Performed by: INTERNAL MEDICINE

## 2021-05-31 PROCEDURE — 93272 ECG/REVIEW INTERPRET ONLY: CPT | Mod: ,,, | Performed by: INTERNAL MEDICINE

## 2021-06-02 ENCOUNTER — PATIENT MESSAGE (OUTPATIENT)
Dept: OPHTHALMOLOGY | Facility: CLINIC | Age: 76
End: 2021-06-02

## 2021-06-11 ENCOUNTER — TELEPHONE (OUTPATIENT)
Dept: FAMILY MEDICINE | Facility: CLINIC | Age: 76
End: 2021-06-11

## 2021-06-14 ENCOUNTER — OFFICE VISIT (OUTPATIENT)
Dept: OPHTHALMOLOGY | Facility: CLINIC | Age: 76
End: 2021-06-14
Payer: MEDICARE

## 2021-06-14 DIAGNOSIS — Z96.1 PSEUDOPHAKIA OF BOTH EYES: ICD-10-CM

## 2021-06-14 DIAGNOSIS — H40.043 STEROID RESPONDER, BILATERAL: ICD-10-CM

## 2021-06-14 DIAGNOSIS — H40.1122 PRIMARY OPEN ANGLE GLAUCOMA OF LEFT EYE, MODERATE STAGE: ICD-10-CM

## 2021-06-14 DIAGNOSIS — Z98.83 GLAUCOMA FILTERING BLEB OF RIGHT EYE: ICD-10-CM

## 2021-06-14 DIAGNOSIS — H40.1111 PRIMARY OPEN ANGLE GLAUCOMA OF RIGHT EYE, MILD STAGE: ICD-10-CM

## 2021-06-14 DIAGNOSIS — H04.123 DRY EYE SYNDROME, BILATERAL: ICD-10-CM

## 2021-06-14 DIAGNOSIS — H57.12 PAIN IN AND AROUND EYE, LEFT: Primary | ICD-10-CM

## 2021-06-14 PROCEDURE — 92133 POSTERIOR SEGMENT OCT OPTIC NERVE(OCULAR COHERENCE TOMOGRAPHY) - OU - BOTH EYES: ICD-10-PCS | Mod: S$GLB,,, | Performed by: OPHTHALMOLOGY

## 2021-06-14 PROCEDURE — 99024 PR POST-OP FOLLOW-UP VISIT: ICD-10-PCS | Mod: S$GLB,,, | Performed by: OPHTHALMOLOGY

## 2021-06-14 PROCEDURE — 3288F FALL RISK ASSESSMENT DOCD: CPT | Mod: CPTII,S$GLB,, | Performed by: OPHTHALMOLOGY

## 2021-06-14 PROCEDURE — 99999 PR PBB SHADOW E&M-EST. PATIENT-LVL III: CPT | Mod: PBBFAC,,, | Performed by: OPHTHALMOLOGY

## 2021-06-14 PROCEDURE — 3288F PR FALLS RISK ASSESSMENT DOCUMENTED: ICD-10-PCS | Mod: CPTII,S$GLB,, | Performed by: OPHTHALMOLOGY

## 2021-06-14 PROCEDURE — 99024 POSTOP FOLLOW-UP VISIT: CPT | Mod: S$GLB,,, | Performed by: OPHTHALMOLOGY

## 2021-06-14 PROCEDURE — 99999 PR PBB SHADOW E&M-EST. PATIENT-LVL III: ICD-10-PCS | Mod: PBBFAC,,, | Performed by: OPHTHALMOLOGY

## 2021-06-14 PROCEDURE — 1126F AMNT PAIN NOTED NONE PRSNT: CPT | Mod: S$GLB,,, | Performed by: OPHTHALMOLOGY

## 2021-06-14 PROCEDURE — 1101F PR PT FALLS ASSESS DOC 0-1 FALLS W/OUT INJ PAST YR: ICD-10-PCS | Mod: CPTII,S$GLB,, | Performed by: OPHTHALMOLOGY

## 2021-06-14 PROCEDURE — 92133 CPTRZD OPH DX IMG PST SGM ON: CPT | Mod: S$GLB,,, | Performed by: OPHTHALMOLOGY

## 2021-06-14 PROCEDURE — 1101F PT FALLS ASSESS-DOCD LE1/YR: CPT | Mod: CPTII,S$GLB,, | Performed by: OPHTHALMOLOGY

## 2021-06-14 PROCEDURE — 1126F PR PAIN SEVERITY QUANTIFIED, NO PAIN PRESENT: ICD-10-PCS | Mod: S$GLB,,, | Performed by: OPHTHALMOLOGY

## 2021-07-07 ENCOUNTER — OFFICE VISIT (OUTPATIENT)
Dept: FAMILY MEDICINE | Facility: CLINIC | Age: 76
End: 2021-07-07
Payer: MEDICARE

## 2021-07-07 VITALS
HEART RATE: 74 BPM | SYSTOLIC BLOOD PRESSURE: 122 MMHG | DIASTOLIC BLOOD PRESSURE: 62 MMHG | HEIGHT: 65 IN | OXYGEN SATURATION: 97 % | BODY MASS INDEX: 32.58 KG/M2 | WEIGHT: 195.56 LBS | TEMPERATURE: 98 F

## 2021-07-07 DIAGNOSIS — R73.09 ABNORMAL GLUCOSE: ICD-10-CM

## 2021-07-07 DIAGNOSIS — H40.1111 PRIMARY OPEN ANGLE GLAUCOMA OF RIGHT EYE, MILD STAGE: ICD-10-CM

## 2021-07-07 DIAGNOSIS — E78.2 MIXED HYPERLIPIDEMIA: ICD-10-CM

## 2021-07-07 DIAGNOSIS — E04.1 THYROID NODULE: ICD-10-CM

## 2021-07-07 DIAGNOSIS — I10 ESSENTIAL HYPERTENSION: ICD-10-CM

## 2021-07-07 DIAGNOSIS — Z01.810 PREOP CARDIOVASCULAR EXAM: Primary | ICD-10-CM

## 2021-07-07 PROCEDURE — 1101F PT FALLS ASSESS-DOCD LE1/YR: CPT | Mod: CPTII,S$GLB,, | Performed by: INTERNAL MEDICINE

## 2021-07-07 PROCEDURE — 99213 PR OFFICE/OUTPT VISIT, EST, LEVL III, 20-29 MIN: ICD-10-PCS | Mod: S$GLB,,, | Performed by: INTERNAL MEDICINE

## 2021-07-07 PROCEDURE — 99999 PR PBB SHADOW E&M-EST. PATIENT-LVL V: CPT | Mod: PBBFAC,,, | Performed by: INTERNAL MEDICINE

## 2021-07-07 PROCEDURE — 1126F AMNT PAIN NOTED NONE PRSNT: CPT | Mod: S$GLB,,, | Performed by: INTERNAL MEDICINE

## 2021-07-07 PROCEDURE — 1159F PR MEDICATION LIST DOCUMENTED IN MEDICAL RECORD: ICD-10-PCS | Mod: S$GLB,,, | Performed by: INTERNAL MEDICINE

## 2021-07-07 PROCEDURE — 99499 UNLISTED E&M SERVICE: CPT | Mod: HCNC,S$GLB,, | Performed by: INTERNAL MEDICINE

## 2021-07-07 PROCEDURE — 1101F PR PT FALLS ASSESS DOC 0-1 FALLS W/OUT INJ PAST YR: ICD-10-PCS | Mod: CPTII,S$GLB,, | Performed by: INTERNAL MEDICINE

## 2021-07-07 PROCEDURE — 3288F PR FALLS RISK ASSESSMENT DOCUMENTED: ICD-10-PCS | Mod: CPTII,S$GLB,, | Performed by: INTERNAL MEDICINE

## 2021-07-07 PROCEDURE — 99213 OFFICE O/P EST LOW 20 MIN: CPT | Mod: S$GLB,,, | Performed by: INTERNAL MEDICINE

## 2021-07-07 PROCEDURE — 1126F PR PAIN SEVERITY QUANTIFIED, NO PAIN PRESENT: ICD-10-PCS | Mod: S$GLB,,, | Performed by: INTERNAL MEDICINE

## 2021-07-07 PROCEDURE — 3288F FALL RISK ASSESSMENT DOCD: CPT | Mod: CPTII,S$GLB,, | Performed by: INTERNAL MEDICINE

## 2021-07-07 PROCEDURE — 1159F MED LIST DOCD IN RCRD: CPT | Mod: S$GLB,,, | Performed by: INTERNAL MEDICINE

## 2021-07-07 PROCEDURE — 99499 RISK ADDL DX/OHS AUDIT: ICD-10-PCS | Mod: HCNC,S$GLB,, | Performed by: INTERNAL MEDICINE

## 2021-07-07 PROCEDURE — 99999 PR PBB SHADOW E&M-EST. PATIENT-LVL V: ICD-10-PCS | Mod: PBBFAC,,, | Performed by: INTERNAL MEDICINE

## 2021-07-14 ENCOUNTER — PATIENT OUTREACH (OUTPATIENT)
Dept: ADMINISTRATIVE | Facility: OTHER | Age: 76
End: 2021-07-14

## 2021-07-15 ENCOUNTER — PATIENT MESSAGE (OUTPATIENT)
Dept: ADMINISTRATIVE | Facility: OTHER | Age: 76
End: 2021-07-15

## 2021-07-16 ENCOUNTER — OFFICE VISIT (OUTPATIENT)
Dept: OPHTHALMOLOGY | Facility: CLINIC | Age: 76
End: 2021-07-16
Payer: MEDICARE

## 2021-07-16 DIAGNOSIS — H40.043 STEROID RESPONDER, BILATERAL: ICD-10-CM

## 2021-07-16 DIAGNOSIS — H40.1111 PRIMARY OPEN ANGLE GLAUCOMA OF RIGHT EYE, MILD STAGE: ICD-10-CM

## 2021-07-16 DIAGNOSIS — H04.123 DRY EYE SYNDROME, BILATERAL: ICD-10-CM

## 2021-07-16 DIAGNOSIS — H40.1122 PRIMARY OPEN ANGLE GLAUCOMA OF LEFT EYE, MODERATE STAGE: Primary | ICD-10-CM

## 2021-07-16 DIAGNOSIS — Z98.83 GLAUCOMA FILTERING BLEB OF RIGHT EYE: ICD-10-CM

## 2021-07-16 DIAGNOSIS — Z96.1 PSEUDOPHAKIA OF BOTH EYES: ICD-10-CM

## 2021-07-16 PROCEDURE — 1101F PR PT FALLS ASSESS DOC 0-1 FALLS W/OUT INJ PAST YR: ICD-10-PCS | Mod: CPTII,S$GLB,, | Performed by: OPHTHALMOLOGY

## 2021-07-16 PROCEDURE — 3288F FALL RISK ASSESSMENT DOCD: CPT | Mod: CPTII,S$GLB,, | Performed by: OPHTHALMOLOGY

## 2021-07-16 PROCEDURE — 99999 PR PBB SHADOW E&M-EST. PATIENT-LVL III: CPT | Mod: PBBFAC,,, | Performed by: OPHTHALMOLOGY

## 2021-07-16 PROCEDURE — 99499 UNLISTED E&M SERVICE: CPT | Mod: S$GLB,,, | Performed by: OPHTHALMOLOGY

## 2021-07-16 PROCEDURE — 99499 NO LOS: ICD-10-PCS | Mod: S$GLB,,, | Performed by: OPHTHALMOLOGY

## 2021-07-16 PROCEDURE — 1101F PT FALLS ASSESS-DOCD LE1/YR: CPT | Mod: CPTII,S$GLB,, | Performed by: OPHTHALMOLOGY

## 2021-07-16 PROCEDURE — 1126F AMNT PAIN NOTED NONE PRSNT: CPT | Mod: S$GLB,,, | Performed by: OPHTHALMOLOGY

## 2021-07-16 PROCEDURE — 3288F PR FALLS RISK ASSESSMENT DOCUMENTED: ICD-10-PCS | Mod: CPTII,S$GLB,, | Performed by: OPHTHALMOLOGY

## 2021-07-16 PROCEDURE — 1126F PR PAIN SEVERITY QUANTIFIED, NO PAIN PRESENT: ICD-10-PCS | Mod: S$GLB,,, | Performed by: OPHTHALMOLOGY

## 2021-07-16 PROCEDURE — 99999 PR PBB SHADOW E&M-EST. PATIENT-LVL III: ICD-10-PCS | Mod: PBBFAC,,, | Performed by: OPHTHALMOLOGY

## 2021-07-17 RX ORDER — MOXIFLOXACIN 5 MG/ML
1 SOLUTION/ DROPS OPHTHALMIC
Status: CANCELLED | OUTPATIENT
Start: 2021-07-17

## 2021-07-17 RX ORDER — SODIUM CHLORIDE 0.9 % (FLUSH) 0.9 %
10 SYRINGE (ML) INJECTION
Status: CANCELLED | OUTPATIENT
Start: 2021-07-17

## 2021-07-20 ENCOUNTER — PATIENT OUTREACH (OUTPATIENT)
Dept: OTHER | Facility: OTHER | Age: 76
End: 2021-07-20

## 2021-07-20 PROCEDURE — 99453 REM MNTR PHYSIOL PARAM SETUP: CPT | Mod: S$GLB,,, | Performed by: INTERNAL MEDICINE

## 2021-07-20 PROCEDURE — 99453 PR REMOTE MONITR, PHYSIOL PARAM, INITIAL: ICD-10-PCS | Mod: S$GLB,,, | Performed by: INTERNAL MEDICINE

## 2021-07-27 ENCOUNTER — TELEPHONE (OUTPATIENT)
Dept: OPHTHALMOLOGY | Facility: CLINIC | Age: 76
End: 2021-07-27

## 2021-07-28 ENCOUNTER — ANESTHESIA EVENT (OUTPATIENT)
Dept: SURGERY | Facility: HOSPITAL | Age: 76
End: 2021-07-28
Payer: MEDICARE

## 2021-07-28 ENCOUNTER — HOSPITAL ENCOUNTER (OUTPATIENT)
Facility: HOSPITAL | Age: 76
Discharge: HOME | End: 2021-07-28
Attending: OPHTHALMOLOGY | Admitting: OPHTHALMOLOGY
Payer: MEDICARE

## 2021-07-28 ENCOUNTER — ANESTHESIA (OUTPATIENT)
Dept: SURGERY | Facility: HOSPITAL | Age: 76
End: 2021-07-28
Payer: MEDICARE

## 2021-07-28 VITALS
WEIGHT: 195.56 LBS | BODY MASS INDEX: 32.58 KG/M2 | TEMPERATURE: 98 F | HEART RATE: 56 BPM | RESPIRATION RATE: 16 BRPM | OXYGEN SATURATION: 99 % | SYSTOLIC BLOOD PRESSURE: 151 MMHG | DIASTOLIC BLOOD PRESSURE: 75 MMHG | HEIGHT: 65 IN

## 2021-07-28 DIAGNOSIS — H40.1111 PRIMARY OPEN ANGLE GLAUCOMA OF RIGHT EYE, MILD STAGE: ICD-10-CM

## 2021-07-28 DIAGNOSIS — H40.1122 PRIMARY OPEN ANGLE GLAUCOMA OF LEFT EYE, MODERATE STAGE: ICD-10-CM

## 2021-07-28 DIAGNOSIS — H40.1122 PRIMARY OPEN-ANGLE GLAUCOMA, LEFT EYE, MODERATE STAGE: Primary | ICD-10-CM

## 2021-07-28 PROCEDURE — D9220A PRA ANESTHESIA: Mod: ANES,,, | Performed by: ANESTHESIOLOGY

## 2021-07-28 PROCEDURE — 63600175 PHARM REV CODE 636 W HCPCS: Performed by: OPHTHALMOLOGY

## 2021-07-28 PROCEDURE — 66183 INSERT ANT DRAINAGE DEVICE: CPT | Mod: LT,,, | Performed by: OPHTHALMOLOGY

## 2021-07-28 PROCEDURE — 71000015 HC POSTOP RECOV 1ST HR: Performed by: OPHTHALMOLOGY

## 2021-07-28 PROCEDURE — 25000003 PHARM REV CODE 250

## 2021-07-28 PROCEDURE — 37000009 HC ANESTHESIA EA ADD 15 MINS: Performed by: OPHTHALMOLOGY

## 2021-07-28 PROCEDURE — 25000003 PHARM REV CODE 250: Performed by: NURSE ANESTHETIST, CERTIFIED REGISTERED

## 2021-07-28 PROCEDURE — 25000003 PHARM REV CODE 250: Performed by: OPHTHALMOLOGY

## 2021-07-28 PROCEDURE — D9220A PRA ANESTHESIA: ICD-10-PCS | Mod: CRNA,,, | Performed by: NURSE ANESTHETIST, CERTIFIED REGISTERED

## 2021-07-28 PROCEDURE — 36000707: Performed by: OPHTHALMOLOGY

## 2021-07-28 PROCEDURE — 66183 PR INSERT ANT SEGMENT DRAIN WO RESERVOIR, EXTERNAL APPROACH: ICD-10-PCS | Mod: LT,,, | Performed by: OPHTHALMOLOGY

## 2021-07-28 PROCEDURE — 63600175 PHARM REV CODE 636 W HCPCS: Performed by: NURSE ANESTHETIST, CERTIFIED REGISTERED

## 2021-07-28 PROCEDURE — D9220A PRA ANESTHESIA: Mod: CRNA,,, | Performed by: NURSE ANESTHETIST, CERTIFIED REGISTERED

## 2021-07-28 PROCEDURE — 36000706: Performed by: OPHTHALMOLOGY

## 2021-07-28 PROCEDURE — D9220A PRA ANESTHESIA: ICD-10-PCS | Mod: ANES,,, | Performed by: ANESTHESIOLOGY

## 2021-07-28 PROCEDURE — 71000044 HC DOSC ROUTINE RECOVERY FIRST HOUR: Performed by: OPHTHALMOLOGY

## 2021-07-28 PROCEDURE — 37000008 HC ANESTHESIA 1ST 15 MINUTES: Performed by: OPHTHALMOLOGY

## 2021-07-28 PROCEDURE — 27201423 OPTIME MED/SURG SUP & DEVICES STERILE SUPPLY: Performed by: OPHTHALMOLOGY

## 2021-07-28 PROCEDURE — C1783 OCULAR IMP, AQUEOUS DRAIN DE: HCPCS | Performed by: OPHTHALMOLOGY

## 2021-07-28 DEVICE — SYS XEN GLAUCOMA TREATMENT 6MM: Type: IMPLANTABLE DEVICE | Site: EYE | Status: FUNCTIONAL

## 2021-07-28 RX ORDER — MOXIFLOXACIN 5 MG/ML
1 SOLUTION/ DROPS OPHTHALMIC
Status: DISCONTINUED | OUTPATIENT
Start: 2021-07-28 | End: 2021-07-28 | Stop reason: HOSPADM

## 2021-07-28 RX ORDER — LIDOCAINE HYDROCHLORIDE 40 MG/ML
INJECTION, SOLUTION RETROBULBAR
Status: DISCONTINUED | OUTPATIENT
Start: 2021-07-28 | End: 2021-07-28 | Stop reason: HOSPADM

## 2021-07-28 RX ORDER — MIDAZOLAM HYDROCHLORIDE 1 MG/ML
INJECTION, SOLUTION INTRAMUSCULAR; INTRAVENOUS
Status: DISCONTINUED | OUTPATIENT
Start: 2021-07-28 | End: 2021-07-28

## 2021-07-28 RX ORDER — ATROPINE SULFATE 10 MG/ML
SOLUTION/ DROPS OPHTHALMIC
Status: DISCONTINUED | OUTPATIENT
Start: 2021-07-28 | End: 2021-07-28 | Stop reason: HOSPADM

## 2021-07-28 RX ORDER — LIDOCAINE HYDROCHLORIDE 40 MG/ML
INJECTION, SOLUTION RETROBULBAR
Status: DISCONTINUED
Start: 2021-07-28 | End: 2021-07-28 | Stop reason: HOSPADM

## 2021-07-28 RX ORDER — TRIAMCINOLONE ACETONIDE 40 MG/ML
INJECTION, SUSPENSION INTRA-ARTICULAR; INTRAMUSCULAR
Status: DISCONTINUED | OUTPATIENT
Start: 2021-07-28 | End: 2021-07-28 | Stop reason: HOSPADM

## 2021-07-28 RX ORDER — LIDOCAINE HYDROCHLORIDE 10 MG/ML
INJECTION, SOLUTION EPIDURAL; INFILTRATION; INTRACAUDAL; PERINEURAL
Status: DISCONTINUED | OUTPATIENT
Start: 2021-07-28 | End: 2021-07-28 | Stop reason: HOSPADM

## 2021-07-28 RX ORDER — PHENYLEPHRINE HCL IN 0.9% NACL 1 MG/10 ML
SYRINGE (ML) INTRAVENOUS
Status: DISCONTINUED | OUTPATIENT
Start: 2021-07-28 | End: 2021-07-28

## 2021-07-28 RX ORDER — LIDOCAINE HYDROCHLORIDE 10 MG/ML
INJECTION, SOLUTION EPIDURAL; INFILTRATION; INTRACAUDAL; PERINEURAL
Status: DISCONTINUED
Start: 2021-07-28 | End: 2021-07-28 | Stop reason: HOSPADM

## 2021-07-28 RX ORDER — MOXIFLOXACIN 5 MG/ML
SOLUTION/ DROPS OPHTHALMIC
Status: COMPLETED
Start: 2021-07-28 | End: 2021-07-28

## 2021-07-28 RX ORDER — LIDOCAINE HYDROCHLORIDE 10 MG/ML
INJECTION, SOLUTION INTRAVENOUS
Status: DISCONTINUED | OUTPATIENT
Start: 2021-07-28 | End: 2021-07-28

## 2021-07-28 RX ORDER — PROPOFOL 10 MG/ML
VIAL (ML) INTRAVENOUS
Status: DISCONTINUED | OUTPATIENT
Start: 2021-07-28 | End: 2021-07-28

## 2021-07-28 RX ORDER — SODIUM CHLORIDE 0.9 % (FLUSH) 0.9 %
10 SYRINGE (ML) INJECTION
Status: DISCONTINUED | OUTPATIENT
Start: 2021-07-28 | End: 2021-07-28 | Stop reason: HOSPADM

## 2021-07-28 RX ORDER — ACETAMINOPHEN 325 MG/1
650 TABLET ORAL EVERY 6 HOURS PRN
Status: DISCONTINUED | OUTPATIENT
Start: 2021-07-28 | End: 2021-07-28 | Stop reason: HOSPADM

## 2021-07-28 RX ORDER — TRIAMCINOLONE ACETONIDE 40 MG/ML
INJECTION, SUSPENSION INTRA-ARTICULAR; INTRAMUSCULAR
Status: DISCONTINUED
Start: 2021-07-28 | End: 2021-07-28 | Stop reason: HOSPADM

## 2021-07-28 RX ORDER — PREDNISOLONE ACETATE 10 MG/ML
SUSPENSION/ DROPS OPHTHALMIC
Status: DISCONTINUED
Start: 2021-07-28 | End: 2021-07-28 | Stop reason: HOSPADM

## 2021-07-28 RX ORDER — FENTANYL CITRATE 50 UG/ML
INJECTION, SOLUTION INTRAMUSCULAR; INTRAVENOUS
Status: DISCONTINUED | OUTPATIENT
Start: 2021-07-28 | End: 2021-07-28

## 2021-07-28 RX ORDER — ATROPINE SULFATE 10 MG/ML
SOLUTION/ DROPS OPHTHALMIC
Status: DISCONTINUED
Start: 2021-07-28 | End: 2021-07-28 | Stop reason: HOSPADM

## 2021-07-28 RX ADMIN — MOXIFLOXACIN 1 DROP: 5 SOLUTION/ DROPS OPHTHALMIC at 11:07

## 2021-07-28 RX ADMIN — FENTANYL CITRATE 50 MCG: 50 INJECTION INTRAMUSCULAR; INTRAVENOUS at 12:07

## 2021-07-28 RX ADMIN — SODIUM CHLORIDE: 0.9 INJECTION, SOLUTION INTRAVENOUS at 12:07

## 2021-07-28 RX ADMIN — ACETAMINOPHEN 650 MG: 325 TABLET ORAL at 02:07

## 2021-07-28 RX ADMIN — Medication 200 MCG: at 01:07

## 2021-07-28 RX ADMIN — PROPOFOL 50 MCG/KG/MIN: 10 INJECTION, EMULSION INTRAVENOUS at 12:07

## 2021-07-28 RX ADMIN — FENTANYL CITRATE 50 MCG: 50 INJECTION INTRAMUSCULAR; INTRAVENOUS at 01:07

## 2021-07-28 RX ADMIN — LIDOCAINE HYDROCHLORIDE 50 MG: 10 INJECTION, SOLUTION INTRAVENOUS at 12:07

## 2021-07-28 RX ADMIN — MIDAZOLAM 2 MG: 1 INJECTION INTRAMUSCULAR; INTRAVENOUS at 12:07

## 2021-07-28 RX ADMIN — PROPOFOL 20 MG: 10 INJECTION, EMULSION INTRAVENOUS at 12:07

## 2021-07-29 ENCOUNTER — TELEPHONE (OUTPATIENT)
Dept: OPHTHALMOLOGY | Facility: CLINIC | Age: 76
End: 2021-07-29

## 2021-07-29 ENCOUNTER — PATIENT MESSAGE (OUTPATIENT)
Dept: OPHTHALMOLOGY | Facility: CLINIC | Age: 76
End: 2021-07-29

## 2021-07-29 ENCOUNTER — OFFICE VISIT (OUTPATIENT)
Dept: OPHTHALMOLOGY | Facility: CLINIC | Age: 76
End: 2021-07-29
Payer: MEDICARE

## 2021-07-29 DIAGNOSIS — H40.1111 PRIMARY OPEN ANGLE GLAUCOMA OF RIGHT EYE, MILD STAGE: Primary | ICD-10-CM

## 2021-07-29 DIAGNOSIS — H04.123 DRY EYE SYNDROME, BILATERAL: ICD-10-CM

## 2021-07-29 DIAGNOSIS — Z96.1 PSEUDOPHAKIA OF BOTH EYES: ICD-10-CM

## 2021-07-29 DIAGNOSIS — Z98.83 GLAUCOMA FILTERING BLEB OF RIGHT EYE: ICD-10-CM

## 2021-07-29 DIAGNOSIS — H40.043 STEROID RESPONDER, BILATERAL: ICD-10-CM

## 2021-07-29 DIAGNOSIS — H40.1122 PRIMARY OPEN ANGLE GLAUCOMA OF LEFT EYE, MODERATE STAGE: ICD-10-CM

## 2021-07-29 DIAGNOSIS — Z98.83 GLAUCOMA FILTERING BLEB, LEFT EYE: ICD-10-CM

## 2021-07-29 PROCEDURE — 1160F PR REVIEW ALL MEDS BY PRESCRIBER/CLIN PHARMACIST DOCUMENTED: ICD-10-PCS | Mod: CPTII,S$GLB,, | Performed by: OPHTHALMOLOGY

## 2021-07-29 PROCEDURE — 1101F PT FALLS ASSESS-DOCD LE1/YR: CPT | Mod: CPTII,S$GLB,, | Performed by: OPHTHALMOLOGY

## 2021-07-29 PROCEDURE — 99024 PR POST-OP FOLLOW-UP VISIT: ICD-10-PCS | Mod: S$GLB,,, | Performed by: OPHTHALMOLOGY

## 2021-07-29 PROCEDURE — 1126F PR PAIN SEVERITY QUANTIFIED, NO PAIN PRESENT: ICD-10-PCS | Mod: CPTII,S$GLB,, | Performed by: OPHTHALMOLOGY

## 2021-07-29 PROCEDURE — 99024 POSTOP FOLLOW-UP VISIT: CPT | Mod: S$GLB,,, | Performed by: OPHTHALMOLOGY

## 2021-07-29 PROCEDURE — 99999 PR PBB SHADOW E&M-EST. PATIENT-LVL III: CPT | Mod: PBBFAC,,, | Performed by: OPHTHALMOLOGY

## 2021-07-29 PROCEDURE — 1126F AMNT PAIN NOTED NONE PRSNT: CPT | Mod: CPTII,S$GLB,, | Performed by: OPHTHALMOLOGY

## 2021-07-29 PROCEDURE — 99999 PR PBB SHADOW E&M-EST. PATIENT-LVL III: ICD-10-PCS | Mod: PBBFAC,,, | Performed by: OPHTHALMOLOGY

## 2021-07-29 PROCEDURE — 1101F PR PT FALLS ASSESS DOC 0-1 FALLS W/OUT INJ PAST YR: ICD-10-PCS | Mod: CPTII,S$GLB,, | Performed by: OPHTHALMOLOGY

## 2021-07-29 PROCEDURE — 1159F PR MEDICATION LIST DOCUMENTED IN MEDICAL RECORD: ICD-10-PCS | Mod: CPTII,S$GLB,, | Performed by: OPHTHALMOLOGY

## 2021-07-29 PROCEDURE — 3044F HG A1C LEVEL LT 7.0%: CPT | Mod: CPTII,S$GLB,, | Performed by: OPHTHALMOLOGY

## 2021-07-29 PROCEDURE — 3288F FALL RISK ASSESSMENT DOCD: CPT | Mod: CPTII,S$GLB,, | Performed by: OPHTHALMOLOGY

## 2021-07-29 PROCEDURE — 1159F MED LIST DOCD IN RCRD: CPT | Mod: CPTII,S$GLB,, | Performed by: OPHTHALMOLOGY

## 2021-07-29 PROCEDURE — 3288F PR FALLS RISK ASSESSMENT DOCUMENTED: ICD-10-PCS | Mod: CPTII,S$GLB,, | Performed by: OPHTHALMOLOGY

## 2021-07-29 PROCEDURE — 3044F PR MOST RECENT HEMOGLOBIN A1C LEVEL <7.0%: ICD-10-PCS | Mod: CPTII,S$GLB,, | Performed by: OPHTHALMOLOGY

## 2021-07-29 PROCEDURE — 1160F RVW MEDS BY RX/DR IN RCRD: CPT | Mod: CPTII,S$GLB,, | Performed by: OPHTHALMOLOGY

## 2021-08-02 ENCOUNTER — OFFICE VISIT (OUTPATIENT)
Dept: OPHTHALMOLOGY | Facility: CLINIC | Age: 76
End: 2021-08-02
Payer: MEDICARE

## 2021-08-02 DIAGNOSIS — H40.1111 PRIMARY OPEN ANGLE GLAUCOMA OF RIGHT EYE, MILD STAGE: ICD-10-CM

## 2021-08-02 DIAGNOSIS — H04.123 DRY EYE SYNDROME, BILATERAL: ICD-10-CM

## 2021-08-02 DIAGNOSIS — H40.1122 PRIMARY OPEN ANGLE GLAUCOMA OF LEFT EYE, MODERATE STAGE: ICD-10-CM

## 2021-08-02 DIAGNOSIS — Z48.89 ENCOUNTER FOR POSTOPERATIVE CARE: Primary | ICD-10-CM

## 2021-08-02 DIAGNOSIS — Z96.1 PSEUDOPHAKIA OF BOTH EYES: ICD-10-CM

## 2021-08-02 DIAGNOSIS — Z98.83 GLAUCOMA FILTERING BLEB OF RIGHT EYE: ICD-10-CM

## 2021-08-02 DIAGNOSIS — Z98.83 GLAUCOMA FILTERING BLEB, LEFT EYE: ICD-10-CM

## 2021-08-02 DIAGNOSIS — H40.043 STEROID RESPONDER, BILATERAL: ICD-10-CM

## 2021-08-02 PROCEDURE — 1101F PT FALLS ASSESS-DOCD LE1/YR: CPT | Mod: CPTII,S$GLB,, | Performed by: OPHTHALMOLOGY

## 2021-08-02 PROCEDURE — 3288F PR FALLS RISK ASSESSMENT DOCUMENTED: ICD-10-PCS | Mod: CPTII,S$GLB,, | Performed by: OPHTHALMOLOGY

## 2021-08-02 PROCEDURE — 99024 POSTOP FOLLOW-UP VISIT: CPT | Mod: S$GLB,,, | Performed by: OPHTHALMOLOGY

## 2021-08-02 PROCEDURE — 3288F FALL RISK ASSESSMENT DOCD: CPT | Mod: CPTII,S$GLB,, | Performed by: OPHTHALMOLOGY

## 2021-08-02 PROCEDURE — 99999 PR PBB SHADOW E&M-EST. PATIENT-LVL III: CPT | Mod: PBBFAC,,, | Performed by: OPHTHALMOLOGY

## 2021-08-02 PROCEDURE — 1160F PR REVIEW ALL MEDS BY PRESCRIBER/CLIN PHARMACIST DOCUMENTED: ICD-10-PCS | Mod: CPTII,S$GLB,, | Performed by: OPHTHALMOLOGY

## 2021-08-02 PROCEDURE — 1101F PR PT FALLS ASSESS DOC 0-1 FALLS W/OUT INJ PAST YR: ICD-10-PCS | Mod: CPTII,S$GLB,, | Performed by: OPHTHALMOLOGY

## 2021-08-02 PROCEDURE — 1126F PR PAIN SEVERITY QUANTIFIED, NO PAIN PRESENT: ICD-10-PCS | Mod: CPTII,S$GLB,, | Performed by: OPHTHALMOLOGY

## 2021-08-02 PROCEDURE — 1160F RVW MEDS BY RX/DR IN RCRD: CPT | Mod: CPTII,S$GLB,, | Performed by: OPHTHALMOLOGY

## 2021-08-02 PROCEDURE — 99024 PR POST-OP FOLLOW-UP VISIT: ICD-10-PCS | Mod: S$GLB,,, | Performed by: OPHTHALMOLOGY

## 2021-08-02 PROCEDURE — 3044F PR MOST RECENT HEMOGLOBIN A1C LEVEL <7.0%: ICD-10-PCS | Mod: CPTII,S$GLB,, | Performed by: OPHTHALMOLOGY

## 2021-08-02 PROCEDURE — 3044F HG A1C LEVEL LT 7.0%: CPT | Mod: CPTII,S$GLB,, | Performed by: OPHTHALMOLOGY

## 2021-08-02 PROCEDURE — 1126F AMNT PAIN NOTED NONE PRSNT: CPT | Mod: CPTII,S$GLB,, | Performed by: OPHTHALMOLOGY

## 2021-08-02 PROCEDURE — 1159F MED LIST DOCD IN RCRD: CPT | Mod: CPTII,S$GLB,, | Performed by: OPHTHALMOLOGY

## 2021-08-02 PROCEDURE — 99999 PR PBB SHADOW E&M-EST. PATIENT-LVL III: ICD-10-PCS | Mod: PBBFAC,,, | Performed by: OPHTHALMOLOGY

## 2021-08-02 PROCEDURE — 1159F PR MEDICATION LIST DOCUMENTED IN MEDICAL RECORD: ICD-10-PCS | Mod: CPTII,S$GLB,, | Performed by: OPHTHALMOLOGY

## 2021-08-02 RX ORDER — PREDNISOLONE ACETATE 10 MG/ML
1 SUSPENSION/ DROPS OPHTHALMIC 2 TIMES DAILY
COMMUNITY
Start: 2021-07-29 | End: 2021-10-07

## 2021-08-02 RX ORDER — MOXIFLOXACIN 5 MG/ML
1 SOLUTION/ DROPS OPHTHALMIC 4 TIMES DAILY
COMMUNITY
Start: 2021-07-29 | End: 2021-08-07

## 2021-08-02 RX ORDER — ATROPINE SULFATE 10 MG/ML
1 SOLUTION/ DROPS OPHTHALMIC DAILY
COMMUNITY
Start: 2021-08-12 | End: 2021-11-11 | Stop reason: ALTCHOICE

## 2021-08-05 ENCOUNTER — OFFICE VISIT (OUTPATIENT)
Dept: OPHTHALMOLOGY | Facility: CLINIC | Age: 76
End: 2021-08-05
Payer: MEDICARE

## 2021-08-05 DIAGNOSIS — H04.123 DRY EYE SYNDROME, BILATERAL: ICD-10-CM

## 2021-08-05 DIAGNOSIS — Z98.83 GLAUCOMA FILTERING BLEB, LEFT EYE: ICD-10-CM

## 2021-08-05 DIAGNOSIS — Z96.1 PSEUDOPHAKIA OF BOTH EYES: ICD-10-CM

## 2021-08-05 DIAGNOSIS — Z48.89 ENCOUNTER FOR POSTOPERATIVE CARE: Primary | ICD-10-CM

## 2021-08-05 DIAGNOSIS — H40.1111 PRIMARY OPEN ANGLE GLAUCOMA OF RIGHT EYE, MILD STAGE: ICD-10-CM

## 2021-08-05 DIAGNOSIS — H40.043 STEROID RESPONDER, BILATERAL: ICD-10-CM

## 2021-08-05 DIAGNOSIS — Z98.83 GLAUCOMA FILTERING BLEB OF RIGHT EYE: ICD-10-CM

## 2021-08-05 DIAGNOSIS — H40.1122 PRIMARY OPEN ANGLE GLAUCOMA OF LEFT EYE, MODERATE STAGE: ICD-10-CM

## 2021-08-05 PROCEDURE — 1160F PR REVIEW ALL MEDS BY PRESCRIBER/CLIN PHARMACIST DOCUMENTED: ICD-10-PCS | Mod: CPTII,S$GLB,, | Performed by: OPHTHALMOLOGY

## 2021-08-05 PROCEDURE — 99999 PR PBB SHADOW E&M-EST. PATIENT-LVL III: CPT | Mod: PBBFAC,,, | Performed by: OPHTHALMOLOGY

## 2021-08-05 PROCEDURE — 1160F RVW MEDS BY RX/DR IN RCRD: CPT | Mod: CPTII,S$GLB,, | Performed by: OPHTHALMOLOGY

## 2021-08-05 PROCEDURE — 1159F PR MEDICATION LIST DOCUMENTED IN MEDICAL RECORD: ICD-10-PCS | Mod: CPTII,S$GLB,, | Performed by: OPHTHALMOLOGY

## 2021-08-05 PROCEDURE — 1101F PR PT FALLS ASSESS DOC 0-1 FALLS W/OUT INJ PAST YR: ICD-10-PCS | Mod: CPTII,S$GLB,, | Performed by: OPHTHALMOLOGY

## 2021-08-05 PROCEDURE — 3288F PR FALLS RISK ASSESSMENT DOCUMENTED: ICD-10-PCS | Mod: CPTII,S$GLB,, | Performed by: OPHTHALMOLOGY

## 2021-08-05 PROCEDURE — 3288F FALL RISK ASSESSMENT DOCD: CPT | Mod: CPTII,S$GLB,, | Performed by: OPHTHALMOLOGY

## 2021-08-05 PROCEDURE — 99024 POSTOP FOLLOW-UP VISIT: CPT | Mod: S$GLB,,, | Performed by: OPHTHALMOLOGY

## 2021-08-05 PROCEDURE — 1101F PT FALLS ASSESS-DOCD LE1/YR: CPT | Mod: CPTII,S$GLB,, | Performed by: OPHTHALMOLOGY

## 2021-08-05 PROCEDURE — 3044F HG A1C LEVEL LT 7.0%: CPT | Mod: CPTII,S$GLB,, | Performed by: OPHTHALMOLOGY

## 2021-08-05 PROCEDURE — 1126F PR PAIN SEVERITY QUANTIFIED, NO PAIN PRESENT: ICD-10-PCS | Mod: CPTII,S$GLB,, | Performed by: OPHTHALMOLOGY

## 2021-08-05 PROCEDURE — 1126F AMNT PAIN NOTED NONE PRSNT: CPT | Mod: CPTII,S$GLB,, | Performed by: OPHTHALMOLOGY

## 2021-08-05 PROCEDURE — 3044F PR MOST RECENT HEMOGLOBIN A1C LEVEL <7.0%: ICD-10-PCS | Mod: CPTII,S$GLB,, | Performed by: OPHTHALMOLOGY

## 2021-08-05 PROCEDURE — 99999 PR PBB SHADOW E&M-EST. PATIENT-LVL III: ICD-10-PCS | Mod: PBBFAC,,, | Performed by: OPHTHALMOLOGY

## 2021-08-05 PROCEDURE — 99024 PR POST-OP FOLLOW-UP VISIT: ICD-10-PCS | Mod: S$GLB,,, | Performed by: OPHTHALMOLOGY

## 2021-08-05 PROCEDURE — 1159F MED LIST DOCD IN RCRD: CPT | Mod: CPTII,S$GLB,, | Performed by: OPHTHALMOLOGY

## 2021-08-12 ENCOUNTER — OFFICE VISIT (OUTPATIENT)
Dept: OPHTHALMOLOGY | Facility: CLINIC | Age: 76
End: 2021-08-12
Payer: MEDICARE

## 2021-08-12 DIAGNOSIS — H40.1111 PRIMARY OPEN ANGLE GLAUCOMA OF RIGHT EYE, MILD STAGE: ICD-10-CM

## 2021-08-12 DIAGNOSIS — Z96.1 PSEUDOPHAKIA OF BOTH EYES: ICD-10-CM

## 2021-08-12 DIAGNOSIS — Z98.83 GLAUCOMA FILTERING BLEB OF RIGHT EYE: ICD-10-CM

## 2021-08-12 DIAGNOSIS — H04.123 DRY EYE SYNDROME, BILATERAL: ICD-10-CM

## 2021-08-12 DIAGNOSIS — Z98.83 GLAUCOMA FILTERING BLEB, LEFT EYE: ICD-10-CM

## 2021-08-12 DIAGNOSIS — H40.1122 PRIMARY OPEN ANGLE GLAUCOMA OF LEFT EYE, MODERATE STAGE: ICD-10-CM

## 2021-08-12 DIAGNOSIS — Z48.89 ENCOUNTER FOR POSTOPERATIVE CARE: Primary | ICD-10-CM

## 2021-08-12 DIAGNOSIS — H40.043 STEROID RESPONDER, BILATERAL: ICD-10-CM

## 2021-08-12 PROCEDURE — 99024 PR POST-OP FOLLOW-UP VISIT: ICD-10-PCS | Mod: S$GLB,,, | Performed by: OPHTHALMOLOGY

## 2021-08-12 PROCEDURE — 99999 PR PBB SHADOW E&M-EST. PATIENT-LVL III: CPT | Mod: PBBFAC,,, | Performed by: OPHTHALMOLOGY

## 2021-08-12 PROCEDURE — 1101F PR PT FALLS ASSESS DOC 0-1 FALLS W/OUT INJ PAST YR: ICD-10-PCS | Mod: CPTII,S$GLB,, | Performed by: OPHTHALMOLOGY

## 2021-08-12 PROCEDURE — 3044F PR MOST RECENT HEMOGLOBIN A1C LEVEL <7.0%: ICD-10-PCS | Mod: CPTII,S$GLB,, | Performed by: OPHTHALMOLOGY

## 2021-08-12 PROCEDURE — 1101F PT FALLS ASSESS-DOCD LE1/YR: CPT | Mod: CPTII,S$GLB,, | Performed by: OPHTHALMOLOGY

## 2021-08-12 PROCEDURE — 99024 POSTOP FOLLOW-UP VISIT: CPT | Mod: S$GLB,,, | Performed by: OPHTHALMOLOGY

## 2021-08-12 PROCEDURE — 1159F PR MEDICATION LIST DOCUMENTED IN MEDICAL RECORD: ICD-10-PCS | Mod: CPTII,S$GLB,, | Performed by: OPHTHALMOLOGY

## 2021-08-12 PROCEDURE — 99999 PR PBB SHADOW E&M-EST. PATIENT-LVL III: ICD-10-PCS | Mod: PBBFAC,,, | Performed by: OPHTHALMOLOGY

## 2021-08-12 PROCEDURE — 3288F PR FALLS RISK ASSESSMENT DOCUMENTED: ICD-10-PCS | Mod: CPTII,S$GLB,, | Performed by: OPHTHALMOLOGY

## 2021-08-12 PROCEDURE — 1159F MED LIST DOCD IN RCRD: CPT | Mod: CPTII,S$GLB,, | Performed by: OPHTHALMOLOGY

## 2021-08-12 PROCEDURE — 3288F FALL RISK ASSESSMENT DOCD: CPT | Mod: CPTII,S$GLB,, | Performed by: OPHTHALMOLOGY

## 2021-08-12 PROCEDURE — 1160F RVW MEDS BY RX/DR IN RCRD: CPT | Mod: CPTII,S$GLB,, | Performed by: OPHTHALMOLOGY

## 2021-08-12 PROCEDURE — 1160F PR REVIEW ALL MEDS BY PRESCRIBER/CLIN PHARMACIST DOCUMENTED: ICD-10-PCS | Mod: CPTII,S$GLB,, | Performed by: OPHTHALMOLOGY

## 2021-08-12 PROCEDURE — 3044F HG A1C LEVEL LT 7.0%: CPT | Mod: CPTII,S$GLB,, | Performed by: OPHTHALMOLOGY

## 2021-08-17 ENCOUNTER — PATIENT OUTREACH (OUTPATIENT)
Dept: OTHER | Facility: OTHER | Age: 76
End: 2021-08-17

## 2021-08-19 ENCOUNTER — OFFICE VISIT (OUTPATIENT)
Dept: OPHTHALMOLOGY | Facility: CLINIC | Age: 76
End: 2021-08-19
Payer: MEDICARE

## 2021-08-19 DIAGNOSIS — H40.043 STEROID RESPONDER, BILATERAL: ICD-10-CM

## 2021-08-19 DIAGNOSIS — Z98.83 GLAUCOMA FILTERING BLEB, LEFT EYE: ICD-10-CM

## 2021-08-19 DIAGNOSIS — H04.123 DRY EYE SYNDROME, BILATERAL: ICD-10-CM

## 2021-08-19 DIAGNOSIS — Z96.1 PSEUDOPHAKIA OF BOTH EYES: ICD-10-CM

## 2021-08-19 DIAGNOSIS — Z98.83 GLAUCOMA FILTERING BLEB OF RIGHT EYE: ICD-10-CM

## 2021-08-19 DIAGNOSIS — H40.1111 PRIMARY OPEN ANGLE GLAUCOMA OF RIGHT EYE, MILD STAGE: ICD-10-CM

## 2021-08-19 DIAGNOSIS — H40.1122 PRIMARY OPEN ANGLE GLAUCOMA OF LEFT EYE, MODERATE STAGE: ICD-10-CM

## 2021-08-19 DIAGNOSIS — Z48.89 ENCOUNTER FOR POSTOPERATIVE CARE: Primary | ICD-10-CM

## 2021-08-19 PROCEDURE — 1101F PR PT FALLS ASSESS DOC 0-1 FALLS W/OUT INJ PAST YR: ICD-10-PCS | Mod: CPTII,S$GLB,, | Performed by: OPHTHALMOLOGY

## 2021-08-19 PROCEDURE — 99999 PR PBB SHADOW E&M-EST. PATIENT-LVL III: ICD-10-PCS | Mod: PBBFAC,,, | Performed by: OPHTHALMOLOGY

## 2021-08-19 PROCEDURE — 3044F HG A1C LEVEL LT 7.0%: CPT | Mod: CPTII,S$GLB,, | Performed by: OPHTHALMOLOGY

## 2021-08-19 PROCEDURE — 99999 PR PBB SHADOW E&M-EST. PATIENT-LVL III: CPT | Mod: PBBFAC,,, | Performed by: OPHTHALMOLOGY

## 2021-08-19 PROCEDURE — 1126F PR PAIN SEVERITY QUANTIFIED, NO PAIN PRESENT: ICD-10-PCS | Mod: CPTII,S$GLB,, | Performed by: OPHTHALMOLOGY

## 2021-08-19 PROCEDURE — 99024 POSTOP FOLLOW-UP VISIT: CPT | Mod: S$GLB,,, | Performed by: OPHTHALMOLOGY

## 2021-08-19 PROCEDURE — 1160F PR REVIEW ALL MEDS BY PRESCRIBER/CLIN PHARMACIST DOCUMENTED: ICD-10-PCS | Mod: CPTII,S$GLB,, | Performed by: OPHTHALMOLOGY

## 2021-08-19 PROCEDURE — 1101F PT FALLS ASSESS-DOCD LE1/YR: CPT | Mod: CPTII,S$GLB,, | Performed by: OPHTHALMOLOGY

## 2021-08-19 PROCEDURE — 99024 PR POST-OP FOLLOW-UP VISIT: ICD-10-PCS | Mod: S$GLB,,, | Performed by: OPHTHALMOLOGY

## 2021-08-19 PROCEDURE — 3044F PR MOST RECENT HEMOGLOBIN A1C LEVEL <7.0%: ICD-10-PCS | Mod: CPTII,S$GLB,, | Performed by: OPHTHALMOLOGY

## 2021-08-19 PROCEDURE — 1160F RVW MEDS BY RX/DR IN RCRD: CPT | Mod: CPTII,S$GLB,, | Performed by: OPHTHALMOLOGY

## 2021-08-19 PROCEDURE — 3288F FALL RISK ASSESSMENT DOCD: CPT | Mod: CPTII,S$GLB,, | Performed by: OPHTHALMOLOGY

## 2021-08-19 PROCEDURE — 3288F PR FALLS RISK ASSESSMENT DOCUMENTED: ICD-10-PCS | Mod: CPTII,S$GLB,, | Performed by: OPHTHALMOLOGY

## 2021-08-19 PROCEDURE — 1126F AMNT PAIN NOTED NONE PRSNT: CPT | Mod: CPTII,S$GLB,, | Performed by: OPHTHALMOLOGY

## 2021-08-19 PROCEDURE — 1159F MED LIST DOCD IN RCRD: CPT | Mod: CPTII,S$GLB,, | Performed by: OPHTHALMOLOGY

## 2021-08-19 PROCEDURE — 1159F PR MEDICATION LIST DOCUMENTED IN MEDICAL RECORD: ICD-10-PCS | Mod: CPTII,S$GLB,, | Performed by: OPHTHALMOLOGY

## 2021-08-26 ENCOUNTER — OFFICE VISIT (OUTPATIENT)
Dept: OPHTHALMOLOGY | Facility: CLINIC | Age: 76
End: 2021-08-26
Payer: MEDICARE

## 2021-08-26 DIAGNOSIS — H04.123 DRY EYE SYNDROME, BILATERAL: ICD-10-CM

## 2021-08-26 DIAGNOSIS — H40.1111 PRIMARY OPEN ANGLE GLAUCOMA OF RIGHT EYE, MILD STAGE: ICD-10-CM

## 2021-08-26 DIAGNOSIS — H40.043 STEROID RESPONDER, BILATERAL: ICD-10-CM

## 2021-08-26 DIAGNOSIS — Z98.83 GLAUCOMA FILTERING BLEB OF RIGHT EYE: ICD-10-CM

## 2021-08-26 DIAGNOSIS — Z96.1 PSEUDOPHAKIA OF BOTH EYES: ICD-10-CM

## 2021-08-26 DIAGNOSIS — Z48.89 ENCOUNTER FOR POSTOPERATIVE CARE: Primary | ICD-10-CM

## 2021-08-26 DIAGNOSIS — H40.1122 PRIMARY OPEN ANGLE GLAUCOMA OF LEFT EYE, MODERATE STAGE: ICD-10-CM

## 2021-08-26 PROCEDURE — 99024 POSTOP FOLLOW-UP VISIT: CPT | Mod: S$GLB,,, | Performed by: OPHTHALMOLOGY

## 2021-08-26 PROCEDURE — 1126F AMNT PAIN NOTED NONE PRSNT: CPT | Mod: CPTII,S$GLB,, | Performed by: OPHTHALMOLOGY

## 2021-08-26 PROCEDURE — 99999 PR PBB SHADOW E&M-EST. PATIENT-LVL III: ICD-10-PCS | Mod: PBBFAC,,, | Performed by: OPHTHALMOLOGY

## 2021-08-26 PROCEDURE — 3288F FALL RISK ASSESSMENT DOCD: CPT | Mod: CPTII,S$GLB,, | Performed by: OPHTHALMOLOGY

## 2021-08-26 PROCEDURE — 1160F PR REVIEW ALL MEDS BY PRESCRIBER/CLIN PHARMACIST DOCUMENTED: ICD-10-PCS | Mod: CPTII,S$GLB,, | Performed by: OPHTHALMOLOGY

## 2021-08-26 PROCEDURE — 3044F HG A1C LEVEL LT 7.0%: CPT | Mod: CPTII,S$GLB,, | Performed by: OPHTHALMOLOGY

## 2021-08-26 PROCEDURE — 1126F PR PAIN SEVERITY QUANTIFIED, NO PAIN PRESENT: ICD-10-PCS | Mod: CPTII,S$GLB,, | Performed by: OPHTHALMOLOGY

## 2021-08-26 PROCEDURE — 3288F PR FALLS RISK ASSESSMENT DOCUMENTED: ICD-10-PCS | Mod: CPTII,S$GLB,, | Performed by: OPHTHALMOLOGY

## 2021-08-26 PROCEDURE — 1101F PR PT FALLS ASSESS DOC 0-1 FALLS W/OUT INJ PAST YR: ICD-10-PCS | Mod: CPTII,S$GLB,, | Performed by: OPHTHALMOLOGY

## 2021-08-26 PROCEDURE — 1101F PT FALLS ASSESS-DOCD LE1/YR: CPT | Mod: CPTII,S$GLB,, | Performed by: OPHTHALMOLOGY

## 2021-08-26 PROCEDURE — 99999 PR PBB SHADOW E&M-EST. PATIENT-LVL III: CPT | Mod: PBBFAC,,, | Performed by: OPHTHALMOLOGY

## 2021-08-26 PROCEDURE — 99024 PR POST-OP FOLLOW-UP VISIT: ICD-10-PCS | Mod: S$GLB,,, | Performed by: OPHTHALMOLOGY

## 2021-08-26 PROCEDURE — 1159F PR MEDICATION LIST DOCUMENTED IN MEDICAL RECORD: ICD-10-PCS | Mod: CPTII,S$GLB,, | Performed by: OPHTHALMOLOGY

## 2021-08-26 PROCEDURE — 3044F PR MOST RECENT HEMOGLOBIN A1C LEVEL <7.0%: ICD-10-PCS | Mod: CPTII,S$GLB,, | Performed by: OPHTHALMOLOGY

## 2021-08-26 PROCEDURE — 1159F MED LIST DOCD IN RCRD: CPT | Mod: CPTII,S$GLB,, | Performed by: OPHTHALMOLOGY

## 2021-08-26 PROCEDURE — 1160F RVW MEDS BY RX/DR IN RCRD: CPT | Mod: CPTII,S$GLB,, | Performed by: OPHTHALMOLOGY

## 2021-09-07 ENCOUNTER — PATIENT MESSAGE (OUTPATIENT)
Dept: OPHTHALMOLOGY | Facility: CLINIC | Age: 76
End: 2021-09-07

## 2021-09-10 DIAGNOSIS — E78.00 PURE HYPERCHOLESTEROLEMIA: ICD-10-CM

## 2021-09-11 RX ORDER — ATORVASTATIN CALCIUM 10 MG/1
TABLET, FILM COATED ORAL
Qty: 45 TABLET | Refills: 1 | Status: SHIPPED | OUTPATIENT
Start: 2021-09-11 | End: 2022-03-14 | Stop reason: SDUPTHER

## 2021-09-16 ENCOUNTER — PES CALL (OUTPATIENT)
Dept: ADMINISTRATIVE | Facility: CLINIC | Age: 76
End: 2021-09-16

## 2021-09-16 ENCOUNTER — OFFICE VISIT (OUTPATIENT)
Dept: OPHTHALMOLOGY | Facility: CLINIC | Age: 76
End: 2021-09-16
Payer: MEDICARE

## 2021-09-16 DIAGNOSIS — H04.123 DRY EYE SYNDROME, BILATERAL: ICD-10-CM

## 2021-09-16 DIAGNOSIS — H40.1111 PRIMARY OPEN ANGLE GLAUCOMA OF RIGHT EYE, MILD STAGE: ICD-10-CM

## 2021-09-16 DIAGNOSIS — Z96.1 PSEUDOPHAKIA OF BOTH EYES: ICD-10-CM

## 2021-09-16 DIAGNOSIS — Z48.89 ENCOUNTER FOR POSTOPERATIVE CARE: Primary | ICD-10-CM

## 2021-09-16 DIAGNOSIS — Z98.83 GLAUCOMA FILTERING BLEB OF RIGHT EYE: ICD-10-CM

## 2021-09-16 DIAGNOSIS — H40.043 STEROID RESPONDER, BILATERAL: ICD-10-CM

## 2021-09-16 DIAGNOSIS — H40.1122 PRIMARY OPEN ANGLE GLAUCOMA OF LEFT EYE, MODERATE STAGE: ICD-10-CM

## 2021-09-16 PROCEDURE — 1101F PR PT FALLS ASSESS DOC 0-1 FALLS W/OUT INJ PAST YR: ICD-10-PCS | Mod: CPTII,S$GLB,, | Performed by: OPHTHALMOLOGY

## 2021-09-16 PROCEDURE — 1101F PT FALLS ASSESS-DOCD LE1/YR: CPT | Mod: CPTII,S$GLB,, | Performed by: OPHTHALMOLOGY

## 2021-09-16 PROCEDURE — 1126F AMNT PAIN NOTED NONE PRSNT: CPT | Mod: CPTII,S$GLB,, | Performed by: OPHTHALMOLOGY

## 2021-09-16 PROCEDURE — 1159F PR MEDICATION LIST DOCUMENTED IN MEDICAL RECORD: ICD-10-PCS | Mod: CPTII,S$GLB,, | Performed by: OPHTHALMOLOGY

## 2021-09-16 PROCEDURE — 3044F HG A1C LEVEL LT 7.0%: CPT | Mod: CPTII,S$GLB,, | Performed by: OPHTHALMOLOGY

## 2021-09-16 PROCEDURE — 1160F RVW MEDS BY RX/DR IN RCRD: CPT | Mod: CPTII,S$GLB,, | Performed by: OPHTHALMOLOGY

## 2021-09-16 PROCEDURE — 1159F MED LIST DOCD IN RCRD: CPT | Mod: CPTII,S$GLB,, | Performed by: OPHTHALMOLOGY

## 2021-09-16 PROCEDURE — 1160F PR REVIEW ALL MEDS BY PRESCRIBER/CLIN PHARMACIST DOCUMENTED: ICD-10-PCS | Mod: CPTII,S$GLB,, | Performed by: OPHTHALMOLOGY

## 2021-09-16 PROCEDURE — 99024 POSTOP FOLLOW-UP VISIT: CPT | Mod: S$GLB,,, | Performed by: OPHTHALMOLOGY

## 2021-09-16 PROCEDURE — 1126F PR PAIN SEVERITY QUANTIFIED, NO PAIN PRESENT: ICD-10-PCS | Mod: CPTII,S$GLB,, | Performed by: OPHTHALMOLOGY

## 2021-09-16 PROCEDURE — 3288F FALL RISK ASSESSMENT DOCD: CPT | Mod: CPTII,S$GLB,, | Performed by: OPHTHALMOLOGY

## 2021-09-16 PROCEDURE — 4010F PR ACE/ARB THEARPY RXD/TAKEN: ICD-10-PCS | Mod: CPTII,S$GLB,, | Performed by: OPHTHALMOLOGY

## 2021-09-16 PROCEDURE — 99999 PR PBB SHADOW E&M-EST. PATIENT-LVL III: ICD-10-PCS | Mod: PBBFAC,,, | Performed by: OPHTHALMOLOGY

## 2021-09-16 PROCEDURE — 3288F PR FALLS RISK ASSESSMENT DOCUMENTED: ICD-10-PCS | Mod: CPTII,S$GLB,, | Performed by: OPHTHALMOLOGY

## 2021-09-16 PROCEDURE — 99024 PR POST-OP FOLLOW-UP VISIT: ICD-10-PCS | Mod: S$GLB,,, | Performed by: OPHTHALMOLOGY

## 2021-09-16 PROCEDURE — 3044F PR MOST RECENT HEMOGLOBIN A1C LEVEL <7.0%: ICD-10-PCS | Mod: CPTII,S$GLB,, | Performed by: OPHTHALMOLOGY

## 2021-09-16 PROCEDURE — 99999 PR PBB SHADOW E&M-EST. PATIENT-LVL III: CPT | Mod: PBBFAC,,, | Performed by: OPHTHALMOLOGY

## 2021-09-16 PROCEDURE — 4010F ACE/ARB THERAPY RXD/TAKEN: CPT | Mod: CPTII,S$GLB,, | Performed by: OPHTHALMOLOGY

## 2021-09-21 ENCOUNTER — PATIENT MESSAGE (OUTPATIENT)
Dept: FAMILY MEDICINE | Facility: CLINIC | Age: 76
End: 2021-09-21

## 2021-09-21 ENCOUNTER — TELEPHONE (OUTPATIENT)
Dept: FAMILY MEDICINE | Facility: CLINIC | Age: 76
End: 2021-09-21

## 2021-09-21 DIAGNOSIS — Z12.31 ENCOUNTER FOR SCREENING MAMMOGRAM FOR MALIGNANT NEOPLASM OF BREAST: Primary | ICD-10-CM

## 2021-10-07 ENCOUNTER — OFFICE VISIT (OUTPATIENT)
Dept: OPHTHALMOLOGY | Facility: CLINIC | Age: 76
End: 2021-10-07
Payer: MEDICARE

## 2021-10-07 DIAGNOSIS — Z96.1 PSEUDOPHAKIA OF BOTH EYES: ICD-10-CM

## 2021-10-07 DIAGNOSIS — H40.1111 PRIMARY OPEN ANGLE GLAUCOMA OF RIGHT EYE, MILD STAGE: ICD-10-CM

## 2021-10-07 DIAGNOSIS — H40.043 STEROID RESPONDER, BILATERAL: ICD-10-CM

## 2021-10-07 DIAGNOSIS — Z98.83 GLAUCOMA FILTERING BLEB OF RIGHT EYE: ICD-10-CM

## 2021-10-07 DIAGNOSIS — H40.1122 PRIMARY OPEN ANGLE GLAUCOMA OF LEFT EYE, MODERATE STAGE: ICD-10-CM

## 2021-10-07 DIAGNOSIS — H04.123 DRY EYE SYNDROME, BILATERAL: ICD-10-CM

## 2021-10-07 DIAGNOSIS — Z48.89 ENCOUNTER FOR POSTOPERATIVE CARE: Primary | ICD-10-CM

## 2021-10-07 PROCEDURE — 1101F PR PT FALLS ASSESS DOC 0-1 FALLS W/OUT INJ PAST YR: ICD-10-PCS | Mod: HCNC,CPTII,S$GLB, | Performed by: OPHTHALMOLOGY

## 2021-10-07 PROCEDURE — 1126F PR PAIN SEVERITY QUANTIFIED, NO PAIN PRESENT: ICD-10-PCS | Mod: HCNC,CPTII,S$GLB, | Performed by: OPHTHALMOLOGY

## 2021-10-07 PROCEDURE — 1159F PR MEDICATION LIST DOCUMENTED IN MEDICAL RECORD: ICD-10-PCS | Mod: HCNC,CPTII,S$GLB, | Performed by: OPHTHALMOLOGY

## 2021-10-07 PROCEDURE — 99499 RISK ADDL DX/OHS AUDIT: ICD-10-PCS | Mod: S$GLB,,, | Performed by: OPHTHALMOLOGY

## 2021-10-07 PROCEDURE — 3044F HG A1C LEVEL LT 7.0%: CPT | Mod: HCNC,CPTII,S$GLB, | Performed by: OPHTHALMOLOGY

## 2021-10-07 PROCEDURE — 4010F ACE/ARB THERAPY RXD/TAKEN: CPT | Mod: HCNC,CPTII,S$GLB, | Performed by: OPHTHALMOLOGY

## 2021-10-07 PROCEDURE — 1126F AMNT PAIN NOTED NONE PRSNT: CPT | Mod: HCNC,CPTII,S$GLB, | Performed by: OPHTHALMOLOGY

## 2021-10-07 PROCEDURE — 1159F MED LIST DOCD IN RCRD: CPT | Mod: HCNC,CPTII,S$GLB, | Performed by: OPHTHALMOLOGY

## 2021-10-07 PROCEDURE — 99999 PR PBB SHADOW E&M-EST. PATIENT-LVL III: ICD-10-PCS | Mod: PBBFAC,HCNC,, | Performed by: OPHTHALMOLOGY

## 2021-10-07 PROCEDURE — 1101F PT FALLS ASSESS-DOCD LE1/YR: CPT | Mod: HCNC,CPTII,S$GLB, | Performed by: OPHTHALMOLOGY

## 2021-10-07 PROCEDURE — 4010F PR ACE/ARB THEARPY RXD/TAKEN: ICD-10-PCS | Mod: HCNC,CPTII,S$GLB, | Performed by: OPHTHALMOLOGY

## 2021-10-07 PROCEDURE — 99024 PR POST-OP FOLLOW-UP VISIT: ICD-10-PCS | Mod: HCNC,S$GLB,, | Performed by: OPHTHALMOLOGY

## 2021-10-07 PROCEDURE — 3288F PR FALLS RISK ASSESSMENT DOCUMENTED: ICD-10-PCS | Mod: HCNC,CPTII,S$GLB, | Performed by: OPHTHALMOLOGY

## 2021-10-07 PROCEDURE — 1160F PR REVIEW ALL MEDS BY PRESCRIBER/CLIN PHARMACIST DOCUMENTED: ICD-10-PCS | Mod: HCNC,CPTII,S$GLB, | Performed by: OPHTHALMOLOGY

## 2021-10-07 PROCEDURE — 99999 PR PBB SHADOW E&M-EST. PATIENT-LVL III: CPT | Mod: PBBFAC,HCNC,, | Performed by: OPHTHALMOLOGY

## 2021-10-07 PROCEDURE — 3288F FALL RISK ASSESSMENT DOCD: CPT | Mod: HCNC,CPTII,S$GLB, | Performed by: OPHTHALMOLOGY

## 2021-10-07 PROCEDURE — 99024 POSTOP FOLLOW-UP VISIT: CPT | Mod: HCNC,S$GLB,, | Performed by: OPHTHALMOLOGY

## 2021-10-07 PROCEDURE — 99499 UNLISTED E&M SERVICE: CPT | Mod: S$GLB,,, | Performed by: OPHTHALMOLOGY

## 2021-10-07 PROCEDURE — 3044F PR MOST RECENT HEMOGLOBIN A1C LEVEL <7.0%: ICD-10-PCS | Mod: HCNC,CPTII,S$GLB, | Performed by: OPHTHALMOLOGY

## 2021-10-07 PROCEDURE — 1160F RVW MEDS BY RX/DR IN RCRD: CPT | Mod: HCNC,CPTII,S$GLB, | Performed by: OPHTHALMOLOGY

## 2021-10-07 RX ORDER — BIMATOPROST 0.1 MG/ML
1 SOLUTION/ DROPS OPHTHALMIC NIGHTLY
Qty: 7.5 ML | Refills: 3 | Status: SHIPPED | OUTPATIENT
Start: 2021-10-07 | End: 2022-05-12 | Stop reason: SDUPTHER

## 2021-10-08 ENCOUNTER — IMMUNIZATION (OUTPATIENT)
Dept: PHARMACY | Facility: CLINIC | Age: 76
End: 2021-10-08
Payer: MEDICARE

## 2021-10-16 ENCOUNTER — IMMUNIZATION (OUTPATIENT)
Dept: INTERNAL MEDICINE | Facility: CLINIC | Age: 76
End: 2021-10-16
Payer: MEDICARE

## 2021-10-16 DIAGNOSIS — Z23 NEED FOR VACCINATION: Primary | ICD-10-CM

## 2021-10-16 PROCEDURE — 91300 COVID-19, MRNA, LNP-S, PF, 30 MCG/0.3 ML DOSE VACCINE: CPT | Mod: HCNC,PBBFAC

## 2021-10-16 PROCEDURE — 0003A COVID-19, MRNA, LNP-S, PF, 30 MCG/0.3 ML DOSE VACCINE: CPT | Mod: HCNC,CV19,PBBFAC

## 2021-10-25 ENCOUNTER — HOSPITAL ENCOUNTER (OUTPATIENT)
Dept: RADIOLOGY | Facility: HOSPITAL | Age: 76
Discharge: HOME | End: 2021-10-25
Attending: INTERNAL MEDICINE
Payer: MEDICARE

## 2021-10-25 DIAGNOSIS — Z12.31 ENCOUNTER FOR SCREENING MAMMOGRAM FOR MALIGNANT NEOPLASM OF BREAST: ICD-10-CM

## 2021-10-25 PROCEDURE — 77067 MAMMO DIGITAL SCREENING BILAT WITH TOMO: ICD-10-PCS | Mod: 26,HCNC,, | Performed by: RADIOLOGY

## 2021-10-25 PROCEDURE — 77067 SCR MAMMO BI INCL CAD: CPT | Mod: 26,HCNC,, | Performed by: RADIOLOGY

## 2021-10-25 PROCEDURE — 77063 MAMMO DIGITAL SCREENING BILAT WITH TOMO: ICD-10-PCS | Mod: 26,HCNC,, | Performed by: RADIOLOGY

## 2021-10-25 PROCEDURE — 77063 BREAST TOMOSYNTHESIS BI: CPT | Mod: 26,HCNC,, | Performed by: RADIOLOGY

## 2021-10-25 PROCEDURE — 77067 SCR MAMMO BI INCL CAD: CPT | Mod: TC,HCNC

## 2021-11-11 ENCOUNTER — OFFICE VISIT (OUTPATIENT)
Dept: OPHTHALMOLOGY | Facility: CLINIC | Age: 76
End: 2021-11-11
Payer: MEDICARE

## 2021-11-11 DIAGNOSIS — Z98.83 GLAUCOMA FILTERING BLEB OF RIGHT EYE: ICD-10-CM

## 2021-11-11 DIAGNOSIS — H40.043 STEROID RESPONDER, BILATERAL: ICD-10-CM

## 2021-11-11 DIAGNOSIS — T81.509A POSTOPERATIVE FOREIGN BODY, INITIAL ENCOUNTER: ICD-10-CM

## 2021-11-11 DIAGNOSIS — H40.1122 PRIMARY OPEN ANGLE GLAUCOMA OF LEFT EYE, MODERATE STAGE: ICD-10-CM

## 2021-11-11 DIAGNOSIS — H04.123 DRY EYE SYNDROME, BILATERAL: ICD-10-CM

## 2021-11-11 DIAGNOSIS — Z96.1 PSEUDOPHAKIA OF BOTH EYES: ICD-10-CM

## 2021-11-11 DIAGNOSIS — H40.1111 PRIMARY OPEN ANGLE GLAUCOMA OF RIGHT EYE, MILD STAGE: Primary | ICD-10-CM

## 2021-11-11 PROCEDURE — 92012 PR EYE EXAM, EST PATIENT,INTERMED: ICD-10-PCS | Mod: HCNC,S$GLB,, | Performed by: OPHTHALMOLOGY

## 2021-11-11 PROCEDURE — 1126F PR PAIN SEVERITY QUANTIFIED, NO PAIN PRESENT: ICD-10-PCS | Mod: HCNC,CPTII,S$GLB, | Performed by: OPHTHALMOLOGY

## 2021-11-11 PROCEDURE — 99999 PR PBB SHADOW E&M-EST. PATIENT-LVL III: ICD-10-PCS | Mod: PBBFAC,HCNC,, | Performed by: OPHTHALMOLOGY

## 2021-11-11 PROCEDURE — 3288F PR FALLS RISK ASSESSMENT DOCUMENTED: ICD-10-PCS | Mod: HCNC,CPTII,S$GLB, | Performed by: OPHTHALMOLOGY

## 2021-11-11 PROCEDURE — 1126F AMNT PAIN NOTED NONE PRSNT: CPT | Mod: HCNC,CPTII,S$GLB, | Performed by: OPHTHALMOLOGY

## 2021-11-11 PROCEDURE — 1101F PT FALLS ASSESS-DOCD LE1/YR: CPT | Mod: HCNC,CPTII,S$GLB, | Performed by: OPHTHALMOLOGY

## 2021-11-11 PROCEDURE — 3288F FALL RISK ASSESSMENT DOCD: CPT | Mod: HCNC,CPTII,S$GLB, | Performed by: OPHTHALMOLOGY

## 2021-11-11 PROCEDURE — 3044F HG A1C LEVEL LT 7.0%: CPT | Mod: HCNC,CPTII,S$GLB, | Performed by: OPHTHALMOLOGY

## 2021-11-11 PROCEDURE — 1160F RVW MEDS BY RX/DR IN RCRD: CPT | Mod: HCNC,CPTII,S$GLB, | Performed by: OPHTHALMOLOGY

## 2021-11-11 PROCEDURE — 1159F MED LIST DOCD IN RCRD: CPT | Mod: HCNC,CPTII,S$GLB, | Performed by: OPHTHALMOLOGY

## 2021-11-11 PROCEDURE — 1159F PR MEDICATION LIST DOCUMENTED IN MEDICAL RECORD: ICD-10-PCS | Mod: HCNC,CPTII,S$GLB, | Performed by: OPHTHALMOLOGY

## 2021-11-11 PROCEDURE — 99999 PR PBB SHADOW E&M-EST. PATIENT-LVL III: CPT | Mod: PBBFAC,HCNC,, | Performed by: OPHTHALMOLOGY

## 2021-11-11 PROCEDURE — 1101F PR PT FALLS ASSESS DOC 0-1 FALLS W/OUT INJ PAST YR: ICD-10-PCS | Mod: HCNC,CPTII,S$GLB, | Performed by: OPHTHALMOLOGY

## 2021-11-11 PROCEDURE — 4010F ACE/ARB THERAPY RXD/TAKEN: CPT | Mod: HCNC,CPTII,S$GLB, | Performed by: OPHTHALMOLOGY

## 2021-11-11 PROCEDURE — 4010F PR ACE/ARB THEARPY RXD/TAKEN: ICD-10-PCS | Mod: HCNC,CPTII,S$GLB, | Performed by: OPHTHALMOLOGY

## 2021-11-11 PROCEDURE — 92012 INTRM OPH EXAM EST PATIENT: CPT | Mod: HCNC,S$GLB,, | Performed by: OPHTHALMOLOGY

## 2021-11-11 PROCEDURE — 3044F PR MOST RECENT HEMOGLOBIN A1C LEVEL <7.0%: ICD-10-PCS | Mod: HCNC,CPTII,S$GLB, | Performed by: OPHTHALMOLOGY

## 2021-11-11 PROCEDURE — 1160F PR REVIEW ALL MEDS BY PRESCRIBER/CLIN PHARMACIST DOCUMENTED: ICD-10-PCS | Mod: HCNC,CPTII,S$GLB, | Performed by: OPHTHALMOLOGY

## 2021-11-11 RX ORDER — DORZOLAMIDE HYDROCHLORIDE AND TIMOLOL MALEATE 20; 5 MG/ML; MG/ML
1 SOLUTION/ DROPS OPHTHALMIC 2 TIMES DAILY
Qty: 30 ML | Refills: 3 | Status: SHIPPED | OUTPATIENT
Start: 2021-11-11 | End: 2022-04-04 | Stop reason: SDUPTHER

## 2021-11-11 RX ORDER — ERYTHROMYCIN 5 MG/G
OINTMENT OPHTHALMIC
Qty: 1 EACH | Refills: 6 | Status: SHIPPED | OUTPATIENT
Start: 2021-11-11 | End: 2022-03-14

## 2021-12-09 ENCOUNTER — PATIENT MESSAGE (OUTPATIENT)
Dept: OTHER | Facility: OTHER | Age: 76
End: 2021-12-09
Payer: MEDICARE

## 2021-12-09 ENCOUNTER — PATIENT OUTREACH (OUTPATIENT)
Dept: OTHER | Facility: OTHER | Age: 76
End: 2021-12-09

## 2021-12-16 ENCOUNTER — OFFICE VISIT (OUTPATIENT)
Dept: INTERNAL MEDICINE | Facility: CLINIC | Age: 76
End: 2021-12-16
Payer: MEDICARE

## 2021-12-16 VITALS
OXYGEN SATURATION: 96 % | SYSTOLIC BLOOD PRESSURE: 126 MMHG | BODY MASS INDEX: 32.36 KG/M2 | HEIGHT: 65 IN | DIASTOLIC BLOOD PRESSURE: 73 MMHG | WEIGHT: 194.25 LBS | HEART RATE: 68 BPM

## 2021-12-16 DIAGNOSIS — M79.671 PAIN OF RIGHT HEEL: Primary | ICD-10-CM

## 2021-12-16 PROCEDURE — 4010F ACE/ARB THERAPY RXD/TAKEN: CPT | Mod: HCNC,CPTII,S$GLB, | Performed by: INTERNAL MEDICINE

## 2021-12-16 PROCEDURE — 99999 PR PBB SHADOW E&M-EST. PATIENT-LVL IV: ICD-10-PCS | Mod: PBBFAC,HCNC,, | Performed by: INTERNAL MEDICINE

## 2021-12-16 PROCEDURE — 4010F PR ACE/ARB THEARPY RXD/TAKEN: ICD-10-PCS | Mod: HCNC,CPTII,S$GLB, | Performed by: INTERNAL MEDICINE

## 2021-12-16 PROCEDURE — 99999 PR PBB SHADOW E&M-EST. PATIENT-LVL IV: CPT | Mod: PBBFAC,HCNC,, | Performed by: INTERNAL MEDICINE

## 2021-12-16 PROCEDURE — 99213 PR OFFICE/OUTPT VISIT, EST, LEVL III, 20-29 MIN: ICD-10-PCS | Mod: HCNC,S$GLB,, | Performed by: INTERNAL MEDICINE

## 2021-12-16 PROCEDURE — 99213 OFFICE O/P EST LOW 20 MIN: CPT | Mod: HCNC,S$GLB,, | Performed by: INTERNAL MEDICINE

## 2021-12-16 RX ORDER — MELOXICAM 15 MG/1
15 TABLET ORAL DAILY
Qty: 30 TABLET | Refills: 1 | Status: SHIPPED | OUTPATIENT
Start: 2021-12-16 | End: 2022-03-14

## 2021-12-20 ENCOUNTER — OFFICE VISIT (OUTPATIENT)
Dept: PODIATRY | Facility: CLINIC | Age: 76
End: 2021-12-20
Payer: MEDICARE

## 2021-12-20 ENCOUNTER — HOSPITAL ENCOUNTER (OUTPATIENT)
Dept: RADIOLOGY | Facility: HOSPITAL | Age: 76
Discharge: HOME | End: 2021-12-20
Attending: PODIATRIST
Payer: MEDICARE

## 2021-12-20 VITALS
HEIGHT: 65 IN | BODY MASS INDEX: 33.43 KG/M2 | SYSTOLIC BLOOD PRESSURE: 138 MMHG | HEART RATE: 59 BPM | DIASTOLIC BLOOD PRESSURE: 79 MMHG | WEIGHT: 200.63 LBS

## 2021-12-20 DIAGNOSIS — M20.42 HAMMER TOES OF BOTH FEET: ICD-10-CM

## 2021-12-20 DIAGNOSIS — M20.5X1 HALLUX LIMITUS, ACQUIRED, RIGHT: ICD-10-CM

## 2021-12-20 DIAGNOSIS — M79.671 FOOT PAIN, RIGHT: ICD-10-CM

## 2021-12-20 DIAGNOSIS — M21.6X2 ACQUIRED BILATERAL PES CAVUS: ICD-10-CM

## 2021-12-20 DIAGNOSIS — G60.9 IDIOPATHIC PERIPHERAL NEUROPATHY: Primary | ICD-10-CM

## 2021-12-20 DIAGNOSIS — M72.2 PLANTAR FASCIITIS: ICD-10-CM

## 2021-12-20 DIAGNOSIS — M20.5X2 HALLUX LIMITUS, ACQUIRED, LEFT: ICD-10-CM

## 2021-12-20 DIAGNOSIS — M21.6X1 ACQUIRED BILATERAL PES CAVUS: ICD-10-CM

## 2021-12-20 DIAGNOSIS — M20.41 HAMMER TOES OF BOTH FEET: ICD-10-CM

## 2021-12-20 PROCEDURE — 4010F PR ACE/ARB THEARPY RXD/TAKEN: ICD-10-PCS | Mod: HCNC,CPTII,S$GLB, | Performed by: PODIATRIST

## 2021-12-20 PROCEDURE — 73630 X-RAY EXAM OF FOOT: CPT | Mod: 26,HCNC,RT, | Performed by: RADIOLOGY

## 2021-12-20 PROCEDURE — 99999 PR PBB SHADOW E&M-EST. PATIENT-LVL V: ICD-10-PCS | Mod: PBBFAC,HCNC,, | Performed by: PODIATRIST

## 2021-12-20 PROCEDURE — 4010F ACE/ARB THERAPY RXD/TAKEN: CPT | Mod: HCNC,CPTII,S$GLB, | Performed by: PODIATRIST

## 2021-12-20 PROCEDURE — 99214 PR OFFICE/OUTPT VISIT, EST, LEVL IV, 30-39 MIN: ICD-10-PCS | Mod: HCNC,S$GLB,, | Performed by: PODIATRIST

## 2021-12-20 PROCEDURE — 99214 OFFICE O/P EST MOD 30 MIN: CPT | Mod: HCNC,S$GLB,, | Performed by: PODIATRIST

## 2021-12-20 PROCEDURE — 73630 X-RAY EXAM OF FOOT: CPT | Mod: TC,HCNC,RT

## 2021-12-20 PROCEDURE — 73630 XR FOOT COMPLETE 3 VIEW RIGHT: ICD-10-PCS | Mod: 26,HCNC,RT, | Performed by: RADIOLOGY

## 2021-12-20 PROCEDURE — 99999 PR PBB SHADOW E&M-EST. PATIENT-LVL V: CPT | Mod: PBBFAC,HCNC,, | Performed by: PODIATRIST

## 2022-01-19 ENCOUNTER — PATIENT MESSAGE (OUTPATIENT)
Dept: ENDOCRINOLOGY | Facility: CLINIC | Age: 77
End: 2022-01-19
Payer: MEDICARE

## 2022-02-01 ENCOUNTER — TELEPHONE (OUTPATIENT)
Dept: FAMILY MEDICINE | Facility: CLINIC | Age: 77
End: 2022-02-01
Payer: MEDICARE

## 2022-02-01 ENCOUNTER — TELEPHONE (OUTPATIENT)
Dept: GASTROENTEROLOGY | Facility: CLINIC | Age: 77
End: 2022-02-01
Payer: MEDICARE

## 2022-02-01 DIAGNOSIS — Z12.11 SCREENING FOR COLON CANCER: Primary | ICD-10-CM

## 2022-02-01 NOTE — TELEPHONE ENCOUNTER
Dr. De Los Santos   Pt had a scope ib 2017 and per note  - Repeat colonoscopy in 5 years for surveillance.  Please advise on orders

## 2022-02-01 NOTE — TELEPHONE ENCOUNTER
----- Message from Evelina Waterman sent at 2/1/2022 10:03 AM CST -----  Contact: self @ [phone]  Pt says she is due for her 5 year colonoscopy.  Her last one was done on 1-23-17.  She will need an order to schedule.

## 2022-02-01 NOTE — TELEPHONE ENCOUNTER
----- Message from Evelina Waterman sent at 2/1/2022 10:05 AM CST -----  Contact: self @ [phone]  Pt says she is due for her 5 year colonoscopy.  Her last one was done on 1-23-17.  She will need an order to schedule.

## 2022-02-02 ENCOUNTER — PATIENT MESSAGE (OUTPATIENT)
Dept: ADMINISTRATIVE | Facility: OTHER | Age: 77
End: 2022-02-02
Payer: MEDICARE

## 2022-02-04 ENCOUNTER — PATIENT MESSAGE (OUTPATIENT)
Dept: ENDOSCOPY | Facility: HOSPITAL | Age: 77
End: 2022-02-04
Payer: MEDICARE

## 2022-02-04 DIAGNOSIS — Z01.818 PRE-OP TESTING: ICD-10-CM

## 2022-02-04 DIAGNOSIS — Z12.11 SPECIAL SCREENING FOR MALIGNANT NEOPLASMS, COLON: Primary | ICD-10-CM

## 2022-02-04 RX ORDER — POLYETHYLENE GLYCOL 3350, SODIUM SULFATE ANHYDROUS, SODIUM BICARBONATE, SODIUM CHLORIDE, POTASSIUM CHLORIDE 236; 22.74; 6.74; 5.86; 2.97 G/4L; G/4L; G/4L; G/4L; G/4L
4 POWDER, FOR SOLUTION ORAL ONCE
Qty: 4000 ML | Refills: 0 | Status: SHIPPED | OUTPATIENT
Start: 2022-02-04 | End: 2022-02-04

## 2022-02-09 ENCOUNTER — PATIENT MESSAGE (OUTPATIENT)
Dept: ADMINISTRATIVE | Facility: OTHER | Age: 77
End: 2022-02-09
Payer: MEDICARE

## 2022-02-09 ENCOUNTER — PATIENT MESSAGE (OUTPATIENT)
Dept: FAMILY MEDICINE | Facility: CLINIC | Age: 77
End: 2022-02-09
Payer: MEDICARE

## 2022-02-12 ENCOUNTER — PATIENT MESSAGE (OUTPATIENT)
Dept: FAMILY MEDICINE | Facility: CLINIC | Age: 77
End: 2022-02-12
Payer: MEDICARE

## 2022-03-10 ENCOUNTER — LAB VISIT (OUTPATIENT)
Dept: LAB | Facility: HOSPITAL | Age: 77
End: 2022-03-10
Attending: INTERNAL MEDICINE
Payer: MEDICARE

## 2022-03-10 DIAGNOSIS — E78.2 MIXED HYPERLIPIDEMIA: ICD-10-CM

## 2022-03-10 DIAGNOSIS — E04.1 THYROID NODULE: ICD-10-CM

## 2022-03-10 DIAGNOSIS — R73.09 ABNORMAL GLUCOSE: ICD-10-CM

## 2022-03-10 LAB
ALBUMIN SERPL BCP-MCNC: 3.6 G/DL (ref 3.5–5.2)
ALP SERPL-CCNC: 81 U/L (ref 55–135)
ALT SERPL W/O P-5'-P-CCNC: 17 U/L (ref 10–44)
ANION GAP SERPL CALC-SCNC: 9 MMOL/L (ref 8–16)
AST SERPL-CCNC: 16 U/L (ref 10–40)
BILIRUB SERPL-MCNC: 0.6 MG/DL (ref 0.1–1)
BUN SERPL-MCNC: 12 MG/DL (ref 8–23)
CALCIUM SERPL-MCNC: 9.6 MG/DL (ref 8.7–10.5)
CHLORIDE SERPL-SCNC: 106 MMOL/L (ref 95–110)
CHOLEST SERPL-MCNC: 119 MG/DL (ref 120–199)
CHOLEST/HDLC SERPL: 2.6 {RATIO} (ref 2–5)
CO2 SERPL-SCNC: 26 MMOL/L (ref 23–29)
CREAT SERPL-MCNC: 0.7 MG/DL (ref 0.5–1.4)
EST. GFR  (AFRICAN AMERICAN): >60 ML/MIN/1.73 M^2
EST. GFR  (NON AFRICAN AMERICAN): >60 ML/MIN/1.73 M^2
ESTIMATED AVG GLUCOSE: 126 MG/DL (ref 68–131)
GLUCOSE SERPL-MCNC: 89 MG/DL (ref 70–110)
HBA1C MFR BLD: 6 % (ref 4–5.6)
HDLC SERPL-MCNC: 45 MG/DL (ref 40–75)
HDLC SERPL: 37.8 % (ref 20–50)
LDLC SERPL CALC-MCNC: 58.4 MG/DL (ref 63–159)
NONHDLC SERPL-MCNC: 74 MG/DL
POTASSIUM SERPL-SCNC: 3.9 MMOL/L (ref 3.5–5.1)
PROT SERPL-MCNC: 6.7 G/DL (ref 6–8.4)
SODIUM SERPL-SCNC: 141 MMOL/L (ref 136–145)
TRIGL SERPL-MCNC: 78 MG/DL (ref 30–150)
TSH SERPL DL<=0.005 MIU/L-ACNC: 2.75 UIU/ML (ref 0.4–4)

## 2022-03-10 PROCEDURE — 83036 HEMOGLOBIN GLYCOSYLATED A1C: CPT | Performed by: INTERNAL MEDICINE

## 2022-03-10 PROCEDURE — 84443 ASSAY THYROID STIM HORMONE: CPT | Performed by: INTERNAL MEDICINE

## 2022-03-10 PROCEDURE — 36415 COLL VENOUS BLD VENIPUNCTURE: CPT | Mod: PO | Performed by: INTERNAL MEDICINE

## 2022-03-10 PROCEDURE — 80053 COMPREHEN METABOLIC PANEL: CPT | Performed by: INTERNAL MEDICINE

## 2022-03-10 PROCEDURE — 80061 LIPID PANEL: CPT | Performed by: INTERNAL MEDICINE

## 2022-03-10 NOTE — PROGRESS NOTES
HPI     DLS: 11/11/2021    Pt here for HVF review/OCT;  -S/p Trab with Xen gel stent and MMC OS 7/28/21   -S/p Trab with Xen gel stent and MMC OD 3/17/21     Meds;  Lumigan QHS OU   Cosopt ou bid   Systane PRN OU   Massage QID OU         Last edited by Yesica Mike MD on 3/11/2022  2:57 PM. (History)              Assessment /Plan     For exam results, see Encounter Report.    Primary open angle glaucoma of right eye, mild stage    Primary open angle glaucoma of left eye, moderate stage    Dry eye syndrome, bilateral    Steroid responder, bilateral    Glaucoma filtering bleb of right eye    Glaucoma filtering bleb, left eye    Pseudophakia of both eyes        NEW BASELINE VF'S AND OCT'S 3/2022- had prog ou pre-trabs w/ IOP's in 30's    1. Early POAG-  Both Eyes   Dx. Years ago with Dr. Eber Crouch  -Strong family hx   -IOP previously well controlled (mid teens) on Xalatan but elevated on generic and switched to lumigan    Family history   STRONG (Mom,Dad, and 5 sisters)  Glaucoma meds   lumigan ou / cosopt ou   H/O adverse rxn to glaucoma drops - combigan - itchy lids  // cosopt - angular blepharitis   LASERS   S/p SLT 4/24/14 OD //  5/8/14 OS (good response 21/22 --> 12/15)// yag cap OS 11/29/2018             Repeat SLT od 9/26/2019-  (good resp, 20-->15) // Repeat SLT os -10/17/2019 -(+ resp 19--> 13 )  GLAUCOMA SURGERIES   [place] od 11/30/2016 // trab w/ xen od 3/2021 // trab w/ xen os 7/2021  OTHER EYE SURGERIES  Phaco /IOL os 10/6/2016 // phaco/IOL / [place] od 11/30/2016   CDR  0.9 / 0.8 (+prog when IOP up in 30's pre trabs in 2021)  Tbase  ??  Tmax  36 od 3/5/2021 // 46 7/16/2021 os   Ttarget   16 OU  HVF - 11 VF '12 -22 -  OD SAD/IAD  ; OS: IAD /SNS                (+prog 2020 to 2022 ou when IOP up in 30's pre-trabs)        NEW BASELINE - 1 TEST 2022 TO 2022 - sad/iad OD // sns / iad OS   Gonio +3 OU  /540  OCT 4  test 2011 to 2017  - RNFL - OD:dec. S/N/I  // OS dec S. Brianna N (+ prog ou)   HRT   "7 test - 2012 to 2020 - MR -  Dec TS/NI border G od // dec NS, border G/T/TS/TI os /// CDR 0.71 od // 0.72 os  Disc photos  2012, 2015 - MR -  Bord S/N/I od // dec. S, bord I/N/T os /// CDR 0.684 od // 0.748 osOIS     Ttoday 19- 22/21-22                 Used to use flonase - but no longer using   Test done today - . HVF / DFE / OCT - new baseline VF and OCT post IOP elevation pre-trabs ou      2. H/O - hyperopia - Both Eyes -mild - pre-cataract surgery    3. Epiphora  - C/O constant tearing of the right eye x 6 months (8/2015   4.  PC IOL   OD - phaco/IOL / [place] 11/30 /2016 - PCB00 22.5  OS - Phaco/IOL - NO COLTON's - pt declined - 10/6/2016 - PCB00 24.0       Plan:  - S/P repeat slt ou ( 10/2019)  ou (target is 16 ou)   ++ H/O use of flonase -  Off all steroids     Pre - surgery glaucoma drops - IOP uncontrolled on MMM  at 37 od / 47 os   timolol XE or gfs   alphagan P   Dorzolamide   lumigan   rhopressa     -Goal IOP 16 OU    Intol to combigan -blepharitis / itch lids - tolerates alphagan P os  Intol to cosopt - angular blepharitis - tolerates dorzolamide os  - tolerating cosopt as of 3/2022  Tolerates Lumigan ou   Tolerates  timolol gfs // timoptic XE ou q am -   rhopressa ou q day - tolerates ok -- good initial response  28/19--> 19/17 // back up again 36/27    SLT od - 9/26/2019 -  20-->15 // repeat - good initial response   SLT os -  10/17/2019 -19--> 13 // repeat - good initial response     Good response to primary SLT  in 2014     Rx glasses given 2/2017     S/P trabeculectomy  With xen and with mitomycin OD  Date:3/17/2021    POM 11 nice "looking bleb" - off steroids (had a re-bound iritis for a while)   IOP  od post trab with MMC and xen - creeping up -    Intolerant to diamox - - low potassium and low heart rate - stopped 5/28/2021    POST - OP TRAB WITH MMC AND XEN GEL STENT OS - 2ND EYE - 7/28/2021  POM 8  with/ xen/MMC OS  anterior chamber depth  Deep   Bleb appearance  Diffuse   AC formed - deep "   sidell neg  IOP  22    Back on  aspirin -    massage OU (8/26/2021) 4 x day for 60 sec at a time      (was on timolol, alphaganP, dorzolamdie . lumigan . rhopressa pre-op and tolerated them all ok)     IOP higher than ideal ou   Cont massaage ou qid   Cont  lumigan or latanoprost ou qhs (above target of 16 ou)   Cont  cosopt bid ou   Add back alphagan P ou 2-3 x day     Blepharitis   WC/LH/AT's and EES oint     Will need new baseline disc photos in future - CDR has progressed (3/2022)     F/u 3-4  months -  IOP check with addition of alphagan P agaiin (( has used rhopressa in past )    ++ VF and OCT prog on 3/11/2022 - likely occurred when IOP up in the 30's ou prior to trabs ou    Back on cosopt and lumigan - IOP still higher than ideal // add back alphganP

## 2022-03-11 ENCOUNTER — CLINICAL SUPPORT (OUTPATIENT)
Dept: OPHTHALMOLOGY | Facility: CLINIC | Age: 77
End: 2022-03-11
Payer: MEDICARE

## 2022-03-11 ENCOUNTER — PATIENT MESSAGE (OUTPATIENT)
Dept: ENDOSCOPY | Facility: HOSPITAL | Age: 77
End: 2022-03-11
Payer: MEDICARE

## 2022-03-11 ENCOUNTER — OFFICE VISIT (OUTPATIENT)
Dept: OPHTHALMOLOGY | Facility: CLINIC | Age: 77
End: 2022-03-11
Payer: MEDICARE

## 2022-03-11 DIAGNOSIS — H40.1111 PRIMARY OPEN ANGLE GLAUCOMA OF RIGHT EYE, MILD STAGE: Primary | ICD-10-CM

## 2022-03-11 DIAGNOSIS — Z96.1 PSEUDOPHAKIA OF BOTH EYES: ICD-10-CM

## 2022-03-11 DIAGNOSIS — Z98.83 GLAUCOMA FILTERING BLEB, LEFT EYE: ICD-10-CM

## 2022-03-11 DIAGNOSIS — H40.1122 PRIMARY OPEN ANGLE GLAUCOMA OF LEFT EYE, MODERATE STAGE: ICD-10-CM

## 2022-03-11 DIAGNOSIS — H40.043 STEROID RESPONDER, BILATERAL: ICD-10-CM

## 2022-03-11 DIAGNOSIS — H04.123 DRY EYE SYNDROME, BILATERAL: ICD-10-CM

## 2022-03-11 DIAGNOSIS — Z98.83 GLAUCOMA FILTERING BLEB OF RIGHT EYE: ICD-10-CM

## 2022-03-11 DIAGNOSIS — H40.1111 PRIMARY OPEN ANGLE GLAUCOMA OF RIGHT EYE, MILD STAGE: ICD-10-CM

## 2022-03-11 PROCEDURE — 92014 PR EYE EXAM, EST PATIENT,COMPREHESV: ICD-10-PCS | Mod: S$GLB,,, | Performed by: OPHTHALMOLOGY

## 2022-03-11 PROCEDURE — 92014 COMPRE OPH EXAM EST PT 1/>: CPT | Mod: S$GLB,,, | Performed by: OPHTHALMOLOGY

## 2022-03-11 PROCEDURE — 1159F MED LIST DOCD IN RCRD: CPT | Mod: CPTII,S$GLB,, | Performed by: OPHTHALMOLOGY

## 2022-03-11 PROCEDURE — 99999 PR PBB SHADOW E&M-EST. PATIENT-LVL III: CPT | Mod: PBBFAC,,, | Performed by: OPHTHALMOLOGY

## 2022-03-11 PROCEDURE — 99999 PR PBB SHADOW E&M-EST. PATIENT-LVL III: ICD-10-PCS | Mod: PBBFAC,,, | Performed by: OPHTHALMOLOGY

## 2022-03-11 PROCEDURE — 3044F HG A1C LEVEL LT 7.0%: CPT | Mod: CPTII,S$GLB,, | Performed by: OPHTHALMOLOGY

## 2022-03-11 PROCEDURE — 3044F PR MOST RECENT HEMOGLOBIN A1C LEVEL <7.0%: ICD-10-PCS | Mod: CPTII,S$GLB,, | Performed by: OPHTHALMOLOGY

## 2022-03-11 PROCEDURE — 1160F RVW MEDS BY RX/DR IN RCRD: CPT | Mod: CPTII,S$GLB,, | Performed by: OPHTHALMOLOGY

## 2022-03-11 PROCEDURE — 1159F PR MEDICATION LIST DOCUMENTED IN MEDICAL RECORD: ICD-10-PCS | Mod: CPTII,S$GLB,, | Performed by: OPHTHALMOLOGY

## 2022-03-11 PROCEDURE — 1160F PR REVIEW ALL MEDS BY PRESCRIBER/CLIN PHARMACIST DOCUMENTED: ICD-10-PCS | Mod: CPTII,S$GLB,, | Performed by: OPHTHALMOLOGY

## 2022-03-11 RX ORDER — BRIMONIDINE TARTRATE 1 MG/ML
1 SOLUTION/ DROPS OPHTHALMIC 2 TIMES DAILY
Qty: 15 ML | Refills: 3 | Status: SHIPPED | OUTPATIENT
Start: 2022-03-11 | End: 2022-04-04 | Stop reason: SDUPTHER

## 2022-03-11 NOTE — PROGRESS NOTES
HVF done ou./rel/fix/coop. good ou./chart checked for latex allergy./-1.25 + 1.75 x 85/od -.75 + 1.25 x 72/os-smh

## 2022-03-12 NOTE — PROGRESS NOTES
This note was created by combination of typed  and M-Modal dictation.  Transcription errors may be present.  If there are any questions, please contact me.    Assessment and Plan:   Normal physical exam  History of colon polyps; 1/23/2017 colonoscopy normal repeat 5 years  -upcoming colonoscopy  Pre visit labs reviewed  Physical exam 1 year  -     CBC Auto Differential; Future; Expected date: 03/14/2023  -     Comprehensive Metabolic Panel; Future; Expected date: 03/14/2023  -     Lipid Panel; Future; Expected date: 03/14/2023  -     Hemoglobin A1C; Future; Expected date: 03/14/2023    Abnormal glucose  Class 2 severe obesity due to excess calories with serious comorbidity in adult, unspecified BMI  -A1c unchanged.  Continue working on TLC  -     CBC Auto Differential; Future; Expected date: 03/14/2023  -     Comprehensive Metabolic Panel; Future; Expected date: 03/14/2023  -     Hemoglobin A1C; Future; Expected date: 03/14/2023    Essential hypertension  -is concerned that her blood pressure went down with addition of Alphagan eyedrops.  Blood pressure today was within normal.  Not feeling lightheaded, dizzy, presyncopal etc..  No changes.  She is followed by digital monitoring though I do not see any readings since early February.  I suspect she may need a software update.  I have asked her to stop off at the [facility] downstairs afterwards to troubleshoot  -     hydroCHLOROthiazide (MICROZIDE) 12.5 mg capsule; Take 1 capsule (12.5 mg total) by mouth once daily.  Dispense: 90 capsule; Refill: 3  -     irbesartan (AVAPRO) 150 MG tablet; Take 1 tablet (150 mg total) by mouth every evening.  Dispense: 90 tablet; Refill: 3    Mixed hyperlipidemia  Pure hypercholesterolemia  -pre visit labs good no changes taking statin every other day  -     Lipid Panel; Future; Expected date: 03/14/2023  -     atorvastatin (LIPITOR) 10 MG tablet; TAKE 1 TABLET (10 MG TOTAL) BY MOUTH EVERY OTHER DAY.  Dispense: 45 tablet;  Refill: 3    Thyroid nodule on US; followed by endo repeat 02/2023  -plan for repeat thyroid ultrasound next February.  Followed by Endocrinology with upcoming follow-up in May    Asymptomatic menopausal state  -last DEXA in 2016 was normal  -     DXA Bone Density Spine And Hip; Future; Expected date: 03/14/2022    Idiopathic peripheral neuropathy normal B12, TSH; A1c pre-DM. 6/2019 gabapentin  -upcoming follow-up with Neurology.  History of gabapentin without relief.  Offered her Cymbalta.  After discussion of side effect profile she would like to defer until her consult with Neurology    Medications Discontinued During This Encounter   Medication Reason    atorvastatin (LIPITOR) 10 MG tablet Reorder    hydroCHLOROthiazide (MICROZIDE) 12.5 mg capsule Reorder    irbesartan (AVAPRO) 150 MG tablet Reorder    erythromycin (ROMYCIN) ophthalmic ointment Patient no longer taking    meloxicam (MOBIC) 15 MG tablet Patient no longer taking    co-enzyme Q-10 30 mg capsule Patient no longer taking       meds sent this encounter:  Medications Ordered This Encounter   Medications    atorvastatin (LIPITOR) 10 MG tablet     Sig: TAKE 1 TABLET (10 MG TOTAL) BY MOUTH EVERY OTHER DAY.     Dispense:  45 tablet     Refill:  3    hydroCHLOROthiazide (MICROZIDE) 12.5 mg capsule     Sig: Take 1 capsule (12.5 mg total) by mouth once daily.     Dispense:  90 capsule     Refill:  3     .    irbesartan (AVAPRO) 150 MG tablet     Sig: Take 1 tablet (150 mg total) by mouth every evening.     Dispense:  90 tablet     Refill:  3     .       Follow Up: No follow-ups on file.  Physical exam 1 year with labs    Subjective:     Chief Complaint   Patient presents with    Annual Exam       HPI  Rosmery is a 76 y.o. female, last appointment with this clinic was Visit date not found.  Social History     Tobacco Use    Smoking status: Never Smoker    Smokeless tobacco: Never Used   Substance Use Topics    Alcohol use: Yes     Alcohol/week:  0.0 standard drinks     Comment: Rarely; 1 glass of wine      Social History     Occupational History    Occupation: retired - formerly pastoral care with [employer]      Social History     Social History Narrative    Not on file       No LMP recorded. Patient has had a hysterectomy.    Last visit with me in July for preop for eye surgery  Thyroid nodule repeat ultrasound due February 2023  TSH normal though TPO antibody high.  Hypertension, abnormal glucose, hyperlipidemia    Upcoming colonoscopy 3/28    Pre visit labs  CMP normal  Lipid profile good  A1c 6.0 stable  TSH good    Since starting eye drops alphagan P - BP has dropped.  Home readings 120s systolic.  However on review it looks like the only readings that have come through with digital monitoring have been from early February.  She has been checking it at least weekly.  Discussed that there may be some soft where update required and I have asked her to stop off at the [facility].  She is taking her medications and denies any obvious issues.  Maybe some fatigue, she attributes to the new eye drop.  Not really new and not debilitating.    She looked at her labs online.      Has f/u with neuro.  Hx of peripheral neuropathy, LBP and hx of lory without relief.   Wears the diabetic socks - the only way she does not feel numbness (puts pressure on her feet)?   But at night when she takes it off her feet get cold so she has to keep them on.   Numbness of the foot L > R  Talked about cymbalta.  She defers at this time.     Notes BMs not to her satisfaction.  Inquiring about probiotic.  Eating more greens.  Juicing more.  Wonders if juicing actually makes her more constipated.  Discussed that I would prefer that she eat more vegetables rather than grind them up into a smoothie.  Also try preserved fruits like prunes.  If she really wants try a probiotic I have given her the name of Align probiotic to try.    Patient Care Team:  Ancelmo Sumner MD as PCP - General  (Internal Medicine)  Brooke Delong MD as Obstetrician (Obstetrics)  Arnoldo Valentin MD as Consulting Physician (Otolaryngology)  Tim Freeman MA as Care Coordinator  Yesica Mike MD as Consulting Physician (Ophthalmology)  Brandan Nava MD as Consulting Physician (Orthopedic Surgery)  Jagjit Feldman MD as Consulting Physician (Endocrinology)  Ailyn Dubois as Digital Medicine Health   Nawaf CernaD as Hypertension Digital Medicine Clinician (Pharmacist)  Ancelmo Sumner MD as Hypertension Digital Medicine Responsible Provider (Internal Medicine)  [employer] as Hypertension Digital Medicine Contract    Patient Active Problem List    Diagnosis Date Noted    Primary open-angle glaucoma, left eye, moderate stage 07/28/2021    Primary open angle glaucoma of right eye, mild stage 03/17/2021    Acute pain of left knee 06/18/2019    Weakness of both hips 06/18/2019    Decreased independence with transfers 06/18/2019    Impaired functional mobility, balance, gait, and endurance 06/18/2019    Idiopathic peripheral neuropathy normal B12, TSH; A1c pre-DM. 6/2019 gabapentin 06/03/2019    Thyroid nodule on US; followed by endo repeat 02/2023 04/22/2019 2/21/20 thyroid US: 1.)  Thyroid gland is upper limits of normal in size with heterogeneous echotexture and normal vascularity  2.) 1.31 x 1.07 x 0.91 cm solid, heterogeneous predominately isoechoic nodule is seen in the right inferior pole  3.) 0.72 x 0.48 x 0.50 cm solid, hyperechoic nodule is seen in the right inferior pole  4.) 0.85 x 0.49 x 0.72 cm solid, isoechoic nodule is seen in the right inferior pole  RECOMMENDATIONS: Recommend repeat thyroid ultrasound in 1 year.  2/2021 thyroid US: 1.  Thyroid gland is normal in size with diffusely heterogenous echotexture. 2.  Two nodules in the right thyroid described above.  None meet criteria for fine-needle aspiration.      Abnormal glucose 04/18/2019     Vitamin D deficiency 04/04/2019    Chronic bilateral low back pain without sciatica 11/02/2018    Bradycardia 6/2018 holter negative 07/20/2018    Status post hysterectomy 07/20/2018    Statin myopathy 07/20/2018    History of colon polyps; 1/23/2017 colonoscopy normal repeat 5 years 01/23/2017    Left knee pain 01/05/2017    Nuclear sclerosis 11/30/2016    Cataract 09/07/2016     Dx updated per 2019 IMO Load      Class 2 severe obesity due to excess calories with serious comorbidity in adult, unspecified BMI     POAG (primary open-angle glaucoma) 01/05/2016    Essential hypertension 08/13/2012 01/13/2021 TTE LV normal size with normal systolic function.  LVEF 55%.  Normal diastolic function.  Normal RV size and normal function.  Normal CVP.  PA pressure 20.      Hyperlipidemia; statin myalgias; 6/28/2018 exercise stress echo neg for ischemia 08/13/2012 6/28/2018 exercise stress echo CONCLUSIONS   1 - Normal left ventricular systolic function (EF 60-65%).   2 - No wall motion abnormalities.   3 - Normal left ventricular diastolic function.   4 - Normal right ventricular systolic function .          PAST MEDICAL PROBLEMS, PAST SURGICAL HISTORY: please see relevant portions of the electronic medical record    ALLERGIES AND MEDICATIONS: updated and reviewed.  Review of patient's allergies indicates:   Allergen Reactions    Lisinopril Swelling    Amlodipine Edema    Combigan [brimonidine-timolol]      Causes itchy eyelids and contact dermatitis    Cosopt [dorzolamide-timolol]      Causes angular blepharitis of eyelids    Pravastatin      Myalgia and elevated CPK       Medication List with Changes/Refills   Current Medications    ASCORBIC ACID, VITAMIN C, (VITAMIN C) 1000 MG TABLET    Take 1,000 mg by mouth once daily.    ASPIRIN (ECOTRIN) 81 MG EC TABLET    Take 81 mg by mouth once daily.    ATORVASTATIN (LIPITOR) 10 MG TABLET    TAKE 1 TABLET (10 MG TOTAL) BY MOUTH EVERY OTHER DAY.     "BRIMONIDINE 0.1% (ALPHAGAN P) 0.1 % DROP    Place 1 drop into both eyes 2 (two) times daily.    CALCIUM-VITAMIN D3 (OS-LETY 500 + D3) 500 MG(1,250MG) -200 UNIT PER TABLET    Take 1 tablet by mouth 2 (two) times daily with meals.    CO-ENZYME Q-10 30 MG CAPSULE    Take 100 mg by mouth once daily.     DORZOLAMIDE-TIMOLOL 2-0.5% (COSOPT) 22.3-6.8 MG/ML OPHTHALMIC SOLUTION    Place 1 drop into both eyes 2 (two) times daily.    ERYTHROMYCIN (ROMYCIN) OPHTHALMIC OINTMENT    Apply to lids and lashes at bedtime as needed for itchy eyelis    HYDROCHLOROTHIAZIDE (MICROZIDE) 12.5 MG CAPSULE    TAKE 1 CAPSULE (12.5 MG TOTAL) BY MOUTH ONCE DAILY.    IRBESARTAN (AVAPRO) 150 MG TABLET    TAKE 1 TABLET (150 MG TOTAL) BY MOUTH EVERY EVENING.    LUMIGAN 0.01 % DROP    Place 1 drop into both eyes every evening.    MELOXICAM (MOBIC) 15 MG TABLET    Take 1 tablet (15 mg total) by mouth once daily.    MULTIVITAMIN (THERAGRAN) PER TABLET    Take 1 tablet by mouth once daily.      Review of Systems   Constitutional: Negative for fever, malaise/fatigue and weight loss.   HENT: Negative for congestion.    Eyes: Negative for blurred vision and pain.   Respiratory: Negative for shortness of breath and wheezing.    Cardiovascular: Negative for chest pain, palpitations and leg swelling.   Gastrointestinal: Positive for constipation. Negative for abdominal pain, blood in stool, diarrhea and heartburn.   Genitourinary: Negative for dysuria.   Neurological: Negative for tingling, focal weakness, weakness and headaches.       Objective:   Physical Exam   Vitals:    03/14/22 1348 03/14/22 1414   BP: 104/60 114/70   BP Location: Left arm Left arm   Patient Position: Sitting Sitting   BP Method: Large (Manual) Large (Manual)   Pulse: 76    Temp: 98 °F (36.7 °C)    TempSrc: Oral    SpO2: 99%    Height: 5' 5" (1.651 m)     Body mass index is 33.38 kg/m².            Physical Exam  Constitutional:       Appearance: She is well-developed.   HENT:      Right " Ear: Tympanic membrane, ear canal and external ear normal.      Left Ear: Tympanic membrane, ear canal and external ear normal.   Eyes:      General: No scleral icterus.     Pupils: Pupils are equal, round, and reactive to light.   Neck:      Thyroid: No thyromegaly.   Cardiovascular:      Rate and Rhythm: Normal rate and regular rhythm.      Heart sounds: Normal heart sounds. No murmur heard.  Pulmonary:      Effort: Pulmonary effort is normal.      Breath sounds: Normal breath sounds. No wheezing.   Abdominal:      Palpations: Abdomen is soft. There is no hepatomegaly, splenomegaly or mass.      Tenderness: There is no abdominal tenderness.   Musculoskeletal:         General: No deformity. Normal range of motion.      Cervical back: Neck supple.   Lymphadenopathy:      Cervical: No cervical adenopathy.   Skin:     General: Skin is warm and dry.      Findings: No rash.      Comments: On exposed skin   Neurological:      Mental Status: She is alert and oriented to person, place, and time.      Deep Tendon Reflexes: Reflexes are normal and symmetric.   Psychiatric:         Behavior: Behavior normal.         Thought Content: Thought content normal.         Judgment: Judgment normal.         Component      Latest Ref Rng & Units 3/10/2022 2/25/2021   Sodium      136 - 145 mmol/L 141 140   Potassium      3.5 - 5.1 mmol/L 3.9 4.2   Chloride      95 - 110 mmol/L 106 105   CO2      23 - 29 mmol/L 26 29   Glucose      70 - 110 mg/dL 89 94   BUN      8 - 23 mg/dL 12 13   Creatinine      0.5 - 1.4 mg/dL 0.7 0.8   Calcium      8.7 - 10.5 mg/dL 9.6 9.7   PROTEIN TOTAL      6.0 - 8.4 g/dL 6.7 7.1   Albumin      3.5 - 5.2 g/dL 3.6 3.9   BILIRUBIN TOTAL      0.1 - 1.0 mg/dL 0.6 0.7   Alkaline Phosphatase      55 - 135 U/L 81 85   AST      10 - 40 U/L 16 19   ALT      10 - 44 U/L 17 19   Anion Gap      8 - 16 mmol/L 9 6 (L)   eGFR if African American      >60 mL/min/1.73 m:2 >60.0 >60.0   eGFR if non       >60  mL/min/1.73 m:2 >60.0 >60.0   Cholesterol      120 - 199 mg/dL 119 (L) 129   Triglycerides      30 - 150 mg/dL 78 97   HDL      40 - 75 mg/dL 45 44   LDL Cholesterol External      63.0 - 159.0 mg/dL 58.4 (L) 65.6   HDL/Cholesterol Ratio      20.0 - 50.0 % 37.8 34.1   Total Cholesterol/HDL Ratio      2.0 - 5.0 2.6 2.9   Non-HDL Cholesterol      mg/dL 74 85   Hemoglobin A1C External      4.0 - 5.6 % 6.0 (H) 6.0 (H)   Estimated Avg Glucose      68 - 131 mg/dL 126 126   TSH      0.400 - 4.000 uIU/mL 2.750 2.118   Thyroperoxidase Antibodies      <6.0 IU/mL  1215.2 (H)

## 2022-03-14 ENCOUNTER — OFFICE VISIT (OUTPATIENT)
Dept: FAMILY MEDICINE | Facility: CLINIC | Age: 77
End: 2022-03-14
Payer: MEDICARE

## 2022-03-14 VITALS
BODY MASS INDEX: 33.38 KG/M2 | TEMPERATURE: 98 F | HEART RATE: 76 BPM | OXYGEN SATURATION: 99 % | SYSTOLIC BLOOD PRESSURE: 114 MMHG | DIASTOLIC BLOOD PRESSURE: 70 MMHG | HEIGHT: 65 IN

## 2022-03-14 DIAGNOSIS — E66.01 CLASS 2 SEVERE OBESITY DUE TO EXCESS CALORIES WITH SERIOUS COMORBIDITY IN ADULT, UNSPECIFIED BMI: ICD-10-CM

## 2022-03-14 DIAGNOSIS — G60.9 IDIOPATHIC PERIPHERAL NEUROPATHY: ICD-10-CM

## 2022-03-14 DIAGNOSIS — I10 ESSENTIAL HYPERTENSION: ICD-10-CM

## 2022-03-14 DIAGNOSIS — E78.00 PURE HYPERCHOLESTEROLEMIA: ICD-10-CM

## 2022-03-14 DIAGNOSIS — Z86.010 HISTORY OF COLON POLYPS: ICD-10-CM

## 2022-03-14 DIAGNOSIS — E78.2 MIXED HYPERLIPIDEMIA: ICD-10-CM

## 2022-03-14 DIAGNOSIS — Z78.0 ASYMPTOMATIC MENOPAUSAL STATE: ICD-10-CM

## 2022-03-14 DIAGNOSIS — Z00.00 NORMAL PHYSICAL EXAM: Primary | ICD-10-CM

## 2022-03-14 DIAGNOSIS — E04.1 THYROID NODULE: ICD-10-CM

## 2022-03-14 DIAGNOSIS — R73.09 ABNORMAL GLUCOSE: ICD-10-CM

## 2022-03-14 PROCEDURE — 99999 PR PBB SHADOW E&M-EST. PATIENT-LVL IV: CPT | Mod: PBBFAC,,, | Performed by: INTERNAL MEDICINE

## 2022-03-14 PROCEDURE — 99499 RISK ADDL DX/OHS AUDIT: ICD-10-PCS | Mod: S$GLB,,, | Performed by: INTERNAL MEDICINE

## 2022-03-14 PROCEDURE — 3074F PR MOST RECENT SYSTOLIC BLOOD PRESSURE < 130 MM HG: ICD-10-PCS | Mod: CPTII,S$GLB,, | Performed by: INTERNAL MEDICINE

## 2022-03-14 PROCEDURE — 3288F PR FALLS RISK ASSESSMENT DOCUMENTED: ICD-10-PCS | Mod: CPTII,S$GLB,, | Performed by: INTERNAL MEDICINE

## 2022-03-14 PROCEDURE — 3074F SYST BP LT 130 MM HG: CPT | Mod: CPTII,S$GLB,, | Performed by: INTERNAL MEDICINE

## 2022-03-14 PROCEDURE — 1101F PT FALLS ASSESS-DOCD LE1/YR: CPT | Mod: CPTII,S$GLB,, | Performed by: INTERNAL MEDICINE

## 2022-03-14 PROCEDURE — 1126F AMNT PAIN NOTED NONE PRSNT: CPT | Mod: CPTII,S$GLB,, | Performed by: INTERNAL MEDICINE

## 2022-03-14 PROCEDURE — 1101F PR PT FALLS ASSESS DOC 0-1 FALLS W/OUT INJ PAST YR: ICD-10-PCS | Mod: CPTII,S$GLB,, | Performed by: INTERNAL MEDICINE

## 2022-03-14 PROCEDURE — 1160F PR REVIEW ALL MEDS BY PRESCRIBER/CLIN PHARMACIST DOCUMENTED: ICD-10-PCS | Mod: CPTII,S$GLB,, | Performed by: INTERNAL MEDICINE

## 2022-03-14 PROCEDURE — 99499 UNLISTED E&M SERVICE: CPT | Mod: S$GLB,,, | Performed by: INTERNAL MEDICINE

## 2022-03-14 PROCEDURE — 1126F PR PAIN SEVERITY QUANTIFIED, NO PAIN PRESENT: ICD-10-PCS | Mod: CPTII,S$GLB,, | Performed by: INTERNAL MEDICINE

## 2022-03-14 PROCEDURE — 3044F HG A1C LEVEL LT 7.0%: CPT | Mod: CPTII,S$GLB,, | Performed by: INTERNAL MEDICINE

## 2022-03-14 PROCEDURE — 3078F DIAST BP <80 MM HG: CPT | Mod: CPTII,S$GLB,, | Performed by: INTERNAL MEDICINE

## 2022-03-14 PROCEDURE — 99397 PER PM REEVAL EST PAT 65+ YR: CPT | Mod: S$GLB,,, | Performed by: INTERNAL MEDICINE

## 2022-03-14 PROCEDURE — 3288F FALL RISK ASSESSMENT DOCD: CPT | Mod: CPTII,S$GLB,, | Performed by: INTERNAL MEDICINE

## 2022-03-14 PROCEDURE — 1159F MED LIST DOCD IN RCRD: CPT | Mod: CPTII,S$GLB,, | Performed by: INTERNAL MEDICINE

## 2022-03-14 PROCEDURE — 99999 PR PBB SHADOW E&M-EST. PATIENT-LVL IV: ICD-10-PCS | Mod: PBBFAC,,, | Performed by: INTERNAL MEDICINE

## 2022-03-14 PROCEDURE — 1159F PR MEDICATION LIST DOCUMENTED IN MEDICAL RECORD: ICD-10-PCS | Mod: CPTII,S$GLB,, | Performed by: INTERNAL MEDICINE

## 2022-03-14 PROCEDURE — 3078F PR MOST RECENT DIASTOLIC BLOOD PRESSURE < 80 MM HG: ICD-10-PCS | Mod: CPTII,S$GLB,, | Performed by: INTERNAL MEDICINE

## 2022-03-14 PROCEDURE — 3044F PR MOST RECENT HEMOGLOBIN A1C LEVEL <7.0%: ICD-10-PCS | Mod: CPTII,S$GLB,, | Performed by: INTERNAL MEDICINE

## 2022-03-14 PROCEDURE — 1160F RVW MEDS BY RX/DR IN RCRD: CPT | Mod: CPTII,S$GLB,, | Performed by: INTERNAL MEDICINE

## 2022-03-14 PROCEDURE — 99397 PR PREVENTIVE VISIT,EST,65 & OVER: ICD-10-PCS | Mod: S$GLB,,, | Performed by: INTERNAL MEDICINE

## 2022-03-14 RX ORDER — IRBESARTAN 150 MG/1
150 TABLET ORAL NIGHTLY
Qty: 90 TABLET | Refills: 3 | Status: SHIPPED | OUTPATIENT
Start: 2022-03-14 | End: 2023-01-26 | Stop reason: SDUPTHER

## 2022-03-14 RX ORDER — ATORVASTATIN CALCIUM 10 MG/1
TABLET, FILM COATED ORAL
Qty: 45 TABLET | Refills: 3 | Status: SHIPPED | OUTPATIENT
Start: 2022-03-14 | End: 2023-01-26 | Stop reason: SDUPTHER

## 2022-03-14 RX ORDER — HYDROCHLOROTHIAZIDE 12.5 MG/1
12.5 CAPSULE ORAL DAILY
Qty: 90 CAPSULE | Refills: 3 | Status: SHIPPED | OUTPATIENT
Start: 2022-03-14 | End: 2022-05-10 | Stop reason: SDUPTHER

## 2022-03-14 NOTE — PATIENT INSTRUCTIONS
For constipation - instead of making greens into smoothie, eat the greens/chew them.  Try prunes  Drink water  If you want to try probiotic can try Align.

## 2022-03-21 ENCOUNTER — PATIENT MESSAGE (OUTPATIENT)
Dept: FAMILY MEDICINE | Facility: CLINIC | Age: 77
End: 2022-03-21
Payer: MEDICARE

## 2022-03-28 ENCOUNTER — ANESTHESIA EVENT (OUTPATIENT)
Dept: ENDOSCOPY | Facility: HOSPITAL | Age: 77
End: 2022-03-28
Payer: MEDICARE

## 2022-03-28 ENCOUNTER — HOSPITAL ENCOUNTER (OUTPATIENT)
Facility: HOSPITAL | Age: 77
Discharge: HOME | End: 2022-03-28
Attending: COLON & RECTAL SURGERY | Admitting: COLON & RECTAL SURGERY
Payer: MEDICARE

## 2022-03-28 ENCOUNTER — ANESTHESIA (OUTPATIENT)
Dept: ENDOSCOPY | Facility: HOSPITAL | Age: 77
End: 2022-03-28
Payer: MEDICARE

## 2022-03-28 VITALS
HEIGHT: 66 IN | RESPIRATION RATE: 16 BRPM | OXYGEN SATURATION: 100 % | TEMPERATURE: 98 F | BODY MASS INDEX: 30.53 KG/M2 | SYSTOLIC BLOOD PRESSURE: 149 MMHG | HEART RATE: 55 BPM | WEIGHT: 190 LBS | DIASTOLIC BLOOD PRESSURE: 67 MMHG

## 2022-03-28 DIAGNOSIS — Z12.11 SCREENING FOR MALIGNANT NEOPLASM OF COLON: ICD-10-CM

## 2022-03-28 DIAGNOSIS — Z12.11 SCREENING FOR COLON CANCER: Primary | ICD-10-CM

## 2022-03-28 PROCEDURE — 25000003 PHARM REV CODE 250: Performed by: COLON & RECTAL SURGERY

## 2022-03-28 PROCEDURE — 45385 PR COLONOSCOPY,REMV LESN,SNARE: ICD-10-PCS | Mod: PT,,, | Performed by: COLON & RECTAL SURGERY

## 2022-03-28 PROCEDURE — 45385 COLONOSCOPY W/LESION REMOVAL: CPT | Mod: PT | Performed by: COLON & RECTAL SURGERY

## 2022-03-28 PROCEDURE — E9220 PRA ENDO ANESTHESIA: ICD-10-PCS | Mod: PT,,, | Performed by: NURSE ANESTHETIST, CERTIFIED REGISTERED

## 2022-03-28 PROCEDURE — 37000008 HC ANESTHESIA 1ST 15 MINUTES: Performed by: COLON & RECTAL SURGERY

## 2022-03-28 PROCEDURE — 37000009 HC ANESTHESIA EA ADD 15 MINS: Performed by: COLON & RECTAL SURGERY

## 2022-03-28 PROCEDURE — 88305 TISSUE EXAM BY PATHOLOGIST: CPT | Performed by: PATHOLOGY

## 2022-03-28 PROCEDURE — E9220 PRA ENDO ANESTHESIA: HCPCS | Mod: PT,,, | Performed by: NURSE ANESTHETIST, CERTIFIED REGISTERED

## 2022-03-28 PROCEDURE — 27201089 HC SNARE, DISP (ANY): Performed by: COLON & RECTAL SURGERY

## 2022-03-28 PROCEDURE — 45385 COLONOSCOPY W/LESION REMOVAL: CPT | Mod: PT,,, | Performed by: COLON & RECTAL SURGERY

## 2022-03-28 PROCEDURE — 25000003 PHARM REV CODE 250: Performed by: NURSE ANESTHETIST, CERTIFIED REGISTERED

## 2022-03-28 PROCEDURE — 63600175 PHARM REV CODE 636 W HCPCS: Performed by: NURSE ANESTHETIST, CERTIFIED REGISTERED

## 2022-03-28 PROCEDURE — 88305 TISSUE EXAM BY PATHOLOGIST: CPT | Mod: 26,,, | Performed by: PATHOLOGY

## 2022-03-28 PROCEDURE — 88305 TISSUE EXAM BY PATHOLOGIST: ICD-10-PCS | Mod: 26,,, | Performed by: PATHOLOGY

## 2022-03-28 RX ORDER — LIDOCAINE HYDROCHLORIDE 20 MG/ML
INJECTION INTRAVENOUS
Status: DISCONTINUED | OUTPATIENT
Start: 2022-03-28 | End: 2022-03-28

## 2022-03-28 RX ORDER — SODIUM CHLORIDE 9 MG/ML
INJECTION, SOLUTION INTRAVENOUS CONTINUOUS
Status: DISCONTINUED | OUTPATIENT
Start: 2022-03-28 | End: 2022-03-28 | Stop reason: HOSPADM

## 2022-03-28 RX ORDER — PROPOFOL 10 MG/ML
VIAL (ML) INTRAVENOUS
Status: DISCONTINUED | OUTPATIENT
Start: 2022-03-28 | End: 2022-03-28

## 2022-03-28 RX ORDER — PROPOFOL 10 MG/ML
VIAL (ML) INTRAVENOUS CONTINUOUS PRN
Status: DISCONTINUED | OUTPATIENT
Start: 2022-03-28 | End: 2022-03-28

## 2022-03-28 RX ADMIN — SODIUM CHLORIDE: 0.9 INJECTION, SOLUTION INTRAVENOUS at 07:03

## 2022-03-28 RX ADMIN — LIDOCAINE HYDROCHLORIDE 50 MG: 20 INJECTION, SOLUTION INTRAVENOUS at 08:03

## 2022-03-28 RX ADMIN — PROPOFOL 60 MG: 10 INJECTION, EMULSION INTRAVENOUS at 08:03

## 2022-03-28 RX ADMIN — Medication 150 MCG/KG/MIN: at 08:03

## 2022-03-28 NOTE — ANESTHESIA POSTPROCEDURE EVALUATION
Anesthesia Post Evaluation    Patient: Rosmery Ramirez    Procedure(s) Performed: Procedure(s) (LRB):  COLONOSCOPY (N/A)    Final Anesthesia Type: general      Patient location during evaluation: GI PACU  Patient participation: Yes- Able to Participate  Level of consciousness: awake and alert  Post-procedure vital signs: reviewed and stable  Pain management: adequate  Airway patency: patent    PONV status at discharge: No PONV  Anesthetic complications: no      Cardiovascular status: blood pressure returned to baseline  Respiratory status: unassisted, spontaneous ventilation and room air  Hydration status: euvolemic            Vitals Value Taken Time   /66 03/28/22 0850   Temp 36.7 °C (98.1 °F) 03/28/22 0838   Pulse 56 03/28/22 0850   Resp 16 03/28/22 0850   SpO2 98 % 03/28/22 0850         No case tracking events are documented in the log.      Pain/Nichole Score: Nichole Score: 8 (3/28/2022  8:38 AM)

## 2022-03-28 NOTE — PROVATION PATIENT INSTRUCTIONS
Discharge Summary/Instructions after an Endoscopic Procedure  Patient Name: Rosmery Ramirez  Patient MRN: [id number]  Patient YOB: 1945 Monday, March 28, 2022  Jamison Chambers MD  Dear patient,  As a result of recent federal legislation (The Federal Cures Act), you may   receive lab or pathology results from your procedure in your [facility]   account before your physician is able to contact you. Your physician or   their representative will relay the results to you with their   recommendations at their soonest availability.  Thank you,  RESTRICTIONS:  During your procedure today, you received medications for sedation.  These   medications may affect your judgment, balance and coordination.  Therefore,   for 24 hours, you have the following restrictions:   - DO NOT drive a car, operate machinery, make legal/financial decisions,   sign important papers or drink alcohol.    ACTIVITY:  Today: no heavy lifting, straining or running due to procedural   sedation/anesthesia.  The following day: return to full activity including work.  DIET:  Eat and drink normally unless instructed otherwise.     TREATMENT FOR COMMON SIDE EFFECTS:  - Mild abdominal pain, nausea, belching, bloating or excessive gas:  rest,   eat lightly and use a heating pad.  - Sore Throat: treat with throat lozenges and/or gargle with warm salt   water.  - Because air was used during the procedure, expelling large amounts of air   from your rectum or belching is normal.  - If a bowel prep was taken, you may not have a bowel movement for 1-3 days.    This is normal.  SYMPTOMS TO WATCH FOR AND REPORT TO YOUR PHYSICIAN:  1. Abdominal pain or bloating, other than gas cramps.  2. Chest pain.  3. Back pain.  4. Signs of infection such as: chills or fever occurring within 24 hours   after the procedure.  5. Rectal bleeding, which would show as bright red, maroon, or black stools.   (A tablespoon of blood from the rectum is not serious, especially  if   hemorrhoids are present.)  6. Vomiting.  7. Weakness or dizziness.  GO DIRECTLY TO THE NEAREST EMERGENCY ROOM IF YOU HAVE ANY OF THE FOLLOWING:      Difficulty breathing              Chills and/or fever over 101 F   Persistent vomiting and/or vomiting blood   Severe abdominal pain   Severe chest pain   Black, tarry stools   Bleeding- more than one tablespoon   Any other symptom or condition that you feel may need urgent attention  Your doctor recommends these additional instructions:  If any biopsies were taken, your doctors clinic will contact you in 1 to 2   weeks with any results.  - Patient has a contact number available for emergencies.  The signs and   symptoms of potential delayed complications were discussed with the   patient.  Return to normal activities tomorrow.  Written discharge   instructions were provided to the patient.   - Discharge patient to home (ambulatory).   - Resume regular diet indefinitely.   - Repeat colonoscopy date to be determined after pending pathology results   are reviewed for surveillance.   - Continue present medications.  For questions, problems or results please call your physician - Jamison Chambers MD at Work:  [phone].  [facility], EMERGENCY ROOM PHONE NUMBER: [phone]  IF A COMPLICATION OR EMERGENCY SITUATION ARISES AND YOU ARE UNABLE TO REACH   YOUR PHYSICIAN - GO DIRECTLY TO THE EMERGENCY ROOM.  Jamison Chambers MD  3/28/2022 8:35:31 AM  This report has been verified and signed electronically.  Dear patient,  As a result of recent federal legislation (The Federal Cures Act), you may   receive lab or pathology results from your procedure in your [facility]   account before your physician is able to contact you. Your physician or   their representative will relay the results to you with their   recommendations at their soonest availability.  Thank you,  PROVATION

## 2022-03-28 NOTE — H&P
Endoscopy H&P    Procedure : Colonoscopy      asymptomatic screening exam and most recent endoscopic exam 5 years ago  q5 due to history of polyps     Last C Scope 1/23/2017  Impression:           - The entire examined colon is normal on direct and                         retroflexion views.                         - No specimens collected.     Past Medical History:   Diagnosis Date    Allergy     Anxiety     Breast cyst     Edema of leg 10/5/2012    bilateral     Fractures 2011    thumb R    GERD (gastroesophageal reflux disease)     Glaucoma     History of colonic polyps     Hx-TIA (transient ischemic attack) 8/13/2012 Jan 2012: LLE and left facial numbness lasting 1 hour     Hyperlipidemia     Hypertension     Left shoulder tendonitis 8/8/2015    Primary open-angle glaucoma, mild stage - Both Eyes 6/3/2013             Review of Systems -ROS:  GENERAL: No fever, chills, fatigability or weight loss.  CHEST: Denies PARKS, cyanosis, wheezing, cough and sputum production.  CARDIOVASCULAR: Denies chest pain, PND, orthopnea or reduced exercise tolerance.   Musculoskeletal ROS: negative for - gait disturbance or joint pain  Neurological ROS: negative for - confusion or memory loss        Physical Exam:  General: well developed, well nourished, no distress  Head: normocephalic  Neck: supple, symmetrical, trachea midline  Lungs:  clear to auscultation bilaterally and normal respiratory effort  Heart: regular rate and rhythm, S1, S2 normal, no murmur, rub or gallop and regular rate and rhythm  Abdomen: soft, non-tender non-distented; bowel sounds normal; no masses,  no organomegaly  Extremities: no cyanosis or edema, or clubbing       Moderate Sedation (choice): Mallampati Score 1    ASA : III    IMP: asymptomatic screening exam and most recent endoscopic exam 5 years ago    Plan: Colonoscopy with Moderate sedation.  I have explained the procedure including indications, alternatives, expected outcomes and  potential complications. The patient appears to understand and gives informed consent. The patient is medically ready for surgery.

## 2022-03-28 NOTE — PLAN OF CARE
Written and verbal discharge instructions given to patient. Patient verbalizes understanding and has no questions at this time.

## 2022-03-28 NOTE — TRANSFER OF CARE
"Anesthesia Transfer of Care Note    Patient: Rosmery Ramirez    Procedure(s) Performed: Procedure(s) (LRB):  COLONOSCOPY (N/A)    Patient location: OPS    Anesthesia Type: general    Transport from OR: Transported from OR on room air with adequate spontaneous ventilation    Post pain: adequate analgesia    Post assessment: no apparent anesthetic complications and tolerated procedure well    Post vital signs: stable    Level of consciousness: sedated    Nausea/Vomiting: no nausea/vomiting    Complications: none    Transfer of care protocol was followed      Last vitals:   Visit Vitals  BP (!) 111/54 (BP Location: Left arm)   Pulse (!) 58   Temp 36.7 °C (98.1 °F) (Temporal)   Resp 16   Ht 5' 6" (1.676 m)   Wt 86.2 kg (190 lb)   SpO2 98%   Breastfeeding No   BMI 30.67 kg/m²     "

## 2022-03-28 NOTE — BRIEF OP NOTE
[facility]  Brief Operative Note    Surgery Date: 3/28/2022     Surgeon(s) and Role:     * Jamison Chambers MD - Primary    Assisting Surgeon: None    Pre-op Diagnosis:  Screening for colon cancer [Z12.11]  Personal history of colonic polyps [Z86.010]    Post-op Diagnosis:  Post-Op Diagnosis Codes:     * Screening for colon cancer [Z12.11]     * Personal history of colonic polyps [Z86.010]    Procedure(s) (LRB):  COLONOSCOPY (N/A)    Anesthesia: General    Operative Findings:   Surveillance colonoscopy without apparent complication.   Sigmoid polyp resected      Estimated Blood Loss: minimal          Specimens:   Specimen (24h ago, onward)             Start     Ordered    03/28/22 0829  Specimen to Pathology, Surgery Gastrointestinal tract  Once        Comments: Pre-op Diagnosis: Screening for colon cancer [Z12.11]  Personal history of colonic polyps [Z86.010]    Procedure(s):  COLONOSCOPY     Number of specimens: 1    Name of specimens:     Jar 1 - Sigmoid colon polyp   References:    Click here for ordering Quick Tip   Question Answer Comment   Procedure Type: Gastrointestinal tract    Specimen Class: Routine/Screening    Release to patient Immediate        03/28/22 0831                  Discharge Note    OUTCOME: Patient tolerated treatment/procedure well without complication and is now ready for discharge.    DISPOSITION: Home or Self Care    FINAL DIAGNOSIS:  Personal history of colonic polyps    FOLLOWUP: In clinic    DISCHARGE INSTRUCTIONS:    Discharge Procedure Orders   Notify your health care provider if you experience any of the following:  temperature >100.4     Notify your health care provider if you experience any of the following:  persistent nausea and vomiting or diarrhea     Notify your health care provider if you experience any of the following:  severe uncontrolled pain     Activity as tolerated        Clinical Reference Documents Added to Patient Instructions        Document    COLONOSCOPY DISCHARGE INSTRUCTIONS (ENGLISH)

## 2022-03-28 NOTE — ANESTHESIA PREPROCEDURE EVALUATION
03/28/2022  Rosmery Ramirez is a 76 y.o., female.    [facility]  Anesthesia Pre-Operative Evaluation       Patient Name: Rosmery Ramirez  YOB: 1945  MRN: [id number]  CSN: [id number]      Code Status: No Order   Date of Procedure: 3/28/2022  Anesthesia: General Procedure: Procedure(s) (LRB):  COLONOSCOPY (N/A)  Pre-Operative Diagnosis: Screening for colon cancer [Z12.11]  Personal history of colonic polyps [Z86.010]  Proceduralist: Surgeon(s) and Role:     * Jamison Chambers MD - Primary Nurse: (Unknown)      SUBJECTIVE:   Rosmery Ramirez is a 76 y.o. female who  has a past medical history of Allergy, Anxiety, Breast cyst, Edema of leg (10/5/2012), Fractures (2011), GERD (gastroesophageal reflux disease), Glaucoma, History of colonic polyps, TIA (transient ischemic attack) (8/13/2012), Hyperlipidemia, Hypertension, Left shoulder tendonitis (8/8/2015), and Primary open-angle glaucoma, mild stage - Both Eyes (6/3/2013). No notes on file    she has a current medication list which includes the following long-term medication(s): atorvastatin, brimonidine 0.1%, calcium-vitamin d3, dorzolamide-timolol 2-0.5%, hydrochlorothiazide, irbesartan, and lumigan.   ALLERGIES:     Review of patient's allergies indicates:   Allergen Reactions    Lisinopril Swelling    Amlodipine Edema    Combigan [brimonidine-timolol]      Causes itchy eyelids and contact dermatitis    Cosopt [dorzolamide-timolol]      Causes angular blepharitis of eyelids    Pravastatin      Myalgia and elevated CPK       No current facility-administered medications for this encounter.     Current Outpatient Medications   Medication Sig Dispense Refill    ascorbic acid, vitamin C, (VITAMIN C) 1000 MG tablet Take 1,000 mg by mouth once daily.      aspirin (ECOTRIN) 81 MG EC tablet Take 81 mg by  mouth once daily.      atorvastatin (LIPITOR) 10 MG tablet TAKE 1 TABLET (10 MG TOTAL) BY MOUTH EVERY OTHER DAY. 45 tablet 3    beta-carotene,A,-vits C,E/mins (OCUVITE ORAL) Take by mouth.      brimonidine 0.1% (ALPHAGAN P) 0.1 % Drop Place 1 drop into both eyes 2 (two) times daily. 15 mL 3    calcium-vitamin D3 (OS-LETY 500 + D3) 500 mg(1,250mg) -200 unit per tablet Take 1 tablet by mouth 2 (two) times daily with meals.      dorzolamide-timolol 2-0.5% (COSOPT) 22.3-6.8 mg/mL ophthalmic solution Place 1 drop into both eyes 2 (two) times daily. 30 mL 3    hydroCHLOROthiazide (MICROZIDE) 12.5 mg capsule Take 1 capsule (12.5 mg total) by mouth once daily. 90 capsule 3    irbesartan (AVAPRO) 150 MG tablet Take 1 tablet (150 mg total) by mouth every evening. 90 tablet 3    LUMIGAN 0.01 % Drop Place 1 drop into both eyes every evening. 7.5 mL 3    multivitamin (THERAGRAN) per tablet Take 1 tablet by mouth once daily.            History:   There are no hospital problems to display for this patient.    Surgical History:    has a past surgical history that includes Left Breast Lumpectomy (Left); Hand surgery (Left, 2011); SELECTIVE LASER TRABECUPLASTY (05/2014); Hysterectomy (1980's); Tubal ligation (1970's); Vaginal delivery; Breast surgery (Left); Colonoscopy (N/A, 1/23/2017); Oophorectomy; KAHOOK GONIOTOMY (Right, 11/30/2016); YAG CAPSULOTOMY (Left, 11/29/2018); Cataract extraction w/  intraocular lens implant (Left, 10/05/2016); Cataract extraction w/  intraocular lens implant (Right, 11/30/2016); Breast biopsy (Left); Trabeculectomy (Right, 3/17/2021); Implantation of device for glaucoma (Right, 3/17/2021); and Trabeculectomy (Left, 7/28/2021).   Social History:    reports being sexually active and has had partner(s) who are female and male. She reports using the following methods of birth control/protection: Post-menopausal and See Surgical Hx.  reports that she has never smoked. She has never used smokeless  tobacco. She reports current alcohol use. She reports that she does not use drugs.     OBJECTIVE:     Vital Signs (Most Recent):    Vital Signs Range (Last 24H):          There is no height or weight on file to calculate BMI.   Wt Readings from Last 4 Encounters:   12/20/21 91 kg (200 lb 9.9 oz)   12/16/21 88.1 kg (194 lb 3.6 oz)   07/28/21 88.7 kg (195 lb 8.8 oz)   07/07/21 88.7 kg (195 lb 8.8 oz)     Significant Labs:  Lab Results   Component Value Date    WBC 5.22 08/18/2020    HGB 12.3 08/18/2020    HCT 39.0 08/18/2020     08/18/2020     03/10/2022    K 3.9 03/10/2022     03/10/2022    CREATININE 0.7 03/10/2022    BUN 12 03/10/2022    CO2 26 03/10/2022    GLU 89 03/10/2022    CALCIUM 9.6 03/10/2022    MG 2.2 01/09/2012    PHOS 3.2 06/27/2018    ALKPHOS 81 03/10/2022    ALT 17 03/10/2022    AST 16 03/10/2022    ALBUMIN 3.6 03/10/2022    INR 1.0 01/08/2012    APTT 24.3 01/08/2012    HGBA1C 6.0 (H) 03/10/2022     10/23/2019    TROPONINI <0.006 06/30/2018     No LMP recorded. Patient has had a hysterectomy.  No results found for this or any previous visit (from the past 72 hour(s)).    EKG:   Results for orders placed or performed in visit on 02/01/19   IN OFFICE EKG 12-LEAD (to [place])    Collection Time: 02/01/19  8:33 AM    Narrative    Test Reason : R42,R00.1,    Vent. Rate : 065 BPM     Atrial Rate : 065 BPM     P-R Int : 162 ms          QRS Dur : 078 ms      QT Int : 390 ms       P-R-T Axes : 039 011 040 degrees     QTc Int : 405 ms    Normal sinus rhythm  Cannot rule out Anterior infarct ,age undetermined  Abnormal ECG  When compared with ECG of 30-JUN-2018 13:57,  No significant change was found  Confirmed by Navi Barillas MD ([id number]) on 2/4/2019 7:08:02 PM    Referred By:  SIMONE           Confirmed By:Navi Barillas MD       TTE:  Results for orders placed or performed during the hospital encounter of 01/13/21   Echo Color Flow Doppler? Yes   Result Value Ref Range    Narrative    · The  left ventricle is normal in size with normal systolic function. The   estimated ejection fraction is 55%  · Normal left ventricular diastolic function.  · Normal right ventricular size with normal right ventricular systolic function.  · Moderate left atrial enlargement.  · Mild right atrial enlargement.  · Mild mitral regurgitation.  · Normal central venous pressure (3 mmHg).  · The estimated PA systolic pressure is 20 mmHg.          ASSESSMENT/PLAN:         Pre-op Assessment    I have reviewed the Patient Summary Reports.     I have reviewed the Nursing Notes.    I have reviewed the Medications.     Review of Systems      Physical Exam  General: Well nourished, Cooperative and Alert    Airway:  Mallampati: III   Mouth Opening: Normal  TM Distance: Normal  Tongue: Normal  Neck ROM: Normal ROM    Dental:  Intact    Chest/Lungs:  Normal Respiratory Rate    Heart:  Rate: Normal  Rhythm: Regular Rhythm        Anesthesia Plan  Type of Anesthesia, risks & benefits discussed:    Anesthesia Type: Gen Natural Airway, MAC  Intra-op Monitoring Plan: Standard ASA Monitors  Post Op Pain Control Plan: IV/PO Opioids PRN  Induction:  IV  Informed Consent: Informed consent signed with the Patient and all parties understand the risks and agree with anesthesia plan.  All questions answered.   ASA Score: 3  Day of Surgery Review of History & Physical: H&P Update referred to the surgeon/provider.    Ready For Surgery From Anesthesia Perspective.     .

## 2022-04-01 LAB
FINAL PATHOLOGIC DIAGNOSIS: NORMAL
Lab: NORMAL

## 2022-04-04 ENCOUNTER — PATIENT MESSAGE (OUTPATIENT)
Dept: OPHTHALMOLOGY | Facility: CLINIC | Age: 77
End: 2022-04-04
Payer: MEDICARE

## 2022-04-04 ENCOUNTER — OFFICE VISIT (OUTPATIENT)
Dept: PODIATRY | Facility: CLINIC | Age: 77
End: 2022-04-04
Payer: MEDICARE

## 2022-04-04 ENCOUNTER — HOSPITAL ENCOUNTER (OUTPATIENT)
Dept: RADIOLOGY | Facility: CLINIC | Age: 77
Discharge: HOME | End: 2022-04-04
Attending: INTERNAL MEDICINE
Payer: MEDICARE

## 2022-04-04 VITALS
WEIGHT: 190.06 LBS | HEIGHT: 66 IN | DIASTOLIC BLOOD PRESSURE: 68 MMHG | HEART RATE: 58 BPM | BODY MASS INDEX: 30.54 KG/M2 | SYSTOLIC BLOOD PRESSURE: 147 MMHG

## 2022-04-04 DIAGNOSIS — M20.5X1 HALLUX LIMITUS, ACQUIRED, RIGHT: ICD-10-CM

## 2022-04-04 DIAGNOSIS — M20.42 HAMMER TOES OF BOTH FEET: ICD-10-CM

## 2022-04-04 DIAGNOSIS — M72.2 PLANTAR FASCIITIS: ICD-10-CM

## 2022-04-04 DIAGNOSIS — Z78.0 ASYMPTOMATIC MENOPAUSAL STATE: ICD-10-CM

## 2022-04-04 DIAGNOSIS — M20.5X2 HALLUX LIMITUS, ACQUIRED, LEFT: ICD-10-CM

## 2022-04-04 DIAGNOSIS — M20.41 HAMMER TOES OF BOTH FEET: ICD-10-CM

## 2022-04-04 DIAGNOSIS — H40.1122 PRIMARY OPEN ANGLE GLAUCOMA OF LEFT EYE, MODERATE STAGE: ICD-10-CM

## 2022-04-04 DIAGNOSIS — M21.6X1 ACQUIRED BILATERAL PES CAVUS: ICD-10-CM

## 2022-04-04 DIAGNOSIS — H40.1111 PRIMARY OPEN ANGLE GLAUCOMA OF RIGHT EYE, MILD STAGE: ICD-10-CM

## 2022-04-04 DIAGNOSIS — M79.671 FOOT PAIN, RIGHT: Primary | ICD-10-CM

## 2022-04-04 DIAGNOSIS — M21.6X2 ACQUIRED BILATERAL PES CAVUS: ICD-10-CM

## 2022-04-04 PROCEDURE — 3078F PR MOST RECENT DIASTOLIC BLOOD PRESSURE < 80 MM HG: ICD-10-PCS | Mod: CPTII,S$GLB,, | Performed by: PODIATRIST

## 2022-04-04 PROCEDURE — 3044F HG A1C LEVEL LT 7.0%: CPT | Mod: CPTII,S$GLB,, | Performed by: PODIATRIST

## 2022-04-04 PROCEDURE — 1125F AMNT PAIN NOTED PAIN PRSNT: CPT | Mod: CPTII,S$GLB,, | Performed by: PODIATRIST

## 2022-04-04 PROCEDURE — 20550 NJX 1 TENDON SHEATH/LIGAMENT: CPT | Mod: RT,S$GLB,, | Performed by: PODIATRIST

## 2022-04-04 PROCEDURE — 99999 PR PBB SHADOW E&M-EST. PATIENT-LVL IV: ICD-10-PCS | Mod: PBBFAC,,, | Performed by: PODIATRIST

## 2022-04-04 PROCEDURE — 3044F PR MOST RECENT HEMOGLOBIN A1C LEVEL <7.0%: ICD-10-PCS | Mod: CPTII,S$GLB,, | Performed by: PODIATRIST

## 2022-04-04 PROCEDURE — 1160F RVW MEDS BY RX/DR IN RCRD: CPT | Mod: CPTII,S$GLB,, | Performed by: PODIATRIST

## 2022-04-04 PROCEDURE — 1125F PR PAIN SEVERITY QUANTIFIED, PAIN PRESENT: ICD-10-PCS | Mod: CPTII,S$GLB,, | Performed by: PODIATRIST

## 2022-04-04 PROCEDURE — 3077F PR MOST RECENT SYSTOLIC BLOOD PRESSURE >= 140 MM HG: ICD-10-PCS | Mod: CPTII,S$GLB,, | Performed by: PODIATRIST

## 2022-04-04 PROCEDURE — 99999 PR PBB SHADOW E&M-EST. PATIENT-LVL IV: CPT | Mod: PBBFAC,,, | Performed by: PODIATRIST

## 2022-04-04 PROCEDURE — 20550 PR INJECT TENDON SHEATH/LIGAMENT: ICD-10-PCS | Mod: RT,S$GLB,, | Performed by: PODIATRIST

## 2022-04-04 PROCEDURE — 3077F SYST BP >= 140 MM HG: CPT | Mod: CPTII,S$GLB,, | Performed by: PODIATRIST

## 2022-04-04 PROCEDURE — 99214 PR OFFICE/OUTPT VISIT, EST, LEVL IV, 30-39 MIN: ICD-10-PCS | Mod: 25,S$GLB,, | Performed by: PODIATRIST

## 2022-04-04 PROCEDURE — 3078F DIAST BP <80 MM HG: CPT | Mod: CPTII,S$GLB,, | Performed by: PODIATRIST

## 2022-04-04 PROCEDURE — 99214 OFFICE O/P EST MOD 30 MIN: CPT | Mod: 25,S$GLB,, | Performed by: PODIATRIST

## 2022-04-04 PROCEDURE — 77080 DXA BONE DENSITY AXIAL: CPT | Mod: 26,,, | Performed by: INTERNAL MEDICINE

## 2022-04-04 PROCEDURE — 1160F PR REVIEW ALL MEDS BY PRESCRIBER/CLIN PHARMACIST DOCUMENTED: ICD-10-PCS | Mod: CPTII,S$GLB,, | Performed by: PODIATRIST

## 2022-04-04 PROCEDURE — 1159F PR MEDICATION LIST DOCUMENTED IN MEDICAL RECORD: ICD-10-PCS | Mod: CPTII,S$GLB,, | Performed by: PODIATRIST

## 2022-04-04 PROCEDURE — 1159F MED LIST DOCD IN RCRD: CPT | Mod: CPTII,S$GLB,, | Performed by: PODIATRIST

## 2022-04-04 PROCEDURE — 77080 DXA BONE DENSITY AXIAL: CPT | Mod: TC

## 2022-04-04 PROCEDURE — 77080 DEXA BONE DENSITY SPINE HIP: ICD-10-PCS | Mod: 26,,, | Performed by: INTERNAL MEDICINE

## 2022-04-04 RX ORDER — DEXAMETHASONE SODIUM PHOSPHATE 4 MG/ML
2 INJECTION, SOLUTION INTRA-ARTICULAR; INTRALESIONAL; INTRAMUSCULAR; INTRAVENOUS; SOFT TISSUE ONCE
Status: COMPLETED | OUTPATIENT
Start: 2022-04-04 | End: 2022-04-04

## 2022-04-04 RX ORDER — BRIMONIDINE TARTRATE 1 MG/ML
1 SOLUTION/ DROPS OPHTHALMIC 2 TIMES DAILY
Qty: 15 ML | Refills: 3 | Status: SHIPPED | OUTPATIENT
Start: 2022-04-04 | End: 2022-11-30

## 2022-04-04 RX ORDER — DORZOLAMIDE HYDROCHLORIDE AND TIMOLOL MALEATE 20; 5 MG/ML; MG/ML
1 SOLUTION/ DROPS OPHTHALMIC 2 TIMES DAILY
Qty: 30 ML | Refills: 3 | Status: SHIPPED | OUTPATIENT
Start: 2022-04-04

## 2022-04-04 RX ORDER — LIDOCAINE HYDROCHLORIDE 20 MG/ML
1 INJECTION, SOLUTION EPIDURAL; INFILTRATION; INTRACAUDAL; PERINEURAL ONCE
Status: COMPLETED | OUTPATIENT
Start: 2022-04-04 | End: 2022-04-04

## 2022-04-04 RX ORDER — BUPIVACAINE HYDROCHLORIDE 5 MG/ML
1 INJECTION, SOLUTION EPIDURAL; INTRACAUDAL ONCE
Status: COMPLETED | OUTPATIENT
Start: 2022-04-04 | End: 2022-04-04

## 2022-04-04 RX ORDER — TRIAMCINOLONE ACETONIDE 40 MG/ML
20 INJECTION, SUSPENSION INTRA-ARTICULAR; INTRAMUSCULAR ONCE
Status: COMPLETED | OUTPATIENT
Start: 2022-04-04 | End: 2022-04-04

## 2022-04-04 RX ADMIN — LIDOCAINE HYDROCHLORIDE 20 MG: 20 INJECTION, SOLUTION EPIDURAL; INFILTRATION; INTRACAUDAL; PERINEURAL at 12:04

## 2022-04-04 RX ADMIN — TRIAMCINOLONE ACETONIDE 20 MG: 40 INJECTION, SUSPENSION INTRA-ARTICULAR; INTRAMUSCULAR at 12:04

## 2022-04-04 RX ADMIN — DEXAMETHASONE SODIUM PHOSPHATE 2 MG: 4 INJECTION, SOLUTION INTRA-ARTICULAR; INTRALESIONAL; INTRAMUSCULAR; INTRAVENOUS; SOFT TISSUE at 12:04

## 2022-04-04 RX ADMIN — BUPIVACAINE HYDROCHLORIDE 5 MG: 5 INJECTION, SOLUTION EPIDURAL; INTRACAUDAL at 12:04

## 2022-04-04 NOTE — PATIENT INSTRUCTIONS
Supplements for inflammation: Arnica Tabs, bromelain with tumeric, alpha lipoic acid, garlic     Over the counter pain creams: Biofreeze, Bengay, tiger balm, two old goat, lidocaine gel,  Absorbine Veterinary Liniment Gel Topical Analgesic Sore Muscle and Joint Pain Relief    Recommend lotions: eucerin, eucerin for diabetics, aquaphor, A&D ointment, gold bond for diabetics, sween, Ray's Bees all purpose baby ointment,  urea 40 with aloe (found on amazon.com)    Shoe recommendations: (try 6pm.com, zappos.[email or web address] , [email or web address].[email or web address], or shoes.[email or web address] for discounted prices) you can visit [employer] in [place]  or [facility] in the [facility] (there are also several shoe brand outlets in the [facility])    Asics (GT 2000 or gel foundations), new balance stability type shoes (such as the 940 series), benito (stabil c3),  Callahan (GTS or Beast or transcend), propet, Gildardo (tennis shoe)    Sofft Brand, baretraps (women) Reyan&Negrito (men), clarks, crocs, aerosoles, naturalizers, SAS, ecco, born, nguyễn lonnie, parisa (dress shoes)    Vionic, burkenstocks, fitflops, propet, baretraps (sandals)    Nike comfort thong sandals, crocs, propet (house shoes)    Nail Home remedy:  Vicks Vapor rub or Emuaid to nails for easier manageability    Occasional soaks for 15-20 mins in luke warm water with 1/2 cup of listerine and 1 cup of apple cider vinegar are ok You may add several drops of oil of oregano or tea tree oil as well    Understanding Plantar Fasciitis    Plantar fasciitis is a condition that causes foot and heel pain. The plantar fascia is a tough band of tissue that runs across the bottom of the foot from the heel to the toes. This tissue pulls on the heel bone. It supports the arch of the foot as it pushes off the ground. If the tissue becomes irritated or red and swollen (inflamed), it is called plantar fasciitis.  How to say it  PLAN-tuhr fa-see-IY-tis   What causes plantar fasciitis?  Plantar fasciitis most often occurs  from overusing the plantar fascia. The tissue may become damaged from activities that put repeated stress on the heel and foot. Or it may wear down over time with age and ankle stiffness. You are more likely to have plantar fasciitis if you:  Do activities that require a lot of running, jumping, or dancing  Have a job that requires being on your feet for long periods  Are overweight or obese  Have certain foot problems, such as a tight Achilles tendon, flat feet, or high arches  Often wear poorly fitting shoes  Symptoms of plantar fasciitis  The condition most often causes pain in the heel and the bottom of the foot. The pain may occur when you take your first steps in the morning. It may get better as you walk throughout the day. But as you continue to put weight on the foot, the pain often returns. Pain may also occur after standing or sitting for long periods.  Treating plantar fasciitis  Treatments for plantar fasciitis include:  Resting the foot. This involves limiting movements that make your foot hurt. You may also need to avoid certain sports and types of work for a time.  Using cold packs. Put an ice pack on the heel and foot to help reduce pain and swelling.  Taking pain medicines. Prescription and over-the-counter pain medicines can help relieve pain and swelling.  Using heel cups or foot inserts (orthotics). These are placed in the shoes to help support the heel or arch and cushion the heel. You may also be told to buy proper-fitting shoes with good arch support and cushioned soles.  Taping the foot. This supports the arch and limits the movement of the plantar fascia to help relieve symptoms.  Wearing a night splint. This stretches the plantar fascia and leg muscles while you sleep. This may help relieve pain.  Doing exercises and physical therapy. These stretch and strengthen the plantar fascia and the muscles in the leg that support the heel and foot.  Getting shots of medicine into the foot. These  may help relieve symptoms for a time.  Having surgery. This may be needed if other treatments fail to relieve symptoms. During surgery, the surgeon may partially cut the plantar fascia to release tension.  Possible complications of plantar fasciitis  Without proper care and treatment, healing may take longer than normal. Also, symptoms may continue or get worse. Over time, the plantar fascia may be damaged. This can make it hard to walk or even stand without pain.  When to call your healthcare provider  Call your healthcare provider right away if you have any of these:  Fever of 100.4°F (38°C) or higher, or as directed  Symptoms that dont get better with treatment, or get worse  New symptoms, such as numbness, tingling, or weakness in the foot   © 0187-2788 [employer]. [address] [place] [address], [place] [address]. All rights reserved. This information is not intended as a substitute for professional medical care. Always follow your healthcare professional's instructions.        Treating Plantar Fasciitis  First, your healthcare provider tries to determine the cause of your problem in order to suggest ways to relieve pain. If your pain is due to poor foot mechanics, custom-made shoe inserts (orthoses) may help.    Reduce symptoms  To relieve mild symptoms, try aspirin, ibuprofen, or other medicines as directed. Rubbing ice on the affected area may also help.  To reduce severe pain and swelling, your healthcare provider may prescribe pills or injections or a walking cast in some instances. Physical therapy, such as ultrasound or a daily stretching program, may also be recommended. Surgery is rarely required.  To reduce symptoms caused by poor foot mechanics, your foot may be taped. This supports the arch and temporarily controls movement. Night splints may also help by stretching the fascia.  Control movement  If taping helps, your healthcare provider may prescribe orthoses. Built from plaster casts of  your feet, these inserts control the way your foot moves. As a result, your symptoms should go away.  Reduce overuse  Every time your foot strikes the ground, the plantar fascia is stretched. You can reduce the strain on the plantar fascia and the possibility of overuse by following these suggestions:  Lose any excess weight.  Avoid running on hard or uneven ground.  Use orthoses at all times in your shoes and house slippers.  If surgery is needed  Your healthcare provider may consider surgery if other types of treatment don't control your pain. During surgery, the plantar fascia is partially cut to release tension. As you heal, fibrous tissue fills the space between the heel bone and the plantar fascia.   © 3709-7111 [employer]. [address] [place] [address], [place] [address]. All rights reserved. This information is not intended as a substitute for professional medical care. Always follow your healthcare professional's instructions.        Wearing Proper Shoes                    You walk on your feet every day, forcing them to support the weight of your body. Repeated stress on your feet can cause damage over time. The right shoes can help protect your feet. The wrong shoes can cause more foot problems. Read the information below to help you find a shoe that fits your foot needs.     A good shoe fit will cover your foot outline.       A shoe that doesnt cover the outline is a bad fit.      Whats Your Foot Shape?  To get a good fit, you need to know the shape of your foot. Do this simple test: While standing, place your foot on a piece of paper and trace around it. Is your foot straight or curved? Do you have a foot problem, such as a bunion, that causes your foot outline to show a bulge on the side of your big toe?  Finding Your Fit  Bring your foot outline to the [employer] store to help you find the right shoe. Place a shoe you like on top of the outline to see if it matches the shape. The shoe should  cover the outline. (If you have a bunion, the shoe may not cover the bulge on the outline. Look for soft leather shoes to stretch over the bunion.) Once youve found a pair of proper shoes, put them on. Walk around. Be sure the shoes dont rub or pinch. If the shoes feel good, youve found your fit!  The Right Shoe for You  A good shoe has features that provide comfort and support. It must also be the right size and shape for your feet. Look for a shoe made of breathable fabric and lining, such as leather or canvas. Be sure that shoes have enough tread to prevent slipping. Go to a good shoe store for help finding the right shoe.  Good Shoe Features  An ideal shoe has the following:  Laces for support. If tying laces is a problem for you, try shoes with Velcro fasteners or joshua.  A front of the shoe (toe box) with ½ inch space in front of your longest toes.  An arch shape that supports your foot.  No more than 1½ inches of heel.  A stiff, snug back of the shoe to keep your foot from sliding around.  A smooth lining with no rough seams.  Shoe Shopping Tips  Below are some dos and donts for when you go to the shoe store.  Do:  Select the shoes that feel right. Wear them around the house. Then bring them to your foot doctor to check for fit. If they dont fit well, return them.  Shop late in the day, when your feet will be slightly bigger.  Each time you buy shoes, have both your feet measured while you are standing. Foot size changes with time.  Pick shoes to suit their purpose. High heels are okay for an occasional night on the town. But for everyday wear, choose a more sensible shoe.  Try on shoes while wearing any inserts specially made for your feet (orthoses).  Try on both the right and left shoes. If your feet are different sizes, pick a pair that fits the larger foot.  Dont:  Dont buy shoes based on shoe size alone. Always try on shoes, as sizes differ from brand to brand and within brands.  Dont expect  Detail Level: Detailed shoes to break in. If they dont fit at the store, dont buy them.  Dont buy a shoe that doesnt match your foot shape.  What About Socks?  Always wear socks with shoes. Socks help absorb sweat and reduce friction and blistering. When shopping for shoes, choose soft, padded socks with seams that dont irritate your feet.  If You Have Foot Problems  Some foot problems cause deformities. This can make it hard to find a good fit. Look for shoes made of soft leather to stretch over the deformity. If you have bunions, buy shoes with a wider toe box. To fit hammertoes, look for shoes with a tall toe box. If you have arch problems, you may need inserts. In some cases, youll need to have custom footwear or orthoses made for your feet.  Suggested Footwear  Ask your healthcare provider what kind of footwear you need. He or she may recommend a certain brand or shoe store.  © 0183-1037 [employer]. [address] [place] [address]. All rights reserved. This information is not intended as a substitute for professional medical care. Always follow your healthcare professional's instructions.        Toe Extension (Flexibility)    These instructions are for your right foot. Switch sides for your left foot.  Sit in a chair. Rest your right ankle on your left knee.  Hold your toes with your right hand. Gently bend the toes backward. Feel a stretch in the undersides of the toes and ball of the foot. Hold for 30 to 60 seconds.  Then gently bend the toes in the other direction. Gently press on them until your foot is pointed. Hold for 30 to 60 seconds.  Repeat 5 times, or as instructed.  © 2000-2016 [employer]. [address] [place] [id number]. All rights reserved. This information is not intended as a substitute for professional medical care. Always follow your healthcare professional's instructions.         Products Recommended: Recommended Neutrogena 110 spf or EltaMd. General Sunscreen Counseling: I recommended a broad spectrum sunscreen with a SPF of 30 or higher.  I explained that SPF 30 sunscreens block approximately 97 percent of the sun's harmful rays.  Sunscreens should be applied at least 15 minutes prior to expected sun exposure and then every 2 hours after that as long as sun exposure continues. If swimming or exercising sunscreen should be reapplied every 45 minutes to an hour after getting wet or sweating.  One ounce, or the equivalent of a shot glass full of sunscreen, is adequate to protect the skin not covered by a bathing suit. I also recommended a lip balm with a sunscreen as well. Sun protective clothing can be used in lieu of sunscreen but must be worn the entire time you are exposed to the sun's rays.

## 2022-04-04 NOTE — PROGRESS NOTES
Subjective:      Patient ID: Rosmery Ramirez is a 76 y.o. female.    Chief Complaint: Plantar Fasciitis (Right foot)    Rosmery Ramirez is a 76 y.o. female who presents to the clinic complaining of  Right plantar medial heel pain, especially with the first step in the morning. The pain is described as Aching, Burning, Throbbing and Sharp.  Rosmery Ramirez rates the pain as 6/10.  The onset of the pain was sudden and has worsened over the past several week(s). she relates pain began without known inciting event.  She is an avid walker, uses a flexible shoe.  Admits that she does not stretch. Seen in urgent care and rx Mobic which she never took.  Has taken some OTC ibuprofen with some relief     History of trauma: denies    4/4/2022 She has been icing, wearing supportive shoes.  She admits she has not been stretching as much as recommended and relates minimal improvement.    Shoe gear: [employer] tennis shoes    Patient Active Problem List   Diagnosis    Essential hypertension    Hyperlipidemia; statin myalgias; 6/28/2018 exercise stress echo neg for ischemia    POAG (primary open-angle glaucoma)    Class 2 severe obesity due to excess calories with serious comorbidity in adult, unspecified BMI    Cataract    Nuclear sclerosis    Left knee pain    Personal history of colonic polyps    Bradycardia 6/2018 holter negative    Status post hysterectomy    Statin myopathy    Chronic bilateral low back pain without sciatica    Vitamin D deficiency    Abnormal glucose    Thyroid nodule on US; followed by endo repeat 02/2023    Idiopathic peripheral neuropathy normal B12, TSH; A1c pre-DM. 6/2019 gabapentin    Acute pain of left knee    Weakness of both hips    Decreased independence with transfers    Impaired functional mobility, balance, gait, and endurance    Primary open angle glaucoma of right eye, mild stage    Primary open-angle glaucoma, left eye, moderate stage    Screening  for colon cancer       Current Outpatient Medications on File Prior to Visit   Medication Sig Dispense Refill    ascorbic acid, vitamin C, (VITAMIN C) 1000 MG tablet Take 1,000 mg by mouth once daily.      aspirin (ECOTRIN) 81 MG EC tablet Take 81 mg by mouth once daily.      atorvastatin (LIPITOR) 10 MG tablet TAKE 1 TABLET (10 MG TOTAL) BY MOUTH EVERY OTHER DAY. 45 tablet 3    beta-carotene,A,-vits C,E/mins (OCUVITE ORAL) Take by mouth.      calcium-vitamin D3 (OS-LETY 500 + D3) 500 mg(1,250mg) -200 unit per tablet Take 1 tablet by mouth 2 (two) times daily with meals.      hydroCHLOROthiazide (MICROZIDE) 12.5 mg capsule Take 1 capsule (12.5 mg total) by mouth once daily. 90 capsule 3    irbesartan (AVAPRO) 150 MG tablet Take 1 tablet (150 mg total) by mouth every evening. 90 tablet 3    LUMIGAN 0.01 % Drop Place 1 drop into both eyes every evening. 7.5 mL 3    multivitamin (THERAGRAN) per tablet Take 1 tablet by mouth once daily.      [DISCONTINUED] brimonidine 0.1% (ALPHAGAN P) 0.1 % Drop Place 1 drop into both eyes 2 (two) times daily. 15 mL 3    [DISCONTINUED] dorzolamide-timolol 2-0.5% (COSOPT) 22.3-6.8 mg/mL ophthalmic solution Place 1 drop into both eyes 2 (two) times daily. 30 mL 3     No current facility-administered medications on file prior to visit.       Review of patient's allergies indicates:   Allergen Reactions    Lisinopril Swelling    Amlodipine Edema    Combigan [brimonidine-timolol]      Causes itchy eyelids and contact dermatitis    Cosopt [dorzolamide-timolol]      Causes angular blepharitis of eyelids    Pravastatin      Myalgia and elevated CPK       Past Surgical History:   Procedure Laterality Date    BREAST BIOPSY Left     core    BREAST SURGERY Left     lumpectomy    CATARACT EXTRACTION W/  INTRAOCULAR LENS IMPLANT Left 10/05/2016    Dr. Mike    CATARACT EXTRACTION W/  INTRAOCULAR LENS IMPLANT Right 11/30/2016    With [place] (Dr. Mike)    COLONOSCOPY N/A  1/23/2017    Procedure: COLONOSCOPY;  Surgeon: Hany Mosquera MD;  Location: [facility] ENDO (4TH FLR);  Service: Endoscopy;  Laterality: N/A;    COLONOSCOPY N/A 3/28/2022    Procedure: COLONOSCOPY;  Surgeon: Jamison Chambers MD;  Location: [facility] ENDO (4TH FLR);  Service: Endoscopy;  Laterality: N/A;  fully vaccinated   instructions to portal-st  3/22 arrival time confirmed with pt/COVID test cancelled-RB    HAND SURGERY Left 2011    thumb    HYSTERECTOMY  1980's    Total;    IMPLANTATION OF DEVICE FOR GLAUCOMA Right 3/17/2021    Procedure: INSERTION, DEVICE, FOR GLAUCOMA XEN GEL;  Surgeon: Yesica Mike MD;  Location: [facility] OR [address] [place] [address];  Service: Ophthalmology;  Laterality: Right;    KAHOOK GONIOTOMY Right 11/30/2016    WITH CE ()    Left Breast Lumpectomy Left     performed in 1960s    OOPHORECTOMY      SELECTIVE LASER TRABECUPLASTY  05/2014    [facility] w/ Dr. Mike    TRABECULECTOMY Right 3/17/2021    Procedure: TRABECULECTOMY/XEN GEL WITH MMC;  Surgeon: Yesica Mike MD;  Location: [facility] OR [address] [place] [address];  Service: Ophthalmology;  Laterality: Right;    TRABECULECTOMY Left 7/28/2021    Procedure: TRABECULECTOMY/ MMC and Xen OS;  Surgeon: Yesica Mike MD;  Location: [facility] OR [address] [place] [address];  Service: Ophthalmology;  Laterality: Left;    TUBAL LIGATION  1970's    VAGINAL DELIVERY      x3    YAG CAPSULOTOMY Left 11/29/2018           Family History   Problem Relation Age of Onset    Breast cancer Mother 50    Glaucoma Mother     Alzheimer's disease Mother     Blindness Mother     Cataracts Mother     Glaucoma Father     Prostate cancer Father     Blindness Father     Cataracts Father     Cancer Father 62        prostate    Glaucoma Sister     No Known Problems Brother     No Known Problems Brother     Alzheimer's disease Maternal Grandfather     Macular degeneration Neg Hx     Retinal detachment Neg Hx     Strabismus Neg Hx     Amblyopia Neg Hx     Heart attack Neg  Hx     Heart disease Neg Hx     Ovarian cancer Neg Hx     Colon cancer Neg Hx        Social History     Socioeconomic History    Marital status:     Number of children: 3   Occupational History    Occupation: retired - formerly pastoral care with [employer]   Tobacco Use    Smoking status: Never Smoker    Smokeless tobacco: Never Used   Substance and Sexual Activity    Alcohol use: Yes     Alcohol/week: 0.0 standard drinks     Comment: Rarely; 1 glass of wine    Drug use: No    Sexual activity: Yes     Partners: Female, Male     Birth control/protection: Post-menopausal, See Surgical Hx     Social Determinants of Health     Financial Resource Strain: Low Risk     Difficulty of Paying Living Expenses: Not hard at all   Food Insecurity: No Food Insecurity    Worried About Running Out of Food in the Last Year: Never true    Ran Out of Food in the Last Year: Never true   Transportation Needs: No Transportation Needs    Lack of Transportation (Medical): No    Lack of Transportation (Non-Medical): No   Physical Activity: Insufficiently Active    Days of Exercise per Week: 2 days    Minutes of Exercise per Session: 30 min   Stress: No Stress Concern Present    Feeling of Stress : Not at all   Social Connections: Unknown    Frequency of Communication with Friends and Family: Once a week    Frequency of Social Gatherings with Friends and Family: Once a week    Active Member of Clubs or Organizations: Yes    Attends Club or Organization Meetings: 1 to 4 times per year    Marital Status:    Housing Stability: Low Risk     Unable to Pay for Housing in the Last Year: No    Number of Places Lived in the Last Year: 1    Unstable Housing in the Last Year: No       Review of Systems   Constitutional: Negative for chills and fever.   Cardiovascular: Negative for claudication and leg swelling.   Respiratory: Negative for cough and shortness of breath.    Skin: Positive for dry skin.  "Negative for itching, nail changes and rash.   Musculoskeletal: Positive for joint pain and myalgias. Negative for falls, joint swelling and muscle weakness.   Gastrointestinal: Negative for diarrhea, nausea and vomiting.   Neurological: Positive for paresthesias. Negative for numbness, tremors and weakness.   Psychiatric/Behavioral: Negative for altered mental status and hallucinations.           Objective:      Vitals:    04/04/22 1102   BP: (!) 147/68   Pulse: (!) 58   Weight: 86.2 kg (190 lb 0.6 oz)   Height: 5' 6" (1.676 m)   PainSc:   4   PainLoc: Foot       Physical Exam  Nursing note reviewed.   Constitutional:       General: She is not in acute distress.     Appearance: She is not toxic-appearing, sickly-appearing or diaphoretic.   Cardiovascular:      Pulses:           Dorsalis pedis pulses are 2+ on the right side and 2+ on the left side.        Posterior tibial pulses are 2+ on the right side and 2+ on the left side.   Pulmonary:      Effort: No respiratory distress.   Musculoskeletal:      Right ankle: No swelling. No tenderness. No lateral malleolus, medial malleolus, AITF ligament, CF ligament or posterior TF ligament tenderness. Decreased range of motion.      Right Achilles Tendon: No pain or defects. Myers's test negative.      Left ankle: No swelling. No tenderness. No lateral malleolus, medial malleolus, AITF ligament, CF ligament or posterior TF ligament tenderness. Decreased range of motion.      Left Achilles Tendon: No pain or defects. Myers's test negative.      Right foot: No bony tenderness.      Left foot: No bony tenderness.      Comments: Biomechanical exam: Pain on palpation right medial calcaneal tubercle at origin of plantar fascia. There is equinus deformity bilateral with decreased dorsiflexion at the ankle joint bilateral. No tenderness with compression of heel. Negative tinels sign. Patient has bilateral heel varus. There is a bilateral anterior cavus foot deformity that is " rigid with minimal compensation.  STJ ROM is adequate in inversion and eversion bilateral.  Bilateral MTJ is is everted to the rear foot. With first metatarsal with foot loaded having and dorsal and plantar excursion of 5mm dorsiflexion and  5mm plantarflexion. Gait analysis reveals an early heel off with the fore foot bilateral striking longer in midstance. Patient shoes demonstrated medial heel counter wear bilateral.      Decreased first MPJ range of motion both weightbearing and nonweightbearing, no crepitus observed the first MP joint, + dorsal flag sign. Mild  bunion deformity is observed .    Patient has hammertoes of digits 2-5 bilateral partially reducible      Skin:     General: Skin is warm, dry and intact.      Coloration: Skin is not pale.      Findings: No bruising, burn, laceration, lesion or rash.      Nails: There is no clubbing or cyanosis.   Neurological:      Sensory: No sensory deficit.      Motor: No tremor, atrophy or abnormal muscle tone.      Deep Tendon Reflexes: Reflexes are normal and symmetric.      Comments: Light touch present    [place] 5.07 monofilament is intact bilateral feet. Sharp/dull sensation is also intact Bilateral feet.    proprioception intact    Vibratory intact    Paresthesias, and hyperesthesia bilateral feet at toes with no clearly identified trigger or source.     Psychiatric:         Attention and Perception: She is attentive.         Mood and Affect: Mood is not anxious. Affect is not inappropriate.         Speech: She is communicative. Speech is not slurred.         Behavior: Behavior is not combative.               Assessment:       Encounter Diagnoses   Name Primary?    Foot pain, right Yes    Plantar fasciitis     Acquired bilateral pes cavus     Hallux limitus, acquired, left     Hallux limitus, acquired, right     Hammer toes of both feet          Plan:       Rosmery was seen today for plantar fasciitis.    Diagnoses and all orders for this  visit:    Foot pain, right    Plantar fasciitis  -     Ambulatory referral/consult to Physical/Occupational Therapy; Future    Acquired bilateral pes cavus    Hallux limitus, acquired, left    Hallux limitus, acquired, right    Hammer toes of both feet    Other orders  -     BUPivacaine (PF) 0.5% (5 mg/mL) injection 5 mg  -     dexamethasone injection 2 mg  -     LIDOcaine (PF) 20 mg/ml (2%) injection 20 mg  -     triamcinolone acetonide injection 20 mg      I counseled the patient on her conditions, their implications and medical management.      Discussed different treatment options for heel pain. I gave written and verbal instructions on stretching exercises. Patient expressed understanding. Discussed icing the affected area as needed and also wearing appropriate shoe gear and avoiding flats, slippers, sandals, and going barefoot. My recommendation for OTC supports is [employer]. Patient instructed on adequate icing techniques. Patient should ice the affected area at least once per day x 10 minutes for 10 days . I advised the patient that extra icing would also be beneficial to ensure adequate anti inflammatory effect. We also discussed cortisone injections and NSAID therapy.     Patient would like injection today. Skin was prepped with alcohol and anesthetized with ethyl chloride.  The following mixture was injected into the right heel, plantar medial approach: 3ccs of mixture of (1cc 1% plain Lidocaine : 1cc 0.5% Marcaine plain:  0.5cc kenalog-40 : 0.5cc dexamethasone) directly into problematic areas.  Patient  tolerated the injection well. Related nearly 100% relief following injection.     Referral placed to PT to aid in increasing ROM and breaking up of scar tissue. Dry needling requested    RTC if no improvement. Patient is amenable to plan.

## 2022-04-07 NOTE — PROGRESS NOTES
DEXA L-spine 1.2, total hip 0.7, femoral neck-0.8.  FRAX score 2.4/6.4%.  Compared to previous of 2013 increased BMD of the lumbar spine though decreased at total hip.  Results to patient via mychart.  Not on bisphosphonate.  No indication to start at this time.  Continue calcium vitamin-D.  Repeat 4 years

## 2022-04-14 PROBLEM — D12.6 TUBULAR ADENOMA OF COLON: Status: ACTIVE | Noted: 2017-01-23

## 2022-04-20 ENCOUNTER — OFFICE VISIT (OUTPATIENT)
Dept: NEUROLOGY | Facility: CLINIC | Age: 77
End: 2022-04-20
Payer: MEDICARE

## 2022-04-20 VITALS
HEART RATE: 60 BPM | SYSTOLIC BLOOD PRESSURE: 183 MMHG | BODY MASS INDEX: 31.18 KG/M2 | HEIGHT: 66 IN | WEIGHT: 194 LBS | DIASTOLIC BLOOD PRESSURE: 72 MMHG

## 2022-04-20 DIAGNOSIS — G60.9 NEUROPATHY, PERIPHERAL, IDIOPATHIC: ICD-10-CM

## 2022-04-20 DIAGNOSIS — G62.9 NEUROPATHY: Primary | ICD-10-CM

## 2022-04-20 PROCEDURE — 3288F PR FALLS RISK ASSESSMENT DOCUMENTED: ICD-10-PCS | Mod: CPTII,S$GLB,, | Performed by: NEUROLOGICAL SURGERY

## 2022-04-20 PROCEDURE — 3077F PR MOST RECENT SYSTOLIC BLOOD PRESSURE >= 140 MM HG: ICD-10-PCS | Mod: CPTII,S$GLB,, | Performed by: NEUROLOGICAL SURGERY

## 2022-04-20 PROCEDURE — 3078F DIAST BP <80 MM HG: CPT | Mod: CPTII,S$GLB,, | Performed by: NEUROLOGICAL SURGERY

## 2022-04-20 PROCEDURE — 99999 PR PBB SHADOW E&M-EST. PATIENT-LVL III: ICD-10-PCS | Mod: PBBFAC,,, | Performed by: NEUROLOGICAL SURGERY

## 2022-04-20 PROCEDURE — 3288F FALL RISK ASSESSMENT DOCD: CPT | Mod: CPTII,S$GLB,, | Performed by: NEUROLOGICAL SURGERY

## 2022-04-20 PROCEDURE — 3044F HG A1C LEVEL LT 7.0%: CPT | Mod: CPTII,S$GLB,, | Performed by: NEUROLOGICAL SURGERY

## 2022-04-20 PROCEDURE — 1159F MED LIST DOCD IN RCRD: CPT | Mod: CPTII,S$GLB,, | Performed by: NEUROLOGICAL SURGERY

## 2022-04-20 PROCEDURE — 1101F PR PT FALLS ASSESS DOC 0-1 FALLS W/OUT INJ PAST YR: ICD-10-PCS | Mod: CPTII,S$GLB,, | Performed by: NEUROLOGICAL SURGERY

## 2022-04-20 PROCEDURE — 1126F PR PAIN SEVERITY QUANTIFIED, NO PAIN PRESENT: ICD-10-PCS | Mod: CPTII,S$GLB,, | Performed by: NEUROLOGICAL SURGERY

## 2022-04-20 PROCEDURE — 1126F AMNT PAIN NOTED NONE PRSNT: CPT | Mod: CPTII,S$GLB,, | Performed by: NEUROLOGICAL SURGERY

## 2022-04-20 PROCEDURE — 99214 PR OFFICE/OUTPT VISIT, EST, LEVL IV, 30-39 MIN: ICD-10-PCS | Mod: S$GLB,,, | Performed by: NEUROLOGICAL SURGERY

## 2022-04-20 PROCEDURE — 1101F PT FALLS ASSESS-DOCD LE1/YR: CPT | Mod: CPTII,S$GLB,, | Performed by: NEUROLOGICAL SURGERY

## 2022-04-20 PROCEDURE — 3078F PR MOST RECENT DIASTOLIC BLOOD PRESSURE < 80 MM HG: ICD-10-PCS | Mod: CPTII,S$GLB,, | Performed by: NEUROLOGICAL SURGERY

## 2022-04-20 PROCEDURE — 3077F SYST BP >= 140 MM HG: CPT | Mod: CPTII,S$GLB,, | Performed by: NEUROLOGICAL SURGERY

## 2022-04-20 PROCEDURE — 99214 OFFICE O/P EST MOD 30 MIN: CPT | Mod: S$GLB,,, | Performed by: NEUROLOGICAL SURGERY

## 2022-04-20 PROCEDURE — 99999 PR PBB SHADOW E&M-EST. PATIENT-LVL III: CPT | Mod: PBBFAC,,, | Performed by: NEUROLOGICAL SURGERY

## 2022-04-20 PROCEDURE — 1159F PR MEDICATION LIST DOCUMENTED IN MEDICAL RECORD: ICD-10-PCS | Mod: CPTII,S$GLB,, | Performed by: NEUROLOGICAL SURGERY

## 2022-04-20 PROCEDURE — 3044F PR MOST RECENT HEMOGLOBIN A1C LEVEL <7.0%: ICD-10-PCS | Mod: CPTII,S$GLB,, | Performed by: NEUROLOGICAL SURGERY

## 2022-04-21 ENCOUNTER — LAB VISIT (OUTPATIENT)
Dept: LAB | Facility: HOSPITAL | Age: 77
End: 2022-04-21
Attending: NEUROLOGICAL SURGERY
Payer: MEDICARE

## 2022-04-21 DIAGNOSIS — G62.9 NEUROPATHY: ICD-10-CM

## 2022-04-21 DIAGNOSIS — G60.9 NEUROPATHY, PERIPHERAL, IDIOPATHIC: ICD-10-CM

## 2022-04-21 LAB
ESTIMATED AVG GLUCOSE: 140 MG/DL (ref 68–131)
FOLATE SERPL-MCNC: 13 NG/ML (ref 4–24)
HBA1C MFR BLD: 6.5 % (ref 4–5.6)
TSH SERPL DL<=0.005 MIU/L-ACNC: 2.2 UIU/ML (ref 0.4–4)

## 2022-04-21 PROCEDURE — 86334 PATHOLOGIST INTERPRETATION IFE: ICD-10-PCS | Mod: 26,,, | Performed by: PATHOLOGY

## 2022-04-21 PROCEDURE — 82746 ASSAY OF FOLIC ACID SERUM: CPT | Performed by: NEUROLOGICAL SURGERY

## 2022-04-21 PROCEDURE — 86334 IMMUNOFIX E-PHORESIS SERUM: CPT | Mod: 26,,, | Performed by: PATHOLOGY

## 2022-04-21 PROCEDURE — 84443 ASSAY THYROID STIM HORMONE: CPT | Performed by: NEUROLOGICAL SURGERY

## 2022-04-21 PROCEDURE — 84165 PROTEIN E-PHORESIS SERUM: CPT | Performed by: NEUROLOGICAL SURGERY

## 2022-04-21 PROCEDURE — 83036 HEMOGLOBIN GLYCOSYLATED A1C: CPT | Performed by: NEUROLOGICAL SURGERY

## 2022-04-21 PROCEDURE — 86334 IMMUNOFIX E-PHORESIS SERUM: CPT | Performed by: NEUROLOGICAL SURGERY

## 2022-04-21 PROCEDURE — 84207 ASSAY OF VITAMIN B-6: CPT | Performed by: NEUROLOGICAL SURGERY

## 2022-04-21 PROCEDURE — 84165 PROTEIN E-PHORESIS SERUM: CPT | Mod: 26,,, | Performed by: PATHOLOGY

## 2022-04-21 PROCEDURE — 84165 PATHOLOGIST INTERPRETATION SPE: ICD-10-PCS | Mod: 26,,, | Performed by: PATHOLOGY

## 2022-04-21 NOTE — PROGRESS NOTES
Chief Complaint   Patient presents with    Numbness    Peripheral Neuropathy        Rosmery Ramirez is a 76 y.o. female with a history of multiple medical diagnoses as listed below that presents for evaluation and management of numbness in the feet. She has had evaluation for the symptoms in the past, but she feels that she has not been diagnosed and has not been able to identify what has been underlying her symptoms. She has not had any pain nor any discomfort. The symptoms have been in the foot from the toes to the ankle. She has been given gabapentin for the symptoms, but has not been able to notice any change in her symptoms.     PAST MEDICAL HISTORY:  Past Medical History:   Diagnosis Date    Allergy     Anxiety     Breast cyst     Edema of leg 10/5/2012    bilateral     Fractures 2011    thumb R    GERD (gastroesophageal reflux disease)     Glaucoma     History of colonic polyps     Hx-TIA (transient ischemic attack) 8/13/2012 Jan 2012: LLE and left facial numbness lasting 1 hour     Hyperlipidemia     Hypertension     Left shoulder tendonitis 8/8/2015    Primary open-angle glaucoma, mild stage - Both Eyes 6/3/2013       PAST SURGICAL HISTORY:  Past Surgical History:   Procedure Laterality Date    BREAST BIOPSY Left     core    BREAST SURGERY Left     lumpectomy    CATARACT EXTRACTION W/  INTRAOCULAR LENS IMPLANT Left 10/05/2016    Dr. Mike    CATARACT EXTRACTION W/  INTRAOCULAR LENS IMPLANT Right 11/30/2016    With [place] (Dr. Mike)    COLONOSCOPY N/A 1/23/2017    Procedure: COLONOSCOPY;  Surgeon: Hany Mosquera MD;  Location: [address]);  Service: Endoscopy;  Laterality: N/A;    COLONOSCOPY N/A 3/28/2022    Procedure: COLONOSCOPY;  Surgeon: Jamison Chambers MD;  Location: [facility] ([address] [place] [address]);  Service: Endoscopy;  Laterality: N/A;  fully vaccinated   instructions to portal-st  3/22 arrival time confirmed with pt/COVID test cancelled-RB    HAND SURGERY Left  2011    thumb    HYSTERECTOMY  1980's    Total;    IMPLANTATION OF DEVICE FOR GLAUCOMA Right 3/17/2021    Procedure: INSERTION, DEVICE, FOR GLAUCOMA XEN GEL;  Surgeon: Yesica Mike MD;  [facility] [address] [place] [address];  Service: Ophthalmology;  Laterality: Right;    KAHOOK GONIOTOMY Right 11/30/2016    WITH CE ()    Left Breast Lumpectomy Left     performed in 1960s    OOPHORECTOMY      SELECTIVE LASER TRABECUPLASTY  05/2014    [facility] w/ Dr. Mike    TRABECULECTOMY Right 3/17/2021    Procedure: TRABECULECTOMY/XEN GEL WITH MMC;  Surgeon: Yesica Mike MD;  Location: [facility] [address] [place] [address];  Service: Ophthalmology;  Laterality: Right;    TRABECULECTOMY Left 7/28/2021    Procedure: TRABECULECTOMY/ MMC and Xen OS;  Surgeon: Yesica Mike MD;  Location: [facility] OR [address] [place] [address];  Service: Ophthalmology;  Laterality: Left;    TUBAL LIGATION  1970's    VAGINAL DELIVERY      x3    YAG CAPSULOTOMY Left 11/29/2018           SOCIAL HISTORY:  Social History     Socioeconomic History    Marital status:     Number of children: 3   Occupational History    Occupation: retired - formerly pastoral care with [employer]   Tobacco Use    Smoking status: Never Smoker    Smokeless tobacco: Never Used   Substance and Sexual Activity    Alcohol use: Yes     Alcohol/week: 0.0 standard drinks     Comment: Rarely; 1 glass of wine    Drug use: No    Sexual activity: Yes     Partners: Female, Male     Birth control/protection: Post-menopausal, See Surgical Hx     Social Determinants of Health     Financial Resource Strain: Low Risk     Difficulty of Paying Living Expenses: Not hard at all   Food Insecurity: No Food Insecurity    Worried About Running Out of Food in the Last Year: Never true    Ran Out of Food in the Last Year: Never true   Transportation Needs: No Transportation Needs    Lack of Transportation (Medical): No    Lack of Transportation (Non-Medical): No   Physical Activity:  Insufficiently Active    Days of Exercise per Week: 2 days    Minutes of Exercise per Session: 30 min   Stress: No Stress Concern Present    Feeling of Stress : Not at all   Social Connections: Unknown    Frequency of Communication with Friends and Family: Once a week    Frequency of Social Gatherings with Friends and Family: Once a week    Active Member of Clubs or Organizations: Yes    Attends Club or Organization Meetings: 1 to 4 times per year    Marital Status:    Housing Stability: Low Risk     Unable to Pay for Housing in the Last Year: No    Number of Places Lived in the Last Year: 1    Unstable Housing in the Last Year: No       FAMILY HISTORY:  Family History   Problem Relation Age of Onset    Breast cancer Mother 50    Glaucoma Mother     Alzheimer's disease Mother     Blindness Mother     Cataracts Mother     Glaucoma Father     Prostate cancer Father     Blindness Father     Cataracts Father     Cancer Father 62        prostate    Glaucoma Sister     No Known Problems Brother     No Known Problems Brother     Alzheimer's disease Maternal Grandfather     Macular degeneration Neg Hx     Retinal detachment Neg Hx     Strabismus Neg Hx     Amblyopia Neg Hx     Heart attack Neg Hx     Heart disease Neg Hx     Ovarian cancer Neg Hx     Colon cancer Neg Hx        ALLERGIES AND MEDICATIONS: updated and reviewed.  Review of patient's allergies indicates:   Allergen Reactions    Lisinopril Swelling    Amlodipine Edema    Combigan [brimonidine-timolol]      Causes itchy eyelids and contact dermatitis    Cosopt [dorzolamide-timolol]      Causes angular blepharitis of eyelids    Pravastatin      Myalgia and elevated CPK     Current Outpatient Medications   Medication Sig Dispense Refill    ascorbic acid, vitamin C, (VITAMIN C) 1000 MG tablet Take 1,000 mg by mouth once daily.      aspirin (ECOTRIN) 81 MG EC tablet Take 81 mg by mouth once daily.      atorvastatin  (LIPITOR) 10 MG tablet TAKE 1 TABLET (10 MG TOTAL) BY MOUTH EVERY OTHER DAY. 45 tablet 3    beta-carotene,A,-vits C,E/mins (OCUVITE ORAL) Take by mouth.      brimonidine 0.1% (ALPHAGAN P) 0.1 % Drop Place 1 drop into both eyes 2 (two) times daily. 15 mL 3    calcium-vitamin D3 (OS-LETY 500 + D3) 500 mg(1,250mg) -200 unit per tablet Take 1 tablet by mouth 2 (two) times daily with meals.      dorzolamide-timolol 2-0.5% (COSOPT) 22.3-6.8 mg/mL ophthalmic solution Place 1 drop into both eyes 2 (two) times daily. 30 mL 3    hydroCHLOROthiazide (MICROZIDE) 12.5 mg capsule Take 1 capsule (12.5 mg total) by mouth once daily. 90 capsule 3    irbesartan (AVAPRO) 150 MG tablet Take 1 tablet (150 mg total) by mouth every evening. 90 tablet 3    LUMIGAN 0.01 % Drop Place 1 drop into both eyes every evening. 7.5 mL 3    multivitamin (THERAGRAN) per tablet Take 1 tablet by mouth once daily.       No current facility-administered medications for this visit.       Review of Systems   Constitutional: Negative for activity change, appetite change, fever and unexpected weight change.   HENT: Negative for trouble swallowing and voice change.    Eyes: Negative for photophobia and visual disturbance.   Respiratory: Negative for apnea and shortness of breath.    Cardiovascular: Negative for chest pain and leg swelling.   Gastrointestinal: Negative for constipation and nausea.   Genitourinary: Negative for difficulty urinating.   Musculoskeletal: Negative for back pain, gait problem and neck pain.   Skin: Negative for color change and pallor.   Neurological: Positive for numbness. Negative for dizziness, seizures, syncope and weakness.   Hematological: Negative for adenopathy.   Psychiatric/Behavioral: Negative for agitation, confusion and decreased concentration.       Neurologic Exam     Mental Status   Oriented to person, place, and time.   Registration: recalls 3 of 3 objects.   Attention: normal. Concentration: normal.   Speech:  "speech is normal   Level of consciousness: alert  Knowledge: good.     Cranial Nerves     CN II   Right visual field deficit: none  Left visual field deficit: none     CN III, IV, VI   Extraocular motions are normal.   Right pupil: Size: 3 mm. Shape: regular.   Left pupil: Size: 3 mm. Shape: regular.   CN III: no CN III palsy  CN VI: no CN VI palsy  Nystagmus: none   Diplopia: none  Ophthalmoparesis: none  Upgaze: normal  Downgaze: normal  Conjugate gaze: present    CN VII   Facial expression full, symmetric.   Right facial weakness: none  Left facial weakness: none    CN VIII   CN VIII normal.     CN XI   CN XI normal.     CN XII   CN XII normal.   Tongue deviation: none    Motor Exam   Muscle bulk: normal  Overall muscle tone: normal  Right arm tone: normal  Left arm tone: normal  Right leg tone: normal  Left leg tone: normal    Gait, Coordination, and Reflexes     Gait  Gait: normal    Coordination   Finger to nose coordination: normal    Tremor   Resting tremor: absent      Physical Exam  Vitals reviewed.   Constitutional:       Appearance: She is well-developed.   HENT:      Head: Normocephalic and atraumatic.   Eyes:      Extraocular Movements: EOM normal.   Pulmonary:      Effort: Pulmonary effort is normal. No respiratory distress.   Musculoskeletal:         General: Normal range of motion.      Cervical back: Normal range of motion.   Neurological:      Mental Status: She is alert and oriented to person, place, and time.      Coordination: Finger-Nose-Finger Test normal.      Gait: Gait is intact.   Psychiatric:         Speech: Speech normal.         Behavior: Behavior normal.         Thought Content: Thought content normal.         Vitals:    04/20/22 1437   BP: (!) 183/72   BP Location: Left arm   Patient Position: Sitting   BP Method: Large (Automatic)   Pulse: 60   Weight: 88 kg (194 lb 0.1 oz)   Height: 5' 6" (1.676 m)       Assessment & Plan:    Problem List Items Addressed This Visit    None   "   Visit Diagnoses     Neuropathy    -  Primary    Relevant Orders    EMG W/ ULTRASOUND AND NERVE CONDUCTION TEST 2 Extremities    Folate    Hemoglobin A1C    Immunofixation Electrophoresis    Protein Electrophoresis, Serum    TSH    Vitamin B1    Vitamin B12    Vitamin B6    Neuropathy, peripheral, idiopathic         Relevant Orders    EMG W/ ULTRASOUND AND NERVE CONDUCTION TEST 2 Extremities    Folate    TSH    Vitamin B12          Follow-up: Follow up in about 3 months (around 7/20/2022).    This note was done with the assistance of voice recognition software. Some errors may be present after proofreading.

## 2022-04-22 LAB
ALBUMIN SERPL ELPH-MCNC: 3.68 G/DL (ref 3.35–5.55)
ALPHA1 GLOB SERPL ELPH-MCNC: 0.38 G/DL (ref 0.17–0.41)
ALPHA2 GLOB SERPL ELPH-MCNC: 0.81 G/DL (ref 0.43–0.99)
B-GLOBULIN SERPL ELPH-MCNC: 0.82 G/DL (ref 0.5–1.1)
GAMMA GLOB SERPL ELPH-MCNC: 1.01 G/DL (ref 0.67–1.58)
INTERPRETATION SERPL IFE-IMP: NORMAL
PATHOLOGIST INTERPRETATION IFE: NORMAL
PATHOLOGIST INTERPRETATION SPE: NORMAL
PROT SERPL-MCNC: 6.7 G/DL (ref 6–8.4)

## 2022-04-26 LAB — PYRIDOXAL SERPL-MCNC: 19 UG/L (ref 5–50)

## 2022-05-02 ENCOUNTER — OFFICE VISIT (OUTPATIENT)
Dept: ENDOCRINOLOGY | Facility: CLINIC | Age: 77
End: 2022-05-02
Payer: MEDICARE

## 2022-05-02 VITALS
WEIGHT: 192.13 LBS | DIASTOLIC BLOOD PRESSURE: 70 MMHG | SYSTOLIC BLOOD PRESSURE: 132 MMHG | HEIGHT: 65 IN | BODY MASS INDEX: 32.01 KG/M2

## 2022-05-02 DIAGNOSIS — R53.83 FATIGUE, UNSPECIFIED TYPE: ICD-10-CM

## 2022-05-02 DIAGNOSIS — R29.818 SUSPECTED SLEEP APNEA: ICD-10-CM

## 2022-05-02 DIAGNOSIS — E04.1 THYROID NODULE: Primary | ICD-10-CM

## 2022-05-02 PROCEDURE — 3078F PR MOST RECENT DIASTOLIC BLOOD PRESSURE < 80 MM HG: ICD-10-PCS | Mod: CPTII,S$GLB,, | Performed by: INTERNAL MEDICINE

## 2022-05-02 PROCEDURE — 1159F MED LIST DOCD IN RCRD: CPT | Mod: CPTII,S$GLB,, | Performed by: INTERNAL MEDICINE

## 2022-05-02 PROCEDURE — 99999 PR PBB SHADOW E&M-EST. PATIENT-LVL III: CPT | Mod: PBBFAC,,, | Performed by: INTERNAL MEDICINE

## 2022-05-02 PROCEDURE — 99499 RISK ADDL DX/OHS AUDIT: ICD-10-PCS | Mod: S$GLB,,, | Performed by: INTERNAL MEDICINE

## 2022-05-02 PROCEDURE — 3078F DIAST BP <80 MM HG: CPT | Mod: CPTII,S$GLB,, | Performed by: INTERNAL MEDICINE

## 2022-05-02 PROCEDURE — 3075F PR MOST RECENT SYSTOLIC BLOOD PRESS GE 130-139MM HG: ICD-10-PCS | Mod: CPTII,S$GLB,, | Performed by: INTERNAL MEDICINE

## 2022-05-02 PROCEDURE — 1101F PR PT FALLS ASSESS DOC 0-1 FALLS W/OUT INJ PAST YR: ICD-10-PCS | Mod: CPTII,S$GLB,, | Performed by: INTERNAL MEDICINE

## 2022-05-02 PROCEDURE — 99999 PR PBB SHADOW E&M-EST. PATIENT-LVL III: ICD-10-PCS | Mod: PBBFAC,,, | Performed by: INTERNAL MEDICINE

## 2022-05-02 PROCEDURE — 1101F PT FALLS ASSESS-DOCD LE1/YR: CPT | Mod: CPTII,S$GLB,, | Performed by: INTERNAL MEDICINE

## 2022-05-02 PROCEDURE — 1159F PR MEDICATION LIST DOCUMENTED IN MEDICAL RECORD: ICD-10-PCS | Mod: CPTII,S$GLB,, | Performed by: INTERNAL MEDICINE

## 2022-05-02 PROCEDURE — 99213 PR OFFICE/OUTPT VISIT, EST, LEVL III, 20-29 MIN: ICD-10-PCS | Mod: S$GLB,,, | Performed by: INTERNAL MEDICINE

## 2022-05-02 PROCEDURE — 99499 UNLISTED E&M SERVICE: CPT | Mod: S$GLB,,, | Performed by: INTERNAL MEDICINE

## 2022-05-02 PROCEDURE — 3075F SYST BP GE 130 - 139MM HG: CPT | Mod: CPTII,S$GLB,, | Performed by: INTERNAL MEDICINE

## 2022-05-02 PROCEDURE — 99213 OFFICE O/P EST LOW 20 MIN: CPT | Mod: S$GLB,,, | Performed by: INTERNAL MEDICINE

## 2022-05-02 PROCEDURE — 3288F PR FALLS RISK ASSESSMENT DOCUMENTED: ICD-10-PCS | Mod: CPTII,S$GLB,, | Performed by: INTERNAL MEDICINE

## 2022-05-02 PROCEDURE — 3044F PR MOST RECENT HEMOGLOBIN A1C LEVEL <7.0%: ICD-10-PCS | Mod: CPTII,S$GLB,, | Performed by: INTERNAL MEDICINE

## 2022-05-02 PROCEDURE — 3288F FALL RISK ASSESSMENT DOCD: CPT | Mod: CPTII,S$GLB,, | Performed by: INTERNAL MEDICINE

## 2022-05-02 PROCEDURE — 3044F HG A1C LEVEL LT 7.0%: CPT | Mod: CPTII,S$GLB,, | Performed by: INTERNAL MEDICINE

## 2022-05-02 NOTE — ASSESSMENT & PLAN NOTE
She does have some factors suggestive of obstructive sleep apnea.  Will refer to Sleep Clinic for evaluation.  She had recent thyroid function tests, which were normal.

## 2022-05-02 NOTE — ASSESSMENT & PLAN NOTE
I have reviewed management options including observation, FNA or surgery.    Check repeat thyroid ultrasound

## 2022-05-02 NOTE — PROGRESS NOTES
Subjective:      Chief Complaint: Follow-up for thyroid nodules    HPI: Rosmery Ramirez is a 76 y.o. female who is here for follow-up evaluation for thyroid nodules        With regards to the thyroid nodules/Hashimoto's disease:    Presents with nodule that was diagnosed by ultrasound    Her only complaint is intermittent fatigue in the morning. Sometimes she sleeps well, sometimes she's up every hour.  She does snore on occasion.  She has never been evaluated for sleep apnea.    Preexisting thyroid disease?: no    Compressive symptoms:  Anterior neck pressure?: no  Dysphagia?: no  Voice changes?: no    Risk Factors:  Radiation to head or neck for any treatment of cancer or exposure to radiation?: no  Personal history of colon or breast cancer?: no  Tobacco use?: no  Family history of thyroid cancer?: no  Two sisters with thyroidectomy due to nodules, but benign path.    Symptoms of hyper or hypothyroidism:  Weight changes?: no  Diarrhea or Constipation?: no  Heat or cold intolerance?: no  Hair, nail or skin changes?: no  Chest pain, palpations or shortness of breath?: no   Mental fog?: no   Fatigue?: No.  Last visit she was complaining of chronic fatigue.  It was suggested to stop the Neurontin, which can have that side effect.  After she stopped taking Neurontin, the fatigue resolved.    Previous biopsy results: None    Last ultrasound 2/25/2021:  Diffusely heterogenous echotexture indicative of Hashimoto's disease    1. 0.71 x 0.57 x 0.59 cm right middle lobe nodule. The nodule is solid with isoechoic echotexture. Vascularity is Grade 1 (absent). Borders are well-defined with a thin surrounding halo. Microcalcifications are not present. On the previous ultrasound this nodule measured 1.31 x 1.07 x 0.91 cm.  Compared to the previous ultrasound, the nodule is smaller in size and unchanged in appearance.  2.  0.90 x 0.47 x 0.88 cm right inferior lobe/isthmus nodule. The nodule is solid with hyperechoic  echotexture. Vascularity is Grade 1 (absent). Borders are well-defined with a thin surrounding halo.  Microcalcifications are not present. On the previous ultrasound this nodule measured 0.85 x 0.49 x 0.72 cm.  Compared to the previous ultrasound, the nodule is unchanged in size and appearance.    Lab Results   Component Value Date    TSH 2.199 04/21/2022    TSH 2.750 03/10/2022    TSH 2.118 02/25/2021    THYROPEROXID 1215.2 (H) 02/25/2021         Objective:     Vitals:    05/02/22 1144   BP: 132/70     BP Readings from Last 5 Encounters:   05/02/22 132/70   04/20/22 (!) 183/72   04/04/22 (!) 147/68   03/28/22 (!) 149/67   03/14/22 114/70         Physical Exam  Neck:      Thyroid: Thyroid mass (subcentimeter nodules palpable bilaterally) present. No thyromegaly.         Wt Readings from Last 10 Encounters:   05/02/22 1144 87.1 kg (192 lb 2.1 oz)   04/20/22 1437 88 kg (194 lb 0.1 oz)   04/04/22 1102 86.2 kg (190 lb 0.6 oz)   03/28/22 0743 86.2 kg (190 lb)   12/20/21 0956 91 kg (200 lb 9.9 oz)   12/16/21 1559 88.1 kg (194 lb 3.6 oz)   07/28/21 1122 88.7 kg (195 lb 8.8 oz)   07/07/21 1539 88.7 kg (195 lb 8.8 oz)   05/14/21 1048 87.5 kg (192 lb 12.7 oz)   03/17/21 0923 85.3 kg (188 lb)       Lab Results   Component Value Date    HGBA1C 6.5 (H) 04/21/2022    HGBA1C 6.0 (H) 03/10/2022    HGBA1C 6.0 (H) 02/25/2021    HGBA1C 6.0 (H) 08/18/2020     Lab Results   Component Value Date    CHOL 119 (L) 03/10/2022    CHOL 129 02/25/2021    HDL 45 03/10/2022    HDL 44 02/25/2021    LDLCALC 58.4 (L) 03/10/2022    LDLCALC 65.6 02/25/2021    TRIG 78 03/10/2022    TRIG 97 02/25/2021    CHOLHDL 37.8 03/10/2022    CHOLHDL 34.1 02/25/2021     Lab Results   Component Value Date     03/10/2022    K 3.9 03/10/2022     03/10/2022    CO2 26 03/10/2022    GLU 89 03/10/2022    BUN 12 03/10/2022    CREATININE 0.7 03/10/2022    CALCIUM 9.6 03/10/2022    PROT 6.7 03/10/2022    ALBUMIN 3.6 03/10/2022    BILITOT 0.6 03/10/2022    ALKPHOS  81 03/10/2022    AST 16 03/10/2022    ALT 17 03/10/2022    ANIONGAP 9 03/10/2022    ESTGFRAFRICA >60.0 03/10/2022    EGFRNONAA >60.0 03/10/2022    TSH 2.199 04/21/2022      No results found for: LABMICR, CREATRANDUR, MICALBCREAT    Assessment/Plan:     Thyroid nodule on US; followed by endo repeat 02/2023  I have reviewed management options including observation, FNA or surgery.    Check repeat thyroid ultrasound    Fatigue  She does have some factors suggestive of obstructive sleep apnea.  Will refer to Sleep Clinic for evaluation.  She had recent thyroid function tests, which were normal.      RTC in 12 months

## 2022-05-03 ENCOUNTER — TELEPHONE (OUTPATIENT)
Dept: NEUROLOGY | Facility: CLINIC | Age: 77
End: 2022-05-03
Payer: MEDICARE

## 2022-05-03 NOTE — TELEPHONE ENCOUNTER
Returned pts call, no answer, lvm. Made pt aware next fri avaiable for emg isnt until 6/10; mad ept aware I will not cx her current appt until she returns my call as im unsure if she wants to wait that long. Office number provided.     ----- Message from Chris Strong sent at 5/3/2022  8:38 AM CDT -----  Contact: Patient  Patient calling in regards to rescheduling appointment. Patient stated that she would prefer a Friday at the end of the month. Requesting call back       [phone] (home)

## 2022-05-03 NOTE — TELEPHONE ENCOUNTER
----- Message from Jesusita Reina sent at 5/3/2022  4:04 PM CDT -----  Type:  Patient Returning Call    Who Called: self     Who Left Message for Patient: Maria L     Does the patient know what this is regarding?: yes     Would the patient rather a call back or a response via [facility]? Call back     Best Call Back Number: [phone]

## 2022-05-09 ENCOUNTER — PATIENT MESSAGE (OUTPATIENT)
Dept: FAMILY MEDICINE | Facility: CLINIC | Age: 77
End: 2022-05-09
Payer: MEDICARE

## 2022-05-09 DIAGNOSIS — I10 ESSENTIAL HYPERTENSION: ICD-10-CM

## 2022-05-10 ENCOUNTER — PATIENT MESSAGE (OUTPATIENT)
Dept: FAMILY MEDICINE | Facility: CLINIC | Age: 77
End: 2022-05-10
Payer: MEDICARE

## 2022-05-10 RX ORDER — HYDROCHLOROTHIAZIDE 12.5 MG/1
12.5 CAPSULE ORAL DAILY
Qty: 90 CAPSULE | Refills: 3 | Status: SHIPPED | OUTPATIENT
Start: 2022-05-10 | End: 2022-12-20

## 2022-05-11 ENCOUNTER — PATIENT MESSAGE (OUTPATIENT)
Dept: OPHTHALMOLOGY | Facility: CLINIC | Age: 77
End: 2022-05-11
Payer: MEDICARE

## 2022-05-12 ENCOUNTER — PATIENT MESSAGE (OUTPATIENT)
Dept: OPHTHALMOLOGY | Facility: CLINIC | Age: 77
End: 2022-05-12
Payer: MEDICARE

## 2022-05-12 DIAGNOSIS — H40.1122 PRIMARY OPEN ANGLE GLAUCOMA OF LEFT EYE, MODERATE STAGE: ICD-10-CM

## 2022-05-12 DIAGNOSIS — H40.1111 PRIMARY OPEN ANGLE GLAUCOMA OF RIGHT EYE, MILD STAGE: ICD-10-CM

## 2022-05-12 RX ORDER — BIMATOPROST 0.1 MG/ML
1 SOLUTION/ DROPS OPHTHALMIC NIGHTLY
Qty: 7.5 ML | Refills: 3 | Status: SHIPPED | OUTPATIENT
Start: 2022-05-12 | End: 2022-06-30 | Stop reason: SDUPTHER

## 2022-05-24 ENCOUNTER — CLINICAL SUPPORT (OUTPATIENT)
Dept: REHABILITATION | Facility: HOSPITAL | Age: 77
End: 2022-05-24
Attending: PODIATRIST
Payer: MEDICARE

## 2022-05-24 DIAGNOSIS — M79.671 RIGHT FOOT PAIN: ICD-10-CM

## 2022-05-24 DIAGNOSIS — M72.2 PLANTAR FASCIITIS: ICD-10-CM

## 2022-05-24 PROCEDURE — 97110 THERAPEUTIC EXERCISES: CPT

## 2022-05-24 PROCEDURE — 97161 PT EVAL LOW COMPLEX 20 MIN: CPT

## 2022-05-24 NOTE — PLAN OF CARE
[facility]  Physical Therapy Initial Evaluation    Name: Rosmery Ramirez  Clinic Number: [id number]    Therapy Diagnosis:   Encounter Diagnoses   Name Primary?    Plantar fasciitis     Right foot pain      Physician: Katja Hussein DPM    Physician Orders: PT Eval and Treat   Medical Diagnosis: M72.2 (ICD-10-CM) - Plantar fasciitis  Date of Surgery: NA  Evaluation Date: 5/24/2022  Authorization Period Expiration: 4/4/23  Plan of Care Certification Period: 7/5/22  Visit # / Visits authorized: 1/ 1    Time In: 205 pm  Time Out: 245  Total Billable Time: 40 minutes    Precautions: Standard    Subjective   Date of onset: 6 mos  History of current condition - Rosmery reports: she has been seeing a podiatrist including exercises and CSI in her R heel which helped. She notes overall her CSI is still helping, but it may start to wear off. Pt notes pain all the time, but before CSI night was the worst. She normally wears a more supportive shoe (saucony) which helps. Pt notes having neuropathy in B feet for the last 3 years. Pt referred to PT to improve R foot symptoms.        Past Medical History:   Diagnosis Date    Allergy     Anxiety     Breast cyst     Edema of leg 10/5/2012    bilateral     Fractures 2011    thumb R    GERD (gastroesophageal reflux disease)     Glaucoma     History of colonic polyps     Hx-TIA (transient ischemic attack) 8/13/2012 Jan 2012: LLE and left facial numbness lasting 1 hour     Hyperlipidemia     Hypertension     Left shoulder tendonitis 8/8/2015    Primary open-angle glaucoma, mild stage - Both Eyes 6/3/2013     Rosmery Ramirez  has a past surgical history that includes Left Breast Lumpectomy (Left); Hand surgery (Left, 2011); SELECTIVE LASER TRABECUPLASTY (05/2014); Hysterectomy (1980's); Tubal ligation (1970's); Vaginal delivery; Breast surgery (Left); Colonoscopy (N/A, 1/23/2017); Oophorectomy; KAHOOK GONIOTOMY (Right,  "11/30/2016); YAG CAPSULOTOMY (Left, 11/29/2018); Cataract extraction w/  intraocular lens implant (Left, 10/05/2016); Cataract extraction w/  intraocular lens implant (Right, 11/30/2016); Breast biopsy (Left); Trabeculectomy (Right, 3/17/2021); Implantation of device for glaucoma (Right, 3/17/2021); Trabeculectomy (Left, 7/28/2021); and Colonoscopy (N/A, 3/28/2022).    Rosmery has a current medication list which includes the following prescription(s): ascorbic acid (vitamin c), aspirin, atorvastatin, beta-carotene(a)-vits c,e/mins, brimonidine 0.1%, calcium-vitamin d3, dorzolamide-timolol 2-0.5%, hydrochlorothiazide, irbesartan, lumigan, and multivitamin.    Review of patient's allergies indicates:   Allergen Reactions    Lisinopril Swelling    Amlodipine Edema    Combigan [brimonidine-timolol]      Causes itchy eyelids and contact dermatitis    Cosopt [dorzolamide-timolol]      Causes angular blepharitis of eyelids    Pravastatin      Myalgia and elevated CPK        Imaging, X-ray: heel spurs    Prior Therapy: yes  Social History: she lives with their spouse (one story)  Occupation: retired  Prior Level of Function: walking 3x a week   Current Level of Function: limited with walking/WB    Pain:  Current 0/10, worst 5/10, best 0/10   Location: right f00t   Description: Sharp  Aggravating Factors: Walking and Night Time  Easing Factors: supportive shoes    Pts goals: "to get rid of her pain"    Objective        Observation: pt appears stated age, NAD      Posture: normal arch height, B foot ER (L>R)      Gait: B hip ER, "too many toes" sign    Range of Motion: AROM (PROM):    Ankle Left Right   Dorsiflexion  (0) degrees  (70) degrees   Plantarflexion  (60) degrees  (5) degrees   Inversion  (25) degrees  (35) degrees   Eversion  (30) degrees  (20) degrees       Strength:      Ankle Left Right   Dorsiflexion 5/5 5/5   Plantarflexion 4+/5 4+/5   Inversion 5/5 5/5   Eversion 4+/5 4+/5       Special " Tests:      Ankle Right   TTP Heel Positive     Joint Mobility: decreased talocrual DF B    Palpation: TTP R heel    Sensation: WNL BLE    Flexibility: tight gastroc B          TREATMENT   Treatment Time In: 235  Treatment Time Out: 245  Total Treatment time separate from Evaluation time:10      Rosmery received therapeutic exercises to develop strength and ROM for 10 minutes including:  Towel curls x3 min  Towel toe holds 3x 20 sec   Towel stretch 3x 30 sec R  Education and HEP review      Home Exercises and Patient Education Provided    Education provided re: HEP to Go    Written Home Exercises Provided: yes.  Exercises were reviewed and Rosmery was able to demonstrate them prior to the end of the session.   Pt received a written copy of exercises to perform at home. Rosmery demonstrated good  understanding of the education provided.     See media section for exercises given.   Assessment   Rosmery is a 76 y.o. female referred to outpatient Physical Therapy with a medical diagnosis of plantar fascitis. Pt presents with decreased DF B, weakness in B PF and Ev, pain with WB and TTP at heel causing limitations with walking. Pt symptoms seem likely coming from bone spur noted in x-ray vs. Plantar fascitis secondary to good response to CSI, normal arch control with loading and pain at night vs first step in the morning.    Pt prognosis is Good.   Pt will benefit from skilled outpatient Physical Therapy to address the deficits stated above and in the chart below, provide pt/family education, and to maximize pt's level of independence.     Plan of care discussed with patient: Yes  Pt's spiritual, cultural and educational needs considered and patient is agreeable to the plan of care and goals as stated below:     Anticipated Barriers for therapy: prolonged WB RLE    Medical Necessity is demonstrated by the following  History  Co-morbidities and personal factors that may impact the plan of care Co-morbidities:    HTN    Personal Factors:   no deficits     low   Examination  Body Structures and Functions, activity limitations and participation restrictions that may impact the plan of care Body Regions:   lower extremities    Body Systems:    ROM  strength  balance  gait  motor control    Participation Restrictions:   WB RLE    Activity limitations:   Mobility  walking    Self care  no deficits    Domestic Life  no deficits    Interactions/Relationships  no deficits    Life Areas  no deficits    Community and Social Life  no deficits         low   Clinical Presentation stable and uncomplicated low   Decision Making/ Complexity Score: low     Goals:  Short Term Goals: 3 weeks   - Pt will increase ROM to 10 deg R DF  - Pt will increase strength to 5/5 B ankle PF and eversion  - Decrease Pain to 2/10 as worst with walking >10 min  - Pt to self correct posture with normal hip alignment  - Pt independent with HEP with progressions.     Long Term Goals (6 Weeks):  - Pt will return to walking 2 x a week and add bike for cardiovascular endurance/exercise painfree  - Decrease Pain to 1/10 as worst with all PT interventions  - Pt to return to 80% PLOF    Plan   Certification Period/Plan of care expiration: 5/24/2022 to 7/5/22.    Outpatient Physical Therapy 1 times weekly for 6 weeks to include the following interventions: Manual Therapy, Moist Heat/ Ice, Neuromuscular Re-ed, Patient Education, Therapeutic Activities and Therapeutic Exercise.     Berta Griffith, PT, DPT

## 2022-06-02 PROBLEM — Z12.11 SCREENING FOR COLON CANCER: Status: RESOLVED | Noted: 2022-03-28 | Resolved: 2022-06-02

## 2022-06-05 ENCOUNTER — PATIENT MESSAGE (OUTPATIENT)
Dept: FAMILY MEDICINE | Facility: CLINIC | Age: 77
End: 2022-06-05
Payer: MEDICARE

## 2022-06-05 DIAGNOSIS — B02.9 HERPES ZOSTER WITHOUT COMPLICATION: Primary | ICD-10-CM

## 2022-06-05 RX ORDER — VALACYCLOVIR HYDROCHLORIDE 1 G/1
1000 TABLET, FILM COATED ORAL 3 TIMES DAILY
Qty: 21 TABLET | Refills: 0 | Status: SHIPPED | OUTPATIENT
Start: 2022-06-05 | End: 2023-03-07 | Stop reason: SDUPTHER

## 2022-06-06 ENCOUNTER — TELEPHONE (OUTPATIENT)
Dept: NEUROLOGY | Facility: CLINIC | Age: 77
End: 2022-06-06
Payer: MEDICARE

## 2022-06-06 NOTE — TELEPHONE ENCOUNTER
----- Message from Jahaira Wynn sent at 6/6/2022  8:22 AM CDT -----  Type: Patient Call Back    Who called: self     What is the request in detail: Pt woould like a call from the nurse to reschedule her appt     Can the clinic reply by [facility]?    Would the patient rather a call back or a response via [facility]? Call     Best call back number: [phone] (home)

## 2022-06-08 ENCOUNTER — CLINICAL SUPPORT (OUTPATIENT)
Dept: REHABILITATION | Facility: HOSPITAL | Age: 77
End: 2022-06-08
Payer: MEDICARE

## 2022-06-08 DIAGNOSIS — M79.671 RIGHT FOOT PAIN: Primary | ICD-10-CM

## 2022-06-08 PROCEDURE — 97140 MANUAL THERAPY 1/> REGIONS: CPT

## 2022-06-08 PROCEDURE — 97110 THERAPEUTIC EXERCISES: CPT

## 2022-06-08 NOTE — PROGRESS NOTES
[facility]   Physical Therapy Treatment Note     Name: Rosmery Ramirez  Clinic Number: [id number]    Therapy Diagnosis:   Encounter Diagnosis   Name Primary?    Right foot pain Yes     Physician: Katja Hussein DPM    Visit Date: 6/8/2022    Physician Orders: PT Eval and Treat   Medical Diagnosis: M72.2 (ICD-10-CM) - Plantar fasciitis  Evaluation Date: 5/24/2022  Authorization Period Expiration: 12/31/2022  Plan of Care Certification Period: 7/5/22  Visit # / Visits authorized: 2/ 20  PTA Visit #: 0/5     Time In: 16:00  Time Out: 16:55  Total Billable Time: 55 minutes    Precautions: Standard    SUBJECTIVE     Pt reports: she has noticed decreased morning pain since the initial evaluation.  She was compliant with home exercise program.  Response to previous treatment: Initial Evalation  Functional change: Ongoing    Pain: 0/10  Location: Right Foot    OBJECTIVE     Objective Measures updated at progress report unless specified.     Treatment     Rosmery received the treatments listed below:      Therapeutic Exercises to develop strength, endurance, ROM and flexibility for 40 minutes including:  Towel curls - x3 min  Towel toe holds 45 seconds x2   calf stretch on incline board - 45 seconds x2  toe yoga - 3 second holds; 3 minutes  foot doming - 3 second holds, 3 minutes  Theraband Step Overs - green theraband; 3 minutes    Manual Therapy Techniques: Joint mobilizations were applied to the: Right Foot for 15 minutes, including:  Talocrural distraction  Distal fibular head AP mobilizations    Patient Education and Home Exercises     Home Exercises Provided and Patient Education Provided     Education provided:   Anatomy and Pathology.  Symptom management and plan of care progressions.  Home Exercise Program.    Written Home Exercises Provided: yes. Exercises were reviewed and Rosmery was able to demonstrate them prior to the end of the session.  Radhagaurav demonstrated good   understanding of the education provided. See EMR under Patient Instructions for exercises provided during therapy sessions    ASSESSMENT     Rosmery presents back for her first follow-up. Denies any symptoms. Initiated manual therapy for joint mobility. Increased dorsiflexion to 3 degrees post treatment. Reinforced positive changes with today'ss new exercises addressing soft tissue mobility and intrinsic strengthening. No exacerbation in symptoms upon completion of today's treatments.    From Initial Evaluation on 5/24/2022 - Rosmery is a 76 y.o. female referred to outpatient Physical Therapy with a medical diagnosis of plantar fascitis. Pt presents with decreased DF B, weakness in B PF and Ev, pain with WB and TTP at heel causing limitations with walking. Pt symptoms seem likely coming from bone spur noted in x-ray vs. Plantar fascitis secondary to good response to CSI, normal arch control with loading and pain at night vs first step in the morning.    Rosmery Is progressing well towards her goals.   Pt prognosis is Good.     Pt will continue to benefit from skilled outpatient physical therapy to address the deficits listed in the problem list box on initial evaluation, provide pt/family education and to maximize pt's level of independence in the home and community environment.   Pt's spiritual, cultural and educational needs considered and pt agreeable to plan of care and goals.     Anticipated barriers to physical therapy: prolonged WB Right lower extremity    Goals:   Short Term Goals: 3 weeks   - Pt will increase range of motion to 10 degrees right dorsiflexion. (Not Met - Progressing)   - Pt will increase strength to 5/5 bilateral ankle plantarflexion and eversion. (Not Met - Progressing)  - Decrease Pain to 2/10 as worst with walking greater than 10 minutes. (Not Met - Progressing)   - Pt to self correct posture with normal hip alignment. (Not Met - Progressing)  - Pt independent with home exercise  program with progressions.      Long Term Goals (6 Weeks):  - Pt will return to walking 2 times a week and add bike for cardiovascular endurance/exercise painfree. (Not Met - Progressing)  - Decrease Pain to 1/10 as worst with all PT interventions. (Not Met - Progressing)  - Pt to return to 80% prior level of function. (Not Met - Progressing)    PLAN     Continue with documented plan of care, and progress patient as indicated.    Certification Period/Plan of care expiration: 5/24/2022 to 7/5/22.  Outpatient Physical Therapy 1 times weekly for 6 weeks    Shiva Del Rosario, PT , DPT, OCS

## 2022-06-15 ENCOUNTER — CLINICAL SUPPORT (OUTPATIENT)
Dept: REHABILITATION | Facility: HOSPITAL | Age: 77
End: 2022-06-15
Payer: MEDICARE

## 2022-06-15 DIAGNOSIS — M79.671 RIGHT FOOT PAIN: Primary | ICD-10-CM

## 2022-06-15 PROCEDURE — 97110 THERAPEUTIC EXERCISES: CPT

## 2022-06-15 PROCEDURE — 97140 MANUAL THERAPY 1/> REGIONS: CPT

## 2022-06-15 NOTE — PROGRESS NOTES
[facility]   Physical Therapy Treatment Note     Name: Rosmery Cody Ramirez  Clinic Number: [id number]    Therapy Diagnosis:   Encounter Diagnosis   Name Primary?    Right foot pain Yes     Physician: Katja Hussein DPM    Visit Date: 6/15/2022    Physician Orders: PT Eval and Treat   Medical Diagnosis: M72.2 (ICD-10-CM) - Plantar fasciitis  Evaluation Date: 5/24/2022  Authorization Period Expiration: 12/31/2022  Plan of Care Certification Period: 7/5/22  Visit # / Visits authorized: 2/ 20  PTA Visit #: 0/5     Time In: 16:00  Time Out: 16:55  Total Billable Time: 55 minutes    Precautions: Standard    SUBJECTIVE     Pt reports: steady decreases in morning pain with first few steps.  She was compliant with home exercise program.  Response to previous treatment: Initial Evalation  Functional change: increased walking duration    Pain: 0/10  Location: Right Foot    OBJECTIVE     6/15/2022  Right Dorsiflexion passive range of motion: 10 degrees    Treatment     Rosmery received the treatments listed below:      Therapeutic Exercises to develop strength, endurance, ROM and flexibility for 40 minutes including:  Towel curls - x3 min  Towel toe holds 45 seconds x3   calf stretch on incline board - 45 seconds x2  toe yoga - 3 second holds; 3 minutes (standing next)  Standing foot doming - 3 second holds, 3 minutes  Theraband Step Overs - green theraband; 3 minutes  Heel raises on incline board - 3x10    Next session:  Single leg balance activities    Manual Therapy Techniques: Joint mobilizations were applied to the: Right Foot for 15 minutes, including:  Great toe extension mobilizations  Proximal 1st ray mobilizations  Subtalar glides  Low Dye taping (to mid arch)    Patient Education and Home Exercises     Home Exercises Provided and Patient Education Provided     Education provided:    Symptom management and plan of care progressions - use of tennis shoes  Home Exercise Program -  reviewed  Taping benefits and expected response.    Written Home Exercises Provided: yes. Exercises were reviewed and Rosmery was able to demonstrate them prior to the end of the session.  Rosmery demonstrated good  understanding of the education provided. See EMR under Patient Instructions for exercises provided during therapy sessions    ASSESSMENT     Rosmery presents with gradually decreasing morning pain. Initiated low dye taping for tissue unloading while continuing exercise progressions for foot mobility and intrinsic strengthening. No pain upon completion of today's treatments.    From Initial Evaluation on 5/24/2022 - Rosmery is a 76 y.o. female referred to outpatient Physical Therapy with a medical diagnosis of plantar fascitis. Pt presents with decreased DF B, weakness in B PF and Ev, pain with WB and TTP at heel causing limitations with walking. Pt symptoms seem likely coming from bone spur noted in x-ray vs. Plantar fascitis secondary to good response to CSI, normal arch control with loading and pain at night vs first step in the morning.    Rosmery Is progressing well towards her goals.   Pt prognosis is Good.     Pt will continue to benefit from skilled outpatient physical therapy to address the deficits listed in the problem list box on initial evaluation, provide pt/family education and to maximize pt's level of independence in the home and community environment.   Pt's spiritual, cultural and educational needs considered and pt agreeable to plan of care and goals.     Anticipated barriers to physical therapy: prolonged WB Right lower extremity    Goals:   Short Term Goals: 3 weeks   - Pt will increase range of motion to 10 degrees right dorsiflexion. (Met - 6/15/2022)   - Pt will increase strength to 5/5 bilateral ankle plantarflexion and eversion. (Not Met - Progressing)  - Decrease Pain to 2/10 as worst with walking greater than 10 minutes. (Not Met - Progressing)   - Pt to self correct  posture with normal hip alignment. (Not Met - Progressing)  - Pt independent with home exercise program with progressions.      Long Term Goals (6 Weeks):  - Pt will return to walking 2 times a week and add bike for cardiovascular endurance/exercise painfree. (Not Met - Progressing)  - Decrease Pain to 1/10 as worst with all PT interventions. (Not Met - Progressing)  - Pt to return to 80% prior level of function. (Not Met - Progressing)    PLAN     Continue with documented plan of care, and progress patient as indicated.    Certification Period/Plan of care expiration: 5/24/2022 to 7/5/22.  Outpatient Physical Therapy 1 times weekly for 6 weeks    Shiva Del Rosario, PT , DPT, OCS

## 2022-06-30 ENCOUNTER — HOSPITAL ENCOUNTER (OUTPATIENT)
Dept: ENDOCRINOLOGY | Facility: CLINIC | Age: 77
Discharge: HOME | End: 2022-06-30
Attending: INTERNAL MEDICINE
Payer: MEDICARE

## 2022-06-30 DIAGNOSIS — H40.1111 PRIMARY OPEN ANGLE GLAUCOMA OF RIGHT EYE, MILD STAGE: ICD-10-CM

## 2022-06-30 DIAGNOSIS — E04.1 THYROID NODULE: ICD-10-CM

## 2022-06-30 DIAGNOSIS — H40.1122 PRIMARY OPEN ANGLE GLAUCOMA OF LEFT EYE, MODERATE STAGE: ICD-10-CM

## 2022-06-30 PROCEDURE — 76536 US EXAM OF HEAD AND NECK: CPT | Mod: S$GLB,,, | Performed by: INTERNAL MEDICINE

## 2022-06-30 PROCEDURE — 76536 US SOFT TISSUE HEAD NECK THYROID: ICD-10-PCS | Mod: S$GLB,,, | Performed by: INTERNAL MEDICINE

## 2022-07-01 ENCOUNTER — PATIENT MESSAGE (OUTPATIENT)
Dept: FAMILY MEDICINE | Facility: CLINIC | Age: 77
End: 2022-07-01
Payer: MEDICARE

## 2022-07-01 RX ORDER — BIMATOPROST 0.1 MG/ML
1 SOLUTION/ DROPS OPHTHALMIC NIGHTLY
Qty: 7.5 ML | Refills: 3 | Status: SHIPPED | OUTPATIENT
Start: 2022-07-01

## 2022-07-06 ENCOUNTER — CLINICAL SUPPORT (OUTPATIENT)
Dept: REHABILITATION | Facility: HOSPITAL | Age: 77
End: 2022-07-06
Payer: MEDICARE

## 2022-07-06 DIAGNOSIS — M79.671 RIGHT FOOT PAIN: Primary | ICD-10-CM

## 2022-07-06 PROCEDURE — 97140 MANUAL THERAPY 1/> REGIONS: CPT

## 2022-07-06 PROCEDURE — 97110 THERAPEUTIC EXERCISES: CPT

## 2022-07-06 NOTE — PROGRESS NOTES
[facility] OUTPATIENT THERAPY AND WELLNESS  Physical Therapy Plan of Care Note    Name: Rosmery Ramirez  Clinic Number: [id number]    Therapy Diagnosis:   Encounter Diagnosis   Name Primary?    Right foot pain Yes     Physician: Katja Hussein DPM    Visit Date: 7/6/2022    Physician Orders: PT Eval and Treat   Medical Diagnosis: M72.2 (ICD-10-CM) - Plantar fasciitis  Evaluation Date: 5/24/2022  Authorization Period Expiration: 12/31/2022  Plan of Care Certification Period: 7/5/22  Visit # / Visits authorized: 3/ 20    Time In: 16:00  Time Out: 16:53  Total Billable Time: 53 minutes    Precautions: Standard    SUBJECTIVE     Pt reports: only morning pain remains. Resolves with exercises and does not bother her the rest of the day.  She was compliant with home exercise program.  Response to previous treatment: tape did not help  Functional change: decreasing morning pain intensity.    Pain: 5/10 (first thing in the morning)  Location: Right Foot    OBJECTIVE     7/6/2022  Right Dorsiflexion passive range of motion: 10 degrees  Single leg calf raise  test = 4-/5    Treatment     Rosmery received the treatments listed below:      Therapeutic Exercises to develop strength, endurance, ROM and flexibility for 40 minutes including:  Towel curls - x3 min  Towel toe holds 45 seconds x3   calf stretch on incline board - 45 seconds x2  toe yoga - 3 second holds; 3 minutes (standing next)  Standing foot doming - 3 second holds, 3 minutes  Theraband Step Overs - green theraband; 3 minutes  Heel raises on incline board - 4x10 each (plantarflexion and dorsiflexion)  Eccentric Calf Raises - 4x8; Right lower extremity only    Next session:  Single leg balance activities    Manual Therapy Techniques: Joint mobilizations were applied to the: Right Foot for 13 minutes, including:  Great toe extension mobilizations  Proximal 1st ray mobilizations  Subtalar glides    Patient Education and Home Exercises     Home Exercises Provided  and Patient Education Provided     Education provided:    Symptom management and plan of care progressions - use of tennis shoes  Home Exercise Program - reviewed    Written Home Exercises Provided: yes. Exercises were reviewed and Rosmery was able to demonstrate them prior to the end of the session.  Rosmery demonstrated good  understanding of the education provided. See EMR under Patient Instructions for exercises provided during therapy sessions    ASSESSMENT     Rosmery has met 4 out of 8 goals as of the 3rd visit and is progressing as expected towards her remaining goals. Requires additional plantarflexion strengthen as noted by her ongong morning pain and weakness with gastroc/soleus testing. It is recommended that she continue with skilled physical therapy in order to meet her remaining goals.    From Initial Evaluation on 5/24/2022 - Rosmery is a 76 y.o. female referred to outpatient Physical Therapy with a medical diagnosis of plantar fascitis. Pt presents with decreased DF B, weakness in B PF and Ev, pain with WB and TTP at heel causing limitations with walking. Pt symptoms seem likely coming from bone spur noted in x-ray vs. Plantar fascitis secondary to good response to CSI, normal arch control with loading and pain at night vs first step in the morning.    Rosmery Is progressing well towards her goals.   Pt prognosis is Good.     Pt will continue to benefit from skilled outpatient physical therapy to address the deficits listed in the problem list box on initial evaluation, provide pt/family education and to maximize pt's level of independence in the home and community environment.   Pt's spiritual, cultural and educational needs considered and pt agreeable to plan of care and goals.     Anticipated barriers to physical therapy: prolonged WB Right lower extremity    Goals:   Short Term Goals: 3 weeks   - Pt will increase range of motion to 10 degrees right dorsiflexion. (Met - 6/15/2022)   -  Pt will increase strength to 5/5 bilateral ankle plantarflexion and eversion. (Not Met - Progressing)  - Decrease Pain to 2/10 as worst with walking greater than 10 minutes. (Met - 7/6/2022)   - Pt to self correct posture with normal hip alignment. (Met - 7/6/2022)  - Pt independent with home exercise program with progressions. (Met - 7/6/2022)      Long Term Goals (6 Weeks):  - Pt will return to walking 2 times a week and add bike for cardiovascular endurance/exercise painfree. (Not Met - Progressing)  - Decrease Pain to 1/10 as worst with all PT interventions. (Not Met - Progressing)  - Pt to return to 80% prior level of function. (Not Met - Progressing)    Reasons for Recertification of Therapy:   Extend plan of care dates.    PLAN     Updated Certification Period: 7/6/2022 to 8/26/2022   Recommended Treatment Plan: 1 times per week for 5 weeks:  Gait Training, Manual Therapy, Neuromuscular Re-ed, Orthotic Management and Training, Patient Education, Therapeutic Activities and Therapeutic Exercise  Other Recommendations: progress plantarflexor strength    Shiva Del Rosario, PT, DPT, OCS    I CERTIFY THE NEED FOR THESE SERVICES FURNISHED UNDER THIS PLAN OF TREATMENT AND WHILE UNDER MY CARE  Physician's comments:      Physician's Signature: ___________________________________________________

## 2022-07-07 ENCOUNTER — OFFICE VISIT (OUTPATIENT)
Dept: FAMILY MEDICINE | Facility: CLINIC | Age: 77
End: 2022-07-07
Payer: MEDICARE

## 2022-07-07 ENCOUNTER — PROCEDURE VISIT (OUTPATIENT)
Dept: NEUROLOGY | Facility: CLINIC | Age: 77
End: 2022-07-07
Payer: MEDICARE

## 2022-07-07 VITALS — BODY MASS INDEX: 32.54 KG/M2 | WEIGHT: 195.31 LBS | HEIGHT: 65 IN

## 2022-07-07 VITALS
WEIGHT: 195.44 LBS | SYSTOLIC BLOOD PRESSURE: 118 MMHG | BODY MASS INDEX: 32.56 KG/M2 | DIASTOLIC BLOOD PRESSURE: 72 MMHG | HEART RATE: 65 BPM | HEIGHT: 65 IN | OXYGEN SATURATION: 98 % | TEMPERATURE: 98 F

## 2022-07-07 DIAGNOSIS — I10 ESSENTIAL HYPERTENSION: Primary | ICD-10-CM

## 2022-07-07 DIAGNOSIS — M54.17 LUMBOSACRAL RADICULOPATHY: Primary | ICD-10-CM

## 2022-07-07 DIAGNOSIS — G60.9 NEUROPATHY, PERIPHERAL, IDIOPATHIC: ICD-10-CM

## 2022-07-07 DIAGNOSIS — G62.9 NEUROPATHY: ICD-10-CM

## 2022-07-07 DIAGNOSIS — H40.1130 PRIMARY OPEN ANGLE GLAUCOMA OF BOTH EYES, UNSPECIFIED GLAUCOMA STAGE: ICD-10-CM

## 2022-07-07 PROCEDURE — 1159F MED LIST DOCD IN RCRD: CPT | Mod: CPTII,S$GLB,, | Performed by: STUDENT IN AN ORGANIZED HEALTH CARE EDUCATION/TRAINING PROGRAM

## 2022-07-07 PROCEDURE — 95911 PR NERVE CONDUCTION STUDY; 9-10 STUDIES: ICD-10-PCS | Mod: S$GLB,,, | Performed by: NEUROLOGICAL SURGERY

## 2022-07-07 PROCEDURE — 1126F PR PAIN SEVERITY QUANTIFIED, NO PAIN PRESENT: ICD-10-PCS | Mod: CPTII,S$GLB,, | Performed by: STUDENT IN AN ORGANIZED HEALTH CARE EDUCATION/TRAINING PROGRAM

## 2022-07-07 PROCEDURE — 3078F DIAST BP <80 MM HG: CPT | Mod: CPTII,S$GLB,, | Performed by: STUDENT IN AN ORGANIZED HEALTH CARE EDUCATION/TRAINING PROGRAM

## 2022-07-07 PROCEDURE — 95885 PR MUSC TST DONE W/NERV TST LIM: ICD-10-PCS | Mod: S$GLB,,, | Performed by: NEUROLOGICAL SURGERY

## 2022-07-07 PROCEDURE — 3288F PR FALLS RISK ASSESSMENT DOCUMENTED: ICD-10-PCS | Mod: CPTII,S$GLB,, | Performed by: STUDENT IN AN ORGANIZED HEALTH CARE EDUCATION/TRAINING PROGRAM

## 2022-07-07 PROCEDURE — 1101F PR PT FALLS ASSESS DOC 0-1 FALLS W/OUT INJ PAST YR: ICD-10-PCS | Mod: CPTII,S$GLB,, | Performed by: STUDENT IN AN ORGANIZED HEALTH CARE EDUCATION/TRAINING PROGRAM

## 2022-07-07 PROCEDURE — 99214 OFFICE O/P EST MOD 30 MIN: CPT | Mod: S$GLB,,, | Performed by: STUDENT IN AN ORGANIZED HEALTH CARE EDUCATION/TRAINING PROGRAM

## 2022-07-07 PROCEDURE — 1159F PR MEDICATION LIST DOCUMENTED IN MEDICAL RECORD: ICD-10-PCS | Mod: CPTII,S$GLB,, | Performed by: STUDENT IN AN ORGANIZED HEALTH CARE EDUCATION/TRAINING PROGRAM

## 2022-07-07 PROCEDURE — 3288F FALL RISK ASSESSMENT DOCD: CPT | Mod: CPTII,S$GLB,, | Performed by: STUDENT IN AN ORGANIZED HEALTH CARE EDUCATION/TRAINING PROGRAM

## 2022-07-07 PROCEDURE — 1126F AMNT PAIN NOTED NONE PRSNT: CPT | Mod: CPTII,S$GLB,, | Performed by: STUDENT IN AN ORGANIZED HEALTH CARE EDUCATION/TRAINING PROGRAM

## 2022-07-07 PROCEDURE — 99214 PR OFFICE/OUTPT VISIT, EST, LEVL IV, 30-39 MIN: ICD-10-PCS | Mod: S$GLB,,, | Performed by: STUDENT IN AN ORGANIZED HEALTH CARE EDUCATION/TRAINING PROGRAM

## 2022-07-07 PROCEDURE — 99999 PR PBB SHADOW E&M-EST. PATIENT-LVL IV: ICD-10-PCS | Mod: PBBFAC,,, | Performed by: STUDENT IN AN ORGANIZED HEALTH CARE EDUCATION/TRAINING PROGRAM

## 2022-07-07 PROCEDURE — 95911 NRV CNDJ TEST 9-10 STUDIES: CPT | Mod: S$GLB,,, | Performed by: NEUROLOGICAL SURGERY

## 2022-07-07 PROCEDURE — 99999 PR PBB SHADOW E&M-EST. PATIENT-LVL IV: CPT | Mod: PBBFAC,,, | Performed by: STUDENT IN AN ORGANIZED HEALTH CARE EDUCATION/TRAINING PROGRAM

## 2022-07-07 PROCEDURE — 1101F PT FALLS ASSESS-DOCD LE1/YR: CPT | Mod: CPTII,S$GLB,, | Performed by: STUDENT IN AN ORGANIZED HEALTH CARE EDUCATION/TRAINING PROGRAM

## 2022-07-07 PROCEDURE — 3074F SYST BP LT 130 MM HG: CPT | Mod: CPTII,S$GLB,, | Performed by: STUDENT IN AN ORGANIZED HEALTH CARE EDUCATION/TRAINING PROGRAM

## 2022-07-07 PROCEDURE — 3074F PR MOST RECENT SYSTOLIC BLOOD PRESSURE < 130 MM HG: ICD-10-PCS | Mod: CPTII,S$GLB,, | Performed by: STUDENT IN AN ORGANIZED HEALTH CARE EDUCATION/TRAINING PROGRAM

## 2022-07-07 PROCEDURE — 95885 MUSC TST DONE W/NERV TST LIM: CPT | Mod: S$GLB,,, | Performed by: NEUROLOGICAL SURGERY

## 2022-07-07 PROCEDURE — 3078F PR MOST RECENT DIASTOLIC BLOOD PRESSURE < 80 MM HG: ICD-10-PCS | Mod: CPTII,S$GLB,, | Performed by: STUDENT IN AN ORGANIZED HEALTH CARE EDUCATION/TRAINING PROGRAM

## 2022-07-07 RX ORDER — PROPOFOL 10 MG/ML
INJECTION, EMULSION INTRAVENOUS
COMMUNITY
Start: 2022-03-28 | End: 2022-11-30

## 2022-07-07 RX ORDER — PREDNISOLONE ACETATE 10 MG/ML
SUSPENSION/ DROPS OPHTHALMIC
COMMUNITY
Start: 2022-05-03 | End: 2022-11-30

## 2022-07-07 RX ORDER — TRIAMCINOLONE ACETONIDE 40 MG/ML
INJECTION, SUSPENSION INTRA-ARTICULAR; INTRAMUSCULAR
COMMUNITY
Start: 2022-04-04 | End: 2022-11-30

## 2022-07-07 RX ORDER — MOXIFLOXACIN 5 MG/ML
SOLUTION/ DROPS OPHTHALMIC
COMMUNITY
Start: 2022-04-08 | End: 2022-11-30

## 2022-07-07 RX ORDER — DEXAMETHASONE SODIUM PHOSPHATE 4 MG/ML
INJECTION, SOLUTION INTRA-ARTICULAR; INTRALESIONAL; INTRAMUSCULAR; INTRAVENOUS; SOFT TISSUE
COMMUNITY
Start: 2022-04-04 | End: 2022-11-30

## 2022-07-07 NOTE — PROGRESS NOTES
07/07/2022    Rosmery Cody Ramirez  [id number]    Chief Complaint   Patient presents with    Pre-op Exam       HPI    Pt presents for pre-op of left eye surgery; states that she is getting a shot placed for glaucoma.     HTN: controlled  Irbesartan 150 mg, HCTZ 12.5 mg   Has taken meds today    Unsure if wants to take 4th booster b/c caused shingles outbreak which lasted for a full year. She has since had both Shingles shots    Negative 10 point ROS outside of HPI    Social History     Socioeconomic History    Marital status:     Number of children: 3   Occupational History    Occupation: retired - formerly pastoral care with [employer]   Tobacco Use    Smoking status: Never Smoker    Smokeless tobacco: Never Used   Substance and Sexual Activity    Alcohol use: Yes     Alcohol/week: 0.0 standard drinks     Comment: Rarely; 1 glass of wine    Drug use: No    Sexual activity: Yes     Partners: Female, Male     Birth control/protection: Post-menopausal, See Surgical Hx     Social Determinants of Health     Financial Resource Strain: Low Risk     Difficulty of Paying Living Expenses: Not hard at all   Food Insecurity: No Food Insecurity    Worried About Running Out of Food in the Last Year: Never true    Ran Out of Food in the Last Year: Never true   Transportation Needs: No Transportation Needs    Lack of Transportation (Medical): No    Lack of Transportation (Non-Medical): No   Physical Activity: Insufficiently Active    Days of Exercise per Week: 2 days    Minutes of Exercise per Session: 30 min   Stress: No Stress Concern Present    Feeling of Stress : Not at all   Social Connections: Unknown    Frequency of Communication with Friends and Family: More than three times a week    Frequency of Social Gatherings with Friends and Family: Once a week    Active Member of Clubs or Organizations: Yes    Attends Club or Organization Meetings: More than 4 times per year    Marital Status:     Housing Stability: Low Risk     Unable to Pay for Housing in the Last Year: No    Number of Places Lived in the Last Year: 1    Unstable Housing in the Last Year: No           Current Outpatient Medications:     ascorbic acid, vitamin C, (VITAMIN C) 1000 MG tablet, Take 1,000 mg by mouth once daily., Disp: , Rfl:     aspirin (ECOTRIN) 81 MG EC tablet, Take 81 mg by mouth once daily., Disp: , Rfl:     atorvastatin (LIPITOR) 10 MG tablet, TAKE 1 TABLET (10 MG TOTAL) BY MOUTH EVERY OTHER DAY., Disp: 45 tablet, Rfl: 3    beta-carotene,A,-vits C,E/mins (OCUVITE ORAL), Take by mouth., Disp: , Rfl:     calcium-vitamin D3 (OS-LETY 500 + D3) 500 mg(1,250mg) -200 unit per tablet, Take 1 tablet by mouth 2 (two) times daily with meals., Disp: , Rfl:     dexamethasone (DECADRON) 4 mg/mL injection, , Disp: , Rfl:     dorzolamide-timolol 2-0.5% (COSOPT) 22.3-6.8 mg/mL ophthalmic solution, Place 1 drop into both eyes 2 (two) times daily., Disp: 30 mL, Rfl: 3    hydroCHLOROthiazide (MICROZIDE) 12.5 mg capsule, Take 1 capsule (12.5 mg total) by mouth once daily., Disp: 90 capsule, Rfl: 3    irbesartan (AVAPRO) 150 MG tablet, Take 1 tablet (150 mg total) by mouth every evening., Disp: 90 tablet, Rfl: 3    KENALOG 40 mg/mL injection, , Disp: , Rfl:     LUMIGAN 0.01 % Drop, Place 1 drop into both eyes every evening., Disp: 7.5 mL, Rfl: 3    moxifloxacin (VIGAMOX) 0.5 % ophthalmic solution, INSTILL 1 DROP INTO LEFT EYE 4 TIMES DAILY FOR 7 DAYS, Disp: , Rfl:     multivitamin (THERAGRAN) per tablet, Take 1 tablet by mouth once daily., Disp: , Rfl:     prednisoLONE acetate (PRED FORTE) 1 % DrpS, INSTILL 1 DROP INTO LEFT EYE 8 TIMES A DAY FOR 30 DAYS, Disp: , Rfl:     propofoL (DIPRIVAN) 10 mg/mL infusion, , Disp: , Rfl:     valACYclovir (VALTREX) 1000 MG tablet, Take 1 tablet (1,000 mg total) by mouth 3 (three) times daily. for 7 days, Disp: 21 tablet, Rfl: 0    brimonidine 0.1% (ALPHAGAN P) 0.1 % Drop, Place 1  drop into both eyes 2 (two) times daily. (Patient not taking: No sig reported), Disp: 15 mL, Rfl: 3      Physical Exam  Vitals:    07/07/22 0927   BP: 118/72   Pulse: 65   Temp: 98.2 °F (36.8 °C)     Gen: well appearing, NAD  Resp: non labored breathing, no crackles, no wheezes, CTAB  CV: RRR no murmur, gallops, rubs, no LE edema    1. Essential hypertension  Controlled; continue current regimen    2. Primary open angle glaucoma of both eyes, unspecified glaucoma stage  Follows with ophthalmology.  Pt is medically optimized for low risk procedure       RTC as needed for routine care    Erika Aguirre MD  Family Medicine

## 2022-07-09 ENCOUNTER — IMMUNIZATION (OUTPATIENT)
Dept: INTERNAL MEDICINE | Facility: CLINIC | Age: 77
End: 2022-07-09
Payer: MEDICARE

## 2022-07-09 DIAGNOSIS — Z23 NEED FOR VACCINATION: Primary | ICD-10-CM

## 2022-07-09 PROCEDURE — 0054A COVID-19, MRNA, LNP-S, PF, 30 MCG/0.3 ML DOSE VACCINE (PFIZER): CPT | Mod: CV19,PBBFAC | Performed by: INTERNAL MEDICINE

## 2022-07-13 ENCOUNTER — CLINICAL SUPPORT (OUTPATIENT)
Dept: REHABILITATION | Facility: HOSPITAL | Age: 77
End: 2022-07-13
Payer: MEDICARE

## 2022-07-13 DIAGNOSIS — M79.671 RIGHT FOOT PAIN: Primary | ICD-10-CM

## 2022-07-13 PROCEDURE — 97110 THERAPEUTIC EXERCISES: CPT

## 2022-07-13 PROCEDURE — 97140 MANUAL THERAPY 1/> REGIONS: CPT

## 2022-07-13 NOTE — PROGRESS NOTES
[facility]  Physical Therapy Treatment Note    Name: Rosmery Ramirez  Clinic Number: [id number]    Therapy Diagnosis:   Encounter Diagnosis   Name Primary?    Right foot pain Yes     Physician: Katja Hussein DPM    Visit Date: 7/13/2022    Physician Orders: PT Eval and Treat   Medical Diagnosis: M72.2 (ICD-10-CM) - Plantar fasciitis  Evaluation Date: 5/24/2022  Authorization Period Expiration: 12/31/2022  Plan of Care Certification Period: 8/26/2022  Visit # / Visits authorized: 4/ 20    Time In: 16:00  Time Out: 16:53  Total Billable Time: 53 minutes    Precautions: Standard    SUBJECTIVE     Pt reports: she was sore following the last visit, but has noticed decreased morning pain.  She was compliant with home exercise program.  Response to previous treatment: tape did not help  Functional change: decreasing morning pain intensity.    Pain: 3/10 (first thing in the morning)  Location: Right Foot    OBJECTIVE     7/6/2022  Right Dorsiflexion passive range of motion: 10 degrees  Single leg calf raise  test = 4-/5    Treatment     Rosmery received the treatments listed below:      Therapeutic Exercises to develop strength, endurance, ROM and flexibility for 43 minutes including:  Towel curls - x3 min  Towel toe holds 45 seconds x3   calf stretch on incline board - 45 seconds x2  Standing toe yoga - 3 second holds; 3 minutes  Standing foot doming - 3 second holds, x30  Theraband Step Overs - green theraband; 3 minutes  Heel raises on incline board - 4x10 each (plantarflexion and dorsiflexion) - NP  Eccentric Calf Raises - 4x8; Right lower extremity only  Single leg balance activities - 60 seconds x3    Manual Therapy Techniques: Joint mobilizations were applied to the: Right Foot for 10 minutes, including:  Great toe extension mobilizations  Proximal 1st ray mobilizations  Subtalar glides    Patient Education and Home Exercises     Home Exercises Provided and Patient Education  Provided     Education provided:    Symptom management and plan of care progressions - use of tennis shoes  Home Exercise Program - reviewed    Written Home Exercises Provided: yes. Exercises were reviewed and Rosmery was able to demonstrate them prior to the end of the session.  Rosmery demonstrated good  understanding of the education provided. See EMR under Patient Instructions for exercises provided during therapy sessions    ASSESSMENT     Decreased intensity of morning pain following the last visit. Continued plantarflexion strengthening and added single leg balance proprioceptive activities. No exacerbation in symptoms upon completions.     From Initial Evaluation on 5/24/2022 - Rosmery is a 76 y.o. female referred to outpatient Physical Therapy with a medical diagnosis of plantar fascitis. Pt presents with decreased DF B, weakness in B PF and Ev, pain with WB and TTP at heel causing limitations with walking. Pt symptoms seem likely coming from bone spur noted in x-ray vs. Plantar fascitis secondary to good response to CSI, normal arch control with loading and pain at night vs first step in the morning.    Rosmery Is progressing well towards her goals.   Pt prognosis is Good.     Pt will continue to benefit from skilled outpatient physical therapy to address the deficits listed in the problem list box on initial evaluation, provide pt/family education and to maximize pt's level of independence in the home and community environment.   Pt's spiritual, cultural and educational needs considered and pt agreeable to plan of care and goals.     Anticipated barriers to physical therapy: prolonged WB Right lower extremity    Goals:   Short Term Goals: 3 weeks   - Pt will increase range of motion to 10 degrees right dorsiflexion. (Met - 6/15/2022)   - Pt will increase strength to 5/5 bilateral ankle plantarflexion and eversion. (Not Met - Progressing)  - Decrease Pain to 2/10 as worst with walking greater than  10 minutes. (Met - 7/6/2022)   - Pt to self correct posture with normal hip alignment. (Met - 7/6/2022)  - Pt independent with home exercise program with progressions. (Met - 7/6/2022)      Long Term Goals (6 Weeks):  - Pt will return to walking 2 times a week and add bike for cardiovascular endurance/exercise painfree. (Not Met - Progressing)  - Decrease Pain to 1/10 as worst with all PT interventions. (Not Met - Progressing)  - Pt to return to 80% prior level of function. (Not Met - Progressing)    PLAN     Continue with documented plan of care, and progress patient as indicated.    Certification Period: 7/6/2022 to 8/26/2022   Treatment Plan: 1 times per week for 5 weeks:  Gait Training, Manual Therapy, Neuromuscular Re-ed, Orthotic Management and Training, Patient Education, Therapeutic Activities and Therapeutic Exercise  Recommendations: progress plantarflexor strength    Shiva Del Rosario, PT, DPT, OCS

## 2022-07-22 ENCOUNTER — HOSPITAL ENCOUNTER (OUTPATIENT)
Dept: RADIOLOGY | Facility: HOSPITAL | Age: 77
Discharge: HOME | End: 2022-07-22
Attending: NEUROLOGICAL SURGERY
Payer: MEDICARE

## 2022-07-22 DIAGNOSIS — M54.17 LUMBOSACRAL RADICULOPATHY: ICD-10-CM

## 2022-07-22 PROCEDURE — 72148 MRI LUMBAR SPINE WITHOUT CONTRAST: ICD-10-PCS | Mod: 26,,, | Performed by: RADIOLOGY

## 2022-07-22 PROCEDURE — 72148 MRI LUMBAR SPINE W/O DYE: CPT | Mod: 26,,, | Performed by: RADIOLOGY

## 2022-07-22 PROCEDURE — 72148 MRI LUMBAR SPINE W/O DYE: CPT | Mod: TC,PO

## 2022-08-08 ENCOUNTER — PATIENT MESSAGE (OUTPATIENT)
Dept: NEUROLOGY | Facility: CLINIC | Age: 77
End: 2022-08-08
Payer: MEDICARE

## 2022-08-09 ENCOUNTER — PES CALL (OUTPATIENT)
Dept: ADMINISTRATIVE | Facility: CLINIC | Age: 77
End: 2022-08-09
Payer: MEDICARE

## 2022-08-10 ENCOUNTER — TELEPHONE (OUTPATIENT)
Dept: NEUROLOGY | Facility: CLINIC | Age: 77
End: 2022-08-10
Payer: MEDICARE

## 2022-08-10 NOTE — TELEPHONE ENCOUNTER
Called and informed patient that we sent the [facility] message to Dr Hamilton for review.  We will call ner and let her know what the next steps are once we hear from Dr Hamilton.

## 2022-08-10 NOTE — TELEPHONE ENCOUNTER
----- Message from Maria Del Carmen Ayala sent at 8/10/2022 12:45 PM CDT -----  .Type: Patient Call Back    Who called:self    What is the request in detail: patient had a MRI recently that was ordered by dr kohli and wants to know her next steps    Can the clinic reply by [facility]?no    Would the patient rather a call back or a response via [facility]? call    Best call back number:.[phone] (home)

## 2022-08-29 ENCOUNTER — PATIENT MESSAGE (OUTPATIENT)
Dept: NEUROLOGY | Facility: CLINIC | Age: 77
End: 2022-08-29
Payer: MEDICARE

## 2022-09-21 ENCOUNTER — IMMUNIZATION (OUTPATIENT)
Dept: INTERNAL MEDICINE | Facility: CLINIC | Age: 77
End: 2022-09-21
Payer: MEDICARE

## 2022-09-21 ENCOUNTER — HOSPITAL ENCOUNTER (OUTPATIENT)
Dept: RADIOLOGY | Facility: HOSPITAL | Age: 77
Discharge: HOME | End: 2022-09-21
Attending: INTERNAL MEDICINE
Payer: MEDICARE

## 2022-09-21 ENCOUNTER — OFFICE VISIT (OUTPATIENT)
Dept: INTERNAL MEDICINE | Facility: CLINIC | Age: 77
End: 2022-09-21
Payer: MEDICARE

## 2022-09-21 VITALS
OXYGEN SATURATION: 98 % | HEIGHT: 65 IN | BODY MASS INDEX: 33.09 KG/M2 | DIASTOLIC BLOOD PRESSURE: 78 MMHG | WEIGHT: 198.63 LBS | SYSTOLIC BLOOD PRESSURE: 128 MMHG | HEART RATE: 69 BPM

## 2022-09-21 DIAGNOSIS — G89.29 CHRONIC BILATERAL LOW BACK PAIN WITHOUT SCIATICA: ICD-10-CM

## 2022-09-21 DIAGNOSIS — M25.572 ACUTE LEFT ANKLE PAIN: ICD-10-CM

## 2022-09-21 DIAGNOSIS — M25.572 ACUTE LEFT ANKLE PAIN: Primary | ICD-10-CM

## 2022-09-21 DIAGNOSIS — S99.912A INJURY OF LEFT ANKLE, INITIAL ENCOUNTER: ICD-10-CM

## 2022-09-21 DIAGNOSIS — M54.50 CHRONIC BILATERAL LOW BACK PAIN WITHOUT SCIATICA: ICD-10-CM

## 2022-09-21 PROCEDURE — 90694 VACC AIIV4 NO PRSRV 0.5ML IM: CPT | Mod: S$GLB,,, | Performed by: INTERNAL MEDICINE

## 2022-09-21 PROCEDURE — 3074F SYST BP LT 130 MM HG: CPT | Mod: CPTII,S$GLB,, | Performed by: INTERNAL MEDICINE

## 2022-09-21 PROCEDURE — 1159F PR MEDICATION LIST DOCUMENTED IN MEDICAL RECORD: ICD-10-PCS | Mod: CPTII,S$GLB,, | Performed by: INTERNAL MEDICINE

## 2022-09-21 PROCEDURE — 99213 OFFICE O/P EST LOW 20 MIN: CPT | Mod: S$GLB,,, | Performed by: INTERNAL MEDICINE

## 2022-09-21 PROCEDURE — 1160F RVW MEDS BY RX/DR IN RCRD: CPT | Mod: CPTII,S$GLB,, | Performed by: INTERNAL MEDICINE

## 2022-09-21 PROCEDURE — 1160F PR REVIEW ALL MEDS BY PRESCRIBER/CLIN PHARMACIST DOCUMENTED: ICD-10-PCS | Mod: CPTII,S$GLB,, | Performed by: INTERNAL MEDICINE

## 2022-09-21 PROCEDURE — 1100F PR PT FALLS ASSESS DOC 2+ FALLS/FALL W/INJURY/YR: ICD-10-PCS | Mod: CPTII,S$GLB,, | Performed by: INTERNAL MEDICINE

## 2022-09-21 PROCEDURE — 73610 X-RAY EXAM OF ANKLE: CPT | Mod: 26,LT,, | Performed by: RADIOLOGY

## 2022-09-21 PROCEDURE — 1125F AMNT PAIN NOTED PAIN PRSNT: CPT | Mod: CPTII,S$GLB,, | Performed by: INTERNAL MEDICINE

## 2022-09-21 PROCEDURE — 99999 PR PBB SHADOW E&M-EST. PATIENT-LVL V: CPT | Mod: PBBFAC,,, | Performed by: INTERNAL MEDICINE

## 2022-09-21 PROCEDURE — 1125F PR PAIN SEVERITY QUANTIFIED, PAIN PRESENT: ICD-10-PCS | Mod: CPTII,S$GLB,, | Performed by: INTERNAL MEDICINE

## 2022-09-21 PROCEDURE — 99999 PR PBB SHADOW E&M-EST. PATIENT-LVL V: ICD-10-PCS | Mod: PBBFAC,,, | Performed by: INTERNAL MEDICINE

## 2022-09-21 PROCEDURE — 1100F PTFALLS ASSESS-DOCD GE2>/YR: CPT | Mod: CPTII,S$GLB,, | Performed by: INTERNAL MEDICINE

## 2022-09-21 PROCEDURE — 90694 FLU VACCINE - QUADRIVALENT - ADJUVANTED: ICD-10-PCS | Mod: S$GLB,,, | Performed by: INTERNAL MEDICINE

## 2022-09-21 PROCEDURE — 3078F DIAST BP <80 MM HG: CPT | Mod: CPTII,S$GLB,, | Performed by: INTERNAL MEDICINE

## 2022-09-21 PROCEDURE — 3074F PR MOST RECENT SYSTOLIC BLOOD PRESSURE < 130 MM HG: ICD-10-PCS | Mod: CPTII,S$GLB,, | Performed by: INTERNAL MEDICINE

## 2022-09-21 PROCEDURE — 3288F FALL RISK ASSESSMENT DOCD: CPT | Mod: CPTII,S$GLB,, | Performed by: INTERNAL MEDICINE

## 2022-09-21 PROCEDURE — 3078F PR MOST RECENT DIASTOLIC BLOOD PRESSURE < 80 MM HG: ICD-10-PCS | Mod: CPTII,S$GLB,, | Performed by: INTERNAL MEDICINE

## 2022-09-21 PROCEDURE — 3288F PR FALLS RISK ASSESSMENT DOCUMENTED: ICD-10-PCS | Mod: CPTII,S$GLB,, | Performed by: INTERNAL MEDICINE

## 2022-09-21 PROCEDURE — 73610 XR ANKLE COMPLETE 3 VIEW LEFT: ICD-10-PCS | Mod: 26,LT,, | Performed by: RADIOLOGY

## 2022-09-21 PROCEDURE — 99213 PR OFFICE/OUTPT VISIT, EST, LEVL III, 20-29 MIN: ICD-10-PCS | Mod: S$GLB,,, | Performed by: INTERNAL MEDICINE

## 2022-09-21 PROCEDURE — G0008 ADMIN INFLUENZA VIRUS VAC: HCPCS | Mod: S$GLB,,, | Performed by: INTERNAL MEDICINE

## 2022-09-21 PROCEDURE — 73610 X-RAY EXAM OF ANKLE: CPT | Mod: TC,LT

## 2022-09-21 PROCEDURE — 1159F MED LIST DOCD IN RCRD: CPT | Mod: CPTII,S$GLB,, | Performed by: INTERNAL MEDICINE

## 2022-09-21 PROCEDURE — G0008 FLU VACCINE - QUADRIVALENT - ADJUVANTED: ICD-10-PCS | Mod: S$GLB,,, | Performed by: INTERNAL MEDICINE

## 2022-09-21 NOTE — PATIENT INSTRUCTIONS
Ice pack to the ankle a few times daily for a few days. May continue ibuprofen as you are doing. Ace Wrap may help give support while awake and up moving around. Will review xray.

## 2022-09-21 NOTE — PROGRESS NOTES
Subjective:       Patient ID: Rosmery Ramirez is a 77 y.o. female.   Chief Complaint: Fall    Patient here for urgent care.  She somehow injured her left ankle yesterday.  When she went to bed Monday night she felt fine but when she awoke yesterday, went to get out of bed and put her left foot on the floor there was a pain that caused her to lose her balance.  She has a history of neuropathy and wears neuropathy socks.  She does not think she slipped and does not remember any injury while in bed during the night.  She said it was swollen and painful.  She used ibuprofen which helped.  It is  to bear weight but she says less tender and less swollen than yesterday.    She did not use ice.    Review of Systems   Constitutional:  Negative for fever.   Musculoskeletal:  Positive for arthralgias, gait problem and joint swelling (left ankle).   Integumentary:  Negative for rash.        Objective:      Physical Exam  Constitutional:       Appearance: She is obese.   Musculoskeletal:         General: Tenderness (medial left ankle with some swelling) present.   Neurological:      Mental Status: She is alert.      Sensory: Sensory deficit (feet) present.       Assessment:       Problem List Items Addressed This Visit    None  Visit Diagnoses       Acute left ankle pain    -  Primary    Relevant Orders    X-Ray Ankle Complete Left    Injury of left ankle, initial encounter        Relevant Orders    X-Ray Ankle Complete Left              Plan:       Rosmery was seen today for fall.    Diagnoses and all orders for this visit:    Acute left ankle pain  -     X-Ray Ankle Complete Left; Future    Injury of left ankle, initial encounter  -     X-Ray Ankle Complete Left; Future         Ice pack to the ankle a few times daily for a few days. May continue ibuprofen as you are doing. Ace Wrap may help give support while awake and up moving around. Will review xray.         Portions of this note may have been created  "with voice recognition software. Occasional "wrong-word" or "sound-a-like" substitutions may have occurred due to the inherent limitations of voice recognition software. Please, read the note carefully and recognize, using context, where substitutions have occurred.    "

## 2022-09-25 ENCOUNTER — PATIENT MESSAGE (OUTPATIENT)
Dept: INTERNAL MEDICINE | Facility: CLINIC | Age: 77
End: 2022-09-25
Payer: MEDICARE

## 2022-09-25 ENCOUNTER — PATIENT MESSAGE (OUTPATIENT)
Dept: FAMILY MEDICINE | Facility: CLINIC | Age: 77
End: 2022-09-25
Payer: MEDICARE

## 2022-10-24 ENCOUNTER — LAB VISIT (OUTPATIENT)
Dept: LAB | Facility: HOSPITAL | Age: 77
End: 2022-10-24
Attending: NEUROLOGICAL SURGERY
Payer: MEDICARE

## 2022-10-24 ENCOUNTER — OFFICE VISIT (OUTPATIENT)
Dept: NEUROLOGY | Facility: CLINIC | Age: 77
End: 2022-10-24
Payer: MEDICARE

## 2022-10-24 VITALS
BODY MASS INDEX: 32.61 KG/M2 | HEIGHT: 65 IN | OXYGEN SATURATION: 98 % | WEIGHT: 195.75 LBS | HEART RATE: 90 BPM | SYSTOLIC BLOOD PRESSURE: 167 MMHG | DIASTOLIC BLOOD PRESSURE: 82 MMHG

## 2022-10-24 DIAGNOSIS — G60.9 NEUROPATHY, PERIPHERAL, IDIOPATHIC: ICD-10-CM

## 2022-10-24 DIAGNOSIS — G60.9 NEUROPATHY, PERIPHERAL, IDIOPATHIC: Primary | ICD-10-CM

## 2022-10-24 LAB — VIT B12 SERPL-MCNC: 956 PG/ML (ref 210–950)

## 2022-10-24 PROCEDURE — 3079F PR MOST RECENT DIASTOLIC BLOOD PRESSURE 80-89 MM HG: ICD-10-PCS | Mod: CPTII,S$GLB,, | Performed by: NEUROLOGICAL SURGERY

## 2022-10-24 PROCEDURE — 84425 ASSAY OF VITAMIN B-1: CPT | Performed by: NEUROLOGICAL SURGERY

## 2022-10-24 PROCEDURE — 1101F PR PT FALLS ASSESS DOC 0-1 FALLS W/OUT INJ PAST YR: ICD-10-PCS | Mod: CPTII,S$GLB,, | Performed by: NEUROLOGICAL SURGERY

## 2022-10-24 PROCEDURE — 1101F PT FALLS ASSESS-DOCD LE1/YR: CPT | Mod: CPTII,S$GLB,, | Performed by: NEUROLOGICAL SURGERY

## 2022-10-24 PROCEDURE — 3077F SYST BP >= 140 MM HG: CPT | Mod: CPTII,S$GLB,, | Performed by: NEUROLOGICAL SURGERY

## 2022-10-24 PROCEDURE — 99214 PR OFFICE/OUTPT VISIT, EST, LEVL IV, 30-39 MIN: ICD-10-PCS | Mod: S$GLB,,, | Performed by: NEUROLOGICAL SURGERY

## 2022-10-24 PROCEDURE — 3079F DIAST BP 80-89 MM HG: CPT | Mod: CPTII,S$GLB,, | Performed by: NEUROLOGICAL SURGERY

## 2022-10-24 PROCEDURE — 99999 PR PBB SHADOW E&M-EST. PATIENT-LVL IV: CPT | Mod: PBBFAC,,, | Performed by: NEUROLOGICAL SURGERY

## 2022-10-24 PROCEDURE — 3288F FALL RISK ASSESSMENT DOCD: CPT | Mod: CPTII,S$GLB,, | Performed by: NEUROLOGICAL SURGERY

## 2022-10-24 PROCEDURE — 3288F PR FALLS RISK ASSESSMENT DOCUMENTED: ICD-10-PCS | Mod: CPTII,S$GLB,, | Performed by: NEUROLOGICAL SURGERY

## 2022-10-24 PROCEDURE — 3077F PR MOST RECENT SYSTOLIC BLOOD PRESSURE >= 140 MM HG: ICD-10-PCS | Mod: CPTII,S$GLB,, | Performed by: NEUROLOGICAL SURGERY

## 2022-10-24 PROCEDURE — 99999 PR PBB SHADOW E&M-EST. PATIENT-LVL IV: ICD-10-PCS | Mod: PBBFAC,,, | Performed by: NEUROLOGICAL SURGERY

## 2022-10-24 PROCEDURE — 1159F MED LIST DOCD IN RCRD: CPT | Mod: CPTII,S$GLB,, | Performed by: NEUROLOGICAL SURGERY

## 2022-10-24 PROCEDURE — 99214 OFFICE O/P EST MOD 30 MIN: CPT | Mod: S$GLB,,, | Performed by: NEUROLOGICAL SURGERY

## 2022-10-24 PROCEDURE — 36415 COLL VENOUS BLD VENIPUNCTURE: CPT | Performed by: NEUROLOGICAL SURGERY

## 2022-10-24 PROCEDURE — 82607 VITAMIN B-12: CPT | Performed by: NEUROLOGICAL SURGERY

## 2022-10-24 PROCEDURE — 1159F PR MEDICATION LIST DOCUMENTED IN MEDICAL RECORD: ICD-10-PCS | Mod: CPTII,S$GLB,, | Performed by: NEUROLOGICAL SURGERY

## 2022-10-24 RX ORDER — PREGABALIN 75 MG/1
75 CAPSULE ORAL 2 TIMES DAILY
Qty: 60 CAPSULE | Refills: 6 | Status: SHIPPED | OUTPATIENT
Start: 2022-10-24 | End: 2022-11-30

## 2022-10-26 ENCOUNTER — PATIENT MESSAGE (OUTPATIENT)
Dept: ADMINISTRATIVE | Facility: OTHER | Age: 77
End: 2022-10-26
Payer: MEDICARE

## 2022-10-27 LAB — VIT B1 BLD-MCNC: 71 UG/L (ref 38–122)

## 2022-11-02 ENCOUNTER — CLINICAL SUPPORT (OUTPATIENT)
Dept: REHABILITATION | Facility: HOSPITAL | Age: 77
End: 2022-11-02
Attending: NEUROLOGICAL SURGERY
Payer: MEDICARE

## 2022-11-02 DIAGNOSIS — G60.9 IDIOPATHIC PERIPHERAL NEUROPATHY: Primary | ICD-10-CM

## 2022-11-02 DIAGNOSIS — G60.9 NEUROPATHY, PERIPHERAL, IDIOPATHIC: ICD-10-CM

## 2022-11-02 PROCEDURE — 97811 ACUP 1/> W/O ESTIM EA ADD 15: CPT

## 2022-11-02 PROCEDURE — 97810 ACUP 1/> WO ESTIM 1ST 15 MIN: CPT

## 2022-11-02 NOTE — PROGRESS NOTES
Therapy Evaluation    Patient Name: Rosmery Ramirez  Date of Visit: 11/2/2022  MRN: [id number]  Diagnosis: No diagnosis found.  Referring Physician: Lorenzo Hamilton MD      Subjective:   Total Time:  0    Objective    Assessment  Rosmery is a 77 y.o. female referred to outpatient {THERAPY:13126} and presents with a medical diagnosis of ***. She demonstrates limitations as described in the problem list. These impairments are limiting the patient's ability to ***.    Assessment    FOTO    Plan

## 2022-11-02 NOTE — PROGRESS NOTES
"      Acupuncture Evaluation Note     Name: Rosmery Ramirez  Clinic Number: [id number]    TCM Diagnosis: Qi stagnation lower extremities  Physician: Lorenzo Hamilton MD    Date of Service: 11/2/2022     Medical Diagnosis: idiopathic peripheral neuropathy  Evaluation Date: 11/02/2022  Plan of Care Certification Period: 01/25/2023  Visit #/Visits authorized: 1/ 12     Precautions: Standard    Subjective     Chief Concern: Neurologist recommended her for neuropathy in her feet ,  Osteoarthritis in pelvis/knees/lower back (occasional pain)  Neuropathy to above ankle, wears diabetic compression ankle socks which reduces sensation.  Feels tingly sensation.  Right heel had plantar fascitis, but physical therapy resolved pain. (This year)  Neuropathy does not affect her balance or walking ability. Walks a mile 3 x a week.    Onset:  3 years ago                Initially at balls of feet, but within 6 months spread to whole foot and just above ankle. Stable, no progression last 2 years    Progression since onset:  has worsened    Aggravating Factors:   constant, none    Relieving Factors:  wearing compression socks    Quality:  Tingling and heavy    Radiation: to above ankle    Severity:  8 (without compression socks)    Frequency:  continuously    Treatments Tried:  Medication, no effect    Diet/Taste/Fluids:  in general, a "healthy" diet  , reduced meat, more fruits/veggies /is drinking plenty of fluids/coffee 1 /day and tea 2 /day    Digestion:  okay, no constipation, no diarrhea    Head and Body: hypertension, controlled with medication.Will feel a flash of dizziness when standing quickly, attributes to blood pressure medicine.  No headaches.  Allergies, but stable now.  Glaucoma,recently had surgery to help drain for pressure, stable now.  No shortness of breath. No palpitations.  History of hashimotos (dx one year ago, no symptoms, found on exam) but thyroid is stable and being monitored.    Sleep:  Can't sleep " more than 6 hours, can fall asleep but wakes easily and has difficulty falling back asleep  - for decades    Exercise:  walks several times a week    Energy Levels:  okay, can nap during the day    Emotional Symptoms:  balanced across range of emotions    Women's Symptoms:  none    Objective     Tongue:  swollen, pink/pale, no coating    Pulse:  slightly choppy, slightly weak    Observation:  healthy    Treatment     Treatment:  move qi in lower legs    Acupuncture points used:    Bilateral: LI10, ST36, SP9, SP6, K3, BL60, Ba Dalton     Auricular Treatment:    NEEDLES IN: 20  NEEDLES OUT: 20    NEEDLES W/O STIM  AT: 3:30    NEEDLES W/O STIM REMOVED AT: 4:00    Modalities:  none    After treatment, pt reported sensation at bottom of foot only.    Assessment     Presented with idiopathic peripheral neuropathy as tingling/heaviness in feet to above ankle. After treatment neuropathy sensation reduced to bottom of feet, indicating positive response to acupuncture.     Cause of Condition: unknown      Traditional Chinese Medicine Diagnosis:  Qi stagnation lower extremities    Patient prognosis is Fair.     Patient will continue to benefit from acupuncture treatment to address the deficits listed in the problem list box on initial evaluation, provide patient family education and to maximize pt's level of independence in the home and community environment.     Patient's spiritual, cultural and educational needs considered and pt agreeable to plan of care and goals.     Anticipated barriers to treatment: none    Plan     Recommend 1 sessions per week for 6 sessions then re-assess.      Recommendations:  observe any changes in intensity or radiation of numbness and report back next visit    Education:  the patient understand the nature of Chinese medicine

## 2022-11-09 ENCOUNTER — CLINICAL SUPPORT (OUTPATIENT)
Dept: REHABILITATION | Facility: HOSPITAL | Age: 77
End: 2022-11-09
Payer: MEDICARE

## 2022-11-09 DIAGNOSIS — M79.671 BILATERAL FOOT PAIN: ICD-10-CM

## 2022-11-09 DIAGNOSIS — M79.672 BILATERAL FOOT PAIN: ICD-10-CM

## 2022-11-09 DIAGNOSIS — G60.9 NEUROPATHY, PERIPHERAL, IDIOPATHIC: Primary | ICD-10-CM

## 2022-11-09 PROCEDURE — 97811 ACUP 1/> W/O ESTIM EA ADD 15: CPT

## 2022-11-09 PROCEDURE — 97810 ACUP 1/> WO ESTIM 1ST 15 MIN: CPT

## 2022-11-09 NOTE — PROGRESS NOTES
Acupuncture Treatment Note     Name: Rosmery Cody Ramirez  Clinic Number: [id number]    Traditional Chinese Medicine Diagnosis: Qi stagnation lower extremities  Physician: Lorenzo Hamilton MD    Date of Service: 11/9/2022     Medical Diagnosis: idiopathic peripheral neuropathy  Evaluation Date: 11/02/2022  Plan of Care Certification Period: 01/25/2023  Visit #/Visits authorized: 2/ 12     Precautions: Standard    Subjective     Chief Complaint:  Neuropathy/tingling in feet to above ankle      Response to Previous Treatment:  felt better for a day (the heel was not as numb), but then neuropathy returned to same level as before.    Quality of Symptoms (Better/Worse):  better for a day, then return to prior symptoms    Other Condition/Symptoms:  sleep no change, can't get more than 6 hours a night    Objective     Tongue:  pale pink, puffy, slight white coat    Pulse:  floating, weak at depth      New Findings:  none      Treatment Principles:  move qi in lower legs    Acupuncture Points:   (Same as prior tx)  Bilateral: LI10, ST36, SP9, SP6, K3, BL60, Ba Dalton     NEEDLES W/O STIM  AT: 3:50    NEEDLES W/O STIM REMOVED AT: 4:30    #NEEDLES IN: 20    #NEEDLES OUT: 20    Other Traditional Chinese Medicine Modalities:  none        Recommendations:  observe changes in intensity and location of neuropathy    Education:  Patient is aware of cumulative effect of acupuncture      Assessment      Analysis of Treatment:  After session, on standing, Rosmery reported that neuropathy is located only at balls of feet, no longer at heels and ankles (same response as first session).    Traditional Chinese Medicine Diagnosis:  Qi stagnation lower extremities    Pt prognosis is Fair.     Patient will continue to benefit from acupuncture treatment to address the deficits listed in the problem list box on initial evaluation, provide patient family education and to maximize pt's level of independence in the home and community  environment.     Patient's spiritual, cultural and educational needs considered and pt agreeable to plan of care and goals.     Anticipated barriers to treatment: none    Plan     Recommend        continue with care plan

## 2022-11-14 ENCOUNTER — TELEPHONE (OUTPATIENT)
Dept: FAMILY MEDICINE | Facility: CLINIC | Age: 77
End: 2022-11-14
Payer: MEDICARE

## 2022-11-14 DIAGNOSIS — Z12.31 ENCOUNTER FOR SCREENING MAMMOGRAM FOR MALIGNANT NEOPLASM OF BREAST: Primary | ICD-10-CM

## 2022-11-14 NOTE — TELEPHONE ENCOUNTER
----- Message from Kiya Chan sent at 11/14/2022  1:43 PM CST -----  Regarding: orders  Contact: @ [phone]  Pt is calling she received a letter saying it is time for her annual mammo but she needs orders can they be placed in the system please call and adv @ [phone]

## 2022-11-17 NOTE — PROGRESS NOTES
Chief Complaint   Patient presents with    Follow-up          Rosmery Ramirez is a 77 y.o. female with a history of multiple medical diagnoses as listed below that presents for evaluation and management of numbness in the feet. She has had evaluation for the symptoms in the past, but she feels that she has not been diagnosed and has not been able to identify what has been underlying her symptoms. She has not had any pain nor any discomfort. The symptoms have been in the foot from the toes to the ankle. She has been given gabapentin for the symptoms, but has not been able to notice any change in her symptoms.    Interval history  10/24/2022  She continues to have pain in her lower extremities despite her compliance with her medications.  Pain has been mainly a numbness and tingling that has been in the feet.  Discomfort has been increasing in intensity at times.  Repetitive activity has been a trigger for her symptoms.  Gabapentin has been prescribed, but has not been enough to control her symptoms..     PAST MEDICAL HISTORY:  Past Medical History:   Diagnosis Date    Allergy     Anxiety     Breast cyst     Edema of leg 10/5/2012    bilateral     Fractures 2011    thumb R    GERD (gastroesophageal reflux disease)     Glaucoma     History of colonic polyps     Hx-TIA (transient ischemic attack) 8/13/2012 Jan 2012: LLE and left facial numbness lasting 1 hour     Hyperlipidemia     Hypertension     Left shoulder tendonitis 8/8/2015    Primary open-angle glaucoma, mild stage - Both Eyes 6/3/2013       PAST SURGICAL HISTORY:  Past Surgical History:   Procedure Laterality Date    BREAST BIOPSY Left     core    BREAST SURGERY Left     lumpectomy    CATARACT EXTRACTION W/  INTRAOCULAR LENS IMPLANT Left 10/05/2016    Dr. Mike    CATARACT EXTRACTION W/  INTRAOCULAR LENS IMPLANT Right 11/30/2016    With [facility] (Dr. Mike)    COLONOSCOPY N/A 1/23/2017    Procedure: COLONOSCOPY;  Surgeon: Hany Mosquera MD;   Location: [facility] ([address] [place] [address]);  Service: Endoscopy;  Laterality: N/A;    COLONOSCOPY N/A 3/28/2022    Procedure: COLONOSCOPY;  Surgeon: Jamison Chambers MD;  Location: [facility] ENDO ([address] [place] [address]);  Service: Endoscopy;  Laterality: N/A;  fully vaccinated   instructions to portal-st  3/22 arrival time confirmed with pt/COVID test cancelled-RB    HAND SURGERY Left 2011    thumb    HYSTERECTOMY  1980's    Total;    IMPLANTATION OF DEVICE FOR GLAUCOMA Right 3/17/2021    Procedure: INSERTION, DEVICE, FOR GLAUCOMA XEN GEL;  Surgeon: Yesica Mike MD;  Location: [facility] OR [address] [place] [address];  Service: Ophthalmology;  Laterality: Right;    KAHOOK GONIOTOMY Right 11/30/2016    WITH CE ()    Left Breast Lumpectomy Left     performed in 1960s    OOPHORECTOMY      SELECTIVE LASER TRABECUPLASTY  05/2014    OU w/ Dr. Mike    TRABECULECTOMY Right 3/17/2021    Procedure: TRABECULECTOMY/XEN GEL WITH MMC;  Surgeon: Yesica Mike MD;  Location: [facility] OR [address] [place] [address];  Service: Ophthalmology;  Laterality: Right;    TRABECULECTOMY Left 7/28/2021    Procedure: TRABECULECTOMY/ MMC and Xen OS;  Surgeon: Yesica Mike MD;  Location: [facility] OR [address] [place] [address];  Service: Ophthalmology;  Laterality: Left;    TUBAL LIGATION  1970's    VAGINAL DELIVERY      x3    YAG CAPSULOTOMY Left 11/29/2018           SOCIAL HISTORY:  Social History     Socioeconomic History    Marital status:     Number of children: 3   Occupational History    Occupation: retired - formerly pastoral care with [employer]   Tobacco Use    Smoking status: Never    Smokeless tobacco: Never   Substance and Sexual Activity    Alcohol use: Yes     Alcohol/week: 0.0 standard drinks     Comment: Rarely; 1 glass of wine    Drug use: No    Sexual activity: Yes     Partners: Female, Male     Birth control/protection: Post-menopausal, See Surgical Hx     Social Determinants of Health     Financial Resource Strain: Low Risk     Difficulty of Paying Living  Expenses: Not hard at all   Food Insecurity: No Food Insecurity    Worried About Running Out of Food in the Last Year: Never true    Ran Out of Food in the Last Year: Never true   Transportation Needs: No Transportation Needs    Lack of Transportation (Medical): No    Lack of Transportation (Non-Medical): No   Physical Activity: Insufficiently Active    Days of Exercise per Week: 2 days    Minutes of Exercise per Session: 30 min   Stress: No Stress Concern Present    Feeling of Stress : Not at all   Social Connections: Unknown    Frequency of Communication with Friends and Family: More than three times a week    Frequency of Social Gatherings with Friends and Family: Once a week    Active Member of Clubs or Organizations: Yes    Attends Club or Organization Meetings: More than 4 times per year    Marital Status:    Housing Stability: Low Risk     Unable to Pay for Housing in the Last Year: No    Number of Places Lived in the Last Year: 1    Unstable Housing in the Last Year: No       FAMILY HISTORY:  Family History   Problem Relation Age of Onset    Breast cancer Mother 50    Glaucoma Mother     Alzheimer's disease Mother     Blindness Mother     Cataracts Mother     Glaucoma Father     Prostate cancer Father     Blindness Father     Cataracts Father     Cancer Father 62        prostate    Glaucoma Sister     No Known Problems Brother     No Known Problems Brother     Alzheimer's disease Maternal Grandfather     Macular degeneration Neg Hx     Retinal detachment Neg Hx     Strabismus Neg Hx     Amblyopia Neg Hx     Heart attack Neg Hx     Heart disease Neg Hx     Ovarian cancer Neg Hx     Colon cancer Neg Hx        ALLERGIES AND MEDICATIONS: updated and reviewed.  Review of patient's allergies indicates:   Allergen Reactions    Lisinopril Swelling    Amlodipine Edema    Combigan [brimonidine-timolol]      Causes itchy eyelids and contact dermatitis    Cosopt [dorzolamide-timolol]      Causes angular  blepharitis of eyelids    Pravastatin      Myalgia and elevated CPK     Current Outpatient Medications   Medication Sig Dispense Refill    ascorbic acid, vitamin C, (VITAMIN C) 1000 MG tablet Take 1,000 mg by mouth once daily.      aspirin (ECOTRIN) 81 MG EC tablet Take 81 mg by mouth once daily.      atorvastatin (LIPITOR) 10 MG tablet TAKE 1 TABLET (10 MG TOTAL) BY MOUTH EVERY OTHER DAY. 45 tablet 3    calcium-vitamin D3 (OS-LETY 500 + D3) 500 mg(1,250mg) -200 unit per tablet Take 1 tablet by mouth 2 (two) times daily with meals.      dorzolamide-timolol 2-0.5% (COSOPT) 22.3-6.8 mg/mL ophthalmic solution Place 1 drop into both eyes 2 (two) times daily. 30 mL 3    hydroCHLOROthiazide (MICROZIDE) 12.5 mg capsule Take 1 capsule (12.5 mg total) by mouth once daily. 90 capsule 3    irbesartan (AVAPRO) 150 MG tablet Take 1 tablet (150 mg total) by mouth every evening. 90 tablet 3    LUMIGAN 0.01 % Drop Place 1 drop into both eyes every evening. 7.5 mL 3    multivitamin (THERAGRAN) per tablet Take 1 tablet by mouth once daily.      beta-carotene,A,-vits C,E/mins (OCUVITE ORAL) Take by mouth.      brimonidine 0.1% (ALPHAGAN P) 0.1 % Drop Place 1 drop into both eyes 2 (two) times daily. (Patient not taking: No sig reported) 15 mL 3    dexamethasone (DECADRON) 4 mg/mL injection       KENALOG 40 mg/mL injection       moxifloxacin (VIGAMOX) 0.5 % ophthalmic solution INSTILL 1 DROP INTO LEFT EYE 4 TIMES DAILY FOR 7 DAYS      prednisoLONE acetate (PRED FORTE) 1 % DrpS INSTILL 1 DROP INTO LEFT EYE 8 TIMES A DAY FOR 30 DAYS      pregabalin (LYRICA) 75 MG capsule Take 1 capsule (75 mg total) by mouth 2 (two) times daily. 60 capsule 6    propofoL (DIPRIVAN) 10 mg/mL infusion       valACYclovir (VALTREX) 1000 MG tablet Take 1 tablet (1,000 mg total) by mouth 3 (three) times daily. for 7 days 21 tablet 0     No current facility-administered medications for this visit.       Review of Systems   Constitutional:  Negative for activity  change, appetite change, fever and unexpected weight change.   HENT:  Negative for trouble swallowing and voice change.    Eyes:  Negative for photophobia and visual disturbance.   Respiratory:  Negative for apnea and shortness of breath.    Cardiovascular:  Negative for chest pain and leg swelling.   Gastrointestinal:  Negative for constipation and nausea.   Genitourinary:  Negative for difficulty urinating.   Musculoskeletal:  Negative for back pain, gait problem and neck pain.   Skin:  Negative for color change and pallor.   Neurological:  Positive for numbness. Negative for dizziness, seizures, syncope and weakness.   Hematological:  Negative for adenopathy.   Psychiatric/Behavioral:  Negative for agitation, confusion and decreased concentration.      Neurologic Exam     Mental Status   Oriented to person, place, and time.   Registration: recalls 3 of 3 objects.   Attention: normal. Concentration: normal.   Speech: speech is normal   Level of consciousness: alert  Knowledge: good.     Cranial Nerves     CN II   Right visual field deficit: none  Left visual field deficit: none     CN III, IV, VI   Extraocular motions are normal.   Right pupil: Size: 3 mm. Shape: regular.   Left pupil: Size: 3 mm. Shape: regular.   CN III: no CN III palsy  CN VI: no CN VI palsy  Nystagmus: none   Diplopia: none  Ophthalmoparesis: none  Upgaze: normal  Downgaze: normal  Conjugate gaze: present    CN VII   Facial expression full, symmetric.   Right facial weakness: none  Left facial weakness: none    CN VIII   CN VIII normal.     CN XI   CN XI normal.     CN XII   CN XII normal.   Tongue deviation: none    Motor Exam   Muscle bulk: normal  Overall muscle tone: normal  Right arm tone: normal  Left arm tone: normal  Right leg tone: normal  Left leg tone: normal    Gait, Coordination, and Reflexes     Gait  Gait: normal    Coordination   Finger to nose coordination: normal    Tremor   Resting tremor: absent    Physical Exam  Vitals  "reviewed.   Constitutional:       Appearance: She is well-developed.   HENT:      Head: Normocephalic and atraumatic.   Eyes:      Extraocular Movements: EOM normal.   Pulmonary:      Effort: Pulmonary effort is normal. No respiratory distress.   Musculoskeletal:         General: Normal range of motion.      Cervical back: Normal range of motion.   Neurological:      Mental Status: She is alert and oriented to person, place, and time.      Coordination: Finger-Nose-Finger Test normal.      Gait: Gait is intact.   Psychiatric:         Speech: Speech normal.         Behavior: Behavior normal.         Thought Content: Thought content normal.       Vitals:    10/24/22 1034   BP: (!) 167/82   Pulse: 90   SpO2: 98%   Weight: 88.8 kg (195 lb 12.3 oz)   Height: 5' 5" (1.651 m)       Assessment & Plan:    Problem List Items Addressed This Visit    None  Visit Diagnoses       Neuropathy, peripheral, idiopathic    -  Primary    Relevant Medications    pregabalin (LYRICA) 75 MG capsule    Other Relevant Orders    Ambulatory referral/consult to Physical/Occupational Therapy    Acupuncture            Follow-up: No follow-ups on file.    This note was done with the assistance of voice recognition software. Some errors may be present after proofreading.          "

## 2022-11-25 ENCOUNTER — HOSPITAL ENCOUNTER (OUTPATIENT)
Dept: RADIOLOGY | Facility: HOSPITAL | Age: 77
Discharge: HOME | End: 2022-11-25
Attending: INTERNAL MEDICINE
Payer: MEDICARE

## 2022-11-25 DIAGNOSIS — Z12.31 ENCOUNTER FOR SCREENING MAMMOGRAM FOR MALIGNANT NEOPLASM OF BREAST: ICD-10-CM

## 2022-11-25 PROCEDURE — 77063 BREAST TOMOSYNTHESIS BI: CPT | Mod: TC,PO

## 2022-11-25 PROCEDURE — 77067 SCR MAMMO BI INCL CAD: CPT | Mod: TC,PO

## 2022-11-25 PROCEDURE — 77063 BREAST TOMOSYNTHESIS BI: CPT | Mod: 26,,, | Performed by: RADIOLOGY

## 2022-11-25 PROCEDURE — 77063 MAMMO DIGITAL SCREENING BILAT WITH TOMO: ICD-10-PCS | Mod: 26,,, | Performed by: RADIOLOGY

## 2022-11-25 PROCEDURE — 77067 SCR MAMMO BI INCL CAD: CPT | Mod: 26,,, | Performed by: RADIOLOGY

## 2022-11-25 PROCEDURE — 77067 MAMMO DIGITAL SCREENING BILAT WITH TOMO: ICD-10-PCS | Mod: 26,,, | Performed by: RADIOLOGY

## 2022-11-30 ENCOUNTER — TELEPHONE (OUTPATIENT)
Dept: PULMONOLOGY | Facility: CLINIC | Age: 77
End: 2022-11-30
Payer: MEDICARE

## 2022-11-30 ENCOUNTER — LAB VISIT (OUTPATIENT)
Dept: LAB | Facility: HOSPITAL | Age: 77
End: 2022-11-30
Attending: INTERNAL MEDICINE
Payer: MEDICARE

## 2022-11-30 ENCOUNTER — OFFICE VISIT (OUTPATIENT)
Dept: FAMILY MEDICINE | Facility: CLINIC | Age: 77
End: 2022-11-30
Payer: MEDICARE

## 2022-11-30 VITALS
OXYGEN SATURATION: 97 % | HEIGHT: 65 IN | TEMPERATURE: 99 F | DIASTOLIC BLOOD PRESSURE: 58 MMHG | HEART RATE: 59 BPM | WEIGHT: 198.5 LBS | BODY MASS INDEX: 33.07 KG/M2 | SYSTOLIC BLOOD PRESSURE: 130 MMHG

## 2022-11-30 DIAGNOSIS — E78.2 MIXED HYPERLIPIDEMIA: ICD-10-CM

## 2022-11-30 DIAGNOSIS — R42 LIGHTHEADED: ICD-10-CM

## 2022-11-30 DIAGNOSIS — R42 DIZZINESS: Primary | ICD-10-CM

## 2022-11-30 DIAGNOSIS — G60.9 IDIOPATHIC PERIPHERAL NEUROPATHY: ICD-10-CM

## 2022-11-30 DIAGNOSIS — E66.01 CLASS 2 SEVERE OBESITY DUE TO EXCESS CALORIES WITH SERIOUS COMORBIDITY IN ADULT, UNSPECIFIED BMI: ICD-10-CM

## 2022-11-30 DIAGNOSIS — E55.9 VITAMIN D DEFICIENCY: ICD-10-CM

## 2022-11-30 DIAGNOSIS — R53.83 FATIGUE, UNSPECIFIED TYPE: ICD-10-CM

## 2022-11-30 DIAGNOSIS — R06.83 SNORING: ICD-10-CM

## 2022-11-30 DIAGNOSIS — I10 ESSENTIAL HYPERTENSION: ICD-10-CM

## 2022-11-30 DIAGNOSIS — E04.1 THYROID NODULE: Chronic | ICD-10-CM

## 2022-11-30 DIAGNOSIS — F51.11 PRIMARY HYPERSOMNIA: ICD-10-CM

## 2022-11-30 DIAGNOSIS — R42 DIZZINESS: ICD-10-CM

## 2022-11-30 DIAGNOSIS — R73.09 ABNORMAL GLUCOSE: ICD-10-CM

## 2022-11-30 PROCEDURE — 3288F PR FALLS RISK ASSESSMENT DOCUMENTED: ICD-10-PCS | Mod: CPTII,S$GLB,, | Performed by: INTERNAL MEDICINE

## 2022-11-30 PROCEDURE — 1101F PR PT FALLS ASSESS DOC 0-1 FALLS W/OUT INJ PAST YR: ICD-10-PCS | Mod: CPTII,S$GLB,, | Performed by: INTERNAL MEDICINE

## 2022-11-30 PROCEDURE — 3075F SYST BP GE 130 - 139MM HG: CPT | Mod: CPTII,S$GLB,, | Performed by: INTERNAL MEDICINE

## 2022-11-30 PROCEDURE — 1160F PR REVIEW ALL MEDS BY PRESCRIBER/CLIN PHARMACIST DOCUMENTED: ICD-10-PCS | Mod: CPTII,S$GLB,, | Performed by: INTERNAL MEDICINE

## 2022-11-30 PROCEDURE — 1126F PR PAIN SEVERITY QUANTIFIED, NO PAIN PRESENT: ICD-10-PCS | Mod: CPTII,S$GLB,, | Performed by: INTERNAL MEDICINE

## 2022-11-30 PROCEDURE — 1101F PT FALLS ASSESS-DOCD LE1/YR: CPT | Mod: CPTII,S$GLB,, | Performed by: INTERNAL MEDICINE

## 2022-11-30 PROCEDURE — 99999 PR PBB SHADOW E&M-EST. PATIENT-LVL IV: CPT | Mod: PBBFAC,,, | Performed by: INTERNAL MEDICINE

## 2022-11-30 PROCEDURE — 81003 URINALYSIS AUTO W/O SCOPE: CPT | Performed by: INTERNAL MEDICINE

## 2022-11-30 PROCEDURE — 1159F PR MEDICATION LIST DOCUMENTED IN MEDICAL RECORD: ICD-10-PCS | Mod: CPTII,S$GLB,, | Performed by: INTERNAL MEDICINE

## 2022-11-30 PROCEDURE — 3288F FALL RISK ASSESSMENT DOCD: CPT | Mod: CPTII,S$GLB,, | Performed by: INTERNAL MEDICINE

## 2022-11-30 PROCEDURE — 1126F AMNT PAIN NOTED NONE PRSNT: CPT | Mod: CPTII,S$GLB,, | Performed by: INTERNAL MEDICINE

## 2022-11-30 PROCEDURE — 99214 OFFICE O/P EST MOD 30 MIN: CPT | Mod: S$GLB,,, | Performed by: INTERNAL MEDICINE

## 2022-11-30 PROCEDURE — 1160F RVW MEDS BY RX/DR IN RCRD: CPT | Mod: CPTII,S$GLB,, | Performed by: INTERNAL MEDICINE

## 2022-11-30 PROCEDURE — 3078F DIAST BP <80 MM HG: CPT | Mod: CPTII,S$GLB,, | Performed by: INTERNAL MEDICINE

## 2022-11-30 PROCEDURE — 1159F MED LIST DOCD IN RCRD: CPT | Mod: CPTII,S$GLB,, | Performed by: INTERNAL MEDICINE

## 2022-11-30 PROCEDURE — 3078F PR MOST RECENT DIASTOLIC BLOOD PRESSURE < 80 MM HG: ICD-10-PCS | Mod: CPTII,S$GLB,, | Performed by: INTERNAL MEDICINE

## 2022-11-30 PROCEDURE — 99999 PR PBB SHADOW E&M-EST. PATIENT-LVL IV: ICD-10-PCS | Mod: PBBFAC,,, | Performed by: INTERNAL MEDICINE

## 2022-11-30 PROCEDURE — 99214 PR OFFICE/OUTPT VISIT, EST, LEVL IV, 30-39 MIN: ICD-10-PCS | Mod: S$GLB,,, | Performed by: INTERNAL MEDICINE

## 2022-11-30 PROCEDURE — 3075F PR MOST RECENT SYSTOLIC BLOOD PRESS GE 130-139MM HG: ICD-10-PCS | Mod: CPTII,S$GLB,, | Performed by: INTERNAL MEDICINE

## 2022-11-30 RX ORDER — NETARSUDIL 0.2 MG/ML
SOLUTION/ DROPS OPHTHALMIC; TOPICAL
COMMUNITY
Start: 2022-08-11

## 2022-11-30 NOTE — PROGRESS NOTES
This note was created by combination of typed  and M-Modal dictation.  Transcription errors may be present.  If there are any questions, please contact me.    Assessment and Plan:   Dizziness  Lightheaded  Fatigue, unspecified type  -fatigue subacute sounds like it is getting worse along with sensation of dizziness versus lightheadedness.  Nonfocal neurologic exam.  Normal cardiac exam.  Never took the Lyrica so can not blame that medication.    She has been on the same blood pressure medicines for awhile now.  Check labs, CBC, CMP, TSH, A1c.    Check also chest x-ray and check urinalysis to look for occult infection   If all negative may have her see ENT.    She has an upcoming follow-up with her cardiologist.  -     TSH; Future; Expected date: 11/30/2022  -     X-Ray Chest PA And Lateral; Future; Expected date: 11/30/2022  -     Urinalysis, Reflex to Urine Culture Urine, Clean Catch; Future; Expected date: 11/30/2022    Snoring  Primary hypersomnia  Fatigue  -symptoms suspicious for sleep apnea.  She never got set up with sleep medicine.  I will order home sleep test.  -     Home Sleep Study; Future    Idiopathic peripheral neuropathy normal B12, TSH; A1c pre-DM. 6/2019 gabapentin  -currently not taking anything for this.  She is tried acupuncture without relief.  Is wary of Lyrica as she was concerned about its side effect profile.    Abnormal glucose  Class 2 severe obesity due to excess calories with serious comorbidity in adult, unspecified BMI  -check A1c    Mixed hyperlipidemia  -check labs on Lipitor    Essential hypertension  -stable. Not hypotensive. No changes.    Vitamin D deficiency  -she stopped her calcium and vitamin-D to see if that was a contributor to her symptoms.  Recommended that she restart.    Thyroid nodule on US; followed by endo repeat 02/2023  -had updated thyroid ultrasound negative.  Check TSH.  Repeat thyroid ultrasound 2 years.  Followed by Endocrinology.  -     TSH;  Future; Expected date: 11/30/2022        Medications Discontinued During This Encounter   Medication Reason    beta-carotene,A,-vits C,E/mins (OCUVITE ORAL)     brimonidine 0.1% (ALPHAGAN P) 0.1 % Drop     dexamethasone (DECADRON) 4 mg/mL injection     moxifloxacin (VIGAMOX) 0.5 % ophthalmic solution     prednisoLONE acetate (PRED FORTE) 1 % DrpS     propofoL (DIPRIVAN) 10 mg/mL infusion     KENALOG 40 mg/mL injection     pregabalin (LYRICA) 75 MG capsule Patient no longer taking       meds sent this encounter:       Follow Up: No follow-ups on file.  Has follow-up scheduled with me in the spring    Subjective:     Chief Complaint   Patient presents with    Dizziness    Fatigue    Nasal Congestion     Phlegm in throat       HPI  Rosmery is a 77 y.o. female, last appointment with this clinic was 7/7/2022.  Social History     Tobacco Use    Smoking status: Never    Smokeless tobacco: Never   Substance Use Topics    Alcohol use: Yes     Alcohol/week: 0.0 standard drinks     Comment: Rarely; 1 glass of wine      Social History     Occupational History    Occupation: retired - formerly pastoral care with [employer]      Social History     Social History Narrative    Not on file       No LMP recorded. Patient has had a hysterectomy.    Last visit with me in March for physical  Abnormal glucose, stable, work on TLC   Hypertension.  She was concerned that her blood pressure went down with the addition of her Alphagan eyedrops.  Blood pressure at that time was normal.  No changes.  Followed by digital monitoring.  Lipid/statin really day.  Stable.    Thyroid nodule on ultrasound due for repeat February 2023.  Followed by Endocrinology.    Saw ra 5/2022 6/30/22 thyroid US:  1. Thyroid gland is normal in size with diffusely heterogenous echotexture consistent with Hashimoto's thyroiditis.  2. 2 nodules in the right thyroid described above. None meet criteria for fine-needle aspiration.  Last DEXA scan 2016 was normal    4/6/22 DEXA L-spine 1.2, total hip 0.7, femoral neck-0.8.  FRAX score 2.4/6.4%.  Peripheral neuropathy followed by Neurology.  History of gabapentin without relief.  She wants to talk to Neurology before considering Cymbalta.  Saw Neurology in follow-up in April.  Labs were normal including SPEP, B1, B6, B12.  07/07/2022 EMG showing sensory motor peripheral neuropathy.  Chronic denervation in the L4-S1 myotomes on the left.  7/22/22 MRI L spine  Mild multilevel degenerative change throughout the lower thoracic and lumbar spine.  No significant spinal canal stenosis but there is scattered lateral recess and neural foraminal encroachment   On f/u changed to lyrica  3/28/22 colonoscopy 4 mm sigmoid TA repeat 7 years per GI    Fatigue referred to sleep Medicine    Complaining of fatigue.  Dizziness versus lightheadedness.  Has been subacute but seems like it is getting worse.    She tells me that she brought this up with endocrinology who referred her to sleep medicine.  Never got that set up.    Feels like she has low energy.  When she wakes up and gets out of the bed in the morning she seems to be okay but soon thereafter starts feeling poorly.    The other day walking around [employer] she felt like she was going to pass out.  Had to hold on to something because of that.  No unilateral weakness no slurred speech no sudden vision or hearing changes.  No digestive complaints.    She thought this might be due to supplements so she stopped her numerous over-the-counter supplements. the D3; vit C; calcium; one a day; selenium; B12; zinc; Mg. She stopped those 2 weeks ago.     She is been monitoring her blood pressures and they have been okay, blood pressures have not been too low.  Blood pressure today on intake was normal range.    Regarding the fatigue, she does note snoring and does note excessive daytime somnolence.  We talked about diagnostic process and I will go ahead and order home sleep study.    She is followed  by Cardiology and has an upcoming follow-up    She is not taking the Lyrica.  She picked it up, red side effect profile, was wary of it and so never took it.    Patient Care Team:  Ancelmo Sumner MD as PCP - General (Internal Medicine)  Brooke Delong MD as Obstetrician (Obstetrics)  Arnoldo Valentin MD as Consulting Physician (Otolaryngology)  Tim Freeman MA as Care Coordinator  Yesica Mike MD as Consulting Physician (Ophthalmology)  Brandan Nava MD as Consulting Physician (Orthopedic Surgery)  Jagjit Feldman MD as Consulting Physician (Endocrinology)  Ailyn Dubois as Digital Medicine Health   Nawaf CernaD as Hypertension Digital Medicine Clinician (Pharmacist)  Ancelmo Sumner MD as Hypertension Digital Medicine Responsible Provider (Internal Medicine)  [employer] as Hypertension Digital Medicine Contract    Patient Active Problem List    Diagnosis Date Noted    Right foot pain 05/24/2022    Primary open-angle glaucoma, left eye, moderate stage 07/28/2021    Primary open angle glaucoma of right eye, mild stage 03/17/2021    Fatigue 01/24/2020    Acute pain of left knee 06/18/2019    Weakness of both hips 06/18/2019    Decreased independence with transfers 06/18/2019    Impaired functional mobility, balance, gait, and endurance 06/18/2019    Idiopathic peripheral neuropathy normal B12, TSH; A1c pre-DM. hx of lory changed to lyrica 06/03/2019 07/07/2022 EMG showing sensory motor peripheral neuropathy.  Chronic denervation in the L4-S1 myotomes on the left.  7/22/22 MRI L spine  Mild multilevel degenerative change throughout the lower thoracic and lumbar spine.  No significant spinal canal stenosis but there is scattered lateral recess and neural foraminal encroachment       Thyroid nodule on US; followed by endo repeat 6/2024 04/22/2019 2/21/20 thyroid US: 1.)  Thyroid gland is upper limits of normal in size with heterogeneous echotexture  and normal vascularity  2.) 1.31 x 1.07 x 0.91 cm solid, heterogeneous predominately isoechoic nodule is seen in the right inferior pole  3.) 0.72 x 0.48 x 0.50 cm solid, hyperechoic nodule is seen in the right inferior pole  4.) 0.85 x 0.49 x 0.72 cm solid, isoechoic nodule is seen in the right inferior pole  RECOMMENDATIONS: Recommend repeat thyroid ultrasound in 1 year.  2/2021 thyroid US: 1.  Thyroid gland is normal in size with diffusely heterogenous echotexture. 2.  Two nodules in the right thyroid described above.  None meet criteria for fine-needle aspiration.  6/30/22 thyroid US:  1. Thyroid gland is normal in size with diffusely heterogenous echotexture consistent with Hashimoto's thyroiditis.  2. 2 nodules in the right thyroid described above. None meet criteria for fine-needle aspiration.      Abnormal glucose 04/18/2019    Vitamin D deficiency 04/04/2019 4/6/22 DEXA L-spine 1.2, total hip 0.7, femoral neck-0.8.  FRAX score 2.4/6.4%.      Chronic bilateral low back pain without sciatica 11/02/2018    Bradycardia 6/2018 holter negative 07/20/2018    Status post hysterectomy 07/20/2018    Statin myopathy 07/20/2018    Tubular adenoma of colon 3/28/22 colonoscopy 4 mm sigmoid TA 01/23/2017     3/28/22 colonoscopy 4 mm sigmoid TA      Left knee pain 01/05/2017    Nuclear sclerosis 11/30/2016    Cataract 09/07/2016     Dx updated per 2019 IMO Load      Class 2 severe obesity due to excess calories with serious comorbidity in adult, unspecified BMI     POAG (primary open-angle glaucoma) 01/05/2016    Essential hypertension 08/13/2012 01/13/2021 TTE LV normal size with normal systolic function.  LVEF 55%.  Normal diastolic function.  Normal RV size and normal function.  Normal CVP.  PA pressure 20.      Hyperlipidemia; statin myalgias; 6/28/2018 exercise stress echo neg for ischemia 08/13/2012 6/28/2018 exercise stress echo CONCLUSIONS   1 - Normal left ventricular systolic function (EF 60-65%).   2  - No wall motion abnormalities.   3 - Normal left ventricular diastolic function.   4 - Normal right ventricular systolic function .          PAST MEDICAL PROBLEMS, PAST SURGICAL HISTORY: please see relevant portions of the electronic medical record    ALLERGIES AND MEDICATIONS: updated and reviewed.  Review of patient's allergies indicates:   Allergen Reactions    Lisinopril Swelling    Amlodipine Edema    Combigan [brimonidine-timolol]      Causes itchy eyelids and contact dermatitis    Cosopt [dorzolamide-timolol]      Causes angular blepharitis of eyelids    Pravastatin      Myalgia and elevated CPK       Medication List with Changes/Refills   Current Medications    ASCORBIC ACID, VITAMIN C, (VITAMIN C) 1000 MG TABLET    Take 1,000 mg by mouth once daily.    ASPIRIN (ECOTRIN) 81 MG EC TABLET    Take 81 mg by mouth once daily.    ATORVASTATIN (LIPITOR) 10 MG TABLET    TAKE 1 TABLET (10 MG TOTAL) BY MOUTH EVERY OTHER DAY.    CALCIUM-VITAMIN D3 (OS-LETY 500 + D3) 500 MG(1,250MG) -200 UNIT PER TABLET    Take 1 tablet by mouth 2 (two) times daily with meals.    DORZOLAMIDE-TIMOLOL 2-0.5% (COSOPT) 22.3-6.8 MG/ML OPHTHALMIC SOLUTION    Place 1 drop into both eyes 2 (two) times daily.    HYDROCHLOROTHIAZIDE (MICROZIDE) 12.5 MG CAPSULE    Take 1 capsule (12.5 mg total) by mouth once daily.    IRBESARTAN (AVAPRO) 150 MG TABLET    Take 1 tablet (150 mg total) by mouth every evening.    LUMIGAN 0.01 % DROP    Place 1 drop into both eyes every evening.    MULTIVITAMIN (THERAGRAN) PER TABLET    Take 1 tablet by mouth once daily.    PREGABALIN (LYRICA) 75 MG CAPSULE    Take 1 capsule (75 mg total) by mouth 2 (two) times daily.    RHOPRESSA 0.02 % OPHTHALMIC SOLUTION    INSTILL 1 DROP INTO LEFT EYE ONCE DAILY    VALACYCLOVIR (VALTREX) 1000 MG TABLET    Take 1 tablet (1,000 mg total) by mouth 3 (three) times daily. for 7 days   Discontinued Medications    BETA-CAROTENE,A,-VITS C,E/MINS (OCUVITE ORAL)    Take by mouth.     "BRIMONIDINE 0.1% (ALPHAGAN P) 0.1 % DROP    Place 1 drop into both eyes 2 (two) times daily.    DEXAMETHASONE (DECADRON) 4 MG/ML INJECTION        KENALOG 40 MG/ML INJECTION        MOXIFLOXACIN (VIGAMOX) 0.5 % OPHTHALMIC SOLUTION    INSTILL 1 DROP INTO LEFT EYE 4 TIMES DAILY FOR 7 DAYS    PREDNISOLONE ACETATE (PRED FORTE) 1 % DRPS    INSTILL 1 DROP INTO LEFT EYE 8 TIMES A DAY FOR 30 DAYS    PROPOFOL (DIPRIVAN) 10 MG/ML INFUSION            Objective:   Physical Exam   Vitals:    11/30/22 1306   BP: (!) 130/58   Pulse: (!) 59   Temp: 98.7 °F (37.1 °C)   TempSrc: Oral   SpO2: 97%   Weight: 90.1 kg (198 lb 8.4 oz)   Height: 5' 5" (1.651 m)    Body mass index is 33.04 kg/m².  Weight: 90.1 kg (198 lb 8.4 oz)   Height: 5' 5" (165.1 cm)     Physical Exam  Constitutional:       General: She is not in acute distress.     Appearance: She is well-developed.   Eyes:      Extraocular Movements: Extraocular movements intact.   Cardiovascular:      Rate and Rhythm: Normal rate and regular rhythm.      Heart sounds: Normal heart sounds. No murmur heard.  Pulmonary:      Effort: Pulmonary effort is normal.      Breath sounds: Normal breath sounds.   Musculoskeletal:         General: Normal range of motion.      Right lower leg: No edema.      Left lower leg: No edema.   Skin:     General: Skin is warm and dry.   Neurological:      Mental Status: She is alert and oriented to person, place, and time.      Cranial Nerves: No cranial nerve deficit.      Sensory: No sensory deficit.      Coordination: Coordination normal.      Deep Tendon Reflexes: Reflexes normal.   Psychiatric:         Behavior: Behavior normal.         Thought Content: Thought content normal.           "

## 2022-12-01 ENCOUNTER — LAB VISIT (OUTPATIENT)
Dept: LAB | Facility: HOSPITAL | Age: 77
End: 2022-12-01
Attending: INTERNAL MEDICINE
Payer: MEDICARE

## 2022-12-01 ENCOUNTER — HOSPITAL ENCOUNTER (OUTPATIENT)
Dept: RADIOLOGY | Facility: HOSPITAL | Age: 77
Discharge: HOME | End: 2022-12-01
Attending: INTERNAL MEDICINE
Payer: MEDICARE

## 2022-12-01 ENCOUNTER — PATIENT MESSAGE (OUTPATIENT)
Dept: FAMILY MEDICINE | Facility: CLINIC | Age: 77
End: 2022-12-01
Payer: MEDICARE

## 2022-12-01 DIAGNOSIS — R42 DIZZINESS: ICD-10-CM

## 2022-12-01 DIAGNOSIS — Z00.00 NORMAL PHYSICAL EXAM: ICD-10-CM

## 2022-12-01 DIAGNOSIS — E78.2 MIXED HYPERLIPIDEMIA: ICD-10-CM

## 2022-12-01 DIAGNOSIS — R73.09 ABNORMAL GLUCOSE: ICD-10-CM

## 2022-12-01 DIAGNOSIS — E04.1 THYROID NODULE: Chronic | ICD-10-CM

## 2022-12-01 PROBLEM — I70.0 THORACIC AORTIC ATHEROSCLEROSIS: Status: ACTIVE | Noted: 2022-12-01

## 2022-12-01 LAB
ALBUMIN SERPL BCP-MCNC: 3.7 G/DL (ref 3.5–5.2)
ALP SERPL-CCNC: 90 U/L (ref 55–135)
ALT SERPL W/O P-5'-P-CCNC: 27 U/L (ref 10–44)
ANION GAP SERPL CALC-SCNC: 7 MMOL/L (ref 8–16)
AST SERPL-CCNC: 20 U/L (ref 10–40)
BASOPHILS # BLD AUTO: 0.03 K/UL (ref 0–0.2)
BASOPHILS NFR BLD: 0.5 % (ref 0–1.9)
BILIRUB SERPL-MCNC: 0.7 MG/DL (ref 0.1–1)
BILIRUB UR QL STRIP: NEGATIVE
BUN SERPL-MCNC: 11 MG/DL (ref 8–23)
CALCIUM SERPL-MCNC: 9.7 MG/DL (ref 8.7–10.5)
CHLORIDE SERPL-SCNC: 105 MMOL/L (ref 95–110)
CHOLEST SERPL-MCNC: 117 MG/DL (ref 120–199)
CHOLEST/HDLC SERPL: 2.7 {RATIO} (ref 2–5)
CLARITY UR REFRACT.AUTO: CLEAR
CO2 SERPL-SCNC: 29 MMOL/L (ref 23–29)
COLOR UR AUTO: YELLOW
CREAT SERPL-MCNC: 0.8 MG/DL (ref 0.5–1.4)
DIFFERENTIAL METHOD: ABNORMAL
EOSINOPHIL # BLD AUTO: 0 K/UL (ref 0–0.5)
EOSINOPHIL NFR BLD: 0.4 % (ref 0–8)
ERYTHROCYTE [DISTWIDTH] IN BLOOD BY AUTOMATED COUNT: 12.8 % (ref 11.5–14.5)
EST. GFR  (NO RACE VARIABLE): >60 ML/MIN/1.73 M^2
ESTIMATED AVG GLUCOSE: 123 MG/DL (ref 68–131)
GLUCOSE SERPL-MCNC: 93 MG/DL (ref 70–110)
GLUCOSE UR QL STRIP: NEGATIVE
HBA1C MFR BLD: 5.9 % (ref 4–5.6)
HCT VFR BLD AUTO: 39.4 % (ref 37–48.5)
HDLC SERPL-MCNC: 43 MG/DL (ref 40–75)
HDLC SERPL: 36.8 % (ref 20–50)
HGB BLD-MCNC: 12.3 G/DL (ref 12–16)
HGB UR QL STRIP: NEGATIVE
IMM GRANULOCYTES # BLD AUTO: 0.01 K/UL (ref 0–0.04)
IMM GRANULOCYTES NFR BLD AUTO: 0.2 % (ref 0–0.5)
KETONES UR QL STRIP: NEGATIVE
LDLC SERPL CALC-MCNC: 58.4 MG/DL (ref 63–159)
LEUKOCYTE ESTERASE UR QL STRIP: NEGATIVE
LYMPHOCYTES # BLD AUTO: 1.6 K/UL (ref 1–4.8)
LYMPHOCYTES NFR BLD: 28.2 % (ref 18–48)
MCH RBC QN AUTO: 29.1 PG (ref 27–31)
MCHC RBC AUTO-ENTMCNC: 31.2 G/DL (ref 32–36)
MCV RBC AUTO: 93 FL (ref 82–98)
MONOCYTES # BLD AUTO: 0.4 K/UL (ref 0.3–1)
MONOCYTES NFR BLD: 8 % (ref 4–15)
NEUTROPHILS # BLD AUTO: 3.4 K/UL (ref 1.8–7.7)
NEUTROPHILS NFR BLD: 62.7 % (ref 38–73)
NITRITE UR QL STRIP: NEGATIVE
NONHDLC SERPL-MCNC: 74 MG/DL
NRBC BLD-RTO: 0 /100 WBC
PH UR STRIP: 7 [PH] (ref 5–8)
PLATELET # BLD AUTO: 286 K/UL (ref 150–450)
PMV BLD AUTO: 11 FL (ref 9.2–12.9)
POTASSIUM SERPL-SCNC: 3.9 MMOL/L (ref 3.5–5.1)
PROT SERPL-MCNC: 7 G/DL (ref 6–8.4)
PROT UR QL STRIP: NEGATIVE
RBC # BLD AUTO: 4.23 M/UL (ref 4–5.4)
SODIUM SERPL-SCNC: 141 MMOL/L (ref 136–145)
SP GR UR STRIP: 1.01 (ref 1–1.03)
TRIGL SERPL-MCNC: 78 MG/DL (ref 30–150)
TSH SERPL DL<=0.005 MIU/L-ACNC: 2.74 UIU/ML (ref 0.4–4)
URN SPEC COLLECT METH UR: NORMAL
WBC # BLD AUTO: 5.49 K/UL (ref 3.9–12.7)

## 2022-12-01 PROCEDURE — 80053 COMPREHEN METABOLIC PANEL: CPT | Performed by: INTERNAL MEDICINE

## 2022-12-01 PROCEDURE — 83036 HEMOGLOBIN GLYCOSYLATED A1C: CPT | Performed by: INTERNAL MEDICINE

## 2022-12-01 PROCEDURE — 71046 X-RAY EXAM CHEST 2 VIEWS: CPT | Mod: 26,,, | Performed by: RADIOLOGY

## 2022-12-01 PROCEDURE — 71046 X-RAY EXAM CHEST 2 VIEWS: CPT | Mod: TC,FY,PO

## 2022-12-01 PROCEDURE — 84443 ASSAY THYROID STIM HORMONE: CPT | Performed by: INTERNAL MEDICINE

## 2022-12-01 PROCEDURE — 80061 LIPID PANEL: CPT | Performed by: INTERNAL MEDICINE

## 2022-12-01 PROCEDURE — 71046 XR CHEST PA AND LATERAL: ICD-10-PCS | Mod: 26,,, | Performed by: RADIOLOGY

## 2022-12-01 PROCEDURE — 85025 COMPLETE CBC W/AUTO DIFF WBC: CPT | Performed by: INTERNAL MEDICINE

## 2022-12-01 PROCEDURE — 36415 COLL VENOUS BLD VENIPUNCTURE: CPT | Mod: PO | Performed by: INTERNAL MEDICINE

## 2022-12-01 NOTE — PROGRESS NOTES
Chest x-ray with atelectasis, incidental aortic atherosclerosis.  Nothing to explain her complaints of lightheadedness and fatigue.    Results to patient via [facility].

## 2022-12-01 NOTE — PROGRESS NOTES
UA normal. Done for c/o dizzy/lightheaded, r/o occult infection  Normal SG  Results to pt via [facility].

## 2022-12-02 NOTE — PROGRESS NOTES
CBC, CMP, TSH WNL  A1c preDM stable  Lipid good on statin  CXR normal.  Results to pt via [facility]. No changes.     Pt notes she is taking statin QOD. May continue to take QOD

## 2022-12-13 ENCOUNTER — OFFICE VISIT (OUTPATIENT)
Dept: PULMONOLOGY | Facility: CLINIC | Age: 77
End: 2022-12-13
Payer: MEDICARE

## 2022-12-13 VITALS
DIASTOLIC BLOOD PRESSURE: 82 MMHG | SYSTOLIC BLOOD PRESSURE: 171 MMHG | RESPIRATION RATE: 17 BRPM | OXYGEN SATURATION: 94 % | HEIGHT: 65 IN | WEIGHT: 198 LBS | BODY MASS INDEX: 32.99 KG/M2 | HEART RATE: 59 BPM

## 2022-12-13 DIAGNOSIS — R06.00 DYSPNEA, UNSPECIFIED TYPE: Primary | ICD-10-CM

## 2022-12-13 DIAGNOSIS — G47.33 OSA (OBSTRUCTIVE SLEEP APNEA): ICD-10-CM

## 2022-12-13 DIAGNOSIS — R06.01 ORTHOPNEA: ICD-10-CM

## 2022-12-13 PROCEDURE — 99204 OFFICE O/P NEW MOD 45 MIN: CPT | Mod: S$GLB,,, | Performed by: INTERNAL MEDICINE

## 2022-12-13 PROCEDURE — 99999 PR PBB SHADOW E&M-EST. PATIENT-LVL IV: CPT | Mod: PBBFAC,,, | Performed by: INTERNAL MEDICINE

## 2022-12-13 PROCEDURE — 1159F MED LIST DOCD IN RCRD: CPT | Mod: CPTII,S$GLB,, | Performed by: INTERNAL MEDICINE

## 2022-12-13 PROCEDURE — 3288F FALL RISK ASSESSMENT DOCD: CPT | Mod: CPTII,S$GLB,, | Performed by: INTERNAL MEDICINE

## 2022-12-13 PROCEDURE — 1101F PR PT FALLS ASSESS DOC 0-1 FALLS W/OUT INJ PAST YR: ICD-10-PCS | Mod: CPTII,S$GLB,, | Performed by: INTERNAL MEDICINE

## 2022-12-13 PROCEDURE — 1160F RVW MEDS BY RX/DR IN RCRD: CPT | Mod: CPTII,S$GLB,, | Performed by: INTERNAL MEDICINE

## 2022-12-13 PROCEDURE — 3079F PR MOST RECENT DIASTOLIC BLOOD PRESSURE 80-89 MM HG: ICD-10-PCS | Mod: CPTII,S$GLB,, | Performed by: INTERNAL MEDICINE

## 2022-12-13 PROCEDURE — 3077F SYST BP >= 140 MM HG: CPT | Mod: CPTII,S$GLB,, | Performed by: INTERNAL MEDICINE

## 2022-12-13 PROCEDURE — 99204 PR OFFICE/OUTPT VISIT, NEW, LEVL IV, 45-59 MIN: ICD-10-PCS | Mod: S$GLB,,, | Performed by: INTERNAL MEDICINE

## 2022-12-13 PROCEDURE — 1159F PR MEDICATION LIST DOCUMENTED IN MEDICAL RECORD: ICD-10-PCS | Mod: CPTII,S$GLB,, | Performed by: INTERNAL MEDICINE

## 2022-12-13 PROCEDURE — 3288F PR FALLS RISK ASSESSMENT DOCUMENTED: ICD-10-PCS | Mod: CPTII,S$GLB,, | Performed by: INTERNAL MEDICINE

## 2022-12-13 PROCEDURE — 1160F PR REVIEW ALL MEDS BY PRESCRIBER/CLIN PHARMACIST DOCUMENTED: ICD-10-PCS | Mod: CPTII,S$GLB,, | Performed by: INTERNAL MEDICINE

## 2022-12-13 PROCEDURE — 3077F PR MOST RECENT SYSTOLIC BLOOD PRESSURE >= 140 MM HG: ICD-10-PCS | Mod: CPTII,S$GLB,, | Performed by: INTERNAL MEDICINE

## 2022-12-13 PROCEDURE — 99999 PR PBB SHADOW E&M-EST. PATIENT-LVL IV: ICD-10-PCS | Mod: PBBFAC,,, | Performed by: INTERNAL MEDICINE

## 2022-12-13 PROCEDURE — 1126F PR PAIN SEVERITY QUANTIFIED, NO PAIN PRESENT: ICD-10-PCS | Mod: CPTII,S$GLB,, | Performed by: INTERNAL MEDICINE

## 2022-12-13 PROCEDURE — 1126F AMNT PAIN NOTED NONE PRSNT: CPT | Mod: CPTII,S$GLB,, | Performed by: INTERNAL MEDICINE

## 2022-12-13 PROCEDURE — 3079F DIAST BP 80-89 MM HG: CPT | Mod: CPTII,S$GLB,, | Performed by: INTERNAL MEDICINE

## 2022-12-13 PROCEDURE — 1101F PT FALLS ASSESS-DOCD LE1/YR: CPT | Mod: CPTII,S$GLB,, | Performed by: INTERNAL MEDICINE

## 2022-12-13 NOTE — ASSESSMENT & PLAN NOTE
Her symptoms seem unlikely to be Pulmonary in nature. She has an elevated diaphragm on XRay of unclear etiology.   -PFTs  -6 minute walk  -CT chest.

## 2022-12-13 NOTE — PROGRESS NOTES
Subjective:       Patient ID: Rosmery Ramirez is a 77 y.o. female.    Chief Complaint: No chief complaint on file.    77 year old female with SOB. Worse when she lays down.   Completes ADLs and walks.   No lung surgery or infection history.   Never smoker.   smoked.   Abnormal CXR.     Review of Systems   Respiratory:  Positive for snoring, shortness of breath and dyspnea on extertion. Negative for cough.      Objective:      Physical Exam   Constitutional: She is oriented to person, place, and time. She appears well-developed and well-nourished. No distress.   Cardiovascular: Normal rate and normal heart sounds.   Pulmonary/Chest: Normal expansion and effort normal.   Musculoskeletal:         General: No edema.   Neurological: She is alert and oriented to person, place, and time.   Skin: She is not diaphoretic.   Nursing note and vitals reviewed.  Personal Diagnostic Review  none pertinent  No flowsheet data found.      Assessment:       1. Dyspnea, unspecified type    2. Orthopnea    3. CLARA (obstructive sleep apnea)        Outpatient Encounter Medications as of 12/13/2022   Medication Sig Dispense Refill    ascorbic acid, vitamin C, (VITAMIN C) 1000 MG tablet Take 1,000 mg by mouth once daily.      aspirin (ECOTRIN) 81 MG EC tablet Take 81 mg by mouth once daily.      atorvastatin (LIPITOR) 10 MG tablet TAKE 1 TABLET (10 MG TOTAL) BY MOUTH EVERY OTHER DAY. 45 tablet 3    calcium-vitamin D3 (OS-LETY 500 + D3) 500 mg(1,250mg) -200 unit per tablet Take 1 tablet by mouth 2 (two) times daily with meals.      hydroCHLOROthiazide (MICROZIDE) 12.5 mg capsule Take 1 capsule (12.5 mg total) by mouth once daily. 90 capsule 3    irbesartan (AVAPRO) 150 MG tablet Take 1 tablet (150 mg total) by mouth every evening. 90 tablet 3    LUMIGAN 0.01 % Drop Place 1 drop into both eyes every evening. 7.5 mL 3    multivitamin (THERAGRAN) per tablet Take 1 tablet by mouth once daily.      RHOPRESSA 0.02 % ophthalmic  solution INSTILL 1 DROP INTO LEFT EYE ONCE DAILY      dorzolamide-timolol 2-0.5% (COSOPT) 22.3-6.8 mg/mL ophthalmic solution Place 1 drop into both eyes 2 (two) times daily. (Patient not taking: Reported on 11/30/2022) 30 mL 3    valACYclovir (VALTREX) 1000 MG tablet Take 1 tablet (1,000 mg total) by mouth 3 (three) times daily. for 7 days 21 tablet 0     No facility-administered encounter medications on file as of 12/13/2022.     Orders Placed This Encounter   Procedures    CT Chest Without Contrast     Standing Status:   Future     Standing Expiration Date:   12/13/2023     Order Specific Question:   May the Radiologist modify the order per protocol to meet the clinical needs of the patient?     Answer:   Yes    Complete PFT with bronchodilator     Standing Status:   Future     Standing Expiration Date:   12/13/2023     Order Specific Question:   Release to patient     Answer:   Immediate    Stress test, pulmonary     Standing Status:   Future     Standing Expiration Date:   12/13/2023     Order Specific Question:   Reason for study     Answer:   Functional status     Order Specific Question:   Release to patient     Answer:   Immediate       Plan:        Orthopnea  Her symptoms seem unlikely to be Pulmonary in nature. She has an elevated diaphragm on XRay of unclear etiology.   -PFTs  -6 minute walk  -CT chest.     CLARA (obstructive sleep apnea)  Sleep study per sleep medicine.     Follow up as needed.     Carlos Betts MD

## 2022-12-15 ENCOUNTER — HOSPITAL ENCOUNTER (OUTPATIENT)
Dept: SLEEP MEDICINE | Facility: HOSPITAL | Age: 77
Discharge: HOME | End: 2022-12-15
Attending: INTERNAL MEDICINE
Payer: MEDICARE

## 2022-12-15 DIAGNOSIS — R06.83 SNORING: ICD-10-CM

## 2022-12-15 DIAGNOSIS — R53.83 FATIGUE, UNSPECIFIED TYPE: ICD-10-CM

## 2022-12-15 DIAGNOSIS — F51.11 PRIMARY HYPERSOMNIA: ICD-10-CM

## 2022-12-15 PROCEDURE — 95800 SLP STDY UNATTENDED: CPT

## 2022-12-16 PROBLEM — R06.83 SNORING: Status: ACTIVE | Noted: 2022-12-16

## 2022-12-19 ENCOUNTER — OFFICE VISIT (OUTPATIENT)
Dept: CARDIOLOGY | Facility: CLINIC | Age: 77
End: 2022-12-19
Payer: MEDICARE

## 2022-12-19 ENCOUNTER — PATIENT MESSAGE (OUTPATIENT)
Dept: FAMILY MEDICINE | Facility: CLINIC | Age: 77
End: 2022-12-19
Payer: MEDICARE

## 2022-12-19 VITALS
RESPIRATION RATE: 18 BRPM | BODY MASS INDEX: 32.74 KG/M2 | DIASTOLIC BLOOD PRESSURE: 79 MMHG | SYSTOLIC BLOOD PRESSURE: 185 MMHG | HEART RATE: 61 BPM | OXYGEN SATURATION: 98 % | WEIGHT: 196.75 LBS

## 2022-12-19 DIAGNOSIS — G72.0 STATIN MYOPATHY: ICD-10-CM

## 2022-12-19 DIAGNOSIS — R29.898 WEAKNESS OF BOTH HIPS: ICD-10-CM

## 2022-12-19 DIAGNOSIS — T46.6X5A STATIN MYOPATHY: ICD-10-CM

## 2022-12-19 DIAGNOSIS — E66.01 CLASS 2 SEVERE OBESITY DUE TO EXCESS CALORIES WITH SERIOUS COMORBIDITY IN ADULT, UNSPECIFIED BMI: ICD-10-CM

## 2022-12-19 DIAGNOSIS — I10 ESSENTIAL HYPERTENSION: ICD-10-CM

## 2022-12-19 DIAGNOSIS — G47.33 OSA (OBSTRUCTIVE SLEEP APNEA): Primary | ICD-10-CM

## 2022-12-19 DIAGNOSIS — G47.33 OSA (OBSTRUCTIVE SLEEP APNEA): ICD-10-CM

## 2022-12-19 DIAGNOSIS — E78.2 MIXED HYPERLIPIDEMIA: ICD-10-CM

## 2022-12-19 DIAGNOSIS — H25.10 NUCLEAR SCLEROSIS, UNSPECIFIED LATERALITY: ICD-10-CM

## 2022-12-19 DIAGNOSIS — R00.1 BRADYCARDIA: ICD-10-CM

## 2022-12-19 DIAGNOSIS — I70.0 THORACIC AORTIC ATHEROSCLEROSIS: ICD-10-CM

## 2022-12-19 DIAGNOSIS — R06.02 SOB (SHORTNESS OF BREATH): Primary | ICD-10-CM

## 2022-12-19 DIAGNOSIS — E55.9 VITAMIN D DEFICIENCY: ICD-10-CM

## 2022-12-19 PROCEDURE — 1126F AMNT PAIN NOTED NONE PRSNT: CPT | Mod: CPTII,S$GLB,, | Performed by: INTERNAL MEDICINE

## 2022-12-19 PROCEDURE — 99999 PR PBB SHADOW E&M-EST. PATIENT-LVL IV: CPT | Mod: PBBFAC,,, | Performed by: INTERNAL MEDICINE

## 2022-12-19 PROCEDURE — 93000 ELECTROCARDIOGRAM COMPLETE: CPT | Mod: S$GLB,,, | Performed by: INTERNAL MEDICINE

## 2022-12-19 PROCEDURE — 95806 PR SLEEP STUDY, UNATTENDED, SIMUL RECORD HR/O2 SAT/RESP FLOW/RESP EFFT: ICD-10-PCS | Mod: 26,,, | Performed by: INTERNAL MEDICINE

## 2022-12-19 PROCEDURE — 99214 PR OFFICE/OUTPT VISIT, EST, LEVL IV, 30-39 MIN: ICD-10-PCS | Mod: S$GLB,,, | Performed by: INTERNAL MEDICINE

## 2022-12-19 PROCEDURE — 95806 SLEEP STUDY UNATT&RESP EFFT: CPT | Mod: 26,,, | Performed by: INTERNAL MEDICINE

## 2022-12-19 PROCEDURE — 1159F MED LIST DOCD IN RCRD: CPT | Mod: CPTII,S$GLB,, | Performed by: INTERNAL MEDICINE

## 2022-12-19 PROCEDURE — 93000 EKG 12-LEAD: ICD-10-PCS | Mod: S$GLB,,, | Performed by: INTERNAL MEDICINE

## 2022-12-19 PROCEDURE — 3078F DIAST BP <80 MM HG: CPT | Mod: CPTII,S$GLB,, | Performed by: INTERNAL MEDICINE

## 2022-12-19 PROCEDURE — 1126F PR PAIN SEVERITY QUANTIFIED, NO PAIN PRESENT: ICD-10-PCS | Mod: CPTII,S$GLB,, | Performed by: INTERNAL MEDICINE

## 2022-12-19 PROCEDURE — 1159F PR MEDICATION LIST DOCUMENTED IN MEDICAL RECORD: ICD-10-PCS | Mod: CPTII,S$GLB,, | Performed by: INTERNAL MEDICINE

## 2022-12-19 PROCEDURE — 1160F RVW MEDS BY RX/DR IN RCRD: CPT | Mod: CPTII,S$GLB,, | Performed by: INTERNAL MEDICINE

## 2022-12-19 PROCEDURE — 99999 PR PBB SHADOW E&M-EST. PATIENT-LVL IV: ICD-10-PCS | Mod: PBBFAC,,, | Performed by: INTERNAL MEDICINE

## 2022-12-19 PROCEDURE — 3077F SYST BP >= 140 MM HG: CPT | Mod: CPTII,S$GLB,, | Performed by: INTERNAL MEDICINE

## 2022-12-19 PROCEDURE — 99214 OFFICE O/P EST MOD 30 MIN: CPT | Mod: S$GLB,,, | Performed by: INTERNAL MEDICINE

## 2022-12-19 PROCEDURE — 1160F PR REVIEW ALL MEDS BY PRESCRIBER/CLIN PHARMACIST DOCUMENTED: ICD-10-PCS | Mod: CPTII,S$GLB,, | Performed by: INTERNAL MEDICINE

## 2022-12-19 PROCEDURE — 3078F PR MOST RECENT DIASTOLIC BLOOD PRESSURE < 80 MM HG: ICD-10-PCS | Mod: CPTII,S$GLB,, | Performed by: INTERNAL MEDICINE

## 2022-12-19 PROCEDURE — 3077F PR MOST RECENT SYSTOLIC BLOOD PRESSURE >= 140 MM HG: ICD-10-PCS | Mod: CPTII,S$GLB,, | Performed by: INTERNAL MEDICINE

## 2022-12-19 NOTE — PROCEDURES
"Dear Provider,     You have ordered sleep LAB services to perform the sleep study for Rosmery Ramirez.  The sleep study that you ordered is complete.      Please find Sleep Study result in "Chart Review" under the "Media tab."      As the ordering provider, you are responsible for reviewing the results and implementing a treatment plan with your patient.    If you need a Sleep Medicine provider to explain the sleep study findings and arrange treatment for the patient, please refer patient for consultation to our Sleep Clinic via [facility] with Ambulatory Consult Sleep.    To do that please place an order for an  "Ambulatory Consult Sleep" - it will go to our clinic work queue for our Medical Assistant to contact the patient for an appointment.     For any questions, please contact our clinic staff at [phone] to talk to clinical staff.   "

## 2022-12-19 NOTE — PROGRESS NOTES
CARDIOVASCULAR CONSULTATION    REASON FOR CONSULT:   Rosmery Ramirez is a 77 y.o. female who presents for follow-up.      HISTORY OF PRESENT ILLNESS:     Patient is a pleasant 74-year-old lady here for follow-up.  Denies any chest pains at rest on exertion, orthopnea, PND.  Complains of paresthesias in bilateral feet and requesting referral to Neurology for that.  Blood pressure well controlled.  No other cardiovascular complaints.     Notes from December 2020:  Patient here for follow-up.  Complains of occasional dizziness.  Can occur once a week or less frequently than that.  No postural relationship.  Denies any chest pains at rest on exertion, orthopnea, PND.      Notes from December 2022: Patient here for follow-up after a long hiatus.  Occasionally gets short of breath when she lays down.  X-ray showed aortic atherosclerosis.  Has been referred to Cardiology to rule out significant ischemia    PAST MEDICAL HISTORY:     Past Medical History:   Diagnosis Date    Allergy     Anxiety     Breast cyst     Edema of leg 10/5/2012    bilateral     Fractures 2011    thumb R    GERD (gastroesophageal reflux disease)     Glaucoma     History of colonic polyps     Hx-TIA (transient ischemic attack) 8/13/2012 Jan 2012: LLE and left facial numbness lasting 1 hour     Hyperlipidemia     Hypertension     Left shoulder tendonitis 8/8/2015    Primary open-angle glaucoma, mild stage - Both Eyes 6/3/2013       PAST SURGICAL HISTORY:     Past Surgical History:   Procedure Laterality Date    BREAST BIOPSY Left     core    BREAST SURGERY Left     lumpectomy    CATARACT EXTRACTION W/  INTRAOCULAR LENS IMPLANT Left 10/05/2016    Dr. Mike    CATARACT EXTRACTION W/  INTRAOCULAR LENS IMPLANT Right 11/30/2016    With [facility] (Dr. Mike)    COLONOSCOPY N/A 1/23/2017    Procedure: COLONOSCOPY;  Surgeon: Hany Mosquera MD;  Location: [facility] ([address] [place] [address]);  Service: Endoscopy;  Laterality: N/A;    COLONOSCOPY N/A 3/28/2022     Procedure: COLONOSCOPY;  Surgeon: Jamison Chambers MD;  Location: [facility] ENDO (4TH FLR);  Service: Endoscopy;  Laterality: N/A;  fully vaccinated   instructions to portal-  3/22 arrival time confirmed with pt/COVID test cancelled-RB    HAND SURGERY Left 2011    thumb    HYSTERECTOMY  1980's    Total;    IMPLANTATION OF DEVICE FOR GLAUCOMA Right 3/17/2021    Procedure: INSERTION, DEVICE, FOR GLAUCOMA XEN GEL;  Surgeon: Yesica Mike MD;  Location: [facility] OR 1ST FLR;  Service: Ophthalmology;  Laterality: Right;    KAHOOK GONIOTOMY Right 11/30/2016    WITH CE ()    Left Breast Lumpectomy Left     performed in 1960s    OOPHORECTOMY      SELECTIVE LASER TRABECUPLASTY  05/2014    OU w/ Dr. Mike    TRABECULECTOMY Right 3/17/2021    Procedure: TRABECULECTOMY/XEN GEL WITH MMC;  Surgeon: Yesica Mike MD;  Location: [facility] OR 1ST FLR;  Service: Ophthalmology;  Laterality: Right;    TRABECULECTOMY Left 7/28/2021    Procedure: TRABECULECTOMY/ MMC and Xen OS;  Surgeon: Yesica Mike MD;  [facility];  Service: Ophthalmology;  Laterality: Left;    TUBAL LIGATION  1970's    VAGINAL DELIVERY      x3    YAG CAPSULOTOMY Left 11/29/2018           ALLERGIES AND MEDICATION:     Review of patient's allergies indicates:   Allergen Reactions    Lisinopril Swelling    Amlodipine Edema    Combigan [brimonidine-timolol]      Causes itchy eyelids and contact dermatitis    Cosopt [dorzolamide-timolol]      Causes angular blepharitis of eyelids    Pravastatin      Myalgia and elevated CPK        Medication List            Accurate as of December 19, 2022  1:14 PM. If you have any questions, ask your nurse or doctor.                CONTINUE taking these medications      ascorbic acid (vitamin C) 1000 MG tablet  Commonly known as: VITAMIN C     aspirin 81 MG EC tablet  Commonly known as: ECOTRIN     atorvastatin 10 MG tablet  Commonly known as: LIPITOR  TAKE 1 TABLET (10 MG TOTAL) BY  MOUTH EVERY OTHER DAY.     calcium-vitamin D3 500 mg-5 mcg (200 unit) per tablet  Commonly known as: OS-LETY 500 + D3     dorzolamide-timolol 2-0.5% 22.3-6.8 mg/mL ophthalmic solution  Commonly known as: COSOPT  Place 1 drop into both eyes 2 (two) times daily.     hydroCHLOROthiazide 12.5 mg capsule  Commonly known as: MICROZIDE  Take 1 capsule (12.5 mg total) by mouth once daily.     irbesartan 150 MG tablet  Commonly known as: AVAPRO  Take 1 tablet (150 mg total) by mouth every evening.     LUMIGAN 0.01 % Drop  Generic drug: bimatoprost  Place 1 drop into both eyes every evening.     multivitamin per tablet  Commonly known as: THERAGRAN     RHOPRESSA 0.02 % ophthalmic solution  Generic drug: netarsudiL     valACYclovir 1000 MG tablet  Commonly known as: VALTREX  Take 1 tablet (1,000 mg total) by mouth 3 (three) times daily. for 7 days              SOCIAL HISTORY:     Social History     Socioeconomic History    Marital status:     Number of children: 3   Occupational History    Occupation: retired - formerly pastoral care with [employer]   Tobacco Use    Smoking status: Never    Smokeless tobacco: Never   Substance and Sexual Activity    Alcohol use: Yes     Alcohol/week: 0.0 standard drinks     Comment: Rarely; 1 glass of wine    Drug use: No    Sexual activity: Yes     Partners: Female, Male     Birth control/protection: Post-menopausal, See Surgical Hx     Social Determinants of Health     Financial Resource Strain: Low Risk     Difficulty of Paying Living Expenses: Not hard at all   Food Insecurity: No Food Insecurity    Worried About Running Out of Food in the Last Year: Never true    Ran Out of Food in the Last Year: Never true   Transportation Needs: No Transportation Needs    Lack of Transportation (Medical): No    Lack of Transportation (Non-Medical): No   Physical Activity: Insufficiently Active    Days of Exercise per Week: 3 days    Minutes of Exercise per Session: 20 min   Stress: No Stress  Concern Present    Feeling of Stress : Not at all   Social Connections: Unknown    Frequency of Communication with Friends and Family: More than three times a week    Frequency of Social Gatherings with Friends and Family: Never    Active Member of Clubs or Organizations: Yes    Attends Club or Organization Meetings: More than 4 times per year    Marital Status:    Housing Stability: Low Risk     Unable to Pay for Housing in the Last Year: No    Number of Places Lived in the Last Year: 1    Unstable Housing in the Last Year: No       FAMILY HISTORY:     Family History   Problem Relation Age of Onset    Breast cancer Mother 50    Glaucoma Mother     Alzheimer's disease Mother     Blindness Mother     Cataracts Mother     Glaucoma Father     Prostate cancer Father     Blindness Father     Cataracts Father     Cancer Father 62        prostate    Glaucoma Sister     No Known Problems Brother     No Known Problems Brother     Alzheimer's disease Maternal Grandfather     Macular degeneration Neg Hx     Retinal detachment Neg Hx     Strabismus Neg Hx     Amblyopia Neg Hx     Heart attack Neg Hx     Heart disease Neg Hx     Ovarian cancer Neg Hx     Colon cancer Neg Hx        REVIEW OF SYSTEMS:   Review of Systems   Constitutional: Negative.    HENT: Negative.     Eyes: Negative.    Respiratory: Negative.     Cardiovascular: Negative.    Gastrointestinal: Negative.    Genitourinary: Negative.    Musculoskeletal: Negative.    Skin: Negative.    Neurological:  Positive for tingling.   Endo/Heme/Allergies: Negative.      A 10 point review of systems was performed and all the pertinent positives have been mentioned. Rest of review of systems was negative.        PHYSICAL EXAM:     Vitals:    12/19/22 1303   BP: (!) 185/79   Pulse: 61   Resp: 18    Body mass index is 32.74 kg/m².  Weight: 89.3 kg (196 lb 12.2 oz)         Physical Exam  Vitals and nursing note reviewed.   Constitutional:       Appearance: Normal  appearance. She is well-developed.   HENT:      Head: Normocephalic and atraumatic.      Right Ear: Hearing normal.      Left Ear: Hearing normal.      Nose: Nose normal.   Eyes:      General: Lids are normal.      Conjunctiva/sclera: Conjunctivae normal.      Pupils: Pupils are equal, round, and reactive to light.   Cardiovascular:      Rate and Rhythm: Normal rate and regular rhythm.      Pulses: Normal pulses.      Heart sounds: Normal heart sounds.   Pulmonary:      Effort: Pulmonary effort is normal.      Breath sounds: Normal breath sounds.   Abdominal:      Palpations: Abdomen is soft.      Tenderness: There is no abdominal tenderness.   Musculoskeletal:         General: No deformity.      Cervical back: Normal range of motion and neck supple.   Skin:     General: Skin is warm and dry.   Neurological:      Mental Status: She is alert and oriented to person, place, and time.   Psychiatric:         Speech: Speech normal.         DATA:     Laboratory:  CBC:  Recent Labs   Lab 08/18/20  1438 12/01/22  0908   WBC 5.22 5.49   Hemoglobin 12.3 12.3   Hematocrit 39.0 39.4   Platelets 327 286         CHEMISTRIES:  Recent Labs   Lab 08/18/20  1438 02/25/21  0955 03/10/22  0836 12/01/22  0908   Glucose 97 94 89 93   Sodium 141 140 141 141   Potassium 3.7 4.2 3.9 3.9   BUN 16 13 12 11   Creatinine 0.9 0.8 0.7 0.8   eGFR if African American >60.0 >60.0 >60.0  --    eGFR if non African American >60.0 >60.0 >60.0  --    Calcium 9.8 9.7 9.6 9.7         CARDIAC BIOMARKERS:          COAGS:        LIPIDS/LFTS:  Recent Labs   Lab 02/25/21  0955 03/10/22  0836 12/01/22  0908   Cholesterol 129 119 L 117 L   Triglycerides 97 78 78   HDL 44 45 43   LDL Cholesterol 65.6 58.4 L 58.4 L   Non-HDL Cholesterol 85 74 74   AST 19 16 20   ALT 19 17 27         Hemoglobin A1C   Date Value Ref Range Status   12/01/2022 5.9 (H) 4.0 - 5.6 % Final     Comment:     ADA Screening Guidelines:  5.7-6.4%  Consistent with prediabetes  >or=6.5%   Consistent with diabetes    High levels of fetal hemoglobin interfere with the HbA1C  assay. Heterozygous hemoglobin variants (HbS, HgC, etc)do  not significantly interfere with this assay.   However, presence of multiple variants may affect accuracy.     04/21/2022 6.5 (H) 4.0 - 5.6 % Final     Comment:     ADA Screening Guidelines:  5.7-6.4%  Consistent with prediabetes  >or=6.5%  Consistent with diabetes    High levels of fetal hemoglobin interfere with the HbA1C  assay. Heterozygous hemoglobin variants (HbS, HgC, etc)do  not significantly interfere with this assay.   However, presence of multiple variants may affect accuracy.     03/10/2022 6.0 (H) 4.0 - 5.6 % Final     Comment:     ADA Screening Guidelines:  5.7-6.4%  Consistent with prediabetes  >or=6.5%  Consistent with diabetes    High levels of fetal hemoglobin interfere with the HbA1C  assay. Heterozygous hemoglobin variants (HbS, HgC, etc)do  not significantly interfere with this assay.   However, presence of multiple variants may affect accuracy.         TSH  Recent Labs   Lab 03/10/22  0836 04/21/22  0939 12/01/22  0908   TSH 2.750 2.199 2.742         The ASCVD Risk score (Patricia DK, et al., 2019) failed to calculate for the following reasons:    The valid total cholesterol range is 130 to 320 mg/dL           Cardiovascular Testing:    Reviewed      ASSESSMENT AND PLAN     Patient Active Problem List   Diagnosis    Essential hypertension    Hyperlipidemia; statin myalgias; 6/28/2018 exercise stress echo neg for ischemia    POAG (primary open-angle glaucoma)    Class 2 severe obesity due to excess calories with serious comorbidity in adult, unspecified BMI    Cataract    Nuclear sclerosis    Left knee pain    Tubular adenoma of colon 3/28/22 colonoscopy 4 mm sigmoid TA    Bradycardia 6/2018 holter negative    Status post hysterectomy    Statin myopathy    Chronic bilateral low back pain without sciatica    Vitamin D deficiency    Abnormal glucose     Thyroid nodule on US; followed by endo repeat 6/2024    Idiopathic peripheral neuropathy normal B12, TSH; A1c pre-DM. hx of lory changed to lyrica    Acute pain of left knee    Weakness of both hips    Decreased independence with transfers    Impaired functional mobility, balance, gait, and endurance    Fatigue    Primary open angle glaucoma of right eye, mild stage    Primary open-angle glaucoma, left eye, moderate stage    Right foot pain    Thoracic aortic atherosclerosis incidental on CXR 12/2022    Orthopnea    CLARA (obstructive sleep apnea)    Snoring       Shortness of breath.  Aortic atherosclerosis on x-ray.  Check stress test and echocardiogram.    blood pressure elevated in clinic today.  But states that at home stays well controlled.  She is on [facility] digital hypertension monitoring program and states at home stays around 130 mmHg.    Follow-up after testing          Thank you very much for involving me in the care of your patient.  Please do not hesitate to contact me if there are any questions.      Julio Dye MD, FACC, [facility]  Interventional Cardiologist, [facility].           This note was dictated with the help of speech recognition software.  There might be un-intended errors and/or substitutions.

## 2022-12-22 ENCOUNTER — HOSPITAL ENCOUNTER (OUTPATIENT)
Dept: RADIOLOGY | Facility: HOSPITAL | Age: 77
Discharge: HOME | End: 2022-12-22
Attending: INTERNAL MEDICINE
Payer: MEDICARE

## 2022-12-22 ENCOUNTER — HOSPITAL ENCOUNTER (OUTPATIENT)
Dept: PULMONOLOGY | Facility: HOSPITAL | Age: 77
Discharge: HOME | End: 2022-12-22
Attending: INTERNAL MEDICINE
Payer: MEDICARE

## 2022-12-22 DIAGNOSIS — R06.00 DYSPNEA, UNSPECIFIED TYPE: ICD-10-CM

## 2022-12-22 PROCEDURE — 94729 DIFFUSING CAPACITY: CPT

## 2022-12-22 PROCEDURE — 94729 DIFFUSING CAPACITY: CPT | Mod: 26,,, | Performed by: INTERNAL MEDICINE

## 2022-12-22 PROCEDURE — 94618 PULMONARY STRESS TESTING: CPT

## 2022-12-22 PROCEDURE — 71250 CT CHEST WITHOUT CONTRAST: ICD-10-PCS | Mod: 26,,, | Performed by: RADIOLOGY

## 2022-12-22 PROCEDURE — 94726 PULM FUNCT TST PLETHYSMOGRAP: ICD-10-PCS | Mod: 26,,, | Performed by: INTERNAL MEDICINE

## 2022-12-22 PROCEDURE — 71250 CT THORAX DX C-: CPT | Mod: 26,,, | Performed by: RADIOLOGY

## 2022-12-22 PROCEDURE — 71250 CT THORAX DX C-: CPT | Mod: TC

## 2022-12-22 PROCEDURE — 94726 PLETHYSMOGRAPHY LUNG VOLUMES: CPT

## 2022-12-22 PROCEDURE — 94618 PULMONARY STRESS TESTING: ICD-10-PCS | Mod: 26,,, | Performed by: INTERNAL MEDICINE

## 2022-12-22 PROCEDURE — 94729 PR C02/MEMBANE DIFFUSE CAPACITY: ICD-10-PCS | Mod: 26,,, | Performed by: INTERNAL MEDICINE

## 2022-12-22 PROCEDURE — 94618 PULMONARY STRESS TESTING: CPT | Mod: 26,,, | Performed by: INTERNAL MEDICINE

## 2022-12-22 PROCEDURE — 94726 PLETHYSMOGRAPHY LUNG VOLUMES: CPT | Mod: 26,,, | Performed by: INTERNAL MEDICINE

## 2022-12-22 PROCEDURE — 94010 BREATHING CAPACITY TEST: CPT

## 2022-12-22 PROCEDURE — 94010 BREATHING CAPACITY TEST: CPT | Mod: 26,59,, | Performed by: INTERNAL MEDICINE

## 2022-12-22 PROCEDURE — 94010 BREATHING CAPACITY TEST: ICD-10-PCS | Mod: 26,59,, | Performed by: INTERNAL MEDICINE

## 2022-12-26 ENCOUNTER — PATIENT MESSAGE (OUTPATIENT)
Dept: PULMONOLOGY | Facility: CLINIC | Age: 77
End: 2022-12-26
Payer: MEDICARE

## 2022-12-27 ENCOUNTER — TELEPHONE (OUTPATIENT)
Dept: CARDIOLOGY | Facility: CLINIC | Age: 77
End: 2022-12-27
Payer: MEDICARE

## 2022-12-27 LAB
BRPFT: ABNORMAL
DLCO ADJ PRE: 15.92 ML/(MIN*MMHG) (ref 15.27–26.73)
DLCO SINGLE BREATH LLN: 15.27
DLCO SINGLE BREATH PRE REF: 73.1 %
DLCO SINGLE BREATH REF: 21
DLCOC SBVA LLN: 2.74
DLCOC SBVA PRE REF: 95.9 %
DLCOC SBVA REF: 4.12
DLCOC SINGLE BREATH LLN: 15.27
DLCOC SINGLE BREATH PRE REF: 75.8 %
DLCOC SINGLE BREATH REF: 21
DLCOVA LLN: 2.74
DLCOVA PRE REF: 92.5 %
DLCOVA PRE: 3.81 ML/(MIN*MMHG*L) (ref 2.74–5.5)
DLCOVA REF: 4.12
DLVAADJ PRE: 3.95 ML/(MIN*MMHG*L) (ref 2.74–5.5)
ERV LLN: -16449.43
ERV PRE REF: 44.4 %
ERV REF: 0.57
FEF 25 75 LLN: 0.52
FEF 25 75 PRE REF: 76.5 %
FEF 25 75 REF: 1.49
FEV1 FVC LLN: 64
FEV1 FVC PRE REF: 94.8 %
FEV1 FVC REF: 78
FEV1 LLN: 1.22
FEV1 PRE REF: 83.8 %
FEV1 REF: 1.8
FRCPLETH LLN: 1.95
FRCPLETH PREREF: 91 %
FRCPLETH REF: 2.77
FVC LLN: 1.61
FVC PRE REF: 87.6 %
FVC REF: 2.34
IVC PRE: 1.92 L (ref 1.61–3.08)
IVC SINGLE BREATH LLN: 1.61
IVC SINGLE BREATH PRE REF: 82.1 %
IVC SINGLE BREATH REF: 2.34
PEF LLN: 2.41
PEF PRE REF: 75.7 %
PEF REF: 4.47
PRE DLCO: 15.35 ML/(MIN*MMHG) (ref 15.27–26.73)
PRE ERV: 0.25 L (ref -16449.43–16450.57)
PRE FEF 25 75: 1.14 L/S (ref 0.52–2.47)
PRE FET 100: 7.62 SEC
PRE FEV1 FVC: 73.6 % (ref 63.93–91.42)
PRE FEV1: 1.51 L (ref 1.22–2.38)
PRE FRC PL: 2.52 L
PRE FVC: 2.05 L (ref 1.61–3.08)
PRE PEF: 3.39 L/S (ref 2.41–6.54)
PRE RV: 1.76 L (ref 1.63–2.78)
PRE TLC: 3.81 L (ref 4.11–6.09)
RAW LLN: 3.06
RAW PRE REF: 100.8 %
RAW PRE: 3.08 CMH2O*S/L (ref 3.06–3.06)
RAW REF: 3.06
RV LLN: 1.63
RV PRE REF: 80 %
RV REF: 2.2
RVTLC LLN: 35
RVTLC PRE REF: 103.1 %
RVTLC PRE: 46.17 % (ref 35.21–54.39)
RVTLC REF: 45
TLC LLN: 4.11
TLC PRE REF: 74.8 %
TLC REF: 5.1
VA PRE: 4.03 L (ref 4.95–4.95)
VA SINGLE BREATH LLN: 4.95
VA SINGLE BREATH PRE REF: 81.5 %
VA SINGLE BREATH REF: 4.95
VC LLN: 1.61
VC PRE REF: 87.6 %
VC PRE: 2.05 L (ref 1.61–3.08)
VC REF: 2.34
VTGRAWPRE: 2.98 L

## 2022-12-27 NOTE — PROCEDURES
Rosmery Ramirez is a 77 y.o.   female patient, who presents for a 6 minute walk test ordered by Dr Betts .  The diagnosis is  PARKS.  The patient's BMI is  kg/m2.32    Predicted distance (lower limit of normal) is  231 meters.      Test Results:    The test was completed .  The patient stopped 0  times for a total of 0 seconds.  The total time walked was 360 seconds.  During walking, the patient reported:  no issues .    12/27/2022---------Distance:   ( 419m)     O2 Sat % Supplemental Oxygen Heart Rate Blood Pressure Ml Scale   Pre-exercise  (Resting)  96 ra 71 171/82 1   During Exercise 94 ra 82 175/79 3   Post-exercise  (Recovery) 96 ra 69 174/78 1     Recovery Time:  35 seconds    Performing nurse/tech: JUAN DAVID Boone RTT       SUMMARY/INTERPRETATION:    Total distance walked: 419  meters  Predicted distance:  231 meters  Percentage predicted: 113 %    PREVIOUS STUDY:     Date:    Prior 6 minute walk:   meters  Percentage change:      The patient has not had a previous study.      CLINICAL INTERPRETATION:  Six minute walk distance is   (419m ) with very, very light dyspnea.  During exercise, there was no significant desaturation while breathing room air.

## 2023-01-03 ENCOUNTER — HOSPITAL ENCOUNTER (OUTPATIENT)
Dept: RADIOLOGY | Facility: HOSPITAL | Age: 78
Discharge: HOME | End: 2023-01-03
Attending: INTERNAL MEDICINE
Payer: MEDICARE

## 2023-01-03 ENCOUNTER — HOSPITAL ENCOUNTER (OUTPATIENT)
Dept: CARDIOLOGY | Facility: HOSPITAL | Age: 78
Discharge: HOME | End: 2023-01-03
Attending: INTERNAL MEDICINE
Payer: MEDICARE

## 2023-01-03 DIAGNOSIS — R06.02 SOB (SHORTNESS OF BREATH): ICD-10-CM

## 2023-01-05 ENCOUNTER — PATIENT MESSAGE (OUTPATIENT)
Dept: FAMILY MEDICINE | Facility: CLINIC | Age: 78
End: 2023-01-05
Payer: MEDICARE

## 2023-01-05 DIAGNOSIS — T23.269A PARTIAL THICKNESS BURN OF BACK OF HAND, UNSPECIFIED LATERALITY, INITIAL ENCOUNTER: Primary | ICD-10-CM

## 2023-01-05 RX ORDER — SILVER SULFADIAZINE 10 G/1000G
CREAM TOPICAL DAILY
Qty: 25 G | Refills: 0 | Status: SHIPPED | OUTPATIENT
Start: 2023-01-05 | End: 2023-01-12

## 2023-01-09 ENCOUNTER — HOSPITAL ENCOUNTER (OUTPATIENT)
Dept: CARDIOLOGY | Facility: HOSPITAL | Age: 78
Discharge: HOME | End: 2023-01-09
Attending: INTERNAL MEDICINE
Payer: MEDICARE

## 2023-01-09 ENCOUNTER — HOSPITAL ENCOUNTER (OUTPATIENT)
Dept: RADIOLOGY | Facility: HOSPITAL | Age: 78
Discharge: HOME | End: 2023-01-09
Attending: INTERNAL MEDICINE
Payer: MEDICARE

## 2023-01-09 LAB
AORTIC ROOT ANNULUS: 2.83 CM
AORTIC VALVE CUSP SEPERATION: 2.13 CM
ASCENDING AORTA: 3.11 CM
AV INDEX (PROSTH): 0.72
AV MEAN GRADIENT: 5 MMHG
AV PEAK GRADIENT: 9 MMHG
AV VALVE AREA: 2.09 CM2
AV VELOCITY RATIO: 0.68
CV ECHO LV RWT: 0.46 CM
CV STRESS BASE HR: 60 BPM
DIASTOLIC BLOOD PRESSURE: 68 MMHG
DOP CALC AO PEAK VEL: 1.53 M/S
DOP CALC AO VTI: 37 CM
DOP CALC LVOT AREA: 2.9 CM2
DOP CALC LVOT DIAMETER: 1.92 CM
DOP CALC LVOT PEAK VEL: 1.04 M/S
DOP CALC LVOT STROKE VOLUME: 77.27 CM3
DOP CALCLVOT PEAK VEL VTI: 26.7 CM
E WAVE DECELERATION TIME: 218.46 MSEC
E/A RATIO: 0.91
E/E' RATIO: 9.05 M/S
ECHO LV POSTERIOR WALL: 1.08 CM (ref 0.6–1.1)
EJECTION FRACTION: 55 %
FRACTIONAL SHORTENING: 31 % (ref 28–44)
INTERVENTRICULAR SEPTUM: 1.13 CM (ref 0.6–1.1)
IVC DIAMETER: 1.17 CM
LA MAJOR: 4.54 CM
LA MINOR: 4.25 CM
LA WIDTH: 3.9 CM
LEFT ATRIUM SIZE: 3.19 CM
LEFT ATRIUM VOLUME: 46.43 CM3
LEFT INTERNAL DIMENSION IN SYSTOLE: 3.21 CM (ref 2.1–4)
LEFT VENTRICLE DIASTOLIC VOLUME: 100.39 ML
LEFT VENTRICLE SYSTOLIC VOLUME: 41.32 ML
LEFT VENTRICULAR INTERNAL DIMENSION IN DIASTOLE: 4.66 CM (ref 3.5–6)
LEFT VENTRICULAR MASS: 186.18 G
LV LATERAL E/E' RATIO: 7.82 M/S
LV SEPTAL E/E' RATIO: 10.75 M/S
LVOT MG: 2.4 MMHG
LVOT MV: 0.72 CM/S
MV PEAK A VEL: 0.95 M/S
MV PEAK E VEL: 0.86 M/S
MV STENOSIS PRESSURE HALF TIME: 78.83 MS
MV VALVE AREA P 1/2 METHOD: 2.79 CM2
NUC REST EJECTION FRACTION: 71
OHS CV CPX 1 MINUTE RECOVERY HEART RATE: 121 BPM
OHS CV CPX 85 PERCENT MAX PREDICTED HEART RATE MALE: 118
OHS CV CPX ESTIMATED METS: 7
OHS CV CPX MAX PREDICTED HEART RATE: 139
OHS CV CPX PATIENT IS FEMALE: 1
OHS CV CPX PATIENT IS MALE: 0
OHS CV CPX PEAK DIASTOLIC BLOOD PRESSURE: 64 MMHG
OHS CV CPX PEAK HEAR RATE: 142 BPM
OHS CV CPX PEAK RATE PRESSURE PRODUCT: NORMAL
OHS CV CPX PEAK SYSTOLIC BLOOD PRESSURE: 211 MMHG
OHS CV CPX PERCENT MAX PREDICTED HEART RATE ACHIEVED: 102
OHS CV CPX RATE PRESSURE PRODUCT PRESENTING: 9900
PISA MRMAX VEL: 3.83 M/S
PISA TR MAX VEL: 2.23 M/S
PV PEAK VELOCITY: 0.88 CM/S
RA MAJOR: 3.41 CM
RA PRESSURE: 8 MMHG
RA WIDTH: 3.1 CM
RIGHT VENTRICULAR END-DIASTOLIC DIMENSION: 2.63 CM
SINUS: 3.01 CM
STJ: 2.53 CM
STRESS ECHO POST EXERCISE DUR MIN: 6 MINUTES
STRESS ECHO POST EXERCISE DUR SEC: 15 SECONDS
SYSTOLIC BLOOD PRESSURE: 165 MMHG
TDI LATERAL: 0.11 M/S
TDI SEPTAL: 0.08 M/S
TDI: 0.1 M/S
TR MAX PG: 20 MMHG
TRICUSPID ANNULAR PLANE SYSTOLIC EXCURSION: 2 CM
TV REST PULMONARY ARTERY PRESSURE: 28 MMHG

## 2023-01-09 PROCEDURE — 93016 NUCLEAR STRESS - CARDIOLOGY INTERPRETED (CUPID ONLY): ICD-10-PCS | Mod: HCNC,,, | Performed by: INTERNAL MEDICINE

## 2023-01-09 PROCEDURE — 78452 NUCLEAR STRESS - CARDIOLOGY INTERPRETED (CUPID ONLY): ICD-10-PCS | Mod: 26,HCNC,, | Performed by: INTERNAL MEDICINE

## 2023-01-09 PROCEDURE — 93306 TTE W/DOPPLER COMPLETE: CPT | Mod: HCNC

## 2023-01-09 PROCEDURE — 93016 CV STRESS TEST SUPVJ ONLY: CPT | Mod: HCNC,,, | Performed by: INTERNAL MEDICINE

## 2023-01-09 PROCEDURE — 93306 ECHO (CUPID ONLY): ICD-10-PCS | Mod: 26,HCNC,, | Performed by: INTERNAL MEDICINE

## 2023-01-09 PROCEDURE — 93306 TTE W/DOPPLER COMPLETE: CPT | Mod: 26,HCNC,, | Performed by: INTERNAL MEDICINE

## 2023-01-09 PROCEDURE — 93018 NUCLEAR STRESS - CARDIOLOGY INTERPRETED (CUPID ONLY): ICD-10-PCS | Mod: HCNC,,, | Performed by: INTERNAL MEDICINE

## 2023-01-09 PROCEDURE — A9502 TC99M TETROFOSMIN: HCPCS | Mod: HCNC

## 2023-01-09 PROCEDURE — 78452 HT MUSCLE IMAGE SPECT MULT: CPT | Mod: 26,HCNC,, | Performed by: INTERNAL MEDICINE

## 2023-01-09 PROCEDURE — 93018 CV STRESS TEST I&R ONLY: CPT | Mod: HCNC,,, | Performed by: INTERNAL MEDICINE

## 2023-01-09 PROCEDURE — 93017 CV STRESS TEST TRACING ONLY: CPT | Mod: HCNC

## 2023-01-09 PROCEDURE — 78452 HT MUSCLE IMAGE SPECT MULT: CPT | Mod: HCNC

## 2023-01-13 ENCOUNTER — OFFICE VISIT (OUTPATIENT)
Dept: CARDIOLOGY | Facility: CLINIC | Age: 78
End: 2023-01-13
Payer: MEDICARE

## 2023-01-13 VITALS
SYSTOLIC BLOOD PRESSURE: 154 MMHG | HEART RATE: 62 BPM | WEIGHT: 194.88 LBS | BODY MASS INDEX: 32.43 KG/M2 | OXYGEN SATURATION: 98 % | RESPIRATION RATE: 16 BRPM | DIASTOLIC BLOOD PRESSURE: 92 MMHG

## 2023-01-13 DIAGNOSIS — I70.0 THORACIC AORTIC ATHEROSCLEROSIS: ICD-10-CM

## 2023-01-13 DIAGNOSIS — E66.01 CLASS 2 SEVERE OBESITY DUE TO EXCESS CALORIES WITH SERIOUS COMORBIDITY IN ADULT, UNSPECIFIED BMI: ICD-10-CM

## 2023-01-13 DIAGNOSIS — E78.2 MIXED HYPERLIPIDEMIA: ICD-10-CM

## 2023-01-13 DIAGNOSIS — G72.0 STATIN MYOPATHY: ICD-10-CM

## 2023-01-13 DIAGNOSIS — I10 ESSENTIAL HYPERTENSION: Primary | ICD-10-CM

## 2023-01-13 DIAGNOSIS — T46.6X5A STATIN MYOPATHY: ICD-10-CM

## 2023-01-13 DIAGNOSIS — H25.10 NUCLEAR SCLEROSIS, UNSPECIFIED LATERALITY: ICD-10-CM

## 2023-01-13 DIAGNOSIS — R53.83 FATIGUE, UNSPECIFIED TYPE: ICD-10-CM

## 2023-01-13 DIAGNOSIS — R00.1 BRADYCARDIA: ICD-10-CM

## 2023-01-13 DIAGNOSIS — G47.33 OSA (OBSTRUCTIVE SLEEP APNEA): ICD-10-CM

## 2023-01-13 DIAGNOSIS — Z90.710 STATUS POST HYSTERECTOMY: ICD-10-CM

## 2023-01-13 DIAGNOSIS — E55.9 VITAMIN D DEFICIENCY: ICD-10-CM

## 2023-01-13 PROCEDURE — 1126F PR PAIN SEVERITY QUANTIFIED, NO PAIN PRESENT: ICD-10-PCS | Mod: HCNC,CPTII,S$GLB, | Performed by: INTERNAL MEDICINE

## 2023-01-13 PROCEDURE — 99214 PR OFFICE/OUTPT VISIT, EST, LEVL IV, 30-39 MIN: ICD-10-PCS | Mod: HCNC,S$GLB,, | Performed by: INTERNAL MEDICINE

## 2023-01-13 PROCEDURE — 3288F FALL RISK ASSESSMENT DOCD: CPT | Mod: HCNC,CPTII,S$GLB, | Performed by: INTERNAL MEDICINE

## 2023-01-13 PROCEDURE — 1159F MED LIST DOCD IN RCRD: CPT | Mod: HCNC,CPTII,S$GLB, | Performed by: INTERNAL MEDICINE

## 2023-01-13 PROCEDURE — 1160F RVW MEDS BY RX/DR IN RCRD: CPT | Mod: HCNC,CPTII,S$GLB, | Performed by: INTERNAL MEDICINE

## 2023-01-13 PROCEDURE — 1126F AMNT PAIN NOTED NONE PRSNT: CPT | Mod: HCNC,CPTII,S$GLB, | Performed by: INTERNAL MEDICINE

## 2023-01-13 PROCEDURE — 1159F PR MEDICATION LIST DOCUMENTED IN MEDICAL RECORD: ICD-10-PCS | Mod: HCNC,CPTII,S$GLB, | Performed by: INTERNAL MEDICINE

## 2023-01-13 PROCEDURE — 99999 PR PBB SHADOW E&M-EST. PATIENT-LVL IV: CPT | Mod: PBBFAC,HCNC,, | Performed by: INTERNAL MEDICINE

## 2023-01-13 PROCEDURE — 99999 PR PBB SHADOW E&M-EST. PATIENT-LVL IV: ICD-10-PCS | Mod: PBBFAC,HCNC,, | Performed by: INTERNAL MEDICINE

## 2023-01-13 PROCEDURE — 3077F PR MOST RECENT SYSTOLIC BLOOD PRESSURE >= 140 MM HG: ICD-10-PCS | Mod: HCNC,CPTII,S$GLB, | Performed by: INTERNAL MEDICINE

## 2023-01-13 PROCEDURE — 1101F PT FALLS ASSESS-DOCD LE1/YR: CPT | Mod: HCNC,CPTII,S$GLB, | Performed by: INTERNAL MEDICINE

## 2023-01-13 PROCEDURE — 1101F PR PT FALLS ASSESS DOC 0-1 FALLS W/OUT INJ PAST YR: ICD-10-PCS | Mod: HCNC,CPTII,S$GLB, | Performed by: INTERNAL MEDICINE

## 2023-01-13 PROCEDURE — 3080F DIAST BP >= 90 MM HG: CPT | Mod: HCNC,CPTII,S$GLB, | Performed by: INTERNAL MEDICINE

## 2023-01-13 PROCEDURE — 3077F SYST BP >= 140 MM HG: CPT | Mod: HCNC,CPTII,S$GLB, | Performed by: INTERNAL MEDICINE

## 2023-01-13 PROCEDURE — 3288F PR FALLS RISK ASSESSMENT DOCUMENTED: ICD-10-PCS | Mod: HCNC,CPTII,S$GLB, | Performed by: INTERNAL MEDICINE

## 2023-01-13 PROCEDURE — 1160F PR REVIEW ALL MEDS BY PRESCRIBER/CLIN PHARMACIST DOCUMENTED: ICD-10-PCS | Mod: HCNC,CPTII,S$GLB, | Performed by: INTERNAL MEDICINE

## 2023-01-13 PROCEDURE — 3080F PR MOST RECENT DIASTOLIC BLOOD PRESSURE >= 90 MM HG: ICD-10-PCS | Mod: HCNC,CPTII,S$GLB, | Performed by: INTERNAL MEDICINE

## 2023-01-13 PROCEDURE — 99214 OFFICE O/P EST MOD 30 MIN: CPT | Mod: HCNC,S$GLB,, | Performed by: INTERNAL MEDICINE

## 2023-01-13 NOTE — PROGRESS NOTES
CARDIOVASCULAR CONSULTATION    REASON FOR CONSULT:   Rosmery aRmirez is a 77 y.o. female who presents for follow-up.      HISTORY OF PRESENT ILLNESS:     Patient is a pleasant 74-year-old lady here for follow-up.  Denies any chest pains at rest on exertion, orthopnea, PND.  Complains of paresthesias in bilateral feet and requesting referral to Neurology for that.  Blood pressure well controlled.  No other cardiovascular complaints.     Notes from December 2020:  Patient here for follow-up.  Complains of occasional dizziness.  Can occur once a week or less frequently than that.  No postural relationship.  Denies any chest pains at rest on exertion, orthopnea, PND.      Notes from December 2022: Patient here for follow-up after a long hiatus.  Occasionally gets short of breath when she lays down.  X-ray showed aortic atherosclerosis.  Has been referred to Cardiology to rule out significant ischemia      Jan 23:  Patient here for follow-up.  Patient here for follow-up after testing.  Stress test did not show any significant ischemia    The estimated ejection fraction is 55%.  The left ventricle is normal in size with mild concentric hypertrophy and normal systolic function.  Grade I left ventricular diastolic dysfunction.  Normal right ventricular size with normal right ventricular systolic function.  Mild to moderate left atrial enlargement.  Mild mitral regurgitation.  Mild tricuspid regurgitation.  Intermediate central venous pressure (8 mmHg).  The estimated PA systolic pressure is 28 mmHg.         Normal myocardial perfusion scan. There is no evidence of myocardial ischemia or infarction.    There is a mild to moderate intensity perfusion abnormality in the apical wall of the left ventricle, secondary to breast attenuation.    The gated perfusion images showed an ejection fraction of 71% at rest.    There is normal wall motion at rest and post stress.    The ECG portion of the study is positive for  ischemia. Specificity is reduced secondary to hypertensive response.    The patient reported no chest pain during the stress test.    There were no arrhythmias during stress.    The patient exercised for 6 minutes 15 seconds on a Jorge protocol, corresponding to a functional capacity of 7 METS, achieving a peak heart rate of 142 bpm, which is 102 % of the age predicted maximum heart rate.      PAST MEDICAL HISTORY:     Past Medical History:   Diagnosis Date    Allergy     Anxiety     Breast cyst     Edema of leg 10/5/2012    bilateral     Fractures 2011    thumb R    GERD (gastroesophageal reflux disease)     Glaucoma     History of colonic polyps     Hx-TIA (transient ischemic attack) 8/13/2012 Jan 2012: LLE and left facial numbness lasting 1 hour     Hyperlipidemia     Hypertension     Left shoulder tendonitis 8/8/2015    Primary open-angle glaucoma, mild stage - Both Eyes 6/3/2013       PAST SURGICAL HISTORY:     Past Surgical History:   Procedure Laterality Date    BREAST BIOPSY Left     core    BREAST SURGERY Left     lumpectomy    CATARACT EXTRACTION W/  INTRAOCULAR LENS IMPLANT Left 10/05/2016    Dr. Mike    CATARACT EXTRACTION W/  INTRAOCULAR LENS IMPLANT Right 11/30/2016    With [place] (Dr. Mike)    COLONOSCOPY N/A 1/23/2017    Procedure: COLONOSCOPY;  Surgeon: Hany Mosquera MD;  Location: [facility] (4TH FLR);  Service: Endoscopy;  Laterality: N/A;    COLONOSCOPY N/A 3/28/2022    Procedure: COLONOSCOPY;  Surgeon: Jamison Chambers MD;  Location: [facility] (4TH FLR);  Service: Endoscopy;  Laterality: N/A;  fully vaccinated   instructions to portal-st  3/22 arrival time confirmed with pt/COVID test cancelled-RB    HAND SURGERY Left 2011    thumb    HYSTERECTOMY  1980's    Total;    IMPLANTATION OF DEVICE FOR GLAUCOMA Right 3/17/2021    Procedure: INSERTION, DEVICE, FOR GLAUCOMA XEN GEL;  Surgeon: Yesica Mike MD;  Location: [address];  Service: Ophthalmology;  Laterality: Right;     KRISHNA GONIOTOMY Right 11/30/2016    WITH CE ()    Left Breast Lumpectomy Left     performed in 1960s    OOPHORECTOMY      SELECTIVE LASER TRABECUPLASTY  05/2014    OU w/ Dr. Mike    TRABECULECTOMY Right 3/17/2021    Procedure: TRABECULECTOMY/XEN GEL WITH MMC;  Surgeon: Yesica Mike MD;  [facility];  Service: Ophthalmology;  Laterality: Right;    TRABECULECTOMY Left 7/28/2021    Procedure: TRABECULECTOMY/ MMC and Xen OS;  Surgeon: Yesica Mike MD;  Location: [facility] OR 1ST FLR;  Service: Ophthalmology;  Laterality: Left;    TUBAL LIGATION  1970's    VAGINAL DELIVERY      x3    YAG CAPSULOTOMY Left 11/29/2018           ALLERGIES AND MEDICATION:     Review of patient's allergies indicates:   Allergen Reactions    Lisinopril Swelling    Amlodipine Edema    Combigan [brimonidine-timolol]      Causes itchy eyelids and contact dermatitis    Cosopt [dorzolamide-timolol]      Causes angular blepharitis of eyelids    Pravastatin      Myalgia and elevated CPK        Medication List            Accurate as of January 13, 2023  1:24 PM. If you have any questions, ask your nurse or doctor.                CONTINUE taking these medications      ascorbic acid (vitamin C) 1000 MG tablet  Commonly known as: VITAMIN C     aspirin 81 MG EC tablet  Commonly known as: ECOTRIN     atorvastatin 10 MG tablet  Commonly known as: LIPITOR  TAKE 1 TABLET (10 MG TOTAL) BY MOUTH EVERY OTHER DAY.     calcium-vitamin D3 500 mg-5 mcg (200 unit) per tablet  Commonly known as: OS-LETY 500 + D3     dorzolamide-timolol 2-0.5% 22.3-6.8 mg/mL ophthalmic solution  Commonly known as: COSOPT  Place 1 drop into both eyes 2 (two) times daily.     hydroCHLOROthiazide 12.5 mg capsule  Commonly known as: MICROZIDE  TAKE 1 CAPSULE EVERY DAY     irbesartan 150 MG tablet  Commonly known as: AVAPRO  Take 1 tablet (150 mg total) by mouth every evening.     LUMIGAN 0.01 % Drop  Generic drug: bimatoprost  Place 1  drop into both eyes every evening.     multivitamin per tablet  Commonly known as: THERAGRAN     RHOPRESSA 0.02 % ophthalmic solution  Generic drug: netarsudiL     valACYclovir 1000 MG tablet  Commonly known as: VALTREX  Take 1 tablet (1,000 mg total) by mouth 3 (three) times daily. for 7 days              SOCIAL HISTORY:     Social History     Socioeconomic History    Marital status:     Number of children: 3   Occupational History    Occupation: retired - formerly pastoral care with [employer]   Tobacco Use    Smoking status: Never    Smokeless tobacco: Never   Substance and Sexual Activity    Alcohol use: Yes     Alcohol/week: 0.0 standard drinks     Comment: Rarely; 1 glass of wine    Drug use: No    Sexual activity: Yes     Partners: Female, Male     Birth control/protection: Post-menopausal, See Surgical Hx     Social Determinants of Health     Financial Resource Strain: Low Risk     Difficulty of Paying Living Expenses: Not very hard   Food Insecurity: No Food Insecurity    Worried About Running Out of Food in the Last Year: Never true    Ran Out of Food in the Last Year: Never true   Transportation Needs: No Transportation Needs    Lack of Transportation (Medical): No    Lack of Transportation (Non-Medical): No   Physical Activity: Insufficiently Active    Days of Exercise per Week: 3 days    Minutes of Exercise per Session: 20 min   Stress: No Stress Concern Present    Feeling of Stress : Not at all   Social Connections: Unknown    Frequency of Communication with Friends and Family: Three times a week    Frequency of Social Gatherings with Friends and Family: Once a week    Active Member of Clubs or Organizations: Yes    Attends Club or Organization Meetings: 1 to 4 times per year    Marital Status:    Housing Stability: Low Risk     Unable to Pay for Housing in the Last Year: No    Number of Places Lived in the Last Year: 1    Unstable Housing in the Last Year: No       FAMILY HISTORY:      Family History   Problem Relation Age of Onset    Breast cancer Mother 50    Glaucoma Mother     Alzheimer's disease Mother     Blindness Mother     Cataracts Mother     Glaucoma Father     Prostate cancer Father     Blindness Father     Cataracts Father     Cancer Father 62        prostate    Glaucoma Sister     No Known Problems Brother     No Known Problems Brother     Alzheimer's disease Maternal Grandfather     Macular degeneration Neg Hx     Retinal detachment Neg Hx     Strabismus Neg Hx     Amblyopia Neg Hx     Heart attack Neg Hx     Heart disease Neg Hx     Ovarian cancer Neg Hx     Colon cancer Neg Hx        REVIEW OF SYSTEMS:   Review of Systems   Constitutional: Negative.    HENT: Negative.     Eyes: Negative.    Respiratory: Negative.     Cardiovascular: Negative.    Gastrointestinal: Negative.    Genitourinary: Negative.    Musculoskeletal: Negative.    Skin: Negative.    Neurological:  Positive for tingling.   Endo/Heme/Allergies: Negative.      A 10 point review of systems was performed and all the pertinent positives have been mentioned. Rest of review of systems was negative.        PHYSICAL EXAM:     Vitals:    01/13/23 1309   BP: (!) 154/92   Pulse: 62   Resp: 16    Body mass index is 32.43 kg/m².  Weight: 88.4 kg (194 lb 14.2 oz)         Physical Exam  Vitals and nursing note reviewed.   Constitutional:       Appearance: Normal appearance. She is well-developed.   HENT:      Head: Normocephalic and atraumatic.      Right Ear: Hearing normal.      Left Ear: Hearing normal.      Nose: Nose normal.   Eyes:      General: Lids are normal.      Conjunctiva/sclera: Conjunctivae normal.      Pupils: Pupils are equal, round, and reactive to light.   Cardiovascular:      Rate and Rhythm: Normal rate and regular rhythm.      Pulses: Normal pulses.      Heart sounds: Normal heart sounds.   Pulmonary:      Effort: Pulmonary effort is normal.      Breath sounds: Normal breath sounds.   Abdominal:       Palpations: Abdomen is soft.      Tenderness: There is no abdominal tenderness.   Musculoskeletal:         General: No deformity.      Cervical back: Normal range of motion and neck supple.   Skin:     General: Skin is warm and dry.   Neurological:      Mental Status: She is alert and oriented to person, place, and time.   Psychiatric:         Speech: Speech normal.         DATA:     Laboratory:  CBC:  Recent Labs   Lab 08/18/20  1438 12/01/22  0908   WBC 5.22 5.49   Hemoglobin 12.3 12.3   Hematocrit 39.0 39.4   Platelets 327 286         CHEMISTRIES:  Recent Labs   Lab 08/18/20  1438 02/25/21  0955 03/10/22  0836 12/01/22  0908   Glucose 97 94 89 93   Sodium 141 140 141 141   Potassium 3.7 4.2 3.9 3.9   BUN 16 13 12 11   Creatinine 0.9 0.8 0.7 0.8   eGFR if African American >60.0 >60.0 >60.0  --    eGFR if non African American >60.0 >60.0 >60.0  --    Calcium 9.8 9.7 9.6 9.7         CARDIAC BIOMARKERS:          COAGS:        LIPIDS/LFTS:  Recent Labs   Lab 02/25/21  0955 03/10/22  0836 12/01/22  0908   Cholesterol 129 119 L 117 L   Triglycerides 97 78 78   HDL 44 45 43   LDL Cholesterol 65.6 58.4 L 58.4 L   Non-HDL Cholesterol 85 74 74   AST 19 16 20   ALT 19 17 27         Hemoglobin A1C   Date Value Ref Range Status   12/01/2022 5.9 (H) 4.0 - 5.6 % Final     Comment:     ADA Screening Guidelines:  5.7-6.4%  Consistent with prediabetes  >or=6.5%  Consistent with diabetes    High levels of fetal hemoglobin interfere with the HbA1C  assay. Heterozygous hemoglobin variants (HbS, HgC, etc)do  not significantly interfere with this assay.   However, presence of multiple variants may affect accuracy.     04/21/2022 6.5 (H) 4.0 - 5.6 % Final     Comment:     ADA Screening Guidelines:  5.7-6.4%  Consistent with prediabetes  >or=6.5%  Consistent with diabetes    High levels of fetal hemoglobin interfere with the HbA1C  assay. Heterozygous hemoglobin variants (HbS, HgC, etc)do  not significantly interfere with this assay.    However, presence of multiple variants may affect accuracy.     03/10/2022 6.0 (H) 4.0 - 5.6 % Final     Comment:     ADA Screening Guidelines:  5.7-6.4%  Consistent with prediabetes  >or=6.5%  Consistent with diabetes    High levels of fetal hemoglobin interfere with the HbA1C  assay. Heterozygous hemoglobin variants (HbS, HgC, etc)do  not significantly interfere with this assay.   However, presence of multiple variants may affect accuracy.         TSH  Recent Labs   Lab 03/10/22  0836 04/21/22  0939 12/01/22  0908   TSH 2.750 2.199 2.742         The ASCVD Risk score (Patricia BAUMAN, et al., 2019) failed to calculate for the following reasons:    The valid total cholesterol range is 130 to 320 mg/dL           Cardiovascular Testing:    Reviewed      ASSESSMENT AND PLAN     Patient Active Problem List   Diagnosis    Essential hypertension    Hyperlipidemia; statin myalgias; 6/28/2018 exercise stress echo neg for ischemia    POAG (primary open-angle glaucoma)    Class 2 severe obesity due to excess calories with serious comorbidity in adult, unspecified BMI    Cataract    Nuclear sclerosis    Left knee pain    Tubular adenoma of colon 3/28/22 colonoscopy 4 mm sigmoid TA    Bradycardia 6/2018 holter negative    Status post hysterectomy    Statin myopathy    Chronic bilateral low back pain without sciatica    Vitamin D deficiency    Abnormal glucose    Thyroid nodule on US; followed by endo repeat 6/2024    Idiopathic peripheral neuropathy normal B12, TSH; A1c pre-DM. hx of lory changed to lyrica    Acute pain of left knee    Weakness of both hips    Decreased independence with transfers    Impaired functional mobility, balance, gait, and endurance    Fatigue    Primary open angle glaucoma of right eye, mild stage    Primary open-angle glaucoma, left eye, moderate stage    Right foot pain    Thoracic aortic atherosclerosis incidental on CXR 12/2022    Orthopnea    CLARA (obstructive sleep apnea)    Snoring       Shortness  of breath.  Aortic atherosclerosis on x-ray.  Echo showed normal left ventricle systolic function.  Stress test did not show any significant ischemia    blood pressure elevated in clinic today.  But states that at home stays well controlled.  She is on [facility] digital hypertension monitoring program and states at home stays around 130 mmHg.    Follow-up after 6 m          Thank you very much for involving me in the care of your patient.  Please do not hesitate to contact me if there are any questions.      Julio Dye MD, FACC, [facility]  Interventional Cardiologist, [facility].           This note was dictated with the help of speech recognition software.  There might be un-intended errors and/or substitutions.

## 2023-01-17 ENCOUNTER — PATIENT MESSAGE (OUTPATIENT)
Dept: FAMILY MEDICINE | Facility: CLINIC | Age: 78
End: 2023-01-17
Payer: MEDICARE

## 2023-01-18 NOTE — TELEPHONE ENCOUNTER
Ok to make an appointment sooner to discuss -  sometime in the next 2 weeks. Ok to use frozen slot

## 2023-01-25 NOTE — PROGRESS NOTES
This note was created by combination of typed  and M-Modal dictation.  Transcription errors may be present.  If there are any questions, please contact me.    Assessment and Plan:   Post-nasal drip  -finds the postnasal drip annoying.  Avoid nasal steroids with a history of glaucoma.  Trial of Astelin.  -     azelastine (ASTELIN) 137 mcg (0.1 %) nasal spray; 1 spray (137 mcg total) by Nasal route 2 (two) times daily.  Dispense: 30 mL; Refill: 2    Gastroesophageal reflux disease, unspecified whether esophagitis present  -never EGD.  History of GERD brief.  It has been going for the past 2 weeks or so now.  She lays in bed around 6 in the evening to watch TV and gets up to eat and then goes right back to horizontal.  Advised to stay upright for 2-3 hours after eating.  Mylanta has not really been helping, trial of Pepcid.  I am hopeful that food hygiene modification will help.  -     famotidine (PEPCID) 20 MG tablet; Take 1 tablet (20 mg total) by mouth 2 (two) times daily.  Dispense: 60 tablet; Refill: 2    Pancreatic atrophy  -incidental finding on CT imaging.  With the exception of the above, no chronic digestive issues.  Hold off on any testing, as she does not exhibit any functional deficits    Thoracic aortic atherosclerosis incidental on CXR 12/2022  Mixed hyperlipidemia  Pure hypercholesterolemia  -plan for labs next December/January on statin  -     CBC Auto Differential; Future; Expected date: 01/26/2024  -     Comprehensive Metabolic Panel; Future; Expected date: 01/26/2024  -     Lipid Panel; Future; Expected date: 01/26/2024  -     atorvastatin (LIPITOR) 10 MG tablet; TAKE 1 TABLET (10 MG TOTAL) BY MOUTH EVERY OTHER DAY.  Dispense: 45 tablet; Refill: 3    CLARA (obstructive sleep apnea)    Thyroid nodule on US; followed by endo repeat 6/2024  -due for repeat the summer.  Followed by Endocrinology.    Abnormal glucose  Class 2 severe obesity due to excess calories with serious comorbidity in  adult, unspecified BMI  Lab Results   Component Value Date    HGBA1C 5.9 (H) 2022   -stable. Diet controlled      Idiopathic peripheral neuropathy normal B12, TSH; A1c pre-DM. hx of lory changed to lyrica  -recently started on Lyrica 75 at night.  Makes her feel dizzy in the morning.  Give it some time to get in her system but if still symptoms I have asked her to reach out to Neurology for possible decreased dose    Essential hypertension  -monitored by digital monitoring.  Blood pressure today okay.  Borderline readings at home.  Stay on HCTZ and irbesartan  -     irbesartan (AVAPRO) 150 MG tablet; Take 1 tablet (150 mg total) by mouth every evening.  Dispense: 90 tablet; Refill: 3            Medications Discontinued During This Encounter   Medication Reason    atorvastatin (LIPITOR) 10 MG tablet Reorder    irbesartan (AVAPRO) 150 MG tablet Reorder       meds sent this encounter:  Medications Ordered This Encounter   Medications    atorvastatin (LIPITOR) 10 MG tablet     Sig: TAKE 1 TABLET (10 MG TOTAL) BY MOUTH EVERY OTHER DAY.     Dispense:  45 tablet     Refill:  3    azelastine (ASTELIN) 137 mcg (0.1 %) nasal spray     Si spray (137 mcg total) by Nasal route 2 (two) times daily.     Dispense:  30 mL     Refill:  2    famotidine (PEPCID) 20 MG tablet     Sig: Take 1 tablet (20 mg total) by mouth 2 (two) times daily.     Dispense:  60 tablet     Refill:  2    irbesartan (AVAPRO) 150 MG tablet     Sig: Take 1 tablet (150 mg total) by mouth every evening.     Dispense:  90 tablet     Refill:  3     .       Follow Up: No follow-ups on file. PEx 1 year with labs    Subjective:     Chief Complaint   Patient presents with    Gastroesophageal Reflux    Nasal Congestion       HPI  Rosmery is a 77 y.o. female.    Social History     Tobacco Use    Smoking status: Never    Smokeless tobacco: Never   Substance Use Topics    Alcohol use: Yes     Alcohol/week: 0.0 standard drinks     Comment: Rarely; 1 glass of  wine      Social History     Occupational History    Occupation: retired - formerly pastoral care with [employer]      Social History     Social History Narrative    Not on file       No LMP recorded. Patient has had a hysterectomy.    Last appointment with this clinic was 11/30/2022. Last visit with me 11/30/2022   To summarize last visit and events leading up to today:  Dizziness, lightheadedness and fatigue.  Normal exam.  Had not tried Lyrica so not due to Lyrica.  Plan was if workup negative may have her see ENT.    Snoring, fatigue, suspicious for sleep apnea.  Ordered home study  Idiopathic peripheral neuropathy, previously followed by Neurology.  Not taking any medications.  Previously prescribed Lyrica not taking at the time.  She was wary of Lyrica because of its listed side effect profile.    07/07/2022 EMG showing sensory motor peripheral neuropathy.  Chronic denervation in the L4-S1 myotomes on the left.  7/22/22 MRI L spine  Mild multilevel degenerative change throughout the lower thoracic and lumbar spine.  No significant spinal canal stenosis but there is scattered lateral recess and neural foraminal encroachment   Abnormal glucose check labs   Hyperlipidemia/statin  Hypertension stable  Vitamin-D deficiency.  She stopped calcium and vitamin-D to see if it was causing her dizziness.  It was not.  Restart.  Thyroid nodule, updated thyroid ultrasound negative 06/2022.  Repeat thyroid ultrasound 2 years.  Followed by Endocrinology.  4/6/22 DEXA L-spine 1.2, total hip 0.7, femoral neck-0.8.  FRAX score 2.4/6.4%.    12/01/2022 A1c 5.9 stable better compared to previous  Lipid was good on statin every other day    Saw pulmonology in consult.    12/22/2022 CT chest showing mild right basilar atelectasis.  Incidental mild pancreatic atrophy.  12/27/2022 PFTs Spirometry is normal. Lung volumes show mild restriction is present. DLCO is mildly reduced.   12/27/2022 6 minute walk test negative    12/15/22  HST with CLARA AHI 9    Saw cardiology the dyspnea.    01/09/2023 TTE LVEF 55%.  LV normal size with mild concentric hypertrophy and normal systolic function.  Grade 1 diastolic dysfunction.  01/09/2023 nuclear medicine stress test negative for ischemia.    Today's visit:  2 weeks - sensation of phlegm in the throat. Hard to bring it up  Wakes up in the morning with sensation indigestion not a burning not a pain but feels like something in the chest/globus sensation    The lung CT scan showed incidental pancreatic atrophy. Up until 2 weeks ago no digestive complaints - no bloating, cramping, undigested food, diarrhea    Hx of reflux on and off. Not recently. That spontaneously resolved.  Never EGD. OTC mylanta at that time.    Took mylanta this time without relief.      Some sniffling/nasal congestion  Feels like an hour later feels like food int he throat/chest  No problems with swallowing   Last meal of the day  Gets in bed around 6 and lays to watch TV  And may get up to eat  So immediately back to lying down to watch TV after eating.     Nasal steroid contraindicated with glaucoma    Restarted the lyrica in the past week. It's modestly improved so far.  The socks don't bother her as bad. But dizziness in the AM. Written for 75 bid but taking once at night at this time.       Last 5 Patient Entered Readings                Current 30 Day Average: 136/67  Recent Readings 1/24/2023 1/24/2023 1/24/2023 1/14/2023 1/13/2023   SBP (mmHg) 138 139 146 133 133   DBP (mmHg) 79 70 68 69 64   Pulse 61 52 53 63 72                  Answers submitted by the patient for this visit:  Review of Systems Questionnaire (Submitted on 1/25/2023)  activity change: No  unexpected weight change: No  neck pain: No  hearing loss: No  rhinorrhea: No  trouble swallowing: No  eye discharge: No  visual disturbance: No  chest tightness: No  wheezing: No  chest pain: No  palpitations: No  blood in stool: No  constipation: No  vomiting: No  diarrhea:  No  polydipsia: No  polyuria: No  difficulty urinating: No  hematuria: No  menstrual problem: No  dysuria: No  joint swelling: No  arthralgias: No  headaches: Yes  weakness: No  confusion: No  dysphoric mood: No    Patient Care Team:  Ancelmo Sumner MD as PCP - General (Internal Medicine)  Brooke Delong MD as Obstetrician (Obstetrics)  Arnoldo Valentin MD as Consulting Physician (Otolaryngology)  Tim Freeman MA as Care Coordinator  Yesica Mike MD as Consulting Physician (Ophthalmology)  Brandan Nava MD as Consulting Physician (Orthopedic Surgery)  Jagjit Feldman MD as Consulting Physician (Endocrinology)  Ailyn Dubois as Digital Medicine Health   Beba Ahmadi PharmD as Hypertension Digital Medicine Clinician (Pharmacist)  Ancelmo Sumner MD as Hypertension Digital Medicine Responsible Provider (Internal Medicine)  [employer] as Hypertension Digital Medicine Contract  Ancelmo Sumner MD as Consulting Physician (Internal Medicine)    Patient Active Problem List    Diagnosis Date Noted    Snoring 12/16/2022    Orthopnea 12/13/2022    CLARA (obstructive sleep apnea) 12/13/2022     12/15/22 HST with CLARA AHI 9      Thoracic aortic atherosclerosis incidental on CXR 12/2022 12/01/2022    Right foot pain 05/24/2022    Primary open-angle glaucoma, left eye, moderate stage 07/28/2021    Primary open angle glaucoma of right eye, mild stage 03/17/2021    Fatigue 01/24/2020    Acute pain of left knee 06/18/2019    Weakness of both hips 06/18/2019    Decreased independence with transfers 06/18/2019    Impaired functional mobility, balance, gait, and endurance 06/18/2019    Idiopathic peripheral neuropathy normal B12, TSH; A1c pre-DM. hx of lory changed to lyrica 06/03/2019 07/07/2022 EMG showing sensory motor peripheral neuropathy.  Chronic denervation in the L4-S1 myotomes on the left.  7/22/22 MRI L spine  Mild multilevel degenerative change throughout the lower  thoracic and lumbar spine.  No significant spinal canal stenosis but there is scattered lateral recess and neural foraminal encroachment       Thyroid nodule on US; followed by endo repeat 6/2024 04/22/2019 2/21/20 thyroid US: 1.)  Thyroid gland is upper limits of normal in size with heterogeneous echotexture and normal vascularity  2.) 1.31 x 1.07 x 0.91 cm solid, heterogeneous predominately isoechoic nodule is seen in the right inferior pole  3.) 0.72 x 0.48 x 0.50 cm solid, hyperechoic nodule is seen in the right inferior pole  4.) 0.85 x 0.49 x 0.72 cm solid, isoechoic nodule is seen in the right inferior pole  RECOMMENDATIONS: Recommend repeat thyroid ultrasound in 1 year.  2/2021 thyroid US: 1.  Thyroid gland is normal in size with diffusely heterogenous echotexture. 2.  Two nodules in the right thyroid described above.  None meet criteria for fine-needle aspiration.  6/30/22 thyroid US:  1. Thyroid gland is normal in size with diffusely heterogenous echotexture consistent with Hashimoto's thyroiditis.  2. 2 nodules in the right thyroid described above. None meet criteria for fine-needle aspiration.      Abnormal glucose 04/18/2019    Vitamin D deficiency 04/04/2019 4/6/22 DEXA L-spine 1.2, total hip 0.7, femoral neck-0.8.  FRAX score 2.4/6.4%.      Chronic bilateral low back pain without sciatica 11/02/2018    Bradycardia 6/2018 holter negative 07/20/2018    Status post hysterectomy 07/20/2018    Statin myopathy 07/20/2018    Tubular adenoma of colon 3/28/22 colonoscopy 4 mm sigmoid TA 01/23/2017     3/28/22 colonoscopy 4 mm sigmoid TA      Left knee pain 01/05/2017    Nuclear sclerosis 11/30/2016    Cataract 09/07/2016     Dx updated per 2019 IMO Load      Class 2 severe obesity due to excess calories with serious comorbidity in adult, unspecified BMI     POAG (primary open-angle glaucoma) 01/05/2016    Essential hypertension 08/13/2012 01/13/2021 TTE LV normal size with normal systolic function.   LVEF 55%.  Normal diastolic function.  Normal RV size and normal function.  Normal CVP.  PA pressure 20.      Hyperlipidemia; statin myalgias; 6/28/2018 exercise stress echo neg for ischemia 08/13/2012 6/28/2018 exercise stress echo CONCLUSIONS   1 - Normal left ventricular systolic function (EF 60-65%).   2 - No wall motion abnormalities.   3 - Normal left ventricular diastolic function.   4 - Normal right ventricular systolic function .          PAST MEDICAL PROBLEMS, PAST SURGICAL HISTORY: please see relevant portions of the electronic medical record    ALLERGIES AND MEDICATIONS: updated and reviewed.  Review of patient's allergies indicates:   Allergen Reactions    Lisinopril Swelling    Amlodipine Edema    Combigan [brimonidine-timolol]      Causes itchy eyelids and contact dermatitis    Cosopt [dorzolamide-timolol]      Causes angular blepharitis of eyelids    Pravastatin      Myalgia and elevated CPK       Medication List with Changes/Refills   Current Medications    ASCORBIC ACID, VITAMIN C, (VITAMIN C) 1000 MG TABLET    Take 1,000 mg by mouth once daily.    ASPIRIN (ECOTRIN) 81 MG EC TABLET    Take 81 mg by mouth once daily.    ATORVASTATIN (LIPITOR) 10 MG TABLET    TAKE 1 TABLET (10 MG TOTAL) BY MOUTH EVERY OTHER DAY.    CALCIUM-VITAMIN D3 (OS-LETY 500 + D3) 500 MG(1,250MG) -200 UNIT PER TABLET    Take 1 tablet by mouth 2 (two) times daily with meals.    DORZOLAMIDE-TIMOLOL 2-0.5% (COSOPT) 22.3-6.8 MG/ML OPHTHALMIC SOLUTION    Place 1 drop into both eyes 2 (two) times daily.    HYDROCHLOROTHIAZIDE (MICROZIDE) 12.5 MG CAPSULE    TAKE 1 CAPSULE EVERY DAY    IRBESARTAN (AVAPRO) 150 MG TABLET    Take 1 tablet (150 mg total) by mouth every evening.    LUMIGAN 0.01 % DROP    Place 1 drop into both eyes every evening.    MULTIVITAMIN (THERAGRAN) PER TABLET    Take 1 tablet by mouth once daily.    RHOPRESSA 0.02 % OPHTHALMIC SOLUTION    INSTILL 1 DROP INTO LEFT EYE ONCE DAILY    VALACYCLOVIR (VALTREX) 1000 MG  "TABLET    Take 1 tablet (1,000 mg total) by mouth 3 (three) times daily. for 7 days        Objective:   Physical Exam   Vitals:    01/26/23 0816   BP: 122/64   BP Location: Left arm   Patient Position: Sitting   BP Method: Large (Manual)   Pulse: (!) 59   Temp: 98.7 °F (37.1 °C)   TempSrc: Oral   SpO2: 96%   Weight: 87.8 kg (193 lb 9 oz)   Height: 5' 5" (1.651 m)    Body mass index is 32.21 kg/m².  Weight: 87.8 kg (193 lb 9 oz)   Height: 5' 5" (165.1 cm)     Physical Exam  Constitutional:       Appearance: She is well-developed.   HENT:      Right Ear: Tympanic membrane, ear canal and external ear normal.      Left Ear: Tympanic membrane, ear canal and external ear normal.      Mouth/Throat:      Mouth: Mucous membranes are moist.      Pharynx: No oropharyngeal exudate or posterior oropharyngeal erythema.   Eyes:      General: No scleral icterus.     Extraocular Movements: Extraocular movements intact.      Pupils: Pupils are equal, round, and reactive to light.   Neck:      Thyroid: No thyromegaly.   Cardiovascular:      Rate and Rhythm: Normal rate and regular rhythm.      Heart sounds: Normal heart sounds. No murmur heard.  Pulmonary:      Effort: Pulmonary effort is normal.      Breath sounds: Normal breath sounds. No wheezing.   Abdominal:      Palpations: Abdomen is soft. There is no hepatomegaly, splenomegaly or mass.      Tenderness: There is no abdominal tenderness.   Musculoskeletal:         General: No deformity. Normal range of motion.      Cervical back: Neck supple.      Right lower leg: No edema.      Left lower leg: No edema.   Lymphadenopathy:      Cervical: No cervical adenopathy.   Skin:     General: Skin is warm and dry.      Findings: No rash.      Comments: On exposed skin   Neurological:      Mental Status: She is alert and oriented to person, place, and time.      Deep Tendon Reflexes: Reflexes are normal and symmetric.   Psychiatric:         Behavior: Behavior normal.         Thought " Content: Thought content normal.         Judgment: Judgment normal.

## 2023-01-26 ENCOUNTER — PATIENT MESSAGE (OUTPATIENT)
Dept: NEUROLOGY | Facility: CLINIC | Age: 78
End: 2023-01-26
Payer: MEDICARE

## 2023-01-26 ENCOUNTER — OFFICE VISIT (OUTPATIENT)
Dept: FAMILY MEDICINE | Facility: CLINIC | Age: 78
End: 2023-01-26
Payer: MEDICARE

## 2023-01-26 VITALS
WEIGHT: 193.56 LBS | SYSTOLIC BLOOD PRESSURE: 122 MMHG | HEART RATE: 59 BPM | HEIGHT: 65 IN | OXYGEN SATURATION: 96 % | DIASTOLIC BLOOD PRESSURE: 64 MMHG | BODY MASS INDEX: 32.25 KG/M2 | TEMPERATURE: 99 F

## 2023-01-26 DIAGNOSIS — E04.1 THYROID NODULE: Chronic | ICD-10-CM

## 2023-01-26 DIAGNOSIS — R09.82 POST-NASAL DRIP: Primary | ICD-10-CM

## 2023-01-26 DIAGNOSIS — K86.89 PANCREATIC ATROPHY: ICD-10-CM

## 2023-01-26 DIAGNOSIS — G60.9 IDIOPATHIC PERIPHERAL NEUROPATHY: ICD-10-CM

## 2023-01-26 DIAGNOSIS — I10 ESSENTIAL HYPERTENSION: ICD-10-CM

## 2023-01-26 DIAGNOSIS — R73.09 ABNORMAL GLUCOSE: ICD-10-CM

## 2023-01-26 DIAGNOSIS — I70.0 THORACIC AORTIC ATHEROSCLEROSIS: ICD-10-CM

## 2023-01-26 DIAGNOSIS — E66.01 CLASS 2 SEVERE OBESITY DUE TO EXCESS CALORIES WITH SERIOUS COMORBIDITY IN ADULT, UNSPECIFIED BMI: ICD-10-CM

## 2023-01-26 DIAGNOSIS — G47.33 OSA (OBSTRUCTIVE SLEEP APNEA): ICD-10-CM

## 2023-01-26 DIAGNOSIS — K21.9 GASTROESOPHAGEAL REFLUX DISEASE, UNSPECIFIED WHETHER ESOPHAGITIS PRESENT: ICD-10-CM

## 2023-01-26 DIAGNOSIS — E78.2 MIXED HYPERLIPIDEMIA: ICD-10-CM

## 2023-01-26 DIAGNOSIS — E78.00 PURE HYPERCHOLESTEROLEMIA: ICD-10-CM

## 2023-01-26 PROCEDURE — 1160F RVW MEDS BY RX/DR IN RCRD: CPT | Mod: HCNC,CPTII,S$GLB, | Performed by: INTERNAL MEDICINE

## 2023-01-26 PROCEDURE — 3288F FALL RISK ASSESSMENT DOCD: CPT | Mod: HCNC,CPTII,S$GLB, | Performed by: INTERNAL MEDICINE

## 2023-01-26 PROCEDURE — 1126F PR PAIN SEVERITY QUANTIFIED, NO PAIN PRESENT: ICD-10-PCS | Mod: HCNC,CPTII,S$GLB, | Performed by: INTERNAL MEDICINE

## 2023-01-26 PROCEDURE — 3288F PR FALLS RISK ASSESSMENT DOCUMENTED: ICD-10-PCS | Mod: HCNC,CPTII,S$GLB, | Performed by: INTERNAL MEDICINE

## 2023-01-26 PROCEDURE — 3078F PR MOST RECENT DIASTOLIC BLOOD PRESSURE < 80 MM HG: ICD-10-PCS | Mod: HCNC,CPTII,S$GLB, | Performed by: INTERNAL MEDICINE

## 2023-01-26 PROCEDURE — 99999 PR PBB SHADOW E&M-EST. PATIENT-LVL III: CPT | Mod: PBBFAC,HCNC,, | Performed by: INTERNAL MEDICINE

## 2023-01-26 PROCEDURE — 1126F AMNT PAIN NOTED NONE PRSNT: CPT | Mod: HCNC,CPTII,S$GLB, | Performed by: INTERNAL MEDICINE

## 2023-01-26 PROCEDURE — 1101F PR PT FALLS ASSESS DOC 0-1 FALLS W/OUT INJ PAST YR: ICD-10-PCS | Mod: HCNC,CPTII,S$GLB, | Performed by: INTERNAL MEDICINE

## 2023-01-26 PROCEDURE — 99214 OFFICE O/P EST MOD 30 MIN: CPT | Mod: HCNC,S$GLB,, | Performed by: INTERNAL MEDICINE

## 2023-01-26 PROCEDURE — 1101F PT FALLS ASSESS-DOCD LE1/YR: CPT | Mod: HCNC,CPTII,S$GLB, | Performed by: INTERNAL MEDICINE

## 2023-01-26 PROCEDURE — 3078F DIAST BP <80 MM HG: CPT | Mod: HCNC,CPTII,S$GLB, | Performed by: INTERNAL MEDICINE

## 2023-01-26 PROCEDURE — 99999 PR PBB SHADOW E&M-EST. PATIENT-LVL III: ICD-10-PCS | Mod: PBBFAC,HCNC,, | Performed by: INTERNAL MEDICINE

## 2023-01-26 PROCEDURE — 1160F PR REVIEW ALL MEDS BY PRESCRIBER/CLIN PHARMACIST DOCUMENTED: ICD-10-PCS | Mod: HCNC,CPTII,S$GLB, | Performed by: INTERNAL MEDICINE

## 2023-01-26 PROCEDURE — 3074F PR MOST RECENT SYSTOLIC BLOOD PRESSURE < 130 MM HG: ICD-10-PCS | Mod: HCNC,CPTII,S$GLB, | Performed by: INTERNAL MEDICINE

## 2023-01-26 PROCEDURE — 3074F SYST BP LT 130 MM HG: CPT | Mod: HCNC,CPTII,S$GLB, | Performed by: INTERNAL MEDICINE

## 2023-01-26 PROCEDURE — 1159F MED LIST DOCD IN RCRD: CPT | Mod: HCNC,CPTII,S$GLB, | Performed by: INTERNAL MEDICINE

## 2023-01-26 PROCEDURE — 1159F PR MEDICATION LIST DOCUMENTED IN MEDICAL RECORD: ICD-10-PCS | Mod: HCNC,CPTII,S$GLB, | Performed by: INTERNAL MEDICINE

## 2023-01-26 PROCEDURE — 99214 PR OFFICE/OUTPT VISIT, EST, LEVL IV, 30-39 MIN: ICD-10-PCS | Mod: HCNC,S$GLB,, | Performed by: INTERNAL MEDICINE

## 2023-01-26 RX ORDER — AZELASTINE 1 MG/ML
1 SPRAY, METERED NASAL 2 TIMES DAILY
Qty: 30 ML | Refills: 2 | Status: SHIPPED | OUTPATIENT
Start: 2023-01-26 | End: 2024-02-09 | Stop reason: SDUPTHER

## 2023-01-26 RX ORDER — PREGABALIN 75 MG/1
75 CAPSULE ORAL 2 TIMES DAILY
COMMUNITY
End: 2023-01-27 | Stop reason: SDUPTHER

## 2023-01-26 RX ORDER — ATORVASTATIN CALCIUM 10 MG/1
TABLET, FILM COATED ORAL
Qty: 45 TABLET | Refills: 3 | Status: SHIPPED | OUTPATIENT
Start: 2023-01-26 | End: 2023-12-29

## 2023-01-26 RX ORDER — IRBESARTAN 150 MG/1
150 TABLET ORAL NIGHTLY
Qty: 90 TABLET | Refills: 3 | Status: SHIPPED | OUTPATIENT
Start: 2023-01-26 | End: 2023-07-25

## 2023-01-26 RX ORDER — FAMOTIDINE 20 MG/1
20 TABLET, FILM COATED ORAL 2 TIMES DAILY
Qty: 60 TABLET | Refills: 2 | Status: SHIPPED | OUTPATIENT
Start: 2023-01-26 | End: 2023-05-10

## 2023-01-27 DIAGNOSIS — G60.9 IDIOPATHIC PERIPHERAL NEUROPATHY: ICD-10-CM

## 2023-01-27 NOTE — TELEPHONE ENCOUNTER
----- Message from Beba Nava sent at 1/27/2023 12:48 PM CST -----  Regarding: Patient call back  .Type: Patient Call Back    Who called: Self     What is the request in detail: pregabalin (LYRICA) 75 MG capsule    Can the clinic reply by [facility]?    Would the patient rather a call back or a response via [facility]? Call     Best call back number: .[phone]      Additional Information: would like a nurse to give her a call to discuss medication listed

## 2023-01-31 RX ORDER — PREGABALIN 75 MG/1
75 CAPSULE ORAL 2 TIMES DAILY
Qty: 60 CAPSULE | Refills: 5 | Status: SHIPPED | OUTPATIENT
Start: 2023-01-31 | End: 2023-08-31 | Stop reason: DRUGHIGH

## 2023-02-06 ENCOUNTER — TELEPHONE (OUTPATIENT)
Dept: NEUROLOGY | Facility: CLINIC | Age: 78
End: 2023-02-06
Payer: MEDICARE

## 2023-02-06 NOTE — TELEPHONE ENCOUNTER
----- Message from T[place]ana Chevalier sent at 2/6/2023  8:59 AM CST -----  Regarding: RX - pt advice   Pt calling again to spk with Dr. Harris's office re medication making pt dizzy. Pt says when she takes med 1 per day she does not have the dizziness. Pt wants to know if taking 1 per day is sufficient. RX name pregabalin (LYRICA) 75 MG capsule. Pls call pt @ [phone].      When she takes 2 at night she wakes up dizzy so she started taking only 1 at night and the dizziness is gone. Informed patient per: Dr Hamilton continue taking 1 at night

## 2023-02-07 ENCOUNTER — OFFICE VISIT (OUTPATIENT)
Dept: FAMILY MEDICINE | Facility: CLINIC | Age: 78
End: 2023-02-07
Payer: MEDICARE

## 2023-02-07 VITALS
OXYGEN SATURATION: 96 % | HEART RATE: 59 BPM | BODY MASS INDEX: 32.25 KG/M2 | DIASTOLIC BLOOD PRESSURE: 68 MMHG | SYSTOLIC BLOOD PRESSURE: 124 MMHG | HEIGHT: 65 IN | TEMPERATURE: 99 F | WEIGHT: 193.56 LBS

## 2023-02-07 DIAGNOSIS — R05.1 ACUTE COUGH: ICD-10-CM

## 2023-02-07 DIAGNOSIS — Z20.822 EXPOSURE TO COVID-19 VIRUS: Primary | ICD-10-CM

## 2023-02-07 DIAGNOSIS — Z00.00 ENCOUNTER FOR MEDICARE ANNUAL WELLNESS EXAM: ICD-10-CM

## 2023-02-07 DIAGNOSIS — R68.89 FLU-LIKE SYMPTOMS: ICD-10-CM

## 2023-02-07 LAB
CTP QC/QA: YES
POC MOLECULAR INFLUENZA A AGN: NEGATIVE
POC MOLECULAR INFLUENZA B AGN: NEGATIVE

## 2023-02-07 PROCEDURE — 87502 INFLUENZA DNA AMP PROBE: CPT | Mod: QW,HCNC,S$GLB, | Performed by: INTERNAL MEDICINE

## 2023-02-07 PROCEDURE — 1125F PR PAIN SEVERITY QUANTIFIED, PAIN PRESENT: ICD-10-PCS | Mod: HCNC,CPTII,S$GLB, | Performed by: INTERNAL MEDICINE

## 2023-02-07 PROCEDURE — 3288F PR FALLS RISK ASSESSMENT DOCUMENTED: ICD-10-PCS | Mod: HCNC,CPTII,S$GLB, | Performed by: INTERNAL MEDICINE

## 2023-02-07 PROCEDURE — 87502 POCT INFLUENZA A/B MOLECULAR: ICD-10-PCS | Mod: QW,HCNC,S$GLB, | Performed by: INTERNAL MEDICINE

## 2023-02-07 PROCEDURE — 99214 PR OFFICE/OUTPT VISIT, EST, LEVL IV, 30-39 MIN: ICD-10-PCS | Mod: HCNC,S$GLB,, | Performed by: INTERNAL MEDICINE

## 2023-02-07 PROCEDURE — 3078F DIAST BP <80 MM HG: CPT | Mod: HCNC,CPTII,S$GLB, | Performed by: INTERNAL MEDICINE

## 2023-02-07 PROCEDURE — 99214 OFFICE O/P EST MOD 30 MIN: CPT | Mod: HCNC,S$GLB,, | Performed by: INTERNAL MEDICINE

## 2023-02-07 PROCEDURE — U0005 INFEC AGEN DETEC AMPLI PROBE: HCPCS | Performed by: INTERNAL MEDICINE

## 2023-02-07 PROCEDURE — 1159F PR MEDICATION LIST DOCUMENTED IN MEDICAL RECORD: ICD-10-PCS | Mod: HCNC,CPTII,S$GLB, | Performed by: INTERNAL MEDICINE

## 2023-02-07 PROCEDURE — 1125F AMNT PAIN NOTED PAIN PRSNT: CPT | Mod: HCNC,CPTII,S$GLB, | Performed by: INTERNAL MEDICINE

## 2023-02-07 PROCEDURE — 3288F FALL RISK ASSESSMENT DOCD: CPT | Mod: HCNC,CPTII,S$GLB, | Performed by: INTERNAL MEDICINE

## 2023-02-07 PROCEDURE — 3074F PR MOST RECENT SYSTOLIC BLOOD PRESSURE < 130 MM HG: ICD-10-PCS | Mod: HCNC,CPTII,S$GLB, | Performed by: INTERNAL MEDICINE

## 2023-02-07 PROCEDURE — 99999 PR PBB SHADOW E&M-EST. PATIENT-LVL IV: CPT | Mod: PBBFAC,HCNC,, | Performed by: INTERNAL MEDICINE

## 2023-02-07 PROCEDURE — 1101F PT FALLS ASSESS-DOCD LE1/YR: CPT | Mod: HCNC,CPTII,S$GLB, | Performed by: INTERNAL MEDICINE

## 2023-02-07 PROCEDURE — 1101F PR PT FALLS ASSESS DOC 0-1 FALLS W/OUT INJ PAST YR: ICD-10-PCS | Mod: HCNC,CPTII,S$GLB, | Performed by: INTERNAL MEDICINE

## 2023-02-07 PROCEDURE — 1159F MED LIST DOCD IN RCRD: CPT | Mod: HCNC,CPTII,S$GLB, | Performed by: INTERNAL MEDICINE

## 2023-02-07 PROCEDURE — 99999 PR PBB SHADOW E&M-EST. PATIENT-LVL IV: ICD-10-PCS | Mod: PBBFAC,HCNC,, | Performed by: INTERNAL MEDICINE

## 2023-02-07 PROCEDURE — 1160F PR REVIEW ALL MEDS BY PRESCRIBER/CLIN PHARMACIST DOCUMENTED: ICD-10-PCS | Mod: HCNC,CPTII,S$GLB, | Performed by: INTERNAL MEDICINE

## 2023-02-07 PROCEDURE — 1160F RVW MEDS BY RX/DR IN RCRD: CPT | Mod: HCNC,CPTII,S$GLB, | Performed by: INTERNAL MEDICINE

## 2023-02-07 PROCEDURE — 3074F SYST BP LT 130 MM HG: CPT | Mod: HCNC,CPTII,S$GLB, | Performed by: INTERNAL MEDICINE

## 2023-02-07 PROCEDURE — 3078F PR MOST RECENT DIASTOLIC BLOOD PRESSURE < 80 MM HG: ICD-10-PCS | Mod: HCNC,CPTII,S$GLB, | Performed by: INTERNAL MEDICINE

## 2023-02-07 RX ORDER — BENZONATATE 100 MG/1
100 CAPSULE ORAL 3 TIMES DAILY PRN
Qty: 30 CAPSULE | Refills: 0 | Status: SHIPPED | OUTPATIENT
Start: 2023-02-07 | End: 2023-02-17

## 2023-02-07 NOTE — PROGRESS NOTES
This note was created by combination of typed  and M-Modal dictation.  Transcription errors may be present.  If there are any questions, please contact me.    Assessment and Plan:   Exposure to COVID-19 virus  Flu-like symptoms  Acute cough  -flu negative  Covid swabbed, if positive, will send in paxlovid, discussed that she would need to stop atorva while taking if positive/treated  Symptomatic treatment with atrovent, benzonatate, recommended OTC cepacol lozenges/spray  -     COVID-19 Routine Screening; Future; Expected date: 02/07/2023  -     POCT Influenza A/B Molecular  -     benzonatate (TESSALON) 100 MG capsule; Take 1 capsule (100 mg total) by mouth 3 (three) times daily as needed for Cough.  Dispense: 30 capsule; Refill: 0          There are no discontinued medications.    meds sent this encounter:  Medications Ordered This Encounter   Medications    benzonatate (TESSALON) 100 MG capsule     Sig: Take 1 capsule (100 mg total) by mouth 3 (three) times daily as needed for Cough.     Dispense:  30 capsule     Refill:  0       Follow Up: No follow-ups on file.    Subjective:     Chief Complaint   Patient presents with    Cough    Sore Throat    Chills    Nasal Congestion       OVI Botello is a 77 y.o. female.    Social History     Tobacco Use    Smoking status: Never    Smokeless tobacco: Never   Substance Use Topics    Alcohol use: Yes     Alcohol/week: 0.0 standard drinks     Comment: Rarely; 1 glass of wine      Social History     Occupational History    Occupation: retired - formerly pastoral care with [employer]      Social History     Social History Narrative    Not on file       No LMP recorded. Patient has had a hysterectomy.    Last appointment with this clinic was 1/26/2023. Last visit with me 1/26/2023   To summarize last visit and events leading up to today:  Post nasal drip, astelin. Avoid nasal steroids (glaucoma)  GERD, needs to improve food hygiene, avoid lying down after  eating. Trial pepcid  Incidental pancreatic atrophy on CT. No symptoms. No further testing.   Lipid, abn glc, HTN, digital monitoring. Borderline home readings  Thyroid nodule on US due for repeat summer; followed by endo  Neuropathy, recent start on lyrica with SE of dizzy in AM     Last 5 Patient Entered Readings                Current 30 Day Average: 137/69  Recent Readings 2/3/2023 2/3/2023 2/3/2023 1/24/2023 1/24/2023   SBP (mmHg) 115 142 143 138 139   DBP (mmHg) 58 73 74 79 70   Pulse 59 62 62 61 52                   Today's visit:  Please note: Chronic medical problems that are stable but are being addressed at this visit may not be listed/documented here, but may be addressed in more detail in the Assessment and Plan section.   Below are salient features of chronic medical conditions, or new/acute conditions and their details.  Saturday babysat for grandchild; grandchild had flu and bronchitis but afebrile when she was caring for grandchild.  Sunday went to [place] but on returning, throat scratchy, coughing  Monday was worse. Last night waking with sweats. Did not take temp today.  High of 99 yesterday. Was taking tylenol regularly. Cough is dry  No chest congestion  Yes some sinus congestion.     Dayquil and nyquil for this. Coricedin.      Patient Care Team:  Ancelmo Sumner MD as PCP - General (Internal Medicine)  Brooke Delong MD as Obstetrician (Obstetrics)  Arnoldo Valentin MD as Consulting Physician (Otolaryngology)  Tim Freeman MA as Care Coordinator  Yesica Mike MD as Consulting Physician (Ophthalmology)  Brandan Nava MD as Consulting Physician (Orthopedic Surgery)  Jagjit Feldman MD as Consulting Physician (Endocrinology)  Ailyn Dubois as Digital Medicine Health   Beba Ahmadi, PharmD as Hypertension Digital Medicine Clinician (Pharmacist)  Ancelmo Sumner MD as Hypertension Digital Medicine Responsible Provider (Internal Medicine)  Erika Sylvester  Medicare as Hypertension Digital Medicine Contract  Ancelmo Sumner MD as Consulting Physician (Internal Medicine)    Patient Active Problem List    Diagnosis Date Noted    Snoring 12/16/2022    Orthopnea 12/13/2022    CLARA (obstructive sleep apnea) 12/13/2022     12/15/22 HST with CLARA AHI 9      Thoracic aortic atherosclerosis incidental on CXR 12/2022 12/01/2022    Right foot pain 05/24/2022    Primary open-angle glaucoma, left eye, moderate stage 07/28/2021    Primary open angle glaucoma of right eye, mild stage 03/17/2021    Fatigue 01/24/2020    Acute pain of left knee 06/18/2019    Weakness of both hips 06/18/2019    Decreased independence with transfers 06/18/2019    Impaired functional mobility, balance, gait, and endurance 06/18/2019    Idiopathic peripheral neuropathy normal B12, TSH; A1c pre-DM. hx of lory changed to lyrica 06/03/2019 07/07/2022 EMG showing sensory motor peripheral neuropathy.  Chronic denervation in the L4-S1 myotomes on the left.  7/22/22 MRI L spine  Mild multilevel degenerative change throughout the lower thoracic and lumbar spine.  No significant spinal canal stenosis but there is scattered lateral recess and neural foraminal encroachment       Thyroid nodule on US; followed by endo repeat 6/2024 04/22/2019 2/21/20 thyroid US: 1.)  Thyroid gland is upper limits of normal in size with heterogeneous echotexture and normal vascularity  2.) 1.31 x 1.07 x 0.91 cm solid, heterogeneous predominately isoechoic nodule is seen in the right inferior pole  3.) 0.72 x 0.48 x 0.50 cm solid, hyperechoic nodule is seen in the right inferior pole  4.) 0.85 x 0.49 x 0.72 cm solid, isoechoic nodule is seen in the right inferior pole  RECOMMENDATIONS: Recommend repeat thyroid ultrasound in 1 year.  2/2021 thyroid US: 1.  Thyroid gland is normal in size with diffusely heterogenous echotexture. 2.  Two nodules in the right thyroid described above.  None meet criteria for fine-needle  aspiration.  6/30/22 thyroid US:  1. Thyroid gland is normal in size with diffusely heterogenous echotexture consistent with Hashimoto's thyroiditis.  2. 2 nodules in the right thyroid described above. None meet criteria for fine-needle aspiration.      Abnormal glucose 04/18/2019    Vitamin D deficiency 04/04/2019 4/6/22 DEXA L-spine 1.2, total hip 0.7, femoral neck-0.8.  FRAX score 2.4/6.4%.      Chronic bilateral low back pain without sciatica 11/02/2018    Bradycardia 6/2018 holter negative 07/20/2018    Status post hysterectomy 07/20/2018    Statin myopathy 07/20/2018    Tubular adenoma of colon 3/28/22 colonoscopy 4 mm sigmoid TA 01/23/2017     3/28/22 colonoscopy 4 mm sigmoid TA      Left knee pain 01/05/2017    Nuclear sclerosis 11/30/2016    Cataract 09/07/2016     Dx updated per 2019 IMO Load      Class 2 severe obesity due to excess calories with serious comorbidity in adult, unspecified BMI     POAG (primary open-angle glaucoma) 01/05/2016    Essential hypertension 08/13/2012 01/13/2021 TTE LV normal size with normal systolic function.  LVEF 55%.  Normal diastolic function.  Normal RV size and normal function.  Normal CVP.  PA pressure 20.      Hyperlipidemia; statin myalgias; 6/28/2018 exercise stress echo neg for ischemia 08/13/2012 6/28/2018 exercise stress echo CONCLUSIONS   1 - Normal left ventricular systolic function (EF 60-65%).   2 - No wall motion abnormalities.   3 - Normal left ventricular diastolic function.   4 - Normal right ventricular systolic function .          PAST MEDICAL PROBLEMS, PAST SURGICAL HISTORY: please see relevant portions of the electronic medical record    ALLERGIES AND MEDICATIONS: updated and reviewed.  Review of patient's allergies indicates:   Allergen Reactions    Lisinopril Swelling    Amlodipine Edema    Combigan [brimonidine-timolol]      Causes itchy eyelids and contact dermatitis    Cosopt [dorzolamide-timolol]      Causes angular blepharitis of  "eyelids    Pravastatin      Myalgia and elevated CPK       Medication List with Changes/Refills   Current Medications    ASCORBIC ACID, VITAMIN C, (VITAMIN C) 1000 MG TABLET    Take 1,000 mg by mouth once daily.    ASPIRIN (ECOTRIN) 81 MG EC TABLET    Take 81 mg by mouth once daily.    ATORVASTATIN (LIPITOR) 10 MG TABLET    TAKE 1 TABLET (10 MG TOTAL) BY MOUTH EVERY OTHER DAY.    AZELASTINE (ASTELIN) 137 MCG (0.1 %) NASAL SPRAY    1 spray (137 mcg total) by Nasal route 2 (two) times daily.    CALCIUM-VITAMIN D3 (OS-LETY 500 + D3) 500 MG(1,250MG) -200 UNIT PER TABLET    Take 1 tablet by mouth 2 (two) times daily with meals.    DORZOLAMIDE-TIMOLOL 2-0.5% (COSOPT) 22.3-6.8 MG/ML OPHTHALMIC SOLUTION    Place 1 drop into both eyes 2 (two) times daily.    FAMOTIDINE (PEPCID) 20 MG TABLET    Take 1 tablet (20 mg total) by mouth 2 (two) times daily.    HYDROCHLOROTHIAZIDE (MICROZIDE) 12.5 MG CAPSULE    TAKE 1 CAPSULE EVERY DAY    IRBESARTAN (AVAPRO) 150 MG TABLET    Take 1 tablet (150 mg total) by mouth every evening.    LUMIGAN 0.01 % DROP    Place 1 drop into both eyes every evening.    MULTIVITAMIN (THERAGRAN) PER TABLET    Take 1 tablet by mouth once daily.    PREGABALIN (LYRICA) 75 MG CAPSULE    Take 1 capsule (75 mg total) by mouth 2 (two) times daily.    RHOPRESSA 0.02 % OPHTHALMIC SOLUTION    INSTILL 1 DROP INTO LEFT EYE ONCE DAILY    VALACYCLOVIR (VALTREX) 1000 MG TABLET    Take 1 tablet (1,000 mg total) by mouth 3 (three) times daily. for 7 days        Objective:   Physical Exam   Vitals:    02/07/23 1003   BP: 124/68   BP Location: Left arm   Patient Position: Sitting   BP Method: Large (Manual)   Pulse: (!) 59   Temp: 98.6 °F (37 °C)   TempSrc: Oral   SpO2: 96%   Weight: 87.8 kg (193 lb 9 oz)   Height: 5' 5" (1.651 m)    Body mass index is 32.21 kg/m².  Weight: 87.8 kg (193 lb 9 oz)   Height: 5' 5" (165.1 cm)     Physical Exam  Constitutional:       Appearance: She is well-developed.   HENT:      Head: " Normocephalic.      Right Ear: Tympanic membrane and ear canal normal.      Left Ear: Tympanic membrane and ear canal normal.      Mouth/Throat:      Pharynx: No oropharyngeal exudate or posterior oropharyngeal erythema.   Eyes:      General: No scleral icterus.        Right eye: No discharge.         Left eye: No discharge.   Cardiovascular:      Rate and Rhythm: Normal rate and regular rhythm.      Heart sounds: Normal heart sounds.   Pulmonary:      Effort: Pulmonary effort is normal.      Breath sounds: Normal breath sounds. No wheezing.   Musculoskeletal:      Cervical back: Neck supple.   Lymphadenopathy:      Cervical: No cervical adenopathy.   Skin:     General: Skin is warm and dry.   Neurological:      Mental Status: She is alert and oriented to person, place, and time.   Psychiatric:         Behavior: Behavior normal.     POCT influenza negative

## 2023-02-08 LAB — SARS-COV-2 RNA RESP QL NAA+PROBE: NOT DETECTED

## 2023-02-09 DIAGNOSIS — Z00.00 ENCOUNTER FOR MEDICARE ANNUAL WELLNESS EXAM: ICD-10-CM

## 2023-02-15 ENCOUNTER — PATIENT MESSAGE (OUTPATIENT)
Dept: ADMINISTRATIVE | Facility: OTHER | Age: 78
End: 2023-02-15
Payer: MEDICARE

## 2023-02-28 ENCOUNTER — OFFICE VISIT (OUTPATIENT)
Dept: GASTROENTEROLOGY | Facility: CLINIC | Age: 78
End: 2023-02-28
Payer: MEDICARE

## 2023-02-28 VITALS
DIASTOLIC BLOOD PRESSURE: 77 MMHG | BODY MASS INDEX: 32.91 KG/M2 | WEIGHT: 197.56 LBS | HEIGHT: 65 IN | SYSTOLIC BLOOD PRESSURE: 144 MMHG | HEART RATE: 63 BPM

## 2023-02-28 DIAGNOSIS — R13.10 ODYNOPHAGIA: ICD-10-CM

## 2023-02-28 DIAGNOSIS — R13.19 ESOPHAGEAL DYSPHAGIA: Primary | ICD-10-CM

## 2023-02-28 DIAGNOSIS — R13.10 DYSPHAGIA, UNSPECIFIED TYPE: Primary | ICD-10-CM

## 2023-02-28 PROCEDURE — 3288F PR FALLS RISK ASSESSMENT DOCUMENTED: ICD-10-PCS | Mod: HCNC,CPTII,S$GLB, | Performed by: INTERNAL MEDICINE

## 2023-02-28 PROCEDURE — 1159F MED LIST DOCD IN RCRD: CPT | Mod: HCNC,CPTII,S$GLB, | Performed by: INTERNAL MEDICINE

## 2023-02-28 PROCEDURE — 3288F FALL RISK ASSESSMENT DOCD: CPT | Mod: HCNC,CPTII,S$GLB, | Performed by: INTERNAL MEDICINE

## 2023-02-28 PROCEDURE — 1126F PR PAIN SEVERITY QUANTIFIED, NO PAIN PRESENT: ICD-10-PCS | Mod: HCNC,CPTII,S$GLB, | Performed by: INTERNAL MEDICINE

## 2023-02-28 PROCEDURE — 1126F AMNT PAIN NOTED NONE PRSNT: CPT | Mod: HCNC,CPTII,S$GLB, | Performed by: INTERNAL MEDICINE

## 2023-02-28 PROCEDURE — 99203 OFFICE O/P NEW LOW 30 MIN: CPT | Mod: HCNC,S$GLB,, | Performed by: INTERNAL MEDICINE

## 2023-02-28 PROCEDURE — 99999 PR PBB SHADOW E&M-EST. PATIENT-LVL IV: CPT | Mod: PBBFAC,HCNC,, | Performed by: INTERNAL MEDICINE

## 2023-02-28 PROCEDURE — 3078F DIAST BP <80 MM HG: CPT | Mod: HCNC,CPTII,S$GLB, | Performed by: INTERNAL MEDICINE

## 2023-02-28 PROCEDURE — 3078F PR MOST RECENT DIASTOLIC BLOOD PRESSURE < 80 MM HG: ICD-10-PCS | Mod: HCNC,CPTII,S$GLB, | Performed by: INTERNAL MEDICINE

## 2023-02-28 PROCEDURE — 3077F SYST BP >= 140 MM HG: CPT | Mod: HCNC,CPTII,S$GLB, | Performed by: INTERNAL MEDICINE

## 2023-02-28 PROCEDURE — 3077F PR MOST RECENT SYSTOLIC BLOOD PRESSURE >= 140 MM HG: ICD-10-PCS | Mod: HCNC,CPTII,S$GLB, | Performed by: INTERNAL MEDICINE

## 2023-02-28 PROCEDURE — 99203 PR OFFICE/OUTPT VISIT, NEW, LEVL III, 30-44 MIN: ICD-10-PCS | Mod: HCNC,S$GLB,, | Performed by: INTERNAL MEDICINE

## 2023-02-28 PROCEDURE — 1101F PT FALLS ASSESS-DOCD LE1/YR: CPT | Mod: HCNC,CPTII,S$GLB, | Performed by: INTERNAL MEDICINE

## 2023-02-28 PROCEDURE — 1159F PR MEDICATION LIST DOCUMENTED IN MEDICAL RECORD: ICD-10-PCS | Mod: HCNC,CPTII,S$GLB, | Performed by: INTERNAL MEDICINE

## 2023-02-28 PROCEDURE — 99999 PR PBB SHADOW E&M-EST. PATIENT-LVL IV: ICD-10-PCS | Mod: PBBFAC,HCNC,, | Performed by: INTERNAL MEDICINE

## 2023-02-28 PROCEDURE — 1101F PR PT FALLS ASSESS DOC 0-1 FALLS W/OUT INJ PAST YR: ICD-10-PCS | Mod: HCNC,CPTII,S$GLB, | Performed by: INTERNAL MEDICINE

## 2023-02-28 NOTE — PROGRESS NOTES
[facility] Consultation Note    Reason for Consult:  The primary encounter diagnosis was Esophageal dysphagia. A diagnosis of Odynophagia was also pertinent to this visit.    PCP:   Ancelmo Sumner   [address] / [place] [id number]    Referring MD:  Ancelmo Sumner Md  [address] [address]    Initial History of Present Illness (HPI):  This is a 77 y.o. female here for evaluation of new onset history of odynophagia dysphagia for the last 2 weeks patient says she has trouble for both liquids and solids feels like they want to come up no food impaction no history for a medication induced esophageal ulcer no chest pain no shortness of breath no nausea vomiting no early satiety no family history of esophageal or stomach cancer no family history of small-bowel or colon cancer no family history of advanced colon adenomas polyps nobody with pancreatitis or pancreatic cancer nobody with ovarian or uterine cancer nobody with kidney or small-bowel cancer patient says family history of gallbladder disease patient has had a hysterectomy no oophorectomy.  Still has a gallbladder in place still has her appendix.  No weight loss at all no GI bleeding does take aspirin daily.  Recently saw her cardiologist and had a stress test that was normal colonoscopy is up-to-date history of adenomas colon polyp.  Her primary care doctor recently put her on an H2 blocker twice daily no improvement    Abdominal pain - no  Reflux - no  Dysphagia - no   Bowel habits - normal  GI bleeding - none  NSAID usage - aspirin    Interval HPI 02/28/2023:  The patient's last visit with me was on Visit date not found.      ROS:  Constitutional: No fevers, chills, No weight loss  ENT:  No heartburn as above dysphagia as above odynophagia no hoarseness  CV: No chest pain, no palpitation  Pulm: No cough, No shortness of breath, no wheezing  Ophtho: No vision changes  GI: see HPI  Derm: No rash, no itching  Heme: No  lymphadenopathy, No easy bruising  MSK:  Some arthritis  : No dysuria, No hematuria  Endo: No hot or cold intolerance  Neuro: No syncope, No seizure, no strokes  Psych: No uncontrolled anxiety, No uncontrolled depression    Medical History:  has a past medical history of Allergy, Anxiety, Breast cyst, Edema of leg (10/5/2012), Fractures (2011), GERD (gastroesophageal reflux disease), Glaucoma, History of colonic polyps, TIA (transient ischemic attack) (8/13/2012), Hyperlipidemia, Hypertension, Left shoulder tendonitis (8/8/2015), and Primary open-angle glaucoma, mild stage - Both Eyes (6/3/2013).    Surgical History:  has a past surgical history that includes Left Breast Lumpectomy (Left); Hand surgery (Left, 2011); SELECTIVE LASER TRABECUPLASTY (05/2014); Hysterectomy (1980's); Tubal ligation (1970's); Vaginal delivery; Breast surgery (Left); Colonoscopy (N/A, 1/23/2017); Oophorectomy; KAHOOK GONIOTOMY (Right, 11/30/2016); YAG CAPSULOTOMY (Left, 11/29/2018); Cataract extraction w/  intraocular lens implant (Left, 10/05/2016); Cataract extraction w/  intraocular lens implant (Right, 11/30/2016); Breast biopsy (Left); Trabeculectomy (Right, 3/17/2021); Implantation of device for glaucoma (Right, 3/17/2021); Trabeculectomy (Left, 7/28/2021); and Colonoscopy (N/A, 3/28/2022).    Family History: family history includes Alzheimer's disease in her maternal grandfather and mother; Blindness in her father and mother; Breast cancer (age of onset: 50) in her mother; Cancer (age of onset: 62) in her father; Cataracts in her father and mother; Glaucoma in her father, mother, and sister; No Known Problems in her brother and brother; Prostate cancer in her father..     Social History:  reports that she has never smoked. She has never used smokeless tobacco. She reports current alcohol use. She reports that she does not use drugs.    Review of patient's allergies indicates:   Allergen Reactions    Lisinopril Swelling     "Amlodipine Edema    Combigan [brimonidine-timolol]      Causes itchy eyelids and contact dermatitis    Cosopt [dorzolamide-timolol]      Causes angular blepharitis of eyelids    Pravastatin      Myalgia and elevated CPK       Medication List with Changes/Refills   Current Medications    ASCORBIC ACID, VITAMIN C, (VITAMIN C) 1000 MG TABLET    Take 1,000 mg by mouth once daily.    ASPIRIN (ECOTRIN) 81 MG EC TABLET    Take 81 mg by mouth once daily.    ATORVASTATIN (LIPITOR) 10 MG TABLET    TAKE 1 TABLET (10 MG TOTAL) BY MOUTH EVERY OTHER DAY.    AZELASTINE (ASTELIN) 137 MCG (0.1 %) NASAL SPRAY    1 spray (137 mcg total) by Nasal route 2 (two) times daily.    CALCIUM-VITAMIN D3 (OS-LETY 500 + D3) 500 MG(1,250MG) -200 UNIT PER TABLET    Take 1 tablet by mouth 2 (two) times daily with meals.    DORZOLAMIDE-TIMOLOL 2-0.5% (COSOPT) 22.3-6.8 MG/ML OPHTHALMIC SOLUTION    Place 1 drop into both eyes 2 (two) times daily.    FAMOTIDINE (PEPCID) 20 MG TABLET    Take 1 tablet (20 mg total) by mouth 2 (two) times daily.    HYDROCHLOROTHIAZIDE (MICROZIDE) 12.5 MG CAPSULE    TAKE 1 CAPSULE EVERY DAY    IRBESARTAN (AVAPRO) 150 MG TABLET    Take 1 tablet (150 mg total) by mouth every evening.    LUMIGAN 0.01 % DROP    Place 1 drop into both eyes every evening.    MULTIVITAMIN (THERAGRAN) PER TABLET    Take 1 tablet by mouth once daily.    PREGABALIN (LYRICA) 75 MG CAPSULE    Take 1 capsule (75 mg total) by mouth 2 (two) times daily.    RHOPRESSA 0.02 % OPHTHALMIC SOLUTION    INSTILL 1 DROP INTO LEFT EYE ONCE DAILY    VALACYCLOVIR (VALTREX) 1000 MG TABLET    Take 1 tablet (1,000 mg total) by mouth 3 (three) times daily. for 7 days         Objective Findings:    Vital Signs:  BP (!) 144/77 (BP Location: Left arm, Patient Position: Sitting, BP Method: Medium (Automatic))   Pulse 63   Ht 5' 5" (1.651 m)   Wt 89.6 kg (197 lb 8.5 oz)   BMI 32.87 kg/m²   Body mass index is 32.87 kg/m².    Physical Exam:  General Appearance: Well appearing " in no acute distress  Eyes:    No scleral icterus  ENT:  No lesions or masses   Lungs: CTA bilaterally, no wheezes, no rhonchi, no rales  Heart:  S1, S2 normal, no murmurs heard  Abdomen:  Non distended, soft, no guarding, no rebound, no tenderness, no appreciated ascites, no bruits, no hepatosplenomegaly,  No CVA tenderness, no appreciated hernias, no Mahan sign, no McBurney point tenderness  Musculoskeletal:  No major joint deformities  Skin: No petechiae or rash on exposed skin areas  Neurologic:  Alert and oriented x4  Psychiatric:  Normal speech mentation and affect    Labs:  Lab Results   Component Value Date    WBC 5.49 12/01/2022    HGB 12.3 12/01/2022    HCT 39.4 12/01/2022     12/01/2022    CHOL 117 (L) 12/01/2022    TRIG 78 12/01/2022    HDL 43 12/01/2022    ALT 27 12/01/2022    AST 20 12/01/2022     12/01/2022    K 3.9 12/01/2022     12/01/2022    CREATININE 0.8 12/01/2022    BUN 11 12/01/2022    CO2 29 12/01/2022    TSH 2.742 12/01/2022    INR 1.0 01/08/2012    HGBA1C 5.9 (H) 12/01/2022             Medical Decision Making:  Lab work reviewed  Recent colonoscopy images and path reviewed  Esophagram talk given  EGD talk given    The Olympus scope CF-[id number] ([id number]) was                          introduced through the anus and advanced to the                          terminal ileum. The terminal ileum and the                          appendiceal orifice were photographed. The                          colonoscopy was performed without difficulty. The                          quality of the bowel preparation was excellent.   Findings:        A 4 mm polyp was found in the sigmoid colon. The polyp was sessile.        The polyp was removed with a cold snare. Resection and retrieval        were complete.        The exam was otherwise without abnormality.   Impression:            - One 4 mm polyp in the sigmoid colon, removed                          with a cold snare. Resected and retrieved.  "                         - The examination was otherwise normal.   Recommendation:        - Patient has a contact number available for                          emergencies. The signs and symptoms of potential                          delayed complications were discussed with the                          patient. Return to normal activities tomorrow.                          Written discharge instructions were provided to                          the patient.                          - Discharge patient to home (ambulatory).                          - Resume regular diet indefinitely.                          - Repeat colonoscopy date to be determined after                          pending pathology results are reviewed for                          surveillance.                          - Continue present medications.   Attending Participation:        I was present from insertion to withdraw and performed procedure        with the fellow.   Jamison Chambers MD   3/28/2022     [facility] DIAGNOSIS:   COLON, SIGMOID, POLYPECTOMY:   Tubular adenoma without high grade dysplasia.   Jagjit Amaya M.D.   Report attached.   Performing site:   [place] [address]   "Disclaimer:  This case diagnosis was rendered completely by the outside   consultation pathologist and the case is electronically signed by an [facility]   pathologist listed below solely to release the report into the medical   record."     Comment: Interp By Monalisa Shea MD, Signed on 04/01/2022        Lab work reviewedConclusion         Normal myocardial perfusion scan. There is no evidence of myocardial ischemia or infarction.    There is a mild to moderate intensity perfusion abnormality in the apical wall of the left ventricle, secondary to breast attenuation.    The gated perfusion images showed an ejection fraction of 71% at rest.    There is normal wall motion at rest and post stress.    The ECG portion of the study is " positive for ischemia. Specificity is reduced secondary to hypertensive response.    The patient reported no chest pain during the stress test.    There were no arrhythmias during stress.    The patient exercised for 6 minutes 15 seconds on a Jorge protocol, corresponding to a functional capacity of 7 METS, achieving a peak heart rate of 142 bpm, which is 102 % of the age predicted maximum heart rate.      Assessment:  1. Esophageal dysphagia    2. Odynophagia         Recommendations:  1. Esophagram with barium tablet  2. Urgent EGD for further evaluation of odynophagia dysphagia.  3. Return GI clinic 4 weeks for follow-up okay for telemedicine video visit sooner if symptoms worsen      No follow-ups on file.      Order summary:  Orders Placed This Encounter    FL Esophagram With Barium Tablet    Ambulatory referral/consult to Endo Procedure          Thank you so much for allowing me to participate in the care of Rosmery Ramirez    Jorge L Thompson MD    DISCLAIMER: This note was prepared with [employer] voice recognition transcription software. Garbled syntax, mangled or inadvertent pronouns, and other bizarre constructions may be attributed to that software system. While efforts were made to correct any mistakes made by this voice recognition program, some errors and/or omissions may remain in the note that were missed when the note was originally created.

## 2023-02-28 NOTE — Clinical Note
Roger please schedule patient as soon as possible for an esophagram orders placed  Please have patient see endoscopy schedulers today to schedule urgent EGD with me for evaluation of dysphagia odynophagia.  Return GI clinic telemedicine video visit 4-8 weeks for follow-up

## 2023-03-01 ENCOUNTER — HOSPITAL ENCOUNTER (OUTPATIENT)
Dept: RADIOLOGY | Facility: HOSPITAL | Age: 78
Discharge: HOME | End: 2023-03-01
Attending: INTERNAL MEDICINE
Payer: MEDICARE

## 2023-03-01 DIAGNOSIS — R13.19 ESOPHAGEAL DYSPHAGIA: ICD-10-CM

## 2023-03-01 DIAGNOSIS — R13.10 ODYNOPHAGIA: ICD-10-CM

## 2023-03-01 PROCEDURE — 74220 FL ESOPHAGRAM WITH BARIUM TABLET: ICD-10-PCS | Mod: 26,HCNC,, | Performed by: RADIOLOGY

## 2023-03-01 PROCEDURE — 74220 X-RAY XM ESOPHAGUS 1CNTRST: CPT | Mod: 26,HCNC,, | Performed by: RADIOLOGY

## 2023-03-01 PROCEDURE — A9698 NON-RAD CONTRAST MATERIALNOC: HCPCS | Mod: HCNC | Performed by: INTERNAL MEDICINE

## 2023-03-01 PROCEDURE — 25500020 PHARM REV CODE 255: Mod: HCNC | Performed by: INTERNAL MEDICINE

## 2023-03-01 PROCEDURE — 74220 X-RAY XM ESOPHAGUS 1CNTRST: CPT | Mod: TC,HCNC

## 2023-03-01 RX ADMIN — BARIUM SULFATE 200 ML: 0.81 POWDER, FOR SUSPENSION ORAL at 09:03

## 2023-03-01 RX ADMIN — BARIUM SULFATE 140 ML: 0.6 SUSPENSION ORAL at 09:03

## 2023-03-05 NOTE — PROGRESS NOTES
Magdalene - no evidence of an esophageal stricture keep your appointment for EGD for further evaluation.    Impression:     Findings suggestive of mild esophageal dysmotility.     Spontaneous gastroesophageal reflux observed.     No findings to suggest achalasia.     Electronically signed by resident: Demond Mccormick  Date:                                            03/01/2023  Time:                                           10:11     Electronically signed by: Jagjit Martínez MD  Date:                                            03/01/2023

## 2023-03-07 DIAGNOSIS — B02.9 HERPES ZOSTER WITHOUT COMPLICATION: ICD-10-CM

## 2023-03-08 RX ORDER — VALACYCLOVIR HYDROCHLORIDE 1 G/1
1000 TABLET, FILM COATED ORAL 3 TIMES DAILY
Qty: 21 TABLET | Refills: 0 | Status: SHIPPED | OUTPATIENT
Start: 2023-03-08 | End: 2023-10-23 | Stop reason: ALTCHOICE

## 2023-03-08 NOTE — TELEPHONE ENCOUNTER
No new care gaps identified.  [facility] Embedded Care Gaps. Reference number: [id number]. 3/07/2023   11:23:37 PM CST

## 2023-03-24 ENCOUNTER — PES CALL (OUTPATIENT)
Dept: ADMINISTRATIVE | Facility: CLINIC | Age: 78
End: 2023-03-24
Payer: MEDICARE

## 2023-03-25 ENCOUNTER — TELEPHONE (OUTPATIENT)
Dept: ENDOSCOPY | Facility: HOSPITAL | Age: 78
End: 2023-03-25
Payer: MEDICARE

## 2023-03-27 ENCOUNTER — PATIENT MESSAGE (OUTPATIENT)
Dept: ENDOSCOPY | Facility: HOSPITAL | Age: 78
End: 2023-03-27
Payer: MEDICARE

## 2023-03-29 ENCOUNTER — PATIENT MESSAGE (OUTPATIENT)
Dept: ENDOSCOPY | Facility: HOSPITAL | Age: 78
End: 2023-03-29
Payer: MEDICARE

## 2023-04-20 ENCOUNTER — PATIENT MESSAGE (OUTPATIENT)
Dept: ENDOSCOPY | Facility: HOSPITAL | Age: 78
End: 2023-04-20
Payer: MEDICARE

## 2023-05-08 ENCOUNTER — TELEPHONE (OUTPATIENT)
Dept: ENDOSCOPY | Facility: HOSPITAL | Age: 78
End: 2023-05-08
Payer: MEDICARE

## 2023-05-08 NOTE — TELEPHONE ENCOUNTER
Returned pts call to reschedule EGD procedure. Pt did not want to reschedule at this time. She will call when she returns from vacation in June. Number provided.

## 2023-05-10 DIAGNOSIS — K21.9 GASTROESOPHAGEAL REFLUX DISEASE, UNSPECIFIED WHETHER ESOPHAGITIS PRESENT: ICD-10-CM

## 2023-05-10 RX ORDER — FAMOTIDINE 20 MG/1
TABLET, FILM COATED ORAL
Qty: 60 TABLET | Refills: 2 | Status: SHIPPED | OUTPATIENT
Start: 2023-05-10 | End: 2023-10-23 | Stop reason: ALTCHOICE

## 2023-05-10 NOTE — TELEPHONE ENCOUNTER
Refill Decision Note   Rosmery Ramirez  is requesting a refill authorization.  Brief Assessment and Rationale for Refill:  Approve     Medication Therapy Plan:         Comments:     Note composed:12:30 PM 05/10/2023

## 2023-05-10 NOTE — TELEPHONE ENCOUNTER
No care due was identified.  [facility] Embedded Care Due Messages. Reference number: [id number].   5/10/2023 7:54:56 AM CDT

## 2023-06-05 ENCOUNTER — TELEPHONE (OUTPATIENT)
Dept: ENDOSCOPY | Facility: HOSPITAL | Age: 78
End: 2023-06-05
Payer: MEDICARE

## 2023-06-05 NOTE — PROGRESS NOTES
Subjective:      Chief Complaint: Follow-up for thyroid nodules    HPI: Rosmery Ramirez is a 77 y.o. female who is here for follow-up evaluation for thyroid nodules. She has been having a lot of difficulty swallowing and hence comes in today to get evaluated for thyroid nodules.      With regards to the thyroid nodules and Hashimoto's disease:  Compressive symptoms:  Anterior neck pressure?: no  Dysphagia?: yes, patient pointing towards substernal area as the area of discomfort  Voice changes?: no    Risk Factors:  Radiation to head or neck for any treatment of cancer or exposure to radiation?: no  Personal history of colon or breast cancer?: no  Tobacco use?: no  Family history of thyroid cancer?: no (Two sisters with thyroidectomy due to nodules, but benign pathology as per patient)    Symptoms of hyper or hypothyroidism:  Weight changes?: no  Diarrhea or Constipation?: no diarrhea, intermittent constipation  Heat or cold intolerance?: no cold intolerance, but heat intolerance  Hair, nail or skin changes?: no  Chest pain, palpations or shortness of breath?: no chest pain or shortness of breath, has palpations   Mental fog?: no   Fatigue?: No.  Last visit she was complaining of chronic fatigue.  It was suggested to stop the Neurontin, which can have that side effect.  After she stopped taking Neurontin, the fatigue resolved.    Previous biopsy results: None      Last Ultrasound on 6.30.22  Right lobe measures: 5.3 x 2.0 x 1.8 cm.  1. 0.6 x 0.7 x 0.6 cm right middle lobe nodule. The nodule is solid with isoechoic echotexture. Vascularity is Grade 1 (absent). Borders are well-defined. Microcalcifications are not present. On the previous ultrasound this nodule measured 0.7 x 0.6 x 0.6 cm.  Compared to the previous ultrasound, the nodule is unchanged in size and appearance.  2.  0.8 x 0.6 x 0.7 cm right inferior lobe nodule. The nodule is solid with hyperechoic echotexture. Vascularity is Grade 3 (penetrating).  Borders are well-defined. Microcalcifications are not present. On the previous ultrasound this nodule measured 0.9 x 0.5 x 0.9 cm.  Compared to the previous ultrasound, the nodule is unchanged in size and appearance.  Isthmus measures: 0.5 cm in AP dimension.  Left lobe measures: 4.8 x 2.4 x 2.5 cm.  Diffusely heterogenous echotexture.  No distinct nodules are identified.  Real-time survey of the bilateral central and lateral neck was performed.  It did not reveal any abnormal appearing lymph nodes.    Lab Results   Component Value Date    TSH 2.742 12/01/2022    TSH 2.199 04/21/2022    TSH 2.750 03/10/2022    THYROPEROXID 1215.2 (H) 02/25/2021         Objective:     Vitals:    06/06/23 0926   BP: 130/80       BP Readings from Last 5 Encounters:   06/06/23 130/80   02/28/23 (!) 144/77   02/07/23 124/68   01/26/23 122/64   01/13/23 (!) 154/92         Physical Exam  Constitutional:       Appearance: She is obese.   HENT:      Head: Normocephalic.      Right Ear: External ear normal.      Left Ear: External ear normal.      Mouth/Throat:      Pharynx: Oropharynx is clear.   Eyes:      Extraocular Movements: Extraocular movements intact.   Neck:      Comments: No nodules palpated  Cardiovascular:      Rate and Rhythm: Normal rate.   Pulmonary:      Effort: Pulmonary effort is normal.   Musculoskeletal:         General: Normal range of motion.      Cervical back: Normal range of motion.   Skin:     General: Skin is warm.   Neurological:      General: No focal deficit present.      Comments: No tremors on arm extension   Psychiatric:         Mood and Affect: Mood normal.       Wt Readings from Last 10 Encounters:   06/06/23 0926 89.8 kg (197 lb 15.6 oz)   02/28/23 1438 89.6 kg (197 lb 8.5 oz)   02/07/23 1003 87.8 kg (193 lb 9 oz)   01/26/23 0816 87.8 kg (193 lb 9 oz)   01/13/23 1309 88.4 kg (194 lb 14.2 oz)   12/19/22 1303 89.3 kg (196 lb 12.2 oz)   12/13/22 1036 89.8 kg (198 lb)   11/30/22 1306 90.1 kg (198 lb 8.4 oz)    10/24/22 1034 88.8 kg (195 lb 12.3 oz)   09/21/22 1325 90.1 kg (198 lb 10.2 oz)       Lab Results   Component Value Date    HGBA1C 5.9 (H) 12/01/2022    HGBA1C 6.5 (H) 04/21/2022    HGBA1C 6.0 (H) 03/10/2022    HGBA1C 6.0 (H) 02/25/2021     Lab Results   Component Value Date    CHOL 117 (L) 12/01/2022    CHOL 119 (L) 03/10/2022    HDL 43 12/01/2022    HDL 45 03/10/2022    LDLCALC 58.4 (L) 12/01/2022    LDLCALC 58.4 (L) 03/10/2022    TRIG 78 12/01/2022    TRIG 78 03/10/2022    CHOLHDL 36.8 12/01/2022    CHOLHDL 37.8 03/10/2022     Lab Results   Component Value Date     12/01/2022    K 3.9 12/01/2022     12/01/2022    CO2 29 12/01/2022    GLU 93 12/01/2022    BUN 11 12/01/2022    CREATININE 0.8 12/01/2022    CALCIUM 9.7 12/01/2022    PROT 7.0 12/01/2022    ALBUMIN 3.7 12/01/2022    BILITOT 0.7 12/01/2022    ALKPHOS 90 12/01/2022    AST 20 12/01/2022    ALT 27 12/01/2022    ANIONGAP 7 (L) 12/01/2022    ESTGFRAFRICA >60.0 03/10/2022    EGFRNONAA >60.0 03/10/2022    TSH 2.742 12/01/2022      No results found for: LABMICR, CREATRANDUR, MICALBCREAT    Assessment/Plan:     Thyroid nodule on US; followed by endo repeat 6/2024  - I reviewed management options including observation, FNA or surgery.  - Reviewed ultrasound from June 2022 with no nodules meeting FNA criteria   - Patient complaining of dysphagia and pointing towards substernal area when asked about area of discomfort  - Will check repeat thyroid ultrasound after Endoscopy is done by gastroenterology team  - Will check TSH and fT4 today    Esophageal dysphagia  - Patient complaining of dysphagia and pointing towards substernal area when asked about area of discomfort  - Unlikely that subcentemeter nodules seen on previous ultrasound last year could be causing it. No big nodules on examination today as well.  - Patient is going to be scheduled for endoscopy (missed previous appointment due to being busy at home). Reviewed barium study results, will  leave it to gastroenterology team to further manage it.  - Will check repeat thyroid ultrasound after Endoscopy is done by gastroenterology team    Fatigue  - resolved      Follow up in 12 months    Dr. Qasim Zafar Iqbal [facility], [address]  [address] [place], [place], [address]    Office: [phone]  Fax: [phone]    The above history labs imaging impression and plan were discussed with attending physician who is in agreement and also took part in this patient's care.  I personally reviewed all of the patients available medications, labs, imaging, vitals, allergies, medical history.

## 2023-06-06 ENCOUNTER — OFFICE VISIT (OUTPATIENT)
Dept: ENDOCRINOLOGY | Facility: CLINIC | Age: 78
End: 2023-06-06
Payer: MEDICARE

## 2023-06-06 ENCOUNTER — PATIENT MESSAGE (OUTPATIENT)
Dept: ENDOCRINOLOGY | Facility: CLINIC | Age: 78
End: 2023-06-06

## 2023-06-06 ENCOUNTER — TELEPHONE (OUTPATIENT)
Dept: ENDOSCOPY | Facility: HOSPITAL | Age: 78
End: 2023-06-06
Payer: MEDICARE

## 2023-06-06 ENCOUNTER — LAB VISIT (OUTPATIENT)
Dept: LAB | Facility: HOSPITAL | Age: 78
End: 2023-06-06
Payer: MEDICARE

## 2023-06-06 VITALS
HEIGHT: 65 IN | WEIGHT: 198 LBS | BODY MASS INDEX: 32.99 KG/M2 | DIASTOLIC BLOOD PRESSURE: 80 MMHG | SYSTOLIC BLOOD PRESSURE: 130 MMHG

## 2023-06-06 DIAGNOSIS — E04.1 THYROID NODULE: ICD-10-CM

## 2023-06-06 DIAGNOSIS — E04.1 THYROID NODULE: Primary | ICD-10-CM

## 2023-06-06 DIAGNOSIS — R13.19 ESOPHAGEAL DYSPHAGIA: ICD-10-CM

## 2023-06-06 DIAGNOSIS — R53.83 FATIGUE, UNSPECIFIED TYPE: ICD-10-CM

## 2023-06-06 LAB
T4 FREE SERPL-MCNC: 0.93 NG/DL (ref 0.71–1.51)
TSH SERPL DL<=0.005 MIU/L-ACNC: 2.43 UIU/ML (ref 0.4–4)

## 2023-06-06 PROCEDURE — 99214 PR OFFICE/OUTPT VISIT, EST, LEVL IV, 30-39 MIN: ICD-10-PCS | Mod: GC,S$GLB,, | Performed by: STUDENT IN AN ORGANIZED HEALTH CARE EDUCATION/TRAINING PROGRAM

## 2023-06-06 PROCEDURE — 84439 ASSAY OF FREE THYROXINE: CPT | Performed by: STUDENT IN AN ORGANIZED HEALTH CARE EDUCATION/TRAINING PROGRAM

## 2023-06-06 PROCEDURE — 99214 OFFICE O/P EST MOD 30 MIN: CPT | Mod: GC,S$GLB,, | Performed by: STUDENT IN AN ORGANIZED HEALTH CARE EDUCATION/TRAINING PROGRAM

## 2023-06-06 PROCEDURE — 3079F PR MOST RECENT DIASTOLIC BLOOD PRESSURE 80-89 MM HG: ICD-10-PCS | Mod: CPTII,GC,S$GLB, | Performed by: STUDENT IN AN ORGANIZED HEALTH CARE EDUCATION/TRAINING PROGRAM

## 2023-06-06 PROCEDURE — 3288F PR FALLS RISK ASSESSMENT DOCUMENTED: ICD-10-PCS | Mod: CPTII,GC,S$GLB, | Performed by: STUDENT IN AN ORGANIZED HEALTH CARE EDUCATION/TRAINING PROGRAM

## 2023-06-06 PROCEDURE — 1101F PT FALLS ASSESS-DOCD LE1/YR: CPT | Mod: CPTII,GC,S$GLB, | Performed by: STUDENT IN AN ORGANIZED HEALTH CARE EDUCATION/TRAINING PROGRAM

## 2023-06-06 PROCEDURE — 3075F SYST BP GE 130 - 139MM HG: CPT | Mod: CPTII,GC,S$GLB, | Performed by: STUDENT IN AN ORGANIZED HEALTH CARE EDUCATION/TRAINING PROGRAM

## 2023-06-06 PROCEDURE — 36415 COLL VENOUS BLD VENIPUNCTURE: CPT | Performed by: STUDENT IN AN ORGANIZED HEALTH CARE EDUCATION/TRAINING PROGRAM

## 2023-06-06 PROCEDURE — 3288F FALL RISK ASSESSMENT DOCD: CPT | Mod: CPTII,GC,S$GLB, | Performed by: STUDENT IN AN ORGANIZED HEALTH CARE EDUCATION/TRAINING PROGRAM

## 2023-06-06 PROCEDURE — 84443 ASSAY THYROID STIM HORMONE: CPT | Performed by: STUDENT IN AN ORGANIZED HEALTH CARE EDUCATION/TRAINING PROGRAM

## 2023-06-06 PROCEDURE — 1101F PR PT FALLS ASSESS DOC 0-1 FALLS W/OUT INJ PAST YR: ICD-10-PCS | Mod: CPTII,GC,S$GLB, | Performed by: STUDENT IN AN ORGANIZED HEALTH CARE EDUCATION/TRAINING PROGRAM

## 2023-06-06 PROCEDURE — 1126F AMNT PAIN NOTED NONE PRSNT: CPT | Mod: CPTII,GC,S$GLB, | Performed by: STUDENT IN AN ORGANIZED HEALTH CARE EDUCATION/TRAINING PROGRAM

## 2023-06-06 PROCEDURE — 99999 PR PBB SHADOW E&M-EST. PATIENT-LVL III: CPT | Mod: PBBFAC,GC,, | Performed by: STUDENT IN AN ORGANIZED HEALTH CARE EDUCATION/TRAINING PROGRAM

## 2023-06-06 PROCEDURE — 1159F MED LIST DOCD IN RCRD: CPT | Mod: CPTII,GC,S$GLB, | Performed by: STUDENT IN AN ORGANIZED HEALTH CARE EDUCATION/TRAINING PROGRAM

## 2023-06-06 PROCEDURE — 1126F PR PAIN SEVERITY QUANTIFIED, NO PAIN PRESENT: ICD-10-PCS | Mod: CPTII,GC,S$GLB, | Performed by: STUDENT IN AN ORGANIZED HEALTH CARE EDUCATION/TRAINING PROGRAM

## 2023-06-06 PROCEDURE — 99999 PR PBB SHADOW E&M-EST. PATIENT-LVL III: ICD-10-PCS | Mod: PBBFAC,GC,, | Performed by: STUDENT IN AN ORGANIZED HEALTH CARE EDUCATION/TRAINING PROGRAM

## 2023-06-06 PROCEDURE — 3079F DIAST BP 80-89 MM HG: CPT | Mod: CPTII,GC,S$GLB, | Performed by: STUDENT IN AN ORGANIZED HEALTH CARE EDUCATION/TRAINING PROGRAM

## 2023-06-06 PROCEDURE — 1159F PR MEDICATION LIST DOCUMENTED IN MEDICAL RECORD: ICD-10-PCS | Mod: CPTII,GC,S$GLB, | Performed by: STUDENT IN AN ORGANIZED HEALTH CARE EDUCATION/TRAINING PROGRAM

## 2023-06-06 PROCEDURE — 3075F PR MOST RECENT SYSTOLIC BLOOD PRESS GE 130-139MM HG: ICD-10-PCS | Mod: CPTII,GC,S$GLB, | Performed by: STUDENT IN AN ORGANIZED HEALTH CARE EDUCATION/TRAINING PROGRAM

## 2023-06-06 NOTE — ASSESSMENT & PLAN NOTE
- I reviewed management options including observation, FNA or surgery.  - Reviewed ultrasound from June 2022 with no nodules meeting FNA criteria   - Patient complaining of dysphagia and pointing towards substernal area when asked about area of discomfort  - Will check repeat thyroid ultrasound after Endoscopy is done by gastroenterology team  - Will check TSH and fT4 today

## 2023-06-06 NOTE — ASSESSMENT & PLAN NOTE
- Patient complaining of dysphagia and pointing towards substernal area when asked about area of discomfort  - Unlikely that subcentemeter nodules seen on previous ultrasound last year could be causing it. No big nodules on examination today as well.  - Patient is going to be scheduled for endoscopy (missed previous appointment due to being busy at home). Reviewed barium study results, will leave it to gastroenterology team to further manage it.  - Will check repeat thyroid ultrasound after Endoscopy is done by gastroenterology team

## 2023-06-07 ENCOUNTER — TELEPHONE (OUTPATIENT)
Dept: ENDOSCOPY | Facility: HOSPITAL | Age: 78
End: 2023-06-07
Payer: MEDICARE

## 2023-06-07 ENCOUNTER — PATIENT MESSAGE (OUTPATIENT)
Dept: GASTROENTEROLOGY | Facility: CLINIC | Age: 78
End: 2023-06-07
Payer: MEDICARE

## 2023-06-07 ENCOUNTER — PATIENT MESSAGE (OUTPATIENT)
Dept: ENDOSCOPY | Facility: HOSPITAL | Age: 78
End: 2023-06-07
Payer: MEDICARE

## 2023-06-07 VITALS — HEIGHT: 65 IN | BODY MASS INDEX: 32.82 KG/M2 | WEIGHT: 197 LBS

## 2023-07-20 ENCOUNTER — PES CALL (OUTPATIENT)
Dept: ADMINISTRATIVE | Facility: CLINIC | Age: 78
End: 2023-07-20
Payer: MEDICARE

## 2023-07-20 ENCOUNTER — HOSPITAL ENCOUNTER (OUTPATIENT)
Facility: HOSPITAL | Age: 78
Discharge: HOME | End: 2023-07-20
Attending: INTERNAL MEDICINE | Admitting: INTERNAL MEDICINE
Payer: MEDICARE

## 2023-07-20 ENCOUNTER — ANESTHESIA EVENT (OUTPATIENT)
Dept: ENDOSCOPY | Facility: HOSPITAL | Age: 78
End: 2023-07-20
Payer: MEDICARE

## 2023-07-20 ENCOUNTER — ANESTHESIA (OUTPATIENT)
Dept: ENDOSCOPY | Facility: HOSPITAL | Age: 78
End: 2023-07-20
Payer: MEDICARE

## 2023-07-20 VITALS
RESPIRATION RATE: 16 BRPM | HEIGHT: 65 IN | WEIGHT: 180 LBS | DIASTOLIC BLOOD PRESSURE: 60 MMHG | TEMPERATURE: 98 F | OXYGEN SATURATION: 98 % | SYSTOLIC BLOOD PRESSURE: 125 MMHG | BODY MASS INDEX: 29.99 KG/M2 | HEART RATE: 54 BPM

## 2023-07-20 DIAGNOSIS — R13.10 DYSPHAGIA: ICD-10-CM

## 2023-07-20 PROCEDURE — 25000003 PHARM REV CODE 250: Mod: HCNC | Performed by: INTERNAL MEDICINE

## 2023-07-20 PROCEDURE — 88305 TISSUE EXAM BY PATHOLOGIST: CPT | Mod: 26,HCNC,, | Performed by: PATHOLOGY

## 2023-07-20 PROCEDURE — 88305 TISSUE EXAM BY PATHOLOGIST: ICD-10-PCS | Mod: 26,HCNC,, | Performed by: PATHOLOGY

## 2023-07-20 PROCEDURE — 43239 PR EGD, FLEX, W/BIOPSY, SGL/MULTI: ICD-10-PCS | Mod: HCNC,GC,, | Performed by: INTERNAL MEDICINE

## 2023-07-20 PROCEDURE — 27201012 HC FORCEPS, HOT/COLD, DISP: Mod: HCNC | Performed by: INTERNAL MEDICINE

## 2023-07-20 PROCEDURE — 37000008 HC ANESTHESIA 1ST 15 MINUTES: Mod: HCNC | Performed by: INTERNAL MEDICINE

## 2023-07-20 PROCEDURE — 82657 ENZYME CELL ACTIVITY: CPT | Mod: HCNC | Performed by: PATHOLOGY

## 2023-07-20 PROCEDURE — 43239 EGD BIOPSY SINGLE/MULTIPLE: CPT | Mod: HCNC | Performed by: INTERNAL MEDICINE

## 2023-07-20 PROCEDURE — 88305 TISSUE EXAM BY PATHOLOGIST: CPT | Mod: 59,HCNC | Performed by: PATHOLOGY

## 2023-07-20 PROCEDURE — 43239 EGD BIOPSY SINGLE/MULTIPLE: CPT | Mod: HCNC,GC,, | Performed by: INTERNAL MEDICINE

## 2023-07-20 PROCEDURE — E9220 PRA ENDO ANESTHESIA: HCPCS | Mod: HCNC,,, | Performed by: NURSE ANESTHETIST, CERTIFIED REGISTERED

## 2023-07-20 PROCEDURE — 63600175 PHARM REV CODE 636 W HCPCS: Mod: HCNC | Performed by: NURSE ANESTHETIST, CERTIFIED REGISTERED

## 2023-07-20 PROCEDURE — 37000009 HC ANESTHESIA EA ADD 15 MINS: Mod: HCNC | Performed by: INTERNAL MEDICINE

## 2023-07-20 PROCEDURE — E9220 PRA ENDO ANESTHESIA: ICD-10-PCS | Mod: HCNC,,, | Performed by: NURSE ANESTHETIST, CERTIFIED REGISTERED

## 2023-07-20 RX ORDER — PROPOFOL 10 MG/ML
VIAL (ML) INTRAVENOUS CONTINUOUS PRN
Status: DISCONTINUED | OUTPATIENT
Start: 2023-07-20 | End: 2023-07-20

## 2023-07-20 RX ORDER — PROPOFOL 10 MG/ML
VIAL (ML) INTRAVENOUS
Status: DISCONTINUED | OUTPATIENT
Start: 2023-07-20 | End: 2023-07-20

## 2023-07-20 RX ORDER — SODIUM CHLORIDE 9 MG/ML
INJECTION, SOLUTION INTRAVENOUS CONTINUOUS
Status: DISCONTINUED | OUTPATIENT
Start: 2023-07-20 | End: 2023-07-20 | Stop reason: HOSPADM

## 2023-07-20 RX ADMIN — Medication 200 MCG/KG/MIN: at 12:07

## 2023-07-20 RX ADMIN — PROPOFOL 80 MG: 10 INJECTION, EMULSION INTRAVENOUS at 12:07

## 2023-07-20 RX ADMIN — PROPOFOL 20 MG: 10 INJECTION, EMULSION INTRAVENOUS at 12:07

## 2023-07-20 RX ADMIN — SODIUM CHLORIDE: 0.9 INJECTION, SOLUTION INTRAVENOUS at 11:07

## 2023-07-20 RX ADMIN — PROPOFOL 30 MG: 10 INJECTION, EMULSION INTRAVENOUS at 12:07

## 2023-07-20 NOTE — H&P
[facility] [address] [facility]  History & Physical    Subjective:      Chief Complaint/Reason for Admission:     This is a 77 y.o. female here for evaluation of new onset history of odynophagia dysphagia for the last 2 weeks patient says she has trouble for both liquids and solids feels like they want to come up no food impaction no history for a medication induced esophageal ulcer no chest pain no shortness of breath no nausea vomiting no early satiety no family history of esophageal or stomach cancer no family history of small-bowel or colon cancer no family history of advanced colon adenomas polyps nobody with pancreatitis or pancreatic cancer nobody with ovarian or uterine cancer nobody with kidney or small-bowel cancer patient says family history of gallbladder disease patient has had a hysterectomy no oophorectomy.  Still has a gallbladder in place still has her appendix.  No weight loss at all no GI bleeding does take aspirin daily.  Recently saw her cardiologist and had a stress test that was normal colonoscopy is up-to-date history of adenomas colon polyp.  Her primary care doctor recently put her on an H2 blocker twice daily no improvement. Patient reports diarrhea.    Rosmery Ramirez is a 78 y.o. female.    Past Medical History:   Diagnosis Date    Allergy     Anxiety     Breast cyst     Edema of leg 10/5/2012    bilateral     Fractures 2011    thumb R    GERD (gastroesophageal reflux disease)     Glaucoma     History of colonic polyps     Hx-TIA (transient ischemic attack) 8/13/2012 Jan 2012: LLE and left facial numbness lasting 1 hour     Hyperlipidemia     Hypertension     Left shoulder tendonitis 8/8/2015    Primary open-angle glaucoma, mild stage - Both Eyes 6/3/2013     Past Surgical History:   Procedure Laterality Date    BREAST BIOPSY Left     core    BREAST SURGERY Left     lumpectomy    CATARACT EXTRACTION W/  INTRAOCULAR LENS IMPLANT Left 10/05/2016    Dr. Mike    CATARACT  EXTRACTION W/  INTRAOCULAR LENS IMPLANT Right 11/30/2016    With [place] (Dr. Mike)    COLONOSCOPY N/A 1/23/2017    Procedure: COLONOSCOPY;  Surgeon: Hany Mosquera MD;  Location: [facility] ENDO (4TH FLR);  Service: Endoscopy;  Laterality: N/A;    COLONOSCOPY N/A 3/28/2022    Procedure: COLONOSCOPY;  Surgeon: Jamison Chambers MD;  Location: [facility] ENDO (4TH FLR);  Service: Endoscopy;  Laterality: N/A;  fully vaccinated   instructions to portal-  3/22 arrival time confirmed with pt/COVID test cancelled-RB    HAND SURGERY Left 2011    thumb    HYSTERECTOMY  1980's    Total;    IMPLANTATION OF DEVICE FOR GLAUCOMA Right 3/17/2021    Procedure: INSERTION, DEVICE, FOR GLAUCOMA XEN GEL;  Surgeon: Yesica Mike MD;  Location: [facility] OR [facility];  Service: Ophthalmology;  Laterality: Right;    KAHOOK GONIOTOMY Right 11/30/2016    WITH CE ()    Left Breast Lumpectomy Left     performed in 1960s    OOPHORECTOMY      SELECTIVE LASER TRABECUPLASTY  05/2014    OU w/ Dr. Mike    TRABECULECTOMY Right 3/17/2021    Procedure: TRABECULECTOMY/XEN GEL WITH MMC;  Surgeon: Yesica Mike MD;  Location: [facility] OR [address] [place] [address];  Service: Ophthalmology;  Laterality: Right;    TRABECULECTOMY Left 7/28/2021    Procedure: TRABECULECTOMY/ MMC and Xen OS;  Surgeon: Yesica Mike MD;  Location: [facility] OR [address] [place] [address];  Service: Ophthalmology;  Laterality: Left;    TUBAL LIGATION  1970's    VAGINAL DELIVERY      x3    YAG CAPSULOTOMY Left 11/29/2018         Family History   Problem Relation Age of Onset    Breast cancer Mother 50    Glaucoma Mother     Alzheimer's disease Mother     Blindness Mother     Cataracts Mother     Glaucoma Father     Prostate cancer Father     Blindness Father     Cataracts Father     Cancer Father 62        prostate    Glaucoma Sister     No Known Problems Brother     No Known Problems Brother     Alzheimer's disease Maternal Grandfather     Macular degeneration Neg Hx     Retinal detachment  Neg Hx     Strabismus Neg Hx     Amblyopia Neg Hx     Heart attack Neg Hx     Heart disease Neg Hx     Ovarian cancer Neg Hx     Colon cancer Neg Hx      Social History     Tobacco Use    Smoking status: Never    Smokeless tobacco: Never   Substance Use Topics    Alcohol use: Not Currently    Drug use: No       PTA Medications   Medication Sig    aspirin (ECOTRIN) 81 MG EC tablet Take 81 mg by mouth once daily.    atorvastatin (LIPITOR) 10 MG tablet TAKE 1 TABLET (10 MG TOTAL) BY MOUTH EVERY OTHER DAY.    dorzolamide-timolol 2-0.5% (COSOPT) 22.3-6.8 mg/mL ophthalmic solution Place 1 drop into both eyes 2 (two) times daily.    hydroCHLOROthiazide (MICROZIDE) 12.5 mg capsule TAKE 1 CAPSULE EVERY DAY    irbesartan (AVAPRO) 150 MG tablet Take 1 tablet (150 mg total) by mouth every evening.    LUMIGAN 0.01 % Drop Place 1 drop into both eyes every evening.    pregabalin (LYRICA) 75 MG capsule Take 1 capsule (75 mg total) by mouth 2 (two) times daily.    RHOPRESSA 0.02 % ophthalmic solution INSTILL 1 DROP INTO LEFT EYE ONCE DAILY    ascorbic acid, vitamin C, (VITAMIN C) 1000 MG tablet Take 1,000 mg by mouth once daily.    azelastine (ASTELIN) 137 mcg (0.1 %) nasal spray 1 spray (137 mcg total) by Nasal route 2 (two) times daily.    calcium-vitamin D3 (OS-LETY 500 + D3) 500 mg(1,250mg) -200 unit per tablet Take 1 tablet by mouth 2 (two) times daily with meals.    famotidine (PEPCID) 20 MG tablet Take 1 tablet by mouth twice daily    multivitamin (THERAGRAN) per tablet Take 1 tablet by mouth once daily.    valACYclovir (VALTREX) 1000 MG tablet Take 1 tablet (1,000 mg total) by mouth 3 (three) times daily. for 7 days     Review of patient's allergies indicates:   Allergen Reactions    Lisinopril Swelling    Amlodipine Edema    Combigan [brimonidine-timolol]      Causes itchy eyelids and contact dermatitis    Cosopt [dorzolamide-timolol]      Causes angular blepharitis of eyelids    Pravastatin      Myalgia and elevated CPK         Review of Systems   Constitutional:  Negative for fever.   Respiratory:  Negative for shortness of breath.    Cardiovascular:  Negative for chest pain.   Gastrointestinal:  Positive for diarrhea and heartburn.     Objective:      Vital Signs (Most Recent)  Temp: 97.9 °F (36.6 °C) (07/20/23 1047)  Pulse: (!) 55 (07/20/23 1047)  Resp: 16 (07/20/23 1047)  BP: (!) 143/68 (07/20/23 1047)  SpO2: 98 % (07/20/23 1047)    Vital Signs Range (Last 24H):  Temp:  [97.9 °F (36.6 °C)]   Pulse:  [55]   Resp:  [16]   BP: (143)/(68)   SpO2:  [98 %]     Physical Exam  Constitutional:       Appearance: Normal appearance.   Cardiovascular:      Rate and Rhythm: Bradycardia present.   Pulmonary:      Effort: Pulmonary effort is normal.   Neurological:      Mental Status: She is alert and oriented to person, place, and time.           Assessment:      This is a 77 y.o. female here for evaluation of new onset history of odynophagia dysphagia for the last 2 weeks patient says she has trouble for both liquids and solids feels like they want to come up no food impaction no history for a medication induced esophageal ulcer no chest pain no shortness of breath no nausea vomiting no early satiety no family history of esophageal or stomach cancer no family history of small-bowel or colon cancer no family history of advanced colon adenomas polyps nobody with pancreatitis or pancreatic cancer nobody with ovarian or uterine cancer nobody with kidney or small-bowel cancer patient says family history of gallbladder disease patient has had a hysterectomy no oophorectomy.  Still has a gallbladder in place still has her appendix.  No weight loss at all no GI bleeding does take aspirin daily.  Recently saw her cardiologist and had a stress test that was normal colonoscopy is up-to-date history of adenomas colon polyp.  Her primary care doctor recently put her on an H2 blocker twice daily no improvement. Patient reports diarrhea.      Plan:     EGD

## 2023-07-20 NOTE — [employer] PATIENT INSTRUCTIONS
Discharge Summary/Instructions after an Endoscopic Procedure  Patient Name: Rosmery Ramirez  Patient MRN: [id number]  Patient YOB: 1945 Thursday, July 20, 2023  Jorge L Thompson MD  Dear patient,  As a result of recent federal legislation (The Federal Cures Act), you may   receive lab or pathology results from your procedure in your [facility]   account before your physician is able to contact you. Your physician or   their representative will relay the results to you with their   recommendations at their soonest availability.  Thank you,  RESTRICTIONS:  During your procedure today, you received medications for sedation.  These   medications may affect your judgment, balance and coordination.  Therefore,   for 24 hours, you have the following restrictions:   - DO NOT drive a car, operate machinery, make legal/financial decisions,   sign important papers or drink alcohol.    ACTIVITY:  Today: no heavy lifting, straining or running due to procedural   sedation/anesthesia.  The following day: return to full activity including work.  DIET:  Eat and drink normally unless instructed otherwise.     TREATMENT FOR COMMON SIDE EFFECTS:  - Mild abdominal pain, nausea, belching, bloating or excessive gas:  rest,   eat lightly and use a heating pad.  - Sore Throat: treat with throat lozenges and/or gargle with warm salt   water.  - Because air was used during the procedure, expelling large amounts of air   from your rectum or belching is normal.  - If a bowel prep was taken, you may not have a bowel movement for 1-3 days.    This is normal.  SYMPTOMS TO WATCH FOR AND REPORT TO YOUR PHYSICIAN:  1. Abdominal pain or bloating, other than gas cramps.  2. Chest pain.  3. Back pain.  4. Signs of infection such as: chills or fever occurring within 24 hours   after the procedure.  5. Rectal bleeding, which would show as bright red, maroon, or black stools.   (A tablespoon of blood from the rectum is not serious, especially  if   hemorrhoids are present.)  6. Vomiting.  7. Weakness or dizziness.  GO DIRECTLY TO THE NEAREST EMERGENCY ROOM IF YOU HAVE ANY OF THE FOLLOWING:      Difficulty breathing              Chills and/or fever over 101 F   Persistent vomiting and/or vomiting blood   Severe abdominal pain   Severe chest pain   Black, tarry stools   Bleeding- more than one tablespoon   Any other symptom or condition that you feel may need urgent attention  Your doctor recommends these additional instructions:  If any biopsies were taken, your doctors clinic will contact you in 1 to 2   weeks with any results.  - Discharge patient to home.   - Follow an antireflux regimen.   - Await pathology results.   - Telephone endoscopist for pathology results in 3 weeks.   - Return to GI clinic at the next available appointment.   - The findings and recommendations were discussed with the patient.  For questions, problems or results please call your physician - Jorge L Thompson MD at Work:  [phone].  [facility], EMERGENCY ROOM PHONE NUMBER: [phone]  IF A COMPLICATION OR EMERGENCY SITUATION ARISES AND YOU ARE UNABLE TO REACH   YOUR PHYSICIAN - GO DIRECTLY TO THE EMERGENCY ROOM.  Jorge L Thompson MD  7/20/2023 12:41:13 PM  This report has been verified and signed electronically.  Dear patient,  As a result of recent federal legislation (The Federal Cures Act), you may   receive lab or pathology results from your procedure in your [facility]   account before your physician is able to contact you. Your physician or   their representative will relay the results to you with their   recommendations at their soonest availability.  Thank you,  [employer]ATION

## 2023-07-20 NOTE — TRANSFER OF CARE
"Anesthesia Transfer of Care Note    Patient: Rosmery Pelayotor    Procedure(s) Performed: Procedure(s) (LRB):  EGD (ESOPHAGOGASTRODUODENOSCOPY) (N/A)    Patient location: PACU    Anesthesia Type: general    Transport from OR: Transported from OR on room air with adequate spontaneous ventilation    Post pain: adequate analgesia    Post assessment: no apparent anesthetic complications    Post vital signs: stable    Level of consciousness: awake    Nausea/Vomiting: no nausea/vomiting    Complications: none    Transfer of care protocol was followed      Last vitals:   Visit Vitals  BP (!) 120/58   Pulse 73   Temp 36.6 °C (97.8 °F)   Resp 16   Ht 5' 5" (1.651 m)   Wt 81.6 kg (180 lb)   SpO2 97%   Breastfeeding No   BMI 29.95 kg/m²     "

## 2023-07-20 NOTE — ANESTHESIA PREPROCEDURE EVALUATION
Pre-operative evaluation for Procedure(s) (LRB):  EGD (ESOPHAGOGASTRODUODENOSCOPY) (N/A)    Rosmery Ramirez is a 78 y.o. female     Patient Active Problem List   Diagnosis    Essential hypertension    Hyperlipidemia; statin myalgias; 6/28/2018 exercise stress echo neg for ischemia    POAG (primary open-angle glaucoma)    Class 2 severe obesity due to excess calories with serious comorbidity in adult, unspecified BMI    Cataract    Nuclear sclerosis    Left knee pain    Tubular adenoma of colon 3/28/22 colonoscopy 4 mm sigmoid TA    Bradycardia 6/2018 holter negative    Status post hysterectomy    Statin myopathy    Chronic bilateral low back pain without sciatica    Vitamin D deficiency    Abnormal glucose    Thyroid nodule on US; followed by endo repeat 6/2024    Idiopathic peripheral neuropathy normal B12, TSH; A1c pre-DM. hx of lory changed to lyrica    Acute pain of left knee    Weakness of both hips    Decreased independence with transfers    Impaired functional mobility, balance, gait, and endurance    Fatigue    Primary open angle glaucoma of right eye, mild stage    Primary open-angle glaucoma, left eye, moderate stage    Right foot pain    Thoracic aortic atherosclerosis incidental on CXR 12/2022    Orthopnea    CLARA (obstructive sleep apnea)    Snoring    Esophageal dysphagia       Review of patient's allergies indicates:   Allergen Reactions    Lisinopril Swelling    Amlodipine Edema    Combigan [brimonidine-timolol]      Causes itchy eyelids and contact dermatitis    Cosopt [dorzolamide-timolol]      Causes angular blepharitis of eyelids    Pravastatin      Myalgia and elevated CPK       No current facility-administered medications on file prior to encounter.     Current Outpatient Medications on File Prior to Encounter   Medication Sig Dispense Refill    aspirin (ECOTRIN) 81 MG EC tablet Take 81 mg by mouth once daily.       atorvastatin (LIPITOR) 10 MG tablet TAKE 1 TABLET (10 MG TOTAL) BY MOUTH EVERY OTHER DAY. 45 tablet 3    dorzolamide-timolol 2-0.5% (COSOPT) 22.3-6.8 mg/mL ophthalmic solution Place 1 drop into both eyes 2 (two) times daily. 30 mL 3    hydroCHLOROthiazide (MICROZIDE) 12.5 mg capsule TAKE 1 CAPSULE EVERY DAY 90 capsule 3    irbesartan (AVAPRO) 150 MG tablet Take 1 tablet (150 mg total) by mouth every evening. 90 tablet 3    LUMIGAN 0.01 % Drop Place 1 drop into both eyes every evening. 7.5 mL 3    pregabalin (LYRICA) 75 MG capsule Take 1 capsule (75 mg total) by mouth 2 (two) times daily. 60 capsule 5    RHOPRESSA 0.02 % ophthalmic solution INSTILL 1 DROP INTO LEFT EYE ONCE DAILY      ascorbic acid, vitamin C, (VITAMIN C) 1000 MG tablet Take 1,000 mg by mouth once daily.      azelastine (ASTELIN) 137 mcg (0.1 %) nasal spray 1 spray (137 mcg total) by Nasal route 2 (two) times daily. 30 mL 2    calcium-vitamin D3 (OS-LETY 500 + D3) 500 mg(1,250mg) -200 unit per tablet Take 1 tablet by mouth 2 (two) times daily with meals.      multivitamin (THERAGRAN) per tablet Take 1 tablet by mouth once daily.         Past Surgical History:   Procedure Laterality Date    BREAST BIOPSY Left     core    BREAST SURGERY Left     lumpectomy    CATARACT EXTRACTION W/  INTRAOCULAR LENS IMPLANT Left 10/05/2016    Dr. Mike    CATARACT EXTRACTION W/  INTRAOCULAR LENS IMPLANT Right 11/30/2016    With [place] (Dr. Mike)    COLONOSCOPY N/A 1/23/2017    Procedure: COLONOSCOPY;  Surgeon: Hany Mosquera MD;  Location: [facility] ([address] [place] [address]);  Service: Endoscopy;  Laterality: N/A;    COLONOSCOPY N/A 3/28/2022    Procedure: COLONOSCOPY;  Surgeon: Jamison Chambers MD;  [facility]  Service: Endoscopy;  Laterality: N/A;  fully vaccinated   instructions to portal-st  3/22 arrival time confirmed with pt/COVID test cancelled-RB    HAND SURGERY Left 2011    thumb    HYSTERECTOMY  1980's    Total;     IMPLANTATION OF DEVICE FOR GLAUCOMA Right 3/17/2021    Procedure: INSERTION, DEVICE, FOR GLAUCOMA XEN GEL;  Surgeon: Yesica Mike MD;  Location: [facility] OR [address] [place] [address];  Service: Ophthalmology;  Laterality: Right;    KAHOOK GONIOTOMY Right 11/30/2016    WITH CE ()    Left Breast Lumpectomy Left     performed in 1960s    OOPHORECTOMY      SELECTIVE LASER TRABECUPLASTY  05/2014    [facility] w/ Dr. Mike    TRABECULECTOMY Right 3/17/2021    Procedure: TRABECULECTOMY/XEN GEL WITH MMC;  Surgeon: Yesica Mike MD;  Location: [facility] OR [address] [place] [address];  Service: Ophthalmology;  Laterality: Right;    TRABECULECTOMY Left 7/28/2021    Procedure: TRABECULECTOMY/ MMC and Xen OS;  Surgeon: Yesica Mike MD;  Location: [facility] OR [address] [place] [address];  Service: Ophthalmology;  Laterality: Left;    TUBAL LIGATION  1970's    VAGINAL DELIVERY      x3    YAG CAPSULOTOMY Left 11/29/2018           Social History     Socioeconomic History    Marital status:     Number of children: 3   Occupational History    Occupation: retired - formerly pastoral care with [employer]   Tobacco Use    Smoking status: Never    Smokeless tobacco: Never   Substance and Sexual Activity    Alcohol use: Not Currently    Drug use: No    Sexual activity: Yes     Partners: Female, Male     Birth control/protection: Post-menopausal, See Surgical Hx     Social Determinants of Health     Financial Resource Strain: Low Risk     Difficulty of Paying Living Expenses: Not hard at all   Food Insecurity: No Food Insecurity    Worried About Running Out of Food in the Last Year: Never true    Ran Out of Food in the Last Year: Never true   Transportation Needs: No Transportation Needs    Lack of Transportation (Medical): No    Lack of Transportation (Non-Medical): No   Physical Activity: Insufficiently Active    Days of Exercise per Week: 3 days    Minutes of Exercise per Session: 20 min   Stress: No Stress Concern Present     Feeling of Stress : Not at all   Social Connections: Unknown    Frequency of Communication with Friends and Family: Three times a week    Frequency of Social Gatherings with Friends and Family: Once a week    Active Member of Clubs or Organizations: Yes    Attends Club or Organization Meetings: 1 to 4 times per year    Marital Status:    Housing Stability: Low Risk     Unable to Pay for Housing in the Last Year: No    Number of Places Lived in the Last Year: 2    Unstable Housing in the Last Year: No         CBC: No results for input(s): WBC, RBC, HGB, HCT, PLT, MCV, MCH, MCHC in the last 72 hours.    CMP: No results for input(s): NA, K, CL, CO2, BUN, CREATININE, GLU, MG, PHOS, CALCIUM, ALBUMIN, PROT, ALKPHOS, ALT, AST, BILITOT in the last 72 hours.    INR  No results for input(s): PT, INR, PROTIME, APTT in the last 72 hours.        Diagnostic Studies:      EKD Echo:     The estimated ejection fraction is 55%.   The left ventricle is normal in size with mild concentric hypertrophy and normal systolic function.   Grade I left ventricular diastolic dysfunction.   Normal right ventricular size with normal right ventricular systolic function.   Mild to moderate left atrial enlargement.   Mild mitral regurgitation.   Mild tricuspid regurgitation.   Intermediate central venous pressure (8 mmHg).   The estimated PA systolic pressure is 28 mmHg.         Pre-op Assessment    I have reviewed the Patient Summary Reports.     I have reviewed the Nursing Notes. I have reviewed the NPO Status.   I have reviewed the Medications.     Review of Systems  Social:  Non-Smoker, No Alcohol Use    Hematology/Oncology:  Hematology Normal   Oncology Normal     EENT/Dental:EENT/Dental Normal   Cardiovascular:   Hypertension    Pulmonary:   Denies Shortness of breath.    Hepatic/GI:   GERD, well controlled        Physical Exam  General: Well nourished, Alert and Oriented    Airway:  Mallampati: III   Mouth  Opening: Normal  TM Distance: Normal  Tongue: Normal    Chest/Lungs:  Clear to auscultation, Normal Respiratory Rate    Heart:  Rate: Normal        Anesthesia Plan  Type of Anesthesia, risks & benefits discussed:    Anesthesia Type: Gen Natural Airway  Intra-op Monitoring Plan: Standard ASA Monitors  Induction:  IV  ASA Score: 3    Ready For Surgery From Anesthesia Perspective.     .

## 2023-07-20 NOTE — ANESTHESIA POSTPROCEDURE EVALUATION
Anesthesia Post Evaluation    Patient: Rosmery Ramirez    Procedure(s) Performed: Procedure(s) (LRB):  EGD (ESOPHAGOGASTRODUODENOSCOPY) (N/A)    Final Anesthesia Type: general      Patient location during evaluation: GI PACU  Patient participation: Yes- Able to Participate  Level of consciousness: awake and alert  Post-procedure vital signs: reviewed and stable  Pain management: adequate  Airway patency: patent    PONV status at discharge: No PONV  Anesthetic complications: no      Cardiovascular status: blood pressure returned to baseline  Respiratory status: unassisted and spontaneous ventilation  Hydration status: euvolemic  Follow-up not needed.          Vitals Value Taken Time   /60 07/20/23 1310   Temp 36.6 °C (97.8 °F) 07/20/23 1240   Pulse 54 07/20/23 1310   Resp 16 07/20/23 1310   SpO2 98 % 07/20/23 1310         No case tracking events are documented in the log.      Pain/Nichole Score: Nichole Score: 10 (7/20/2023  1:10 PM)

## 2023-07-22 ENCOUNTER — PATIENT MESSAGE (OUTPATIENT)
Dept: GASTROENTEROLOGY | Facility: CLINIC | Age: 78
End: 2023-07-22
Payer: MEDICARE

## 2023-07-22 ENCOUNTER — NURSE TRIAGE (OUTPATIENT)
Dept: ADMINISTRATIVE | Facility: CLINIC | Age: 78
End: 2023-07-22
Payer: MEDICARE

## 2023-07-22 NOTE — TELEPHONE ENCOUNTER
Patient states she is s/p EGD Procedure on 7/20/23 and c/o painful swallowing, and sensation of swallowing lumps/bumps. Patient states it also hurts to drink water.     Care Advice given per Swallowing Difficulty (Adult) Guideline. Patient advised to Go to ED/UCC within 24 Hours for evaluation/treatment and to follow up with Provider. Patient states understanding of care advice.    Reason for Disposition   [1] Swallowing difficulty AND [2] cause unknown  (Exception: Difficulty swallowing is a chronic symptom.)    Additional Information   Negative: [1] Severe difficulty swallowing (e.g., drooling or spitting) AND [2] started suddenly after taking a medicine or allergic food   Negative: Wheezing, stridor, hoarseness, or difficulty breathing   Negative: [1] Swollen tongue AND [2] sudden onset   Negative: Sounds like a life-threatening emergency to the triager   Negative: Mouth ulcers are seen   Negative: Sore throat (throat pain with swallowing)   Negative: Swallowed a (non-edible) foreign body   Negative: Feeding tube, questions or concerns related to   Negative: Swelling of uvula   Negative: Swelling of tongue   Negative: SEVERE difficulty swallowing (e.g., drooling or spitting, can't swallow water)   Negative: [1] Symptoms of blocked esophagus (e.g., can't swallow normal secretions, drooling) AND [2] present now   Negative: Symptoms of food or bone stuck in throat or esophagus (e.g., pain in throat or chest, FB sensation, blood-tinged saliva)   Negative: SEVERE symptoms of pill stuck in throat or esophagus (e.g., severe pain, bleeding, or inability to swallow liquids)   Negative: [1] Drinking very little AND [2] dehydration suspected (e.g., no urine > 12 hours, very dry mouth, very lightheaded)   Negative: [1] Refuses to drink anything AND [2] for > 12 hours   Negative: Patient sounds very sick or weak to the triager   Negative: Fever > 100.4 F (38.0 C)   Negative: [1] Coughing spells AND [2] occur during  eating/feedings or within 2 hours   Negative: [1] Symptoms of pill stuck in throat or esophagus (e.g., pain in throat or chest, FB sensation) AND [2] no relief after using Care Advice   Negative: Weak immune system (e.g., HIV positive, cancer chemo, splenectomy, organ transplant, chronic steroids)    Protocols used: Swallowing Difficulty-A-AH

## 2023-07-24 LAB
FINAL PATHOLOGIC DIAGNOSIS: NORMAL
GROSS: NORMAL
Lab: NORMAL

## 2023-07-25 DIAGNOSIS — I10 ESSENTIAL HYPERTENSION: ICD-10-CM

## 2023-07-25 LAB
FINAL PATHOLOGIC DIAGNOSIS: NORMAL
Lab: NORMAL

## 2023-07-25 RX ORDER — IRBESARTAN 150 MG/1
TABLET ORAL
Qty: 90 TABLET | Refills: 1 | Status: SHIPPED | OUTPATIENT
Start: 2023-07-25 | End: 2024-02-09 | Stop reason: SDUPTHER

## 2023-07-25 NOTE — TELEPHONE ENCOUNTER
No care due was identified.  [facility] Embedded Care Due Messages. Reference number: [id number].   7/25/2023 6:48:53 PM CDT

## 2023-07-26 ENCOUNTER — TELEPHONE (OUTPATIENT)
Dept: ENDOSCOPY | Facility: HOSPITAL | Age: 78
End: 2023-07-26

## 2023-07-26 ENCOUNTER — HOSPITAL ENCOUNTER (EMERGENCY)
Facility: HOSPITAL | Age: 78
Discharge: HOME | End: 2023-07-26
Attending: EMERGENCY MEDICINE
Payer: MEDICARE

## 2023-07-26 VITALS
HEART RATE: 68 BPM | DIASTOLIC BLOOD PRESSURE: 78 MMHG | SYSTOLIC BLOOD PRESSURE: 158 MMHG | OXYGEN SATURATION: 98 % | BODY MASS INDEX: 29.95 KG/M2 | RESPIRATION RATE: 16 BRPM | WEIGHT: 180 LBS | TEMPERATURE: 98 F

## 2023-07-26 DIAGNOSIS — J98.11 ATELECTASIS: Primary | ICD-10-CM

## 2023-07-26 DIAGNOSIS — R07.9 CHEST PAIN, UNSPECIFIED TYPE: Primary | ICD-10-CM

## 2023-07-26 DIAGNOSIS — R07.9 CHEST PAIN: ICD-10-CM

## 2023-07-26 LAB
ALBUMIN SERPL BCP-MCNC: 3.5 G/DL (ref 3.5–5.2)
ALP SERPL-CCNC: 82 U/L (ref 55–135)
ALT SERPL W/O P-5'-P-CCNC: 15 U/L (ref 10–44)
ANION GAP SERPL CALC-SCNC: 10 MMOL/L (ref 8–16)
AST SERPL-CCNC: 17 U/L (ref 10–40)
BASOPHILS # BLD AUTO: 0.03 K/UL (ref 0–0.2)
BASOPHILS NFR BLD: 0.6 % (ref 0–1.9)
BILIRUB SERPL-MCNC: 0.4 MG/DL (ref 0.1–1)
BNP SERPL-MCNC: 10 PG/ML (ref 0–99)
BUN SERPL-MCNC: 14 MG/DL (ref 8–23)
CALCIUM SERPL-MCNC: 9.5 MG/DL (ref 8.7–10.5)
CHLORIDE SERPL-SCNC: 103 MMOL/L (ref 95–110)
CO2 SERPL-SCNC: 26 MMOL/L (ref 23–29)
CREAT SERPL-MCNC: 0.8 MG/DL (ref 0.5–1.4)
D DIMER PPP IA.FEU-MCNC: 0.52 MG/L FEU
DIFFERENTIAL METHOD: ABNORMAL
EOSINOPHIL # BLD AUTO: 0 K/UL (ref 0–0.5)
EOSINOPHIL NFR BLD: 0.2 % (ref 0–8)
ERYTHROCYTE [DISTWIDTH] IN BLOOD BY AUTOMATED COUNT: 12.3 % (ref 11.5–14.5)
EST. GFR  (NO RACE VARIABLE): >60 ML/MIN/1.73 M^2
GLUCOSE SERPL-MCNC: 94 MG/DL (ref 70–110)
HCT VFR BLD AUTO: 35.9 % (ref 37–48.5)
HGB BLD-MCNC: 11.3 G/DL (ref 12–16)
IMM GRANULOCYTES # BLD AUTO: 0.02 K/UL (ref 0–0.04)
IMM GRANULOCYTES NFR BLD AUTO: 0.4 % (ref 0–0.5)
LYMPHOCYTES # BLD AUTO: 1.5 K/UL (ref 1–4.8)
LYMPHOCYTES NFR BLD: 28.1 % (ref 18–48)
MCH RBC QN AUTO: 28 PG (ref 27–31)
MCHC RBC AUTO-ENTMCNC: 31.5 G/DL (ref 32–36)
MCV RBC AUTO: 89 FL (ref 82–98)
MONOCYTES # BLD AUTO: 0.6 K/UL (ref 0.3–1)
MONOCYTES NFR BLD: 11 % (ref 4–15)
NEUTROPHILS # BLD AUTO: 3.3 K/UL (ref 1.8–7.7)
NEUTROPHILS NFR BLD: 59.7 % (ref 38–73)
NRBC BLD-RTO: 0 /100 WBC
PLATELET # BLD AUTO: 274 K/UL (ref 150–450)
PMV BLD AUTO: 10.6 FL (ref 9.2–12.9)
POTASSIUM SERPL-SCNC: 3.7 MMOL/L (ref 3.5–5.1)
PROT SERPL-MCNC: 6.8 G/DL (ref 6–8.4)
RBC # BLD AUTO: 4.03 M/UL (ref 4–5.4)
SODIUM SERPL-SCNC: 139 MMOL/L (ref 136–145)
TROPONIN I SERPL DL<=0.01 NG/ML-MCNC: <0.006 NG/ML (ref 0–0.03)
TROPONIN I SERPL DL<=0.01 NG/ML-MCNC: <0.006 NG/ML (ref 0–0.03)
WBC # BLD AUTO: 5.44 K/UL (ref 3.9–12.7)

## 2023-07-26 PROCEDURE — 84484 ASSAY OF TROPONIN QUANT: CPT | Mod: HCNC | Performed by: EMERGENCY MEDICINE

## 2023-07-26 PROCEDURE — 85025 COMPLETE CBC W/AUTO DIFF WBC: CPT | Mod: HCNC | Performed by: EMERGENCY MEDICINE

## 2023-07-26 PROCEDURE — 93010 EKG 12-LEAD: ICD-10-PCS | Mod: HCNC,,, | Performed by: INTERNAL MEDICINE

## 2023-07-26 PROCEDURE — 93005 ELECTROCARDIOGRAM TRACING: CPT | Mod: HCNC

## 2023-07-26 PROCEDURE — 93010 ELECTROCARDIOGRAM REPORT: CPT | Mod: HCNC,,, | Performed by: INTERNAL MEDICINE

## 2023-07-26 PROCEDURE — 99285 EMERGENCY DEPT VISIT HI MDM: CPT | Mod: 25,HCNC

## 2023-07-26 PROCEDURE — 80053 COMPREHEN METABOLIC PANEL: CPT | Mod: HCNC | Performed by: EMERGENCY MEDICINE

## 2023-07-26 PROCEDURE — 83880 ASSAY OF NATRIURETIC PEPTIDE: CPT | Mod: HCNC | Performed by: EMERGENCY MEDICINE

## 2023-07-26 PROCEDURE — 85379 FIBRIN DEGRADATION QUANT: CPT | Mod: HCNC | Performed by: EMERGENCY MEDICINE

## 2023-07-26 PROCEDURE — 94799 UNLISTED PULMONARY SVC/PX: CPT | Mod: HCNC

## 2023-07-26 NOTE — TELEPHONE ENCOUNTER
----- Message from Christal Russell sent at 7/26/2023 12:53 PM CDT -----  Regarding: Scheduling Request  Contact: [phone]  SCHEDULING/REQUEST    Appt Type: ER Follow Up    Date/Time Preference: Anytime    Treating Provider: Jorge L Thompson    Caller Name: Rosmery Ramirez    Contact Preference: [phone]    Comments/Notes: Pt had EGD on 07/20/23 and was seen in the ER on yesterday during discomfort in swallowing foods.  Please call.  No appts avail.

## 2023-07-26 NOTE — ED TRIAGE NOTES
Rosmery Ramirez, a 78 y.o. female presents to the ED w/ complaint of epigastric chest pain radiating to L shoulder. Pt states pain started after her EGD last Thursday. +SOB when laying flat and on exertion. Pt states she took an advil before she came and pain has been relieved to 5/10.    Triage note:  Chief Complaint   Patient presents with    Chest Pain     Pain since since EGD which was on Thursday last week. Worsens with deeps breaths      Review of patient's allergies indicates:   Allergen Reactions    Lisinopril Swelling    Amlodipine Edema    Combigan [brimonidine-timolol]      Causes itchy eyelids and contact dermatitis    Cosopt [dorzolamide-timolol]      Causes angular blepharitis of eyelids    Pravastatin      Myalgia and elevated CPK     Past Medical History:   Diagnosis Date    Allergy     Anxiety     Breast cyst     Edema of leg 10/5/2012    bilateral     Fractures 2011    thumb R    GERD (gastroesophageal reflux disease)     Glaucoma     History of colonic polyps     Hx-TIA (transient ischemic attack) 8/13/2012 Jan 2012: LLE and left facial numbness lasting 1 hour     Hyperlipidemia     Hypertension     Left shoulder tendonitis 8/8/2015    Primary open-angle glaucoma, mild stage - Both Eyes 6/3/2013

## 2023-07-26 NOTE — TELEPHONE ENCOUNTER
Refill Decision Note   Rosmery Ramirez  is requesting a refill authorization.  Brief Assessment and Rationale for Refill:  Approve     Medication Therapy Plan:         Comments:     No Care Gaps recommended.     Note composed:7:49 PM 07/25/2023

## 2023-07-26 NOTE — TELEPHONE ENCOUNTER
Says she is having chest discomfort since the procedure.   When to ER yesterday.  Can you please review those notes and advise?

## 2023-07-26 NOTE — DISCHARGE INSTRUCTIONS
Follow-up with Dr. Thompson to notify him of your ER visit said he can review your labs and note and decide if he wants to make any changes    Follow-up with your primary care and cardiologist as previously scheduled     Return to the emergency department if any new or concerning symptoms occur

## 2023-07-26 NOTE — TELEPHONE ENCOUNTER
----- Message from Christal Russell sent at 7/26/2023 12:53 PM CDT -----  Regarding: Scheduling Request  Contact: [phone]  SCHEDULING/REQUEST    Appt Type: ER Follow Up    Date/Time Preference: Anytime    Treating Provider: Jorge L Thompson    Caller Name: Rosmery Ramirez    Contact Preference: [phone]    Comments/Notes: Pt had EGD on 07/20/23 and was seen in the ER on yesterday during discomfort in swallowing foods.  Please call.  No appts avail.

## 2023-07-26 NOTE — ED PROVIDER NOTES
Encounter Date: 7/26/2023       History     Chief Complaint   Patient presents with    Chest Pain     Pain since since EGD which was on Thursday last week. Worsens with deeps breaths      Female history of hypertension, hyperlipidemia, TIA, GERD.  Patient presents today with chest pain since her EGD on 07/20/2023.  She describes the pain as burning and in her mid chest.  She points just right of her sternum but says it is on the left.  She reports she does have some pain in her back as well.  She reports is similar to the symptoms for which she recently received her EGD.  She denies shortness of breath.  She denies headache.  She has no hematemesis, coffee-ground emesis, melena, hematochezia.  She denies abdominal pain.  She denies exertional exacerbation of her symptoms.  She feels like maybe it is worse when she lies supine.    Review of patient's allergies indicates:   Allergen Reactions    Lisinopril Swelling    Amlodipine Edema    Combigan [brimonidine-timolol]      Causes itchy eyelids and contact dermatitis    Cosopt [dorzolamide-timolol]      Causes angular blepharitis of eyelids    Pravastatin      Myalgia and elevated CPK     Past Medical History:   Diagnosis Date    Allergy     Anxiety     Breast cyst     Edema of leg 10/5/2012    bilateral     Fractures 2011    thumb R    GERD (gastroesophageal reflux disease)     Glaucoma     History of colonic polyps     Hx-TIA (transient ischemic attack) 8/13/2012 Jan 2012: LLE and left facial numbness lasting 1 hour     Hyperlipidemia     Hypertension     Left shoulder tendonitis 8/8/2015    Primary open-angle glaucoma, mild stage - Both Eyes 6/3/2013     Past Surgical History:   Procedure Laterality Date    BREAST BIOPSY Left     core    BREAST SURGERY Left     lumpectomy    CATARACT EXTRACTION W/  INTRAOCULAR LENS IMPLANT Left 10/05/2016    Dr. Mike    CATARACT EXTRACTION W/  INTRAOCULAR LENS IMPLANT Right 11/30/2016    With [facility]  (Dr. Mike)    COLONOSCOPY N/A 1/23/2017    Procedure: COLONOSCOPY;  Surgeon: Hany Mosquera MD;  Location: [facility] ENDO (4TH FLR);  Service: Endoscopy;  Laterality: N/A;    COLONOSCOPY N/A 3/28/2022    Procedure: COLONOSCOPY;  Surgeon: Jamison Chambers MD;  Location: [facility] ENDO (4TH FLR);  Service: Endoscopy;  Laterality: N/A;  fully vaccinated   instructions to portal-st  3/22 arrival time confirmed with pt/COVID test cancelled-RB    ESOPHAGOGASTRODUODENOSCOPY N/A 7/20/2023    Procedure: EGD (ESOPHAGOGASTRODUODENOSCOPY);  Surgeon: Jorge L Thompson MD;  Location: [facility] ENDO (4TH FLR);  Service: Endoscopy;  Laterality: N/A;  inst handed to pre-call pt needs to be rescheduled to new day 03/21 bm.  4/26/23- precall- pt wants to cancel appt and reschedule for another day  6/7 pt r/s/instr. to sunita;-s7/14/23-pre-call completed-LS    HAND SURGERY Left 2011    thumb    HYSTERECTOMY  1980's    Total;    IMPLANTATION OF DEVICE FOR GLAUCOMA Right 3/17/2021    Procedure: INSERTION, DEVICE, FOR GLAUCOMA XEN GEL;  Surgeon: Yesica Mike MD;  Location: [facility] OR [address] [place] [address];  Service: Ophthalmology;  Laterality: Right;    KAHOOK GONIOTOMY Right 11/30/2016    WITH CE ()    Left Breast Lumpectomy Left     performed in 1960s    OOPHORECTOMY      SELECTIVE LASER TRABECUPLASTY  05/2014    [facility] w/ Dr. Mike    TRABECULECTOMY Right 3/17/2021    Procedure: TRABECULECTOMY/XEN GEL WITH MMC;  Surgeon: Yesica Mike MD;  Location: [facility] OR [address] [place] [address];  Service: Ophthalmology;  Laterality: Right;    TRABECULECTOMY Left 7/28/2021    Procedure: TRABECULECTOMY/ MMC and Xen OS;  Surgeon: Yesica Mike MD;  Location: [facility] OR [address] [place] [address];  Service: Ophthalmology;  Laterality: Left;    TUBAL LIGATION  1970's    VAGINAL DELIVERY      x3    YAG CAPSULOTOMY Left 11/29/2018         Family History   Problem Relation Age of Onset    Breast cancer Mother 50    Glaucoma Mother     Alzheimer's disease  Mother     Blindness Mother     Cataracts Mother     Glaucoma Father     Prostate cancer Father     Blindness Father     Cataracts Father     Cancer Father 62        prostate    Glaucoma Sister     No Known Problems Brother     No Known Problems Brother     Alzheimer's disease Maternal Grandfather     Macular degeneration Neg Hx     Retinal detachment Neg Hx     Strabismus Neg Hx     Amblyopia Neg Hx     Heart attack Neg Hx     Heart disease Neg Hx     Ovarian cancer Neg Hx     Colon cancer Neg Hx      Social History     Tobacco Use    Smoking status: Never    Smokeless tobacco: Never   Substance Use Topics    Alcohol use: Not Currently    Drug use: No     Review of Systems  All other systems reviewed and negative except as noted in HPI    Physical Exam     Initial Vitals [07/26/23 0540]   BP Pulse Resp Temp SpO2   (!) 170/78 62 15 97.9 °F (36.6 °C) 98 %      MAP       --         Physical Exam  General: AO x 3, NAD. Well nourished. Well Developed  Head: Normocephalic and Atraumatic  HEENT: PERRLA. EOMI. OP Clear  Neck: Supple, Nontender in midline.  Cardiovascular: RRR. No m/r/g. 2+ Distal Pulses  Pulm/Chest: Chest nontender. Lungs clear to auscultation.  No crepitus.  No respiratory distress.  Abdomen: Nontender. Nondistended. No rigidity, rebound, or guarding  MSK: extremities atraumatic x 4. Gait normal  Ext: no clubbing, cyanosis, or edema  Neuro: GCS 15. CN II-XII intact. Intact symmetric sensation, strength, DTR x 4 extremities  Skin: no bullae, petechiae, or purpura. Warm, dry, and intact.  Psych: normal mood and affect.    ED Course   Procedures  Labs Reviewed   CBC W/ AUTO DIFFERENTIAL - Abnormal; Notable for the following components:       Result Value    Hemoglobin 11.3 (*)     Hematocrit 35.9 (*)     MCHC 31.5 (*)     All other components within normal limits   D DIMER, QUANTITATIVE - Abnormal; Notable for the following components:    D-Dimer 0.52 (*)     All other components  within normal limits   COMPREHENSIVE METABOLIC PANEL   TROPONIN I   B-TYPE NATRIURETIC PEPTIDE   TROPONIN I   POCT TROPONIN   POCT TROPONIN     EKG Readings: (Independently Interpreted)   Initial Reading: No STEMI. Previous EKG: Compared with most recent EKG Rhythm: Sinus Bradycardia. Heart Rate: 56.   No significant change     Imaging Results              X-Ray Chest PA And Lateral (Final result)  Result time 07/26/23 07:33:19      Final result by Arnoldo South MD (07/26/23 07:33:19)                   Impression:      See above      Electronically signed by: Arnoldo South MD  Date:    07/26/2023  Time:    07:33               Narrative:    EXAMINATION:  XR CHEST PA AND LATERAL    CLINICAL HISTORY:  Chest Pain;    TECHNIQUE:  PA and lateral views of the chest were performed.    COMPARISON:  12/01/2022    FINDINGS:  Cardiac size is normal.  Right hemidiaphragm is again noted to be elevated with some mild atelectasis at the right lung base.  No infiltrate or cardiac failure.  No evidence of pleural effusion.                                    X-Rays:   Independently Interpreted Readings:   Chest X-Ray: No Free air  No mediastinal Widening   No focal consolidation      Medications - No data to display  Medical Decision Making:   History:   Old Medical Records: I decided to obtain old medical records.  Old Records Summarized: records from clinic visits, records from previous admission(s), records from another hospital and other records.       <> Summary of Records: EGD on 07/20/2023   Review of message encounter with Dr. Thompson from Gastroenterology reveals the symptoms are similar to the symptoms which he originally had her EGD  Initial Assessment:   Considered esophageal perforation given recent EGD.  However, patient was no crepitus on examination.  She does not have infectious symptoms to suggest mediastinitis despite the duration of her symptoms.  Overall, I am less concern for esophageal perforation.   If the patient has significant leukocytosis or abnormality on chest x-ray to suggest free air, will obtain CT chest.  Brief hospitalization with same-day discharge but will obtain D-dimer as patient was concerned about a blood clot.  Will obtain troponin x2 as patient has risk factors for coronary artery disease in terms of high blood pressure and dyslipidemia  Independently Interpreted Test(s):   I have ordered and independently interpreted X-rays - see prior notes.  I have ordered and independently interpreted EKG Reading(s) - see prior notes  Clinical Tests:   Lab Tests: Ordered and Reviewed  Radiological Study: Ordered and Reviewed  Medical Tests: Ordered and Reviewed  ED Management:  Presentation atypical for ACS.  EKG nonischemic.  Troponin negative x2.  Chest x-ray, clinical presentation, lab assays not consistent with esophageal perforation or mediastinum this.  Will  patient follow-up with her gastroenterologist as previously planned.  I had less concern for pulmonary embolism given the brief duration of patient's hospitalization.  However, I did order a D-dimer to ensure that she did not have PE.  Her D-dimer was age adjusted negative.  Patient does have some atelectasis on chest x-ray.  This may be the cause of her symptoms.  Will provide incentive spirometer.      See ED course for additional HPI, ROS, PE, lab, imaging, consultant discussion, procedure interpretation, and Medical Decision Making.             ED Course as of 07/26/23 1023   Wed Jul 26, 2023   0850 Considered esophageal perforation given recent EGD.  However, patient was no crepitus on examination.  She does not have infectious symptoms to suggest mediastinitis despite the duration of her symptoms.  Overall, I am less concern for esophageal perforation.  If the patient has significant leukocytosis or abnormality on chest x-ray to suggest free air, will obtain CT chest.  Brief hospitalization with same-day discharge but will obtain  D-dimer as patient was concerned about a blood clot.  Will obtain troponin x2 as patient has risk factors for coronary artery disease in terms of high blood pressure and dyslipidemia [DS]   0851 Patient has no leukocytosis or free air on chest x-ray.  Doubt esophageal perforation.  Initial troponin is negative.  D-dimer is negative when adjusted for age. [DS]      ED Course User Index  [DS] Jagjit Zamarripa MD                 Clinical Impression:   Final diagnoses:  [R07.9] Chest pain               Jagjit Zamarripa MD  07/27/23 2929

## 2023-07-29 ENCOUNTER — OFFICE VISIT (OUTPATIENT)
Dept: INTERNAL MEDICINE | Facility: CLINIC | Age: 78
End: 2023-07-29
Payer: MEDICARE

## 2023-07-29 VITALS
DIASTOLIC BLOOD PRESSURE: 72 MMHG | HEIGHT: 65 IN | HEART RATE: 57 BPM | SYSTOLIC BLOOD PRESSURE: 130 MMHG | WEIGHT: 197.75 LBS | BODY MASS INDEX: 32.95 KG/M2 | OXYGEN SATURATION: 99 %

## 2023-07-29 DIAGNOSIS — M94.0 COSTOCHONDRITIS, ACUTE: ICD-10-CM

## 2023-07-29 PROBLEM — R07.81 PLEURITIC CHEST PAIN: Status: RESOLVED | Noted: 2023-07-29 | Resolved: 2023-07-29

## 2023-07-29 PROBLEM — R07.81 PLEURITIC CHEST PAIN: Status: ACTIVE | Noted: 2023-07-29

## 2023-07-29 PROCEDURE — 1159F MED LIST DOCD IN RCRD: CPT | Mod: HCNC,CPTII,S$GLB, | Performed by: STUDENT IN AN ORGANIZED HEALTH CARE EDUCATION/TRAINING PROGRAM

## 2023-07-29 PROCEDURE — 99999 PR PBB SHADOW E&M-EST. PATIENT-LVL III: CPT | Mod: PBBFAC,HCNC,, | Performed by: STUDENT IN AN ORGANIZED HEALTH CARE EDUCATION/TRAINING PROGRAM

## 2023-07-29 PROCEDURE — 99213 OFFICE O/P EST LOW 20 MIN: CPT | Mod: HCNC,S$GLB,, | Performed by: STUDENT IN AN ORGANIZED HEALTH CARE EDUCATION/TRAINING PROGRAM

## 2023-07-29 PROCEDURE — 3078F PR MOST RECENT DIASTOLIC BLOOD PRESSURE < 80 MM HG: ICD-10-PCS | Mod: HCNC,CPTII,S$GLB, | Performed by: STUDENT IN AN ORGANIZED HEALTH CARE EDUCATION/TRAINING PROGRAM

## 2023-07-29 PROCEDURE — 3288F FALL RISK ASSESSMENT DOCD: CPT | Mod: HCNC,CPTII,S$GLB, | Performed by: STUDENT IN AN ORGANIZED HEALTH CARE EDUCATION/TRAINING PROGRAM

## 2023-07-29 PROCEDURE — 1101F PR PT FALLS ASSESS DOC 0-1 FALLS W/OUT INJ PAST YR: ICD-10-PCS | Mod: HCNC,CPTII,S$GLB, | Performed by: STUDENT IN AN ORGANIZED HEALTH CARE EDUCATION/TRAINING PROGRAM

## 2023-07-29 PROCEDURE — 3288F PR FALLS RISK ASSESSMENT DOCUMENTED: ICD-10-PCS | Mod: HCNC,CPTII,S$GLB, | Performed by: STUDENT IN AN ORGANIZED HEALTH CARE EDUCATION/TRAINING PROGRAM

## 2023-07-29 PROCEDURE — 99213 PR OFFICE/OUTPT VISIT, EST, LEVL III, 20-29 MIN: ICD-10-PCS | Mod: HCNC,S$GLB,, | Performed by: STUDENT IN AN ORGANIZED HEALTH CARE EDUCATION/TRAINING PROGRAM

## 2023-07-29 PROCEDURE — 1125F AMNT PAIN NOTED PAIN PRSNT: CPT | Mod: HCNC,CPTII,S$GLB, | Performed by: STUDENT IN AN ORGANIZED HEALTH CARE EDUCATION/TRAINING PROGRAM

## 2023-07-29 PROCEDURE — 99999 PR PBB SHADOW E&M-EST. PATIENT-LVL III: ICD-10-PCS | Mod: PBBFAC,HCNC,, | Performed by: STUDENT IN AN ORGANIZED HEALTH CARE EDUCATION/TRAINING PROGRAM

## 2023-07-29 PROCEDURE — 1125F PR PAIN SEVERITY QUANTIFIED, PAIN PRESENT: ICD-10-PCS | Mod: HCNC,CPTII,S$GLB, | Performed by: STUDENT IN AN ORGANIZED HEALTH CARE EDUCATION/TRAINING PROGRAM

## 2023-07-29 PROCEDURE — 3078F DIAST BP <80 MM HG: CPT | Mod: HCNC,CPTII,S$GLB, | Performed by: STUDENT IN AN ORGANIZED HEALTH CARE EDUCATION/TRAINING PROGRAM

## 2023-07-29 PROCEDURE — 1159F PR MEDICATION LIST DOCUMENTED IN MEDICAL RECORD: ICD-10-PCS | Mod: HCNC,CPTII,S$GLB, | Performed by: STUDENT IN AN ORGANIZED HEALTH CARE EDUCATION/TRAINING PROGRAM

## 2023-07-29 PROCEDURE — 3075F PR MOST RECENT SYSTOLIC BLOOD PRESS GE 130-139MM HG: ICD-10-PCS | Mod: HCNC,CPTII,S$GLB, | Performed by: STUDENT IN AN ORGANIZED HEALTH CARE EDUCATION/TRAINING PROGRAM

## 2023-07-29 PROCEDURE — 1101F PT FALLS ASSESS-DOCD LE1/YR: CPT | Mod: HCNC,CPTII,S$GLB, | Performed by: STUDENT IN AN ORGANIZED HEALTH CARE EDUCATION/TRAINING PROGRAM

## 2023-07-29 PROCEDURE — 3075F SYST BP GE 130 - 139MM HG: CPT | Mod: HCNC,CPTII,S$GLB, | Performed by: STUDENT IN AN ORGANIZED HEALTH CARE EDUCATION/TRAINING PROGRAM

## 2023-07-29 RX ORDER — NAPROXEN 500 MG/1
500 TABLET ORAL 2 TIMES DAILY WITH MEALS
Qty: 14 TABLET | Refills: 0 | Status: SHIPPED | OUTPATIENT
Start: 2023-07-29 | End: 2023-08-05

## 2023-07-29 NOTE — ASSESSMENT & PLAN NOTE
Suspect it could be costochondritis but not a 100% sure since she does not have pain with palpation.

## 2023-07-29 NOTE — PROGRESS NOTES
Subjective     Patient ID: Rosmery Ramirez is a 78 y.o. female.    Chief Complaint: Chest Pain (Chest hurts when breathing)    Chest Pain   Pertinent negatives include no abdominal pain, cough, dizziness, fever, headaches, nausea, shortness of breath or vomiting.     Patient is a 79 yo female who started having chest pain last Friday, a day after her EGD. She went to the ER 5 days after pain started. Cardiac workup was negative. Cxr without acute findings. No esophageal rupture suspected. Pain present with deep breathing and when she lays down. Pain also in her upper back. EGD went without complications. She does seem to be belching a lot. There is more pain with movements but not tender to palpation. She has tried ibuprofen and tylenol without relief. Describes pain as sharp. Denies N/V. Not bringing up blood. Initially had trouble swallowing but not anymore. She is eating without issues. She thinks pain has gotten worse since it first started. Never had this type of pain before.   Review of Systems   Constitutional:  Negative for chills and fever.   HENT:  Negative for rhinorrhea and sore throat.    Respiratory:  Negative for cough, chest tightness and shortness of breath.    Cardiovascular:  Positive for chest pain.   Gastrointestinal:  Negative for abdominal pain, blood in stool, nausea, vomiting and reflux.   Genitourinary:  Negative for dysuria and hematuria.   Neurological:  Negative for dizziness, light-headedness and headaches.   Psychiatric/Behavioral:  Negative for confusion.           Objective     Physical Exam  Eyes:      Conjunctiva/sclera: Conjunctivae normal.   Cardiovascular:      Rate and Rhythm: Normal rate and regular rhythm.      Heart sounds: Normal heart sounds.   Pulmonary:      Effort: Pulmonary effort is normal. No respiratory distress.      Breath sounds: Normal breath sounds.   Musculoskeletal:      Right lower leg: No edema.      Left lower leg: No edema.      Comments: Pain  with palpation over the left upper sternum   Skin:     General: Skin is warm.   Neurological:      Mental Status: She is alert and oriented to person, place, and time.   Psychiatric:         Mood and Affect: Mood normal.         Behavior: Behavior normal.            Assessment and Plan     1. Costochondritis, acute  Assessment & Plan:  Patient does have tenderness to palpation over the upper left sternum. Suspect costochondritis but could also be a muscle strain. All the life threatening causes were ruled out in the ER. Do no suspect cardiac cause or esophageal rupture. Asked pt to drink sparkling water to get the air out. Will treat with naproxen for 5 days but asked pt to take it only after eating. Also asked pt to apply ice 4 times a day, 10-15 mins each time      Other orders  -     naproxen (NAPROSYN) 500 MG tablet; Take 1 tablet (500 mg total) by mouth 2 (two) times daily with meals. for 7 days  Dispense: 14 tablet; Refill: 0

## 2023-07-29 NOTE — ASSESSMENT & PLAN NOTE
Patient does have tenderness to palpation over the upper left sternum. Suspect costochondritis but could also be a muscle strain. All the life threatening causes were ruled out in the ER. Do no suspect cardiac cause or esophageal rupture. Asked pt to drink sparkling water to get the air out. Will treat with naproxen for 5 days but asked pt to take it only after eating. Also asked pt to apply ice 4 times a day, 10-15 mins each time

## 2023-08-02 ENCOUNTER — OFFICE VISIT (OUTPATIENT)
Dept: FAMILY MEDICINE | Facility: CLINIC | Age: 78
End: 2023-08-02
Payer: MEDICARE

## 2023-08-02 VITALS
OXYGEN SATURATION: 96 % | WEIGHT: 203.13 LBS | DIASTOLIC BLOOD PRESSURE: 68 MMHG | SYSTOLIC BLOOD PRESSURE: 132 MMHG | HEART RATE: 58 BPM | HEIGHT: 65 IN | BODY MASS INDEX: 33.84 KG/M2 | TEMPERATURE: 98 F

## 2023-08-02 DIAGNOSIS — E04.1 THYROID NODULE: Chronic | ICD-10-CM

## 2023-08-02 DIAGNOSIS — R73.09 ABNORMAL GLUCOSE: ICD-10-CM

## 2023-08-02 DIAGNOSIS — R13.19 ESOPHAGEAL DYSPHAGIA: ICD-10-CM

## 2023-08-02 DIAGNOSIS — I10 ESSENTIAL HYPERTENSION: ICD-10-CM

## 2023-08-02 DIAGNOSIS — E78.5 DYSLIPIDEMIA: ICD-10-CM

## 2023-08-02 DIAGNOSIS — R60.0 PERIPHERAL EDEMA: Primary | ICD-10-CM

## 2023-08-02 PROCEDURE — 1160F RVW MEDS BY RX/DR IN RCRD: CPT | Mod: HCNC,CPTII,S$GLB, | Performed by: INTERNAL MEDICINE

## 2023-08-02 PROCEDURE — 99214 PR OFFICE/OUTPT VISIT, EST, LEVL IV, 30-39 MIN: ICD-10-PCS | Mod: HCNC,S$GLB,, | Performed by: INTERNAL MEDICINE

## 2023-08-02 PROCEDURE — 1125F PR PAIN SEVERITY QUANTIFIED, PAIN PRESENT: ICD-10-PCS | Mod: HCNC,CPTII,S$GLB, | Performed by: INTERNAL MEDICINE

## 2023-08-02 PROCEDURE — 1101F PT FALLS ASSESS-DOCD LE1/YR: CPT | Mod: HCNC,CPTII,S$GLB, | Performed by: INTERNAL MEDICINE

## 2023-08-02 PROCEDURE — 99214 OFFICE O/P EST MOD 30 MIN: CPT | Mod: HCNC,S$GLB,, | Performed by: INTERNAL MEDICINE

## 2023-08-02 PROCEDURE — 1101F PR PT FALLS ASSESS DOC 0-1 FALLS W/OUT INJ PAST YR: ICD-10-PCS | Mod: HCNC,CPTII,S$GLB, | Performed by: INTERNAL MEDICINE

## 2023-08-02 PROCEDURE — 3288F PR FALLS RISK ASSESSMENT DOCUMENTED: ICD-10-PCS | Mod: HCNC,CPTII,S$GLB, | Performed by: INTERNAL MEDICINE

## 2023-08-02 PROCEDURE — 3075F PR MOST RECENT SYSTOLIC BLOOD PRESS GE 130-139MM HG: ICD-10-PCS | Mod: HCNC,CPTII,S$GLB, | Performed by: INTERNAL MEDICINE

## 2023-08-02 PROCEDURE — 1159F PR MEDICATION LIST DOCUMENTED IN MEDICAL RECORD: ICD-10-PCS | Mod: HCNC,CPTII,S$GLB, | Performed by: INTERNAL MEDICINE

## 2023-08-02 PROCEDURE — 1160F PR REVIEW ALL MEDS BY PRESCRIBER/CLIN PHARMACIST DOCUMENTED: ICD-10-PCS | Mod: HCNC,CPTII,S$GLB, | Performed by: INTERNAL MEDICINE

## 2023-08-02 PROCEDURE — 3078F PR MOST RECENT DIASTOLIC BLOOD PRESSURE < 80 MM HG: ICD-10-PCS | Mod: HCNC,CPTII,S$GLB, | Performed by: INTERNAL MEDICINE

## 2023-08-02 PROCEDURE — 1125F AMNT PAIN NOTED PAIN PRSNT: CPT | Mod: HCNC,CPTII,S$GLB, | Performed by: INTERNAL MEDICINE

## 2023-08-02 PROCEDURE — 1159F MED LIST DOCD IN RCRD: CPT | Mod: HCNC,CPTII,S$GLB, | Performed by: INTERNAL MEDICINE

## 2023-08-02 PROCEDURE — 99999 PR PBB SHADOW E&M-EST. PATIENT-LVL IV: ICD-10-PCS | Mod: PBBFAC,HCNC,, | Performed by: INTERNAL MEDICINE

## 2023-08-02 PROCEDURE — 99999 PR PBB SHADOW E&M-EST. PATIENT-LVL IV: CPT | Mod: PBBFAC,HCNC,, | Performed by: INTERNAL MEDICINE

## 2023-08-02 PROCEDURE — 3288F FALL RISK ASSESSMENT DOCD: CPT | Mod: HCNC,CPTII,S$GLB, | Performed by: INTERNAL MEDICINE

## 2023-08-02 PROCEDURE — 3075F SYST BP GE 130 - 139MM HG: CPT | Mod: HCNC,CPTII,S$GLB, | Performed by: INTERNAL MEDICINE

## 2023-08-02 PROCEDURE — 3078F DIAST BP <80 MM HG: CPT | Mod: HCNC,CPTII,S$GLB, | Performed by: INTERNAL MEDICINE

## 2023-08-02 NOTE — Clinical Note
BLANCHE saw our mutual pt.  Chest pain improving with NSAIDs, I told he she can cancel upcoming CTA chest/abd

## 2023-08-02 NOTE — PROGRESS NOTES
This note was created by combination of typed  and M-Modal dictation.  Transcription errors may be present.  If there are any questions, please contact me.    Assessment and Plan:   Assessment and Plan    Peripheral edema  -I think she is having pain from peripheral edema.  I think the edema is cost/worsened by naproxen acutely.  She is also on Lyrica which can also cause or worsen peripheral edema.  Previous exams, no edema documented.  Stop naproxen   Keep legs elevated   Wear knee length socks if tolerated   If no improvement she can contact me, would increase the HCTZ to 25 mg for a week and then try to go back to previous dose    Essential hypertension  -blood pressure today is good.  No changes.  If peripheral edema persists, would temporarily increase that HCTZ to 25    Esophageal dysphagia  -status post EGD with GI.  Postprocedure had chest pain recurring, was started on naproxen, overall feels like it is improved.  Denies shortness or breath.  GI had ordered CTA chest and abdomen.  Given that she is improving she can cancel the upcoming scan    Abnormal glucose  -check future labs    Thyroid nodule on US; followed by endo repeat 6/2024  -due for repeat ultrasound next year    Dyslipidemia; statin myalgias; 6/28/2018 exercise stress echo neg for ischemia  -check future labs      There are no discontinued medications.    meds sent this encounter:         Follow Up:  Follow-up 6 months with labs  Future Appointments   Date Time Provider Department Center   8/9/2023  8:30 AM [facility]CT1 [address] LB LIMIT [facility]   1/26/2024  9:00 AM LAB, [facility] [place]   2/5/2024  3:40 PM Ancelmo Sumner MD [facility] [place]         Subjective:   Subjective   Chief Complaint   Patient presents with    Leg Pain       HPI  Rosmery is a 78 y.o. female.    Social History     Tobacco Use    Smoking status: Never    Smokeless tobacco: Never   Substance Use Topics    Alcohol use: Not Currently       Social History     Occupational History    Occupation: retired - formerly pastoral care with [employer]      Social History     Social History Narrative    Not on file       No LMP recorded. Patient has had a hysterectomy.    Last appointment with this clinic was 2/7/2023. Last visit with me 2/7/2023   To summarize last visit and events leading up to today:  Post nasal drip, astelin. Avoid nasal steroids (glaucoma)  GERD, needs to improve food hygiene, avoid lying down after eating. Trial pepcid  Incidental pancreatic atrophy on CT. No symptoms. No further testing.   Lipid, abn glc, HTN, digital monitoring. Borderline home readings  Thyroid nodule on US due for repeat summer; followed by endo  Saw endo 6/6/23 - plan repeat thyroid US after EGD  6/2023 TSH, FT4 WNL  Neuropathy, recent start on lyrica with SE of dizzy in AM  Dysphagia and odynophagia, seen by GI  3/1/23 esophogram  Findings suggestive of mild esophageal dysmotility.  Spontaneous gastroesophageal reflux observed.  No findings to suggest achalasia.  7/20/23 EGD normal duodenum; erythema gastric body, antrum, prepyloric region. 1 cm HH. Bx's neg for H pylori, sprue, no EE  ED after the EGD for chest pain. Negative workup  Saw outpt primary care after ED. Treated for MSK    7/22/22 MRI L spine  Mild multilevel degenerative change throughout the lower thoracic and lumbar spine.  No significant spinal canal stenosis but there is scattered lateral recess and neural foraminal encroachment as further detailed above.    Today's visit:  Please note: Chronic medical problems that are stable but are being addressed at this visit may not be listed/documented here, but may be addressed in more detail in the Assessment and Plan section.   Below are salient features of chronic medical conditions, or new/acute conditions and their details.    Chest pain seems to be improving with naproxen    But the leg hurts  Right leg  Affecting sleep. Aching pain.    Walks for  physical activity.  Had stopped and then on Monday started walking again.  And that is when the pain seems have started again.  Pointing towards the anterior lower leg.  More on the right than the left.  No obvious swelling but I did note some swelling with some tender pitting edema today.  Previous documented exams, no edema.  She notes that her socks will leave a dent at the end of the day.  She is been taking the naproxen for the chest pain  She also takes Lyrica for the neuropathy.  Known hx of OA of the knees    Has an upcoming CTA chest and abd  D dimer was mildly elevated  It's getting better.    OK to hold off on CT    Last time she had COVID vaccine she broke out with shingles.  Since then she is had the Shingrix.  She is due for booster.  Recommended that she update the booster    Answers submitted by the patient for this visit:  Review of Systems Questionnaire (Submitted on 8/2/2023)  activity change: No  unexpected weight change: No  neck pain: No  hearing loss: No  rhinorrhea: No  trouble swallowing: No  eye discharge: No  visual disturbance: No  chest tightness: No  wheezing: No  chest pain: Yes  palpitations: No  blood in stool: No  constipation: No  vomiting: No  diarrhea: No  polydipsia: No  polyuria: No  difficulty urinating: No  hematuria: No  menstrual problem: No  dysuria: No  joint swelling: No  arthralgias: Yes  headaches: No  weakness: No  confusion: No  dysphoric mood: No      Patient Care Team:  Ancelmo Sumner MD as PCP - General (Internal Medicine)  Brooke Delong MD as Obstetrician (Obstetrics)  Arnoldo Valentin MD as Consulting Physician (Otolaryngology)  Tim Freeman MA as Care Coordinator  Yesica Mike MD as Consulting Physician (Ophthalmology)  Brandan Nava MD as Consulting Physician (Orthopedic Surgery)  Jagjit Feldman MD as Consulting Physician (Endocrinology)  Ailyn Dubois as Digital Medicine Health   Beba Ahmadi PharmD as Hypertension  Digital Medicine Clinician (Pharmacist)  Ancelmo Sumner MD as Hypertension Digital Medicine Responsible Provider (Internal Medicine)  Ancelmo Sumner MD as Consulting Physician (Internal Medicine)  Erika Magnus Managed Medicare as Hypertension Digital Medicine Contract      Patient Active Problem List    Diagnosis Date Noted    Costochondritis, acute 07/29/2023    Esophageal dysphagia 06/06/2023     3/1/23 esophogram  Findings suggestive of mild esophageal dysmotility.  Spontaneous gastroesophageal reflux observed.  No findings to suggest achalasia.  7/20/23 EGD normal duodenum; erythema gastric body, antrum, prepyloric region. 1 cm HH. Bx's neg for H pylori, sprue, no EE      Snoring 12/16/2022    Orthopnea 12/13/2022    CLARA (obstructive sleep apnea) 12/13/2022     12/15/22 HST with CLARA AHI 9      Thoracic aortic atherosclerosis incidental on CXR 12/2022 12/01/2022    Right foot pain 05/24/2022    Primary open-angle glaucoma, left eye, moderate stage 07/28/2021    Primary open angle glaucoma of right eye, mild stage 03/17/2021    Fatigue 01/24/2020    Acute pain of left knee 06/18/2019    Weakness of both hips 06/18/2019    Decreased independence with transfers 06/18/2019    Impaired functional mobility, balance, gait, and endurance 06/18/2019    Idiopathic peripheral neuropathy normal B12, TSH; A1c pre-DM. hx of lory changed to lyrica 06/03/2019 07/07/2022 EMG showing sensory motor peripheral neuropathy.  Chronic denervation in the L4-S1 myotomes on the left.  7/22/22 MRI L spine  Mild multilevel degenerative change throughout the lower thoracic and lumbar spine.  No significant spinal canal stenosis but there is scattered lateral recess and neural foraminal encroachment       Thyroid nodule on US; followed by endo repeat 6/2024 04/22/2019 2/21/20 thyroid US: 1.)  Thyroid gland is upper limits of normal in size with heterogeneous echotexture and normal vascularity  2.) 1.31 x 1.07 x 0.91 cm solid,  heterogeneous predominately isoechoic nodule is seen in the right inferior pole  3.) 0.72 x 0.48 x 0.50 cm solid, hyperechoic nodule is seen in the right inferior pole  4.) 0.85 x 0.49 x 0.72 cm solid, isoechoic nodule is seen in the right inferior pole  RECOMMENDATIONS: Recommend repeat thyroid ultrasound in 1 year.  2/2021 thyroid US: 1.  Thyroid gland is normal in size with diffusely heterogenous echotexture. 2.  Two nodules in the right thyroid described above.  None meet criteria for fine-needle aspiration.  6/30/22 thyroid US:  1. Thyroid gland is normal in size with diffusely heterogenous echotexture consistent with Hashimoto's thyroiditis.  2. 2 nodules in the right thyroid described above. None meet criteria for fine-needle aspiration.      Abnormal glucose 04/18/2019    Vitamin D deficiency 04/04/2019 4/6/22 DEXA L-spine 1.2, total hip 0.7, femoral neck-0.8.  FRAX score 2.4/6.4%.      Chronic bilateral low back pain without sciatica 11/02/2018    Bradycardia 6/2018 holter negative 07/20/2018    Status post hysterectomy 07/20/2018    Statin myopathy 07/20/2018    Tubular adenoma of colon 3/28/22 colonoscopy 4 mm sigmoid TA 01/23/2017     3/28/22 colonoscopy 4 mm sigmoid TA      Left knee pain 01/05/2017    Nuclear sclerosis 11/30/2016    Cataract 09/07/2016     Dx updated per 2019 IMO Load      Class 2 severe obesity due to excess calories with serious comorbidity in adult, unspecified BMI     POAG (primary open-angle glaucoma) 01/05/2016    Essential hypertension 08/13/2012 01/13/2021 TTE LV normal size with normal systolic function.  LVEF 55%.  Normal diastolic function.  Normal RV size and normal function.  Normal CVP.  PA pressure 20.      Dyslipidemia; statin myalgias; 6/28/2018 exercise stress echo neg for ischemia 08/13/2012 6/28/2018 exercise stress echo CONCLUSIONS   1 - Normal left ventricular systolic function (EF 60-65%).   2 - No wall motion abnormalities.   3 - Normal left  ventricular diastolic function.   4 - Normal right ventricular systolic function .          PAST MEDICAL PROBLEMS, PAST SURGICAL HISTORY: please see relevant portions of the electronic medical record    ALLERGIES AND MEDICATIONS: updated and reviewed.  Medication List with Changes/Refills   Current Medications    ASCORBIC ACID, VITAMIN C, (VITAMIN C) 1000 MG TABLET    Take 1,000 mg by mouth once daily.    ASPIRIN (ECOTRIN) 81 MG EC TABLET    Take 81 mg by mouth once daily.    ATORVASTATIN (LIPITOR) 10 MG TABLET    TAKE 1 TABLET (10 MG TOTAL) BY MOUTH EVERY OTHER DAY.    AZELASTINE (ASTELIN) 137 MCG (0.1 %) NASAL SPRAY    1 spray (137 mcg total) by Nasal route 2 (two) times daily.    CALCIUM-VITAMIN D3 (OS-LETY 500 + D3) 500 MG(1,250MG) -200 UNIT PER TABLET    Take 1 tablet by mouth 2 (two) times daily with meals.    DORZOLAMIDE-TIMOLOL 2-0.5% (COSOPT) 22.3-6.8 MG/ML OPHTHALMIC SOLUTION    Place 1 drop into both eyes 2 (two) times daily.    FAMOTIDINE (PEPCID) 20 MG TABLET    Take 1 tablet by mouth twice daily    HYDROCHLOROTHIAZIDE (MICROZIDE) 12.5 MG CAPSULE    TAKE 1 CAPSULE EVERY DAY    IRBESARTAN (AVAPRO) 150 MG TABLET    TAKE 1 TABLET BY MOUTH EVERY EVENING.    LUMIGAN 0.01 % DROP    Place 1 drop into both eyes every evening.    MULTIVITAMIN (THERAGRAN) PER TABLET    Take 1 tablet by mouth once daily.    NAPROXEN (NAPROSYN) 500 MG TABLET    Take 1 tablet (500 mg total) by mouth 2 (two) times daily with meals. for 7 days    PREGABALIN (LYRICA) 75 MG CAPSULE    Take 1 capsule (75 mg total) by mouth 2 (two) times daily.    RHOPRESSA 0.02 % OPHTHALMIC SOLUTION    INSTILL 1 DROP INTO LEFT EYE ONCE DAILY    VALACYCLOVIR (VALTREX) 1000 MG TABLET    Take 1 tablet (1,000 mg total) by mouth 3 (three) times daily. for 7 days         Objective:   Objective   Physical Exam   Vitals:    08/02/23 1405   BP: 132/68   Pulse: (!) 58   Temp: 98.2 °F (36.8 °C)   TempSrc: Oral   SpO2: 96%   Weight: 92.1 kg (203 lb 2.5 oz)   Height:  "5' 5" (1.651 m)    Body mass index is 33.81 kg/m².  Weight: 92.1 kg (203 lb 2.5 oz)   Height: 5' 5" (165.1 cm)     Physical Exam  Constitutional:       General: She is not in acute distress.     Appearance: She is well-developed.   Eyes:      Extraocular Movements: Extraocular movements intact.   Cardiovascular:      Rate and Rhythm: Normal rate and regular rhythm.      Heart sounds: Normal heart sounds. No murmur heard.  Pulmonary:      Effort: Pulmonary effort is normal.      Breath sounds: Normal breath sounds.   Musculoskeletal:         General: Normal range of motion.      Right lower leg: Edema present.      Left lower leg: Edema present.      Comments: Mild pitting edema to mid tibia bilaterally, painful both anterior lower legs.  The calf muscles unremarkable without swelling or deformity or tenderness  The knees are unremarkable without swelling, patella non ballotable   Skin:     General: Skin is warm and dry.   Neurological:      Mental Status: She is alert and oriented to person, place, and time.      Coordination: Coordination normal.   Psychiatric:         Behavior: Behavior normal.         Thought Content: Thought content normal.                "

## 2023-08-08 NOTE — PROGRESS NOTES
Already with back pain chronic, normally does not take anything except tylenol prn.  Heat and cold packs  But this is very severe  Started after thanksgiving - she thinks the food made her constipated, so sitting and straining on the toilet which really aggravated it.  Had to have help getting off the toilet  No fever.  The pain is localized in the lower back. No pain with urination or BM; no saddle dysesthesia  No distal radiation.   For this - took tylenol ES with some relief.    Consent (Ear)/Introductory Paragraph: The rationale for Mohs was explained to the patient and consent was obtained. The risks, benefits and alternatives to therapy were discussed in detail. Specifically, the risks of ear deformity, infection, scarring, bleeding, prolonged wound healing, incomplete removal, allergy to anesthesia, nerve injury and recurrence were addressed. Prior to the procedure, the treatment site was clearly identified and confirmed by the patient. All components of Universal Protocol/PAUSE Rule completed.

## 2023-08-12 ENCOUNTER — PATIENT MESSAGE (OUTPATIENT)
Dept: ADMINISTRATIVE | Facility: OTHER | Age: 78
End: 2023-08-12
Payer: MEDICARE

## 2023-08-12 ENCOUNTER — PATIENT MESSAGE (OUTPATIENT)
Dept: OTHER | Facility: OTHER | Age: 78
End: 2023-08-12
Payer: MEDICARE

## 2023-08-27 ENCOUNTER — PATIENT MESSAGE (OUTPATIENT)
Dept: GASTROENTEROLOGY | Facility: CLINIC | Age: 78
End: 2023-08-27
Payer: MEDICARE

## 2023-08-27 NOTE — PROGRESS NOTES
Rosmery your EGD pathology was benign no evidence of celiac sprue no evidence of H pylori no evidence of eosinophilic esophagitis.    1. Duodenum (biopsy):   - Benign duodenum mucosa mild regenerative changes   - Negative for active inflammation   - No features of sprue   - No organisms     2. Stomach (biopsy):   - Benign antrum and body with features of reactive/chemical gastropathy and patchy glandular atrophy   - Negative for active inflammation   - Negative for intestinal metaplasia   - Negative for Helicobacter pylori organisms on H&E stain     3.  Distal Esophagus (biopsy):   - Benign esophagus mucosa   - Negative for active inflammation   - No significant eosinophil population     4.  Mid Esophagus (biopsy):   - Benign esophagus mucosa   - Negative for active inflammation   - No significant eosinophil population    Comment: Interp By Mary Biggs MD, Signed on 07/24/2023

## 2023-08-30 ENCOUNTER — PATIENT MESSAGE (OUTPATIENT)
Dept: FAMILY MEDICINE | Facility: CLINIC | Age: 78
End: 2023-08-30
Payer: MEDICARE

## 2023-08-30 DIAGNOSIS — G60.9 IDIOPATHIC PERIPHERAL NEUROPATHY: Primary | ICD-10-CM

## 2023-08-31 RX ORDER — PREGABALIN 25 MG/1
CAPSULE ORAL
Qty: 35 CAPSULE | Refills: 0 | Status: SHIPPED | OUTPATIENT
Start: 2023-08-31 | End: 2023-10-23 | Stop reason: ALTCHOICE

## 2023-09-10 ENCOUNTER — PATIENT MESSAGE (OUTPATIENT)
Dept: FAMILY MEDICINE | Facility: CLINIC | Age: 78
End: 2023-09-10
Payer: MEDICARE

## 2023-10-04 ENCOUNTER — TELEPHONE (OUTPATIENT)
Dept: CARDIOLOGY | Facility: CLINIC | Age: 78
End: 2023-10-04
Payer: MEDICARE

## 2023-10-04 ENCOUNTER — OFFICE VISIT (OUTPATIENT)
Dept: CARDIOLOGY | Facility: CLINIC | Age: 78
End: 2023-10-04
Payer: MEDICARE

## 2023-10-04 VITALS
SYSTOLIC BLOOD PRESSURE: 156 MMHG | HEIGHT: 65 IN | DIASTOLIC BLOOD PRESSURE: 68 MMHG | HEART RATE: 63 BPM | OXYGEN SATURATION: 97 % | WEIGHT: 194.44 LBS | RESPIRATION RATE: 15 BRPM | BODY MASS INDEX: 32.4 KG/M2

## 2023-10-04 DIAGNOSIS — G47.33 OSA (OBSTRUCTIVE SLEEP APNEA): ICD-10-CM

## 2023-10-04 DIAGNOSIS — E66.01 CLASS 2 SEVERE OBESITY DUE TO EXCESS CALORIES WITH SERIOUS COMORBIDITY IN ADULT, UNSPECIFIED BMI: ICD-10-CM

## 2023-10-04 DIAGNOSIS — T46.6X5A STATIN MYOPATHY: ICD-10-CM

## 2023-10-04 DIAGNOSIS — I70.0 THORACIC AORTIC ATHEROSCLEROSIS: ICD-10-CM

## 2023-10-04 DIAGNOSIS — I10 ESSENTIAL HYPERTENSION: Primary | ICD-10-CM

## 2023-10-04 DIAGNOSIS — R00.1 BRADYCARDIA: ICD-10-CM

## 2023-10-04 DIAGNOSIS — G72.0 STATIN MYOPATHY: ICD-10-CM

## 2023-10-04 DIAGNOSIS — E78.5 DYSLIPIDEMIA: ICD-10-CM

## 2023-10-04 PROCEDURE — 3078F PR MOST RECENT DIASTOLIC BLOOD PRESSURE < 80 MM HG: ICD-10-PCS | Mod: HCNC,CPTII,S$GLB, | Performed by: INTERNAL MEDICINE

## 2023-10-04 PROCEDURE — 3288F FALL RISK ASSESSMENT DOCD: CPT | Mod: HCNC,CPTII,S$GLB, | Performed by: INTERNAL MEDICINE

## 2023-10-04 PROCEDURE — 1159F MED LIST DOCD IN RCRD: CPT | Mod: HCNC,CPTII,S$GLB, | Performed by: INTERNAL MEDICINE

## 2023-10-04 PROCEDURE — 1126F PR PAIN SEVERITY QUANTIFIED, NO PAIN PRESENT: ICD-10-PCS | Mod: HCNC,CPTII,S$GLB, | Performed by: INTERNAL MEDICINE

## 2023-10-04 PROCEDURE — 99999 PR PBB SHADOW E&M-EST. PATIENT-LVL IV: CPT | Mod: PBBFAC,HCNC,, | Performed by: INTERNAL MEDICINE

## 2023-10-04 PROCEDURE — 3288F PR FALLS RISK ASSESSMENT DOCUMENTED: ICD-10-PCS | Mod: HCNC,CPTII,S$GLB, | Performed by: INTERNAL MEDICINE

## 2023-10-04 PROCEDURE — 1126F AMNT PAIN NOTED NONE PRSNT: CPT | Mod: HCNC,CPTII,S$GLB, | Performed by: INTERNAL MEDICINE

## 2023-10-04 PROCEDURE — 1101F PT FALLS ASSESS-DOCD LE1/YR: CPT | Mod: HCNC,CPTII,S$GLB, | Performed by: INTERNAL MEDICINE

## 2023-10-04 PROCEDURE — 99214 OFFICE O/P EST MOD 30 MIN: CPT | Mod: HCNC,S$GLB,, | Performed by: INTERNAL MEDICINE

## 2023-10-04 PROCEDURE — 99999 PR PBB SHADOW E&M-EST. PATIENT-LVL IV: ICD-10-PCS | Mod: PBBFAC,HCNC,, | Performed by: INTERNAL MEDICINE

## 2023-10-04 PROCEDURE — 3078F DIAST BP <80 MM HG: CPT | Mod: HCNC,CPTII,S$GLB, | Performed by: INTERNAL MEDICINE

## 2023-10-04 PROCEDURE — 3077F PR MOST RECENT SYSTOLIC BLOOD PRESSURE >= 140 MM HG: ICD-10-PCS | Mod: HCNC,CPTII,S$GLB, | Performed by: INTERNAL MEDICINE

## 2023-10-04 PROCEDURE — 1159F PR MEDICATION LIST DOCUMENTED IN MEDICAL RECORD: ICD-10-PCS | Mod: HCNC,CPTII,S$GLB, | Performed by: INTERNAL MEDICINE

## 2023-10-04 PROCEDURE — 1101F PR PT FALLS ASSESS DOC 0-1 FALLS W/OUT INJ PAST YR: ICD-10-PCS | Mod: HCNC,CPTII,S$GLB, | Performed by: INTERNAL MEDICINE

## 2023-10-04 PROCEDURE — 99214 PR OFFICE/OUTPT VISIT, EST, LEVL IV, 30-39 MIN: ICD-10-PCS | Mod: HCNC,S$GLB,, | Performed by: INTERNAL MEDICINE

## 2023-10-04 PROCEDURE — 3077F SYST BP >= 140 MM HG: CPT | Mod: HCNC,CPTII,S$GLB, | Performed by: INTERNAL MEDICINE

## 2023-10-04 NOTE — PROGRESS NOTES
CARDIOVASCULAR CONSULTATION    REASON FOR CONSULT:   Rosmery Ramirez is a 78 y.o. female who presents for follow-up.      HISTORY OF PRESENT ILLNESS:     Patient is a pleasant 74-year-old lady here for follow-up.  Denies any chest pains at rest on exertion, orthopnea, PND.  Complains of paresthesias in bilateral feet and requesting referral to Neurology for that.  Blood pressure well controlled.  No other cardiovascular complaints.     Notes from December 2020:  Patient here for follow-up.  Complains of occasional dizziness.  Can occur once a week or less frequently than that.  No postural relationship.  Denies any chest pains at rest on exertion, orthopnea, PND.      Notes from December 2022: Patient here for follow-up after a long hiatus.  Occasionally gets short of breath when she lays down.  X-ray showed aortic atherosclerosis.  Has been referred to Cardiology to rule out significant ischemia      Jan 23:  Patient here for follow-up.  Patient here for follow-up after testing.  Stress test did not show any significant ischemia    The estimated ejection fraction is 55%.  The left ventricle is normal in size with mild concentric hypertrophy and normal systolic function.  Grade I left ventricular diastolic dysfunction.  Normal right ventricular size with normal right ventricular systolic function.  Mild to moderate left atrial enlargement.  Mild mitral regurgitation.  Mild tricuspid regurgitation.  Intermediate central venous pressure (8 mmHg).  The estimated PA systolic pressure is 28 mmHg.         Normal myocardial perfusion scan. There is no evidence of myocardial ischemia or infarction.    There is a mild to moderate intensity perfusion abnormality in the apical wall of the left ventricle, secondary to breast attenuation.    The gated perfusion images showed an ejection fraction of 71% at rest.    There is normal wall motion at rest and post stress.    The ECG portion of the study is positive for  ischemia. Specificity is reduced secondary to hypertensive response.    The patient reported no chest pain during the stress test.    There were no arrhythmias during stress.    The patient exercised for 6 minutes 15 seconds on a Jorge protocol, corresponding to a functional capacity of 7 METS, achieving a peak heart rate of 142 bpm, which is 102 % of the age predicted maximum heart rate.    Notes from October 2023: Patient here for follow-up.  Has been doing fine.  Denies any chest pains at rest on exertion, orthopnea, PND.  Doing fine.        PAST MEDICAL HISTORY:     Past Medical History:   Diagnosis Date    Allergy     Anxiety     Breast cyst     Edema of leg 10/5/2012    bilateral     Fractures 2011    thumb R    GERD (gastroesophageal reflux disease)     Glaucoma     History of colonic polyps     Hx-TIA (transient ischemic attack) 8/13/2012 Jan 2012: LLE and left facial numbness lasting 1 hour     Hyperlipidemia     Hypertension     Left shoulder tendonitis 8/8/2015    Primary open-angle glaucoma, mild stage - Both Eyes 6/3/2013       PAST SURGICAL HISTORY:     Past Surgical History:   Procedure Laterality Date    BREAST BIOPSY Left     core    BREAST SURGERY Left     lumpectomy    CATARACT EXTRACTION W/  INTRAOCULAR LENS IMPLANT Left 10/05/2016    Dr. Mike    CATARACT EXTRACTION W/  INTRAOCULAR LENS IMPLANT Right 11/30/2016    With [place] (Dr. Mike)    COLONOSCOPY N/A 1/23/2017    Procedure: COLONOSCOPY;  Surgeon: Hany Mosquera MD;  Location: [address]);  Service: Endoscopy;  Laterality: N/A;    COLONOSCOPY N/A 3/28/2022    Procedure: COLONOSCOPY;  Surgeon: Jamison Chambers MD;  Location: [address]);  Service: Endoscopy;  Laterality: N/A;  fully vaccinated   instructions to portal-st  3/22 arrival time confirmed with pt/COVID test cancelled-RB    ESOPHAGOGASTRODUODENOSCOPY N/A 7/20/2023    Procedure: EGD (ESOPHAGOGASTRODUODENOSCOPY);  Surgeon: Jorge L Thompson MD;  Location:  [facility] (4TH FLR);  Service: Endoscopy;  Laterality: N/A;  inst handed to pre-call pt needs to be rescheduled to new day 03/21 bm.  4/26/23- precall- pt wants to cancel appt and reschedule for another day  6/7 pt r/s/instr. to [facility];-s7/14/23-pre-call completed-LS    HAND SURGERY Left 2011    thumb    HYSTERECTOMY  1980's    Total;    IMPLANTATION OF DEVICE FOR GLAUCOMA Right 3/17/2021    Procedure: INSERTION, DEVICE, FOR GLAUCOMA XEN GEL;  Surgeon: Yesica Mike MD;  Location: [facility] OR 1ST FLR;  Service: Ophthalmology;  Laterality: Right;    [place] GONIOTOMY Right 11/30/2016    WITH CE ()    Left Breast Lumpectomy Left     performed in 1960s    OOPHORECTOMY      SELECTIVE LASER TRABECUPLASTY  05/2014    OU w/ Dr. Mike    TRABECULECTOMY Right 3/17/2021    Procedure: TRABECULECTOMY/XEN GEL WITH [facility]MC;  Surgeon: Yesica Mike MD;  [facility];  Service: Ophthalmology;  Laterality: Right;    TRABECULECTOMY Left 7/28/2021    Procedure: TRABECULECTOMY/ MMC and Xen OS;  Surgeon: Yesica Mike MD;  [facility];  Service: Ophthalmology;  Laterality: Left;    TUBAL LIGATION  1970's    VAGINAL DELIVERY      x3    YAG CAPSULOTOMY Left 11/29/2018           ALLERGIES AND MEDICATION:     Review of patient's allergies indicates:   Allergen Reactions    Lisinopril Swelling    Amlodipine Edema    Combigan [brimonidine-timolol]      Causes itchy eyelids and contact dermatitis    Cosopt [dorzolamide-timolol]      Causes angular blepharitis of eyelids    Pravastatin      Myalgia and elevated CPK        Medication List            Accurate as of October 4, 2023  1:24 PM. If you have any questions, ask your nurse or doctor.                CONTINUE taking these medications      ascorbic acid (vitamin C) 1000 MG tablet  Commonly known as: VITAMIN C     aspirin 81 MG EC tablet  Commonly known as: ECOTRIN     atorvastatin 10 MG tablet  Commonly known as:  LIPITOR  TAKE 1 TABLET (10 MG TOTAL) BY MOUTH EVERY OTHER DAY.     azelastine 137 mcg (0.1 %) nasal spray  Commonly known as: ASTELIN  1 spray (137 mcg total) by Nasal route 2 (two) times daily.     calcium-vitamin D3 500 mg-5 mcg (200 unit) per tablet  Commonly known as: OS-LETY 500 + D3     dorzolamide-timolol 2-0.5% 22.3-6.8 mg/mL ophthalmic solution  Commonly known as: COSOPT  Place 1 drop into both eyes 2 (two) times daily.     famotidine 20 MG tablet  Commonly known as: PEPCID  Take 1 tablet by mouth twice daily     hydroCHLOROthiazide 12.5 mg capsule  Commonly known as: MICROZIDE  TAKE 1 CAPSULE EVERY DAY     irbesartan 150 MG tablet  Commonly known as: AVAPRO  TAKE 1 TABLET BY MOUTH EVERY EVENING.     LUMIGAN 0.01 % Drop  Generic drug: bimatoprost  Place 1 drop into both eyes every evening.     multivitamin per tablet  Commonly known as: THERAGRAN     pregabalin 25 MG capsule  Commonly known as: LYRICA  Take 2 capsules (50 mg total) by mouth 2 (two) times daily for 7 days, THEN 1 capsule (25 mg total) once daily for 7 days.  Start taking on: August 31, 2023     RHOPRESSA 0.02 % ophthalmic solution  Generic drug: netarsudiL     valACYclovir 1000 MG tablet  Commonly known as: VALTREX  Take 1 tablet (1,000 mg total) by mouth 3 (three) times daily. for 7 days              SOCIAL HISTORY:     Social History     Socioeconomic History    Marital status:     Number of children: 3   Occupational History    Occupation: retired - formerly pastoral care with [employer]   Tobacco Use    Smoking status: Never    Smokeless tobacco: Never   Substance and Sexual Activity    Alcohol use: Not Currently    Drug use: No    Sexual activity: Yes     Partners: Female, Male     Birth control/protection: Post-menopausal, See Surgical Hx     Social Determinants of Health     Financial Resource Strain: Low Risk  (10/3/2023)    Overall Financial Resource Strain (CARDIA)     Difficulty of Paying Living Expenses: Not very hard    Food Insecurity: No Food Insecurity (10/3/2023)    Hunger Vital Sign     Worried About Running Out of Food in the Last Year: Never true     Ran Out of Food in the Last Year: Never true   Transportation Needs: No Transportation Needs (10/3/2023)    PRAPARE - Transportation     Lack of Transportation (Medical): No     Lack of Transportation (Non-Medical): No   Physical Activity: Insufficiently Active (10/3/2023)    Exercise Vital Sign     Days of Exercise per Week: 3 days     Minutes of Exercise per Session: 20 min   Stress: No Stress Concern Present (10/3/2023)    [place] [place] [employer] - Occupational Stress Questionnaire     Feeling of Stress : Not at all   Social Connections: Unknown (10/3/2023)    Social Connection and Isolation Panel [NHANES]     Frequency of Communication with Friends and Family: Three times a week     Frequency of Social Gatherings with Friends and Family: Three times a week     Active Member of Clubs or Organizations: Yes     Attends Club or Organization Meetings: 1 to 4 times per year     Marital Status:    Housing Stability: Low Risk  (10/3/2023)    Housing Stability Vital Sign     Unable to Pay for Housing in the Last Year: No     Number of Places Lived in the Last Year: 1     Unstable Housing in the Last Year: No       FAMILY HISTORY:     Family History   Problem Relation Age of Onset    Breast cancer Mother 50    Glaucoma Mother     Alzheimer's disease Mother     Blindness Mother     Cataracts Mother     Glaucoma Father     Prostate cancer Father     Blindness Father     Cataracts Father     Cancer Father 62        prostate    Glaucoma Sister     No Known Problems Brother     No Known Problems Brother     Alzheimer's disease Maternal Grandfather     Macular degeneration Neg Hx     Retinal detachment Neg Hx     Strabismus Neg Hx     Amblyopia Neg Hx     Heart attack Neg Hx     Heart disease Neg Hx     Ovarian cancer Neg Hx     Colon cancer Neg Hx        REVIEW OF  "SYSTEMS:   Review of Systems   Constitutional: Negative.    HENT: Negative.     Eyes: Negative.    Respiratory: Negative.     Cardiovascular: Negative.    Gastrointestinal: Negative.    Genitourinary: Negative.    Musculoskeletal: Negative.    Skin: Negative.    Neurological:  Positive for tingling.   Endo/Heme/Allergies: Negative.        A 10 point review of systems was performed and all the pertinent positives have been mentioned. Rest of review of systems was negative.        PHYSICAL EXAM:     Vitals:    10/04/23 1258   BP: (!) 156/68   Pulse: 63   Resp: 15    Body mass index is 32.36 kg/m².  Weight: 88.2 kg (194 lb 7.1 oz)   Height: 5' 5" (165.1 cm)     Physical Exam  Vitals and nursing note reviewed.   Constitutional:       Appearance: Normal appearance. She is well-developed.   HENT:      Head: Normocephalic and atraumatic.      Right Ear: Hearing normal.      Left Ear: Hearing normal.      Nose: Nose normal.   Eyes:      General: Lids are normal.      Conjunctiva/sclera: Conjunctivae normal.      Pupils: Pupils are equal, round, and reactive to light.   Cardiovascular:      Rate and Rhythm: Normal rate and regular rhythm.      Pulses: Normal pulses.      Heart sounds: Normal heart sounds.   Pulmonary:      Effort: Pulmonary effort is normal.      Breath sounds: Normal breath sounds.   Abdominal:      Palpations: Abdomen is soft.      Tenderness: There is no abdominal tenderness.   Musculoskeletal:         General: No deformity.      Cervical back: Normal range of motion and neck supple.   Skin:     General: Skin is warm and dry.   Neurological:      Mental Status: She is alert and oriented to person, place, and time.   Psychiatric:         Speech: Speech normal.           DATA:     Laboratory:  CBC:  Recent Labs   Lab 12/01/22  0908 07/26/23  0655   WBC 5.49 5.44   Hemoglobin 12.3 11.3 L   Hematocrit 39.4 35.9 L   Platelets 286 274         CHEMISTRIES:  Recent Labs   Lab 02/25/21  0955 03/10/22  0836 " 12/01/22  0908 07/26/23  0655   Glucose 94 89 93 94   Sodium 140 141 141 139   Potassium 4.2 3.9 3.9 3.7   BUN 13 12 11 14   Creatinine 0.8 0.7 0.8 0.8   eGFR if African American >60.0 >60.0  --   --    eGFR if non African American >60.0 >60.0  --   --    Calcium 9.7 9.6 9.7 9.5         CARDIAC BIOMARKERS:  Recent Labs   Lab 07/26/23  0655 07/26/23  1020   Troponin I <0.006 <0.006           COAGS:        LIPIDS/LFTS:  Recent Labs   Lab 02/25/21  0955 03/10/22  0836 12/01/22  0908 07/26/23  0655   Cholesterol 129 119 L 117 L  --    Triglycerides 97 78 78  --    HDL 44 45 43  --    LDL Cholesterol 65.6 58.4 L 58.4 L  --    Non-HDL Cholesterol 85 74 74  --    AST 19 16 20 17   ALT 19 17 27 15         Hemoglobin A1C   Date Value Ref Range Status   12/01/2022 5.9 (H) 4.0 - 5.6 % Final     Comment:     ADA Screening Guidelines:  5.7-6.4%  Consistent with prediabetes  >or=6.5%  Consistent with diabetes    High levels of fetal hemoglobin interfere with the HbA1C  assay. Heterozygous hemoglobin variants (HbS, HgC, etc)do  not significantly interfere with this assay.   However, presence of multiple variants may affect accuracy.     04/21/2022 6.5 (H) 4.0 - 5.6 % Final     Comment:     ADA Screening Guidelines:  5.7-6.4%  Consistent with prediabetes  >or=6.5%  Consistent with diabetes    High levels of fetal hemoglobin interfere with the HbA1C  assay. Heterozygous hemoglobin variants (HbS, HgC, etc)do  not significantly interfere with this assay.   However, presence of multiple variants may affect accuracy.     03/10/2022 6.0 (H) 4.0 - 5.6 % Final     Comment:     ADA Screening Guidelines:  5.7-6.4%  Consistent with prediabetes  >or=6.5%  Consistent with diabetes    High levels of fetal hemoglobin interfere with the HbA1C  assay. Heterozygous hemoglobin variants (HbS, HgC, etc)do  not significantly interfere with this assay.   However, presence of multiple variants may affect accuracy.         TSH  Recent Labs   Lab  04/21/22  0939 12/01/22  0908 06/06/23  0946   TSH 2.199 2.742 2.428         The ASCVD Risk score (Patricia DK, et al., 2019) failed to calculate for the following reasons:    The valid total cholesterol range is 130 to 320 mg/dL           Cardiovascular Testing:    Reviewed      ASSESSMENT AND PLAN     Patient Active Problem List   Diagnosis    Essential hypertension    Dyslipidemia; statin myalgias; 6/28/2018 exercise stress echo neg for ischemia    POAG (primary open-angle glaucoma)    Class 2 severe obesity due to excess calories with serious comorbidity in adult, unspecified BMI    Cataract    Nuclear sclerosis    Left knee pain    Tubular adenoma of colon 3/28/22 colonoscopy 4 mm sigmoid TA    Bradycardia 6/2018 holter negative    Status post hysterectomy    Statin myopathy    Chronic bilateral low back pain without sciatica    Vitamin D deficiency    Abnormal glucose    Thyroid nodule on US; followed by endo repeat 6/2024    Idiopathic peripheral neuropathy normal B12, TSH; A1c pre-DM. hx of lory changed to lyrica    Acute pain of left knee    Weakness of both hips    Decreased independence with transfers    Impaired functional mobility, balance, gait, and endurance    Fatigue    Primary open angle glaucoma of right eye, mild stage    Primary open-angle glaucoma, left eye, moderate stage    Right foot pain    Thoracic aortic atherosclerosis incidental on CXR 12/2022    Orthopnea    CLARA (obstructive sleep apnea)    Snoring    Esophageal dysphagia    Costochondritis, acute       Shortness of breath.  Aortic atherosclerosis on x-ray.  Echo showed normal left ventricle systolic function.  Stress test did not show any significant ischemia   states that at home bp stays well controlled.  [facility] and states at home stays around 130 mmHg.    Follow-up after 6 m          Thank you very much for involving me in the care of your patient.  Please do not hesitate to contact  me if there are any questions.      Julio Dye MD, FACC, [facility]  Interventional Cardiologist, [facility].           This note was dictated with the help of speech recognition software.  There might be un-intended errors and/or substitutions.

## 2023-10-23 ENCOUNTER — PATIENT MESSAGE (OUTPATIENT)
Dept: FAMILY MEDICINE | Facility: CLINIC | Age: 78
End: 2023-10-23
Payer: MEDICARE

## 2023-10-23 ENCOUNTER — OFFICE VISIT (OUTPATIENT)
Dept: NEUROLOGY | Facility: CLINIC | Age: 78
End: 2023-10-23
Payer: MEDICARE

## 2023-10-23 VITALS
HEART RATE: 62 BPM | WEIGHT: 194 LBS | SYSTOLIC BLOOD PRESSURE: 136 MMHG | HEIGHT: 65 IN | DIASTOLIC BLOOD PRESSURE: 69 MMHG | BODY MASS INDEX: 32.32 KG/M2

## 2023-10-23 DIAGNOSIS — G60.9 NEUROPATHY, PERIPHERAL, IDIOPATHIC: Primary | ICD-10-CM

## 2023-10-23 DIAGNOSIS — R73.03 PREDIABETES: ICD-10-CM

## 2023-10-23 PROCEDURE — 99999 PR PBB SHADOW E&M-EST. PATIENT-LVL III: ICD-10-PCS | Mod: PBBFAC,HCNC,, | Performed by: PSYCHIATRY & NEUROLOGY

## 2023-10-23 PROCEDURE — 99999 PR PBB SHADOW E&M-EST. PATIENT-LVL III: CPT | Mod: PBBFAC,HCNC,, | Performed by: PSYCHIATRY & NEUROLOGY

## 2023-10-23 PROCEDURE — 3078F PR MOST RECENT DIASTOLIC BLOOD PRESSURE < 80 MM HG: ICD-10-PCS | Mod: HCNC,CPTII,S$GLB, | Performed by: PSYCHIATRY & NEUROLOGY

## 2023-10-23 PROCEDURE — 3075F PR MOST RECENT SYSTOLIC BLOOD PRESS GE 130-139MM HG: ICD-10-PCS | Mod: HCNC,CPTII,S$GLB, | Performed by: PSYCHIATRY & NEUROLOGY

## 2023-10-23 PROCEDURE — 1159F MED LIST DOCD IN RCRD: CPT | Mod: HCNC,CPTII,S$GLB, | Performed by: PSYCHIATRY & NEUROLOGY

## 2023-10-23 PROCEDURE — 1160F RVW MEDS BY RX/DR IN RCRD: CPT | Mod: HCNC,CPTII,S$GLB, | Performed by: PSYCHIATRY & NEUROLOGY

## 2023-10-23 PROCEDURE — 3075F SYST BP GE 130 - 139MM HG: CPT | Mod: HCNC,CPTII,S$GLB, | Performed by: PSYCHIATRY & NEUROLOGY

## 2023-10-23 PROCEDURE — 99417 PROLNG OP E/M EACH 15 MIN: CPT | Mod: HCNC,S$GLB,, | Performed by: PSYCHIATRY & NEUROLOGY

## 2023-10-23 PROCEDURE — 99417 PR PROLONGED SVC, OUTPT, W/WO DIRECT PT CONTACT,  EA ADDTL 15 MIN: ICD-10-PCS | Mod: HCNC,S$GLB,, | Performed by: PSYCHIATRY & NEUROLOGY

## 2023-10-23 PROCEDURE — 1159F PR MEDICATION LIST DOCUMENTED IN MEDICAL RECORD: ICD-10-PCS | Mod: HCNC,CPTII,S$GLB, | Performed by: PSYCHIATRY & NEUROLOGY

## 2023-10-23 PROCEDURE — 1160F PR REVIEW ALL MEDS BY PRESCRIBER/CLIN PHARMACIST DOCUMENTED: ICD-10-PCS | Mod: HCNC,CPTII,S$GLB, | Performed by: PSYCHIATRY & NEUROLOGY

## 2023-10-23 PROCEDURE — 99215 OFFICE O/P EST HI 40 MIN: CPT | Mod: HCNC,S$GLB,, | Performed by: PSYCHIATRY & NEUROLOGY

## 2023-10-23 PROCEDURE — 99215 PR OFFICE/OUTPT VISIT, EST, LEVL V, 40-54 MIN: ICD-10-PCS | Mod: HCNC,S$GLB,, | Performed by: PSYCHIATRY & NEUROLOGY

## 2023-10-23 PROCEDURE — 3078F DIAST BP <80 MM HG: CPT | Mod: HCNC,CPTII,S$GLB, | Performed by: PSYCHIATRY & NEUROLOGY

## 2023-10-23 PROCEDURE — 3288F PR FALLS RISK ASSESSMENT DOCUMENTED: ICD-10-PCS | Mod: HCNC,CPTII,S$GLB, | Performed by: PSYCHIATRY & NEUROLOGY

## 2023-10-23 PROCEDURE — 1101F PT FALLS ASSESS-DOCD LE1/YR: CPT | Mod: HCNC,CPTII,S$GLB, | Performed by: PSYCHIATRY & NEUROLOGY

## 2023-10-23 PROCEDURE — 1101F PR PT FALLS ASSESS DOC 0-1 FALLS W/OUT INJ PAST YR: ICD-10-PCS | Mod: HCNC,CPTII,S$GLB, | Performed by: PSYCHIATRY & NEUROLOGY

## 2023-10-23 PROCEDURE — 3288F FALL RISK ASSESSMENT DOCD: CPT | Mod: HCNC,CPTII,S$GLB, | Performed by: PSYCHIATRY & NEUROLOGY

## 2023-10-23 RX ORDER — DULOXETIN HYDROCHLORIDE 20 MG/1
20 CAPSULE, DELAYED RELEASE ORAL DAILY
Qty: 30 CAPSULE | Refills: 11 | Status: SHIPPED | OUTPATIENT
Start: 2023-10-23 | End: 2024-02-09 | Stop reason: ALTCHOICE

## 2023-10-23 NOTE — PATIENT INSTRUCTIONS
Walk and exercise  Fall precaution  In case of any question, plz contact through [facility].

## 2023-10-23 NOTE — PROGRESS NOTES
[facility] - NEUROLOGY 7TH [place] [facility], [place]    Date: 10/23/23  Patient Name: Rosmery Ramirez   MRN: [id number]   Referring Provider: No ref. provider found    Thank you so much No ref. provider found for your patient referral to Neuromuscular team at [facility]. We take pride in our care coordination and look forward to your feedback and questions.    Assessment:   Rosmery Ramirez is a 78 y.o. female is a very pleasant patient with sensory predominant painful polyneuropathy in the setting of prediabetes for evaluation and 2nd opinion.  As her paresthesia did not respond well to pregabalin/gabapentin so I decided to try duloxetine and patient agreed with the plan.  I guided her that her workup is complete for polyneuropathy and we do need to repeat any further testing at this time but she needs to work to control prediabetes with healthy lifestyle.    I addressed her complaints. I provided information about fall precautions and healthy lifestyle. I would wish her very best for improvement/recovery in her condition.    Future direction based on feedback:    Plan:     Problem List Items Addressed This Visit    None  Visit Diagnoses       Neuropathy, peripheral, idiopathic    -  Primary    Relevant Medications    DULoxetine (CYMBALTA) 20 MG capsule    Prediabetes        Relevant Medications    DULoxetine (CYMBALTA) 20 MG capsule            Maritza Whitfield MD    This evaluation was completed in >75  Minutes over 50% of the time spent on education & counseling. This includes face to face time and non-face to face time preparing to see the patient (eg, review of tests), obtaining and/or reviewing separately obtained history, documenting clinical information in the electronic or other health record, independently interpreting results and communicating results to the patient/family/caregiver, or care coordinator.    Patient note was created using TEODORAodal  Dictation.  Any errors in syntax or even information may not have been identified and edited on initial review prior to signing this note.    Details provided by:    Patient    Reason for visit:  Numbness in feet    HISTORY OF PRESENT ILLNESS   Ms. Rosmery Ramirez is a 78 y.o. female presenting for evaluation of numbness in feet.  She has PMH of glaucoma, HTN, obesity, CLARA.    She started having numbness in her feet 2 years ago. It was initially at heels, then it progressed to involve both her feet up to her ankles. Her numbness is constant, and it is not relieved by any medication. She has not been taking Lyrica for 2 months. She has felt relief with diabetic socks. She has not noticed any numbness in her hands, forearms, or arms.     She has also noticed left thigh numbness on walking, which is not present at rest. She feels her left knee gives out occasionally. Other than that she has no difficulty in walking or going up and down stairs. She walks a mile thrice a week. She has no h/o dizziness or falls.    She has a h/o glaucoma, other than that she has not noticed any vision problems. There are no h/o headaches but she has difficulty sleeping. She had palpitations and shortness of breath 3 years ago, she has been treated for it by cardiology and these symptoms have resolved.     Medications tried in the past include:   Gabapentin has not been much helpful.    Pregabalin also did not help much and she stopped it for last 2 months    Chart Review:  Last neurology evaluation on 10/24/2022 with detailed polyneuropathy workup negative for any particular pathology for her sensory predominant polyneuropathy.    Pertinent work up based on chart review for current condition:  Comprehensive metabolic panel normal   CBC with hemoglobin 11.3, hematocrit 35   TSH 2.4   Hemoglobin A1c 5.9 highest has been 6.5  Vitamin B1 level 71 normal   Vitamin B12 level 956   Folate level 13 normal   Vitamin B6 level 19   Serum  protein electrophoresis normal, Normal total protein, normal pattern.    Immunofixation electrophoresis normal, No monoclonal peaks identified.    Thyroid peroxidase antibody strongly positive    Rheumatoid factor negative in 2012   SSA/SSB negative   DALJIT profile negative    NCS/EMG on 07/07/2022.    Sensory predominant polyneuropathy    MRI lumbar spine on 07/22/2022.    Mild multilevel degenerative change throughout the lower thoracic and lumbar spine.  No significant spinal canal stenosis but there is scattered lateral recess and neural foraminal encroachment as further detailed above.    Review of Systems:  12 system review of systems is negative except for the symptoms mentioned in HPI.       Past Medical History Social History   Past Medical History:   Diagnosis Date    Allergy     Anxiety     Breast cyst     Edema of leg 10/5/2012    bilateral     Fractures 2011    thumb R    GERD (gastroesophageal reflux disease)     Glaucoma     History of colonic polyps     Hx-TIA (transient ischemic attack) 8/13/2012 Jan 2012: LLE and left facial numbness lasting 1 hour     Hyperlipidemia     Hypertension     Left shoulder tendonitis 8/8/2015    Primary open-angle glaucoma, mild stage - Both Eyes 6/3/2013      Social History     Socioeconomic History    Marital status:     Number of children: 3   Occupational History    Occupation: retired - formerly pastoral care with [employer]   Tobacco Use    Smoking status: Never    Smokeless tobacco: Never   Substance and Sexual Activity    Alcohol use: Not Currently    Drug use: No    Sexual activity: Yes     Partners: Female, Male     Birth control/protection: Post-menopausal, See Surgical Hx     Social Determinants of Health     Financial Resource Strain: Low Risk  (10/3/2023)    Overall Financial Resource Strain (CARDIA)     Difficulty of Paying Living Expenses: Not very hard   Food Insecurity: No Food Insecurity (10/3/2023)    Hunger Vital Sign     Worried  About Running Out of Food in the Last Year: Never true     Ran Out of Food in the Last Year: Never true   Transportation Needs: No Transportation Needs (10/3/2023)    PRAPARE - Transportation     Lack of Transportation (Medical): No     Lack of Transportation (Non-Medical): No   Physical Activity: Insufficiently Active (10/3/2023)    Exercise Vital Sign     Days of Exercise per Week: 3 days     Minutes of Exercise per Session: 20 min   Stress: No Stress Concern Present (10/3/2023)    [employer] - Occupational Stress Questionnaire     Feeling of Stress : Not at all   Social Connections: Unknown (10/3/2023)    Social Connection and Isolation Panel [NHANES]     Frequency of Communication with Friends and Family: Three times a week     Frequency of Social Gatherings with Friends and Family: Three times a week     Active Member of Clubs or Organizations: Yes     Attends Club or Organization Meetings: 1 to 4 times per year     Marital Status:    Housing Stability: Low Risk  (10/3/2023)    Housing Stability Vital Sign     Unable to Pay for Housing in the Last Year: No     Number of Places Lived in the Last Year: 1     Unstable Housing in the Last Year: No        Family History Past Surgical History   Family History   Problem Relation Age of Onset    Breast cancer Mother 50    Glaucoma Mother     Alzheimer's disease Mother     Blindness Mother     Cataracts Mother     Glaucoma Father     Prostate cancer Father     Blindness Father     Cataracts Father     Cancer Father 62        prostate    Glaucoma Sister     No Known Problems Brother     No Known Problems Brother     Alzheimer's disease Maternal Grandfather     Macular degeneration Neg Hx     Retinal detachment Neg Hx     Strabismus Neg Hx     Amblyopia Neg Hx     Heart attack Neg Hx     Heart disease Neg Hx     Ovarian cancer Neg Hx     Colon cancer Neg Hx      Past Surgical History:   Procedure Laterality Date    BREAST BIOPSY Left      core    BREAST SURGERY Left     lumpectomy    CATARACT EXTRACTION W/  INTRAOCULAR LENS IMPLANT Left 10/05/2016    Dr. Mike    CATARACT EXTRACTION W/  INTRAOCULAR LENS IMPLANT Right 11/30/2016    With [facility] (Dr. Mike)    COLONOSCOPY N/A 1/23/2017    Procedure: COLONOSCOPY;  Surgeon: Hany Mosquera MD;  Location: [facility] (4TH FLR);  Service: Endoscopy;  Laterality: N/A;    COLONOSCOPY N/A 3/28/2022    Procedure: COLONOSCOPY;  Surgeon: Jamison Chambers MD;  Location: [facility] (4TH FLR);  Service: Endoscopy;  Laterality: N/A;  fully vaccinated   instructions to portal-st  3/22 arrival time confirmed with pt/COVID test cancelled-RB    ESOPHAGOGASTRODUODENOSCOPY N/A 7/20/2023    Procedure: EGD (ESOPHAGOGASTRODUODENOSCOPY);  Surgeon: Jorge L Thompson MD;  Location: [facility] (4TH FLR);  Service: Endoscopy;  Laterality: N/A;  inst handed to pre-call pt needs to be rescheduled to new day 03/21 bm.  4/26/23- precall- pt wants to cancel appt and reschedule for another day  6/7 pt r/s/instr. to [facility];-s7/14/23-pre-call completed-LS    HAND SURGERY Left 2011    thumb    HYSTERECTOMY  1980's    Total;    IMPLANTATION OF DEVICE FOR GLAUCOMA Right 3/17/2021    Procedure: INSERTION, DEVICE, FOR GLAUCOMA XEN GEL;  Surgeon: Yesica Mike MD;  Location: [facility] OR [address] [place] [address];  Service: Ophthalmology;  Laterality: Right;    KAHOOK GONIOTOMY Right 11/30/2016    WITH OSMANY ()    Left Breast Lumpectomy Left     performed in 1960s    OOPHORECTOMY      SELECTIVE LASER TRABECUPLASTY  05/2014    OU w/ Dr. Mike    TRABECULECTOMY Right 3/17/2021    Procedure: TRABECULECTOMY/XEN GEL WITH MMC;  Surgeon: Yesica Mike MD;  Location: [facility] OR [address] [place] [address];  Service: Ophthalmology;  Laterality: Right;    TRABECULECTOMY Left 7/28/2021    Procedure: TRABECULECTOMY/ MMC and Xen OS;  Surgeon: Yesica Mike MD;  Location: [facility] OR 1ST FLR;  Service: Ophthalmology;  Laterality: Left;    TUBAL LIGATION  1970's     VAGINAL DELIVERY      x3    YAG CAPSULOTOMY Left 11/29/2018            Allergies    Review of patient's allergies indicates:   Allergen Reactions    Lisinopril Swelling    Amlodipine Edema    Combigan [brimonidine-timolol]      Causes itchy eyelids and contact dermatitis    Cosopt [dorzolamide-timolol]      Causes angular blepharitis of eyelids    Pravastatin      Myalgia and elevated CPK            Medications     Current Outpatient Medications   Medication Sig Dispense Refill    ascorbic acid, vitamin C, (VITAMIN C) 1000 MG tablet Take 1,000 mg by mouth once daily.      aspirin (ECOTRIN) 81 MG EC tablet Take 81 mg by mouth once daily.      atorvastatin (LIPITOR) 10 MG tablet TAKE 1 TABLET (10 MG TOTAL) BY MOUTH EVERY OTHER DAY. 45 tablet 3    azelastine (ASTELIN) 137 mcg (0.1 %) nasal spray 1 spray (137 mcg total) by Nasal route 2 (two) times daily. 30 mL 2    calcium-vitamin D3 (OS-LETY 500 + D3) 500 mg(1,250mg) -200 unit per tablet Take 1 tablet by mouth 2 (two) times daily with meals.      dorzolamide-timolol 2-0.5% (COSOPT) 22.3-6.8 mg/mL ophthalmic solution Place 1 drop into both eyes 2 (two) times daily. 30 mL 3    famotidine (PEPCID) 20 MG tablet Take 1 tablet by mouth twice daily 60 tablet 2    hydroCHLOROthiazide (MICROZIDE) 12.5 mg capsule TAKE 1 CAPSULE EVERY DAY 90 capsule 3    irbesartan (AVAPRO) 150 MG tablet TAKE 1 TABLET BY MOUTH EVERY EVENING. 90 tablet 1    LUMIGAN 0.01 % Drop Place 1 drop into both eyes every evening. 7.5 mL 3    multivitamin (THERAGRAN) per tablet Take 1 tablet by mouth once daily.      pregabalin (LYRICA) 25 MG capsule Take 2 capsules (50 mg total) by mouth 2 (two) times daily for 7 days, THEN 1 capsule (25 mg total) once daily for 7 days. 35 capsule 0    RHOPRESSA 0.02 % ophthalmic solution INSTILL 1 DROP INTO LEFT EYE ONCE DAILY      valACYclovir (VALTREX) 1000 MG tablet Take 1 tablet (1,000 mg total) by mouth 3 (three) times daily. for 7 days 21 tablet 0     No  "current facility-administered medications for this visit.         LABS   Last CBC Results:   Lab Results   Component Value Date    WBC 5.44 07/26/2023    HGB 11.3 (L) 07/26/2023    HCT 35.9 (L) 07/26/2023     07/26/2023       Last CMP Results  Lab Results   Component Value Date     07/26/2023    K 3.7 07/26/2023     07/26/2023    CO2 26 07/26/2023    BUN 14 07/26/2023    CREATININE 0.8 07/26/2023    CALCIUM 9.5 07/26/2023    ALBUMIN 3.5 07/26/2023    AST 17 07/26/2023    ALT 15 07/26/2023       Last Lipids  Lab Results   Component Value Date    CHOL 117 (L) 12/01/2022    TRIG 78 12/01/2022    HDL 43 12/01/2022    LDLCALC 58.4 (L) 12/01/2022       Last A1C  Lab Results   Component Value Date    HGBA1C 5.9 (H) 12/01/2022       Last TSH  Lab Results   Component Value Date    TSH 2.428 06/06/2023         PHYSICAL EXAMINATION     Vitals:    10/23/23 1404   BP: 136/69   Pulse: 62   Weight: 88 kg (194 lb 0.1 oz)   Height: 5' 5" (1.651 m)     Wt Readings from Last 3 Encounters:   10/04/23 1258 88.2 kg (194 lb 7.1 oz)   08/02/23 1405 92.1 kg (203 lb 2.5 oz)   07/29/23 1008 89.7 kg (197 lb 12 oz)     Body mass index is 32.28 kg/m².     General: No acute distress, calm & cooperative  Head: Atraumatic, normocephalic  HEENT: PERRLA, EOMI, Oral mucosa moist   Neck: Supple, trachea midline  Cardiovascular: Regular rate and rhythm.  Pulmonary: CTAB, no increased work of breathing, no rhonchi or wheezing  Extremities: Warm, well-perfused, no significant edema  Psychiatric: Normal mood & affect; behavior normal & appropriate  Skin: No jaundice, rashes    GENERAL/CONSTITUTIONAL:    -Well appearing; well nourished  -FP-1.     HIGHER INTEGRATIVE FUNCTIONS:   -Attention & concentration: Normal   -Orientation: Oriented to person, place & time  -Memory: Normal  -Language: Normal   -Fund of Knowledge: Normal     CRANIAL NERVES:   -CN 2: Visual fields full  -CN 2,3: PERRL  -CN 3,4,6: EOMI  -CN 5: Facial sensation intact " bilaterally  -CN 7: Facial strength/movement intact bilaterally  -CN 8: Hearing normal bilaterally  -CN 9,10: Palate elevates symmetrically  -CN 11: Normal shoulder shrug and head turn  -CN 12: Tongue protrudes midline     MOTOR:   -Tone: normal in upper and lower extremities  -UE/LE motor: 5/5 throughout, no pronator drift      Upper Ext Right Left Lower Ext Right Left   Shoulder Abd 5 5 Hip flexion 5 5   Elbow flexion 5 5 Knee extension 5 5   Elbow extension 5 5 Knee flexion 5 5   Fingers abduction 5 5 Ankle dorsiflexion 5 5   Wrist extension   Ankle plantar flexion     Wrist flexion   Great toe dorsiflexion     Finger extension   Thigh adduction     Finger flexion   Thigh abduction     Thumb abduction            REFLEXES:      R L  R L   Triceps 2 2 Knee 2 1   Biceps 2 2 Ankle 1 1   BR 2 2        -Flexor plantar reflex bilaterally     SENSATION:   -Vibration is decreased and is 75% on the left great toe.   -The pinprick is dull in the feet bilaterally. It is 90% in the lower extremity as compared to the upper extremity.    COORDINATION:   -FNF normal bilaterally    GAIT:   -Normal casual gait. Able to stand on heels and toes without difficulty with minimal support.    Scheduled Follow-up :  Future Appointments   Date Time Provider Department Center   10/23/2023  2:00 PM Maritza Whitfield MD [facility] NEURO [facility]   10/23/2023  3:30 PM FLU VACCINE, PRIMARY CARE IMMUNIZATION STATION [facility] IM [facility]   1/26/2024  9:00 AM LAB, [facility] [place]   2/5/2024  3:40 PM Ancelmo Sumner MD [facility] [place]       After Visit Medication List :     Medication List            Accurate as of October 23, 2023  1:19 PM. If you have any questions, ask your nurse or doctor.                CONTINUE taking these medications      ascorbic acid (vitamin C) 1000 MG tablet  Commonly known as: VITAMIN C     aspirin 81 MG EC tablet  Commonly known as: ECOTRIN     atorvastatin 10 MG tablet  Commonly known as: LIPITOR  TAKE 1  TABLET (10 MG TOTAL) BY MOUTH EVERY OTHER DAY.     azelastine 137 mcg (0.1 %) nasal spray  Commonly known as: ASTELIN  1 spray (137 mcg total) by Nasal route 2 (two) times daily.     calcium-vitamin D3 500 mg-5 mcg (200 unit) per tablet  Commonly known as: OS-LETY 500 + D3     dorzolamide-timolol 2-0.5% 22.3-6.8 mg/mL ophthalmic solution  Commonly known as: COSOPT  Place 1 drop into both eyes 2 (two) times daily.     famotidine 20 MG tablet  Commonly known as: PEPCID  Take 1 tablet by mouth twice daily     hydroCHLOROthiazide 12.5 mg capsule  Commonly known as: MICROZIDE  TAKE 1 CAPSULE EVERY DAY     irbesartan 150 MG tablet  Commonly known as: AVAPRO  TAKE 1 TABLET BY MOUTH EVERY EVENING.     LUMIGAN 0.01 % Drop  Generic drug: bimatoprost  Place 1 drop into both eyes every evening.     multivitamin per tablet  Commonly known as: THERAGRAN     pregabalin 25 MG capsule  Commonly known as: LYRICA  Take 2 capsules (50 mg total) by mouth 2 (two) times daily for 7 days, THEN 1 capsule (25 mg total) once daily for 7 days.  Start taking on: August 31, 2023     RHOPRESSA 0.02 % ophthalmic solution  Generic drug: netarsudiL     valACYclovir 1000 MG tablet  Commonly known as: VALTREX  Take 1 tablet (1,000 mg total) by mouth 3 (three) times daily. for 7 days              Signing Physician:          Maritza Whitfield MD  , [employer] [facility] [employer]  Neurology Consultant. [facility].   [facility]. 7th [place] [address].

## 2023-10-28 ENCOUNTER — IMMUNIZATION (OUTPATIENT)
Dept: INTERNAL MEDICINE | Facility: CLINIC | Age: 78
End: 2023-10-28
Payer: MEDICARE

## 2023-10-28 PROCEDURE — G0008 ADMIN INFLUENZA VIRUS VAC: HCPCS | Mod: HCNC,S$GLB,, | Performed by: INTERNAL MEDICINE

## 2023-10-28 PROCEDURE — 90694 VACC AIIV4 NO PRSRV 0.5ML IM: CPT | Mod: HCNC,S$GLB,, | Performed by: INTERNAL MEDICINE

## 2023-10-28 PROCEDURE — G0008 FLU VACCINE - QUADRIVALENT - ADJUVANTED: ICD-10-PCS | Mod: HCNC,S$GLB,, | Performed by: INTERNAL MEDICINE

## 2023-10-28 PROCEDURE — 90694 FLU VACCINE - QUADRIVALENT - ADJUVANTED: ICD-10-PCS | Mod: HCNC,S$GLB,, | Performed by: INTERNAL MEDICINE

## 2023-11-15 ENCOUNTER — PATIENT MESSAGE (OUTPATIENT)
Dept: FAMILY MEDICINE | Facility: CLINIC | Age: 78
End: 2023-11-15
Payer: MEDICARE

## 2023-11-30 ENCOUNTER — TELEPHONE (OUTPATIENT)
Dept: ADMINISTRATIVE | Facility: CLINIC | Age: 78
End: 2023-11-30
Payer: MEDICARE

## 2023-11-30 ENCOUNTER — TELEPHONE (OUTPATIENT)
Dept: INTERNAL MEDICINE | Facility: CLINIC | Age: 78
End: 2023-11-30
Payer: MEDICARE

## 2023-12-05 ENCOUNTER — PATIENT MESSAGE (OUTPATIENT)
Dept: FAMILY MEDICINE | Facility: CLINIC | Age: 78
End: 2023-12-05
Payer: MEDICARE

## 2023-12-13 ENCOUNTER — DOCUMENTATION ONLY (OUTPATIENT)
Dept: FAMILY MEDICINE | Facility: CLINIC | Age: 78
End: 2023-12-13
Payer: MEDICARE

## 2023-12-13 NOTE — PROGRESS NOTES
OV note from endo 11/29/23 Dr. Angela Johnson  Multinodular goiter stable no need for biopsy   Hashimoto's thyroiditis, clinically euthyroid not on levothyroxine, repeat labs 1 year  Normal DEXA calcium and vitamin-D supplement   Pre diabetes work on therapeutic lifestyle modification (TLC)  No labs included

## 2023-12-19 ENCOUNTER — HOSPITAL ENCOUNTER (OUTPATIENT)
Dept: RADIOLOGY | Facility: HOSPITAL | Age: 78
Discharge: HOME | End: 2023-12-19
Attending: INTERNAL MEDICINE
Payer: MEDICARE

## 2023-12-19 DIAGNOSIS — Z12.31 ENCOUNTER FOR SCREENING MAMMOGRAM FOR BREAST CANCER: ICD-10-CM

## 2023-12-19 PROCEDURE — 77063 BREAST TOMOSYNTHESIS BI: CPT | Mod: 26,HCNC,, | Performed by: RADIOLOGY

## 2023-12-19 PROCEDURE — 77067 SCR MAMMO BI INCL CAD: CPT | Mod: 26,HCNC,, | Performed by: RADIOLOGY

## 2023-12-19 PROCEDURE — 77063 MAMMO DIGITAL SCREENING BILAT WITH TOMO: ICD-10-PCS | Mod: 26,HCNC,, | Performed by: RADIOLOGY

## 2023-12-19 PROCEDURE — 77067 MAMMO DIGITAL SCREENING BILAT WITH TOMO: ICD-10-PCS | Mod: 26,HCNC,, | Performed by: RADIOLOGY

## 2023-12-19 PROCEDURE — 77067 SCR MAMMO BI INCL CAD: CPT | Mod: TC,HCNC

## 2023-12-21 DIAGNOSIS — I10 ESSENTIAL HYPERTENSION: ICD-10-CM

## 2023-12-21 RX ORDER — HYDROCHLOROTHIAZIDE 12.5 MG/1
CAPSULE ORAL
Qty: 90 CAPSULE | Refills: 2 | Status: SHIPPED | OUTPATIENT
Start: 2023-12-21 | End: 2024-02-09 | Stop reason: SDUPTHER

## 2023-12-21 NOTE — TELEPHONE ENCOUNTER
Care Due:                  Date            Visit Type   Department     Provider  --------------------------------------------------------------------------------                                [facility]                              FOLLOWUP/OF  MED/ INTERNAL  Last Visit: 08-      FICE VISIT   MED/ ISABELLE Sumner  Next Visit: None Scheduled  None         None Found                                                            Last  Test          Frequency    Reason                     Performed    Due Date  --------------------------------------------------------------------------------    Lipid Panel.  12 months..  atorvastatin.............  12- 11-    [facility] Embedded Care Due Messages. Reference number: [id number].   12/21/2023 1:51:49 AM CST

## 2023-12-21 NOTE — TELEPHONE ENCOUNTER
Provider Staff:  Action required for this patient    Requires labs      Please see care gap opportunities below in Care Due Message.    Thanks!  [facility]     Appointments      Date Provider   Last Visit   8/2/2023 Ancelmo Sumner MD   Next Visit   2/5/2024 Ancelmo Sumner MD     Refill Decision Note   Rosmery Ramirez  is requesting a refill authorization.  Brief Assessment and Rationale for Refill:  Approve     Medication Therapy Plan:         Comments:     Note composed:4:41 PM 12/21/2023             Appointments     Last Visit   8/2/2023 Ancelmo Sumner MD   Next Visit   2/5/2024 Ancelmo Sumner MD

## 2024-01-17 ENCOUNTER — PATIENT OUTREACH (OUTPATIENT)
Dept: ADMINISTRATIVE | Facility: HOSPITAL | Age: 79
End: 2024-01-17
Payer: MEDICARE

## 2024-01-17 DIAGNOSIS — R73.09 ABNORMAL GLUCOSE: Primary | ICD-10-CM

## 2024-01-26 ENCOUNTER — LAB VISIT (OUTPATIENT)
Dept: LAB | Facility: HOSPITAL | Age: 79
End: 2024-01-26
Attending: INTERNAL MEDICINE
Payer: MEDICARE

## 2024-01-26 DIAGNOSIS — E78.2 MIXED HYPERLIPIDEMIA: ICD-10-CM

## 2024-01-26 DIAGNOSIS — R73.09 ABNORMAL GLUCOSE: ICD-10-CM

## 2024-01-26 LAB
ALBUMIN SERPL BCP-MCNC: 3.8 G/DL (ref 3.5–5.2)
ALP SERPL-CCNC: 89 U/L (ref 55–135)
ALT SERPL W/O P-5'-P-CCNC: 17 U/L (ref 10–44)
ANION GAP SERPL CALC-SCNC: 5 MMOL/L (ref 8–16)
AST SERPL-CCNC: 19 U/L (ref 10–40)
BASOPHILS # BLD AUTO: 0.05 K/UL (ref 0–0.2)
BASOPHILS NFR BLD: 1 % (ref 0–1.9)
BILIRUB SERPL-MCNC: 0.7 MG/DL (ref 0.1–1)
BUN SERPL-MCNC: 9 MG/DL (ref 8–23)
CALCIUM SERPL-MCNC: 9.8 MG/DL (ref 8.7–10.5)
CHLORIDE SERPL-SCNC: 108 MMOL/L (ref 95–110)
CHOLEST SERPL-MCNC: 123 MG/DL (ref 120–199)
CHOLEST/HDLC SERPL: 2.9 {RATIO} (ref 2–5)
CO2 SERPL-SCNC: 27 MMOL/L (ref 23–29)
CREAT SERPL-MCNC: 0.8 MG/DL (ref 0.5–1.4)
DIFFERENTIAL METHOD BLD: NORMAL
EOSINOPHIL # BLD AUTO: 0 K/UL (ref 0–0.5)
EOSINOPHIL NFR BLD: 0.2 % (ref 0–8)
ERYTHROCYTE [DISTWIDTH] IN BLOOD BY AUTOMATED COUNT: 12.5 % (ref 11.5–14.5)
EST. GFR  (NO RACE VARIABLE): >60 ML/MIN/1.73 M^2
ESTIMATED AVG GLUCOSE: 123 MG/DL (ref 68–131)
GLUCOSE SERPL-MCNC: 96 MG/DL (ref 70–110)
HBA1C MFR BLD: 5.9 % (ref 4–5.6)
HCT VFR BLD AUTO: 39.1 % (ref 37–48.5)
HDLC SERPL-MCNC: 42 MG/DL (ref 40–75)
HDLC SERPL: 34.1 % (ref 20–50)
HGB BLD-MCNC: 12.5 G/DL (ref 12–16)
IMM GRANULOCYTES # BLD AUTO: 0.01 K/UL (ref 0–0.04)
IMM GRANULOCYTES NFR BLD AUTO: 0.2 % (ref 0–0.5)
LDLC SERPL CALC-MCNC: 62.4 MG/DL (ref 63–159)
LYMPHOCYTES # BLD AUTO: 1.5 K/UL (ref 1–4.8)
LYMPHOCYTES NFR BLD: 30.2 % (ref 18–48)
MCH RBC QN AUTO: 29 PG (ref 27–31)
MCHC RBC AUTO-ENTMCNC: 32 G/DL (ref 32–36)
MCV RBC AUTO: 91 FL (ref 82–98)
MONOCYTES # BLD AUTO: 0.4 K/UL (ref 0.3–1)
MONOCYTES NFR BLD: 8.3 % (ref 4–15)
NEUTROPHILS # BLD AUTO: 3 K/UL (ref 1.8–7.7)
NEUTROPHILS NFR BLD: 60.1 % (ref 38–73)
NONHDLC SERPL-MCNC: 81 MG/DL
NRBC BLD-RTO: 0 /100 WBC
PLATELET # BLD AUTO: 314 K/UL (ref 150–450)
PMV BLD AUTO: 11.1 FL (ref 9.2–12.9)
POTASSIUM SERPL-SCNC: 4.2 MMOL/L (ref 3.5–5.1)
PROT SERPL-MCNC: 7.2 G/DL (ref 6–8.4)
RBC # BLD AUTO: 4.31 M/UL (ref 4–5.4)
SODIUM SERPL-SCNC: 140 MMOL/L (ref 136–145)
TRIGL SERPL-MCNC: 93 MG/DL (ref 30–150)
WBC # BLD AUTO: 4.94 K/UL (ref 3.9–12.7)

## 2024-01-26 PROCEDURE — 85025 COMPLETE CBC W/AUTO DIFF WBC: CPT | Mod: HCNC | Performed by: INTERNAL MEDICINE

## 2024-01-26 PROCEDURE — 83036 HEMOGLOBIN GLYCOSYLATED A1C: CPT | Mod: HCNC | Performed by: INTERNAL MEDICINE

## 2024-01-26 PROCEDURE — 36415 COLL VENOUS BLD VENIPUNCTURE: CPT | Mod: HCNC,PO | Performed by: INTERNAL MEDICINE

## 2024-01-26 PROCEDURE — 80061 LIPID PANEL: CPT | Mod: HCNC | Performed by: INTERNAL MEDICINE

## 2024-01-26 PROCEDURE — 80053 COMPREHEN METABOLIC PANEL: CPT | Mod: HCNC | Performed by: INTERNAL MEDICINE

## 2024-02-08 PROBLEM — R06.83 SNORING: Status: RESOLVED | Noted: 2022-12-16 | Resolved: 2024-02-08

## 2024-02-08 NOTE — PROGRESS NOTES
This note was created by combination of typed  and M-Modal dictation.  Transcription errors may be present.  If there are any questions, please contact me.    Assessment and Plan:   Assessment and Plan    Normal physical exam  Tubular adenoma of colon 3/28/22 colonoscopy 4 mm sigmoid TA  -pre visit labs reviewed  Aged out of repeat colonoscopy  -     CBC Without Differential; Future; Expected date: 02/08/2025  -     Comprehensive Metabolic Panel; Future; Expected date: 02/08/2025  -     Lipid Panel; Future; Expected date: 02/08/2025  -     Hemoglobin A1C; Future; Expected date: 02/08/2025    Abnormal glucose  Class 2 severe obesity due to excess calories with serious comorbidity in adult, unspecified BMI  -stable A1c.  Work on TLC.  -     CBC Without Differential; Future; Expected date: 02/08/2025  -     Comprehensive Metabolic Panel; Future; Expected date: 02/08/2025  -     Hemoglobin A1C; Future; Expected date: 02/08/2025    Essential hypertension  -BP stable.  Refilled HCTZ and irbesartan  -     hydroCHLOROthiazide (MICROZIDE) 12.5 mg capsule; Take 1 capsule (12.5 mg total) by mouth once daily.  Dispense: 90 capsule; Refill: 3  -     irbesartan (AVAPRO) 150 MG tablet; Take 1 tablet (150 mg total) by mouth every evening.  Dispense: 90 tablet; Refill: 3    Dyslipidemia; statin myalgias; 6/28/2018 exercise stress echo neg for ischemia  Thoracic aortic atherosclerosis  Statin myopathy  -lipid profile good on low-dose atorvastatin every other day.  No changes  -     CBC Without Differential; Future; Expected date: 02/08/2025  -     Comprehensive Metabolic Panel; Future; Expected date: 02/08/2025  -     Lipid Panel; Future; Expected date: 02/08/2025  -     atorvastatin (LIPITOR) 10 MG tablet; Take 1 tablet (10 mg total) by mouth every other day.  Dispense: 45 tablet; Refill: 3    Thyroid nodule on US; followed by endo repeat 6/2024  -she is aware that the ultrasound has been ordered and she will schedule at  her earliest convenience    Idiopathic peripheral neuropathy normal B12, TSH; A1c pre-DM. hx of lory changed to lyrica  -she was wary of Cymbalta side effect profile and never picked it up.  She finds that the neuropathy is tolerable if she just wears socks all the time.  No changes.  If she decides in the future she wants to challenge on Cymbalta she can notify me.    Decreased anal sphincter tone  -work on self-directed physical therapy/Kegel exercises.    Post-nasal drip  -refilled Astelin  -     azelastine (ASTELIN) 137 mcg (0.1 %) nasal spray; 1 spray (137 mcg total) by Nasal route 2 (two) times daily.  Dispense: 90 mL; Refill: 3    CLARA (obstructive sleep apnea)  -not using CPAP.  Did not find it helpful.      Medications Discontinued During This Encounter   Medication Reason    DULoxetine (CYMBALTA) 20 MG capsule Therapy completed    azelastine (ASTELIN) 137 mcg (0.1 %) nasal spray Reorder    irbesartan (AVAPRO) 150 MG tablet Reorder    hydroCHLOROthiazide (MICROZIDE) 12.5 mg capsule Reorder    atorvastatin (LIPITOR) 10 MG tablet Reorder       meds sent this encounter:  Medications Ordered This Encounter   Medications    atorvastatin (LIPITOR) 10 MG tablet     Sig: Take 1 tablet (10 mg total) by mouth every other day.     Dispense:  45 tablet     Refill:  3     Pharmacy update refills, keep on file, not requesting Rx to be filled today.    azelastine (ASTELIN) 137 mcg (0.1 %) nasal spray     Si spray (137 mcg total) by Nasal route 2 (two) times daily.     Dispense:  90 mL     Refill:  3     Pharmacy update refills, keep on file, not requesting Rx to be filled today.    hydroCHLOROthiazide (MICROZIDE) 12.5 mg capsule     Sig: Take 1 capsule (12.5 mg total) by mouth once daily.     Dispense:  90 capsule     Refill:  3     . Pharmacy update refills, keep on file, not requesting Rx to be filled today.    irbesartan (AVAPRO) 150 MG tablet     Sig: Take 1 tablet (150 mg total) by mouth every evening.      Dispense:  90 tablet     Refill:  3     Please discontinue all prior scripts with the same name and strength including any scripts on hold. Pharmacy update refills, keep on file, not requesting Rx to be filled today.         Follow Up:  Follow-up 6 months no labs  Future Appointments   Date Time Provider Department Center   8/13/2024  2:20 PM Ancelmo Sumner MD [facility] [place]           Subjective:   Subjective   Chief Complaint   Patient presents with    6 month follow up       OVI Botello is a 78 y.o. female.    Social History     Tobacco Use    Smoking status: Never    Smokeless tobacco: Never   Substance Use Topics    Alcohol use: Not Currently      Social History     Occupational History    Occupation: retired - formerly pastoral care with [employer]      Social History     Social History Narrative    Not on file       No LMP recorded. Patient has had a hysterectomy.    Last appointment with this clinic was Visit date not found. Last visit with me 8/2/2023   To summarize last visit and events leading up to today:  Hypertension, digital monitoring  Hyperlipidemia  Abnormal glucose  Thyroid nodule on ultrasound.  Last seen by Endocrinology 06/2023 and plan was repeat thyroid ultrasound after her EGD.  TFTs at that time were normal  Neuropathy, Lyrica with side effects and ineffective, wean down.  10/2023 follow-up with Neurology, trial of Cymbalta  Post nasal drip, astelin. Avoid nasal steroids (glaucoma)  Incidental pancreatic atrophy on CT. No symptoms. No further testing.   Reflux type symptoms, dysphagia and odynophagia.  Seen by GI.    3/1/23 esophogram Findings suggestive of mild esophageal dysmotility. Spontaneous gastroesophageal reflux observed. No findings to suggest achalasia.  7/20/23 EGD normal duodenum; erythema gastric body, antrum, prepyloric region. 1 cm HH. Bx's neg for H pylori, sprue, no EE  ED after the EGD for chest pain. Negative workup  Saw outpt primary care after ED. Treated  for MSK  7/22/22 MRI L spine Mild multilevel degenerative change throughout the lower thoracic and lumbar spine.  No significant spinal canal stenosis but there is scattered lateral recess and neural foraminal encroachment as further detailed above.    Last visit me in August   Painful peripheral edema worsened by naproxen.  Contributor Lyrica.  Conservative management, elevation, compression socks.  If no improvement increase HCTZ  Thyroid nodule due for repeat ultrasound 2024    She messaged me in August to wean down the Lyrica given ineffectiveness.    Saw cardiology in follow-up 10/4.  Stable follow-up 6 months    Saw Neurology 10/23 for neuropathy.  Trial of duloxetine    Pre visit labs  CBC normal   CMP normal   Lipid profile good  A1c 5.9    Thyroid US ordered by endo    Today's visit:    Never got the cymbalta filled.  She read the SE profile and got worried.  Finds that the neuropathy is tolerable if she wears socks all the time.  She would rather do that.    Last covid shot she broke out.  So she is wary of pursuing another 1.  Encouraged to get the RSV vaccine.     Wonders if there is exercises for her anal sphincter.  Sometimes when she has loose stool she may have a little bit of fecal incontinence.  The diarrhea is not frequent, she may notice it more if she drinks a liquid green smoothie.  No gross blood.  No saddle dysesthesia, no low back pain    Not using CPAP took for about 3 months and still was feeling tired so did not pursue.      Patient Care Team:  Ancelmo Sumner MD as PCP - General (Internal Medicine)  Brooke Delong MD as Obstetrician (Obstetrics)  Arnoldo Valentin MD as Consulting Physician (Otolaryngology)  Yesica Mike MD as Consulting Physician (Ophthalmology)  Brandan Nava MD as Consulting Physician (Orthopedic Surgery)  Jagjit Feldman MD as Consulting Physician (Endocrinology)  Ailyn Dubois as Digital Medicine Health   Beba Ahmadi, NawafD as  Hypertension Digital Medicine Clinician (Pharmacist)  Ancelmo Sumner MD as Hypertension Digital Medicine Responsible Provider (Internal Medicine)  Ancelmo Sumner MD as Consulting Physician (Internal Medicine)  Medicare, [employer] Managed as Hypertension Digital Medicine Contract  Jes Farrell MA as Care Coordinator      Patient Active Problem List    Diagnosis Date Noted    Costochondritis, acute 07/29/2023    Esophageal dysphagia 06/06/2023     3/1/23 esophogram  Findings suggestive of mild esophageal dysmotility.  Spontaneous gastroesophageal reflux observed.  No findings to suggest achalasia.  7/20/23 EGD normal duodenum; erythema gastric body, antrum, prepyloric region. 1 cm HH. Bx's neg for H pylori, sprue, no EE      Orthopnea 12/13/2022    CLARA (obstructive sleep apnea) 12/13/2022     12/15/22 HST with CLARA AHI 9      Thoracic aortic atherosclerosis incidental on CXR 12/2022 12/01/2022    Right foot pain 05/24/2022    Primary open-angle glaucoma, left eye, moderate stage 07/28/2021    Primary open angle glaucoma of right eye, mild stage 03/17/2021    Fatigue 01/24/2020    Acute pain of left knee 06/18/2019    Weakness of both hips 06/18/2019    Decreased independence with transfers 06/18/2019    Impaired functional mobility, balance, gait, and endurance 06/18/2019    Idiopathic peripheral neuropathy normal B12, TSH; A1c pre-DM. hx of lory changed to lyrica 06/03/2019 07/07/2022 EMG showing sensory motor peripheral neuropathy.  Chronic denervation in the L4-S1 myotomes on the left.  7/22/22 MRI L spine  Mild multilevel degenerative change throughout the lower thoracic and lumbar spine.  No significant spinal canal stenosis but there is scattered lateral recess and neural foraminal encroachment       Thyroid nodule on US; followed by endo repeat 6/2024 04/22/2019 2/21/20 thyroid US: 1.)  Thyroid gland is upper limits of normal in size with heterogeneous echotexture and normal vascularity  2.)  1.31 x 1.07 x 0.91 cm solid, heterogeneous predominately isoechoic nodule is seen in the right inferior pole  3.) 0.72 x 0.48 x 0.50 cm solid, hyperechoic nodule is seen in the right inferior pole  4.) 0.85 x 0.49 x 0.72 cm solid, isoechoic nodule is seen in the right inferior pole  RECOMMENDATIONS: Recommend repeat thyroid ultrasound in 1 year.  2/2021 thyroid US: 1.  Thyroid gland is normal in size with diffusely heterogenous echotexture. 2.  Two nodules in the right thyroid described above.  None meet criteria for fine-needle aspiration.  6/30/22 thyroid US:  1. Thyroid gland is normal in size with diffusely heterogenous echotexture consistent with Hashimoto's thyroiditis.  2. 2 nodules in the right thyroid described above. None meet criteria for fine-needle aspiration.      Abnormal glucose 04/18/2019    Vitamin D deficiency 04/04/2019 4/6/22 DEXA L-spine 1.2, total hip 0.7, femoral neck-0.8.  FRAX score 2.4/6.4%.      Chronic bilateral low back pain without sciatica 11/02/2018    Bradycardia 6/2018 holter negative 07/20/2018    Status post hysterectomy 07/20/2018    Statin myopathy 07/20/2018    Tubular adenoma of colon 3/28/22 colonoscopy 4 mm sigmoid TA 01/23/2017     3/28/22 colonoscopy 4 mm sigmoid TA      Left knee pain 01/05/2017    Nuclear sclerosis 11/30/2016    Cataract 09/07/2016     Dx updated per 2019 IMO Load      Class 2 severe obesity due to excess calories with serious comorbidity in adult, unspecified BMI     POAG (primary open-angle glaucoma) 01/05/2016    Essential hypertension 08/13/2012 01/13/2021 TTE LV normal size with normal systolic function.  LVEF 55%.  Normal diastolic function.  Normal RV size and normal function.  Normal CVP.  PA pressure 20.      Dyslipidemia; statin myalgias; 6/28/2018 exercise stress echo neg for ischemia 08/13/2012 6/28/2018 exercise stress echo CONCLUSIONS   1 - Normal left ventricular systolic function (EF 60-65%).   2 - No wall motion  "abnormalities.   3 - Normal left ventricular diastolic function.   4 - Normal right ventricular systolic function .          PAST MEDICAL PROBLEMS, PAST SURGICAL HISTORY: please see relevant portions of the electronic medical record    ALLERGIES AND MEDICATIONS: updated and reviewed.  Medication List with Changes/Refills   Current Medications    ASCORBIC ACID, VITAMIN C, (VITAMIN C) 1000 MG TABLET    Take 1,000 mg by mouth once daily.    ASPIRIN (ECOTRIN) 81 MG EC TABLET    Take 81 mg by mouth once daily.    ATORVASTATIN (LIPITOR) 10 MG TABLET    Take 1 tablet (10 mg total) by mouth every other day.    AZELASTINE (ASTELIN) 137 MCG (0.1 %) NASAL SPRAY    1 spray (137 mcg total) by Nasal route 2 (two) times daily.    CALCIUM-VITAMIN D3 (OS-LETY 500 + D3) 500 MG(1,250MG) -200 UNIT PER TABLET    Take 1 tablet by mouth 2 (two) times daily with meals.    DORZOLAMIDE-TIMOLOL 2-0.5% (COSOPT) 22.3-6.8 MG/ML OPHTHALMIC SOLUTION    Place 1 drop into both eyes 2 (two) times daily.    DULOXETINE (CYMBALTA) 20 MG CAPSULE    Take 1 capsule (20 mg total) by mouth once daily.    HYDROCHLOROTHIAZIDE (MICROZIDE) 12.5 MG CAPSULE    TAKE 1 CAPSULE EVERY DAY    IRBESARTAN (AVAPRO) 150 MG TABLET    TAKE 1 TABLET BY MOUTH EVERY EVENING.    LUMIGAN 0.01 % DROP    Place 1 drop into both eyes every evening.    MULTIVITAMIN (THERAGRAN) PER TABLET    Take 1 tablet by mouth once daily.    RHOPRESSA 0.02 % OPHTHALMIC SOLUTION    INSTILL 1 DROP INTO LEFT EYE ONCE DAILY         Objective:   Objective   Physical Exam   Vitals:    02/09/24 1455   BP: 134/66   Pulse: 69   Temp: 97.9 °F (36.6 °C)   TempSrc: Oral   SpO2: 98%   Weight: 86.9 kg (191 lb 9.3 oz)   Height: 5' 5" (1.651 m)    Body mass index is 31.88 kg/m².  Weight: 86.9 kg (191 lb 9.3 oz)   Height: 5' 5" (165.1 cm)     Physical Exam  Constitutional:       Appearance: She is well-developed.   HENT:      Right Ear: Tympanic membrane, ear canal and external ear normal.      Left Ear: Tympanic " membrane, ear canal and external ear normal.   Eyes:      General: No scleral icterus.     Extraocular Movements: Extraocular movements intact.      Pupils: Pupils are equal, round, and reactive to light.   Cardiovascular:      Rate and Rhythm: Normal rate and regular rhythm.      Heart sounds: Normal heart sounds. No murmur heard.  Pulmonary:      Effort: Pulmonary effort is normal.      Breath sounds: Normal breath sounds. No wheezing.   Abdominal:      Palpations: Abdomen is soft. There is no hepatomegaly, splenomegaly or mass.      Tenderness: There is no abdominal tenderness.   Genitourinary:     Comments: Decreased anal sphincter tone.  No lesions no induration no masses on IMCKY  Musculoskeletal:         General: No deformity. Normal range of motion.      Cervical back: Neck supple.      Right lower leg: No edema.      Left lower leg: No edema.   Lymphadenopathy:      Cervical: No cervical adenopathy.   Skin:     General: Skin is warm and dry.      Findings: No rash.      Comments: On exposed skin   Neurological:      Mental Status: She is alert and oriented to person, place, and time.      Deep Tendon Reflexes: Reflexes are normal and symmetric.   Psychiatric:         Behavior: Behavior normal.         Thought Content: Thought content normal.         Judgment: Judgment normal.         Component      Latest Ref Rng 12/1/2022 6/6/2023 1/26/2024   WBC      3.90 - 12.70 K/uL 5.49   4.94    RBC      4.00 - 5.40 M/uL 4.23   4.31    Hemoglobin      12.0 - 16.0 g/dL 12.3   12.5    Hematocrit      37.0 - 48.5 % 39.4   39.1    MCV      82 - 98 fL 93   91    MCH      27.0 - 31.0 pg 29.1   29.0    MCHC      32.0 - 36.0 g/dL 31.2 (L)   32.0    RDW      11.5 - 14.5 % 12.8   12.5    Platelet Count      150 - 450 K/uL 286   314    MPV      9.2 - 12.9 fL 11.0   11.1    Immature Granulocytes      0.0 - 0.5 % 0.2   0.2    Gran # (ANC)      1.8 - 7.7 K/uL 3.4   3.0    Immature Grans (Abs)      0.00 - 0.04 K/uL 0.01   0.01     Lymph #      1.0 - 4.8 K/uL 1.6   1.5    Mono #      0.3 - 1.0 K/uL 0.4   0.4    Eos #      0.0 - 0.5 K/uL 0.0   0.0    Baso #      0.00 - 0.20 K/uL 0.03   0.05    nRBC      0 /100 WBC 0   0    Gran %      38.0 - 73.0 % 62.7   60.1    Lymph %      18.0 - 48.0 % 28.2   30.2    Mono %      4.0 - 15.0 % 8.0   8.3    Eos %      0.0 - 8.0 % 0.4   0.2    Basophil %      0.0 - 1.9 % 0.5   1.0    Differential Method Automated   Automated    Sodium      136 - 145 mmol/L 141   140    Potassium      3.5 - 5.1 mmol/L 3.9   4.2    Chloride      95 - 110 mmol/L 105   108    CO2      23 - 29 mmol/L 29   27    Glucose      70 - 110 mg/dL 93   96    BUN      8 - 23 mg/dL 11   9    Creatinine      0.5 - 1.4 mg/dL 0.8   0.8    Calcium      8.7 - 10.5 mg/dL 9.7   9.8    PROTEIN TOTAL      6.0 - 8.4 g/dL 7.0   7.2    Albumin      3.5 - 5.2 g/dL 3.7   3.8    BILIRUBIN TOTAL      0.1 - 1.0 mg/dL 0.7   0.7    ALP      55 - 135 U/L 90   89    AST      10 - 40 U/L 20   19    ALT      10 - 44 U/L 27   17    Anion Gap      8 - 16 mmol/L 7 (L)   5 (L)    eGFR      >60 mL/min/1.73 m^2 >60.0   >60.0    Cholesterol Total      120 - 199 mg/dL 117 (L)   123    Triglycerides      30 - 150 mg/dL 78   93    HDL      40 - 75 mg/dL 43   42    LDL Cholesterol      63.0 - 159.0 mg/dL 58.4 (L)   62.4 (L)    HDL/Cholesterol Ratio      20.0 - 50.0 % 36.8   34.1    Total Cholesterol/HDL Ratio      2.0 - 5.0  2.7   2.9    Non-HDL Cholesterol      mg/dL 74   81    Hemoglobin A1C External      4.0 - 5.6 % 5.9 (H)   5.9 (H)    Estimated Avg Glucose      68 - 131 mg/dL 123   123    TSH      0.400 - 4.000 uIU/mL 2.742  2.428        Legend:  (L) Low  (H) High

## 2024-02-09 ENCOUNTER — OFFICE VISIT (OUTPATIENT)
Dept: FAMILY MEDICINE | Facility: CLINIC | Age: 79
End: 2024-02-09
Payer: MEDICARE

## 2024-02-09 VITALS
HEART RATE: 69 BPM | TEMPERATURE: 98 F | DIASTOLIC BLOOD PRESSURE: 66 MMHG | OXYGEN SATURATION: 98 % | BODY MASS INDEX: 31.92 KG/M2 | SYSTOLIC BLOOD PRESSURE: 134 MMHG | HEIGHT: 65 IN | WEIGHT: 191.56 LBS

## 2024-02-09 DIAGNOSIS — D12.6 TUBULAR ADENOMA OF COLON: ICD-10-CM

## 2024-02-09 DIAGNOSIS — E66.01 CLASS 2 SEVERE OBESITY DUE TO EXCESS CALORIES WITH SERIOUS COMORBIDITY IN ADULT, UNSPECIFIED BMI: ICD-10-CM

## 2024-02-09 DIAGNOSIS — G60.9 IDIOPATHIC PERIPHERAL NEUROPATHY: ICD-10-CM

## 2024-02-09 DIAGNOSIS — R73.09 ABNORMAL GLUCOSE: ICD-10-CM

## 2024-02-09 DIAGNOSIS — Z00.00 NORMAL PHYSICAL EXAM: Primary | ICD-10-CM

## 2024-02-09 DIAGNOSIS — G47.33 OSA (OBSTRUCTIVE SLEEP APNEA): ICD-10-CM

## 2024-02-09 DIAGNOSIS — G72.0 STATIN MYOPATHY: ICD-10-CM

## 2024-02-09 DIAGNOSIS — I10 ESSENTIAL HYPERTENSION: ICD-10-CM

## 2024-02-09 DIAGNOSIS — E04.1 THYROID NODULE: Chronic | ICD-10-CM

## 2024-02-09 DIAGNOSIS — I70.0 THORACIC AORTIC ATHEROSCLEROSIS: ICD-10-CM

## 2024-02-09 DIAGNOSIS — R09.82 POST-NASAL DRIP: ICD-10-CM

## 2024-02-09 DIAGNOSIS — T46.6X5A STATIN MYOPATHY: ICD-10-CM

## 2024-02-09 DIAGNOSIS — E78.5 DYSLIPIDEMIA: ICD-10-CM

## 2024-02-09 DIAGNOSIS — K62.89 DECREASED ANAL SPHINCTER TONE: ICD-10-CM

## 2024-02-09 PROCEDURE — 1126F AMNT PAIN NOTED NONE PRSNT: CPT | Mod: HCNC,CPTII,S$GLB, | Performed by: INTERNAL MEDICINE

## 2024-02-09 PROCEDURE — 1160F RVW MEDS BY RX/DR IN RCRD: CPT | Mod: HCNC,CPTII,S$GLB, | Performed by: INTERNAL MEDICINE

## 2024-02-09 PROCEDURE — 99999 PR PBB SHADOW E&M-EST. PATIENT-LVL IV: CPT | Mod: PBBFAC,HCNC,, | Performed by: INTERNAL MEDICINE

## 2024-02-09 PROCEDURE — 3075F SYST BP GE 130 - 139MM HG: CPT | Mod: HCNC,CPTII,S$GLB, | Performed by: INTERNAL MEDICINE

## 2024-02-09 PROCEDURE — 1159F MED LIST DOCD IN RCRD: CPT | Mod: HCNC,CPTII,S$GLB, | Performed by: INTERNAL MEDICINE

## 2024-02-09 PROCEDURE — 3288F FALL RISK ASSESSMENT DOCD: CPT | Mod: HCNC,CPTII,S$GLB, | Performed by: INTERNAL MEDICINE

## 2024-02-09 PROCEDURE — 1101F PT FALLS ASSESS-DOCD LE1/YR: CPT | Mod: HCNC,CPTII,S$GLB, | Performed by: INTERNAL MEDICINE

## 2024-02-09 PROCEDURE — 99397 PER PM REEVAL EST PAT 65+ YR: CPT | Mod: HCNC,S$GLB,, | Performed by: INTERNAL MEDICINE

## 2024-02-09 PROCEDURE — 3078F DIAST BP <80 MM HG: CPT | Mod: HCNC,CPTII,S$GLB, | Performed by: INTERNAL MEDICINE

## 2024-02-09 RX ORDER — IRBESARTAN 150 MG/1
150 TABLET ORAL NIGHTLY
Qty: 90 TABLET | Refills: 3 | Status: SHIPPED | OUTPATIENT
Start: 2024-02-09

## 2024-02-09 RX ORDER — ATORVASTATIN CALCIUM 10 MG/1
10 TABLET, FILM COATED ORAL EVERY OTHER DAY
Qty: 45 TABLET | Refills: 3 | Status: SHIPPED | OUTPATIENT
Start: 2024-02-09

## 2024-02-09 RX ORDER — AZELASTINE 1 MG/ML
1 SPRAY, METERED NASAL 2 TIMES DAILY
Qty: 90 ML | Refills: 3 | Status: SHIPPED | OUTPATIENT
Start: 2024-02-09 | End: 2025-02-08

## 2024-02-09 RX ORDER — HYDROCHLOROTHIAZIDE 12.5 MG/1
12.5 CAPSULE ORAL DAILY
Qty: 90 CAPSULE | Refills: 3 | Status: SHIPPED | OUTPATIENT
Start: 2024-02-09

## 2024-02-09 NOTE — PATIENT INSTRUCTIONS
I recommend you get the RSV vaccine.   RSV is a common respiratory virus that usually causes mild, cold-like symptoms. RSV can cause illness in people of all ages but may be especially serious for infants and older adults. Adults at highest risk for severe RSV disease include older adults, adults with chronic medical conditions such as heart or lung disease, weakened immune systems, or certain other underlying medical conditions, or who live in nursing homes or long-term care facilities. RSV spreads through direct contact with the virus, such as droplets from another persons cough or sneeze contacting your eyes, nose, or mouth. It can also be spread by touching a surface that has the virus on it, like a doorknob, and then touching your face before washing your hands. Symptoms of RSV infection may include runny nose, decrease in appetite, coughing, sneezing, fever, or wheezing. RSV can cause bronchiolitis (inflammation of the small airways in the lung) and pneumonia (infection of the lungs). RSV can sometimes lead to worsening of other medical conditions such as asthma, chronic obstructive pulmonary disease (a chronic disease of the lungs that makes it hard to breathe), or congestive heart failure (when the heart cant pump enough blood and oxygen throughout the body). Older adults and infants who get very sick from RSV may need to be hospitalized. Some may even die.

## 2024-02-29 ENCOUNTER — OFFICE VISIT (OUTPATIENT)
Dept: INTERNAL MEDICINE | Facility: CLINIC | Age: 79
End: 2024-02-29
Payer: MEDICARE

## 2024-02-29 VITALS
HEIGHT: 65 IN | WEIGHT: 191.81 LBS | BODY MASS INDEX: 31.96 KG/M2 | HEART RATE: 80 BPM | DIASTOLIC BLOOD PRESSURE: 72 MMHG | TEMPERATURE: 99 F | SYSTOLIC BLOOD PRESSURE: 136 MMHG | OXYGEN SATURATION: 96 %

## 2024-02-29 DIAGNOSIS — G47.33 OSA (OBSTRUCTIVE SLEEP APNEA): ICD-10-CM

## 2024-02-29 DIAGNOSIS — J02.9 SORETHROAT: ICD-10-CM

## 2024-02-29 DIAGNOSIS — J10.1 INFLUENZA A: Primary | ICD-10-CM

## 2024-02-29 DIAGNOSIS — E66.01 CLASS 2 SEVERE OBESITY DUE TO EXCESS CALORIES WITH SERIOUS COMORBIDITY IN ADULT, UNSPECIFIED BMI: ICD-10-CM

## 2024-02-29 DIAGNOSIS — U07.1 COVID-19: ICD-10-CM

## 2024-02-29 PROBLEM — M25.562 ACUTE PAIN OF LEFT KNEE: Status: RESOLVED | Noted: 2019-06-18 | Resolved: 2024-02-29

## 2024-02-29 PROBLEM — M25.562 LEFT KNEE PAIN: Status: RESOLVED | Noted: 2017-01-05 | Resolved: 2024-02-29

## 2024-02-29 PROBLEM — M94.0 COSTOCHONDRITIS, ACUTE: Status: RESOLVED | Noted: 2023-07-29 | Resolved: 2024-02-29

## 2024-02-29 PROBLEM — R29.898 WEAKNESS OF BOTH HIPS: Status: RESOLVED | Noted: 2019-06-18 | Resolved: 2024-02-29

## 2024-02-29 PROBLEM — R53.83 FATIGUE: Status: RESOLVED | Noted: 2020-01-24 | Resolved: 2024-02-29

## 2024-02-29 LAB
CTP QC/QA: YES
SARS-COV-2 RDRP RESP QL NAA+PROBE: POSITIVE

## 2024-02-29 PROCEDURE — 99999 PR PBB SHADOW E&M-EST. PATIENT-LVL III: CPT | Mod: PBBFAC,HCNC,, | Performed by: INTERNAL MEDICINE

## 2024-02-29 PROCEDURE — 3078F DIAST BP <80 MM HG: CPT | Mod: HCNC,CPTII,S$GLB, | Performed by: INTERNAL MEDICINE

## 2024-02-29 PROCEDURE — 99214 OFFICE O/P EST MOD 30 MIN: CPT | Mod: HCNC,S$GLB,, | Performed by: INTERNAL MEDICINE

## 2024-02-29 PROCEDURE — 1101F PT FALLS ASSESS-DOCD LE1/YR: CPT | Mod: HCNC,CPTII,S$GLB, | Performed by: INTERNAL MEDICINE

## 2024-02-29 PROCEDURE — 87635 SARS-COV-2 COVID-19 AMP PRB: CPT | Mod: QW,HCNC,S$GLB, | Performed by: INTERNAL MEDICINE

## 2024-02-29 PROCEDURE — 3288F FALL RISK ASSESSMENT DOCD: CPT | Mod: HCNC,CPTII,S$GLB, | Performed by: INTERNAL MEDICINE

## 2024-02-29 PROCEDURE — 3075F SYST BP GE 130 - 139MM HG: CPT | Mod: HCNC,CPTII,S$GLB, | Performed by: INTERNAL MEDICINE

## 2024-02-29 PROCEDURE — 1125F AMNT PAIN NOTED PAIN PRSNT: CPT | Mod: HCNC,CPTII,S$GLB, | Performed by: INTERNAL MEDICINE

## 2024-02-29 RX ORDER — OSELTAMIVIR PHOSPHATE 75 MG/1
75 CAPSULE ORAL 2 TIMES DAILY
Qty: 10 CAPSULE | Refills: 0 | Status: SHIPPED | OUTPATIENT
Start: 2024-02-29 | End: 2024-03-05

## 2024-02-29 NOTE — PROGRESS NOTES
INTERNAL MEDICINE CLINIC - SAME DAY APPOINTMENT  Progress Note    PRESENTING HISTORY     PCP: Ancelmo Sumner MD    Chief Complaint/Reason for Visit:     Chief Complaint   Patient presents with    Sore Throat    Cough    Chills    Fever      History of Present Illness & ROS : Ms. Rosmery Ramirez is a 78 y.o. female.      Started with symptoms on Monday with fever up to 101 F, sore throat, mild diarrhea and cough.    No chest pain or SOB.    PAST HISTORY:     Past Medical History:   Diagnosis Date    Allergy     Anxiety     Breast cyst     Edema of leg 10/5/2012    bilateral     Fractures 2011    thumb R    GERD (gastroesophageal reflux disease)     Glaucoma     History of colonic polyps     Hx-TIA (transient ischemic attack) 8/13/2012 Jan 2012: LLE and left facial numbness lasting 1 hour     Hyperlipidemia     Hypertension     Left shoulder tendonitis 8/8/2015    Primary open-angle glaucoma, mild stage - Both Eyes 6/3/2013       Past Surgical History:   Procedure Laterality Date    BREAST BIOPSY Left     core    BREAST SURGERY Left     lumpectomy    CATARACT EXTRACTION W/  INTRAOCULAR LENS IMPLANT Left 10/05/2016    Dr. Mike    CATARACT EXTRACTION W/  INTRAOCULAR LENS IMPLANT Right 11/30/2016    With [place] (Dr. Mike)    COLONOSCOPY N/A 1/23/2017    Procedure: COLONOSCOPY;  Surgeon: Hany Mosquera MD;  [facility]  Service: Endoscopy;  Laterality: N/A;    COLONOSCOPY N/A 3/28/2022    Procedure: COLONOSCOPY;  Surgeon: Jamison Chambers MD;  [facility]  Service: Endoscopy;  Laterality: N/A;  fully vaccinated   instructions to portal-st  3/22 arrival time confirmed with pt/COVID test cancelled-RB    ESOPHAGOGASTRODUODENOSCOPY N/A 7/20/2023    Procedure: EGD (ESOPHAGOGASTRODUODENOSCOPY);  Surgeon: Jorge L Thompson MD;  [facility]  Service: Endoscopy;  Laterality: N/A;  inst handed to pre-call pt needs to be rescheduled to new day 03/21  bm.  4/26/23- precall- pt wants to cancel appt and reschedule for another day  6/7 pt r/s/instr. to [facility];-s7/14/23-pre-call completed-LS    HAND SURGERY Left 2011    thumb    HYSTERECTOMY  1980's    Total;    IMPLANTATION OF DEVICE FOR GLAUCOMA Right 3/17/2021    Procedure: INSERTION, DEVICE, FOR GLAUCOMA XEN GEL;  Surgeon: Yesica Mike MD;  Location: [facility] [place] [address] [place] [address];  Service: Ophthalmology;  Laterality: Right;    [place] GONIOTOMY Right 11/30/2016    WITH CE ()    Left Breast Lumpectomy Left     performed in 1960s    OOPHORECTOMY      SELECTIVE LASER TRABECUPLASTY  05/2014    [facility] w/ Dr. Mike    TRABECULECTOMY Right 3/17/2021    Procedure: TRABECULECTOMY/XEN GEL WITH [facility];  Surgeon: Yesica Mike MD;  Location: [facility] OR [address] [place] [address];  Service: Ophthalmology;  Laterality: Right;    TRABECULECTOMY Left 7/28/2021    Procedure: TRABECULECTOMY/ [facility] and Xen OS;  Surgeon: Yesica Mike MD;  Location: [facility] OR [address] [place] [address];  Service: Ophthalmology;  Laterality: Left;    TUBAL LIGATION  1970's    VAGINAL DELIVERY      x3    YAG CAPSULOTOMY Left 11/29/2018           Family History   Problem Relation Age of Onset    Breast cancer Mother 50    Glaucoma Mother     Alzheimer's disease Mother     Blindness Mother     Cataracts Mother     Glaucoma Father     Prostate cancer Father     Blindness Father     Cataracts Father     Cancer Father 62        prostate    Breast cancer Sister     Glaucoma Sister     Alzheimer's disease Maternal Grandfather     No Known Problems Brother     No Known Problems Brother     Macular degeneration Neg Hx     Retinal detachment Neg Hx     Strabismus Neg Hx     Amblyopia Neg Hx     Heart attack Neg Hx     Heart disease Neg Hx     Ovarian cancer Neg Hx     Colon cancer Neg Hx        Social History     Socioeconomic History    Marital status:     Number of children: 3   Occupational History    Occupation: retired - formerly pastoral care with [employer]    Tobacco Use    Smoking status: Never    Smokeless tobacco: Never   Substance and Sexual Activity    Alcohol use: Not Currently    Drug use: No    Sexual activity: Yes     Partners: Female, Male     Birth control/protection: Post-menopausal, See Surgical Hx     Social Determinants of Health     Financial Resource Strain: Low Risk  (11/14/2023)    Overall Financial Resource Strain (CARDIA)     Difficulty of Paying Living Expenses: Not hard at all   Food Insecurity: No Food Insecurity (11/14/2023)    Hunger Vital Sign     Worried About Running Out of Food in the Last Year: Never true     Ran Out of Food in the Last Year: Never true   Transportation Needs: No Transportation Needs (11/14/2023)    PRAPARE - Transportation     Lack of Transportation (Medical): No     Lack of Transportation (Non-Medical): No   Physical Activity: Inactive (11/14/2023)    Exercise Vital Sign     Days of Exercise per Week: 0 days     Minutes of Exercise per Session: 20 min   Stress: No Stress Concern Present (11/14/2023)    [employer] - Occupational Stress Questionnaire     Feeling of Stress : Not at all   Social Connections: Unknown (11/14/2023)    Social Connection and Isolation Panel [NHANES]     Frequency of Communication with Friends and Family: Three times a week     Frequency of Social Gatherings with Friends and Family: Once a week     Active Member of Clubs or Organizations: Yes     Attends Club or Organization Meetings: 1 to 4 times per year     Marital Status:    Housing Stability: Low Risk  (11/14/2023)    Housing Stability Vital Sign     Unable to Pay for Housing in the Last Year: No     Number of Places Lived in the Last Year: 1     Unstable Housing in the Last Year: No       MEDICATIONS & ALLERGIES:     Current Outpatient Medications on File Prior to Visit   Medication Sig Dispense Refill    ascorbic acid, vitamin C, (VITAMIN C) 1000 MG tablet Take 1,000 mg by mouth once daily.      aspirin  (ECOTRIN) 81 MG EC tablet Take 81 mg by mouth once daily.      atorvastatin (LIPITOR) 10 MG tablet Take 1 tablet (10 mg total) by mouth every other day. 45 tablet 3    azelastine (ASTELIN) 137 mcg (0.1 %) nasal spray 1 spray (137 mcg total) by Nasal route 2 (two) times daily. 90 mL 3    calcium-vitamin D3 (OS-LETY 500 + D3) 500 mg(1,250mg) -200 unit per tablet Take 1 tablet by mouth 2 (two) times daily with meals.      dorzolamide-timolol 2-0.5% (COSOPT) 22.3-6.8 mg/mL ophthalmic solution Place 1 drop into both eyes 2 (two) times daily. 30 mL 3    hydroCHLOROthiazide (MICROZIDE) 12.5 mg capsule Take 1 capsule (12.5 mg total) by mouth once daily. 90 capsule 3    irbesartan (AVAPRO) 150 MG tablet Take 1 tablet (150 mg total) by mouth every evening. 90 tablet 3    LUMIGAN 0.01 % Drop Place 1 drop into both eyes every evening. 7.5 mL 3    multivitamin (THERAGRAN) per tablet Take 1 tablet by mouth once daily.      RHOPRESSA 0.02 % ophthalmic solution INSTILL 1 DROP INTO LEFT EYE ONCE DAILY       No current facility-administered medications on file prior to visit.        Review of patient's allergies indicates:   Allergen Reactions    Lisinopril Swelling    Amlodipine Edema    Combigan [brimonidine-timolol]      Causes itchy eyelids and contact dermatitis    Cosopt [dorzolamide-timolol]      Causes angular blepharitis of eyelids    Pravastatin      Myalgia and elevated CPK       Medications Reconciliation:   I have reconciled the patient's home medications with the patient/family. I have updated all changes.  Refer to After-Visit Medication List.    OBJECTIVE:     Vital Signs:  Vitals:    02/29/24 1711   BP: 136/72   Pulse: 80   Temp: 99.1 °F (37.3 °C)     Wt Readings from Last 3 Encounters:   02/29/24 1711 87 kg (191 lb 12.8 oz)   02/09/24 1455 86.9 kg (191 lb 9.3 oz)   10/23/23 1404 88 kg (194 lb 0.1 oz)     Body mass index is 31.92 kg/m².     Physical Exam:  General: Well developed, well nourished. No distress.  HEENT:  Head is normocephalic, atraumatic;  Eyes: Clear conjunctiva.  Neck: Supple, symmetrical neck; trachea midline.  Lungs: Clear to auscultation bilaterally and normal respiratory effort.  Cardiovascular: Heart with regular rate and rhythm.    Abdomen: Abdomen is soft, non-tender  Musculoskeletal: Normal gait.   Psychiatric: Normal affect. Alert.      Laboratory  Lab Results   Component Value Date    WBC 4.94 01/26/2024    HGB 12.5 01/26/2024    HCT 39.1 01/26/2024     01/26/2024    CHOL 123 01/26/2024    TRIG 93 01/26/2024    HDL 42 01/26/2024    ALT 17 01/26/2024    AST 19 01/26/2024     01/26/2024    K 4.2 01/26/2024     01/26/2024    CREATININE 0.8 01/26/2024    BUN 9 01/26/2024    CO2 27 01/26/2024    TSH 2.428 06/06/2023    INR 1.0 01/08/2012    HGBA1C 5.9 (H) 01/26/2024       ASSESSMENT & PLAN:     Influenza A  - Rx:  -     oseltamivir (TAMIFLU) 75 MG capsule; Take 1 capsule (75 mg total) by mouth 2 (two) times daily. for 5 days  Dispense: 10 capsule; Refill: 0    COVID-19  - Co-morbidities:       Age 78       Class 2 severe obesity due to excess calories with serious comorbidity in adult, unspecified BMI       CLARA (obstructive sleep apnea) no longer using CPAP      Plan:  -     nirmatrelvir-ritonavir 300 mg (150 mg x 2)-100 mg copackaged tablets (EUA); Take 3 tablets by mouth 2 (two) times daily for 5 days. Each dose contains 2 nirmatrelvir (pink tablets) and 1 ritonavir (white tablet). Take all 3 tablets together  Dispense: 30 tablet; Refill: 0    Hold Atorvastatin for 7 days.    Sorethroat  -     POCT Influenza A/B Molecular: positive for A  -     POCT COVID-19 Rapid Screening: positive    Scheduled Follow-up :  Future Appointments   Date Time Provider Department Center   8/13/2024  2:20 PM Ancelmo Sumner MD [facility]       After Visit Medication List :     Medication List            Accurate as of February 29, 2024  5:44 PM. If you have any questions, ask your nurse or doctor.                 START taking these medications      nirmatrelvir-ritonavir 300 mg (150 mg x 2)-100 mg copackaged tablets (EUA)  Take 3 tablets by mouth 2 (two) times daily for 5 days. Each dose contains 2 nirmatrelvir (pink tablets) and 1 ritonavir (white tablet). Take all 3 tablets together  Started by: Harpal Knox MD     oseltamivir 75 MG capsule  Commonly known as: TAMIFLU  Take 1 capsule (75 mg total) by mouth 2 (two) times daily. for 5 days  Started by: Harpal Knox MD            CONTINUE taking these medications      ascorbic acid (vitamin C) 1000 MG tablet  Commonly known as: VITAMIN C     aspirin 81 MG EC tablet  Commonly known as: ECOTRIN     atorvastatin 10 MG tablet  Commonly known as: LIPITOR  Take 1 tablet (10 mg total) by mouth every other day.     azelastine 137 mcg (0.1 %) nasal spray  Commonly known as: ASTELIN  1 spray (137 mcg total) by Nasal route 2 (two) times daily.     calcium-vitamin D3 500 mg-5 mcg (200 unit) per tablet  Commonly known as: OS-LETY 500 + D3     dorzolamide-timolol 2-0.5% 22.3-6.8 mg/mL ophthalmic solution  Commonly known as: COSOPT  Place 1 drop into both eyes 2 (two) times daily.     hydroCHLOROthiazide 12.5 mg capsule  Commonly known as: MICROZIDE  Take 1 capsule (12.5 mg total) by mouth once daily.     irbesartan 150 MG tablet  Commonly known as: AVAPRO  Take 1 tablet (150 mg total) by mouth every evening.     LUMIGAN 0.01 % Drop  Generic drug: bimatoprost  Place 1 drop into both eyes every evening.     multivitamin per tablet  Commonly known as: THERAGRAN     RHOPRESSA 0.02 % ophthalmic solution  Generic drug: netarsudiL               Where to Get Your Medications        These medications were sent to [facility] [id number] [facility] [place] [address]  [address] [place] [address]      Phone: [phone]   nirmatrelvir-ritonavir 300 mg (150 mg x 2)-100 mg copackaged tablets (EUA)  oseltamivir 75 MG capsule         Signing Physician:  Harpal Knox MD

## 2024-05-20 ENCOUNTER — OFFICE VISIT (OUTPATIENT)
Dept: INTERNAL MEDICINE | Facility: CLINIC | Age: 79
End: 2024-05-20
Payer: MEDICARE

## 2024-05-20 VITALS
WEIGHT: 188.06 LBS | TEMPERATURE: 99 F | BODY MASS INDEX: 31.33 KG/M2 | HEART RATE: 60 BPM | OXYGEN SATURATION: 96 % | DIASTOLIC BLOOD PRESSURE: 65 MMHG | SYSTOLIC BLOOD PRESSURE: 140 MMHG | HEIGHT: 65 IN

## 2024-05-20 DIAGNOSIS — J06.9 UPPER RESPIRATORY TRACT INFECTION, UNSPECIFIED TYPE: Primary | ICD-10-CM

## 2024-05-20 LAB
CTP QC/QA: YES
CTP QC/QA: YES
FLUAV AG NPH QL: NEGATIVE
FLUBV AG NPH QL: NEGATIVE
SARS-COV-2 RDRP RESP QL NAA+PROBE: NEGATIVE

## 2024-05-20 PROCEDURE — 3077F SYST BP >= 140 MM HG: CPT | Mod: HCNC,CPTII,GC,S$GLB | Performed by: STUDENT IN AN ORGANIZED HEALTH CARE EDUCATION/TRAINING PROGRAM

## 2024-05-20 PROCEDURE — 1101F PT FALLS ASSESS-DOCD LE1/YR: CPT | Mod: HCNC,CPTII,GC,S$GLB | Performed by: STUDENT IN AN ORGANIZED HEALTH CARE EDUCATION/TRAINING PROGRAM

## 2024-05-20 PROCEDURE — 99999 PR PBB SHADOW E&M-EST. PATIENT-LVL IV: CPT | Mod: PBBFAC,HCNC,GC, | Performed by: STUDENT IN AN ORGANIZED HEALTH CARE EDUCATION/TRAINING PROGRAM

## 2024-05-20 PROCEDURE — 3078F DIAST BP <80 MM HG: CPT | Mod: HCNC,CPTII,GC,S$GLB | Performed by: STUDENT IN AN ORGANIZED HEALTH CARE EDUCATION/TRAINING PROGRAM

## 2024-05-20 PROCEDURE — 87635 SARS-COV-2 COVID-19 AMP PRB: CPT | Mod: QW,HCNC,GC,S$GLB | Performed by: STUDENT IN AN ORGANIZED HEALTH CARE EDUCATION/TRAINING PROGRAM

## 2024-05-20 PROCEDURE — 3288F FALL RISK ASSESSMENT DOCD: CPT | Mod: HCNC,CPTII,GC,S$GLB | Performed by: STUDENT IN AN ORGANIZED HEALTH CARE EDUCATION/TRAINING PROGRAM

## 2024-05-20 PROCEDURE — 1126F AMNT PAIN NOTED NONE PRSNT: CPT | Mod: HCNC,CPTII,GC,S$GLB | Performed by: STUDENT IN AN ORGANIZED HEALTH CARE EDUCATION/TRAINING PROGRAM

## 2024-05-20 PROCEDURE — 99213 OFFICE O/P EST LOW 20 MIN: CPT | Mod: HCNC,GC,S$GLB, | Performed by: STUDENT IN AN ORGANIZED HEALTH CARE EDUCATION/TRAINING PROGRAM

## 2024-05-20 PROCEDURE — 87804 INFLUENZA ASSAY W/OPTIC: CPT | Mod: 59,QW,HCNC,GC | Performed by: STUDENT IN AN ORGANIZED HEALTH CARE EDUCATION/TRAINING PROGRAM

## 2024-05-20 RX ORDER — BENZONATATE 200 MG/1
200 CAPSULE ORAL 3 TIMES DAILY PRN
Qty: 30 CAPSULE | Refills: 0 | Status: SHIPPED | OUTPATIENT
Start: 2024-05-20 | End: 2024-05-30

## 2024-05-20 RX ORDER — ALBUTEROL SULFATE 90 UG/1
2 AEROSOL, METERED RESPIRATORY (INHALATION) EVERY 6 HOURS PRN
Qty: 6.7 G | Refills: 0 | Status: SHIPPED | OUTPATIENT
Start: 2024-05-20

## 2024-05-20 NOTE — PROGRESS NOTES
Rosmery Ramirez  1945        Subjective     Reason for Visit: URI symptoms    History of Present Illness:  Ms. Rosmery Ramirez is a 78 y.o. female who  has a past medical history of Allergy, Anxiety, Breast cyst, Edema of leg, Fractures, GERD (gastroesophageal reflux disease), Glaucoma, History of colonic polyps, TIA (transient ischemic attack), Hyperlipidemia, Hypertension, Left shoulder tendonitis, and Primary open-angle glaucoma, mild stage - Both Eyes.    She  presents to clinic for evaluation of URI symptoms that initially started with sore throat and started to have associated cough, fever, fatigue. Symptoms worsen in night time. Denies sick contacts. Went to [facility] Sunday where she didn't wear a mask, unclear if any exposure there. She is Flu vaccinated and Covid vaccinated x4. She reports  a history of Covid at the beginning of this year that was treated with paxlovid.        Cough  This is a new problem. Episode onset: Saturday, started with a sore throat. The problem has been gradually improving. The problem occurs every few minutes. The cough is Non-productive. Associated symptoms include a fever, headaches and a sore throat. Pertinent negatives include no chest pain, chills, ear pain, heartburn, hemoptysis, myalgias, nasal congestion, postnasal drip, rash, rhinorrhea, shortness of breath, sweats or wheezing. Treatments tried: Robitussin, Azelastine, Tylenol, Warm gargles. The treatment provided moderate relief. Her past medical history is significant for environmental allergies. There is no history of asthma, bronchiectasis, bronchitis, COPD, emphysema or pneumonia.   Sore Throat   This is a new problem. Episode onset: Saturday\ The problem has been unchanged. The pain is worse on the left side. Maximum temperature: 100.4 F. The fever has been present for 1 to 2 days (Saturday). Associated symptoms include coughing, headaches, a hoarse voice and a plugged ear sensation. Pertinent  negatives include no congestion, diarrhea, ear discharge, ear pain, shortness of breath, trouble swallowing or vomiting.   Fever   Associated symptoms include coughing, headaches and a sore throat. Pertinent negatives include no chest pain, congestion, diarrhea, ear pain, rash, vomiting or wheezing. She has tried fluids for the symptoms. The treatment provided mild relief.   Fatigue  Associated symptoms include coughing, fatigue, a fever, headaches and a sore throat. Pertinent negatives include no chest pain, chills, congestion, myalgias, rash or vomiting.       Review of Systems   Constitutional:  Positive for fatigue and fever. Negative for chills.   HENT:  Positive for hoarse voice and sore throat. Negative for congestion, ear discharge, ear pain, postnasal drip, rhinorrhea and trouble swallowing.    Respiratory:  Positive for cough. Negative for hemoptysis, shortness of breath and wheezing.    Cardiovascular:  Negative for chest pain.   Gastrointestinal:  Negative for diarrhea, heartburn and vomiting.   Musculoskeletal:  Negative for myalgias.   Skin:  Negative for rash.   Neurological:  Positive for headaches.   Endo/Heme/Allergies:  Positive for environmental allergies.        PAST HISTORY:     Past Medical History:   Diagnosis Date    Allergy     Anxiety     Breast cyst     Edema of leg 10/5/2012    bilateral     Fractures 2011    thumb R    GERD (gastroesophageal reflux disease)     Glaucoma     History of colonic polyps     Hx-TIA (transient ischemic attack) 8/13/2012 Jan 2012: LLE and left facial numbness lasting 1 hour     Hyperlipidemia     Hypertension     Left shoulder tendonitis 8/8/2015    Primary open-angle glaucoma, mild stage - Both Eyes 6/3/2013       Past Surgical History:   Procedure Laterality Date    BREAST BIOPSY Left     core    BREAST SURGERY Left     lumpectomy    CATARACT EXTRACTION W/  INTRAOCULAR LENS IMPLANT Left 10/05/2016    Dr. Mike    CATARACT EXTRACTION W/  INTRAOCULAR  LENS IMPLANT Right 11/30/2016    With [facility] (Dr. Mike)    COLONOSCOPY N/A 1/23/2017    Procedure: COLONOSCOPY;  Surgeon: Hany Mosquera MD;  Location: [facility] ([address] [place] [address]);  Service: Endoscopy;  Laterality: N/A;    COLONOSCOPY N/A 3/28/2022    Procedure: COLONOSCOPY;  Surgeon: Jamison Chambers MD;  Location: [facility] ENDO (4TH FLR);  Service: Endoscopy;  Laterality: N/A;  fully vaccinated   instructions to portal-st  3/22 arrival time confirmed with pt/COVID test cancelled-RB    ESOPHAGOGASTRODUODENOSCOPY N/A 7/20/2023    Procedure: EGD (ESOPHAGOGASTRODUODENOSCOPY);  Surgeon: Jorge L Thompson MD;  Location: [facility] (4TH FLR);  Service: Endoscopy;  Laterality: N/A;  inst handed to pre-call pt needs to be rescheduled to new day 03/21 bm.  4/26/23- precall- pt wants to cancel appt and reschedule for another day  6/7 pt r/s/instr. to sunita;-s7/14/23-pre-call completed-LS    HAND SURGERY Left 2011    thumb    HYSTERECTOMY  1980's    Total;    IMPLANTATION OF DEVICE FOR GLAUCOMA Right 3/17/2021    Procedure: INSERTION, DEVICE, FOR GLAUCOMA XEN GEL;  Surgeon: Yesica Mike MD;  Location: [facility] OR [address] [place] [address];  Service: Ophthalmology;  Laterality: Right;    KAHOOK GONIOTOMY Right 11/30/2016    WITH CE ()    Left Breast Lumpectomy Left     performed in 1960s    OOPHORECTOMY      SELECTIVE LASER TRABECUPLASTY  05/2014    OU w/ Dr. Mike    TRABECULECTOMY Right 3/17/2021    Procedure: TRABECULECTOMY/XEN GEL WITH MMC;  Surgeon: Yesica Mike MD;  Location: [facility] OR [address] [place] [address];  Service: Ophthalmology;  Laterality: Right;    TRABECULECTOMY Left 7/28/2021    Procedure: TRABECULECTOMY/ MMC and Xen OS;  Surgeon: Yesica Mike MD;  Location: [facility] OR [address] [place] [address];  Service: Ophthalmology;  Laterality: Left;    TUBAL LIGATION  1970's    VAGINAL DELIVERY      x3    YAG CAPSULOTOMY Left 11/29/2018           Family History   Problem Relation Name Age of Onset    Breast cancer Mother 76 y/o 50     Glaucoma Mother 78 y/o     Alzheimer's disease Mother 78 y/o     Blindness Mother 78 y/o     Cataracts Mother 78 y/o     Glaucoma Father 77 y/o     Prostate cancer Father 77 y/o     Blindness Father 77 y/o     Cataracts Father 77 y/o     Cancer Father 77 y/o 62        prostate    Breast cancer Sister 5 sisters     Glaucoma Sister 5 sisters     Alzheimer's disease Maternal Grandfather      No Known Problems Brother      No Known Problems Brother      Macular degeneration Neg Hx      Retinal detachment Neg Hx      Strabismus Neg Hx      Amblyopia Neg Hx      Heart attack Neg Hx      Heart disease Neg Hx      Ovarian cancer Neg Hx      Colon cancer Neg Hx           MEDICATIONS & ALLERGIES:     Current Outpatient Medications on File Prior to Visit   Medication Sig    ascorbic acid, vitamin C, (VITAMIN C) 1000 MG tablet Take 1,000 mg by mouth once daily.    aspirin (ECOTRIN) 81 MG EC tablet Take 81 mg by mouth once daily.    atorvastatin (LIPITOR) 10 MG tablet Take 1 tablet (10 mg total) by mouth every other day.    azelastine (ASTELIN) 137 mcg (0.1 %) nasal spray 1 spray (137 mcg total) by Nasal route 2 (two) times daily.    calcium-vitamin D3 (OS-LETY 500 + D3) 500 mg(1,250mg) -200 unit per tablet Take 1 tablet by mouth 2 (two) times daily with meals.    dorzolamide-timolol 2-0.5% (COSOPT) 22.3-6.8 mg/mL ophthalmic solution Place 1 drop into both eyes 2 (two) times daily.    hydroCHLOROthiazide (MICROZIDE) 12.5 mg capsule Take 1 capsule (12.5 mg total) by mouth once daily.    irbesartan (AVAPRO) 150 MG tablet Take 1 tablet (150 mg total) by mouth every evening.    LUMIGAN 0.01 % Drop Place 1 drop into both eyes every evening.    multivitamin (THERAGRAN) per tablet Take 1 tablet by mouth once daily.    RHOPRESSA 0.02 % ophthalmic solution INSTILL 1 DROP INTO LEFT EYE ONCE DAILY     No current facility-administered medications on file prior to visit.       Review of patient's allergies indicates:   Allergen Reactions     "Lisinopril Swelling    Amlodipine Edema    Combigan [brimonidine-timolol]      Causes itchy eyelids and contact dermatitis    Cosopt [dorzolamide-timolol]      Causes angular blepharitis of eyelids    Pravastatin      Myalgia and elevated CPK       OBJECTIVE:        Vital Signs:  Vitals:    05/20/24 1358 05/20/24 1418   BP: (!) 145/73 (!) 140/65   BP Location: Left arm Left arm   Patient Position: Sitting Sitting   BP Method: Large (Automatic) Large (Manual)   Pulse: 60    Temp: 98.8 °F (37.1 °C)    TempSrc: Oral    SpO2: 96%    Weight: 85.3 kg (188 lb 0.8 oz)    Height: 5' 5" (1.651 m)        Body mass index is 31.29 kg/m².       Physical Exam  Vitals and nursing note reviewed.   Constitutional:       General: She is not in acute distress.     Appearance: Normal appearance. She is not ill-appearing, toxic-appearing or diaphoretic.   HENT:      Head: Normocephalic and atraumatic.      Right Ear: Tympanic membrane, ear canal and external ear normal. There is no impacted cerumen.      Left Ear: Tympanic membrane, ear canal and external ear normal. There is no impacted cerumen.      Nose: Nose normal. No congestion or rhinorrhea.      Right Sinus: No maxillary sinus tenderness or frontal sinus tenderness.      Left Sinus: No maxillary sinus tenderness or frontal sinus tenderness.      Mouth/Throat:      Mouth: Mucous membranes are moist.      Pharynx: Oropharynx is clear. No oropharyngeal exudate, posterior oropharyngeal erythema or uvula swelling.      Tonsils: No tonsillar exudate or tonsillar abscesses.   Eyes:      General: No scleral icterus.        Right eye: No discharge.         Left eye: No discharge.      Conjunctiva/sclera: Conjunctivae normal.   Cardiovascular:      Rate and Rhythm: Normal rate and regular rhythm.      Heart sounds: Normal heart sounds. No murmur heard.  Pulmonary:      Effort: Pulmonary effort is normal. No respiratory distress.      Breath sounds: Normal breath sounds. No stridor. No " wheezing, rhonchi or rales.   Abdominal:      General: Abdomen is flat. Bowel sounds are normal. There is no distension.      Palpations: Abdomen is soft.      Tenderness: There is no abdominal tenderness. There is no guarding.   Musculoskeletal:      Right lower leg: No edema.      Left lower leg: No edema.   Skin:     General: Skin is warm and dry.      Coloration: Skin is not jaundiced.   Neurological:      Mental Status: She is alert and oriented to person, place, and time. Mental status is at baseline.   Psychiatric:         Mood and Affect: Mood normal.         Behavior: Behavior normal.            Laboratory  Lab Results   Component Value Date    WBC 4.94 01/26/2024    HGB 12.5 01/26/2024    HCT 39.1 01/26/2024    MCV 91 01/26/2024     01/26/2024     Lab Results   Component Value Date    GLU 96 01/26/2024     01/26/2024    K 4.2 01/26/2024     01/26/2024    CO2 27 01/26/2024    BUN 9 01/26/2024    CREATININE 0.8 01/26/2024    CALCIUM 9.8 01/26/2024    MG 2.2 01/09/2012    PROT 7.2 01/26/2024    BILITOT 0.7 01/26/2024    ALKPHOS 89 01/26/2024    ALT 17 01/26/2024    AST 19 01/26/2024     Lab Results   Component Value Date    INR 1.0 01/08/2012     Lab Results   Component Value Date    CHOL 123 01/26/2024    HDL 42 01/26/2024    LDLCALC 62.4 (L) 01/26/2024    TRIG 93 01/26/2024     Lab Results   Component Value Date    HGBA1C 5.9 (H) 01/26/2024     Lab Results   Component Value Date    TSH 2.428 06/06/2023              ASSESSMENT & PLAN:       Ms. Rosmery Ramirez is a 78 y.o. female who was seen today in clinic for URI symptoms that started a couple days ago.      Diagnoses and all orders for this visit:    Upper respiratory tract infection, unspecified type  Onset 2 days ago. Associated symptoms include dry cough, sore throat, fever and fatigue. Trial of Robitussin, Azelastine, Tylenol, warm gargles has provided some alleviation with overall symptoms improving.  Suspect common  cold/viral etiology.     Pneumonia less likely, reports most symptoms are URI-related. Suspect cough is secondary to post nasal drip.   -CURB-65 score: 1 points. Low risk group    Oral exam unremarkable, strep pharyngitis unlikely.   -CENTOR score: 1 points. [due to reported fever of 100.4 F]. Low likelihood of strep.          PLAN:  -     POCT COVID-19 Rapid Screening- NEGATIVE  -     POCT Influenza A/B Rapid Antigen- NEGATIVE  -     albuterol (VENTOLIN HFA) 90 mcg/actuation inhaler; Inhale 2 puffs into the lungs every 6 (six) hours as needed for Wheezing or Shortness of Breath. Rescue  -     benzonatate (TESSALON) 200 MG capsule; Take 1 capsule (200 mg total) by mouth 3 (three) times daily as needed for Cough.  - Recommended trial of Nyquil/Dayquil.  - Continue symptomatic care with warm gargles.   - Extensively counseled on alarm symptoms/worsening of symptoms that would require emergent evaluation. Patient verbalized understanding.     1. Upper respiratory tract infection, unspecified type        Follow up if symptoms worsen or fail to improve.    Other Orders Placed This Visit:  Orders Placed This Encounter   Procedures    POCT COVID-19 Rapid Screening    POCT Influenza A/B Rapid Antigen             Discussed with Dr. Santacruz - staff attestation to follow    Rian Cohen DO  Internal Medicine, PGY-3  05/21/2024.4:06 PM  [facility]  Internal Medicine Resident Clinic  [address] [place] [id number]  Phone: [phone]  Fax: [phone]  [email or web address]

## 2024-05-20 NOTE — PATIENT INSTRUCTIONS
Trial of Nyquil/Dayquil, Benzonatate, Albuterol inhaler    -Please read the attached information regarding upper respiratory infection and go to your nearest ED if any of the alarm/concerning signs develop.

## 2024-05-22 ENCOUNTER — TELEPHONE (OUTPATIENT)
Dept: INTERNAL MEDICINE | Facility: CLINIC | Age: 79
End: 2024-05-22
Payer: MEDICARE

## 2024-05-22 NOTE — TELEPHONE ENCOUNTER
Called patient to followup on recent visit. Reports overall improvement in symptoms. Denies recurrence of fevers.     Counseled patient to reach out to us if any concerns. Patient verbalized understanding.

## 2024-05-24 ENCOUNTER — PATIENT MESSAGE (OUTPATIENT)
Dept: ADMINISTRATIVE | Facility: OTHER | Age: 79
End: 2024-05-24
Payer: MEDICARE

## 2024-05-24 ENCOUNTER — PATIENT MESSAGE (OUTPATIENT)
Dept: ENDOCRINOLOGY | Facility: CLINIC | Age: 79
End: 2024-05-24
Payer: MEDICARE

## 2024-06-03 ENCOUNTER — PATIENT MESSAGE (OUTPATIENT)
Dept: ENDOCRINOLOGY | Facility: CLINIC | Age: 79
End: 2024-06-03
Payer: MEDICARE

## 2024-06-05 ENCOUNTER — HOSPITAL ENCOUNTER (OUTPATIENT)
Dept: RADIOLOGY | Facility: HOSPITAL | Age: 79
Discharge: HOME | End: 2024-06-05
Attending: STUDENT IN AN ORGANIZED HEALTH CARE EDUCATION/TRAINING PROGRAM
Payer: MEDICARE

## 2024-06-05 DIAGNOSIS — E04.1 THYROID NODULE: ICD-10-CM

## 2024-06-05 PROCEDURE — 76536 US EXAM OF HEAD AND NECK: CPT | Mod: 26,HCNC,, | Performed by: RADIOLOGY

## 2024-06-05 PROCEDURE — 76536 US EXAM OF HEAD AND NECK: CPT | Mod: TC,HCNC

## 2024-06-07 ENCOUNTER — PATIENT MESSAGE (OUTPATIENT)
Dept: ENDOCRINOLOGY | Facility: CLINIC | Age: 79
End: 2024-06-07
Payer: MEDICARE

## 2024-07-24 DIAGNOSIS — M25.569 KNEE PAIN, UNSPECIFIED CHRONICITY, UNSPECIFIED LATERALITY: Primary | ICD-10-CM

## 2024-07-26 ENCOUNTER — HOSPITAL ENCOUNTER (OUTPATIENT)
Dept: RADIOLOGY | Facility: HOSPITAL | Age: 79
Discharge: HOME | End: 2024-07-26
Attending: STUDENT IN AN ORGANIZED HEALTH CARE EDUCATION/TRAINING PROGRAM
Payer: MEDICARE

## 2024-07-26 ENCOUNTER — OFFICE VISIT (OUTPATIENT)
Dept: INTERNAL MEDICINE | Facility: CLINIC | Age: 79
End: 2024-07-26
Payer: MEDICARE

## 2024-07-26 VITALS
SYSTOLIC BLOOD PRESSURE: 142 MMHG | BODY MASS INDEX: 32.03 KG/M2 | HEIGHT: 65 IN | WEIGHT: 192.25 LBS | HEART RATE: 56 BPM | OXYGEN SATURATION: 96 % | DIASTOLIC BLOOD PRESSURE: 60 MMHG

## 2024-07-26 DIAGNOSIS — M17.0 OSTEOARTHRITIS OF BOTH KNEES, UNSPECIFIED OSTEOARTHRITIS TYPE: ICD-10-CM

## 2024-07-26 DIAGNOSIS — M25.562 ACUTE PAIN OF LEFT KNEE: ICD-10-CM

## 2024-07-26 DIAGNOSIS — M25.562 ACUTE PAIN OF LEFT KNEE: Primary | ICD-10-CM

## 2024-07-26 PROCEDURE — 99999 PR PBB SHADOW E&M-EST. PATIENT-LVL V: CPT | Mod: PBBFAC,,, | Performed by: STUDENT IN AN ORGANIZED HEALTH CARE EDUCATION/TRAINING PROGRAM

## 2024-07-26 PROCEDURE — 73564 X-RAY EXAM KNEE 4 OR MORE: CPT | Mod: TC,50

## 2024-07-26 PROCEDURE — 73564 X-RAY EXAM KNEE 4 OR MORE: CPT | Mod: 26,50,, | Performed by: RADIOLOGY

## 2024-07-26 RX ORDER — NAPROXEN 500 MG/1
500 TABLET ORAL 2 TIMES DAILY WITH MEALS
Qty: 28 TABLET | Refills: 0 | Status: SHIPPED | OUTPATIENT
Start: 2024-07-26 | End: 2024-07-26

## 2024-07-26 RX ORDER — NAPROXEN 500 MG/1
500 TABLET ORAL 2 TIMES DAILY WITH MEALS
Qty: 60 TABLET | Refills: 0 | Status: SHIPPED | OUTPATIENT
Start: 2024-07-26 | End: 2024-08-25

## 2024-07-26 RX ORDER — NAPROXEN 500 MG/1
500 TABLET ORAL 2 TIMES DAILY WITH MEALS
Qty: 60 TABLET | Refills: 0 | Status: SHIPPED | OUTPATIENT
Start: 2024-07-26 | End: 2024-07-26

## 2024-07-26 NOTE — PATIENT INSTRUCTIONS
Imaging studies as detailed have been ordered. Please complete today.    Referrals to Orthopedics and Physical Therapy have been ordered. You may schedule appointments when you check out today, online, or by calling [phone].    In the meantime:   Naproxen has been prescribed. Please take Twice Daily with food. Avoid other NSAIDs while you are taking this medication. You may take acetaminophen (Tylenol) at the same time and this may help the medication work better. Take as scheduled for 10 days, and then use as needed thereafter.

## 2024-07-26 NOTE — PROGRESS NOTES
[facility]      CHIEF COMPLAINT:   Chief Complaint   Patient presents with    Leg Pain    Knee Pain       HISTORY OF PRESENT ILLNESS: Rosmery Ramirez is a 79 y.o. female who presents here today for left leg and knee pain. Pain starts behind knee and goes down calf. No pain in hip, thigh, ankle, foot. The pain has been present for 3 weeks, stable in this timeframe. Patient describes pain as achy. The pain is constant. She denies preceding injury. Pain is worse when she first steps down after getting up and with any twisting motion of the knee. Patient has tried Advil with moderate relief. However she notices as soon as the Advil wears off. She has also tried a heating pad with no relief. No VTE hx or risk factors. Knee XR 2019 with bilateral DJD and bone spurs. Patient had PT in the past which she found helpful and would like again.      REVIEW OF SYSTEMS:    ROS as in HPI.      MEDICAL HISTORY:    Past Medical History:   Diagnosis Date    Allergy     Anxiety     Breast cyst     Edema of leg 10/5/2012    bilateral     Fractures 2011    thumb R    GERD (gastroesophageal reflux disease)     Glaucoma     History of colonic polyps     Hx-TIA (transient ischemic attack) 8/13/2012 Jan 2012: LLE and left facial numbness lasting 1 hour     Hyperlipidemia     Hypertension     Left shoulder tendonitis 8/8/2015    Primary open-angle glaucoma, mild stage - Both Eyes 6/3/2013       MEDICATIONS:    Current Outpatient Medications on File Prior to Visit   Medication Sig Dispense Refill    albuterol (VENTOLIN HFA) 90 mcg/actuation inhaler Inhale 2 puffs into the lungs every 6 (six) hours as needed for Wheezing or Shortness of Breath. Rescue 6.7 g 0    ascorbic acid, vitamin C, (VITAMIN C) 1000 MG tablet Take 1,000 mg by mouth once daily.      aspirin (ECOTRIN) 81 MG EC tablet Take 81 mg by mouth once daily.      atorvastatin (LIPITOR) 10 MG tablet Take 1 tablet (10 mg total) by mouth every other  "day. 45 tablet 3    azelastine (ASTELIN) 137 mcg (0.1 %) nasal spray 1 spray (137 mcg total) by Nasal route 2 (two) times daily. 90 mL 3    calcium-vitamin D3 (OS-LETY 500 + D3) 500 mg(1,250mg) -200 unit per tablet Take 1 tablet by mouth 2 (two) times daily with meals.      dorzolamide-timolol 2-0.5% (COSOPT) 22.3-6.8 mg/mL ophthalmic solution Place 1 drop into both eyes 2 (two) times daily. 30 mL 3    hydroCHLOROthiazide (MICROZIDE) 12.5 mg capsule Take 1 capsule (12.5 mg total) by mouth once daily. 90 capsule 3    irbesartan (AVAPRO) 150 MG tablet Take 1 tablet (150 mg total) by mouth every evening. 90 tablet 3    LUMIGAN 0.01 % Drop Place 1 drop into both eyes every evening. 7.5 mL 3    multivitamin (THERAGRAN) per tablet Take 1 tablet by mouth once daily.      RHOPRESSA 0.02 % ophthalmic solution INSTILL 1 DROP INTO LEFT EYE ONCE DAILY       No current facility-administered medications on file prior to visit.         PHYSICAL EXAM:    BP (!) 142/60 (BP Location: Right arm, Patient Position: Sitting, BP Method: Medium (Manual))   Pulse (!) 56   Ht 5' 5" (1.651 m)   Wt 87.2 kg (192 lb 3.9 oz)   SpO2 96%   BMI 31.99 kg/m²     Physical Exam  Vitals and nursing note reviewed.   Constitutional:       General: She is not in acute distress.     Appearance: Normal appearance. She is not ill-appearing, toxic-appearing or diaphoretic.   HENT:      Head: Normocephalic and atraumatic.      Nose: Nose normal.   Eyes:      Extraocular Movements: Extraocular movements intact.      Conjunctiva/sclera: Conjunctivae normal.      Pupils: Pupils are equal, round, and reactive to light.   Cardiovascular:      Rate and Rhythm: Normal rate.   Pulmonary:      Effort: Pulmonary effort is normal. No respiratory distress.   Musculoskeletal:         General: No deformity. Normal range of motion.      Right knee: No swelling, erythema or bony tenderness. Normal range of motion. No tenderness.      Left knee: Swelling present. No erythema " or bony tenderness. Normal range of motion. Tenderness present.      Right lower leg: Normal. No swelling or tenderness. No edema.      Left lower leg: Normal. No swelling or tenderness. No edema.   Skin:     Findings: No lesion or rash.   Neurological:      General: No focal deficit present.      Mental Status: She is alert.      Motor: No weakness.      Gait: Gait normal.   Psychiatric:         Mood and Affect: Mood normal.         Behavior: Behavior normal.         Thought Content: Thought content normal.         Judgment: Judgment normal.             ASSESSMENT & PLAN:    Rosmery was seen today for leg pain and knee pain.    Diagnoses and all orders for this visit:    Acute pain of left knee  Osteoarthritis of both knees, unspecified osteoarthritis type  Patient presenting with acute worsening of left knee pain in the context of known bilateral knee DJD.   On exam, slight swelling and tenderness posterior knee. No erythema, fluctuance, effusion noted.  Low concern for acute etiology such as fracture, dislocation, septic joint. Likely flare of known osteoarthritis.   Updated XR ordered.   Referrals to PT & Orthopedics placed.   Naproxen 500 mg BID x 10 days, PRN thereafter.   -     X-ray Knee Ortho Bilateral with Flexion; Future  -     Ambulatory referral/consult to Orthopedics; Future  -     Ambulatory referral/consult to Physical/Occupational Therapy; Future  -     naproxen (NAPROSYN) 500 MG tablet; Take 1 tablet (500 mg total) by mouth 2 (two) times daily with meals.              Alissa Ontiveros MD  [facility]

## 2024-07-31 ENCOUNTER — PATIENT MESSAGE (OUTPATIENT)
Dept: ADMINISTRATIVE | Facility: OTHER | Age: 79
End: 2024-07-31
Payer: MEDICARE

## 2024-08-02 ENCOUNTER — TELEPHONE (OUTPATIENT)
Dept: ORTHOPEDICS | Facility: CLINIC | Age: 79
End: 2024-08-02
Payer: MEDICARE

## 2024-08-02 ENCOUNTER — OFFICE VISIT (OUTPATIENT)
Dept: ORTHOPEDICS | Facility: CLINIC | Age: 79
End: 2024-08-02
Payer: MEDICARE

## 2024-08-02 VITALS — WEIGHT: 193.69 LBS | HEIGHT: 67 IN | BODY MASS INDEX: 30.4 KG/M2

## 2024-08-02 DIAGNOSIS — M17.12 PRIMARY OSTEOARTHRITIS OF LEFT KNEE: Primary | ICD-10-CM

## 2024-08-02 PROCEDURE — 1125F AMNT PAIN NOTED PAIN PRSNT: CPT | Mod: HCNC,CPTII,S$GLB, | Performed by: ORTHOPAEDIC SURGERY

## 2024-08-02 PROCEDURE — 99999 PR PBB SHADOW E&M-EST. PATIENT-LVL III: CPT | Mod: PBBFAC,HCNC,, | Performed by: ORTHOPAEDIC SURGERY

## 2024-08-02 PROCEDURE — 1159F MED LIST DOCD IN RCRD: CPT | Mod: HCNC,CPTII,S$GLB, | Performed by: ORTHOPAEDIC SURGERY

## 2024-08-02 PROCEDURE — 1101F PT FALLS ASSESS-DOCD LE1/YR: CPT | Mod: HCNC,CPTII,S$GLB, | Performed by: ORTHOPAEDIC SURGERY

## 2024-08-02 PROCEDURE — 99203 OFFICE O/P NEW LOW 30 MIN: CPT | Mod: HCNC,S$GLB,, | Performed by: ORTHOPAEDIC SURGERY

## 2024-08-02 PROCEDURE — 3288F FALL RISK ASSESSMENT DOCD: CPT | Mod: HCNC,CPTII,S$GLB, | Performed by: ORTHOPAEDIC SURGERY

## 2024-08-02 PROCEDURE — 1160F RVW MEDS BY RX/DR IN RCRD: CPT | Mod: HCNC,CPTII,S$GLB, | Performed by: ORTHOPAEDIC SURGERY

## 2024-08-02 NOTE — TELEPHONE ENCOUNTER
Called and spoke to pt regarding message from Dr Heaton: tell her she should talk to her eye doctor about getting her eye pressure checked in the next week or 2. Pt understood conversation with no further questions.     ----- Message from Arnoldo Heaton MD sent at 8/2/2024  2:43 PM CDT -----  She asked about her glaucoma when we gave her her cortisone shot.  Give her a call and tell her I forgot to tell her she should talk to her eye doctor about getting her eye pressure checked in the next week or 2.

## 2024-08-08 PROBLEM — M17.12 PRIMARY OSTEOARTHRITIS OF LEFT KNEE: Status: ACTIVE | Noted: 2024-08-08

## 2024-08-15 ENCOUNTER — PATIENT MESSAGE (OUTPATIENT)
Dept: FAMILY MEDICINE | Facility: CLINIC | Age: 79
End: 2024-08-15
Payer: MEDICARE

## 2024-08-22 ENCOUNTER — LAB VISIT (OUTPATIENT)
Dept: LAB | Facility: HOSPITAL | Age: 79
End: 2024-08-22
Attending: INTERNAL MEDICINE
Payer: MEDICARE

## 2024-08-22 ENCOUNTER — OFFICE VISIT (OUTPATIENT)
Dept: INTERNAL MEDICINE | Facility: CLINIC | Age: 79
End: 2024-08-22
Payer: MEDICARE

## 2024-08-22 VITALS
SYSTOLIC BLOOD PRESSURE: 135 MMHG | HEIGHT: 67 IN | WEIGHT: 189.63 LBS | HEART RATE: 67 BPM | TEMPERATURE: 98 F | OXYGEN SATURATION: 97 % | DIASTOLIC BLOOD PRESSURE: 60 MMHG | BODY MASS INDEX: 29.76 KG/M2 | RESPIRATION RATE: 18 BRPM

## 2024-08-22 DIAGNOSIS — Z86.39 HISTORY OF HASHIMOTO THYROIDITIS: ICD-10-CM

## 2024-08-22 DIAGNOSIS — R51.9 LEFT-SIDED HEADACHE: ICD-10-CM

## 2024-08-22 DIAGNOSIS — R51.9 LEFT-SIDED HEADACHE: Primary | ICD-10-CM

## 2024-08-22 LAB
ERYTHROCYTE [SEDIMENTATION RATE] IN BLOOD BY PHOTOMETRIC METHOD: 20 MM/HR (ref 0–36)
TSH SERPL DL<=0.005 MIU/L-ACNC: 1.75 UIU/ML (ref 0.4–4)

## 2024-08-22 PROCEDURE — 84443 ASSAY THYROID STIM HORMONE: CPT | Mod: HCNC | Performed by: INTERNAL MEDICINE

## 2024-08-22 PROCEDURE — 1125F AMNT PAIN NOTED PAIN PRSNT: CPT | Mod: HCNC,CPTII,S$GLB, | Performed by: INTERNAL MEDICINE

## 2024-08-22 PROCEDURE — 85652 RBC SED RATE AUTOMATED: CPT | Mod: HCNC | Performed by: INTERNAL MEDICINE

## 2024-08-22 PROCEDURE — 3075F SYST BP GE 130 - 139MM HG: CPT | Mod: HCNC,CPTII,S$GLB, | Performed by: INTERNAL MEDICINE

## 2024-08-22 PROCEDURE — 36415 COLL VENOUS BLD VENIPUNCTURE: CPT | Mod: HCNC | Performed by: INTERNAL MEDICINE

## 2024-08-22 PROCEDURE — 1159F MED LIST DOCD IN RCRD: CPT | Mod: HCNC,CPTII,S$GLB, | Performed by: INTERNAL MEDICINE

## 2024-08-22 PROCEDURE — 99999 PR PBB SHADOW E&M-EST. PATIENT-LVL IV: CPT | Mod: PBBFAC,HCNC,, | Performed by: INTERNAL MEDICINE

## 2024-08-22 PROCEDURE — 3288F FALL RISK ASSESSMENT DOCD: CPT | Mod: HCNC,CPTII,S$GLB, | Performed by: INTERNAL MEDICINE

## 2024-08-22 PROCEDURE — 99214 OFFICE O/P EST MOD 30 MIN: CPT | Mod: HCNC,S$GLB,, | Performed by: INTERNAL MEDICINE

## 2024-08-22 PROCEDURE — 3078F DIAST BP <80 MM HG: CPT | Mod: HCNC,CPTII,S$GLB, | Performed by: INTERNAL MEDICINE

## 2024-08-22 PROCEDURE — 1101F PT FALLS ASSESS-DOCD LE1/YR: CPT | Mod: HCNC,CPTII,S$GLB, | Performed by: INTERNAL MEDICINE

## 2024-08-22 RX ORDER — TIZANIDINE 4 MG/1
4 TABLET ORAL EVERY 6 HOURS PRN
Qty: 60 TABLET | Refills: 0 | Status: SHIPPED | OUTPATIENT
Start: 2024-08-22 | End: 2024-09-06

## 2024-08-22 RX ORDER — IBUPROFEN 600 MG/1
600 TABLET ORAL EVERY 8 HOURS PRN
Qty: 60 TABLET | Refills: 0 | Status: SHIPPED | OUTPATIENT
Start: 2024-08-22 | End: 2024-09-11

## 2024-08-22 NOTE — PROGRESS NOTES
INTERNAL MEDICINE CLINIC - SAME DAY APPOINTMENT  Progress Note    PRESENTING HISTORY     PCP: Ancelmo Sumner MD    Chief Complaint/Reason for Visit:     Chief Complaint   Patient presents with    Dizziness     Pt stated pain on the left side of face that starts at the top of her head and radiates down to her jaw. For about a week      History of Present Illness & ROS : Ms. Rosmery Ramirez is a 79 y.o. female.      One week history of shooting pain from top of head to the left jaw.  She is light headed.  Left shoulder pain.  Hearing ok. No ear pain. No fever.    Review of Systems:  Answers submitted by the patient for this visit:  Review of Systems Questionnaire (Submitted on 8/21/2024)  activity change: No  unexpected weight change: No  neck pain: No  hearing loss: No  rhinorrhea: No  trouble swallowing: No  eye discharge: No  visual disturbance: No  chest tightness: No  wheezing: No  chest pain: No  palpitations: No  blood in stool: No  constipation: Yes  vomiting: No  diarrhea: No  polydipsia: No  polyuria: No  difficulty urinating: No  hematuria: No  menstrual problem: No  dysuria: No  joint swelling: No  arthralgias: Yes  headaches: Yes  weakness: Yes  confusion: No  dysphoric mood: No      PAST HISTORY:     Past Medical History:   Diagnosis Date    Allergy     Anxiety     Breast cyst     Edema of leg 10/5/2012    bilateral     Fractures 2011    thumb R    GERD (gastroesophageal reflux disease)     Glaucoma     History of colonic polyps     Hx-TIA (transient ischemic attack) 8/13/2012 Jan 2012: LLE and left facial numbness lasting 1 hour     Hyperlipidemia     Hypertension     Left shoulder tendonitis 8/8/2015    Primary open-angle glaucoma, mild stage - Both Eyes 6/3/2013       Past Surgical History:   Procedure Laterality Date    BREAST BIOPSY Left     core    BREAST SURGERY Left     lumpectomy    CATARACT EXTRACTION W/  INTRAOCULAR LENS IMPLANT Left 10/05/2016    Dr. Mike    CATARACT  EXTRACTION W/  INTRAOCULAR LENS IMPLANT Right 11/30/2016    With [place] (Dr. Mike)    COLONOSCOPY N/A 1/23/2017    Procedure: COLONOSCOPY;  Surgeon: Hany Mosquera MD;  Location: [facility] (4TH FLR);  Service: Endoscopy;  Laterality: N/A;    COLONOSCOPY N/A 3/28/2022    Procedure: COLONOSCOPY;  Surgeon: Jamison Chambers MD;  Location: [facility] ENDO (4TH FLR);  Service: Endoscopy;  Laterality: N/A;  fully vaccinated   instructions to portal-st  3/22 arrival time confirmed with pt/COVID test cancelled-RB    ESOPHAGOGASTRODUODENOSCOPY N/A 7/20/2023    Procedure: EGD (ESOPHAGOGASTRODUODENOSCOPY);  Surgeon: Jorge L Thompson MD;  Location: [facility] (4TH FLR);  Service: Endoscopy;  Laterality: N/A;  inst handed to pre-call pt needs to be rescheduled to new day 03/21 bm.  4/26/23- precall- pt wants to cancel appt and reschedule for another day  6/7 pt r/s/instr. to [facility];-s7/14/23-pre-call completed-LS    HAND SURGERY Left 2011    thumb    HYSTERECTOMY  1980's    Total;    IMPLANTATION OF DEVICE FOR GLAUCOMA Right 3/17/2021    Procedure: INSERTION, DEVICE, FOR GLAUCOMA XEN GEL;  Surgeon: Yesica Mike MD;  Location: [facility] OR [address] [place] [address];  Service: Ophthalmology;  Laterality: Right;    KAHOOK GONIOTOMY Right 11/30/2016    WITH [facility]SMANY ()    Left Breast Lumpectomy Left     performed in 1960s    OOPHORECTOMY      SELECTIVE LASER TRABECUPLASTY  05/2014    OU w/ Dr. Mike    TRABECULECTOMY Right 3/17/2021    Procedure: TRABECULECTOMY/XEN GEL WITH MMC;  Surgeon: Yesica Mike MD;  Location: [facility] OR [address] [place] [address];  Service: Ophthalmology;  Laterality: Right;    TRABECULECTOMY Left 7/28/2021    Procedure: TRABECULECTOMY/ MMC and Xen OS;  Surgeon: Yesica Mike MD;  Location: [facility] OR [address] [place] [address];  Service: Ophthalmology;  Laterality: Left;    TUBAL LIGATION  1970's    VAGINAL DELIVERY      x3    YAG CAPSULOTOMY Left 11/29/2018           Family History   Problem Relation Name Age of Onset    Breast  cancer Mother 76 y/o 50    Glaucoma Mother 76 y/o     Alzheimer's disease Mother 76 y/o     Blindness Mother 76 y/o     Cataracts Mother 76 y/o     Glaucoma Father 75 y/o     Prostate cancer Father 75 y/o     Blindness Father 75 y/o     Cataracts Father 75 y/o     Cancer Father 75 y/o 62        prostate    Breast cancer Sister 5 sisters     Glaucoma Sister 5 sisters     Alzheimer's disease Maternal Grandfather      No Known Problems Brother      No Known Problems Brother      Macular degeneration Neg Hx      Retinal detachment Neg Hx      Strabismus Neg Hx      Amblyopia Neg Hx      Heart attack Neg Hx      Heart disease Neg Hx      Ovarian cancer Neg Hx      Colon cancer Neg Hx         Social History     Socioeconomic History    Marital status:     Number of children: 3   Occupational History    Occupation: retired - formerly pastoral care with [employer]   Tobacco Use    Smoking status: Never    Smokeless tobacco: Never   Substance and Sexual Activity    Alcohol use: Not Currently    Drug use: No    Sexual activity: Yes     Partners: Female, Male     Birth control/protection: Post-menopausal, See Surgical Hx     Social Determinants of Health     Financial Resource Strain: Low Risk  (11/14/2023)    Overall Financial Resource Strain (CARDIA)     Difficulty of Paying Living Expenses: Not hard at all   Food Insecurity: No Food Insecurity (11/14/2023)    Hunger Vital Sign     Worried About Running Out of Food in the Last Year: Never true     Ran Out of Food in the Last Year: Never true   Transportation Needs: No Transportation Needs (11/14/2023)    PRAPARE - Transportation     Lack of Transportation (Medical): No     Lack of Transportation (Non-Medical): No   Physical Activity: Unknown (11/14/2023)    Exercise Vital Sign     Days of Exercise per Week: 0 days   Recent Concern: Physical Activity - Inactive (11/14/2023)    Exercise Vital Sign     Days of Exercise per Week: 0 days     Minutes of Exercise per  Session: 20 min   Stress: No Stress Concern Present (11/14/2023)    [employer] [place] [employer] - Occupational Stress Questionnaire     Feeling of Stress : Not at all   Housing Stability: Low Risk  (11/14/2023)    Housing Stability Vital Sign     Unable to Pay for Housing in the Last Year: No     Number of Places Lived in the Last Year: 1     Unstable Housing in the Last Year: No       MEDICATIONS & ALLERGIES:     Current Outpatient Medications on File Prior to Visit   Medication Sig Dispense Refill    albuterol (VENTOLIN HFA) 90 mcg/actuation inhaler Inhale 2 puffs into the lungs every 6 (six) hours as needed for Wheezing or Shortness of Breath. Rescue 6.7 g 0    ascorbic acid, vitamin C, (VITAMIN C) 1000 MG tablet Take 1,000 mg by mouth once daily.      aspirin (ECOTRIN) 81 MG EC tablet Take 81 mg by mouth once daily.      atorvastatin (LIPITOR) 10 MG tablet Take 1 tablet (10 mg total) by mouth every other day. 45 tablet 3    azelastine (ASTELIN) 137 mcg (0.1 %) nasal spray 1 spray (137 mcg total) by Nasal route 2 (two) times daily. 90 mL 3    calcium-vitamin D3 (OS-LETY 500 + D3) 500 mg(1,250mg) -200 unit per tablet Take 1 tablet by mouth 2 (two) times daily with meals.      dorzolamide-timolol 2-0.5% (COSOPT) 22.3-6.8 mg/mL ophthalmic solution Place 1 drop into both eyes 2 (two) times daily. 30 mL 3    hydroCHLOROthiazide (MICROZIDE) 12.5 mg capsule Take 1 capsule (12.5 mg total) by mouth once daily. 90 capsule 3    irbesartan (AVAPRO) 150 MG tablet Take 1 tablet (150 mg total) by mouth every evening. 90 tablet 3    LUMIGAN 0.01 % Drop Place 1 drop into both eyes every evening. 7.5 mL 3    multivitamin (THERAGRAN) per tablet Take 1 tablet by mouth once daily.      RHOPRESSA 0.02 % ophthalmic solution INSTILL 1 DROP INTO LEFT EYE ONCE DAILY      [DISCONTINUED] naproxen (NAPROSYN) 500 MG tablet Take 1 tablet (500 mg total) by mouth 2 (two) times daily with meals. 60 tablet 0     No current  facility-administered medications on file prior to visit.        Review of patient's allergies indicates:   Allergen Reactions    Lisinopril Swelling    Amlodipine Edema    Combigan [brimonidine-timolol]      Causes itchy eyelids and contact dermatitis    Cosopt [dorzolamide-timolol]      Causes angular blepharitis of eyelids    Pravastatin      Myalgia and elevated CPK       Medications Reconciliation:   I have reconciled the patient's home medications with the patient/family. I have updated all changes.  Refer to After-Visit Medication List.    OBJECTIVE:     Vital Signs:  Vitals:    08/22/24 1426   BP: 135/60   Pulse: 67   Resp: 18   Temp: 98 °F (36.7 °C)     Wt Readings from Last 3 Encounters:   08/22/24 1426 86 kg (189 lb 9.5 oz)   08/02/24 1403 87.9 kg (193 lb 10.8 oz)   07/26/24 1331 87.2 kg (192 lb 3.9 oz)     Body mass index is 30.05 kg/m².     Physical Exam:  General: Well developed, well nourished. No distress.  HEENT: Head is normocephalic, atraumatic; ears are normal.    No rash on head. Left shoulder full range of motion.  Bilateral jaw normal.   Eyes: Clear conjunctiva.  Neck: Supple, symmetrical neck; trachea midline.  Lungs: Clear to auscultation bilaterally and normal respiratory effort.  Cardiovascular: Heart with regular rate and rhythm.    Extremities: No LE edema.   Musculoskeletal: Normal gait.   Lymph Nodes: No cervical or supraclavicular adenopathy.  Neurologic: Normal strength and tone. No focal numbness or weakness.   Psychiatric: Normal affect. Alert.      Laboratory  Lab Results   Component Value Date    WBC 4.94 01/26/2024    HGB 12.5 01/26/2024    HCT 39.1 01/26/2024     01/26/2024    CHOL 123 01/26/2024    TRIG 93 01/26/2024    HDL 42 01/26/2024    ALT 17 01/26/2024    AST 19 01/26/2024     01/26/2024    K 4.2 01/26/2024     01/26/2024    CREATININE 0.8 01/26/2024    BUN 9 01/26/2024    CO2 27 01/26/2024    TSH 2.428 06/06/2023    INR 1.0 01/08/2012    HGBA1C 5.9  (H) 01/26/2024       ASSESSMENT & PLAN:     Left-sided headache  - Likely musculoskeletal. No warning signs.      Plan:  -     TSH; Future; Expected date: 08/22/2024  -     Sedimentation rate; Future; Expected date: 08/22/2024    Rx:  -     tiZANidine (ZANAFLEX) 4 MG tablet; Take 1 tablet (4 mg total) by mouth every 6 (six) hours as needed (pain).  Dispense: 60 tablet; Refill: 0  -     ibuprofen (ADVIL,MOTRIN) 600 MG tablet; Take 1 tablet (600 mg total) by mouth every 8 (eight) hours as needed for Pain.  Dispense: 60 tablet; Refill: 0    History of Hashimoto thyroiditis  -     TSH; Future; Expected date: 08/22/2024    Scheduled Follow-up :  Future Appointments   Date Time Provider Department Center   9/19/2024 11:00 AM Alyssa Newton MD [facility] CARDIO [place]   9/25/2024  3:00 PM Pop Farrell MD [facility] ENDOCRN [facility]   12/11/2024  2:30 PM Amparo Clark MD [facility] ENDOCR [place]       After Visit Medication List :     Medication List            Accurate as of August 22, 2024  2:44 PM. If you have any questions, ask your nurse or doctor.                START taking these medications      ibuprofen 600 MG tablet  Commonly known as: ADVIL,MOTRIN  Take 1 tablet (600 mg total) by mouth every 8 (eight) hours as needed for Pain.  Started by: Harpal Knox MD     tiZANidine 4 MG tablet  Commonly known as: ZANAFLEX  Take 1 tablet (4 mg total) by mouth every 6 (six) hours as needed (pain).  Started by: Harpal Knox MD            CONTINUE taking these medications      albuterol 90 mcg/actuation inhaler  Commonly known as: VENTOLIN HFA  Inhale 2 puffs into the lungs every 6 (six) hours as needed for Wheezing or Shortness of Breath. Rescue     ascorbic acid (vitamin C) 1000 MG tablet  Commonly known as: VITAMIN C     aspirin 81 MG EC tablet  Commonly known as: ECOTRIN     atorvastatin 10 MG tablet  Commonly known as: LIPITOR  Take 1 tablet (10 mg total) by mouth every other day.     azelastine 137 mcg (0.1 %) nasal  spray  Commonly known as: ASTELIN  1 spray (137 mcg total) by Nasal route 2 (two) times daily.     calcium-vitamin D3 500 mg-5 mcg (200 unit) per tablet  Commonly known as: OS-LETY 500 + D3     dorzolamide-timolol 2-0.5% 22.3-6.8 mg/mL ophthalmic solution  Commonly known as: COSOPT  Place 1 drop into both eyes 2 (two) times daily.     hydroCHLOROthiazide 12.5 mg capsule  Commonly known as: MICROZIDE  Take 1 capsule (12.5 mg total) by mouth once daily.     irbesartan 150 MG tablet  Commonly known as: AVAPRO  Take 1 tablet (150 mg total) by mouth every evening.     LUMIGAN 0.01 % Drop  Generic drug: bimatoprost  Place 1 drop into both eyes every evening.     multivitamin per tablet  Commonly known as: THERAGRAN     RHOPRESSA 0.02 % ophthalmic solution  Generic drug: netarsudiL            STOP taking these medications      naproxen 500 MG tablet  Commonly known as: NAPROSYN  Stopped by: Harpal Knox MD               Where to Get Your Medications        These medications were sent to [facility] [address] [place] [address] [address] [facility]  [address] [facility] [facility] [place] [address]      Phone: [phone]   ibuprofen 600 MG tablet  tiZANidine 4 MG tablet         Signing Physician:  Harpal Knox MD

## 2024-08-28 ENCOUNTER — PATIENT MESSAGE (OUTPATIENT)
Dept: ORTHOPEDICS | Facility: CLINIC | Age: 79
End: 2024-08-28
Payer: MEDICARE

## 2024-09-04 ENCOUNTER — TELEPHONE (OUTPATIENT)
Dept: INTERNAL MEDICINE | Facility: CLINIC | Age: 79
End: 2024-09-04
Payer: MEDICARE

## 2024-09-04 NOTE — TELEPHONE ENCOUNTER
Spoke to pt, she has already called [facility] and had you listed as her new PCP. Should she call them back and take you off?

## 2024-09-04 NOTE — TELEPHONE ENCOUNTER
----- Message from Harpal Knox MD sent at 9/4/2024  6:01 AM CDT -----  Regarding: RE: Call requested  Contact: [phone]  No. She has already seen her PCP this year.  I usually don't take tranfers.  ----- Message -----  From: Frida Ledezma MA  Sent: 9/3/2024   1:08 PM CDT  To: Harpal Knox MD  Subject: FW: Call requested                               Are you okay with taking this patient on as a new one?  ----- Message -----  From: Herb Spann  Sent: 9/3/2024  11:50 AM CDT  To: [facility] Staff  Subject: Call requested                                   Hi, pt called to request a call from the office to discuss getting the provider to show on her care team. Pls call the pt at  [phone].    Thank you.

## 2024-09-04 NOTE — TELEPHONE ENCOUNTER
Tried to call pt back to let her know that she will need to call [facility] back to get Dr. Knox removed since he does not do transfer patients. Pt did not answer LVM

## 2024-09-30 ENCOUNTER — TELEPHONE (OUTPATIENT)
Dept: ORTHOPEDICS | Facility: CLINIC | Age: 79
End: 2024-09-30
Payer: MEDICARE

## 2024-09-30 ENCOUNTER — HOSPITAL ENCOUNTER (OUTPATIENT)
Dept: RADIOLOGY | Facility: HOSPITAL | Age: 79
Discharge: HOME | End: 2024-09-30
Attending: PHYSICIAN ASSISTANT
Payer: MEDICARE

## 2024-09-30 ENCOUNTER — OFFICE VISIT (OUTPATIENT)
Dept: ORTHOPEDICS | Facility: CLINIC | Age: 79
End: 2024-09-30
Payer: MEDICARE

## 2024-09-30 DIAGNOSIS — M17.12 PRIMARY OSTEOARTHRITIS OF LEFT KNEE: Primary | ICD-10-CM

## 2024-09-30 DIAGNOSIS — M17.12 PRIMARY OSTEOARTHRITIS OF LEFT KNEE: ICD-10-CM

## 2024-09-30 PROCEDURE — 99214 OFFICE O/P EST MOD 30 MIN: CPT | Mod: HCNC,S$GLB,, | Performed by: PHYSICIAN ASSISTANT

## 2024-09-30 PROCEDURE — 73560 X-RAY EXAM OF KNEE 1 OR 2: CPT | Mod: TC,HCNC,RT

## 2024-09-30 PROCEDURE — 1160F RVW MEDS BY RX/DR IN RCRD: CPT | Mod: HCNC,CPTII,S$GLB, | Performed by: PHYSICIAN ASSISTANT

## 2024-09-30 PROCEDURE — 73562 X-RAY EXAM OF KNEE 3: CPT | Mod: TC,HCNC,LT

## 2024-09-30 PROCEDURE — 99999 PR PBB SHADOW E&M-EST. PATIENT-LVL III: CPT | Mod: PBBFAC,HCNC,, | Performed by: PHYSICIAN ASSISTANT

## 2024-09-30 PROCEDURE — 1159F MED LIST DOCD IN RCRD: CPT | Mod: HCNC,CPTII,S$GLB, | Performed by: PHYSICIAN ASSISTANT

## 2024-09-30 NOTE — PROGRESS NOTES
SUBJECTIVE:     History of Present Illness:  Rosmery Ramirez is a 79 y.o. year old female here with complaints of worsening left knee pain that worsened about 4 days ago.  She was seen in our clinic by Dr. Heaton on 8/8 and had a steroid injection.  She states she was doing well after that until symptoms worsened after going to the gym. Aggravating factors include standing and walking.  She has pain with weightbearing.  She is able to walk with assistance- using a cane. She has noticed some swelling. Previous treatments include rest, ibuprofen. There is not a history of previous injury or surgery to the knee.      Review of patient's allergies indicates:   Allergen Reactions    Lisinopril Swelling    Amlodipine Edema    Combigan [brimonidine-timolol]      Causes itchy eyelids and contact dermatitis    Cosopt [dorzolamide-timolol]      Causes angular blepharitis of eyelids    Pravastatin      Myalgia and elevated CPK         Current Outpatient Medications   Medication Sig Dispense Refill    albuterol (VENTOLIN HFA) 90 mcg/actuation inhaler Inhale 2 puffs into the lungs every 6 (six) hours as needed for Wheezing or Shortness of Breath. Rescue 6.7 g 0    ascorbic acid, vitamin C, (VITAMIN C) 1000 MG tablet Take 1,000 mg by mouth once daily.      aspirin (ECOTRIN) 81 MG EC tablet Take 81 mg by mouth once daily.      atorvastatin (LIPITOR) 10 MG tablet Take 1 tablet (10 mg total) by mouth every other day. 45 tablet 3    azelastine (ASTELIN) 137 mcg (0.1 %) nasal spray 1 spray (137 mcg total) by Nasal route 2 (two) times daily. 90 mL 3    calcium-vitamin D3 (OS-LETY 500 + D3) 500 mg(1,250mg) -200 unit per tablet Take 1 tablet by mouth 2 (two) times daily with meals.      dorzolamide-timolol 2-0.5% (COSOPT) 22.3-6.8 mg/mL ophthalmic solution Place 1 drop into both eyes 2 (two) times daily. 30 mL 3    hydroCHLOROthiazide (MICROZIDE) 12.5 mg capsule Take 1 capsule (12.5 mg total) by mouth once daily. 90 capsule 3     irbesartan (AVAPRO) 150 MG tablet Take 1 tablet (150 mg total) by mouth every evening. 90 tablet 3    LUMIGAN 0.01 % Drop Place 1 drop into both eyes every evening. 7.5 mL 3    multivitamin (THERAGRAN) per tablet Take 1 tablet by mouth once daily.      RHOPRESSA 0.02 % ophthalmic solution INSTILL 1 DROP INTO LEFT EYE ONCE DAILY       No current facility-administered medications for this visit.       Past Medical History:   Diagnosis Date    Allergy     Anxiety     Breast cyst     Edema of leg 10/5/2012    bilateral     Fractures 2011    thumb R    GERD (gastroesophageal reflux disease)     Glaucoma     History of colonic polyps     Hx-TIA (transient ischemic attack) 8/13/2012 Jan 2012: LLE and left facial numbness lasting 1 hour     Hyperlipidemia     Hypertension     Left shoulder tendonitis 8/8/2015    Primary open-angle glaucoma, mild stage - Both Eyes 6/3/2013       Past Surgical History:   Procedure Laterality Date    BREAST BIOPSY Left     core    BREAST SURGERY Left     lumpectomy    CATARACT EXTRACTION W/  INTRAOCULAR LENS IMPLANT Left 10/05/2016    Dr. Mike    CATARACT EXTRACTION W/  INTRAOCULAR LENS IMPLANT Right 11/30/2016    With [place] (Dr. Mike)    COLONOSCOPY N/A 1/23/2017    Procedure: COLONOSCOPY;  Surgeon: Hany Mosquera MD;  [facility]  Service: Endoscopy;  Laterality: N/A;    COLONOSCOPY N/A 3/28/2022    Procedure: COLONOSCOPY;  Surgeon: Jamison Chambers MD;  [facility]  Service: Endoscopy;  Laterality: N/A;  fully vaccinated   instructions to portal-st  3/22 arrival time confirmed with pt/COVID test cancelled-RB    ESOPHAGOGASTRODUODENOSCOPY N/A 7/20/2023    Procedure: EGD (ESOPHAGOGASTRODUODENOSCOPY);  Surgeon: Jorge L Thompson MD;  [facility]  Service: Endoscopy;  Laterality: N/A;  inst handed to pre-call pt needs to be rescheduled to new day 03/21 bm.  4/26/23- precall- pt wants to cancel appt and reschedule for another  day  6/7 pt r/s/instr. to [facility];-s7/14/23-pre-call completed-LS    HAND SURGERY Left 2011    thumb    HYSTERECTOMY  1980's    Total;    IMPLANTATION OF DEVICE FOR GLAUCOMA Right 3/17/2021    Procedure: INSERTION, DEVICE, FOR GLAUCOMA XEN GEL;  Surgeon: Yesica Mike MD;  [facility] [address] [place] [address];  Service: Ophthalmology;  Laterality: Right;    KAHOOK GONIOTOMY Right 11/30/2016    WITH CE ()    Left Breast Lumpectomy Left     performed in 1960s    OOPHORECTOMY      SELECTIVE LASER TRABECUPLASTY  05/2014    OU w/ Dr. Mike    TRABECULECTOMY Right 3/17/2021    Procedure: TRABECULECTOMY/XEN GEL WITH MMC;  Surgeon: Yesica Mike MD;  Location: [facility] [address] [place] [address];  Service: Ophthalmology;  Laterality: Right;    TRABECULECTOMY Left 7/28/2021    Procedure: TRABECULECTOMY/ MMC and Xen OS;  Surgeon: Yesica Mike MD;  [facility]: [facility] [address] [place] [address];  Service: Ophthalmology;  Laterality: Left;    TUBAL LIGATION  1970's    VAGINAL DELIVERY      x3    YAG CAPSULOTOMY Left 11/29/2018             Review of Systems:  ROS:  Constitutional: no fever or chills  Eyes: no visual changes  ENT: no nasal congestion or sore throat  Respiratory: no cough or shortness of breath  Musculoskeletal: no arthralgias or myalgias      OBJECTIVE:     PHYSICAL EXAM:        General: Well developed, well nourished, in no acute distress.  Neurological: Mood & affect are normal.  HEENT: NCAT, sclera nonicteric   Lungs: Respirations are equal and unlabored.   CV: 2+ bilateral upper and lower extremity pulses.   Skin: Intact throughout with no rashes, erythema, or lesions  Extremities: No LE edema, no erythema or warmth of the skin in either lower extremity.    Left  Knee Exam:  Knee Range of Motion: 0-120   Effusion: none  Condition of skin: intact  Location of tenderness: Medial joint line   Strength: 5 of 5 quadriceps strength and 5 of 5 hamstring strength  Stability:  stable to testing  Varus /Valgus stress:   normal  Donato:  positive      Left  Hip Examination:  full painless range of motion, without tenderness    IMAGING:    X-rays of the left knee, personally reviewed by me, demonstrate moderate degenerative changes with joint space narrowing, osteophyte formation and subchondral sclerosis.  No fracture or dislocation.       ASSESSMENT     1. Primary osteoarthritis of left knee        PLAN:     We discussed with the patient at length all the different treatment options available for the knee including NSAIDS, acetaminophen, rest, ice, knee strengthening exercise, steroid injections for temporary relief, Viscosupplementation, and knee arthroplasty.     -  We discussed trying HA injections if symptoms don't continue to subside  - Continue rest, ice, OTC medications  - PT referral  - Will have her follow up for orthovisc series- can cancel if pain is much better    MEDICAL NECESSITY FOR VISCOSUPPLEMENTATION:  A thorough evaluation of the patient, have determined that viscosupplementation is medically necessary.  The patient has painful DJD of the knee with failure of conservative therapy including lifestyle modifications and rehabilitation exercises.  Oral analgesics/NSAIDs have not adequately controlled symptoms and there is radiographic evidence of joint space narrowing, subchondral sclerosis, and osteophyte formation - Kellgren Joe grade 3

## 2024-10-09 ENCOUNTER — CLINICAL SUPPORT (OUTPATIENT)
Dept: REHABILITATION | Facility: HOSPITAL | Age: 79
End: 2024-10-09
Payer: MEDICARE

## 2024-10-09 DIAGNOSIS — M17.12 PRIMARY OSTEOARTHRITIS OF LEFT KNEE: ICD-10-CM

## 2024-10-09 DIAGNOSIS — M25.562 CHRONIC PAIN OF LEFT KNEE: Primary | ICD-10-CM

## 2024-10-09 DIAGNOSIS — G89.29 CHRONIC PAIN OF LEFT KNEE: Primary | ICD-10-CM

## 2024-10-09 PROCEDURE — 97110 THERAPEUTIC EXERCISES: CPT

## 2024-10-09 PROCEDURE — 97161 PT EVAL LOW COMPLEX 20 MIN: CPT

## 2024-10-09 NOTE — PLAN OF CARE
[facility]   Physical Therapy Initial Evaluation      Name: Rosmery Ramirez  Clinic Number: [id number]    Therapy Diagnosis:   Encounter Diagnoses   Name Primary?    Primary osteoarthritis of left knee     Chronic pain of left knee Yes        Physician: Digna Snyder, FLORA    Physician Orders: PT Eval and Treat   Medical Diagnosis from Referral: M17.12 (ICD-10-CM) - Primary osteoarthritis of left knee   Evaluation Date: 10/9/2024  Authorization Period Expiration: 9/30/25  Plan of Care Expiration: 11/22/24  Progress Note Due: 11/9/24  Visit # / Visits authorized: 1/ 1   FOTO: 1/ 3    Precautions: Standard     Time In: 2:36 pm   Time Out: 3:04 pm   Total Billable Time: 28 minutes    Subjective     Date of onset: recent flare up in early September 2024     History of current condition - Rosmery reports: that (L) knee pain has been off and on and had gone away for a while. Pt stated that (L) knee pain came back in early September. Pt stated that she got steroid shot in (L) knee which helped pain go away and started going back to gym and doing machines and about two weeks ago she woke up and could not walk. Pt stated that she went back and saw PA who suggested she get gel shots. Pt stated that she would like to try PT first, because in the past PT has helped. Pt stated that when walking (L) knee feels weak. Pt stated that she was using cane when pain in (L) knee was bad.     Falls: no     Imaging: see imaging section     Prior Therapy: yes and reports good results  Social History:  lives with their spouse, pt stated that she has no steps to enter her [facility]   Occupation: retired   Prior Level of Function: independent, going to gym, intermittent pain in (L) knee  Current Level of Function: report of pain/difficulty performing aggravating factors listed below     Pain:  Current 2/10, worst 10/10, best 0/10   Location: left knee  Description: Aching, Burning, and Sharp  Aggravating  Factors: walking, transfers  Easing Factors: injection, prescription Ibuprofen, ice     Patients goals: not to be in any pain and not to have to walk with cane     Medical History:   Past Medical History:   Diagnosis Date    Allergy     Anxiety     Breast cyst     Edema of leg 10/5/2012    bilateral     Fractures 2011    thumb R    GERD (gastroesophageal reflux disease)     Glaucoma     History of colonic polyps     Hx-TIA (transient ischemic attack) 8/13/2012 Jan 2012: LLE and left facial numbness lasting 1 hour     Hyperlipidemia     Hypertension     Left shoulder tendonitis 8/8/2015    Primary open-angle glaucoma, mild stage - Both Eyes 6/3/2013       Surgical History:   Rosmery Ramirez  has a past surgical history that includes Left Breast Lumpectomy (Left); Hand surgery (Left, 2011); SELECTIVE LASER TRABECUPLASTY (05/2014); Hysterectomy (1980's); Tubal ligation (1970's); Vaginal delivery; Breast surgery (Left); Colonoscopy (N/A, 1/23/2017); Oophorectomy; KAHOOK GONIOTOMY (Right, 11/30/2016); YAG CAPSULOTOMY (Left, 11/29/2018); Cataract extraction w/  intraocular lens implant (Left, 10/05/2016); Cataract extraction w/  intraocular lens implant (Right, 11/30/2016); Breast biopsy (Left); Trabeculectomy (Right, 3/17/2021); Implantation of device for glaucoma (Right, 3/17/2021); Trabeculectomy (Left, 7/28/2021); Colonoscopy (N/A, 3/28/2022); and Esophagogastroduodenoscopy (N/A, 7/20/2023).    Medications:   Rosmery has a current medication list which includes the following prescription(s): albuterol, ascorbic acid (vitamin c), aspirin, atorvastatin, azelastine, calcium-vitamin d3, dorzolamide-timolol 2-0.5%, hydrochlorothiazide, irbesartan, lumigan, multivitamin, and rhopressa, and the following Facility-Administered Medications: sodium hyaluronate (orthovisc).    Allergies:   Review of patient's allergies indicates:   Allergen Reactions    Lisinopril Swelling    Amlodipine Edema    Combigan  "[brimonidine-timolol]      Causes itchy eyelids and contact dermatitis    Cosopt [dorzolamide-timolol]      Causes angular blepharitis of eyelids    Pravastatin      Myalgia and elevated CPK        Objective        Hip Right  Left  Pain/Dysfunction with Movement    AROM MMT AROM MMT NT - not tested    Flexion WFL 4+/5 WFL 4/5    Extension NT NT NT NT  Pt performed good bridge against gravity   Abduction WFL 4+/5 WFL 4/5    External rotation WFL 4/5 WFL 4/5       Knee Right  Left  Pain/Dysfunction with Movement    AROM MMT AROM MMT    Flexion 115 5/5 115 5/5    Extension 0 5/5 Lacking 2 4+/5      Ankle Right  Left  Pain/Dysfunction with Movement    AROM MMT AROM MMT    Plantarflexion WFL 5/5 WFL 5/5    Dorsiflexion WFL 5/5 WFL 5/5      TUG: 15 seconds without AD    30 Second Sit to Stand: 6 w/UE support       Intake Outcome Measure for FOTO Knee Survey    Therapist reviewed FOTO scores for Rosmery Ramirez on 10/9/2024.   FOTO report - see Media section or FOTO account episode details.    Intake Score: 46%         Treatment     Total Treatment time (time-based codes) separate from Evaluation: 9 minutes     Rosmery received the treatments listed below:      therapeutic exercises to develop strength, ROM, and flexibility for 9 minutes including:  Gastroc stretch w/strap 3x30"   Supine hamstring stretch 3x30"   SLR 2x10           Patient Education and Home Exercises     Education provided:   - HEP   - educated pt on avoiding exercises in gym that cause/increase pain - pt verbalized understanding    Written Home Exercises Provided: Yes. Exercises were reviewed and Rosmery was able to demonstrate them prior to the end of the session.  Rosmery demonstrated good  understanding of the education provided. See EMR under Patient Instructions for exercises provided during therapy sessions.    Assessment     Rosmery is a 79 y.o. female referred to outpatient Physical Therapy with a medical diagnosis of Primary " osteoarthritis of left knee. Patient presents with decreased LE MMT scores noted above, decreased (L) knee extension AROM, and decreased functional mobility. Pt will benefit from skilled PT.     Patient prognosis is Good.   Patient will benefit from skilled outpatient Physical Therapy to address the deficits stated above and in the chart below, provide patient /family education, and to maximize patientt's level of independence.     Plan of care discussed with patient: Yes  Patient's spiritual, cultural and educational needs considered and patient is agreeable to the plan of care and goals as stated below:     Anticipated Barriers for therapy: none    Medical Necessity is demonstrated by the following  History  Co-morbidities and personal factors that may impact the plan of care [] LOW: no personal factors / co-morbidities  [x] MODERATE: 1-2 personal factors / co-morbidities  [] HIGH: 3+ personal factors / co-morbidities    Moderate / High Support Documentation:   Co-morbidities affecting plan of care: HTN    Personal Factors:   no deficits     Examination  Body Structures and Functions, activity limitations and participation restrictions that may impact the plan of care [] LOW: addressing 1-2 elements  [] MODERATE: 3+ elements  [x] HIGH: 4+ elements (please support below)    Moderate / High Support Documentation: ROM, strength, gait, transfers     Clinical Presentation [x] LOW: stable  [] MODERATE: Evolving  [] HIGH: Unstable     Decision Making/ Complexity Score: low       Goals:  Short Term Goals: 1 weeks   Pt will be compliant with HEP in order to supplement PT with decreasing pain and improving functional mobility    Long Term Goals: 6 weeks   Pt will improve FOTO score to at least 62 in order to demo improved functional mobility  Pt will improve LE MMT scores by at least 1/2 grade where deficits noted in order to improve strength for functional tasks  Pt will improve (L) knee extension AROM to 0 degrees in  order to improve functional gait  Pt will report ability to perform ADLs without pain in (L) knee  Pt will perform at least 5 sit to stands without UE support in order to demo improved functional strength and ability to perform transfers  Plan     Plan of care Certification: 10/9/2024 to 11/22/24.    Outpatient Physical Therapy 2 times weekly for 6 weeks to include the following interventions: Gait Training, Manual Therapy, Moist Heat/ Ice, Neuromuscular Re-ed, Patient Education, Therapeutic Activities, Therapeutic Exercise, and IASTM, dry needling, and modalities prn  .     Renae Brenner, PT        Physician's Signature: _________________________________________ Date: ________________

## 2024-10-10 ENCOUNTER — HOSPITAL ENCOUNTER (OUTPATIENT)
Dept: RADIOLOGY | Facility: HOSPITAL | Age: 79
Discharge: HOME | End: 2024-10-10
Attending: NEUROLOGICAL SURGERY
Payer: MEDICARE

## 2024-10-10 DIAGNOSIS — G44.53 PRIMARY THUNDERCLAP HEADACHE: ICD-10-CM

## 2024-10-10 PROCEDURE — 70551 MRI BRAIN STEM W/O DYE: CPT | Mod: 26,HCNC,, | Performed by: STUDENT IN AN ORGANIZED HEALTH CARE EDUCATION/TRAINING PROGRAM

## 2024-10-10 PROCEDURE — 70551 MRI BRAIN STEM W/O DYE: CPT | Mod: TC,HCNC

## 2024-10-17 NOTE — PROGRESS NOTES
"[facility]   Physical Therapy Treatment Note      Name: Rosmery Cody Ramirez  Clinic Number: [id number]    Therapy Diagnosis:   Encounter Diagnosis   Name Primary?    Chronic pain of left knee Yes     Physician: Digna Snyder PA-C    Visit Date: 10/18/2024    Physician Orders: PT Eval and Treat   Medical Diagnosis from Referral: M17.12 (ICD-10-CM) - Primary osteoarthritis of left knee   Evaluation Date: 10/9/2024  Authorization Period Expiration: 9/30/25  Plan of Care Expiration: 11/22/24  Progress Note Due: 11/9/24  Visit # / Visits authorized: 1/ 1 1/20  FOTO: 1/ 3     Precautions: Standard      PTA Visit #: 1/5     Time In: 229 pm  Time Out: 311 pm  Total Billable Time: 42 minutes    Subjective     Pt reports: that she is feeling better already. She feels that the exercises are the reason why.  She was compliant with home exercise program.  Response to previous treatment: last session was initial evaluation  Functional change: progressing    Pain: 0/10  Location: left knee      Objective      Objective Measures updated at progress report unless specified.     Treatment     Rosmery received the treatments listed below:      therapeutic exercises to develop strength, endurance, ROM, flexibility, posture, and core stabilization for 19 minutes including:  Gastroc stretch w/strap 3x30"   Supine hamstring stretch 3x30"   SLR B 2x10   Supine clams RTB      neuromuscular re-education activities to improve: Balance, Coordination, Kinesthetic, Sense, Proprioception, and Posture for 23 minutes. The following activities were included:  Quad sets  LAQ 2x10  SAQ 2x10  Bridges 2x10  therapeutic activities to improve functional performance for 0  minutes, including:          Patient Education and Home Exercises       Education provided:   - safety awareness    Written Home Exercises Provided: Patient instructed to cont prior HEP. Exercises were reviewed and Rosmery was able to demonstrate " them prior to the end of the session.  Rosmery demonstrated good  understanding of the education provided. See EMR under Patient Instructions for exercises provided during therapy sessions    Assessment     Tolerated first full tx well.    Rosmery Is progressing well towards her goals.   Pt prognosis is Good.     Pt will continue to benefit from skilled outpatient physical therapy to address the deficits listed in the problem list box on initial evaluation, provide pt/family education and to maximize pt's level of independence in the home and community environment.     Pt's spiritual, cultural and educational needs considered and pt agreeable to plan of care and goals.     Anticipated barriers to physical therapy:  none    Goals:   Short Term Goals: 1 weeks   Pt will be compliant with HEP in order to supplement PT with decreasing pain and improving functional mobility     Long Term Goals: 6 weeks   Pt will improve FOTO score to at least 62 in order to demo improved functional mobility  Pt will improve LE MMT scores by at least 1/2 grade where deficits noted in order to improve strength for functional tasks  Pt will improve (L) knee extension AROM to 0 degrees in order to improve functional gait  Pt will report ability to perform ADLs without pain in (L) knee  Pt will perform at least 5 sit to stands without UE support in order to demo improved functional strength and ability to perform transfers    Plan     Plan of care Certification: 10/9/2024 to 11/22/24.     Outpatient Physical Therapy 2 times weekly for 6 weeks to include the following interventions: Gait Training, Manual Therapy, Moist Heat/ Ice, Neuromuscular Re-ed, Patient Education, Therapeutic Activities, Therapeutic Exercise, and IASTM, dry needling, and modalities prn  .     PT/PTA met face to face to discuss pt's treatment plan and progress towards established goals. Pt will be seen by a physical therapist minimally every 6th visit or every 30  days.      Arnoldo Velasquez, PTA

## 2024-10-18 ENCOUNTER — CLINICAL SUPPORT (OUTPATIENT)
Dept: REHABILITATION | Facility: HOSPITAL | Age: 79
End: 2024-10-18
Payer: MEDICARE

## 2024-10-18 DIAGNOSIS — G89.29 CHRONIC PAIN OF LEFT KNEE: Primary | ICD-10-CM

## 2024-10-18 DIAGNOSIS — M25.562 CHRONIC PAIN OF LEFT KNEE: Primary | ICD-10-CM

## 2024-10-18 PROCEDURE — 97110 THERAPEUTIC EXERCISES: CPT | Mod: HCNC,CQ

## 2024-10-18 PROCEDURE — 97112 NEUROMUSCULAR REEDUCATION: CPT | Mod: HCNC,CQ

## 2024-10-21 ENCOUNTER — CLINICAL SUPPORT (OUTPATIENT)
Dept: REHABILITATION | Facility: HOSPITAL | Age: 79
End: 2024-10-21
Payer: MEDICARE

## 2024-10-21 DIAGNOSIS — M25.562 CHRONIC PAIN OF LEFT KNEE: Primary | ICD-10-CM

## 2024-10-21 DIAGNOSIS — G89.29 CHRONIC PAIN OF LEFT KNEE: Primary | ICD-10-CM

## 2024-10-21 PROCEDURE — 97110 THERAPEUTIC EXERCISES: CPT | Mod: HCNC

## 2024-10-21 NOTE — PROGRESS NOTES
"  Physical Therapy Daily Treatment Note     Name: Rosmery Ramirez  Clinic Number: [id number]    Therapy Diagnosis:   Encounter Diagnosis   Name Primary?    Chronic pain of left knee Yes     Physician: Digna Snyder PA-C    Visit Date: 10/21/2024    Physician Orders: PT Eval and Treat   Medical Diagnosis from Referral: M17.12 (ICD-10-CM) - Primary osteoarthritis of left knee   Evaluation Date: 10/9/2024  Authorization Period Expiration: 9/30/25  Plan of Care Expiration: 11/22/24  Progress Note Due: 11/9/24  Visit # / Visits authorized: 1/ 1 2/20  FOTO: 1/ 3    Time In: 1:00 pm   Time Out: 1:42 pm   Total Billable Time: 25 minutes    Precautions: Standard    Subjective     Pt reports: that (L) knee is feeling better. Pt stated that she is not currently experiencing pain.   She was compliant with home exercise program.  Response to previous treatment: no adverse effects  Functional change: progressing     Pain: 0/10 (L) knee       Objective       therapeutic exercises to develop strength, ROM, flexibility for 42 minutes including:  Bike x 5' for joint lubrication  Gastroc stretch w/strap 3x30"   Supine hamstring stretch 3x30"   SLR B 2x10   Supine clams RTB 3x10   Bridges 2x10  LAQ 2x10 1#   SAQ 2x10 1#   Standing hip abduction 2x10 1# B   Standing hip extension 2x10 1# B   Standing DL heel raises 2x10         Home Exercises Provided and Patient Education Provided     Education provided:   - HEP     Written Home Exercises Provided: Patient instructed to cont prior HEP.   Rosmery demonstrated good  understanding of the education provided.     See EMR under Patient Instructions for exercises provided prior visit.      Assessment     Pt was able to tolerate additional LE strengthening therex and addition of resistance for LAQ and SAQ. Pt tolerated therapy session without any adverse reactions.   Rosmery Is progressing well towards her goals.   Pt prognosis is Good.     Pt will continue to benefit from " skilled outpatient physical therapy to address the deficits listed in the problem list box on initial evaluation, provide pt/family education and to maximize pt's level of independence in the home and community environment.     Pt's spiritual, cultural and educational needs considered and pt agreeable to plan of care and goals.    Anticipated barriers to physical therapy:  none    Goals:   Short Term Goals: 1 weeks   Pt will be compliant with HEP in order to supplement PT with decreasing pain and improving functional mobility     Long Term Goals: 6 weeks   Pt will improve FOTO score to at least 62 in order to demo improved functional mobility  Pt will improve LE MMT scores by at least 1/2 grade where deficits noted in order to improve strength for functional tasks  Pt will improve (L) knee extension AROM to 0 degrees in order to improve functional gait  Pt will report ability to perform ADLs without pain in (L) knee  Pt will perform at least 5 sit to stands without UE support in order to demo improved functional strength and ability to perform transfers    Plan     Continue per POC, progress as tolerated    Renae Brenner, PT

## 2024-10-25 ENCOUNTER — CLINICAL SUPPORT (OUTPATIENT)
Dept: REHABILITATION | Facility: HOSPITAL | Age: 79
End: 2024-10-25
Payer: MEDICARE

## 2024-10-25 DIAGNOSIS — M25.562 CHRONIC PAIN OF LEFT KNEE: Primary | ICD-10-CM

## 2024-10-25 DIAGNOSIS — G89.29 CHRONIC PAIN OF LEFT KNEE: Primary | ICD-10-CM

## 2024-10-25 PROCEDURE — 97110 THERAPEUTIC EXERCISES: CPT | Mod: HCNC,CQ

## 2024-10-25 NOTE — PROGRESS NOTES
"  Physical Therapy Daily Treatment Note     Name: Rosmery Ramirez  Clinic Number: [id number]    Therapy Diagnosis:   Encounter Diagnosis   Name Primary?    Chronic pain of left knee Yes     Physician: Digna Snyder PA-C    Visit Date: 10/25/2024    Physician Orders: PT Eval and Treat   Medical Diagnosis from Referral: M17.12 (ICD-10-CM) - Primary osteoarthritis of left knee   Evaluation Date: 10/9/2024  Authorization Period Expiration: 9/30/25  Plan of Care Expiration: 11/22/24  Progress Note Due: 11/9/24  Visit # / Visits authorized: 1/ 1 2/20  FOTO: 1/ 3    Time In: 2:25 pm   Time Out: 3:10 pm   Total Billable Time: 45 minutes    Precautions: Standard    Subjective     Pt reports: that  she has been experiencing posterior L knee pain lately last night after she cut the yard.  She was compliant with home exercise program.  Response to previous treatment: no adverse effects  Functional change: progressing     Pain: 4/10 (L) knee       Objective       therapeutic exercises to develop strength, ROM, flexibility for 44 minutes including:  Bike x ' for joint lubrication  Gastroc stretch w/strap 3x30"   Supine hamstring stretch 3x30"   SLR B 2x10   Supine clams RTB 3x10   Bridges 2x10  LAQ 2x10 1#   SAQ 2x10 1#   Standing hip abduction 2x10 1# B   Standing hip extension 2x10 1# B   Standing DL heel raises 2x10         Home Exercises Provided and Patient Education Provided     Education provided:   - HEP     Written Home Exercises Provided: Patient instructed to cont prior HEP.   Rosmery demonstrated good  understanding of the education provided.     See EMR under Patient Instructions for exercises provided prior visit.      Assessment     Pt presents to clinic with mild posterior L knee pain. Despite mild pain patient able to complete entire program today. Pt tolerated therapy session without any adverse reactions.     Rosmery Is progressing well towards her goals.   Pt prognosis is Good.     Pt will " continue to benefit from skilled outpatient physical therapy to address the deficits listed in the problem list box on initial evaluation, provide pt/family education and to maximize pt's level of independence in the home and community environment.     Pt's spiritual, cultural and educational needs considered and pt agreeable to plan of care and goals.    Anticipated barriers to physical therapy:  none    Goals:   Short Term Goals: 1 weeks   Pt will be compliant with HEP in order to supplement PT with decreasing pain and improving functional mobility     Long Term Goals: 6 weeks   Pt will improve FOTO score to at least 62 in order to demo improved functional mobility  Pt will improve LE MMT scores by at least 1/2 grade where deficits noted in order to improve strength for functional tasks  Pt will improve (L) knee extension AROM to 0 degrees in order to improve functional gait  Pt will report ability to perform ADLs without pain in (L) knee  Pt will perform at least 5 sit to stands without UE support in order to demo improved functional strength and ability to perform transfers    Plan     Continue per POC, progress as tolerated    Arnoldo Velasquez, PTA

## 2024-10-28 ENCOUNTER — CLINICAL SUPPORT (OUTPATIENT)
Dept: REHABILITATION | Facility: HOSPITAL | Age: 79
End: 2024-10-28
Payer: MEDICARE

## 2024-10-28 DIAGNOSIS — M25.562 CHRONIC PAIN OF LEFT KNEE: Primary | ICD-10-CM

## 2024-10-28 DIAGNOSIS — G89.29 CHRONIC PAIN OF LEFT KNEE: Primary | ICD-10-CM

## 2024-10-28 PROCEDURE — 97110 THERAPEUTIC EXERCISES: CPT | Mod: HCNC

## 2024-11-05 ENCOUNTER — CLINICAL SUPPORT (OUTPATIENT)
Dept: REHABILITATION | Facility: HOSPITAL | Age: 79
End: 2024-11-05
Payer: MEDICARE

## 2024-11-05 DIAGNOSIS — M25.562 CHRONIC PAIN OF LEFT KNEE: Primary | ICD-10-CM

## 2024-11-05 DIAGNOSIS — G89.29 CHRONIC PAIN OF LEFT KNEE: Primary | ICD-10-CM

## 2024-11-05 PROCEDURE — 97110 THERAPEUTIC EXERCISES: CPT | Mod: HCNC

## 2024-11-05 NOTE — PROGRESS NOTES
"  Physical Therapy Daily Treatment Note     Name: Rosmery Ramirez  Clinic Number: [id number]    Therapy Diagnosis:   Encounter Diagnosis   Name Primary?    Chronic pain of left knee Yes     Physician: Digna Snyder PA-C    Visit Date: 11/5/2024    Physician Orders: PT Eval and Treat   Medical Diagnosis from Referral: M17.12 (ICD-10-CM) - Primary osteoarthritis of left knee   Evaluation Date: 10/9/2024  Authorization Period Expiration: 9/30/25  Plan of Care Expiration: 11/22/24  Progress Note Due: 11/9/24  Visit # / Visits authorized: 1/ 1 5/20  FOTO: 1/ 3    Time In: 1:45 pm   Time Out: 2:27 pm   Total Billable Time: 42 minutes    Precautions: Standard    Subjective     Pt reports: that (L) knee has been feeling stiff not painful. Pt stated that (L) knee feels stiff when she wakes up in the morning. Pt stated that as long as she is moving (L) knee feels fine.  She was compliant with home exercise program.  Response to previous treatment: no adverse effects  Functional change: progressing     Pain: 0/10 (L) knee       Objective       therapeutic exercises to develop strength, ROM, flexibility for 42 minutes including:  Bike x 5' for joint lubrication  Gastroc stretch w/strap 3x30"   Supine hamstring stretch 3x30"   SLR B 2x10 2#  LAQ 2x10 2#   Standing hip abduction 2x10 2# B   Standing hip extension 2x10 2# B   Standing DL heel raises 2x10   DL Shuttle 1.5 c 2x10   Step ups L2 2x10 B  Step downs L2 x9 then held   Sit to stands with one UE support 2x10       Home Exercises Provided and Patient Education Provided     Education provided:   - HEP     Written Home Exercises Provided: Patient instructed to cont prior HEP.  Exercises were reviewed and Rosmery was able to demonstrate them prior to the end of the session.  Rosmery demonstrated good  understanding of the education provided.     See EMR under Patient Instructions for exercises provided prior visit.      Assessment     Pt was able to tolerate " increased resistance for therex noted above. Additional reps of step downs deferred due to pt report of feeling a strain in (L) knee when performing. Pt was able to tolerate all other therex without any adverse reactions and reported no pain in (L) knee at end of session.   Rosmery Is progressing well towards her goals.   Pt prognosis is Good.     Pt will continue to benefit from skilled outpatient physical therapy to address the deficits listed in the problem list box on initial evaluation, provide pt/family education and to maximize pt's level of independence in the home and community environment.     Pt's spiritual, cultural and educational needs considered and pt agreeable to plan of care and goals.    Anticipated barriers to physical therapy:  none    Goals:   Short Term Goals: 1 weeks   Pt will be compliant with HEP in order to supplement PT with decreasing pain and improving functional mobility     Long Term Goals: 6 weeks   Pt will improve FOTO score to at least 62 in order to demo improved functional mobility  Pt will improve LE MMT scores by at least 1/2 grade where deficits noted in order to improve strength for functional tasks  Pt will improve (L) knee extension AROM to 0 degrees in order to improve functional gait  Pt will report ability to perform ADLs without pain in (L) knee  Pt will perform at least 5 sit to stands without UE support in order to demo improved functional strength and ability to perform transfers    Plan     Continue per POC, progress as tolerated     Renae Brenner, PT

## 2024-11-06 ENCOUNTER — PATIENT MESSAGE (OUTPATIENT)
Dept: FAMILY MEDICINE | Facility: CLINIC | Age: 79
End: 2024-11-06
Payer: MEDICARE

## 2024-11-06 DIAGNOSIS — E04.1 THYROID NODULE: Primary | Chronic | ICD-10-CM

## 2024-11-08 ENCOUNTER — CLINICAL SUPPORT (OUTPATIENT)
Dept: REHABILITATION | Facility: HOSPITAL | Age: 79
End: 2024-11-08
Payer: MEDICARE

## 2024-11-08 ENCOUNTER — LAB VISIT (OUTPATIENT)
Dept: LAB | Facility: HOSPITAL | Age: 79
End: 2024-11-08
Attending: INTERNAL MEDICINE
Payer: MEDICARE

## 2024-11-08 DIAGNOSIS — Z00.00 NORMAL PHYSICAL EXAM: ICD-10-CM

## 2024-11-08 DIAGNOSIS — R73.09 ABNORMAL GLUCOSE: ICD-10-CM

## 2024-11-08 DIAGNOSIS — G89.29 CHRONIC PAIN OF LEFT KNEE: Primary | ICD-10-CM

## 2024-11-08 DIAGNOSIS — E78.5 DYSLIPIDEMIA: ICD-10-CM

## 2024-11-08 DIAGNOSIS — E04.1 THYROID NODULE: Chronic | ICD-10-CM

## 2024-11-08 DIAGNOSIS — M25.562 CHRONIC PAIN OF LEFT KNEE: Primary | ICD-10-CM

## 2024-11-08 LAB
ALBUMIN SERPL BCP-MCNC: 3.7 G/DL (ref 3.5–5.2)
ALP SERPL-CCNC: 86 U/L (ref 40–150)
ALT SERPL W/O P-5'-P-CCNC: 13 U/L (ref 10–44)
ANION GAP SERPL CALC-SCNC: 11 MMOL/L (ref 8–16)
AST SERPL-CCNC: 17 U/L (ref 10–40)
BILIRUB SERPL-MCNC: 0.5 MG/DL (ref 0.1–1)
BUN SERPL-MCNC: 11 MG/DL (ref 8–23)
CALCIUM SERPL-MCNC: 10 MG/DL (ref 8.7–10.5)
CHLORIDE SERPL-SCNC: 106 MMOL/L (ref 95–110)
CHOLEST SERPL-MCNC: 132 MG/DL (ref 120–199)
CHOLEST/HDLC SERPL: 2.7 {RATIO} (ref 2–5)
CO2 SERPL-SCNC: 23 MMOL/L (ref 23–29)
CREAT SERPL-MCNC: 0.7 MG/DL (ref 0.5–1.4)
ERYTHROCYTE [DISTWIDTH] IN BLOOD BY AUTOMATED COUNT: 12.8 % (ref 11.5–14.5)
EST. GFR  (NO RACE VARIABLE): >60 ML/MIN/1.73 M^2
ESTIMATED AVG GLUCOSE: 126 MG/DL (ref 68–131)
GLUCOSE SERPL-MCNC: 82 MG/DL (ref 70–110)
HBA1C MFR BLD: 6 % (ref 4–5.6)
HCT VFR BLD AUTO: 38.4 % (ref 37–48.5)
HDLC SERPL-MCNC: 49 MG/DL (ref 40–75)
HDLC SERPL: 37.1 % (ref 20–50)
HGB BLD-MCNC: 11.9 G/DL (ref 12–16)
LDLC SERPL CALC-MCNC: 67.6 MG/DL (ref 63–159)
MCH RBC QN AUTO: 28.9 PG (ref 27–31)
MCHC RBC AUTO-ENTMCNC: 31 G/DL (ref 32–36)
MCV RBC AUTO: 93 FL (ref 82–98)
NONHDLC SERPL-MCNC: 83 MG/DL
PLATELET # BLD AUTO: 298 K/UL (ref 150–450)
PMV BLD AUTO: 10.9 FL (ref 9.2–12.9)
POTASSIUM SERPL-SCNC: 4.2 MMOL/L (ref 3.5–5.1)
PROT SERPL-MCNC: 7.1 G/DL (ref 6–8.4)
RBC # BLD AUTO: 4.12 M/UL (ref 4–5.4)
SODIUM SERPL-SCNC: 140 MMOL/L (ref 136–145)
TRIGL SERPL-MCNC: 77 MG/DL (ref 30–150)
TSH SERPL DL<=0.005 MIU/L-ACNC: 1.9 UIU/ML (ref 0.4–4)
WBC # BLD AUTO: 5.87 K/UL (ref 3.9–12.7)

## 2024-11-08 PROCEDURE — 83036 HEMOGLOBIN GLYCOSYLATED A1C: CPT | Mod: HCNC | Performed by: INTERNAL MEDICINE

## 2024-11-08 PROCEDURE — 84443 ASSAY THYROID STIM HORMONE: CPT | Mod: HCNC | Performed by: INTERNAL MEDICINE

## 2024-11-08 PROCEDURE — 97110 THERAPEUTIC EXERCISES: CPT | Mod: HCNC,CQ

## 2024-11-08 PROCEDURE — 80053 COMPREHEN METABOLIC PANEL: CPT | Mod: HCNC | Performed by: INTERNAL MEDICINE

## 2024-11-08 PROCEDURE — 80061 LIPID PANEL: CPT | Mod: HCNC | Performed by: INTERNAL MEDICINE

## 2024-11-08 PROCEDURE — 36415 COLL VENOUS BLD VENIPUNCTURE: CPT | Mod: HCNC,PO | Performed by: INTERNAL MEDICINE

## 2024-11-08 PROCEDURE — 85027 COMPLETE CBC AUTOMATED: CPT | Mod: HCNC | Performed by: INTERNAL MEDICINE

## 2024-11-08 NOTE — PROGRESS NOTES
"  Physical Therapy Daily Treatment Note     Name: Rosmery Pelayotor  Clinic Number: [id number]    Therapy Diagnosis:   Encounter Diagnosis   Name Primary?    Chronic pain of left knee Yes     Physician: Digna Snyder PA-C    Visit Date: 11/8/2024    Physician Orders: PT Eval and Treat   Medical Diagnosis from Referral: M17.12 (ICD-10-CM) - Primary osteoarthritis of left knee   Evaluation Date: 10/9/2024  Authorization Period Expiration: 9/30/25  Plan of Care Expiration: 11/22/24  Progress Note Due: 11/9/24  Visit # / Visits authorized: 1/ 1 5/20  FOTO: 1/ 3    Time In: 2:30 pm   Time Out: 315 pm   Total Billable Time: 45 minutes    Precautions: Standard    Subjective     Pt reports: that she isn't having any pain today. She feels that the therapy is helping.  She was compliant with home exercise program.  Response to previous treatment: no adverse effects  Functional change: progressing     Pain: 0/10 (L) knee       Objective       therapeutic exercises to develop strength, ROM, flexibility for 45 minutes including:  Bike x 5' for joint lubrication  Gastroc stretch w/strap 3x30"   Supine hamstring stretch 3x30"   SLR B 2x10 2#  LAQ 2x10 2#   Touch and go squats 24' plyobox c 1 airex BUE  Standing hip abduction 2x10 2# B   Standing hip extension 2x10 2# B   Standing DL heel raises 2x10   DL Shuttle 1.5 c 2x10   SLP B 1 c 2x10  Step ups L2 2x10 B  Step downs L2 x9 then held   Sit to stands with one UE support 2x10 (last 2 reps no support)      Home Exercises Provided and Patient Education Provided     Education provided:   - HEP     Written Home Exercises Provided: Patient instructed to cont prior HEP.  Exercises were reviewed and Rosmery was able to demonstrate them prior to the end of the session.  Rosmery demonstrated good  understanding of the education provided.     See EMR under Patient Instructions for exercises provided prior visit.      Assessment     Pt presents to therapy with improved sx per " subjective reports. Patient able to manage last few reps of STS without any UE support. Added mini squats to address gluteal weakness. Deferred step downs this visit d/t level of difficulty last session.  Rosmery Is progressing well towards her goals.   Pt prognosis is Good.     Pt will continue to benefit from skilled outpatient physical therapy to address the deficits listed in the problem list box on initial evaluation, provide pt/family education and to maximize pt's level of independence in the home and community environment.     Pt's spiritual, cultural and educational needs considered and pt agreeable to plan of care and goals.    Anticipated barriers to physical therapy:  none    Goals:   Short Term Goals: 1 weeks   Pt will be compliant with HEP in order to supplement PT with decreasing pain and improving functional mobility     Long Term Goals: 6 weeks   Pt will improve FOTO score to at least 62 in order to demo improved functional mobility  Pt will improve LE MMT scores by at least 1/2 grade where deficits noted in order to improve strength for functional tasks  Pt will improve (L) knee extension AROM to 0 degrees in order to improve functional gait  Pt will report ability to perform ADLs without pain in (L) knee  Pt will perform at least 5 sit to stands without UE support in order to demo improved functional strength and ability to perform transfers    Plan     Continue per POC, progress as tolerated     Arnoldo Velasquez, PTA

## 2024-11-09 ENCOUNTER — PATIENT MESSAGE (OUTPATIENT)
Dept: FAMILY MEDICINE | Facility: CLINIC | Age: 79
End: 2024-11-09
Payer: MEDICARE

## 2024-11-09 DIAGNOSIS — E55.9 VITAMIN D DEFICIENCY: ICD-10-CM

## 2024-11-09 DIAGNOSIS — G60.9 IDIOPATHIC PERIPHERAL NEUROPATHY: Primary | ICD-10-CM

## 2024-11-11 ENCOUNTER — LAB VISIT (OUTPATIENT)
Dept: LAB | Facility: HOSPITAL | Age: 79
End: 2024-11-11
Attending: INTERNAL MEDICINE
Payer: MEDICARE

## 2024-11-11 ENCOUNTER — CLINICAL SUPPORT (OUTPATIENT)
Dept: REHABILITATION | Facility: HOSPITAL | Age: 79
End: 2024-11-11
Payer: MEDICARE

## 2024-11-11 DIAGNOSIS — G60.9 IDIOPATHIC PERIPHERAL NEUROPATHY: ICD-10-CM

## 2024-11-11 DIAGNOSIS — G89.29 CHRONIC PAIN OF LEFT KNEE: Primary | ICD-10-CM

## 2024-11-11 DIAGNOSIS — M25.562 CHRONIC PAIN OF LEFT KNEE: Primary | ICD-10-CM

## 2024-11-11 DIAGNOSIS — E55.9 VITAMIN D DEFICIENCY: ICD-10-CM

## 2024-11-11 LAB
25(OH)D3+25(OH)D2 SERPL-MCNC: 32 NG/ML (ref 30–96)
VIT B12 SERPL-MCNC: 1086 PG/ML (ref 210–950)

## 2024-11-11 PROCEDURE — 82306 VITAMIN D 25 HYDROXY: CPT | Mod: HCNC | Performed by: INTERNAL MEDICINE

## 2024-11-11 PROCEDURE — 36415 COLL VENOUS BLD VENIPUNCTURE: CPT | Mod: HCNC,PO | Performed by: INTERNAL MEDICINE

## 2024-11-11 PROCEDURE — 97110 THERAPEUTIC EXERCISES: CPT | Mod: HCNC

## 2024-11-11 PROCEDURE — 82607 VITAMIN B-12: CPT | Mod: HCNC | Performed by: INTERNAL MEDICINE

## 2024-11-11 NOTE — PROGRESS NOTES
"  Physical Therapy Daily Treatment Note     Name: Rosmery Ramirez  Clinic Number: [id number]    Therapy Diagnosis:   Encounter Diagnosis   Name Primary?    Chronic pain of left knee Yes     Physician: Digna Snyder PA-C    Visit Date: 2024    Physician Orders: PT Eval and Treat   Medical Diagnosis from Referral: M17.12 (ICD-10-CM) - Primary osteoarthritis of left knee   Evaluation Date: 10/9/2024  Authorization Period Expiration: 25  Plan of Care Expiration: 24  Progress Note Due: 24  Visit # / Visits authorized:   FOTO: 1/ 3    Time In: 1:46 pm   Time Out: 2:33 pm   Total Billable Time: 47 minutes    Precautions: Standard    Subjective     Pt reports: that her knees are feeling good.  She was compliant with home exercise program.  Response to previous treatment: no adverse effects  Functional change: see assessment    Pain: 0/10 (B) knees      Objective       Hip Right   Left   Pain/Dysfunction with Movement     AROM MMT AROM MMT NT - not tested    Flexion WFL 4+/5 WFL 4+/5     Extension NT NT NT NT  Pt performed good bridge against gravity   Abduction WFL 4+/5 WFL 4+/5     External rotation WFL 4+/5 WFL 4+/5        Knee Right   Left   Pain/Dysfunction with Movement     AROM MMT AROM MMT     Flexion 115 5/5 115 5/5     Extension 0 5/5 0 4+/5           TU seconds without AD     30 Second Sit to Stand: 7 w/o UE support     Objective measurements and therapeutic exercises to develop strength, ROM, flexibility for 47 minutes including:  Bike x 5' for joint lubrication  Gastroc stretch 3x30" on wedge  Supine hamstring stretch 3x30"   SLR B 3x10 2#  LAQ 2x10 2# not performed   Touch and go squats 24' plyobox BUE 3x10   Standing hip abduction 3x10 2# B   Standing hip extension 3x10 2# B   Standing DL heel raises 2x10   DL Shuttle 1.5 c 3x10   SLP B 1 c 2x10  Step ups L2 2x10 B  Step downs L2 x9 not performed   Sit to stands with one UE support 2x10 (last 2 reps no support) - " not performed         Home Exercises Provided and Patient Education Provided     Education provided:   - HEP     Written Home Exercises Provided: Patient instructed to cont prior HEP.   Rosmery demonstrated good  understanding of the education provided.     See EMR under Patient Instructions for exercises provided prior visit.      Assessment     Updated measurements taken and pt demo improvements in TUG score, 30 Second Sit to Stand score, (L) knee extension AROM, and LE MMT scores compared to measurements taken on initial evaluation. Pt tolerated therapy session without any adverse reactions.   Rosmery Is progressing well towards her goals.   Pt prognosis is Good.     Pt will continue to benefit from skilled outpatient physical therapy to address the deficits listed in the problem list box on initial evaluation, provide pt/family education and to maximize pt's level of independence in the home and community environment.     Pt's spiritual, cultural and educational needs considered and pt agreeable to plan of care and goals.    Anticipated barriers to physical therapy:  none    Goals:   Short Term Goals: 1 weeks   Pt will be compliant with HEP in order to supplement PT with decreasing pain and improving functional mobility     Long Term Goals: 6 weeks   Pt will improve FOTO score to at least 62 in order to demo improved functional mobility  Pt will improve LE MMT scores by at least 1/2 grade where deficits noted in order to improve strength for functional tasks  Pt will improve (L) knee extension AROM to 0 degrees in order to improve functional gait  Pt will report ability to perform ADLs without pain in (L) knee  Pt will perform at least 5 sit to stands without UE support in order to demo improved functional strength and ability to perform transfers    Plan     Continue per POC, progress as tolerated     Renae Brenner, PT

## 2024-11-13 PROBLEM — R73.09 ABNORMAL GLUCOSE: Status: ACTIVE | Noted: 2024-11-13

## 2024-11-14 ENCOUNTER — OFFICE VISIT (OUTPATIENT)
Dept: FAMILY MEDICINE | Facility: CLINIC | Age: 79
End: 2024-11-14
Payer: MEDICARE

## 2024-11-14 ENCOUNTER — HOSPITAL ENCOUNTER (OUTPATIENT)
Dept: RADIOLOGY | Facility: HOSPITAL | Age: 79
Discharge: HOME | End: 2024-11-14
Attending: INTERNAL MEDICINE
Payer: MEDICARE

## 2024-11-14 VITALS
OXYGEN SATURATION: 99 % | HEIGHT: 65 IN | SYSTOLIC BLOOD PRESSURE: 134 MMHG | BODY MASS INDEX: 31.77 KG/M2 | WEIGHT: 190.69 LBS | RESPIRATION RATE: 18 BRPM | HEART RATE: 70 BPM | TEMPERATURE: 98 F | DIASTOLIC BLOOD PRESSURE: 66 MMHG

## 2024-11-14 DIAGNOSIS — D12.6 TUBULAR ADENOMA OF COLON: ICD-10-CM

## 2024-11-14 DIAGNOSIS — E04.1 THYROID NODULE: Chronic | ICD-10-CM

## 2024-11-14 DIAGNOSIS — I10 ESSENTIAL HYPERTENSION: ICD-10-CM

## 2024-11-14 DIAGNOSIS — R73.09 ABNORMAL GLUCOSE: ICD-10-CM

## 2024-11-14 DIAGNOSIS — E66.01 CLASS 2 SEVERE OBESITY DUE TO EXCESS CALORIES WITH SERIOUS COMORBIDITY IN ADULT, UNSPECIFIED BMI: ICD-10-CM

## 2024-11-14 DIAGNOSIS — D64.9 ANEMIA, UNSPECIFIED TYPE: ICD-10-CM

## 2024-11-14 DIAGNOSIS — R06.00 DYSPNEA, UNSPECIFIED TYPE: ICD-10-CM

## 2024-11-14 DIAGNOSIS — E66.812 CLASS 2 SEVERE OBESITY DUE TO EXCESS CALORIES WITH SERIOUS COMORBIDITY IN ADULT, UNSPECIFIED BMI: ICD-10-CM

## 2024-11-14 DIAGNOSIS — E55.9 VITAMIN D DEFICIENCY: ICD-10-CM

## 2024-11-14 DIAGNOSIS — T46.6X5A STATIN MYOPATHY: ICD-10-CM

## 2024-11-14 DIAGNOSIS — R53.83 FATIGUE, UNSPECIFIED TYPE: Primary | ICD-10-CM

## 2024-11-14 DIAGNOSIS — G72.0 STATIN MYOPATHY: ICD-10-CM

## 2024-11-14 DIAGNOSIS — E78.5 DYSLIPIDEMIA: ICD-10-CM

## 2024-11-14 DIAGNOSIS — G60.9 IDIOPATHIC PERIPHERAL NEUROPATHY: ICD-10-CM

## 2024-11-14 DIAGNOSIS — I70.0 THORACIC AORTIC ATHEROSCLEROSIS: ICD-10-CM

## 2024-11-14 PROCEDURE — 1101F PT FALLS ASSESS-DOCD LE1/YR: CPT | Mod: HCNC,CPTII,S$GLB, | Performed by: INTERNAL MEDICINE

## 2024-11-14 PROCEDURE — 1126F AMNT PAIN NOTED NONE PRSNT: CPT | Mod: HCNC,CPTII,S$GLB, | Performed by: INTERNAL MEDICINE

## 2024-11-14 PROCEDURE — 1159F MED LIST DOCD IN RCRD: CPT | Mod: HCNC,CPTII,S$GLB, | Performed by: INTERNAL MEDICINE

## 2024-11-14 PROCEDURE — 71046 X-RAY EXAM CHEST 2 VIEWS: CPT | Mod: 26,HCNC,, | Performed by: RADIOLOGY

## 2024-11-14 PROCEDURE — 99999 PR PBB SHADOW E&M-EST. PATIENT-LVL IV: CPT | Mod: PBBFAC,HCNC,, | Performed by: INTERNAL MEDICINE

## 2024-11-14 PROCEDURE — 1160F RVW MEDS BY RX/DR IN RCRD: CPT | Mod: HCNC,CPTII,S$GLB, | Performed by: INTERNAL MEDICINE

## 2024-11-14 PROCEDURE — 99214 OFFICE O/P EST MOD 30 MIN: CPT | Mod: HCNC,S$GLB,, | Performed by: INTERNAL MEDICINE

## 2024-11-14 PROCEDURE — 71046 X-RAY EXAM CHEST 2 VIEWS: CPT | Mod: TC,HCNC,FY,PO

## 2024-11-14 PROCEDURE — 3288F FALL RISK ASSESSMENT DOCD: CPT | Mod: HCNC,CPTII,S$GLB, | Performed by: INTERNAL MEDICINE

## 2024-11-14 PROCEDURE — 3078F DIAST BP <80 MM HG: CPT | Mod: HCNC,CPTII,S$GLB, | Performed by: INTERNAL MEDICINE

## 2024-11-14 PROCEDURE — 3075F SYST BP GE 130 - 139MM HG: CPT | Mod: HCNC,CPTII,S$GLB, | Performed by: INTERNAL MEDICINE

## 2024-11-14 RX ORDER — HYDROCHLOROTHIAZIDE 12.5 MG/1
12.5 CAPSULE ORAL DAILY
Qty: 90 CAPSULE | Refills: 3 | Status: SHIPPED | OUTPATIENT
Start: 2024-11-14

## 2024-11-14 RX ORDER — ATORVASTATIN CALCIUM 10 MG/1
10 TABLET, FILM COATED ORAL EVERY OTHER DAY
Qty: 45 TABLET | Refills: 3 | Status: SHIPPED | OUTPATIENT
Start: 2024-11-14

## 2024-11-14 RX ORDER — IRBESARTAN 150 MG/1
150 TABLET ORAL NIGHTLY
Qty: 90 TABLET | Refills: 3 | Status: SHIPPED | OUTPATIENT
Start: 2024-11-14

## 2024-11-14 NOTE — PROGRESS NOTES
This note was created by combination of typed  and M-Modal dictation.  Transcription errors may be present.   This note was also generated with the assistance of ambient listening technology. Verbal consent was obtained by the patient and accompanying visitor(s) for the recording of patient appointment to facilitate this note. I attest to having reviewed and edited the generated note for accuracy, though some syntax or spelling errors may persist. Please contact the author of this note for any clarification.    Assessment and Plan:   Assessment and Plan    Fatigue, unspecified type  Dyspnea, unspecified type  Anemia, unspecified type  -fatigue for the past few months.  Has upcoming follow-up with Cardiology in December.  Check chest x-ray.  Pre visit labs were unrevealing.  CBC with borderline mild anemia.  Check ferritin and iron  -     Ferritin; Future; Expected date: 11/14/2024  -     Reticulocytes; Future; Expected date: 11/15/2024  -     Iron and TIBC; Future; Expected date: 11/15/2024  -     CBC Auto Differential; Future; Expected date: 11/15/2024  -     X-Ray Chest PA And Lateral; Future; Expected date: 11/14/2024    Essential hypertension  -BP stable.  On irbesartan and HCTZ updated prescriptions  -     irbesartan (AVAPRO) 150 MG tablet; Take 1 tablet (150 mg total) by mouth every evening.  Dispense: 90 tablet; Refill: 3  -     hydroCHLOROthiazide (MICROZIDE) 12.5 mg capsule; Take 1 capsule (12.5 mg total) by mouth once daily.  Dispense: 90 capsule; Refill: 3    Dyslipidemia; statin myalgias; 6/28/2018 exercise stress echo neg for ischemia  Statin myopathy  Thoracic aortic atherosclerosis incidental on CXR 12/2022  -pre visit labs stable on atorvastatin every other day.  No changes  -     Comprehensive Metabolic Panel; Future; Expected date: 05/14/2025  -     Cancel: Lipid Panel; Future; Expected date: 05/12/2025  -     CBC Without Differential; Future; Expected date: 05/14/2025  -     atorvastatin  (LIPITOR) 10 MG tablet; Take 1 tablet (10 mg total) by mouth every other day.  Dispense: 45 tablet; Refill: 3    Class 2 severe obesity due to excess calories with serious comorbidity in adult, unspecified BMI  Abnormal glucose  -pre visit labs A1c in pre diabetes range stable no changes  -     Hemoglobin A1C; Future; Expected date: 05/14/2025    Thyroid nodule on US; followed by endo repeat 6/2026  -last TSH was normal.  Plan is repeat ultrasound June 20, 2026    Vitamin D deficiency  -pre visit labs vitamin-D low normal on    Idiopathic peripheral neuropathy normal B12, TSH; A1c pre-DM. hx of lory changed to lyrica  -stable  Saw outside neurology, negative evaluation    Tubular adenoma of colon 3/28/22 colonoscopy 4 mm sigmoid TA          Medications Discontinued During This Encounter   Medication Reason    calcium-vitamin D3 (OS-LETY 500 + D3) 500 mg(1,250mg) -200 unit per tablet Patient no longer taking    multivitamin (THERAGRAN) per tablet Patient no longer taking    albuterol (VENTOLIN HFA) 90 mcg/actuation inhaler Therapy completed    atorvastatin (LIPITOR) 10 MG tablet Reorder    hydroCHLOROthiazide (MICROZIDE) 12.5 mg capsule Reorder    irbesartan (AVAPRO) 150 MG tablet Reorder       meds sent this encounter:  Medications Ordered This Encounter   Medications    atorvastatin (LIPITOR) 10 MG tablet     Sig: Take 1 tablet (10 mg total) by mouth every other day.     Dispense:  45 tablet     Refill:  3     Pharmacy update refills, keep on file, not requesting Rx to be filled today.    hydroCHLOROthiazide (MICROZIDE) 12.5 mg capsule     Sig: Take 1 capsule (12.5 mg total) by mouth once daily.     Dispense:  90 capsule     Refill:  3     . Pharmacy update refills, keep on file, not requesting Rx to be filled today.    irbesartan (AVAPRO) 150 MG tablet     Sig: Take 1 tablet (150 mg total) by mouth every evening.     Dispense:  90 tablet     Refill:  3     Pharmacy update refills, keep on file, not requesting Rx to  be filled today.         Follow Up:  Follow-up 6 months with labs  Future Appointments   Date Time Provider Department Center   11/15/2024 10:45 AM Renae Brenner, PT [facility]   11/18/2024  1:45 PM [facility]    11/20/2024  2:00 PM Latisha Woods, NP [facility]ON [address]   11/20/2024  2:30 PM Renae Brenner, PT [facility]   12/19/2024  9:00 AM Julio Dye MD [facility] CARDIO [place]   4/1/2025  2:20 PM Ancelmo Sumner MD [facility] FAM MED [place]         Subjective:   Subjective   Chief Complaint   Patient presents with    Follow-up    Hypertension    Medication Refill       OVI Botello is a 79 y.o. female.    Social History     Tobacco Use    Smoking status: Never     Passive exposure: Never    Smokeless tobacco: Never   Substance Use Topics    Alcohol use: Not Currently      Social History     Occupational History    Occupation: retired - formerly pastoral care with [employer]      [employer]     Social History Narrative    Not on file       Patient Care Team:  Ancelmo Sumner MD as PCP - General (Internal Medicine)  Brooke Delong MD as Obstetrician (Obstetrics)  Arnlodo Valentin MD as Consulting Physician (Otolaryngology)  Yesica Mike MD as Consulting Physician (Ophthalmology)  Brandan Nava MD as Consulting Physician (Orthopedic Surgery)  Jagjit Feldman MD as Consulting Physician (Endocrinology)  Ailyn Dubois as Digital Medicine Health   Ancelmo Sumner MD as Consulting Physician (Internal Medicine)    No LMP recorded. Patient has had a hysterectomy.    Last appointment with this clinic was Visit date not found. Last visit with me 2/9/2024   To summarize last visit and events leading up to today:  Hypertension, digital monitoring  Hyperlipidemia  Abnormal glucose  Thyroid nodule on ultrasound.  Last seen by Endocrinology 06/2023 and plan was repeat thyroid ultrasound after her EGD.  TFTs at that  time were normal  Neuropathy, Lyrica with side effects and ineffective, wean down.  10/2023 follow-up with Neurology, trial of Cymbalta  Post nasal drip, astelin. Avoid nasal steroids (glaucoma)  Incidental pancreatic atrophy on CT. No symptoms. No further testing.   Reflux type symptoms, dysphagia and odynophagia.  Seen by GI.    3/1/23 esophogram Findings suggestive of mild esophageal dysmotility. Spontaneous gastroesophageal reflux observed. No findings to suggest achalasia.  7/20/23 EGD normal duodenum; erythema gastric body, antrum, prepyloric region. 1 cm HH. Bx's neg for H pylori, sprue, no EE  ED after the EGD for chest pain. Negative workup  Saw outpt primary care after ED. Treated for MSK  7/22/22 MRI L spine Mild multilevel degenerative change throughout the lower thoracic and lumbar spine.  No significant spinal canal stenosis but there is scattered lateral recess and neural foraminal encroachment as further detailed above.     Last visit me in August   Painful peripheral edema worsened by naproxen.  Contributor Lyrica.  Conservative management, elevation, compression socks.  If no improvement increase HCTZ  Thyroid nodule due for repeat ultrasound 2024     She messaged me in August to wean down the Lyrica given ineffectiveness.     Saw cardiology in follow-up 10/4.  Stable follow-up 6 months     Saw Neurology 10/23 for neuropathy.  Trial of duloxetine     Pre visit labs  CBC normal   CMP normal   Lipid profile good  A1c 5.9     Thyroid US ordered by ra    Last visit with me 02/09/2024 for physical  Abnormal glucose stable   Hypertension stable   Dyslipidemia with a history of statin myopathy.  Taking statin every other day no changes   Thyroid nodule needs to schedule the ultrasound   Peripheral neuropathy she was wary of Cymbalta and never picked it up.  Neuropathy tolerable if she just wears socks all the time.  Decreased sphincter tone work on self-directed physical therapy/Kegel exercises  CLARA not  finding CPAP helpful and not using it    06/05/2024 thyroid ultrasound  Three right-sided nodules, none of which meet ACR criteria for follow-up.     Plan is repeat thyroid ultrasound in 2 years, 06/2026    Seen at another location 07/26/2024 for left knee pain with a history of arthritis.  X-ray with DJD.  Referred to PT and Orthopedics     08/02/2024 saw orthopedics.  Monitor    08/22/2024 seen at another location for left-sided headache.  Ibuprofen and tizanidine.  TSH normal.  Sed rate normal    Saw orthopedics 09/30/2024 for aggravation of left knee pain.  Given Orthovisc injection    Per claims data saw outside neurology Dr. Hamilton 10/01/2024 for headache  10/10/2024 MRI  No acute intracranial pathology.     Pre visit labs   CBC mild anemia   B12 good/high   CMP normal   Lipid normal   Vitamin-D good   A1c 6.0   TSH normal    Today's visit:    History of Present Illness    HPI:  Rosmery reports fatigue and difficulty getting out of bed since September, following a knee injury at the gym and a subsequent knee injection. She needs to rest when attempting household chores due to perceived bradycardia and describes weakness in the chest area without pain. Rosmery reports dyspnea only with significant exertion, noting a decrease in activity tolerance compared to 6 months ago. She also mentions sleep disturbances with frequent nocturnal awakenings, though this is not a new issue.    Rosmery has been seeing an orthopedist for her knee injury and has an upcoming appointment with a colorectal surgeon regarding fecal incontinence. She has been performing Kegel exercises as previously recommended, which has partially improved but not fully resolved the issue.    Rosmery consulted a neurologist for headaches. The neurologist offered medication, but the patient declined due to mild pain.    Rosmery follows a partially vegan diet, occasionally consuming chicken.    Rosmery denies chest pain, sudden weight  changes, diarrhea, constipation, significant coughing, hemoptysis, hematemesis, hematochezia, hematuria, or melena.    MEDICATIONS:  - Atorvastatin, low dose every other day, for hypercholesterolemia  - Hydrochlorothiazide for hypertension  - Irbesartan for hypertension  - Aspirin for cardiac prophylaxis  - Eye drops  - Vitamin D3, 25 mcg    MEDICAL HISTORY:  - Hashimoto's thyroiditis: Diagnosed approximately 2 years ago  - Pre-diabetes      ROS:  General: reports fatigue  Cardiovascular: no chest pain  Respiratory: no cough, reports shortness of breath  Gastrointestinal: no diarrhea, no constipation, no melena  Genitourinary: no hematuria  Neurological: reports headache, reports weakness  Psychiatric: reports sleep difficulty       Answers submitted by the patient for this visit:  Review of Systems Questionnaire (Submitted on 11/14/2024)  activity change: Yes  unexpected weight change: No  neck pain: No  hearing loss: No  rhinorrhea: No  trouble swallowing: No  eye discharge: No  visual disturbance: No  chest tightness: No  wheezing: No  chest pain: No  palpitations: No  blood in stool: No  constipation: No  vomiting: No  diarrhea: No  polydipsia: Yes  polyuria: Yes  difficulty urinating: No  hematuria: No  menstrual problem: No  dysuria: No  joint swelling: Yes  arthralgias: Yes  headaches: No  weakness: Yes  confusion: Yes  dysphoric mood: No        ALLERGIES AND MEDICATIONS: updated and reviewed.  Medication List with Changes/Refills   Current Medications    ALBUTEROL (VENTOLIN HFA) 90 MCG/ACTUATION INHALER    Inhale 2 puffs into the lungs every 6 (six) hours as needed for Wheezing or Shortness of Breath. Rescue    ASCORBIC ACID, VITAMIN C, (VITAMIN C) 1000 MG TABLET    Take 1,000 mg by mouth once daily.    ASPIRIN (ECOTRIN) 81 MG EC TABLET    Take 81 mg by mouth once daily.    ATORVASTATIN (LIPITOR) 10 MG TABLET    Take 1 tablet (10 mg total) by mouth every other day.    AZELASTINE (ASTELIN) 137 MCG (0.1 %)  "NASAL SPRAY    1 spray (137 mcg total) by Nasal route 2 (two) times daily.    DORZOLAMIDE-TIMOLOL 2-0.5% (COSOPT) 22.3-6.8 MG/ML OPHTHALMIC SOLUTION    Place 1 drop into both eyes 2 (two) times daily.    HYDROCHLOROTHIAZIDE (MICROZIDE) 12.5 MG CAPSULE    Take 1 capsule (12.5 mg total) by mouth once daily.    IRBESARTAN (AVAPRO) 150 MG TABLET    Take 1 tablet (150 mg total) by mouth every evening.    LUMIGAN 0.01 % DROP    Place 1 drop into both eyes every evening.    RHOPRESSA 0.02 % OPHTHALMIC SOLUTION    INSTILL 1 DROP INTO LEFT EYE ONCE DAILY   Discontinued Medications    CALCIUM-VITAMIN D3 (OS-LETY 500 + D3) 500 MG(1,250MG) -200 UNIT PER TABLET    Take 1 tablet by mouth 2 (two) times daily with meals.    MULTIVITAMIN (THERAGRAN) PER TABLET    Take 1 tablet by mouth once daily.         Objective:   Objective   Physical Exam   Vitals:    11/14/24 1412   BP: 134/66   Pulse: 70   Resp: 18   Temp: 97.7 °F (36.5 °C)   TempSrc: Oral   SpO2: 99%   Weight: 86.5 kg (190 lb 11.2 oz)   Height: 5' 5" (1.651 m)    Body mass index is 31.73 kg/m².  Weight: 86.5 kg (190 lb 11.2 oz)   Height: 5' 5" (165.1 cm)     Physical Exam  Constitutional:       General: She is not in acute distress.     Appearance: She is well-developed.   Eyes:      Extraocular Movements: Extraocular movements intact.   Cardiovascular:      Rate and Rhythm: Normal rate and regular rhythm.      Heart sounds: Normal heart sounds. No murmur heard.  Pulmonary:      Effort: Pulmonary effort is normal.      Breath sounds: Normal breath sounds.   Musculoskeletal:         General: Normal range of motion.      Right lower leg: No edema.      Left lower leg: No edema.   Skin:     General: Skin is warm and dry.   Neurological:      Mental Status: She is alert and oriented to person, place, and time.      Coordination: Coordination normal.   Psychiatric:         Behavior: Behavior normal.         Thought Content: Thought content normal.         Component      Latest Ref " Rng 1/26/2024 8/22/2024 11/8/2024 11/11/2024   WBC      3.90 - 12.70 K/uL 4.94   5.87     RBC      4.00 - 5.40 M/uL 4.31   4.12     Hemoglobin      12.0 - 16.0 g/dL 12.5   11.9 (L)     Hematocrit      37.0 - 48.5 % 39.1   38.4     MCV      82 - 98 fL 91   93     MCH      27.0 - 31.0 pg 29.0   28.9     MCHC      32.0 - 36.0 g/dL 32.0   31.0 (L)     RDW      11.5 - 14.5 % 12.5   12.8     Platelet Count      150 - 450 K/uL 314   298     MPV      9.2 - 12.9 fL 11.1   10.9     Immature Granulocytes      0.0 - 0.5 % 0.2       Gran # (ANC)      1.8 - 7.7 K/uL 3.0       Immature Grans (Abs)      0.00 - 0.04 K/uL 0.01       Lymph #      1.0 - 4.8 K/uL 1.5       Mono #      0.3 - 1.0 K/uL 0.4       Eos #      0.0 - 0.5 K/uL 0.0       Baso #      0.00 - 0.20 K/uL 0.05       nRBC      0 /100 WBC 0       Gran %      38.0 - 73.0 % 60.1       Lymph %      18.0 - 48.0 % 30.2       Mono %      4.0 - 15.0 % 8.3       Eos %      0.0 - 8.0 % 0.2       Basophil %      0.0 - 1.9 % 1.0       Differential Method Automated       Sodium      136 - 145 mmol/L 140   140     Potassium      3.5 - 5.1 mmol/L 4.2   4.2     Chloride      95 - 110 mmol/L 108   106     CO2      23 - 29 mmol/L 27   23     Glucose      70 - 110 mg/dL 96   82     BUN      8 - 23 mg/dL 9   11     Creatinine      0.5 - 1.4 mg/dL 0.8   0.7     Calcium      8.7 - 10.5 mg/dL 9.8   10.0     PROTEIN TOTAL      6.0 - 8.4 g/dL 7.2   7.1     Albumin      3.5 - 5.2 g/dL 3.8   3.7     BILIRUBIN TOTAL      0.1 - 1.0 mg/dL 0.7   0.5     ALP      40 - 150 U/L 89   86     AST      10 - 40 U/L 19   17     ALT      10 - 44 U/L 17   13     eGFR      >60 mL/min/1.73 m^2 >60.0   >60.0     Anion Gap      8 - 16 mmol/L 5 (L)   11     Cholesterol Total      120 - 199 mg/dL 123   132     Triglycerides      30 - 150 mg/dL 93   77     HDL      40 - 75 mg/dL 42   49     LDL Cholesterol      63.0 - 159.0 mg/dL 62.4 (L)   67.6     HDL/Cholesterol Ratio      20.0 - 50.0 % 34.1   37.1     Total  Cholesterol/HDL Ratio      2.0 - 5.0  2.9   2.7     Non-HDL Cholesterol      mg/dL 81   83     Hemoglobin A1C External      4.0 - 5.6 % 5.9 (H)   6.0 (H)     Estimated Avg Glucose      68 - 131 mg/dL 123   126     TSH      0.400 - 4.000 uIU/mL  1.747  1.895     Vitamin B12      210 - 950 pg/mL    1086 (H)    Vitamin D      30 - 96 ng/mL    32       Legend:  (L) Low  (H) High

## 2024-11-14 NOTE — PROGRESS NOTES
CXR normal   Done for complaints of dyspnea and fatigue  Await cardiology consult   Checking ferritin

## 2024-11-15 ENCOUNTER — CLINICAL SUPPORT (OUTPATIENT)
Dept: REHABILITATION | Facility: HOSPITAL | Age: 79
End: 2024-11-15
Payer: MEDICARE

## 2024-11-15 DIAGNOSIS — G89.29 CHRONIC PAIN OF LEFT KNEE: Primary | ICD-10-CM

## 2024-11-15 DIAGNOSIS — M25.562 CHRONIC PAIN OF LEFT KNEE: Primary | ICD-10-CM

## 2024-11-15 PROCEDURE — 97110 THERAPEUTIC EXERCISES: CPT | Mod: HCNC

## 2024-11-15 NOTE — PROGRESS NOTES
"  Physical Therapy Daily Treatment Note     Name: Rosmery Pelayotor  Clinic Number: [id number]    Therapy Diagnosis:   Encounter Diagnosis   Name Primary?    Chronic pain of left knee Yes     Physician: Digna Snyder PA-C    Visit Date: 11/15/2024    Physician Orders: PT Eval and Treat   Medical Diagnosis from Referral: M17.12 (ICD-10-CM) - Primary osteoarthritis of left knee   Evaluation Date: 10/9/2024  Authorization Period Expiration: 9/30/25  Plan of Care Expiration: 11/22/24  Progress Note Due: 11/9/24  Visit # / Visits authorized: 1/ 1 7/20  FOTO: 1/ 3    Time In: 10:45 am   Time Out: 11:26 am   Total Billable Time: 13 minutes    Precautions: Standard    Subjective     Pt reports: no pain in (L) knee currently.  She was compliant with home exercise program.  Response to previous treatment: no adverse effects  Functional change: see prior note    Pain: 0/10 (L) knee       Objective     therapeutic exercises to develop strength, ROM, flexibility for 41 minutes including:  Bike x 5' for joint lubrication  Gastroc stretch 3x30" on wedge  Supine hamstring stretch 3x30"   SLR B 3x10 2#  LAQ 2x10 2#   Touch and go squats 24' plyobox BUE 3x10   Standing hip abduction 3x10 2# B   Standing hip extension 3x10 2# B   Standing DL heel raises 2x10   DL Shuttle 1.5 c 3x10   SLP B 1 c 2x10  Fwd Step ups L2 2x10 B  Lateral step ups L2 2x10 B   Step downs L2 x9 not performed   Sit to stands with one UE support 2x10 (last 2 reps no support) - not performed       Home Exercises Provided and Patient Education Provided     Education provided:   - HEP     Written Home Exercises Provided: Patient instructed to cont prior HEP.   Rosmery demonstrated good  understanding of the education provided.     See EMR under Patient Instructions for exercises provided prior visit.      Assessment     Pt was able to tolerate addition of lateral step ups for LE strengthening. Pt tolerated therapy session without any adverse reactions. "   Rosmery Is progressing well towards her goals.   Pt prognosis is Good.     Pt will continue to benefit from skilled outpatient physical therapy to address the deficits listed in the problem list box on initial evaluation, provide pt/family education and to maximize pt's level of independence in the home and community environment.     Pt's spiritual, cultural and educational needs considered and pt agreeable to plan of care and goals.      Anticipated barriers to physical therapy:  none    Goals:   Short Term Goals: 1 weeks   Pt will be compliant with HEP in order to supplement PT with decreasing pain and improving functional mobility     Long Term Goals: 6 weeks   Pt will improve FOTO score to at least 62 in order to demo improved functional mobility  Pt will improve LE MMT scores by at least 1/2 grade where deficits noted in order to improve strength for functional tasks  Pt will improve (L) knee extension AROM to 0 degrees in order to improve functional gait  Pt will report ability to perform ADLs without pain in (L) knee  Pt will perform at least 5 sit to stands without UE support in order to demo improved functional strength and ability to perform transfers    Plan     Continue per POC, progress as tolerated    Renae Brenner, PT

## 2024-11-18 ENCOUNTER — TELEPHONE (OUTPATIENT)
Dept: SURGERY | Facility: CLINIC | Age: 79
End: 2024-11-18
Payer: MEDICARE

## 2024-11-27 ENCOUNTER — PATIENT MESSAGE (OUTPATIENT)
Dept: FAMILY MEDICINE | Facility: CLINIC | Age: 79
End: 2024-11-27
Payer: MEDICARE

## 2024-11-27 DIAGNOSIS — G47.33 OSA (OBSTRUCTIVE SLEEP APNEA): Primary | ICD-10-CM

## 2024-11-28 NOTE — TELEPHONE ENCOUNTER
Her sleep study was done 12/2022.  If she has not been using her CPAP in over a year, insurance typically requires that she undergo a repeat sleep study.    I will re-order sleep study.

## 2024-12-02 ENCOUNTER — TELEPHONE (OUTPATIENT)
Dept: PULMONOLOGY | Facility: CLINIC | Age: 79
End: 2024-12-02
Payer: MEDICARE

## 2024-12-02 NOTE — TELEPHONE ENCOUNTER
Chart reviewed.  Patient was seen on 11/14 with h/o dipesh.  Need requalifying hsat to get apap.  Proceed with HSAT.

## 2024-12-06 ENCOUNTER — HOSPITAL ENCOUNTER (OUTPATIENT)
Dept: SLEEP MEDICINE | Facility: HOSPITAL | Age: 79
Discharge: HOME | End: 2024-12-06
Attending: INTERNAL MEDICINE
Payer: MEDICARE

## 2024-12-06 DIAGNOSIS — G47.33 OSA (OBSTRUCTIVE SLEEP APNEA): ICD-10-CM

## 2024-12-06 PROCEDURE — 95800 SLP STDY UNATTENDED: CPT | Mod: HCNC

## 2024-12-06 PROCEDURE — 95806 SLEEP STUDY UNATT&RESP EFFT: CPT | Mod: 26,HCNC,, | Performed by: INTERNAL MEDICINE

## 2024-12-06 NOTE — PROGRESS NOTES
The patient ID was verified. She was instructed on how to turn the Home Sleep Testing device on and off, how to apply the sensors.  She was encouraged to sleep on supine position and must have 6 hours of sleep. The patient was instructed not to get the device wet and return it back to us. All questions were answered prior to patient leaving.  She was provided the after visit summary. She was tested before, and stopped using the PAP machine.

## 2024-12-10 NOTE — PROCEDURES
"Dear Provider,     You have ordered sleep LAB services to perform the sleep study for Rosmery Ramirez.  The sleep study that you ordered is complete.      Please find Sleep Study result in "Chart Review" under the "Media tab."      As the ordering provider, you are responsible for reviewing the results and implementing a treatment plan with your patient.    If you need a Sleep Medicine provider to explain the sleep study findings and arrange treatment for the patient, please refer patient for consultation to our Sleep Clinic via [facility] with Ambulatory Consult Sleep.    To do that please place an order for an  "Ambulatory Consult Sleep" - it will go to our clinic work queue for our Medical Assistant to contact the patient for an appointment.     For any questions, please contact our clinic staff at [phone] to talk to clinical staff.   "

## 2024-12-16 ENCOUNTER — TELEPHONE (OUTPATIENT)
Dept: ORTHOPEDICS | Facility: CLINIC | Age: 79
End: 2024-12-16
Payer: MEDICARE

## 2024-12-16 NOTE — TELEPHONE ENCOUNTER
Attempted to contact pt, no answer. LVM with reason for call and call back number.   ----- Message from Didi sent at 12/16/2024  4:33 PM CST -----  Regarding: pt called    Name of Caller:LAUREN PHILLIPS [[id number]]      When is the first available appointment? N/a       Symptoms: left knee / requesting injection     Best Call Back Number Telephone Information:  Mobile          [phone]            Additional Information: requesting to see MD this time for an appt t transfer care  from Dr Villalpando

## 2024-12-19 ENCOUNTER — OFFICE VISIT (OUTPATIENT)
Dept: CARDIOLOGY | Facility: CLINIC | Age: 79
End: 2024-12-19
Payer: MEDICARE

## 2024-12-19 VITALS
WEIGHT: 191.56 LBS | BODY MASS INDEX: 31.92 KG/M2 | HEART RATE: 56 BPM | SYSTOLIC BLOOD PRESSURE: 136 MMHG | HEIGHT: 65 IN | OXYGEN SATURATION: 97 % | DIASTOLIC BLOOD PRESSURE: 83 MMHG | RESPIRATION RATE: 15 BRPM

## 2024-12-19 DIAGNOSIS — E78.2 MIXED HYPERLIPIDEMIA: ICD-10-CM

## 2024-12-19 DIAGNOSIS — I10 ESSENTIAL HYPERTENSION: ICD-10-CM

## 2024-12-19 DIAGNOSIS — R06.09 DOE (DYSPNEA ON EXERTION): Primary | ICD-10-CM

## 2024-12-19 DIAGNOSIS — R53.83 FATIGUE, UNSPECIFIED TYPE: ICD-10-CM

## 2024-12-19 DIAGNOSIS — E78.5 DYSLIPIDEMIA: ICD-10-CM

## 2024-12-19 DIAGNOSIS — I70.0 THORACIC AORTIC ATHEROSCLEROSIS: ICD-10-CM

## 2024-12-19 LAB
OHS QRS DURATION: 88 MS
OHS QTC CALCULATION: 388 MS

## 2024-12-19 PROCEDURE — 1159F MED LIST DOCD IN RCRD: CPT | Mod: CPTII,S$GLB,, | Performed by: INTERNAL MEDICINE

## 2024-12-19 PROCEDURE — 1124F ACP DISCUSS-NO DSCNMKR DOCD: CPT | Mod: CPTII,S$GLB,, | Performed by: INTERNAL MEDICINE

## 2024-12-19 PROCEDURE — 1101F PT FALLS ASSESS-DOCD LE1/YR: CPT | Mod: CPTII,S$GLB,, | Performed by: INTERNAL MEDICINE

## 2024-12-19 PROCEDURE — 3288F FALL RISK ASSESSMENT DOCD: CPT | Mod: CPTII,S$GLB,, | Performed by: INTERNAL MEDICINE

## 2024-12-19 PROCEDURE — 99999 PR PBB SHADOW E&M-EST. PATIENT-LVL IV: CPT | Mod: PBBFAC,HCNC,, | Performed by: INTERNAL MEDICINE

## 2024-12-19 PROCEDURE — G2211 COMPLEX E/M VISIT ADD ON: HCPCS | Mod: S$GLB,,, | Performed by: INTERNAL MEDICINE

## 2024-12-19 PROCEDURE — 1126F AMNT PAIN NOTED NONE PRSNT: CPT | Mod: CPTII,S$GLB,, | Performed by: INTERNAL MEDICINE

## 2024-12-19 PROCEDURE — 99214 OFFICE O/P EST MOD 30 MIN: CPT | Mod: S$GLB,,, | Performed by: INTERNAL MEDICINE

## 2024-12-19 PROCEDURE — 3079F DIAST BP 80-89 MM HG: CPT | Mod: CPTII,S$GLB,, | Performed by: INTERNAL MEDICINE

## 2024-12-19 PROCEDURE — 3075F SYST BP GE 130 - 139MM HG: CPT | Mod: CPTII,S$GLB,, | Performed by: INTERNAL MEDICINE

## 2024-12-19 NOTE — PROGRESS NOTES
CARDIOVASCULAR CONSULTATION    REASON FOR CONSULT:   Rosmery Ramirez is a 79 y.o. female who presents for follow-up.      HISTORY OF PRESENT ILLNESS:     Patient is a pleasant 74-year-old lady here for follow-up.  Denies any chest pains at rest on exertion, orthopnea, PND.  Complains of paresthesias in bilateral feet and requesting referral to Neurology for that.  Blood pressure well controlled.  No other cardiovascular complaints.     Notes from December 2020:  Patient here for follow-up.  Complains of occasional dizziness.  Can occur once a week or less frequently than that.  No postural relationship.  Denies any chest pains at rest on exertion, orthopnea, PND.      Notes from December 2022: Patient here for follow-up after a long hiatus.  Occasionally gets short of breath when she lays down.  X-ray showed aortic atherosclerosis.  Has been referred to Cardiology to rule out significant ischemia      Jan 23:  Patient here for follow-up.  Patient here for follow-up after testing.  Stress test did not show any significant ischemia    The estimated ejection fraction is 55%.  The left ventricle is normal in size with mild concentric hypertrophy and normal systolic function.  Grade I left ventricular diastolic dysfunction.  Normal right ventricular size with normal right ventricular systolic function.  Mild to moderate left atrial enlargement.  Mild mitral regurgitation.  Mild tricuspid regurgitation.  Intermediate central venous pressure (8 mmHg).  The estimated PA systolic pressure is 28 mmHg.         Normal myocardial perfusion scan. There is no evidence of myocardial ischemia or infarction.    There is a mild to moderate intensity perfusion abnormality in the apical wall of the left ventricle, secondary to breast attenuation.    The gated perfusion images showed an ejection fraction of 71% at rest.    There is normal wall motion at rest and post stress.    The ECG portion of the study is positive for  ischemia. Specificity is reduced secondary to hypertensive response.    The patient reported no chest pain during the stress test.    There were no arrhythmias during stress.    The patient exercised for 6 minutes 15 seconds on a Jorge protocol, corresponding to a functional capacity of 7 METS, achieving a peak heart rate of 142 bpm, which is 102 % of the age predicted maximum heart rate.    Notes from October 2023: Patient here for follow-up.  Has been doing fine.  Denies any chest pains at rest on exertion, orthopnea, PND.  Doing fine.    December 24:    History of Present Illness    CHIEF COMPLAINT:  - Rosmery presents with complaints of fatigue and lack of energy, with associated dyspnea on exertion.    HPI:  Rosmery reports fatigue for 6 months, at times significant enough to impair her ability to get out of bed. Her primary care physician conducted extensive lab work to investigate the cause. She was referred to an endocrinologist for thyroid evaluation due to a thyroid nodule. The endocrinologist diagnosed Hashimoto's disease. TSH levels were normal.    She reports dyspnea, particularly during housework such as mopping. She attempts to exercise by walking on a track, noting initial dyspnea but improved endurance as activity continues.    She reports occasional lightheadedness upon standing, described as unsteadiness.    A sleep study was conducted with normal results. The cause of fatigue remains undetermined, leading to this follow-up visit.    She denies chest pain, tightness, dizziness, or LOC.    MEDICATIONS:  - ASA  - Atorvastatin  - HCTZ  - Irbesartan        PAST MEDICAL HISTORY:     Past Medical History:   Diagnosis Date    Allergy     Anxiety     Breast cyst     Edema of leg 10/5/2012    bilateral     Fractures 2011    thumb R    GERD (gastroesophageal reflux disease)     Glaucoma     History of colonic polyps     Hx-TIA (transient ischemic attack) 8/13/2012 Jan 2012: LLE and left facial  numbness lasting 1 hour     Hyperlipidemia     Hypertension     Left shoulder tendonitis 8/8/2015    Primary open-angle glaucoma, mild stage - Both Eyes 6/3/2013       PAST SURGICAL HISTORY:     Past Surgical History:   Procedure Laterality Date    BREAST BIOPSY Left     core    BREAST SURGERY Left     lumpectomy    CATARACT EXTRACTION W/  INTRAOCULAR LENS IMPLANT Left 10/05/2016    Dr. Mike    CATARACT EXTRACTION W/  INTRAOCULAR LENS IMPLANT Right 11/30/2016    With [facility] (Dr. Mike)    COLONOSCOPY N/A 1/23/2017    Procedure: COLONOSCOPY;  Surgeon: Hany Mosquera MD;  Location: [facility] (4TH FLR);  Service: Endoscopy;  Laterality: N/A;    COLONOSCOPY N/A 3/28/2022    Procedure: COLONOSCOPY;  Surgeon: Jamison Chambers MD;  [facility]  Service: Endoscopy;  Laterality: N/A;  fully vaccinated   instructions to portal-st  3/22 arrival time confirmed with pt/COVID test cancelled-RB    ESOPHAGOGASTRODUODENOSCOPY N/A 7/20/2023    Procedure: EGD (ESOPHAGOGASTRODUODENOSCOPY);  Surgeon: Jorge L Thompson MD;  Location: [facility] (4TH FLR);  Service: Endoscopy;  Laterality: N/A;  inst handed to pre-call pt needs to be rescheduled to new day 03/21 bm.  4/26/23- precall- pt wants to cancel appt and reschedule for another day  6/7 pt r/s/instr. to sunita;-s7/14/23-pre-call completed-LS    HAND SURGERY Left 2011    thumb    HYSTERECTOMY  1980's    Total;    IMPLANTATION OF DEVICE FOR GLAUCOMA Right 3/17/2021    Procedure: INSERTION, DEVICE, FOR GLAUCOMA XEN GEL;  Surgeon: Yesica Mike MD;  Location: [facility] OR [address] [place] [address];  Service: Ophthalmology;  Laterality: Right;    KAHOOK GONIOTOMY Right 11/30/2016    WITH OSMANY ()    Left Breast Lumpectomy Left     performed in 1960s    OOPHORECTOMY      SELECTIVE LASER TRABECUPLASTY  05/2014    OU w/ Dr. Mike    TRABECULECTOMY Right 3/17/2021    Procedure: TRABECULECTOMY/XEN GEL WITH MMC;  Surgeon: Yesica Mike MD;  Location: [facility] OR [facility]  FLR;  Service: Ophthalmology;  Laterality: Right;    TRABECULECTOMY Left 7/28/2021    Procedure: TRABECULECTOMY/ MMC and Xen OS;  Surgeon: Yesica Mike MD;  Location: [facility] OR [address] [place] [address];  Service: Ophthalmology;  Laterality: Left;    TUBAL LIGATION  1970's    VAGINAL DELIVERY      x3    YAG CAPSULOTOMY Left 11/29/2018           ALLERGIES AND MEDICATION:     Review of patient's allergies indicates:   Allergen Reactions    Lisinopril Swelling    Amlodipine Edema    Combigan [brimonidine-timolol]      Causes itchy eyelids and contact dermatitis    Cosopt [dorzolamide-timolol]      Causes angular blepharitis of eyelids    Pravastatin      Myalgia and elevated CPK        Medication List            Accurate as of December 19, 2024  9:57 AM. If you have any questions, ask your nurse or doctor.                CONTINUE taking these medications      ascorbic acid (vitamin C) 1000 MG tablet  Commonly known as: VITAMIN C     aspirin 81 MG EC tablet  Commonly known as: ECOTRIN     atorvastatin 10 MG tablet  Commonly known as: LIPITOR  Take 1 tablet (10 mg total) by mouth every other day.     azelastine 137 mcg (0.1 %) nasal spray  Commonly known as: ASTELIN  1 spray (137 mcg total) by Nasal route 2 (two) times daily.     dorzolamide-timolol 2-0.5% 22.3-6.8 mg/mL ophthalmic solution  Commonly known as: COSOPT  Place 1 drop into both eyes 2 (two) times daily.     hydroCHLOROthiazide 12.5 mg capsule  Commonly known as: MICROZIDE  Take 1 capsule (12.5 mg total) by mouth once daily.     irbesartan 150 MG tablet  Commonly known as: AVAPRO  Take 1 tablet (150 mg total) by mouth every evening.     LUMIGAN 0.01 % Drop  Generic drug: bimatoprost  Place 1 drop into both eyes every evening.     RHOPRESSA 0.02 % ophthalmic solution  Generic drug: netarsudiL              SOCIAL HISTORY:     Social History     Socioeconomic History    Marital status:     Number of children: 3   Occupational History    Occupation:  retired - formerly pastoral care with [employer]   Tobacco Use    Smoking status: Never     Passive exposure: Never    Smokeless tobacco: Never   Substance and Sexual Activity    Alcohol use: Not Currently    Drug use: No    Sexual activity: Yes     Partners: Female, Male     Birth control/protection: Post-menopausal, See Surgical Hx     Social Drivers of Health     Financial Resource Strain: Low Risk  (11/14/2023)    Overall Financial Resource Strain (CARDIA)     Difficulty of Paying Living Expenses: Not hard at all   Food Insecurity: No Food Insecurity (11/14/2023)    Hunger Vital Sign     Worried About Running Out of Food in the Last Year: Never true     Ran Out of Food in the Last Year: Never true   Transportation Needs: No Transportation Needs (11/14/2023)    PRAPARE - Transportation     Lack of Transportation (Medical): No     Lack of Transportation (Non-Medical): No   Physical Activity: Unknown (11/14/2023)    Exercise Vital Sign     Days of Exercise per Week: 0 days   Recent Concern: Physical Activity - Inactive (11/14/2023)    Exercise Vital Sign     Days of Exercise per Week: 0 days     Minutes of Exercise per Session: 20 min   Stress: No Stress Concern Present (11/14/2023)    [employer] [place] [employer] - Occupational Stress Questionnaire     Feeling of Stress : Not at all   Housing Stability: Low Risk  (11/14/2023)    Housing Stability Vital Sign     Unable to Pay for Housing in the Last Year: No     Number of Places Lived in the Last Year: 1     Unstable Housing in the Last Year: No       FAMILY HISTORY:     Family History   Problem Relation Name Age of Onset    Breast cancer Mother 78 y/o 50    Glaucoma Mother 78 y/o     Alzheimer's disease Mother 78 y/o     Blindness Mother 78 y/o     Cataracts Mother 78 y/o     Glaucoma Father 77 y/o     Prostate cancer Father 77 y/o     Blindness Father 77 y/o     Cataracts Father 77 y/o     Cancer Father 77 y/o 62        prostate    Breast cancer  "Sister 5 sisters     Glaucoma Sister 5 sisters     Alzheimer's disease Maternal Grandfather      No Known Problems Brother      No Known Problems Brother      Macular degeneration Neg Hx      Retinal detachment Neg Hx      Strabismus Neg Hx      Amblyopia Neg Hx      Heart attack Neg Hx      Heart disease Neg Hx      Ovarian cancer Neg Hx      Colon cancer Neg Hx         REVIEW OF SYSTEMS:   Review of Systems   Constitutional: Negative.    HENT: Negative.     Eyes: Negative.    Respiratory:  Positive for shortness of breath.    Cardiovascular: Negative.    Gastrointestinal: Negative.    Genitourinary: Negative.    Musculoskeletal: Negative.    Skin: Negative.    Neurological:  Positive for tingling.   Endo/Heme/Allergies: Negative.        A 10 point review of systems was performed and all the pertinent positives have been mentioned. Rest of review of systems was negative.        PHYSICAL EXAM:     Vitals:    12/19/24 0921   BP: 136/83   Pulse: (!) 56   Resp: 15    Body mass index is 31.88 kg/m².  Weight: 86.9 kg (191 lb 9.3 oz)   Height: 5' 5" (165.1 cm)     Physical Exam  Vitals and nursing note reviewed.   Constitutional:       Appearance: Normal appearance. She is well-developed.   HENT:      Head: Normocephalic and atraumatic.      Right Ear: Hearing normal.      Left Ear: Hearing normal.      Nose: Nose normal.   Eyes:      General: Lids are normal.      Conjunctiva/sclera: Conjunctivae normal.      Pupils: Pupils are equal, round, and reactive to light.   Cardiovascular:      Rate and Rhythm: Normal rate and regular rhythm.      Pulses: Normal pulses.      Heart sounds: Normal heart sounds.   Pulmonary:      Effort: Pulmonary effort is normal.      Breath sounds: Normal breath sounds.   Abdominal:      Palpations: Abdomen is soft.      Tenderness: There is no abdominal tenderness.   Musculoskeletal:         General: No deformity.      Cervical back: Normal range of motion and neck supple.   Skin:     General: " Skin is warm and dry.   Neurological:      Mental Status: She is alert and oriented to person, place, and time.   Psychiatric:         Speech: Speech normal.           DATA:     Laboratory:  CBC:  Recent Labs   Lab 01/26/24  0918 11/08/24  1230 11/14/24  1458   WBC 4.94 5.87 6.71   Hemoglobin 12.5 11.9 L 13.0   Hematocrit 39.1 38.4 39.6   Platelets 314 298 326       CHEMISTRIES:  Recent Labs   Lab 03/10/22  0836 12/01/22  0908 07/26/23  0655 01/26/24  0918 11/08/24  1230   Glucose 89   < > 94 96 82   Sodium 141   < > 139 140 140   Potassium 3.9   < > 3.7 4.2 4.2   BUN 12   < > 14 9 11   Creatinine 0.7   < > 0.8 0.8 0.7   eGFR if  >60.0  --   --   --   --    eGFR if non  >60.0  --   --   --   --    Calcium 9.6   < > 9.5 9.8 10.0    < > = values in this interval not displayed.       CARDIAC BIOMARKERS:  Recent Labs   Lab 07/26/23  0655 07/26/23  1020   Troponin I <0.006 <0.006         COAGS:        LIPIDS/LFTS:  Recent Labs   Lab 12/01/22  0908 07/26/23  0655 01/26/24  0918 11/08/24  1230   Cholesterol 117 L  --  123 132   Triglycerides 78  --  93 77   HDL 43  --  42 49   LDL Cholesterol 58.4 L  --  62.4 L 67.6   Non-HDL Cholesterol 74  --  81 83   AST 20 17 19 17   ALT 27 15 17 13       Hemoglobin A1C   Date Value Ref Range Status   11/08/2024 6.0 (H) 4.0 - 5.6 % Final     Comment:     ADA Screening Guidelines:  5.7-6.4%  Consistent with prediabetes  >or=6.5%  Consistent with diabetes    High levels of fetal hemoglobin interfere with the HbA1C  assay. Heterozygous hemoglobin variants (HbS, HgC, etc)do  not significantly interfere with this assay.   However, presence of multiple variants may affect accuracy.     01/26/2024 5.9 (H) 4.0 - 5.6 % Final     Comment:     ADA Screening Guidelines:  5.7-6.4%  Consistent with prediabetes  >or=6.5%  Consistent with diabetes    High levels of fetal hemoglobin interfere with the HbA1C  assay. Heterozygous hemoglobin variants (HbS, HgC, etc)do  not  significantly interfere with this assay.   However, presence of multiple variants may affect accuracy.     12/01/2022 5.9 (H) 4.0 - 5.6 % Final     Comment:     ADA Screening Guidelines:  5.7-6.4%  Consistent with prediabetes  >or=6.5%  Consistent with diabetes    High levels of fetal hemoglobin interfere with the HbA1C  assay. Heterozygous hemoglobin variants (HbS, HgC, etc)do  not significantly interfere with this assay.   However, presence of multiple variants may affect accuracy.         TSH  Recent Labs   Lab 06/06/23  0946 08/22/24  1452 11/08/24  1230   TSH 2.428 1.747 1.895       The 10-year ASCVD risk score (Patricia BAUMAN, et al., 2019) is: 12.4%    Values used to calculate the score:      Age: 79 years      Sex: Female      Is Non- : Yes      Diabetic: No      Tobacco smoker: No      Systolic Blood Pressure: 136 mmHg      Is BP treated: Yes      HDL Cholesterol: 49 mg/dL      Total Cholesterol: 132 mg/dL           Cardiovascular Testing:    Reviewed      ASSESSMENT AND PLAN     Patient Active Problem List   Diagnosis    Essential hypertension    Dyslipidemia; statin myalgias; 6/28/2018 exercise stress echo neg for ischemia    POAG (primary open-angle glaucoma)    Class 2 severe obesity due to excess calories with serious comorbidity in adult, unspecified BMI    Cataract    Nuclear sclerosis    Tubular adenoma of colon 3/28/22 colonoscopy 4 mm sigmoid TA    Bradycardia 6/2018 holter negative    Status post hysterectomy    Statin myopathy    Chronic bilateral low back pain without sciatica    Vitamin D deficiency    Thyroid nodule on US; followed by endo repeat 6/2026    Idiopathic peripheral neuropathy normal B12, TSH; A1c pre-DM. hx of lory changed to lyrica    Decreased independence with transfers    Impaired functional mobility, balance, gait, and endurance    Primary open angle glaucoma of right eye, mild stage    Primary open-angle glaucoma, left eye, moderate stage    Thoracic  aortic atherosclerosis incidental on CXR 12/2022    CLARA (obstructive sleep apnea) no longer using CPAP    Esophageal dysphagia    Primary osteoarthritis of left knee    Chronic pain of left knee    Abnormal glucose       Assessment & Plan      IMPRESSION:  Considered potential cardiac causes for symptoms given exertional dyspnea  Further cardiac workup necessary to evaluate symptoms  Sinus bradycardia noted on EKG with HR of 53 bpm    - Echo ordered.  - Stress test ordered.  - Holter monitor ordered.  - Follow up after completion of ordered tests.       Aortic atherosclerosis:  On Lipitor 10 mg daily    Dyslipidemia on Lipitor    Hypertension: Well controlled at current therapy      Follow-up after testing          Thank you very much for involving me in the care of your patient.  Please do not hesitate to contact me if there are any questions.      Julio Dye MD, FAC, [facility]  Interventional Cardiologist, [facility].     Visit today included increased complexity associated with the care of the episodic problem sinus bradycardia, dyspnea on exertion, history of statin myopathy, dyslipidemia, aortic atherosclerosis addressed and managing the longitudinal care of the patient due to the serious and/or complex managed problem(s)   Patient Active Problem List   Diagnosis    Essential hypertension    Dyslipidemia; statin myalgias; 6/28/2018 exercise stress echo neg for ischemia    POAG (primary open-angle glaucoma)    Class 2 severe obesity due to excess calories with serious comorbidity in adult, unspecified BMI    Cataract    Nuclear sclerosis    Tubular adenoma of colon 3/28/22 colonoscopy 4 mm sigmoid TA    Bradycardia 6/2018 holter negative    Status post hysterectomy    Statin myopathy    Chronic bilateral low back pain without sciatica    Vitamin D deficiency    Thyroid nodule on US; followed by endo repeat 6/2026    Idiopathic peripheral neuropathy normal B12, TSH; A1c pre-DM. hx of lory changed to  lyrica    Decreased independence with transfers    Impaired functional mobility, balance, gait, and endurance    Primary open angle glaucoma of right eye, mild stage    Primary open-angle glaucoma, left eye, moderate stage    Thoracic aortic atherosclerosis incidental on CXR 12/2022    CLARA (obstructive sleep apnea) no longer using CPAP    Esophageal dysphagia    Primary osteoarthritis of left knee    Chronic pain of left knee    Abnormal glucose     .           This note was dictated with the help of speech recognition software.  There might be un-intended errors and/or substitutions.

## 2024-12-27 ENCOUNTER — TELEPHONE (OUTPATIENT)
Dept: FAMILY MEDICINE | Facility: CLINIC | Age: 79
End: 2024-12-27
Payer: MEDICARE

## 2024-12-27 NOTE — TELEPHONE ENCOUNTER
----- Message from [employer] sent at 12/26/2024  3:57 PM CST -----  Who called:[facility]    What is the request in detail:verification regarding diagnosis     Can the clinic reply by [facility]?no    Would the patient rather a call back or a response via [facility]?call    Best call back number:[phone] fax [phone]    Additional Information:ref #[id number]

## 2024-12-27 NOTE — TELEPHONE ENCOUNTER
Spoke with rosalba from [facility] [place] [place] for verification of chronic conditions. Rosalba does not have any other questions or concerns at this time.

## 2024-12-31 ENCOUNTER — TELEPHONE (OUTPATIENT)
Dept: CARDIOLOGY | Facility: CLINIC | Age: 79
End: 2024-12-31
Payer: MEDICARE

## 2025-01-06 ENCOUNTER — HOSPITAL ENCOUNTER (OUTPATIENT)
Dept: CARDIOLOGY | Facility: HOSPITAL | Age: 80
Discharge: HOME | End: 2025-01-06
Attending: INTERNAL MEDICINE
Payer: MEDICARE

## 2025-01-06 ENCOUNTER — HOSPITAL ENCOUNTER (OUTPATIENT)
Dept: RADIOLOGY | Facility: HOSPITAL | Age: 80
Discharge: HOME | End: 2025-01-06
Attending: INTERNAL MEDICINE
Payer: MEDICARE

## 2025-01-06 DIAGNOSIS — R06.09 DOE (DYSPNEA ON EXERTION): ICD-10-CM

## 2025-01-06 LAB
ASCENDING AORTA: 2.73 CM
AV INDEX (PROSTH): 0.72
AV MEAN GRADIENT: 4.6 MMHG
AV PEAK GRADIENT: 7.8 MMHG
AV VALVE AREA BY VELOCITY RATIO: 2 CM²
AV VALVE AREA: 2.2 CM²
AV VELOCITY RATIO: 0.64
CV ECHO LV RWT: 0.36 CM
CV STRESS BASE HR: 58 BPM
DIASTOLIC BLOOD PRESSURE: 68 MMHG
DOP CALC AO PEAK VEL: 1.4 M/S
DOP CALC AO VTI: 31.7 CM
DOP CALC LVOT AREA: 3.1 CM2
DOP CALC LVOT DIAMETER: 2 CM
DOP CALC LVOT PEAK VEL: 0.9 M/S
DOP CALC LVOT STROKE VOLUME: 71.3 CM3
DOP CALC MV VTI: 35.7 CM
DOP CALCLVOT PEAK VEL VTI: 22.7 CM
E WAVE DECELERATION TIME: 198.87 MSEC
E/A RATIO: 1.42
E/E' RATIO: 8.36 M/S
ECHO LV POSTERIOR WALL: 0.8 CM (ref 0.6–1.1)
FRACTIONAL SHORTENING: 28.9 % (ref 28–44)
INTERVENTRICULAR SEPTUM: 1 CM (ref 0.6–1.1)
IVC DIAMETER: 1.84 CM
IVRT: 110.37 MSEC
LA MAJOR: 4.23 CM
LA MINOR: 4.67 CM
LA WIDTH: 3.8 CM
LEFT ATRIUM SIZE: 2.78 CM
LEFT ATRIUM VOLUME: 39.86 CM3
LEFT INTERNAL DIMENSION IN SYSTOLE: 3.2 CM (ref 2.1–4)
LEFT VENTRICLE DIASTOLIC VOLUME: 91.34 ML
LEFT VENTRICLE SYSTOLIC VOLUME: 40.91 ML
LEFT VENTRICULAR INTERNAL DIMENSION IN DIASTOLE: 4.5 CM (ref 3.5–6)
LEFT VENTRICULAR MASS: 132.8 G
LV LATERAL E/E' RATIO: 7.08 M/S
LV SEPTAL E/E' RATIO: 10.22 M/S
LVED V (TEICH): 91.34 ML
LVES V (TEICH): 40.91 ML
LVOT MG: 1.94 MMHG
LVOT MV: 0.66 CM/S
MV MEAN GRADIENT: 2 MMHG
MV PEAK A VEL: 0.65 M/S
MV PEAK E VEL: 0.92 M/S
MV PEAK GRADIENT: 4 MMHG
MV STENOSIS PRESSURE HALF TIME: 57.67 MS
MV VALVE AREA BY CONTINUITY EQUATION: 2 CM2
MV VALVE AREA P 1/2 METHOD: 3.81 CM2
NUC STRESS DIASTOLIC VOLUME INDEX: 49
NUC STRESS EJECTION FRACTION: 75 %
NUC STRESS SYSTOLIC VOLUME INDEX: 12
OHS CV CPX 85 PERCENT MAX PREDICTED HEART RATE MALE: 121
OHS CV CPX MAX PREDICTED HEART RATE: 142
OHS CV CPX PATIENT IS FEMALE: 1
OHS CV CPX PATIENT IS MALE: 0
OHS CV CPX PEAK DIASTOLIC BLOOD PRESSURE: 58 MMHG
OHS CV CPX PEAK HEAR RATE: 91 BPM
OHS CV CPX PEAK RATE PRESSURE PRODUCT: NORMAL
OHS CV CPX PEAK SYSTOLIC BLOOD PRESSURE: 152 MMHG
OHS CV CPX PERCENT MAX PREDICTED HEART RATE ACHIEVED: 66
OHS CV CPX RATE PRESSURE PRODUCT PRESENTING: 9222
OHS CV INITIAL DOSE: 10.6 MCG/KG/MIN
OHS CV PEAK DOSE: 30.9 MCG/KG/MIN
OHS CV RV/LV RATIO: 0.71 CM
PISA TR MAX VEL: 2.62 M/S
PV PEAK GRADIENT: 3 MMHG
PV PEAK VELOCITY: 0.83 M/S
RA MAJOR: 3.72 CM
RA PRESSURE ESTIMATED: 3 MMHG
RA WIDTH: 3.3 CM
RIGHT VENTRICLE DIASTOLIC BASEL DIMENSION: 3.2 CM
RIGHT VENTRICULAR END-DIASTOLIC DIMENSION: 3.17 CM
RV TB RVSP: 6 MMHG
RV TISSUE DOPPLER FREE WALL SYSTOLIC VELOCITY 1 (APICAL 4 CHAMBER VIEW): 10.59 CM/S
SINUS: 3.17 CM
STJ: 1.86 CM
SYSTOLIC BLOOD PRESSURE: 159 MMHG
TDI LATERAL: 0.13 M/S
TDI SEPTAL: 0.09 M/S
TDI: 0.11 M/S
TR MAX PG: 27 MMHG
TRICUSPID ANNULAR PLANE SYSTOLIC EXCURSION: 1.84 CM
TV REST PULMONARY ARTERY PRESSURE: 30 MMHG

## 2025-01-06 PROCEDURE — 78452 HT MUSCLE IMAGE SPECT MULT: CPT | Mod: 26,,, | Performed by: INTERNAL MEDICINE

## 2025-01-06 PROCEDURE — 93306 TTE W/DOPPLER COMPLETE: CPT | Mod: 26,,, | Performed by: INTERNAL MEDICINE

## 2025-01-06 PROCEDURE — 78452 HT MUSCLE IMAGE SPECT MULT: CPT

## 2025-01-06 PROCEDURE — 93306 TTE W/DOPPLER COMPLETE: CPT

## 2025-01-06 PROCEDURE — 93016 CV STRESS TEST SUPVJ ONLY: CPT | Mod: ,,, | Performed by: INTERNAL MEDICINE

## 2025-01-06 PROCEDURE — A9502 TC99M TETROFOSMIN: HCPCS | Performed by: INTERNAL MEDICINE

## 2025-01-06 PROCEDURE — 93225 XTRNL ECG REC<48 HRS REC: CPT

## 2025-01-06 PROCEDURE — 93227 XTRNL ECG REC<48 HR R&I: CPT | Mod: ,,, | Performed by: INTERNAL MEDICINE

## 2025-01-06 PROCEDURE — 93018 CV STRESS TEST I&R ONLY: CPT | Mod: ,,, | Performed by: INTERNAL MEDICINE

## 2025-01-06 PROCEDURE — 93017 CV STRESS TEST TRACING ONLY: CPT

## 2025-01-06 PROCEDURE — 63600175 PHARM REV CODE 636 W HCPCS: Performed by: INTERNAL MEDICINE

## 2025-01-06 RX ORDER — REGADENOSON 0.08 MG/ML
0.4 INJECTION, SOLUTION INTRAVENOUS ONCE
Status: COMPLETED | OUTPATIENT
Start: 2025-01-06 | End: 2025-01-06

## 2025-01-06 RX ADMIN — TETROFOSMIN 10.6 MILLICURIE: 1.38 INJECTION, POWDER, LYOPHILIZED, FOR SOLUTION INTRAVENOUS at 07:01

## 2025-01-06 RX ADMIN — TETROFOSMIN 30.9 MILLICURIE: 1.38 INJECTION, POWDER, LYOPHILIZED, FOR SOLUTION INTRAVENOUS at 08:01

## 2025-01-06 RX ADMIN — REGADENOSON 0.4 MG: 0.08 INJECTION, SOLUTION INTRAVENOUS at 08:01

## 2025-01-08 ENCOUNTER — PATIENT MESSAGE (OUTPATIENT)
Dept: FAMILY MEDICINE | Facility: CLINIC | Age: 80
End: 2025-01-08
Payer: MEDICARE

## 2025-01-09 ENCOUNTER — OFFICE VISIT (OUTPATIENT)
Dept: FAMILY MEDICINE | Facility: CLINIC | Age: 80
End: 2025-01-09
Payer: MEDICARE

## 2025-01-09 VITALS
HEIGHT: 65 IN | WEIGHT: 190.5 LBS | TEMPERATURE: 98 F | DIASTOLIC BLOOD PRESSURE: 62 MMHG | OXYGEN SATURATION: 95 % | SYSTOLIC BLOOD PRESSURE: 120 MMHG | BODY MASS INDEX: 31.74 KG/M2 | HEART RATE: 62 BPM

## 2025-01-09 DIAGNOSIS — H40.1122 PRIMARY OPEN-ANGLE GLAUCOMA, LEFT EYE, MODERATE STAGE: ICD-10-CM

## 2025-01-09 DIAGNOSIS — G72.0 STATIN MYOPATHY: ICD-10-CM

## 2025-01-09 DIAGNOSIS — I10 ESSENTIAL HYPERTENSION: Primary | ICD-10-CM

## 2025-01-09 DIAGNOSIS — E66.812 CLASS 2 SEVERE OBESITY DUE TO EXCESS CALORIES WITH SERIOUS COMORBIDITY IN ADULT, UNSPECIFIED BMI: ICD-10-CM

## 2025-01-09 DIAGNOSIS — E66.01 CLASS 2 SEVERE OBESITY DUE TO EXCESS CALORIES WITH SERIOUS COMORBIDITY IN ADULT, UNSPECIFIED BMI: ICD-10-CM

## 2025-01-09 DIAGNOSIS — R73.09 ABNORMAL GLUCOSE: ICD-10-CM

## 2025-01-09 DIAGNOSIS — T46.6X5A STATIN MYOPATHY: ICD-10-CM

## 2025-01-09 DIAGNOSIS — H40.1111 PRIMARY OPEN ANGLE GLAUCOMA OF RIGHT EYE, MILD STAGE: ICD-10-CM

## 2025-01-09 PROCEDURE — 3078F DIAST BP <80 MM HG: CPT | Mod: CPTII,S$GLB,, | Performed by: INTERNAL MEDICINE

## 2025-01-09 PROCEDURE — 99999 PR PBB SHADOW E&M-EST. PATIENT-LVL III: CPT | Mod: PBBFAC,,, | Performed by: INTERNAL MEDICINE

## 2025-01-09 PROCEDURE — 3288F FALL RISK ASSESSMENT DOCD: CPT | Mod: CPTII,S$GLB,, | Performed by: INTERNAL MEDICINE

## 2025-01-09 PROCEDURE — 1159F MED LIST DOCD IN RCRD: CPT | Mod: CPTII,S$GLB,, | Performed by: INTERNAL MEDICINE

## 2025-01-09 PROCEDURE — 3074F SYST BP LT 130 MM HG: CPT | Mod: CPTII,S$GLB,, | Performed by: INTERNAL MEDICINE

## 2025-01-09 PROCEDURE — 1160F RVW MEDS BY RX/DR IN RCRD: CPT | Mod: CPTII,S$GLB,, | Performed by: INTERNAL MEDICINE

## 2025-01-09 PROCEDURE — 1101F PT FALLS ASSESS-DOCD LE1/YR: CPT | Mod: CPTII,S$GLB,, | Performed by: INTERNAL MEDICINE

## 2025-01-09 PROCEDURE — 99214 OFFICE O/P EST MOD 30 MIN: CPT | Mod: S$GLB,,, | Performed by: INTERNAL MEDICINE

## 2025-01-09 PROCEDURE — 1126F AMNT PAIN NOTED NONE PRSNT: CPT | Mod: CPTII,S$GLB,, | Performed by: INTERNAL MEDICINE

## 2025-01-09 RX ORDER — ACETAZOLAMIDE 250 MG/1
250 TABLET ORAL 2 TIMES DAILY
COMMUNITY

## 2025-01-09 NOTE — PROGRESS NOTES
This note was created by combination of typed  and M-Modal dictation.  Transcription errors may be present.   This note was also generated with the assistance of ambient listening technology. Verbal consent was obtained by the patient and accompanying visitor(s) for the recording of patient appointment to facilitate this note. I attest to having reviewed and edited the generated note for accuracy, though some syntax or spelling errors may persist. Please contact the author of this note for any clarification.    Assessment and Plan:   Assessment and Plan    Essential hypertension  Primary open-angle glaucoma, left eye, moderate stage  Primary open angle glaucoma of right eye, mild stage  -her ophthalmologist started her on Diamox because of progressive glaucoma.  She is concerned about interactions between Diamox and HCTZ.    I think it is reasonable to stop the HCTZ and continue the irbesartan.  If her blood pressures go up we can increase the irbesartan dose.    She reports a bit of white coat syndrome.  Reports home readings are consistently below 130 systolic.  Continue home monitoring   Nurse visit 1 month for BP check and check BMP on Diamox and irbesartan  -     Basic Metabolic Panel; Future; Expected date: 02/09/2025    Statin myopathy  -on low-dose statin    Class 2 severe obesity due to excess calories with serious comorbidity in adult, unspecified BMI  Abnormal glucose  -stable, last labs A1c pre diabetes 6.0          Medications Discontinued During This Encounter   Medication Reason    hydroCHLOROthiazide (MICROZIDE) 12.5 mg capsule Alternate therapy       meds sent this encounter:         Follow Up:   Future Appointments   Date Time Provider Department Center   1/15/2025 10:40 AM Ancelmo Sumner MD [facility] FAM MED [place]   1/16/2025  3:20 PM Julio Dye MD [facility] CARDIO [place]   2/5/2025 10:40 AM Ancelmo Sumner MD [facility] FAM MED [place]         Subjective:   Subjective   Chief Complaint    Patient presents with    Med Change       HPI  Rosmery is a 79 y.o. female.    Social History     Tobacco Use    Smoking status: Never     Passive exposure: Never    Smokeless tobacco: Never   Substance Use Topics    Alcohol use: Not Currently      Social History     Occupational History    Occupation: retired - formerly pastoral care with [employer]      Social History     Social History Narrative    Not on file       Patient Care Team:  Ancelmo Sumner MD as PCP - General (Internal Medicine)  Brooke Delong MD as Obstetrician (Obstetrics)  Arnoldo Valentin MD as Consulting Physician (Otolaryngology)  Yesica Mike MD as Consulting Physician (Ophthalmology)  Brandan Nava MD as Consulting Physician (Orthopedic Surgery)  Jagjit Feldman MD as Consulting Physician (Endocrinology)  Ailyn Dubois as Digital Medicine Health   Ancelmo Sumner MD as Consulting Physician (Internal Medicine)    No LMP recorded. Patient has had a hysterectomy.    Last appointment with this clinic was 11/14/2024. Last visit with me 11/14/2024   To summarize last visit and events leading up to today:  Hypertension, digital monitoring  Hyperlipidemia  Abnormal glucose  Thyroid nodule on ultrasound.  Last seen by Endocrinology 06/2023 and plan was repeat thyroid ultrasound after her EGD.  TFTs at that time were normal  Neuropathy, Lyrica with side effects and ineffective, wean down.  10/2023 follow-up with Neurology, trial of Cymbalta  Post nasal drip, astelin. Avoid nasal steroids (glaucoma)  Incidental pancreatic atrophy on CT. No symptoms. No further testing.   Reflux type symptoms, dysphagia and odynophagia.  Seen by GI.    3/1/23 esophogram Findings suggestive of mild esophageal dysmotility. Spontaneous gastroesophageal reflux observed. No findings to suggest achalasia.  7/20/23 EGD normal duodenum; erythema gastric body, antrum, prepyloric region. 1 cm HH. Bx's neg for H pylori, sprue, no EE  ED  after the EGD for chest pain. Negative workup  Saw outpt primary care after ED. Treated for MSK  7/22/22 MRI L spine Mild multilevel degenerative change throughout the lower thoracic and lumbar spine.  No significant spinal canal stenosis but there is scattered lateral recess and neural foraminal encroachment as further detailed above.     Last visit me in August   Painful peripheral edema worsened by naproxen.  Contributor Lyrica.  Conservative management, elevation, compression socks.  If no improvement increase HCTZ  Thyroid nodule due for repeat ultrasound 2024     She messaged me in August to wean down the Lyrica given ineffectiveness.     Saw cardiology in follow-up 10/4.  Stable follow-up 6 months     Saw Neurology 10/23 for neuropathy.  Trial of duloxetine     Pre visit labs  CBC normal   CMP normal   Lipid profile good  A1c 5.9     Thyroid US ordered by ra     Last visit with me 02/09/2024 for physical  Abnormal glucose stable   Hypertension stable   Dyslipidemia with a history of statin myopathy.  Taking statin every other day no changes   Thyroid nodule needs to schedule the ultrasound   Peripheral neuropathy she was wary of Cymbalta and never picked it up.  Neuropathy tolerable if she just wears socks all the time.  Decreased sphincter tone work on self-directed physical therapy/Kegel exercises  CLARA not finding CPAP helpful and not using it     06/05/2024 thyroid ultrasound  Three right-sided nodules, none of which meet ACR criteria for follow-up.      Plan is repeat thyroid ultrasound in 2 years, 06/2026     Seen at another location 07/26/2024 for left knee pain with a history of arthritis.  X-ray with DJD.  Referred to PT and Orthopedics      08/02/2024 saw orthopedics.  Monitor     08/22/2024 seen at another location for left-sided headache.  Ibuprofen and tizanidine.  TSH normal.  Sed rate normal     Saw orthopedics 09/30/2024 for aggravation of left knee pain.  Given Orthovisc injection     Per  claims data saw outside neurology Dr. Hamilton 10/01/2024 for headache  10/10/2024 MRI  No acute intracranial pathology.      Pre visit labs   CBC mild anemia   B12 good/high   CMP normal   Lipid normal   Vitamin-D good   A1c 6.0   TSH normal    Last visit me 11/14/2024  Fatigue, dyspnea, times months.  Upcoming cardiology follow-up.  Ordered chest x-ray.  Pre visit labs were unremarkable CBC borderline mild anemia.  Check iron stores  Hypertension stable   Hyperlipidemia with statin myopathy taking statin every other day no changes   Abnormal glucose pre diabetes stable A1c   TSH normal.  Ultrasound due for repeat June 2026  Idiopathic peripheral neuropathy saw outside neurology with negative evaluation    She subsequently contacted the office complaining of her fatigue with a history of sleep apnea and was interested in retrial of CPAP or autoPAP.  She will need to repeat HST and that was reordered    CBC normal repeat previous mild anemia   Ferritin normal   Iron panel normal   Retic count normal/appropriate    12/19/2024 saw cardiology.    01/06/2025 TTE LVEF 60-65% normal diastolic function.  Mild aortic sclerosis PIP 30  01/06/2025 nuclear stress test mild moderate intensity small size reversible perfusion abnormality consistent with ischemia in the apical walls.  EKG portion negative for ischemia.    Holter monitor results pending    Message from patient  Good afternoon Dr Sumner. My ophthalmologist started me on Diamox 250 mg twice daily because my ocular pressure is steadily climbing to a dangerous zone. It is a diuretic. I am asking about the Hydrochloridize I am taking in am. What do I need to know about taking 2 diuretic. Please advise.     Today's visit:    She was started on Diamox by her ophthalmologist for progressive glaucoma.  She took her 1st dose last night.  She is prescribed 250 mg twice daily and the plan is to reassess in 3 months.    She is concerned about interactions with HCTZ.    She reports  "that home blood pressure readings are better than the in office with systolic consistently below 130.  No obvious side effects of the current medications at this time.    She is aware of the side effect profile for Diamox.        ALLERGIES AND MEDICATIONS: updated and reviewed.  Medication List with Changes/Refills   Current Medications    ASCORBIC ACID, VITAMIN C, (VITAMIN C) 1000 MG TABLET    Take 1,000 mg by mouth once daily.    ASPIRIN (ECOTRIN) 81 MG EC TABLET    Take 81 mg by mouth once daily.    ATORVASTATIN (LIPITOR) 10 MG TABLET    Take 1 tablet (10 mg total) by mouth every other day.    AZELASTINE (ASTELIN) 137 MCG (0.1 %) NASAL SPRAY    1 spray (137 mcg total) by Nasal route 2 (two) times daily.    DORZOLAMIDE-TIMOLOL 2-0.5% (COSOPT) 22.3-6.8 MG/ML OPHTHALMIC SOLUTION    Place 1 drop into both eyes 2 (two) times daily.    HYDROCHLOROTHIAZIDE (MICROZIDE) 12.5 MG CAPSULE    Take 1 capsule (12.5 mg total) by mouth once daily.    IRBESARTAN (AVAPRO) 150 MG TABLET    Take 1 tablet (150 mg total) by mouth every evening.    LUMIGAN 0.01 % DROP    Place 1 drop into both eyes every evening.    RHOPRESSA 0.02 % OPHTHALMIC SOLUTION    INSTILL 1 DROP INTO LEFT EYE ONCE DAILY         Objective:   Objective   Physical Exam   Vitals:    01/09/25 1013   BP: 120/62   Pulse: 62   Temp: 98.1 °F (36.7 °C)   TempSrc: Oral   SpO2: 95%   Weight: 86.4 kg (190 lb 7.6 oz)   Height: 5' 5" (1.651 m)    Body mass index is 31.7 kg/m².  Weight: 86.4 kg (190 lb 7.6 oz)   Height: 5' 5" (165.1 cm)     Physical Exam  Constitutional:       General: She is not in acute distress.     Appearance: She is well-developed.   Eyes:      Extraocular Movements: Extraocular movements intact.   Cardiovascular:      Rate and Rhythm: Normal rate and regular rhythm.      Heart sounds: Normal heart sounds. No murmur heard.  Musculoskeletal:         General: Normal range of motion.      Right lower leg: Edema present.      Left lower leg: Edema present.     "  Comments: Trace edema   Skin:     General: Skin is warm and dry.   Neurological:      Mental Status: She is alert and oriented to person, place, and time.      Coordination: Coordination normal.   Psychiatric:         Behavior: Behavior normal.         Thought Content: Thought content normal.

## 2025-01-10 ENCOUNTER — OFFICE VISIT (OUTPATIENT)
Dept: CARDIOLOGY | Facility: CLINIC | Age: 80
End: 2025-01-10
Payer: MEDICARE

## 2025-01-10 VITALS
HEART RATE: 56 BPM | SYSTOLIC BLOOD PRESSURE: 140 MMHG | WEIGHT: 193.81 LBS | DIASTOLIC BLOOD PRESSURE: 72 MMHG | OXYGEN SATURATION: 96 % | BODY MASS INDEX: 32.29 KG/M2 | HEIGHT: 65 IN | RESPIRATION RATE: 15 BRPM

## 2025-01-10 DIAGNOSIS — R06.09 DOE (DYSPNEA ON EXERTION): Primary | ICD-10-CM

## 2025-01-10 DIAGNOSIS — E78.5 DYSLIPIDEMIA: ICD-10-CM

## 2025-01-10 DIAGNOSIS — I70.0 THORACIC AORTIC ATHEROSCLEROSIS: ICD-10-CM

## 2025-01-10 DIAGNOSIS — I10 ESSENTIAL HYPERTENSION: ICD-10-CM

## 2025-01-10 DIAGNOSIS — R00.1 BRADYCARDIA: ICD-10-CM

## 2025-01-10 DIAGNOSIS — E78.2 MIXED HYPERLIPIDEMIA: ICD-10-CM

## 2025-01-10 DIAGNOSIS — G47.33 OSA (OBSTRUCTIVE SLEEP APNEA): ICD-10-CM

## 2025-01-10 LAB
OHS CV EVENT MONITOR DAY: 2
OHS CV HOLTER LENGTH DECIMAL HOURS: 96
OHS CV HOLTER LENGTH HOURS: 48
OHS CV HOLTER LENGTH MINUTES: 0
OHS CV HOLTER SINUS AVERAGE HR: 61
OHS CV HOLTER SINUS MAX HR: 113
OHS CV HOLTER SINUS MIN HR: 47

## 2025-01-10 PROCEDURE — 99999 PR PBB SHADOW E&M-EST. PATIENT-LVL IV: CPT | Mod: PBBFAC,,, | Performed by: INTERNAL MEDICINE

## 2025-01-10 RX ORDER — ATORVASTATIN CALCIUM 40 MG/1
40 TABLET, FILM COATED ORAL NIGHTLY
Qty: 90 TABLET | Refills: 3 | Status: SHIPPED | OUTPATIENT
Start: 2025-01-10

## 2025-01-10 RX ORDER — IRBESARTAN 150 MG/1
300 TABLET ORAL NIGHTLY
Qty: 180 TABLET | Refills: 3 | Status: SHIPPED | OUTPATIENT
Start: 2025-01-10 | End: 2026-01-10

## 2025-01-10 RX ORDER — NITROGLYCERIN 0.4 MG/1
0.4 TABLET SUBLINGUAL EVERY 5 MIN PRN
Qty: 30 TABLET | Refills: 12 | Status: SHIPPED | OUTPATIENT
Start: 2025-01-10 | End: 2025-02-09

## 2025-01-12 ENCOUNTER — PATIENT MESSAGE (OUTPATIENT)
Dept: CARDIOLOGY | Facility: CLINIC | Age: 80
End: 2025-01-12
Payer: MEDICARE

## 2025-01-13 RX ORDER — NITROGLYCERIN 0.4 MG/1
0.4 TABLET SUBLINGUAL EVERY 5 MIN PRN
Qty: 30 TABLET | Refills: 12 | Status: SHIPPED | OUTPATIENT
Start: 2025-01-13 | End: 2025-02-12

## 2025-01-13 RX ORDER — IRBESARTAN 150 MG/1
300 TABLET ORAL NIGHTLY
Qty: 180 TABLET | Refills: 3 | Status: SHIPPED | OUTPATIENT
Start: 2025-01-13 | End: 2026-01-13

## 2025-01-13 RX ORDER — ATORVASTATIN CALCIUM 40 MG/1
40 TABLET, FILM COATED ORAL NIGHTLY
Qty: 90 TABLET | Refills: 3 | Status: SHIPPED | OUTPATIENT
Start: 2025-01-13

## 2025-02-04 ENCOUNTER — PATIENT MESSAGE (OUTPATIENT)
Dept: FAMILY MEDICINE | Facility: CLINIC | Age: 80
End: 2025-02-04
Payer: MEDICARE

## 2025-02-09 ENCOUNTER — PATIENT MESSAGE (OUTPATIENT)
Dept: FAMILY MEDICINE | Facility: CLINIC | Age: 80
End: 2025-02-09
Payer: MEDICARE

## 2025-02-10 ENCOUNTER — CLINICAL SUPPORT (OUTPATIENT)
Dept: FAMILY MEDICINE | Facility: CLINIC | Age: 80
End: 2025-02-10
Payer: MEDICARE

## 2025-02-10 ENCOUNTER — PATIENT OUTREACH (OUTPATIENT)
Dept: ADMINISTRATIVE | Facility: HOSPITAL | Age: 80
End: 2025-02-10
Payer: MEDICARE

## 2025-02-10 ENCOUNTER — LAB VISIT (OUTPATIENT)
Dept: LAB | Facility: HOSPITAL | Age: 80
End: 2025-02-10
Attending: INTERNAL MEDICINE
Payer: MEDICARE

## 2025-02-10 VITALS — SYSTOLIC BLOOD PRESSURE: 148 MMHG | DIASTOLIC BLOOD PRESSURE: 70 MMHG | HEART RATE: 70 BPM

## 2025-02-10 DIAGNOSIS — I10 ESSENTIAL HYPERTENSION: Primary | ICD-10-CM

## 2025-02-10 DIAGNOSIS — R73.09 ABNORMAL GLUCOSE: ICD-10-CM

## 2025-02-10 DIAGNOSIS — E78.5 DYSLIPIDEMIA: ICD-10-CM

## 2025-02-10 DIAGNOSIS — I10 ESSENTIAL HYPERTENSION: ICD-10-CM

## 2025-02-10 LAB
ALBUMIN SERPL BCP-MCNC: 3.7 G/DL (ref 3.5–5.2)
ALP SERPL-CCNC: 72 U/L (ref 40–150)
ALT SERPL W/O P-5'-P-CCNC: 14 U/L (ref 10–44)
ANION GAP SERPL CALC-SCNC: 5 MMOL/L (ref 8–16)
ANION GAP SERPL CALC-SCNC: 5 MMOL/L (ref 8–16)
AST SERPL-CCNC: 18 U/L (ref 10–40)
BILIRUB SERPL-MCNC: 0.5 MG/DL (ref 0.1–1)
BUN SERPL-MCNC: 11 MG/DL (ref 8–23)
BUN SERPL-MCNC: 11 MG/DL (ref 8–23)
CALCIUM SERPL-MCNC: 9.5 MG/DL (ref 8.7–10.5)
CALCIUM SERPL-MCNC: 9.5 MG/DL (ref 8.7–10.5)
CHLORIDE SERPL-SCNC: 108 MMOL/L (ref 95–110)
CHLORIDE SERPL-SCNC: 108 MMOL/L (ref 95–110)
CO2 SERPL-SCNC: 26 MMOL/L (ref 23–29)
CO2 SERPL-SCNC: 26 MMOL/L (ref 23–29)
CREAT SERPL-MCNC: 0.7 MG/DL (ref 0.5–1.4)
CREAT SERPL-MCNC: 0.7 MG/DL (ref 0.5–1.4)
ERYTHROCYTE [DISTWIDTH] IN BLOOD BY AUTOMATED COUNT: 13 % (ref 11.5–14.5)
EST. GFR  (NO RACE VARIABLE): >60 ML/MIN/1.73 M^2
EST. GFR  (NO RACE VARIABLE): >60 ML/MIN/1.73 M^2
ESTIMATED AVG GLUCOSE: 120 MG/DL (ref 68–131)
GLUCOSE SERPL-MCNC: 81 MG/DL (ref 70–110)
GLUCOSE SERPL-MCNC: 81 MG/DL (ref 70–110)
HBA1C MFR BLD: 5.8 % (ref 4–5.6)
HCT VFR BLD AUTO: 35.3 % (ref 37–48.5)
HGB BLD-MCNC: 11.3 G/DL (ref 12–16)
MCH RBC QN AUTO: 29.3 PG (ref 27–31)
MCHC RBC AUTO-ENTMCNC: 32 G/DL (ref 32–36)
MCV RBC AUTO: 92 FL (ref 82–98)
PLATELET # BLD AUTO: 270 K/UL (ref 150–450)
PMV BLD AUTO: 11.3 FL (ref 9.2–12.9)
POTASSIUM SERPL-SCNC: 4.5 MMOL/L (ref 3.5–5.1)
POTASSIUM SERPL-SCNC: 4.5 MMOL/L (ref 3.5–5.1)
PROT SERPL-MCNC: 7 G/DL (ref 6–8.4)
RBC # BLD AUTO: 3.86 M/UL (ref 4–5.4)
SODIUM SERPL-SCNC: 139 MMOL/L (ref 136–145)
SODIUM SERPL-SCNC: 139 MMOL/L (ref 136–145)
WBC # BLD AUTO: 4.55 K/UL (ref 3.9–12.7)

## 2025-02-10 PROCEDURE — 83036 HEMOGLOBIN GLYCOSYLATED A1C: CPT | Performed by: INTERNAL MEDICINE

## 2025-02-10 PROCEDURE — 80053 COMPREHEN METABOLIC PANEL: CPT | Performed by: INTERNAL MEDICINE

## 2025-02-10 PROCEDURE — 99999 PR PBB SHADOW E&M-EST. PATIENT-LVL II: CPT | Mod: PBBFAC,,,

## 2025-02-10 PROCEDURE — 85027 COMPLETE CBC AUTOMATED: CPT | Performed by: INTERNAL MEDICINE

## 2025-02-10 RX ORDER — HYDROCHLOROTHIAZIDE 12.5 MG/1
12.5 TABLET ORAL DAILY
Qty: 30 TABLET | Refills: 11 | Status: SHIPPED | OUTPATIENT
Start: 2025-02-10 | End: 2026-02-10

## 2025-02-10 NOTE — PROGRESS NOTES
Patient reports that her Diamox was stopped.  That is managed by her ophthalmologist.    We will resume HCTZ 12.5   If she were to restart the Diamox would need to stop the HCTZ.

## 2025-02-10 NOTE — PROGRESS NOTES
Review of patient's allergies indicates:   Allergen Reactions    Lisinopril Swelling    Amlodipine Edema    Combigan [brimonidine-timolol]      Causes itchy eyelids and contact dermatitis    Cosopt [dorzolamide-timolol]      Causes angular blepharitis of eyelids    Pravastatin      Myalgia and elevated CPK     Creatinine   Date Value Ref Range Status   11/08/2024 0.7 0.5 - 1.4 mg/dL Final     Sodium   Date Value Ref Range Status   11/08/2024 140 136 - 145 mmol/L Final     Potassium   Date Value Ref Range Status   11/08/2024 4.2 3.5 - 5.1 mmol/L Final   ]  Patient verifies taking blood pressure medications on a regular basis at the same time of the day.     Current Outpatient Medications:     acetaZOLAMIDE (DIAMOX) 250 MG tablet, Take 250 mg by mouth 2 (two) times daily., Disp: , Rfl:     ascorbic acid, vitamin C, (VITAMIN C) 1000 MG tablet, Take 1,000 mg by mouth once daily. (Patient not taking: Reported on 1/10/2025), Disp: , Rfl:     aspirin (ECOTRIN) 81 MG EC tablet, Take 81 mg by mouth once daily., Disp: , Rfl:     atorvastatin (LIPITOR) 40 MG tablet, Take 1 tablet (40 mg total) by mouth every evening., Disp: 90 tablet, Rfl: 3    azelastine (ASTELIN) 137 mcg (0.1 %) nasal spray, 1 spray (137 mcg total) by Nasal route 2 (two) times daily., Disp: 90 mL, Rfl: 3    dorzolamide-timolol 2-0.5% (COSOPT) 22.3-6.8 mg/mL ophthalmic solution, Place 1 drop into both eyes 2 (two) times daily. (Patient not taking: Reported on 1/10/2025), Disp: 30 mL, Rfl: 3    irbesartan (AVAPRO) 150 MG tablet, Take 2 tablets (300 mg total) by mouth every evening., Disp: 180 tablet, Rfl: 3    LUMIGAN 0.01 % Drop, Place 1 drop into both eyes every evening., Disp: 7.5 mL, Rfl: 3    nitroGLYCERIN (NITROSTAT) 0.4 MG SL tablet, Place 1 tablet (0.4 mg total) under the tongue every 5 (five) minutes as needed for Chest pain., Disp: 30 tablet, Rfl: 12    RHOPRESSA 0.02 % ophthalmic solution, INSTILL 1 DROP INTO LEFT EYE ONCE DAILY, Disp: , Rfl:   Does  patient have record of home blood pressure readings no.   Last dose of blood pressure medication was taken at 2/9/2025 @ 1800.  Patient is asymptomatic.       BP: (!) 160/74 , Pulse: (!) 56 .    Blood pressure reading after 15 minutes was 148/70, Pulse 70.  Dr. quintero notified.

## 2025-02-13 NOTE — PROGRESS NOTES
CBC mild anemia seen previously, variable  CMP normal, normal potassium normal creatinine  A1c 5.8 from 6.0    Had BP check at time of labs, reportedly her Diamox was stopped.  At that visit restarted HCTZ 12.5    Results to patient via [employer].  Self-directed monitoring

## 2025-02-18 ENCOUNTER — PATIENT MESSAGE (OUTPATIENT)
Dept: CARDIOLOGY | Facility: CLINIC | Age: 80
End: 2025-02-18
Payer: MEDICARE

## 2025-02-22 ENCOUNTER — PATIENT MESSAGE (OUTPATIENT)
Dept: FAMILY MEDICINE | Facility: CLINIC | Age: 80
End: 2025-02-22
Payer: MEDICARE

## 2025-02-22 DIAGNOSIS — Z00.00 ENCOUNTER FOR MEDICARE ANNUAL WELLNESS EXAM: ICD-10-CM

## 2025-02-24 ENCOUNTER — TELEPHONE (OUTPATIENT)
Dept: ENDOCRINOLOGY | Facility: CLINIC | Age: 80
End: 2025-02-24
Payer: MEDICARE

## 2025-02-24 NOTE — TELEPHONE ENCOUNTER
----- Message from [employer] sent at 2/24/2025 11:36 AM CST -----  .Type:  Patient Call BackWho Called: PT Does the patient know what this is regarding?: PLEASE REACH OUT TO PT ON SCHEDULING VISIT Would the patient rather a call back YES Best Call Back Number:  [phone] Information: Thank You

## 2025-02-25 ENCOUNTER — HOSPITAL ENCOUNTER (OUTPATIENT)
Dept: RADIOLOGY | Facility: HOSPITAL | Age: 80
Discharge: HOME | End: 2025-02-25
Attending: INTERNAL MEDICINE
Payer: MEDICARE

## 2025-02-25 VITALS — BODY MASS INDEX: 29.95 KG/M2 | WEIGHT: 180 LBS

## 2025-02-25 DIAGNOSIS — Z12.31 ENCOUNTER FOR SCREENING MAMMOGRAM FOR BREAST CANCER: ICD-10-CM

## 2025-02-25 PROCEDURE — 99285 EMERGENCY DEPT VISIT HI MDM: CPT | Mod: 25

## 2025-02-25 PROCEDURE — 77063 BREAST TOMOSYNTHESIS BI: CPT | Mod: TC

## 2025-02-25 PROCEDURE — 93010 ELECTROCARDIOGRAM REPORT: CPT | Mod: ,,, | Performed by: INTERNAL MEDICINE

## 2025-02-25 PROCEDURE — 77063 BREAST TOMOSYNTHESIS BI: CPT | Mod: 26,,, | Performed by: RADIOLOGY

## 2025-02-25 PROCEDURE — 93005 ELECTROCARDIOGRAM TRACING: CPT

## 2025-02-25 PROCEDURE — 94761 N-INVAS EAR/PLS OXIMETRY MLT: CPT

## 2025-02-25 PROCEDURE — 77067 SCR MAMMO BI INCL CAD: CPT | Mod: 26,,, | Performed by: RADIOLOGY

## 2025-02-26 ENCOUNTER — PATIENT MESSAGE (OUTPATIENT)
Dept: FAMILY MEDICINE | Facility: CLINIC | Age: 80
End: 2025-02-26
Payer: MEDICARE

## 2025-02-26 ENCOUNTER — HOSPITAL ENCOUNTER (EMERGENCY)
Facility: HOSPITAL | Age: 80
Discharge: HOME | End: 2025-02-26
Attending: EMERGENCY MEDICINE
Payer: MEDICARE

## 2025-02-26 VITALS
DIASTOLIC BLOOD PRESSURE: 74 MMHG | BODY MASS INDEX: 29.97 KG/M2 | SYSTOLIC BLOOD PRESSURE: 162 MMHG | RESPIRATION RATE: 16 BRPM | TEMPERATURE: 98 F | OXYGEN SATURATION: 97 % | HEIGHT: 65 IN | HEART RATE: 60 BPM | WEIGHT: 179.88 LBS

## 2025-02-26 DIAGNOSIS — R07.9 CHEST PAIN: Primary | ICD-10-CM

## 2025-02-26 DIAGNOSIS — I95.1 ORTHOSTATIC HYPOTENSION: ICD-10-CM

## 2025-02-26 DIAGNOSIS — R07.9 CHEST PAIN, UNSPECIFIED TYPE: ICD-10-CM

## 2025-02-26 DIAGNOSIS — Z91.89 AT HIGH RISK FOR BREAST CANCER: Primary | ICD-10-CM

## 2025-02-26 DIAGNOSIS — R42 LIGHTHEADEDNESS: ICD-10-CM

## 2025-02-26 LAB
ALBUMIN SERPL BCP-MCNC: 3.9 G/DL (ref 3.5–5.2)
ALP SERPL-CCNC: 90 U/L (ref 40–150)
ALT SERPL W/O P-5'-P-CCNC: 13 U/L (ref 10–44)
ANION GAP SERPL CALC-SCNC: 9 MMOL/L (ref 8–16)
AST SERPL-CCNC: 21 U/L (ref 10–40)
BASOPHILS # BLD AUTO: 0.04 K/UL (ref 0–0.2)
BASOPHILS NFR BLD: 0.6 % (ref 0–1.9)
BILIRUB SERPL-MCNC: 0.4 MG/DL (ref 0.1–1)
BNP SERPL-MCNC: 30 PG/ML (ref 0–99)
BUN SERPL-MCNC: 8 MG/DL (ref 6–30)
BUN SERPL-MCNC: 9 MG/DL (ref 8–23)
CALCIUM SERPL-MCNC: 9.7 MG/DL (ref 8.7–10.5)
CHLORIDE SERPL-SCNC: 97 MMOL/L (ref 95–110)
CHLORIDE SERPL-SCNC: 99 MMOL/L (ref 95–110)
CO2 SERPL-SCNC: 24 MMOL/L (ref 23–29)
CREAT SERPL-MCNC: 0.7 MG/DL (ref 0.5–1.4)
CREAT SERPL-MCNC: 0.7 MG/DL (ref 0.5–1.4)
DIFFERENTIAL METHOD BLD: ABNORMAL
EOSINOPHIL # BLD AUTO: 0 K/UL (ref 0–0.5)
EOSINOPHIL NFR BLD: 0.3 % (ref 0–8)
ERYTHROCYTE [DISTWIDTH] IN BLOOD BY AUTOMATED COUNT: 12.1 % (ref 11.5–14.5)
EST. GFR  (NO RACE VARIABLE): >60 ML/MIN/1.73 M^2
GLUCOSE SERPL-MCNC: 112 MG/DL (ref 70–110)
GLUCOSE SERPL-MCNC: 115 MG/DL (ref 70–110)
HCT VFR BLD AUTO: 37 % (ref 37–48.5)
HCT VFR BLD CALC: 35 %PCV (ref 36–54)
HCV AB SERPL QL IA: NORMAL
HGB BLD-MCNC: 11.8 G/DL (ref 12–16)
HIV 1+2 AB+HIV1 P24 AG SERPL QL IA: NORMAL
IMM GRANULOCYTES # BLD AUTO: 0.02 K/UL (ref 0–0.04)
IMM GRANULOCYTES NFR BLD AUTO: 0.3 % (ref 0–0.5)
LIPASE SERPL-CCNC: 33 U/L (ref 4–60)
LYMPHOCYTES # BLD AUTO: 1.8 K/UL (ref 1–4.8)
LYMPHOCYTES NFR BLD: 25.2 % (ref 18–48)
MCH RBC QN AUTO: 28.6 PG (ref 27–31)
MCHC RBC AUTO-ENTMCNC: 31.9 G/DL (ref 32–36)
MCV RBC AUTO: 90 FL (ref 82–98)
MONOCYTES # BLD AUTO: 0.6 K/UL (ref 0.3–1)
MONOCYTES NFR BLD: 8 % (ref 4–15)
NEUTROPHILS # BLD AUTO: 4.6 K/UL (ref 1.8–7.7)
NEUTROPHILS NFR BLD: 65.6 % (ref 38–73)
NRBC BLD-RTO: 0 /100 WBC
OHS QRS DURATION: 90 MS
OHS QTC CALCULATION: 392 MS
PLATELET # BLD AUTO: 270 K/UL (ref 150–450)
PMV BLD AUTO: 10.1 FL (ref 9.2–12.9)
POC IONIZED CALCIUM: 1.13 MMOL/L (ref 1.06–1.42)
POC TCO2 (MEASURED): 24 MMOL/L (ref 23–29)
POTASSIUM BLD-SCNC: 3.9 MMOL/L (ref 3.5–5.1)
POTASSIUM SERPL-SCNC: 4 MMOL/L (ref 3.5–5.1)
PROT SERPL-MCNC: 7.3 G/DL (ref 6–8.4)
RBC # BLD AUTO: 4.12 M/UL (ref 4–5.4)
SAMPLE: ABNORMAL
SODIUM BLD-SCNC: 133 MMOL/L (ref 136–145)
SODIUM SERPL-SCNC: 132 MMOL/L (ref 136–145)
TROPONIN I SERPL DL<=0.01 NG/ML-MCNC: <3 NG/L (ref 0–14)
TROPONIN I SERPL DL<=0.01 NG/ML-MCNC: <3 NG/L (ref 0–14)
WBC # BLD AUTO: 6.98 K/UL (ref 3.9–12.7)

## 2025-02-26 PROCEDURE — 25000003 PHARM REV CODE 250

## 2025-02-26 PROCEDURE — 82330 ASSAY OF CALCIUM: CPT | Mod: 59

## 2025-02-26 PROCEDURE — 83880 ASSAY OF NATRIURETIC PEPTIDE: CPT | Performed by: EMERGENCY MEDICINE

## 2025-02-26 PROCEDURE — 96374 THER/PROPH/DIAG INJ IV PUSH: CPT

## 2025-02-26 PROCEDURE — 86803 HEPATITIS C AB TEST: CPT | Performed by: PHYSICIAN ASSISTANT

## 2025-02-26 PROCEDURE — 87389 HIV-1 AG W/HIV-1&-2 AB AG IA: CPT | Performed by: PHYSICIAN ASSISTANT

## 2025-02-26 PROCEDURE — 96361 HYDRATE IV INFUSION ADD-ON: CPT

## 2025-02-26 PROCEDURE — 80047 BASIC METABLC PNL IONIZED CA: CPT

## 2025-02-26 PROCEDURE — 83690 ASSAY OF LIPASE: CPT | Performed by: EMERGENCY MEDICINE

## 2025-02-26 PROCEDURE — 84484 ASSAY OF TROPONIN QUANT: CPT | Mod: 91 | Performed by: EMERGENCY MEDICINE

## 2025-02-26 PROCEDURE — 85025 COMPLETE CBC W/AUTO DIFF WBC: CPT | Performed by: EMERGENCY MEDICINE

## 2025-02-26 PROCEDURE — 80053 COMPREHEN METABOLIC PANEL: CPT | Performed by: EMERGENCY MEDICINE

## 2025-02-26 RX ORDER — FAMOTIDINE 10 MG/ML
20 INJECTION INTRAVENOUS
Status: COMPLETED | OUTPATIENT
Start: 2025-02-26 | End: 2025-02-26

## 2025-02-26 RX ORDER — ALUMINUM HYDROXIDE, MAGNESIUM HYDROXIDE, AND SIMETHICONE 1200; 120; 1200 MG/30ML; MG/30ML; MG/30ML
30 SUSPENSION ORAL ONCE
Status: COMPLETED | OUTPATIENT
Start: 2025-02-26 | End: 2025-02-26

## 2025-02-26 RX ORDER — LIDOCAINE HYDROCHLORIDE 20 MG/ML
15 SOLUTION OROPHARYNGEAL ONCE
Status: COMPLETED | OUTPATIENT
Start: 2025-02-26 | End: 2025-02-26

## 2025-02-26 RX ORDER — ASPIRIN 325 MG
325 TABLET ORAL
Status: COMPLETED | OUTPATIENT
Start: 2025-02-26 | End: 2025-02-26

## 2025-02-26 RX ADMIN — ASPIRIN 325 MG ORAL TABLET 325 MG: 325 PILL ORAL at 01:02

## 2025-02-26 RX ADMIN — ALUMINUM HYDROXIDE, MAGNESIUM HYDROXIDE, AND SIMETHICONE 30 ML: 200; 200; 20 SUSPENSION ORAL at 01:02

## 2025-02-26 RX ADMIN — LIDOCAINE HYDROCHLORIDE 15 ML: 20 SOLUTION ORAL at 01:02

## 2025-02-26 RX ADMIN — FAMOTIDINE 20 MG: 10 INJECTION INTRAVENOUS at 01:02

## 2025-02-26 RX ADMIN — SODIUM CHLORIDE 1000 ML: 0.9 INJECTION, SOLUTION INTRAVENOUS at 01:02

## 2025-02-26 NOTE — ED TRIAGE NOTES
Rosmery Ramirez, a 79 y.o. female presents to the ED w/ complaint of     Triage note:  Chief Complaint   Patient presents with    Dizziness    Chest Pain     Dizziness and CP radiating to L shoulder x2 days. Almost passed out while shopping today.      Review of patient's allergies indicates:   Allergen Reactions    Lisinopril Swelling    Amlodipine Edema    Combigan [brimonidine-timolol]      Causes itchy eyelids and contact dermatitis    Cosopt [dorzolamide-timolol]      Causes angular blepharitis of eyelids    Pravastatin      Myalgia and elevated CPK     Past Medical History:   Diagnosis Date    Allergy     Anxiety     Breast cyst     Edema of leg 10/5/2012    bilateral     Fractures 2011    thumb R    GERD (gastroesophageal reflux disease)     Glaucoma     History of colonic polyps     Hx-TIA (transient ischemic attack) 8/13/2012 Jan 2012: LLE and left facial numbness lasting 1 hour     Hyperlipidemia     Hypertension     Left shoulder tendonitis 8/8/2015    Primary open-angle glaucoma, mild stage - Both Eyes 6/3/2013

## 2025-02-26 NOTE — ED PROVIDER NOTES
I I assumed care of this patient from my colleague at shift change.  Patient was pending a 2nd troponin..   Repeat troponin normal.  Patient remains chest pain-free.  Considered but doubt ACS, doubt STEMI, doubt pulmonary embolism.  Doubt dissection.  We will discharged home.  Stable for discharge.     Ken Bravo, FLORA  02/26/25 0318

## 2025-02-26 NOTE — ED TRIAGE NOTES
Rosmery Ramirez, a 79 y.o. female presents to the ED w/ complaint of 6/10 sharp/dull midsternal chest pain radiating down left arm. Pt also endorses feeling dizzy this morning. Pt endorses SOB upon exertion. Pt endorses nausea and soft stools beginning today. Pt is AAOx4, GCS 15.     Triage note:  Chief Complaint   Patient presents with    Dizziness    Chest Pain     Dizziness and CP radiating to L shoulder x2 days. Almost passed out while shopping today.      Review of patient's allergies indicates:   Allergen Reactions    Lisinopril Swelling    Amlodipine Edema    Combigan [brimonidine-timolol]      Causes itchy eyelids and contact dermatitis    Cosopt [dorzolamide-timolol]      Causes angular blepharitis of eyelids    Pravastatin      Myalgia and elevated CPK     Past Medical History:   Diagnosis Date    Allergy     Anxiety     Breast cyst     Edema of leg 10/5/2012    bilateral     Fractures 2011    thumb R    GERD (gastroesophageal reflux disease)     Glaucoma     History of colonic polyps     Hx-TIA (transient ischemic attack) 8/13/2012 Jan 2012: LLE and left facial numbness lasting 1 hour     Hyperlipidemia     Hypertension     Left shoulder tendonitis 8/8/2015    Primary open-angle glaucoma, mild stage - Both Eyes 6/3/2013

## 2025-02-26 NOTE — ED PROVIDER NOTES
Encounter Date: 2/25/2025       History     Chief Complaint   Patient presents with    Dizziness    Chest Pain     Dizziness and CP radiating to L shoulder x2 days. Almost passed out while shopping today.      79-year-old female with past medical history of hypertension, hyperlipidemia, GERD presents to the ED regarding midsternal chest pain onset 5:00 pm last night.  She states she was casually walking in the store with no strenuous activity when the pain suddenly came on.  It was accompanied with lightheadedness but denies any syncope.  She states the pain radiated into her left arm but this has since resolved.  Patient has subsided and now currently feels like indigestion.  She reports feeling nauseous but denies vomiting.  Also having headache and feeling generally off-balance.  Denies falling.  Denies shortness of breath, abdominal pain, recent URI symptoms, fever, body aches, chills, or other complaints time.  She states she does have a cardiologist and recently had echocardiogram and stress test.  She is scheduled to have a CT angiogram next month.    The history is provided by the patient and medical records.     Review of patient's allergies indicates:   Allergen Reactions    Lisinopril Swelling    Amlodipine Edema    Combigan [brimonidine-timolol]      Causes itchy eyelids and contact dermatitis    Cosopt [dorzolamide-timolol]      Causes angular blepharitis of eyelids    Pravastatin      Myalgia and elevated CPK     Past Medical History:   Diagnosis Date    Allergy     Anxiety     Breast cyst     Edema of leg 10/5/2012    bilateral     Fractures 2011    thumb R    GERD (gastroesophageal reflux disease)     Glaucoma     History of colonic polyps     Hx-TIA (transient ischemic attack) 8/13/2012 Jan 2012: LLE and left facial numbness lasting 1 hour     Hyperlipidemia     Hypertension     Left shoulder tendonitis 8/8/2015    Primary open-angle glaucoma, mild stage - Both Eyes 6/3/2013     Past Surgical  History:   Procedure Laterality Date    BREAST BIOPSY Left     core    BREAST SURGERY Left     lumpectomy    CATARACT EXTRACTION W/  INTRAOCULAR LENS IMPLANT Left 10/05/2016    Dr. Mike    CATARACT EXTRACTION W/  INTRAOCULAR LENS IMPLANT Right 11/30/2016    With [facility] (Dr. Mike)    COLONOSCOPY N/A 1/23/2017    Procedure: COLONOSCOPY;  Surgeon: Hany Mosquera MD;  Location: [facility] (4TH FLR);  Service: Endoscopy;  Laterality: N/A;    COLONOSCOPY N/A 3/28/2022    Procedure: COLONOSCOPY;  Surgeon: Jamison Chambers MD;  Location: [facility] ENDO (4TH FLR);  Service: Endoscopy;  Laterality: N/A;  fully vaccinated   instructions to portal-st  3/22 arrival time confirmed with pt/COVID test cancelled-RB    ESOPHAGOGASTRODUODENOSCOPY N/A 7/20/2023    Procedure: EGD (ESOPHAGOGASTRODUODENOSCOPY);  Surgeon: Jorge L Thompson MD;  [facility]  Service: Endoscopy;  Laterality: N/A;  inst handed to pre-call pt needs to be rescheduled to new day 03/21 bm.  4/26/23- precall- pt wants to cancel appt and reschedule for another day  6/7 pt r/s/instr. to sunita;-s7/14/23-pre-call completed-LS    HAND SURGERY Left 2011    thumb    HYSTERECTOMY  1980's    Total;    IMPLANTATION OF DEVICE FOR GLAUCOMA Right 3/17/2021    Procedure: INSERTION, DEVICE, FOR GLAUCOMA XEN GEL;  Surgeon: Yesica Mike MD;  Location: [facility] OR [address] [place] [address];  Service: Ophthalmology;  Laterality: Right;    JUDIOOK GONIOTOMY Right 11/30/2016    WITH OSMANY ()    Left Breast Lumpectomy Left     performed in 1960s    OOPHORECTOMY      SELECTIVE LASER TRABECUPLASTY  05/2014    OU w/ Dr. Mike    TRABECULECTOMY Right 3/17/2021    Procedure: TRABECULECTOMY/XEN GEL WITH MMC;  Surgeon: Yesica Mike MD;  Location: [facility] OR [address] [place] [address];  Service: Ophthalmology;  Laterality: Right;    TRABECULECTOMY Left 7/28/2021    Procedure: TRABECULECTOMY/ MMC and Xen OS;  Surgeon: Yesica Mike MD;  Location: [facility] OR [address] [place] [address];  Service:  Ophthalmology;  Laterality: Left;    TUBAL LIGATION  1970's    VAGINAL DELIVERY      x3    YAG CAPSULOTOMY Left 11/29/2018         Family History   Problem Relation Name Age of Onset    Breast cancer Mother 78 y/o 50    Glaucoma Mother 78 y/o     Alzheimer's disease Mother 78 y/o     Blindness Mother 78 y/o     Cataracts Mother 78 y/o     Glaucoma Father 77 y/o     Prostate cancer Father 77 y/o     Blindness Father 77 y/o     Cataracts Father 77 y/o     Cancer Father 77 y/o 62        prostate    Breast cancer Sister 5 sisters     Glaucoma Sister 5 sisters     Alzheimer's disease Maternal Grandfather      No Known Problems Brother      No Known Problems Brother      Macular degeneration Neg Hx      Retinal detachment Neg Hx      Strabismus Neg Hx      Amblyopia Neg Hx      Heart attack Neg Hx      Heart disease Neg Hx      Ovarian cancer Neg Hx      Colon cancer Neg Hx       Social History[1]  Review of Systems  See HPI  Physical Exam     Initial Vitals [02/25/25 2225]   BP Pulse Resp Temp SpO2   (!) 199/87 97 15 97.7 °F (36.5 °C) 98 %      MAP       --         Physical Exam    Vitals reviewed.  Constitutional: She appears well-developed and well-nourished. She is not diaphoretic. No distress.   HENT:   Head: Normocephalic and atraumatic.   Eyes: Conjunctivae and EOM are normal. Pupils are equal, round, and reactive to light.   Neck: Neck supple.   Normal range of motion.  Cardiovascular:  Normal rate, regular rhythm and normal heart sounds.     Exam reveals no gallop and no friction rub.       No murmur heard.  Pulmonary/Chest: Breath sounds normal. No respiratory distress. She has no wheezes. She has no rhonchi. She has no rales. She exhibits no tenderness.   Abdominal: Abdomen is soft. She exhibits no distension. There is no abdominal tenderness. There is no rebound and no guarding.   Musculoskeletal:      Cervical back: Normal range of motion and neck supple.     Neurological: She is alert and  oriented to person, place, and time. No cranial nerve deficit. She displays a negative Romberg sign. Gait normal.   Psychiatric: She has a normal mood and affect.         ED Course   Procedures  Labs Reviewed   CBC W/ AUTO DIFFERENTIAL - Abnormal       Result Value    WBC 6.98      RBC 4.12      Hemoglobin 11.8 (*)     Hematocrit 37.0      MCV 90      MCH 28.6      MCHC 31.9 (*)     RDW 12.1      Platelets 270      MPV 10.1      Immature Granulocytes 0.3      Gran # (ANC) 4.6      Immature Grans (Abs) 0.02      Lymph # 1.8      Mono # 0.6      Eos # 0.0      Baso # 0.04      nRBC 0      Gran % 65.6      Lymph % 25.2      Mono % 8.0      Eosinophil % 0.3      Basophil % 0.6      Differential Method Automated     COMPREHENSIVE METABOLIC PANEL - Abnormal    Sodium 132 (*)     Potassium 4.0      Chloride 99      CO2 24      Glucose 112 (*)     BUN 9      Creatinine 0.7      Calcium 9.7      Total Protein 7.3      Albumin 3.9      Total Bilirubin 0.4      Alkaline Phosphatase 90      AST 21      ALT 13      eGFR >60.0      Anion Gap 9     ISTAT PROCEDURE - Abnormal    POC Glucose 115 (*)     POC BUN 8      POC Creatinine 0.7      POC Sodium 133 (*)     POC Potassium 3.9      POC Chloride 97      POC TCO2 (MEASURED) 24      POC Ionized Calcium 1.13      POC Hematocrit 35 (*)     Sample ABBY     HEPATITIS C ANTIBODY    Hepatitis C Ab Non-reactive      Narrative:     Release to patient->Immediate   HIV 1 / 2 ANTIBODY    HIV 1/2 Ag/Ab Non-reactive      Narrative:     Release to patient->Immediate   TROPONIN I HIGH SENSITIVITY    Troponin I High Sensitivity <3     B-TYPE NATRIURETIC PEPTIDE    BNP 30     LIPASE   LIPASE    Lipase 33      Narrative:     Add on Lipase per Shi Chow PA-C @ 00:53 am to order #   [id number]   TROPONIN I HIGH SENSITIVITY     EKG Readings: (Independently Interpreted)   Initial Reading: No STEMI. Rhythm: Sinus Bradycardia. Heart Rate: 58. Ectopy: No Ectopy. Conduction: Normal. ST Segments:  Normal ST Segments. T Waves: Normal.       Imaging Results              CT Head Without Contrast (Final result)  Result time 02/26/25 01:57:12      Final result by Caleb Hernandez MD (02/26/25 01:57:12)                   Impression:      No CT evidence of acute intracranial abnormality. If the patient has an acute, focal neurological deficit, MRI of the brain may be indicated.      Electronically signed by: Caleb Hernandez MD  Date:    02/26/2025  Time:    01:57               Narrative:    EXAMINATION:  CT HEAD WITHOUT CONTRAST    CLINICAL HISTORY:  Dizziness, persistent/recurrent, cardiac or vascular cause suspected;    TECHNIQUE:  Low dose axial images were obtained through the head.  Coronal and sagittal reformations were also performed. Contrast was not administered.    COMPARISON:  MRI brain, 10/10/2024.    FINDINGS:  Exam quality is mildly limited by motion.    No evidence of acute territorial infarct, hemorrhage, mass effect, or midline shift.    Ventricles are normal in size and configuration.    No displaced calvarial fracture.    Visualized paranasal sinuses and mastoid air cells are essentially clear.                                       X-Ray Chest AP Portable (Final result)  Result time 02/26/25 01:14:37      Final result by Harpal Adamson MD (02/26/25 01:14:37)                   Impression:      No acute abnormality.      Electronically signed by: Harpal Adamson  Date:    02/26/2025  Time:    01:14               Narrative:    EXAMINATION:  XR CHEST AP PORTABLE    CLINICAL HISTORY:  Chest Pain;    TECHNIQUE:  Single frontal view of the chest was performed.    COMPARISON:  11/14/2024    FINDINGS:  The lungs are clear, with normal appearance of pulmonary vasculature and no pleural effusion or pneumothorax.    The cardiac silhouette is normal in size. The hilar and mediastinal contours are unremarkable.    Bones are intact.                                       Medications   aspirin tablet 325  mg (325 mg Oral Given 2/26/25 0148)   aluminum-magnesium hydroxide-simethicone 200-200-20 mg/5 mL suspension 30 mL (30 mLs Oral Given 2/26/25 0116)     And   LIDOcaine viscous HCl 2% oral solution 15 mL (15 mLs Oral Given 2/26/25 0116)   famotidine (PF) injection 20 mg (20 mg Intravenous Given 2/26/25 0148)   sodium chloride 0.9% bolus 1,000 mL 1,000 mL (0 mLs Intravenous Stopped 2/26/25 0248)     Medical Decision Making  Emergent evaluation of 79-year-old female regarding chest pain and lightheadedness.  Hypertensive with other vitals stable.  Clinically well-appearing, in no acute distress.  See physical exam findings above.  Cardiac workup initiated.  EKG unremarkable, sinus bradycardia with no ischemic changes.  Given headache and lightheadedness will obtain CT head to assess for possible intracranial abnormality.  Will provide analgesia and reassess.    My differential diagnoses include but are not limited to:  ACS, costochondritis, acute pancreatitis, GERD, orthostatic hypotension, electrolyte abnormality, dehydration, tension headache, ICH, hypertensive emergency  See ED course.    Amount and/or Complexity of Data Reviewed  External Data Reviewed: notes.     Details: Cardiology:  HPI:   Rosmery Ramirez is a 79 y.o. female who has had an extensive workup. I have probably 20 pages of workup that seems to have been generated for fatigue, atypical chest pain. She has seen a cardiologist at [facility] who has a nuclear stress test showing apical ischemia and ejection fraction of 75%. Prior to that the primary care physician did an extensive workup and it looks like the thyroid functions are normal. That the cholesterol level is only 132. The potassium is normal at 4.2. There was no evidence of significant anemia with a hemoglobin of 11.9 hematocrit 38 point. Even the vitamin-D levels were checked than her vitamin-D level is 30 2. Her iron, transferrin and saturating iron levels are within normal limits.  Saturated iron is actually a little low in the TIBC is normal. None of this really answers why she has symptoms.     Significant Findings:  Holter monitor predominant rhythm is sinus average heart rate 61 rare PVCs rare PACs testing January 6, 2025  Nuclear stress testing mild-to-moderate intensity small size reversible perfusion abnormality at the apex of the heart no other significant abnormalities ejection fraction 75%  January 6, 2025.  Two-dimensional echo Doppler left ventricular ejection fraction 60-65% normal right ventricular size PA systolic pressure of 30    Twelve lead EKG showing a heart rate of 59 beats per minute QRS of 82 R-wave axis 32 sinus bradycardia otherwise normal EKG.     Plan:   They are very few test that have not been done in this patient. However she and her daughter concerned about cardiovascular disease and being tested without having to have a heart catheterization done. I would like to get CT angiogram for an abnormal nuclear stress test and several cardiac risk factors as above.   Radiology: ordered.    Risk  OTC drugs.  Prescription drug management.               ED Course as of 02/26/25 0253   Wed Feb 26, 2025   0221 Orthostatics positive.  Patient currently getting fluids.  CMP showed mild hyponatremia with no other significant electrolyte or metabolic derangement.  No ORIN.  CBC showing stable anemia.  BNP 30.  First troponin negative.  Discussed these results with patient.  She does have heart score of 4 though did recently have an echocardiogram and abnormal stress test.  She has CT angiogram scheduled next month.  Discussed admission vs outpatient follow up.  Patient does not want to be admitted at this time though also would feel more comfortable if we trend the troponin.  She states her pain has improved with GI cocktail.  I think reasonable to trend troponin in his long as it remains negative she can follow up outpatient with her cardiologist as scheduled.  Pending 2nd  troponin signed out to Ken Bravo PA-C. [KB]      ED Course User Index  [KB] Shi Chow PA-C                           Clinical Impression:  Final diagnoses:  [R07.9] Chest pain (Primary)  [R42] Lightheadedness  [I95.1] Orthostatic hypotension                     [1]   Social History  Tobacco Use    Smoking status: Never     Passive exposure: Never    Smokeless tobacco: Never   Substance Use Topics    Alcohol use: Not Currently    Drug use: No        Shi Chow PA-C  02/26/25 0254

## 2025-02-26 NOTE — ED NOTES
I-STAT Chem-8+ Results:   Value Reference Range   Sodium 133 136-145 mmol/L   Potassium  3.9 3.5-5.1 mmol/L   Chloride 97  mmol/L   Ionized Calcium 1.13 1.06-1.42 mmol/L   CO2 (measured) 24 23-29 mmol/L   Glucose 115  mg/dL   BUN 8 6-30 mg/dL   Creatinine 0.7 0.5-1.4 mg/dL   Hematocrit 35 36-54%

## 2025-02-26 NOTE — DISCHARGE INSTRUCTIONS

## 2025-03-02 ENCOUNTER — PATIENT MESSAGE (OUTPATIENT)
Dept: CARDIOLOGY | Facility: CLINIC | Age: 80
End: 2025-03-02
Payer: MEDICARE

## 2025-03-11 ENCOUNTER — OFFICE VISIT (OUTPATIENT)
Dept: CARDIOLOGY | Facility: CLINIC | Age: 80
End: 2025-03-11
Payer: MEDICARE

## 2025-03-11 VITALS
OXYGEN SATURATION: 95 % | HEART RATE: 58 BPM | WEIGHT: 184.31 LBS | RESPIRATION RATE: 15 BRPM | HEIGHT: 65 IN | BODY MASS INDEX: 30.71 KG/M2 | DIASTOLIC BLOOD PRESSURE: 89 MMHG | SYSTOLIC BLOOD PRESSURE: 167 MMHG

## 2025-03-11 DIAGNOSIS — I70.0 THORACIC AORTIC ATHEROSCLEROSIS: ICD-10-CM

## 2025-03-11 DIAGNOSIS — R07.9 CHEST PAIN, UNSPECIFIED TYPE: Primary | ICD-10-CM

## 2025-03-11 DIAGNOSIS — I10 ESSENTIAL HYPERTENSION: ICD-10-CM

## 2025-03-11 DIAGNOSIS — I25.10 CAD (CORONARY ARTERY DISEASE): ICD-10-CM

## 2025-03-11 DIAGNOSIS — E78.5 DYSLIPIDEMIA: ICD-10-CM

## 2025-03-11 PROCEDURE — 3077F SYST BP >= 140 MM HG: CPT | Mod: CPTII,S$GLB,, | Performed by: INTERNAL MEDICINE

## 2025-03-11 PROCEDURE — G2211 COMPLEX E/M VISIT ADD ON: HCPCS | Mod: S$GLB,,, | Performed by: INTERNAL MEDICINE

## 2025-03-11 PROCEDURE — 99999 PR PBB SHADOW E&M-EST. PATIENT-LVL IV: CPT | Mod: PBBFAC,,, | Performed by: INTERNAL MEDICINE

## 2025-03-11 PROCEDURE — 1101F PT FALLS ASSESS-DOCD LE1/YR: CPT | Mod: CPTII,S$GLB,, | Performed by: INTERNAL MEDICINE

## 2025-03-11 PROCEDURE — 3288F FALL RISK ASSESSMENT DOCD: CPT | Mod: CPTII,S$GLB,, | Performed by: INTERNAL MEDICINE

## 2025-03-11 PROCEDURE — 1126F AMNT PAIN NOTED NONE PRSNT: CPT | Mod: CPTII,S$GLB,, | Performed by: INTERNAL MEDICINE

## 2025-03-11 PROCEDURE — 1159F MED LIST DOCD IN RCRD: CPT | Mod: CPTII,S$GLB,, | Performed by: INTERNAL MEDICINE

## 2025-03-11 PROCEDURE — 3079F DIAST BP 80-89 MM HG: CPT | Mod: CPTII,S$GLB,, | Performed by: INTERNAL MEDICINE

## 2025-03-11 PROCEDURE — 99215 OFFICE O/P EST HI 40 MIN: CPT | Mod: S$GLB,,, | Performed by: INTERNAL MEDICINE

## 2025-03-11 RX ORDER — ISOSORBIDE MONONITRATE 30 MG/1
30 TABLET, EXTENDED RELEASE ORAL DAILY
Qty: 90 TABLET | Refills: 3 | Status: SHIPPED | OUTPATIENT
Start: 2025-03-11

## 2025-03-11 RX ORDER — SODIUM CHLORIDE 9 MG/ML
INJECTION, SOLUTION INTRAVENOUS CONTINUOUS
OUTPATIENT
Start: 2025-03-11

## 2025-03-11 RX ORDER — DIPHENHYDRAMINE HCL 50 MG
50 CAPSULE ORAL ONCE
OUTPATIENT
Start: 2025-03-11 | End: 2025-03-11

## 2025-03-11 RX ORDER — CLOPIDOGREL BISULFATE 300 MG/1
600 TABLET, FILM COATED ORAL ONCE
OUTPATIENT
Start: 2025-03-11 | End: 2025-03-11

## 2025-03-11 RX ORDER — EZETIMIBE 10 MG/1
10 TABLET ORAL DAILY
Qty: 90 TABLET | Refills: 3 | Status: SHIPPED | OUTPATIENT
Start: 2025-03-11

## 2025-03-11 NOTE — H&P (VIEW-ONLY)
CARDIOVASCULAR CONSULTATION    REASON FOR CONSULT:   Rosmery Ramirez is a 79 y.o. female who presents for follow-up.      HISTORY OF PRESENT ILLNESS:     Patient is a pleasant 74-year-old lady here for follow-up.  Denies any chest pains at rest on exertion, orthopnea, PND.  Complains of paresthesias in bilateral feet and requesting referral to Neurology for that.  Blood pressure well controlled.  No other cardiovascular complaints.     Notes from December 2020:  Patient here for follow-up.  Complains of occasional dizziness.  Can occur once a week or less frequently than that.  No postural relationship.  Denies any chest pains at rest on exertion, orthopnea, PND.      Notes from December 2022: Patient here for follow-up after a long hiatus.  Occasionally gets short of breath when she lays down.  X-ray showed aortic atherosclerosis.  Has been referred to Cardiology to rule out significant ischemia      Jan 23:  Patient here for follow-up.  Patient here for follow-up after testing.  Stress test did not show any significant ischemia    The estimated ejection fraction is 55%.  The left ventricle is normal in size with mild concentric hypertrophy and normal systolic function.  Grade I left ventricular diastolic dysfunction.  Normal right ventricular size with normal right ventricular systolic function.  Mild to moderate left atrial enlargement.  Mild mitral regurgitation.  Mild tricuspid regurgitation.  Intermediate central venous pressure (8 mmHg).  The estimated PA systolic pressure is 28 mmHg.         Normal myocardial perfusion scan. There is no evidence of myocardial ischemia or infarction.    There is a mild to moderate intensity perfusion abnormality in the apical wall of the left ventricle, secondary to breast attenuation.    The gated perfusion images showed an ejection fraction of 71% at rest.    There is normal wall motion at rest and post stress.    The ECG portion of the study is positive for  ischemia. Specificity is reduced secondary to hypertensive response.    The patient reported no chest pain during the stress test.    There were no arrhythmias during stress.    The patient exercised for 6 minutes 15 seconds on a Jorge protocol, corresponding to a functional capacity of 7 METS, achieving a peak heart rate of 142 bpm, which is 102 % of the age predicted maximum heart rate.    Notes from October 2023: Patient here for follow-up.  Has been doing fine.  Denies any chest pains at rest on exertion, orthopnea, PND.  Doing fine.    December 24:    History of Present Illness    CHIEF COMPLAINT:  - Rosmery presents with complaints of fatigue and lack of energy, with associated dyspnea on exertion.    HPI:  Rosmery reports fatigue for 6 months, at times significant enough to impair her ability to get out of bed. Her primary care physician conducted extensive lab work to investigate the cause. She was referred to an endocrinologist for thyroid evaluation due to a thyroid nodule. The endocrinologist diagnosed Hashimoto's disease. TSH levels were normal.    She reports dyspnea, particularly during housework such as mopping. She attempts to exercise by walking on a track, noting initial dyspnea but improved endurance as activity continues.    She reports occasional lightheadedness upon standing, described as unsteadiness.    A sleep study was conducted with normal results. The cause of fatigue remains undetermined, leading to this follow-up visit.    She denies chest pain, tightness, dizziness, or LOC.    MEDICATIONS:  - ASA  - Atorvastatin  - HCTZ  - Irbesartan    JAN 25:    History of Present Illness    CHIEF COMPLAINT:  Rosmery presents today with palpitations.    CARDIAC SYMPTOMS:  She experiences palpitations 2-3 times per day without specific pattern. These palpitations are not new, having previously resolved before recurring. She confirms experiencing palpitations while wearing the Holter monitor. She  also reports shortness of breath on exertion, particularly when bending over to perform work, describing it as weakness in her chest. She denies chest pain, pressure, or chest tightness.    MEDICATIONS:  She currently takes aspirin, atorvastatin (increased to 40 mg daily from alternate day dosing), and Irbisartan (increased to 300 mg at night). She was started on Diamox for glaucoma and discontinued hydrochlorothiazide.       Results for orders placed or performed during the hospital encounter of 02/26/25   EKG 12-lead    Collection Time: 02/25/25 10:33 PM   Result Value Ref Range    QRS Duration 90 ms    OHS QTC Calculation 392 ms    Narrative    Test Reason : R07.9,    Vent. Rate :  58 BPM     Atrial Rate :  58 BPM     P-R Int : 184 ms          QRS Dur :  90 ms      QT Int : 400 ms       P-R-T Axes :  71  33  62 degrees    QTcB Int : 392 ms    Sinus bradycardia  Abnormal R wave progression in the precordial leads - Possible Anterior  infarct (cited on or before 06-Jan-2025)  Abnormal ECG  When compared with ECG of 06-Jan-2025 08:05,  No significant change was found  Confirmed by Farhad Mcdaniels ([id number]) on 2/26/2025 8:35:34 AM    Referred By: AAAREFERRAL SELF           Confirmed By: Farhad Mcdaniels       Results for orders placed during the hospital encounter of 01/06/25    Echo    Interpretation Summary    Left Ventricle: The left ventricle is normal in size. Normal wall thickness. There is normal systolic function with a visually estimated ejection fraction of 60 - 65%. There is normal diastolic function. Normal left ventricular filling pressure.    Right Ventricle: Normal right ventricular cavity size. Systolic function is normal.    Aortic Valve: There is mild aortic valve sclerosis.    Pulmonary Artery: The estimated pulmonary artery systolic pressure is 30 mmHg.    IVC/SVC: Normal venous pressure at 3 mmHg.      Results for orders placed during the hospital encounter of 01/06/25    Nuclear Stress - Cardiology  Interpreted    Interpretation Summary    Abnormal myocardial perfusion scan.    There is a mild to moderate intensity, small sized, reversible perfusion abnormality that is consistent with ischemia in the apical wall(s).    There are no other significant perfusion abnormalities.    The gated perfusion images showed an ejection fraction of 75% post stress.    The ECG portion of the study is negative for ischemia.    The patient reported no chest pain during the stress test.    There were no arrhythmias during stress.      No results found for this or any previous visit.    March 25:    CHIEF COMPLAINT:  Rosmery presents today for intermittent chest pain.    CHEST PAIN:  She experiences intermittent chest pain occurring at rest, specifically while sitting, and denies correlation with physical exertion. The pain resolves spontaneously within approximately one minute. Stress test was abnormal, indicating possible coronary artery blockage.    CARDIOVASCULAR:  She reports home blood pressure readings consistently in the 120s, measured every evening for the past two weeks.    MEDICATIONS:  Her current regimen includes Aspirin, Hydrochlorothiazide, herbicide, and Nitroglycerin as needed. She experienced severe leg pain with Atorvastatin 40mg daily and self-adjusted to taking it every other day, which resolved the leg pain.    DIET AND WEIGHT MANAGEMENT:  She has followed a plant-based diet for the last 3 months with occasional chicken consumption and has eliminated red meat. She reports significant weight loss and overall improvement in well-being due to these dietary changes.    MUSCULOSKELETAL:  She has a torn meniscus in her knee requiring surgical repair.           PAST MEDICAL HISTORY:     Past Medical History:   Diagnosis Date    Allergy     Anxiety     Breast cyst     Edema of leg 10/5/2012    bilateral     Fractures 2011    thumb R    GERD (gastroesophageal reflux disease)     Glaucoma     History of colonic  polyps     Hx-TIA (transient ischemic attack) 8/13/2012 Jan 2012: LLE and left facial numbness lasting 1 hour     Hyperlipidemia     Hypertension     Left shoulder tendonitis 8/8/2015    Primary open-angle glaucoma, mild stage - Both Eyes 6/3/2013       PAST SURGICAL HISTORY:     Past Surgical History:   Procedure Laterality Date    BREAST BIOPSY Left     core    BREAST SURGERY Left     lumpectomy    CATARACT EXTRACTION W/  INTRAOCULAR LENS IMPLANT Left 10/05/2016    Dr. Mike    CATARACT EXTRACTION W/  INTRAOCULAR LENS IMPLANT Right 11/30/2016    With Quinten (Dr. Mike)    COLONOSCOPY N/A 1/23/2017    Procedure: COLONOSCOPY;  Surgeon: Hany Mosquera MD;  Location: [facility] (4TH FLR);  Service: Endoscopy;  Laterality: N/A;    COLONOSCOPY N/A 3/28/2022    Procedure: COLONOSCOPY;  Surgeon: Jamison Chambers MD;  Location: [facility] (4TH FLR);  Service: Endoscopy;  Laterality: N/A;  fully vaccinated   instructions to portal-st  3/22 arrival time confirmed with pt/COVID test cancelled-RB    ESOPHAGOGASTRODUODENOSCOPY N/A 7/20/2023    Procedure: EGD (ESOPHAGOGASTRODUODENOSCOPY);  Surgeon: Jorge L Thompson MD;  Location: [facility] (4TH FLR);  Service: Endoscopy;  Laterality: N/A;  inst handed to pre-call pt needs to be rescheduled to new day 03/21 bm.  4/26/23- precall- pt wants to cancel appt and reschedule for another day  6/7 pt r/s/instr. to sunita;-s7/14/23-pre-call completed-LS    HAND SURGERY Left 2011    thumb    HYSTERECTOMY  1980's    Total;    IMPLANTATION OF DEVICE FOR GLAUCOMA Right 3/17/2021    Procedure: INSERTION, DEVICE, FOR GLAUCOMA XEN GEL;  Surgeon: Yesica Mike MD;  Location: [address];  Service: Ophthalmology;  Laterality: Right;    QUINTEN GONIOTOMY Right 11/30/2016    WITH OSMANY ()    Left Breast Lumpectomy Left     performed in 1960s    OOPHORECTOMY      SELECTIVE LASER TRABECUPLASTY  05/2014    OU w/ Dr. Mike    TRABECULECTOMY Right 3/17/2021    Procedure:  TRABECULECTOMY/XEN GEL WITH MMC;  Surgeon: Yesica Mike MD;  [facility];  Service: Ophthalmology;  Laterality: Right;    TRABECULECTOMY Left 7/28/2021    Procedure: TRABECULECTOMY/ MMC and Xen OS;  Surgeon: Yesica Mike MD;  [facility];  Service: Ophthalmology;  Laterality: Left;    TUBAL LIGATION  1970's    VAGINAL DELIVERY      x3    YAG CAPSULOTOMY Left 11/29/2018           ALLERGIES AND MEDICATION:     Review of patient's allergies indicates:   Allergen Reactions    Lisinopril Swelling    Amlodipine Edema    Combigan [brimonidine-timolol]      Causes itchy eyelids and contact dermatitis    Cosopt [dorzolamide-timolol]      Causes angular blepharitis of eyelids    Pravastatin      Myalgia and elevated CPK        Medication List            Accurate as of March 11, 2025  1:59 PM. If you have any questions, ask your nurse or doctor.                START taking these medications      ezetimibe 10 mg tablet  Commonly known as: ZETIA  Take 1 tablet (10 mg total) by mouth once daily.  Started by: Julio Dye MD     isosorbide mononitrate 30 MG 24 hr tablet  Commonly known as: IMDUR  Take 1 tablet (30 mg total) by mouth once daily.  Started by: Julio Dye MD            CONTINUE taking these medications      acetaZOLAMIDE 250 MG tablet  Commonly known as: DIAMOX     ascorbic acid (vitamin C) 1000 MG tablet  Commonly known as: VITAMIN C     aspirin 81 MG EC tablet  Commonly known as: ECOTRIN     atorvastatin 40 MG tablet  Commonly known as: LIPITOR  Take 1 tablet (40 mg total) by mouth every evening.     azelastine 137 mcg (0.1 %) nasal spray  Commonly known as: ASTELIN  1 spray (137 mcg total) by Nasal route 2 (two) times daily.     dorzolamide-timolol 2-0.5% 22.3-6.8 mg/mL ophthalmic solution  Commonly known as: COSOPT  Place 1 drop into both eyes 2 (two) times daily.     hydroCHLOROthiazide 12.5 MG Tab  Take 1 tablet (12.5 mg total) by mouth once daily. Do not  take if taking diamox     irbesartan 150 MG tablet  Commonly known as: AVAPRO  Take 2 tablets (300 mg total) by mouth every evening.     LUMIGAN 0.01 % Drop  Generic drug: bimatoprost  Place 1 drop into both eyes every evening.     nitroGLYCERIN 0.4 MG SL tablet  Commonly known as: NITROSTAT  Place 1 tablet (0.4 mg total) under the tongue every 5 (five) minutes as needed for Chest pain.     RHOPRESSA 0.02 % ophthalmic solution  Generic drug: netarsudiL               Where to Get Your Medications        These medications were sent to [facility] [address] [place] [address] [address] [facility]  [address] [facility] [address]      Phone: [phone]   ezetimibe 10 mg tablet  isosorbide mononitrate 30 MG 24 hr tablet         SOCIAL HISTORY:     Social History     Socioeconomic History    Marital status:     Number of children: 3   Occupational History    Occupation: retired - formerly pastoral care with [employer]   Tobacco Use    Smoking status: Never     Passive exposure: Never    Smokeless tobacco: Never   Substance and Sexual Activity    Alcohol use: Not Currently    Drug use: No    Sexual activity: Yes     Partners: Female, Male     Birth control/protection: Post-menopausal, See Surgical Hx     Social Drivers of Health     Financial Resource Strain: Low Risk  (1/9/2025)    Overall Financial Resource Strain (CARDIA)     Difficulty of Paying Living Expenses: Not hard at all   Food Insecurity: No Food Insecurity (1/26/2025)    Received from [facility]    Hunger Vital Sign     Worried About Running Out of Food in the Last Year: Never true     Ran Out of Food in the Last Year: Never true   Transportation Needs: No Transportation Needs (1/26/2025)    Received from [facility]    PRAPARE - Transportation     Lack of Transportation (Medical): No     Lack of Transportation (Non-Medical): No   Physical Activity: Insufficiently Active (1/26/2025)    Received from [facility]    Exercise Vital Sign     Days of  "Exercise per Week: 1 day     Minutes of Exercise per Session: 20 min   Stress: No Stress Concern Present (1/26/2025)    Received from [facility] - Occupational Stress Questionnaire     Feeling of Stress : Not at all   Housing Stability: Unknown (1/26/2025)    Received from [facility]    Housing Stability Vital Sign     Unable to Pay for Housing in the Last Year: No       FAMILY HISTORY:     Family History   Problem Relation Name Age of Onset    Breast cancer Mother 78 y/o 50    Glaucoma Mother 78 y/o     Alzheimer's disease Mother 78 y/o     Blindness Mother 78 y/o     Cataracts Mother 78 y/o     Glaucoma Father 75 y/o     Prostate cancer Father 75 y/o     Blindness Father 75 y/o     Cataracts Father 75 y/o     Cancer Father 75 y/o 62        prostate    Breast cancer Sister 5 sisters     Glaucoma Sister 5 sisters     Alzheimer's disease Maternal Grandfather      No Known Problems Brother      No Known Problems Brother      Macular degeneration Neg Hx      Retinal detachment Neg Hx      Strabismus Neg Hx      Amblyopia Neg Hx      Heart attack Neg Hx      Heart disease Neg Hx      Ovarian cancer Neg Hx      Colon cancer Neg Hx         REVIEW OF SYSTEMS:   Review of Systems   Constitutional: Negative.    HENT: Negative.     Eyes: Negative.    Respiratory:  Positive for shortness of breath.    Cardiovascular:  Positive for chest pain.   Gastrointestinal: Negative.    Genitourinary: Negative.    Musculoskeletal: Negative.    Skin: Negative.    Neurological:  Positive for tingling.   Endo/Heme/Allergies: Negative.        A 10 point review of systems was performed and all the pertinent positives have been mentioned. Rest of review of systems was negative.        PHYSICAL EXAM:     Vitals:    03/11/25 1300   BP: (!) 167/89   Pulse: (!) 58   Resp: 15    Body mass index is 30.67 kg/m².  Weight: 83.6 kg (184 lb 4.9 oz)   Height: 5' 5" (165.1 cm)     Physical Exam  Vitals and " nursing note reviewed.   Constitutional:       Appearance: Normal appearance. She is well-developed.   HENT:      Head: Normocephalic and atraumatic.      Right Ear: Hearing normal.      Left Ear: Hearing normal.      Nose: Nose normal.   Eyes:      General: Lids are normal.      Conjunctiva/sclera: Conjunctivae normal.      Pupils: Pupils are equal, round, and reactive to light.   Cardiovascular:      Rate and Rhythm: Normal rate and regular rhythm.      Pulses: Normal pulses.      Heart sounds: Normal heart sounds.   Pulmonary:      Effort: Pulmonary effort is normal.      Breath sounds: Normal breath sounds.   Abdominal:      Palpations: Abdomen is soft.      Tenderness: There is no abdominal tenderness.   Musculoskeletal:         General: No deformity.      Cervical back: Normal range of motion and neck supple.   Skin:     General: Skin is warm and dry.   Neurological:      Mental Status: She is alert and oriented to person, place, and time.   Psychiatric:         Speech: Speech normal.           DATA:     Laboratory:  CBC:  Recent Labs   Lab 11/14/24  1458 02/10/25  1308 02/26/25  0023 02/26/25  0028   WBC 6.71 4.55 6.98  --    Hemoglobin 13.0 11.3 L 11.8 L  --    POC Hematocrit  --   --   --  35 L   Hematocrit 39.6 35.3 L 37.0  --    Platelets 326 270 270  --        CHEMISTRIES:  Recent Labs   Lab 11/08/24  1230 02/10/25  1308 02/26/25  0023   Glucose 82 81  81 112 H   Sodium 140 139  139 132 L   Potassium 4.2 4.5  4.5 4.0   BUN 11 11  11 9   Creatinine 0.7 0.7  0.7 0.7   Calcium 10.0 9.5  9.5 9.7       CARDIAC BIOMARKERS:  Recent Labs   Lab 07/26/23  0655 07/26/23  1020   Troponin I <0.006 <0.006         COAGS:        LIPIDS/LFTS:  Recent Labs   Lab 12/01/22  0908 07/26/23  0655 01/26/24  0918 11/08/24  1230 02/10/25  1308 02/26/25  0023   Cholesterol 117 L  --  123 132  --   --    Triglycerides 78  --  93 77  --   --    HDL 43  --  42 49  --   --    LDL Cholesterol 58.4 L  --  62.4 L 67.6  --   --     Non-HDL Cholesterol 74  --  81 83  --   --    AST 20   < > 19 17 18 21   ALT 27   < > 17 13 14 13    < > = values in this interval not displayed.       Hemoglobin A1C   Date Value Ref Range Status   02/10/2025 5.8 (H) 4.0 - 5.6 % Final     Comment:     ADA Screening Guidelines:  5.7-6.4%  Consistent with prediabetes  >or=6.5%  Consistent with diabetes    High levels of fetal hemoglobin interfere with the HbA1C  assay. Heterozygous hemoglobin variants (HbS, HgC, etc)do  not significantly interfere with this assay.   However, presence of multiple variants may affect accuracy.     11/08/2024 6.0 (H) 4.0 - 5.6 % Final     Comment:     ADA Screening Guidelines:  5.7-6.4%  Consistent with prediabetes  >or=6.5%  Consistent with diabetes    High levels of fetal hemoglobin interfere with the HbA1C  assay. Heterozygous hemoglobin variants (HbS, HgC, etc)do  not significantly interfere with this assay.   However, presence of multiple variants may affect accuracy.     01/26/2024 5.9 (H) 4.0 - 5.6 % Final     Comment:     ADA Screening Guidelines:  5.7-6.4%  Consistent with prediabetes  >or=6.5%  Consistent with diabetes    High levels of fetal hemoglobin interfere with the HbA1C  assay. Heterozygous hemoglobin variants (HbS, HgC, etc)do  not significantly interfere with this assay.   However, presence of multiple variants may affect accuracy.         TSH  Recent Labs   Lab 06/06/23  0946 08/22/24  1452 11/08/24  1230   TSH 2.428 1.747 1.895       The 10-year ASCVD risk score (Patricia BAUMAN, et al., 2019) is: 15.7%    Values used to calculate the score:      Age: 79 years      Sex: Female      Is Non- : Yes      Diabetic: No      Tobacco smoker: No      Systolic Blood Pressure: 167 mmHg      Is BP treated: Yes      HDL Cholesterol: 49 mg/dL      Total Cholesterol: 132 mg/dL           Cardiovascular Testing:    Reviewed      ASSESSMENT AND PLAN     Patient Active Problem List   Diagnosis    Essential  hypertension    Dyslipidemia; statin myalgias; 6/28/2018 exercise stress echo neg for ischemia    POAG (primary open-angle glaucoma)    Class 2 severe obesity due to excess calories with serious comorbidity in adult, unspecified BMI    Cataract    Nuclear sclerosis    Tubular adenoma of colon 3/28/22 colonoscopy 4 mm sigmoid TA    Bradycardia 6/2018 holter negative    Status post hysterectomy    Statin myopathy    Chronic bilateral low back pain without sciatica    Vitamin D deficiency    Thyroid nodule on US; followed by endo repeat 6/2026    Idiopathic peripheral neuropathy normal B12, TSH; A1c pre-DM. hx of lory changed to lyrica    Decreased independence with transfers    Impaired functional mobility, balance, gait, and endurance    Primary open angle glaucoma of right eye, mild stage    Primary open-angle glaucoma, left eye, moderate stage    Thoracic aortic atherosclerosis incidental on CXR 12/2022    CLARA (obstructive sleep apnea) no longer using CPAP    Esophageal dysphagia    Primary osteoarthritis of left knee    Chronic pain of left knee    Abnormal glucose       IMPRESSION:  Blood pressure well-controlled at home, possible white coat HTN.  LDL goal <55 mg/dL due to abnormal stress test; last LDL 67 mg/dL.  Recommend invasive coronary angiogram due to persistent chest pain despite medical management and abnormal stress test.  Discussed risks of angiogram procedure including infection, bleeding, heart attack, stroke, and death (<1% chance).  Added long-acting nitrate (Imdur) for daily use to prevent chest pain; patient to decide whether to start.  Started ezetimibe (Zetia) daily in addition to current atorvastatin regimen.    Risks, benefits and alternatives of the catheterization procedure were discussed with the patient.The risks of coronary angiography include but are not limited to: bleeding, infection, death, heart attack, arrhythmia, kidney injury or failure, potential need for dialysis, allergic  reactions, stroke, need for emergency surgery, hematoma, pseudoaneurysm etc.  Should stenting be indicated, the patient has agreed to dual anti-platelet therapy for 1-consecutive year with a drug-eluting stent and a minimum of 1-month with the use of a bare metal stent. Additionally, pt is aware that non-compliance is likely to result in stent clotting with heart attack, heart failure, and/or death  The risks of moderate sedation include hypotension, respiratory depression, arrhythmias, bronchospasm, and death. Informed consent was obtained and the  patient is agreeable to proceed with the procedure. Consent was placed on the chart.      PLAN SUMMARY:  - Added long-acting nitrate (Imdur) for daily use to prevent chest pain; patient to decide  - Scheduled invasive coronary angiogram for April 9th  - Continue plant-based diet  - Continue aspirin, HCTZ, herbicartin, and nitroglycerin as needed  - Started ezetimibe (Zetia) daily in addition to current atorvastatin regimen  - Continue atorvastatin 40 mg every other day  - Magdalene to sign consent forms and complete pre-op process prior to angiogram procedure    CHEST PAIN AND CORONARY ARTERY DISEASE:  - Explained difference between EF and coronary artery stenosis.  - Discussed stent durability and importance of ongoing lifestyle management post-stent placement.  - Added long-acting nitrate (Imdur) for daily use to prevent chest pain; patient to decide whether to start.  - Invasive coronary angiogram scheduled for April 9th.    ABNORMAL CARDIAC IMAGING FINDINGS:  - Magdalene to sign consent forms and complete pre-op process prior to procedure.    HYPERLIPIDEMIA MANAGEMENT:  - Started ezetimibe (Zetia) daily in addition to current atorvastatin regimen.  - Continued atorvastatin 40 mg every other day.    LONG-TERM MEDICATION USE:  - Continued aspirin, HCTZ, herbicartin, and nitroglycerin as needed.    LIFESTYLE MANAGEMENT:  - Magdalene to continue plant-based diet.        Lab Results   Component Value Date    LDLCALC 67.6 11/08/2024         FOLLOW-UP:  - Follow up after angiography          Thank you very much for involving me in the care of your patient.  Please do not hesitate to contact me if there are any questions.      Julio Dye MD, FAC, [facility]  Interventional Cardiologist, [facility].     Visit today included increased complexity associated with the care of the episodic problem sinus bradycardia, dyspnea on exertion, history of statin myopathy, dyslipidemia, aortic atherosclerosis addressed and managing the longitudinal care of the patient due to the serious and/or complex managed problem(s)   Patient Active Problem List   Diagnosis    Essential hypertension    Dyslipidemia; statin myalgias; 6/28/2018 exercise stress echo neg for ischemia    POAG (primary open-angle glaucoma)    Class 2 severe obesity due to excess calories with serious comorbidity in adult, unspecified BMI    Cataract    Nuclear sclerosis    Tubular adenoma of colon 3/28/22 colonoscopy 4 mm sigmoid TA    Bradycardia 6/2018 holter negative    Status post hysterectomy    Statin myopathy    Chronic bilateral low back pain without sciatica    Vitamin D deficiency    Thyroid nodule on US; followed by endo repeat 6/2026    Idiopathic peripheral neuropathy normal B12, TSH; A1c pre-DM. hx of lory changed to lyrica    Decreased independence with transfers    Impaired functional mobility, balance, gait, and endurance    Primary open angle glaucoma of right eye, mild stage    Primary open-angle glaucoma, left eye, moderate stage    Thoracic aortic atherosclerosis incidental on CXR 12/2022    CLARA (obstructive sleep apnea) no longer using CPAP    Esophageal dysphagia    Primary osteoarthritis of left knee    Chronic pain of left knee    Abnormal glucose     .     This note was generated with the assistance of ambient listening technology. Verbal consent was obtained by the patient and accompanying visitor(s)  for the recording of patient appointment to facilitate this note. I attest to having reviewed and edited the generated note for accuracy, though some syntax or spelling errors may persist. Please contact the author of this note for any clarification.      This note was dictated with the help of speech recognition software.  There might be un-intended errors and/or substitutions.

## 2025-03-11 NOTE — PROGRESS NOTES
CARDIOVASCULAR CONSULTATION    REASON FOR CONSULT:   Rosmery Ramirez is a 79 y.o. female who presents for follow-up.      HISTORY OF PRESENT ILLNESS:     Patient is a pleasant 74-year-old lady here for follow-up.  Denies any chest pains at rest on exertion, orthopnea, PND.  Complains of paresthesias in bilateral feet and requesting referral to Neurology for that.  Blood pressure well controlled.  No other cardiovascular complaints.     Notes from December 2020:  Patient here for follow-up.  Complains of occasional dizziness.  Can occur once a week or less frequently than that.  No postural relationship.  Denies any chest pains at rest on exertion, orthopnea, PND.      Notes from December 2022: Patient here for follow-up after a long hiatus.  Occasionally gets short of breath when she lays down.  X-ray showed aortic atherosclerosis.  Has been referred to Cardiology to rule out significant ischemia      Jan 23:  Patient here for follow-up.  Patient here for follow-up after testing.  Stress test did not show any significant ischemia    The estimated ejection fraction is 55%.  The left ventricle is normal in size with mild concentric hypertrophy and normal systolic function.  Grade I left ventricular diastolic dysfunction.  Normal right ventricular size with normal right ventricular systolic function.  Mild to moderate left atrial enlargement.  Mild mitral regurgitation.  Mild tricuspid regurgitation.  Intermediate central venous pressure (8 mmHg).  The estimated PA systolic pressure is 28 mmHg.         Normal myocardial perfusion scan. There is no evidence of myocardial ischemia or infarction.    There is a mild to moderate intensity perfusion abnormality in the apical wall of the left ventricle, secondary to breast attenuation.    The gated perfusion images showed an ejection fraction of 71% at rest.    There is normal wall motion at rest and post stress.    The ECG portion of the study is positive for  ischemia. Specificity is reduced secondary to hypertensive response.    The patient reported no chest pain during the stress test.    There were no arrhythmias during stress.    The patient exercised for 6 minutes 15 seconds on a Jorge protocol, corresponding to a functional capacity of 7 METS, achieving a peak heart rate of 142 bpm, which is 102 % of the age predicted maximum heart rate.    Notes from October 2023: Patient here for follow-up.  Has been doing fine.  Denies any chest pains at rest on exertion, orthopnea, PND.  Doing fine.    December 24:    History of Present Illness    CHIEF COMPLAINT:  - Rosmery presents with complaints of fatigue and lack of energy, with associated dyspnea on exertion.    HPI:  Rosmery reports fatigue for 6 months, at times significant enough to impair her ability to get out of bed. Her primary care physician conducted extensive lab work to investigate the cause. She was referred to an endocrinologist for thyroid evaluation due to a thyroid nodule. The endocrinologist diagnosed Hashimoto's disease. TSH levels were normal.    She reports dyspnea, particularly during housework such as mopping. She attempts to exercise by walking on a track, noting initial dyspnea but improved endurance as activity continues.    She reports occasional lightheadedness upon standing, described as unsteadiness.    A sleep study was conducted with normal results. The cause of fatigue remains undetermined, leading to this follow-up visit.    She denies chest pain, tightness, dizziness, or LOC.    MEDICATIONS:  - ASA  - Atorvastatin  - HCTZ  - Irbesartan    JAN 25:    History of Present Illness    CHIEF COMPLAINT:  Rosmery presents today with palpitations.    CARDIAC SYMPTOMS:  She experiences palpitations 2-3 times per day without specific pattern. These palpitations are not new, having previously resolved before recurring. She confirms experiencing palpitations while wearing the Holter monitor. She  also reports shortness of breath on exertion, particularly when bending over to perform work, describing it as weakness in her chest. She denies chest pain, pressure, or chest tightness.    MEDICATIONS:  She currently takes aspirin, atorvastatin (increased to 40 mg daily from alternate day dosing), and Irbisartan (increased to 300 mg at night). She was started on Diamox for glaucoma and discontinued hydrochlorothiazide.       Results for orders placed or performed during the hospital encounter of 02/26/25   EKG 12-lead    Collection Time: 02/25/25 10:33 PM   Result Value Ref Range    QRS Duration 90 ms    [facility] QTC Calculation 392 ms    Narrative    Test Reason : R07.9,    Vent. Rate :  58 BPM     Atrial Rate :  58 BPM     P-R Int : 184 ms          QRS Dur :  90 ms      QT Int : 400 ms       P-R-T Axes :  71  33  62 degrees    QTcB Int : 392 ms    Sinus bradycardia  Abnormal R wave progression in the precordial leads - Possible Anterior  infarct (cited on or before 06-Jan-2025)  Abnormal ECG  When compared with ECG of 06-Jan-2025 08:05,  No significant change was found  Confirmed by Farhad Mcdaniels ([id number]) on 2/26/2025 8:35:34 AM    Referred By: AAAREFERRAL SELF           Confirmed By: Farhad Mcdaniels       Results for orders placed during the hospital encounter of 01/06/25    Echo    Interpretation Summary    Left Ventricle: The left ventricle is normal in size. Normal wall thickness. There is normal systolic function with a visually estimated ejection fraction of 60 - 65%. There is normal diastolic function. Normal left ventricular filling pressure.    Right Ventricle: Normal right ventricular cavity size. Systolic function is normal.    Aortic Valve: There is mild aortic valve sclerosis.    Pulmonary Artery: The estimated pulmonary artery systolic pressure is 30 mmHg.    IVC/SVC: Normal venous pressure at 3 mmHg.      Results for orders placed during the hospital encounter of 01/06/25    Nuclear Stress - Cardiology  Interpreted    Interpretation Summary    Abnormal myocardial perfusion scan.    There is a mild to moderate intensity, small sized, reversible perfusion abnormality that is consistent with ischemia in the apical wall(s).    There are no other significant perfusion abnormalities.    The gated perfusion images showed an ejection fraction of 75% post stress.    The ECG portion of the study is negative for ischemia.    The patient reported no chest pain during the stress test.    There were no arrhythmias during stress.      No results found for this or any previous visit.    March 25:    CHIEF COMPLAINT:  Rosmery presents today for intermittent chest pain.    CHEST PAIN:  She experiences intermittent chest pain occurring at rest, specifically while sitting, and denies correlation with physical exertion. The pain resolves spontaneously within approximately one minute. Stress test was abnormal, indicating possible coronary artery blockage.    CARDIOVASCULAR:  She reports home blood pressure readings consistently in the 120s, measured every evening for the past two weeks.    MEDICATIONS:  Her current regimen includes Aspirin, Hydrochlorothiazide, herbicide, and Nitroglycerin as needed. She experienced severe leg pain with Atorvastatin 40mg daily and self-adjusted to taking it every other day, which resolved the leg pain.    DIET AND WEIGHT MANAGEMENT:  She has followed a plant-based diet for the last 3 months with occasional chicken consumption and has eliminated red meat. She reports significant weight loss and overall improvement in well-being due to these dietary changes.    MUSCULOSKELETAL:  She has a torn meniscus in her knee requiring surgical repair.           PAST MEDICAL HISTORY:     Past Medical History:   Diagnosis Date    Allergy     Anxiety     Breast cyst     Edema of leg 10/5/2012    bilateral     Fractures 2011    thumb R    GERD (gastroesophageal reflux disease)     Glaucoma     History of colonic  polyps     Hx-TIA (transient ischemic attack) 8/13/2012 Jan 2012: LLE and left facial numbness lasting 1 hour     Hyperlipidemia     Hypertension     Left shoulder tendonitis 8/8/2015    Primary open-angle glaucoma, mild stage - Both Eyes 6/3/2013       PAST SURGICAL HISTORY:     Past Surgical History:   Procedure Laterality Date    BREAST BIOPSY Left     core    BREAST SURGERY Left     lumpectomy    CATARACT EXTRACTION W/  INTRAOCULAR LENS IMPLANT Left 10/05/2016    Dr. Mike    CATARACT EXTRACTION W/  INTRAOCULAR LENS IMPLANT Right 11/30/2016    With Quinten (Dr. Mike)    COLONOSCOPY N/A 1/23/2017    Procedure: COLONOSCOPY;  Surgeon: Hany Mosquera MD;  Location: [facility] (4TH FLR);  Service: Endoscopy;  Laterality: N/A;    COLONOSCOPY N/A 3/28/2022    Procedure: COLONOSCOPY;  Surgeon: Jamison Chambers MD;  Location: [facility] (4TH FLR);  Service: Endoscopy;  Laterality: N/A;  fully vaccinated   instructions to portal-st  3/22 arrival time confirmed with pt/COVID test cancelled-RB    ESOPHAGOGASTRODUODENOSCOPY N/A 7/20/2023    Procedure: EGD (ESOPHAGOGASTRODUODENOSCOPY);  Surgeon: Jorge L Thompson MD;  Location: [facility] (4TH FLR);  Service: Endoscopy;  Laterality: N/A;  inst handed to pre-call pt needs to be rescheduled to new day 03/21 bm.  4/26/23- precall- pt wants to cancel appt and reschedule for another day  6/7 pt r/s/instr. to sunita;-s7/14/23-pre-call completed-LS    HAND SURGERY Left 2011    thumb    HYSTERECTOMY  1980's    Total;    IMPLANTATION OF DEVICE FOR GLAUCOMA Right 3/17/2021    Procedure: INSERTION, DEVICE, FOR GLAUCOMA XEN GEL;  Surgeon: Yesica Mike MD;  Location: [address];  Service: Ophthalmology;  Laterality: Right;    QUINTEN GONIOTOMY Right 11/30/2016    WITH OSMANY ()    Left Breast Lumpectomy Left     performed in 1960s    OOPHORECTOMY      SELECTIVE LASER TRABECUPLASTY  05/2014    OU w/ Dr. Mike    TRABECULECTOMY Right 3/17/2021    Procedure:  TRABECULECTOMY/XEN GEL WITH MMC;  Surgeon: Yesica Mike MD;  [facility];  Service: Ophthalmology;  Laterality: Right;    TRABECULECTOMY Left 7/28/2021    Procedure: TRABECULECTOMY/ MMC and Xen OS;  Surgeon: Yesica Mike MD;  [facility];  Service: Ophthalmology;  Laterality: Left;    TUBAL LIGATION  1970's    VAGINAL DELIVERY      x3    YAG CAPSULOTOMY Left 11/29/2018           ALLERGIES AND MEDICATION:     Review of patient's allergies indicates:   Allergen Reactions    Lisinopril Swelling    Amlodipine Edema    Combigan [brimonidine-timolol]      Causes itchy eyelids and contact dermatitis    Cosopt [dorzolamide-timolol]      Causes angular blepharitis of eyelids    Pravastatin      Myalgia and elevated CPK        Medication List            Accurate as of March 11, 2025  1:59 PM. If you have any questions, ask your nurse or doctor.                START taking these medications      ezetimibe 10 mg tablet  Commonly known as: ZETIA  Take 1 tablet (10 mg total) by mouth once daily.  Started by: Julio Dye MD     isosorbide mononitrate 30 MG 24 hr tablet  Commonly known as: IMDUR  Take 1 tablet (30 mg total) by mouth once daily.  Started by: Julio Dye MD            CONTINUE taking these medications      acetaZOLAMIDE 250 MG tablet  Commonly known as: DIAMOX     ascorbic acid (vitamin C) 1000 MG tablet  Commonly known as: VITAMIN C     aspirin 81 MG EC tablet  Commonly known as: ECOTRIN     atorvastatin 40 MG tablet  Commonly known as: LIPITOR  Take 1 tablet (40 mg total) by mouth every evening.     azelastine 137 mcg (0.1 %) nasal spray  Commonly known as: ASTELIN  1 spray (137 mcg total) by Nasal route 2 (two) times daily.     dorzolamide-timolol 2-0.5% 22.3-6.8 mg/mL ophthalmic solution  Commonly known as: COSOPT  Place 1 drop into both eyes 2 (two) times daily.     hydroCHLOROthiazide 12.5 MG Tab  Take 1 tablet (12.5 mg total) by mouth once daily. Do not  take if taking diamox     irbesartan 150 MG tablet  Commonly known as: AVAPRO  Take 2 tablets (300 mg total) by mouth every evening.     LUMIGAN 0.01 % Drop  Generic drug: bimatoprost  Place 1 drop into both eyes every evening.     nitroGLYCERIN 0.4 MG SL tablet  Commonly known as: NITROSTAT  Place 1 tablet (0.4 mg total) under the tongue every 5 (five) minutes as needed for Chest pain.     RHOPRESSA 0.02 % ophthalmic solution  Generic drug: netarsudiL               Where to Get Your Medications        These medications were sent to [facility] [address] [place] [address] [address] [facility]  [address] [facility] [address]      Phone: [phone]   ezetimibe 10 mg tablet  isosorbide mononitrate 30 MG 24 hr tablet         SOCIAL HISTORY:     Social History     Socioeconomic History    Marital status:     Number of children: 3   Occupational History    Occupation: retired - formerly pastoral care with [employer]   Tobacco Use    Smoking status: Never     Passive exposure: Never    Smokeless tobacco: Never   Substance and Sexual Activity    Alcohol use: Not Currently    Drug use: No    Sexual activity: Yes     Partners: Female, Male     Birth control/protection: Post-menopausal, See Surgical Hx     Social Drivers of Health     Financial Resource Strain: Low Risk  (1/9/2025)    Overall Financial Resource Strain (CARDIA)     Difficulty of Paying Living Expenses: Not hard at all   Food Insecurity: No Food Insecurity (1/26/2025)    Received from [facility]    Hunger Vital Sign     Worried About Running Out of Food in the Last Year: Never true     Ran Out of Food in the Last Year: Never true   Transportation Needs: No Transportation Needs (1/26/2025)    Received from [facility]    PRAPARE - Transportation     Lack of Transportation (Medical): No     Lack of Transportation (Non-Medical): No   Physical Activity: Insufficiently Active (1/26/2025)    Received from [facility]    Exercise Vital Sign     Days of  "Exercise per Week: 1 day     Minutes of Exercise per Session: 20 min   Stress: No Stress Concern Present (1/26/2025)    Received from [facility] - Occupational Stress Questionnaire     Feeling of Stress : Not at all   Housing Stability: Unknown (1/26/2025)    Received from [facility]    Housing Stability Vital Sign     Unable to Pay for Housing in the Last Year: No       FAMILY HISTORY:     Family History   Problem Relation Name Age of Onset    Breast cancer Mother 78 y/o 50    Glaucoma Mother 78 y/o     Alzheimer's disease Mother 78 y/o     Blindness Mother 78 y/o     Cataracts Mother 78 y/o     Glaucoma Father 75 y/o     Prostate cancer Father 75 y/o     Blindness Father 75 y/o     Cataracts Father 75 y/o     Cancer Father 75 y/o 62        prostate    Breast cancer Sister 5 sisters     Glaucoma Sister 5 sisters     Alzheimer's disease Maternal Grandfather      No Known Problems Brother      No Known Problems Brother      Macular degeneration Neg Hx      Retinal detachment Neg Hx      Strabismus Neg Hx      Amblyopia Neg Hx      Heart attack Neg Hx      Heart disease Neg Hx      Ovarian cancer Neg Hx      Colon cancer Neg Hx         REVIEW OF SYSTEMS:   Review of Systems   Constitutional: Negative.    HENT: Negative.     Eyes: Negative.    Respiratory:  Positive for shortness of breath.    Cardiovascular:  Positive for chest pain.   Gastrointestinal: Negative.    Genitourinary: Negative.    Musculoskeletal: Negative.    Skin: Negative.    Neurological:  Positive for tingling.   Endo/Heme/Allergies: Negative.        A 10 point review of systems was performed and all the pertinent positives have been mentioned. Rest of review of systems was negative.        PHYSICAL EXAM:     Vitals:    03/11/25 1300   BP: (!) 167/89   Pulse: (!) 58   Resp: 15    Body mass index is 30.67 kg/m².  Weight: 83.6 kg (184 lb 4.9 oz)   Height: 5' 5" (165.1 cm)     Physical Exam  Vitals and " nursing note reviewed.   Constitutional:       Appearance: Normal appearance. She is well-developed.   HENT:      Head: Normocephalic and atraumatic.      Right Ear: Hearing normal.      Left Ear: Hearing normal.      Nose: Nose normal.   Eyes:      General: Lids are normal.      Conjunctiva/sclera: Conjunctivae normal.      Pupils: Pupils are equal, round, and reactive to light.   Cardiovascular:      Rate and Rhythm: Normal rate and regular rhythm.      Pulses: Normal pulses.      Heart sounds: Normal heart sounds.   Pulmonary:      Effort: Pulmonary effort is normal.      Breath sounds: Normal breath sounds.   Abdominal:      Palpations: Abdomen is soft.      Tenderness: There is no abdominal tenderness.   Musculoskeletal:         General: No deformity.      Cervical back: Normal range of motion and neck supple.   Skin:     General: Skin is warm and dry.   Neurological:      Mental Status: She is alert and oriented to person, place, and time.   Psychiatric:         Speech: Speech normal.           DATA:     Laboratory:  CBC:  Recent Labs   Lab 11/14/24  1458 02/10/25  1308 02/26/25  0023 02/26/25  0028   WBC 6.71 4.55 6.98  --    Hemoglobin 13.0 11.3 L 11.8 L  --    POC Hematocrit  --   --   --  35 L   Hematocrit 39.6 35.3 L 37.0  --    Platelets 326 270 270  --        CHEMISTRIES:  Recent Labs   Lab 11/08/24  1230 02/10/25  1308 02/26/25  0023   Glucose 82 81  81 112 H   Sodium 140 139  139 132 L   Potassium 4.2 4.5  4.5 4.0   BUN 11 11  11 9   Creatinine 0.7 0.7  0.7 0.7   Calcium 10.0 9.5  9.5 9.7       CARDIAC BIOMARKERS:  Recent Labs   Lab 07/26/23  0655 07/26/23  1020   Troponin I <0.006 <0.006         COAGS:        LIPIDS/LFTS:  Recent Labs   Lab 12/01/22  0908 07/26/23  0655 01/26/24  0918 11/08/24  1230 02/10/25  1308 02/26/25  0023   Cholesterol 117 L  --  123 132  --   --    Triglycerides 78  --  93 77  --   --    HDL 43  --  42 49  --   --    LDL Cholesterol 58.4 L  --  62.4 L 67.6  --   --     Non-HDL Cholesterol 74  --  81 83  --   --    AST 20   < > 19 17 18 21   ALT 27   < > 17 13 14 13    < > = values in this interval not displayed.       Hemoglobin A1C   Date Value Ref Range Status   02/10/2025 5.8 (H) 4.0 - 5.6 % Final     Comment:     ADA Screening Guidelines:  5.7-6.4%  Consistent with prediabetes  >or=6.5%  Consistent with diabetes    High levels of fetal hemoglobin interfere with the HbA1C  assay. Heterozygous hemoglobin variants (HbS, HgC, etc)do  not significantly interfere with this assay.   However, presence of multiple variants may affect accuracy.     11/08/2024 6.0 (H) 4.0 - 5.6 % Final     Comment:     ADA Screening Guidelines:  5.7-6.4%  Consistent with prediabetes  >or=6.5%  Consistent with diabetes    High levels of fetal hemoglobin interfere with the HbA1C  assay. Heterozygous hemoglobin variants (HbS, HgC, etc)do  not significantly interfere with this assay.   However, presence of multiple variants may affect accuracy.     01/26/2024 5.9 (H) 4.0 - 5.6 % Final     Comment:     ADA Screening Guidelines:  5.7-6.4%  Consistent with prediabetes  >or=6.5%  Consistent with diabetes    High levels of fetal hemoglobin interfere with the HbA1C  assay. Heterozygous hemoglobin variants (HbS, HgC, etc)do  not significantly interfere with this assay.   However, presence of multiple variants may affect accuracy.         TSH  Recent Labs   Lab 06/06/23  0946 08/22/24  1452 11/08/24  1230   TSH 2.428 1.747 1.895       The 10-year ASCVD risk score (Patricia BAUMAN, et al., 2019) is: 15.7%    Values used to calculate the score:      Age: 79 years      Sex: Female      Is Non- : Yes      Diabetic: No      Tobacco smoker: No      Systolic Blood Pressure: 167 mmHg      Is BP treated: Yes      HDL Cholesterol: 49 mg/dL      Total Cholesterol: 132 mg/dL           Cardiovascular Testing:    Reviewed      ASSESSMENT AND PLAN     Patient Active Problem List   Diagnosis    Essential  hypertension    Dyslipidemia; statin myalgias; 6/28/2018 exercise stress echo neg for ischemia    POAG (primary open-angle glaucoma)    Class 2 severe obesity due to excess calories with serious comorbidity in adult, unspecified BMI    Cataract    Nuclear sclerosis    Tubular adenoma of colon 3/28/22 colonoscopy 4 mm sigmoid TA    Bradycardia 6/2018 holter negative    Status post hysterectomy    Statin myopathy    Chronic bilateral low back pain without sciatica    Vitamin D deficiency    Thyroid nodule on US; followed by endo repeat 6/2026    Idiopathic peripheral neuropathy normal B12, TSH; A1c pre-DM. hx of lory changed to lyrica    Decreased independence with transfers    Impaired functional mobility, balance, gait, and endurance    Primary open angle glaucoma of right eye, mild stage    Primary open-angle glaucoma, left eye, moderate stage    Thoracic aortic atherosclerosis incidental on CXR 12/2022    CLARA (obstructive sleep apnea) no longer using CPAP    Esophageal dysphagia    Primary osteoarthritis of left knee    Chronic pain of left knee    Abnormal glucose       IMPRESSION:  Blood pressure well-controlled at home, possible white coat HTN.  LDL goal <55 mg/dL due to abnormal stress test; last LDL 67 mg/dL.  Recommend invasive coronary angiogram due to persistent chest pain despite medical management and abnormal stress test.  Discussed risks of angiogram procedure including infection, bleeding, heart attack, stroke, and death (<1% chance).  Added long-acting nitrate (Imdur) for daily use to prevent chest pain; patient to decide whether to start.  Started ezetimibe (Zetia) daily in addition to current atorvastatin regimen.    Risks, benefits and alternatives of the catheterization procedure were discussed with the patient.The risks of coronary angiography include but are not limited to: bleeding, infection, death, heart attack, arrhythmia, kidney injury or failure, potential need for dialysis, allergic  reactions, stroke, need for emergency surgery, hematoma, pseudoaneurysm etc.  Should stenting be indicated, the patient has agreed to dual anti-platelet therapy for 1-consecutive year with a drug-eluting stent and a minimum of 1-month with the use of a bare metal stent. Additionally, pt is aware that non-compliance is likely to result in stent clotting with heart attack, heart failure, and/or death  The risks of moderate sedation include hypotension, respiratory depression, arrhythmias, bronchospasm, and death. Informed consent was obtained and the  patient is agreeable to proceed with the procedure. Consent was placed on the chart.      PLAN SUMMARY:  - Added long-acting nitrate (Imdur) for daily use to prevent chest pain; patient to decide  - Scheduled invasive coronary angiogram for April 9th  - Continue plant-based diet  - Continue aspirin, HCTZ, herbicartin, and nitroglycerin as needed  - Started ezetimibe (Zetia) daily in addition to current atorvastatin regimen  - Continue atorvastatin 40 mg every other day  - Magdalene to sign consent forms and complete pre-op process prior to angiogram procedure    CHEST PAIN AND CORONARY ARTERY DISEASE:  - Explained difference between EF and coronary artery stenosis.  - Discussed stent durability and importance of ongoing lifestyle management post-stent placement.  - Added long-acting nitrate (Imdur) for daily use to prevent chest pain; patient to decide whether to start.  - Invasive coronary angiogram scheduled for April 9th.    ABNORMAL CARDIAC IMAGING FINDINGS:  - Magdalene to sign consent forms and complete pre-op process prior to procedure.    HYPERLIPIDEMIA MANAGEMENT:  - Started ezetimibe (Zetia) daily in addition to current atorvastatin regimen.  - Continued atorvastatin 40 mg every other day.    LONG-TERM MEDICATION USE:  - Continued aspirin, HCTZ, herbicartin, and nitroglycerin as needed.    LIFESTYLE MANAGEMENT:  - Magdalene to continue plant-based diet.        Lab Results   Component Value Date    LDLCALC 67.6 11/08/2024         FOLLOW-UP:  - Follow up after angiography          Thank you very much for involving me in the care of your patient.  Please do not hesitate to contact me if there are any questions.      Julio Dye MD, FAC, [facility]  Interventional Cardiologist, [facility].     Visit today included increased complexity associated with the care of the episodic problem sinus bradycardia, dyspnea on exertion, history of statin myopathy, dyslipidemia, aortic atherosclerosis addressed and managing the longitudinal care of the patient due to the serious and/or complex managed problem(s)   Patient Active Problem List   Diagnosis    Essential hypertension    Dyslipidemia; statin myalgias; 6/28/2018 exercise stress echo neg for ischemia    POAG (primary open-angle glaucoma)    Class 2 severe obesity due to excess calories with serious comorbidity in adult, unspecified BMI    Cataract    Nuclear sclerosis    Tubular adenoma of colon 3/28/22 colonoscopy 4 mm sigmoid TA    Bradycardia 6/2018 holter negative    Status post hysterectomy    Statin myopathy    Chronic bilateral low back pain without sciatica    Vitamin D deficiency    Thyroid nodule on US; followed by endo repeat 6/2026    Idiopathic peripheral neuropathy normal B12, TSH; A1c pre-DM. hx of lory changed to lyrica    Decreased independence with transfers    Impaired functional mobility, balance, gait, and endurance    Primary open angle glaucoma of right eye, mild stage    Primary open-angle glaucoma, left eye, moderate stage    Thoracic aortic atherosclerosis incidental on CXR 12/2022    CLARA (obstructive sleep apnea) no longer using CPAP    Esophageal dysphagia    Primary osteoarthritis of left knee    Chronic pain of left knee    Abnormal glucose     .     This note was generated with the assistance of ambient listening technology. Verbal consent was obtained by the patient and accompanying visitor(s)  for the recording of patient appointment to facilitate this note. I attest to having reviewed and edited the generated note for accuracy, though some syntax or spelling errors may persist. Please contact the author of this note for any clarification.      This note was dictated with the help of speech recognition software.  There might be un-intended errors and/or substitutions.

## 2025-03-24 ENCOUNTER — PATIENT MESSAGE (OUTPATIENT)
Dept: ADMINISTRATIVE | Facility: HOSPITAL | Age: 80
End: 2025-03-24
Payer: MEDICARE

## 2025-03-25 ENCOUNTER — HOSPITAL ENCOUNTER (OUTPATIENT)
Dept: RADIOLOGY | Facility: HOSPITAL | Age: 80
Discharge: HOME | End: 2025-03-25
Attending: ORTHOPAEDIC SURGERY
Payer: MEDICARE

## 2025-03-25 ENCOUNTER — PATIENT OUTREACH (OUTPATIENT)
Dept: ADMINISTRATIVE | Facility: HOSPITAL | Age: 80
End: 2025-03-25
Payer: MEDICARE

## 2025-03-25 ENCOUNTER — PATIENT MESSAGE (OUTPATIENT)
Dept: FAMILY MEDICINE | Facility: CLINIC | Age: 80
End: 2025-03-25
Payer: MEDICARE

## 2025-03-25 ENCOUNTER — OFFICE VISIT (OUTPATIENT)
Dept: SPORTS MEDICINE | Facility: CLINIC | Age: 80
End: 2025-03-25
Payer: MEDICARE

## 2025-03-25 VITALS
DIASTOLIC BLOOD PRESSURE: 74 MMHG | BODY MASS INDEX: 30.66 KG/M2 | HEIGHT: 65 IN | WEIGHT: 184 LBS | HEART RATE: 57 BPM | SYSTOLIC BLOOD PRESSURE: 155 MMHG

## 2025-03-25 VITALS — SYSTOLIC BLOOD PRESSURE: 116 MMHG | DIASTOLIC BLOOD PRESSURE: 66 MMHG

## 2025-03-25 DIAGNOSIS — M25.562 LEFT KNEE PAIN, UNSPECIFIED CHRONICITY: ICD-10-CM

## 2025-03-25 DIAGNOSIS — M25.562 LEFT KNEE PAIN, UNSPECIFIED CHRONICITY: Primary | ICD-10-CM

## 2025-03-25 DIAGNOSIS — S83.272A COMPLEX TEAR OF LATERAL MENISCUS OF LEFT KNEE AS CURRENT INJURY, INITIAL ENCOUNTER: ICD-10-CM

## 2025-03-25 PROCEDURE — 99203 OFFICE O/P NEW LOW 30 MIN: CPT | Mod: S$GLB,,, | Performed by: ORTHOPAEDIC SURGERY

## 2025-03-25 PROCEDURE — 3077F SYST BP >= 140 MM HG: CPT | Mod: CPTII,S$GLB,, | Performed by: ORTHOPAEDIC SURGERY

## 2025-03-25 PROCEDURE — 3078F DIAST BP <80 MM HG: CPT | Mod: CPTII,S$GLB,, | Performed by: ORTHOPAEDIC SURGERY

## 2025-03-25 PROCEDURE — 73564 X-RAY EXAM KNEE 4 OR MORE: CPT | Mod: TC,50

## 2025-03-25 PROCEDURE — 73564 X-RAY EXAM KNEE 4 OR MORE: CPT | Mod: 26,50,, | Performed by: RADIOLOGY

## 2025-03-25 PROCEDURE — 1159F MED LIST DOCD IN RCRD: CPT | Mod: CPTII,S$GLB,, | Performed by: ORTHOPAEDIC SURGERY

## 2025-03-25 PROCEDURE — 99999 PR PBB SHADOW E&M-EST. PATIENT-LVL III: CPT | Mod: PBBFAC,,, | Performed by: ORTHOPAEDIC SURGERY

## 2025-03-25 PROCEDURE — 1125F AMNT PAIN NOTED PAIN PRSNT: CPT | Mod: CPTII,S$GLB,, | Performed by: ORTHOPAEDIC SURGERY

## 2025-03-25 NOTE — PROGRESS NOTES
CC: Left knee pain    79 y.o. Female with a 3 month history of atraumatic left knee pain.  Patient is retired. She was diagnosed with OA Knee and received 3 x Euflexxa injections a month ago which relieved her arthritic knee pain. She however complains of lateral knee pain. She has tried 10 PT sessions which were not helpful. She was prescribed Oral NSAIDs but could not take them due to gastritis.   She states that the pain is severe and not responding to any conservative care.      She reports that the pain and weakness. It also bothers her at night.    - mechanical symptoms, - instability    Is affecting ADLs.  Pain is 8/10 at it's worst.    REVIEW OF SYSTEMS:  Constitution: Negative. Negative for chills, fever and night sweats.   HENT: Negative for congestion and headaches.    Eyes: Negative for blurred vision, left vision loss and right vision loss.   Cardiovascular: Negative for chest pain and syncope.   Respiratory: Negative for cough and shortness of breath.    Endocrine: Negative for polydipsia, polyphagia and polyuria.   Hematologic/Lymphatic: Negative for bleeding problem. Does not bruise/bleed easily.   Skin: Negative for dry skin, itching and rash.   Musculoskeletal: Negative for falls. Positive for left knee pain and  muscle weakness.   Gastrointestinal: Negative for abdominal pain and bowel incontinence.   Genitourinary: Negative for bladder incontinence and nocturia.   Neurological: Negative for disturbances in coordination, loss of balance and seizures.   Psychiatric/Behavioral: Negative for depression. The patient does not have insomnia.    Allergic/Immunologic: Negative for hives and persistent infections.     PAST MEDICAL HISTORY:    Past Medical History:   Diagnosis Date    Allergy     Anxiety     Breast cyst     Edema of leg 10/5/2012    bilateral     Fractures 2011    thumb R    GERD (gastroesophageal reflux disease)     Glaucoma     History of colonic polyps     Hx-TIA (transient ischemic  attack) 8/13/2012 Jan 2012: LLE and left facial numbness lasting 1 hour     Hyperlipidemia     Hypertension     Left shoulder tendonitis 8/8/2015    Primary open-angle glaucoma, mild stage - Both Eyes 6/3/2013       PAST SURGICAL HISTORY:   Past Surgical History:   Procedure Laterality Date    BREAST BIOPSY Left     core    BREAST SURGERY Left     lumpectomy    CATARACT EXTRACTION W/  INTRAOCULAR LENS IMPLANT Left 10/05/2016    Dr. Mike    CATARACT EXTRACTION W/  INTRAOCULAR LENS IMPLANT Right 11/30/2016    With [facility] (Dr. Mike)    COLONOSCOPY N/A 1/23/2017    Procedure: COLONOSCOPY;  Surgeon: Hany Mosquera MD;  Location: [facility] ENDO (4TH FLR);  Service: Endoscopy;  Laterality: N/A;    COLONOSCOPY N/A 3/28/2022    Procedure: COLONOSCOPY;  Surgeon: Jamison Chambers MD;  Location: [facility] (4TH FLR);  Service: Endoscopy;  Laterality: N/A;  fully vaccinated   instructions to portal-st  3/22 arrival time confirmed with pt/COVID test cancelled-RB    ESOPHAGOGASTRODUODENOSCOPY N/A 7/20/2023    Procedure: EGD (ESOPHAGOGASTRODUODENOSCOPY);  Surgeon: Jorge L Thompson MD;  Location: [facility] (4TH FLR);  Service: Endoscopy;  Laterality: N/A;  inst handed to pre-call pt needs to be rescheduled to new day 03/21 bm.  4/26/23- precall- pt wants to cancel appt and reschedule for another day  6/7 pt r/s/instr. to sunita;-s7/14/23-pre-call completed-LS    HAND SURGERY Left 2011    thumb    HYSTERECTOMY  1980's    Total;    IMPLANTATION OF DEVICE FOR GLAUCOMA Right 3/17/2021    Procedure: INSERTION, DEVICE, FOR GLAUCOMA XEN GEL;  Surgeon: Yesica Mike MD;  [facility];  Service: Ophthalmology;  Laterality: Right;    JUDIOOK GONIOTOMY Right 11/30/2016    WITH OSMANY ()    Left Breast Lumpectomy Left     performed in 1960s    OOPHORECTOMY      SELECTIVE LASER TRABECUPLASTY  05/2014    OU w/ Dr. Mike    TRABECULECTOMY Right 3/17/2021    Procedure: TRABECULECTOMY/XEN GEL WITH MMC;  Surgeon:  "Yesica Mike MD;  Location: [address];  Service: Ophthalmology;  Laterality: Right;    TRABECULECTOMY Left 7/28/2021    Procedure: TRABECULECTOMY/ MMC and Xen OS;  Surgeon: Yesica Mike MD;  Location: [facility] OR [address] [place] [address];  Service: Ophthalmology;  Laterality: Left;    TUBAL LIGATION  1970's    VAGINAL DELIVERY      x3    YAG CAPSULOTOMY Left 11/29/2018           FAMILY HISTORY:   Family History   Problem Relation Name Age of Onset    Breast cancer Mother 78 y/o 50    Glaucoma Mother 78 y/o     Alzheimer's disease Mother 78 y/o     Blindness Mother 78 y/o     Cataracts Mother 78 y/o     Glaucoma Father 75 y/o     Prostate cancer Father 75 y/o     Blindness Father 75 y/o     Cataracts Father 75 y/o     Cancer Father 75 y/o 62        prostate    Breast cancer Sister 5 sisters     Glaucoma Sister 5 sisters     Alzheimer's disease Maternal Grandfather      No Known Problems Brother      No Known Problems Brother      Macular degeneration Neg Hx      Retinal detachment Neg Hx      Strabismus Neg Hx      Amblyopia Neg Hx      Heart attack Neg Hx      Heart disease Neg Hx      Ovarian cancer Neg Hx      Colon cancer Neg Hx         SOCIAL HISTORY:   Social History[1]    MEDICATIONS:   Current Medications[2]    ALLERGIES:   Review of patient's allergies indicates:   Allergen Reactions    Lisinopril Swelling    Amlodipine Edema    Combigan [brimonidine-timolol]      Causes itchy eyelids and contact dermatitis    Cosopt [dorzolamide-timolol]      Causes angular blepharitis of eyelids    Pravastatin      Myalgia and elevated CPK       VITAL SIGNS:   BP (!) 155/74 (Patient Position: Sitting)   Pulse (!) 57   Ht 5' 5" (1.651 m)   Wt 83.4 kg (183 lb 15.6 oz)   BMI 30.61 kg/m²      PHYSICAL EXAMINATION  General:  The patient is alert and oriented x 3.  Mood is pleasant.  Observation of ears, eyes and nose reveal no gross abnormalities.  No labored breathing observed.    LEFT KNEE EXAMINATION "     OBSERVATION / INSPECTION   Gait:   antalgic right  Alignment:  Neutral   Scars:   None   Muscle atrophy: Mild  Effusion:  None   Warmth:  None   Discoloration:   none     TENDERNESS / CREPITUS (T / C):          T / C      T / C   Patella   - / -   Lateral joint line   +/ -   Peripatellar medial  -  Medial joint line    - / -   Peripatellar lateral -  Medial plica   - / -   Patellar tendon -   Popliteal fossa  - / -   Quad tendon   -   Gastrocnemius   -   Prepatellar Bursa - / -   Quadricep   -   Tibial tubercle  -  Thigh/hamstring  -   Pes anserine/HS -  Fibula    -   ITB   - / -  Tibia     -   Tib/fib joint  - / -  LCL    -     MFC   - / -   MCL: Proximal  -    LFC   - / -    Distal   -          ROM: (* = pain)  PASSIVE   ACTIVE    Left :    0 / 145                          0 / 145     Right :     0 / 145     *0 / 145    Patellofemoral examination:  See above noted areas of tenderness.   Patella position    Subluxation / dislocation: Centered           Sup. / Inf;   Normal   Crepitus (PF):    Absent   Patellar Mobility:       Medial-lateral:   Normal    Superior-inferior:  Normal    Inferior tilt   Normal    Patellar tendon:  Normal   Lateral tilt:    Normal   J-sign:     None   Patellofemoral grind:   No pain       MENISCAL SIGNS:     Pain on terminal extension:  +  Pain on terminal flexion:  +  Donatos maneuver:  - (for pain)  Squat     - (for pain)    LIGAMENT EXAMINATION:  ACL / Lachman:  normal (-1 to 2mm)    PCL-Post.  drawer: normal 0 to 2mm  MCL- Valgus:  normal 0 to 2mm  LCL- Varus:  normal 0 to 2mm  Pivot shift: normal (Equal)   Dial Test: difference c/w other side   At 30° flexion: normal (< 5°)    At 90° flexion: normal (< 5°)   Reverse Pivot Shift:   normal (Equal)     STRENGTH: (* = with pain) PAINFUL SIDE   Quadricep   5/5   Hamstrin/5    EXTREMITY NEURO-VASCULAR EXAMINATION:   Sensation:  Grossly intact to light touch all dermatomal regions.   Motor Function:  Fully intact motor  function at hip, knee, foot and ankle    DTRs;  quadriceps and  achilles 2+.  No clonus and downgoing Babinski.    Vascular status:  DP and PT pulses 2+, brisk capillary refill, symmetric.     OTHER FINDINGS:  -    IMAGING:     X-rays including standing, weight bearing AP and flexion bilateral knees, lateral and merchant views ordered and images reviewed by me show:    No fracture, dislocation or other pathology   Medial compartment: no degenerative changes   Lateral compartment: no degenerative changes   Patellofemoral compartment: no degenerative changes    MRI Left Knee 12/24 done at outside facility (only report available - no images) shows complex tear of lateral meniscus.     ASSESSMENT:    Left Knee  Pain, possible   1. Left knee pain, unspecified chronicity    2. Complex tear of lateral meniscus of left knee as current injury, initial encounter         PLAN:   1. Patient is due for a cardiac procedure soon.   2. She will reviewed in clinic PRN  3. If she is not keen on proceeding with a TKA then we may consider a CSI.     All questions were answered, pt will contact us for questions or concerns in the interim.                   [1]   Social History  Socioeconomic History    Marital status:     Number of children: 3   Occupational History    Occupation: retired - formerly pastoral care with [employer]   Tobacco Use    Smoking status: Never     Passive exposure: Never    Smokeless tobacco: Never   Substance and Sexual Activity    Alcohol use: Not Currently    Drug use: No    Sexual activity: Yes     Partners: Female, Male     Birth control/protection: Post-menopausal, See Surgical Hx     Social Drivers of Health     Financial Resource Strain: Low Risk  (1/9/2025)    Overall Financial Resource Strain (CARDIA)     Difficulty of Paying Living Expenses: Not hard at all   Food Insecurity: No Food Insecurity (1/26/2025)    Received from [facility]    Hunger Vital Sign     Worried About Running Out of  Food in the Last Year: Never true     Ran Out of Food in the Last Year: Never true   Transportation Needs: No Transportation Needs (1/26/2025)    Received from [facility]    PRAPARE - Transportation     Lack of Transportation (Medical): No     Lack of Transportation (Non-Medical): No   Physical Activity: Insufficiently Active (1/26/2025)    Received from [facility]    Exercise Vital Sign     Days of Exercise per Week: 1 day     Minutes of Exercise per Session: 20 min   Stress: No Stress Concern Present (1/26/2025)    Received from [facility]    [employer] - Occupational Stress Questionnaire     Feeling of Stress : Not at all   Housing Stability: Unknown (1/26/2025)    Received from [facility]    Housing Stability Vital Sign     Unable to Pay for Housing in the Last Year: No   [2]   Current Outpatient Medications:     acetaZOLAMIDE (DIAMOX) 250 MG tablet, Take 250 mg by mouth 2 (two) times daily., Disp: , Rfl:     aspirin (ECOTRIN) 81 MG EC tablet, Take 81 mg by mouth once daily., Disp: , Rfl:     atorvastatin (LIPITOR) 40 MG tablet, Take 1 tablet (40 mg total) by mouth every evening., Disp: 90 tablet, Rfl: 3    dorzolamide-timolol 2-0.5% (COSOPT) 22.3-6.8 mg/mL ophthalmic solution, Place 1 drop into both eyes 2 (two) times daily., Disp: 30 mL, Rfl: 3    ezetimibe (ZETIA) 10 mg tablet, Take 1 tablet (10 mg total) by mouth once daily., Disp: 90 tablet, Rfl: 3    hydroCHLOROthiazide 12.5 MG Tab, Take 1 tablet (12.5 mg total) by mouth once daily. Do not take if taking diamox, Disp: 30 tablet, Rfl: 11    irbesartan (AVAPRO) 150 MG tablet, Take 2 tablets (300 mg total) by mouth every evening., Disp: 180 tablet, Rfl: 3    isosorbide mononitrate (IMDUR) 30 MG 24 hr tablet, Take 1 tablet (30 mg total) by mouth once daily., Disp: 90 tablet, Rfl: 3    LUMIGAN 0.01 % Drop, Place 1 drop into both eyes every evening., Disp: 7.5 mL, Rfl: 3    RHOPRESSA 0.02 % ophthalmic solution, INSTILL 1 DROP  INTO LEFT EYE ONCE DAILY, Disp: , Rfl:     ascorbic acid, vitamin C, (VITAMIN C) 1000 MG tablet, Take 1,000 mg by mouth once daily. (Patient not taking: Reported on 1/10/2025), Disp: , Rfl:     azelastine (ASTELIN) 137 mcg (0.1 %) nasal spray, 1 spray (137 mcg total) by Nasal route 2 (two) times daily., Disp: 90 mL, Rfl: 3    nitroGLYCERIN (NITROSTAT) 0.4 MG SL tablet, Place 1 tablet (0.4 mg total) under the tongue every 5 (five) minutes as needed for Chest pain., Disp: 30 tablet, Rfl: 12

## 2025-03-26 ENCOUNTER — TELEPHONE (OUTPATIENT)
Dept: CARDIOLOGY | Facility: CLINIC | Age: 80
End: 2025-03-26
Payer: MEDICARE

## 2025-03-26 ENCOUNTER — PATIENT OUTREACH (OUTPATIENT)
Dept: ADMINISTRATIVE | Facility: HOSPITAL | Age: 80
End: 2025-03-26
Payer: MEDICARE

## 2025-03-26 ENCOUNTER — PATIENT MESSAGE (OUTPATIENT)
Dept: CARDIOLOGY | Facility: CLINIC | Age: 80
End: 2025-03-26
Payer: MEDICARE

## 2025-03-26 VITALS — SYSTOLIC BLOOD PRESSURE: 113 MMHG | DIASTOLIC BLOOD PRESSURE: 62 MMHG

## 2025-03-26 NOTE — TELEPHONE ENCOUNTER
----- Message from Gill sent at 3/26/2025  9:57 AM CDT -----  Who called:self  What is the request in detail:pt is calling in regards of her upcoming appt on 04-09-25 03-28-25 she has to cancel she's going out of town and would like to reschedule once she return back in town she would like for you to give her a call its the heart catheterization  Can the clinic reply by [facility]? Would the patient rather a call back or a response via [facility]?callback  Best call back number:[phone] Additional Information:  
Left message for pt to call office back regarding message. fina  
3

## 2025-03-26 NOTE — TELEPHONE ENCOUNTER
Patient would like to cancel procedure scheduled for 04/09/2025 with Dr. Dye pt states that she is going out of town. Pt will call when to schedule when she return.

## 2025-03-27 VITALS — SYSTOLIC BLOOD PRESSURE: 113 MMHG | DIASTOLIC BLOOD PRESSURE: 62 MMHG

## 2025-03-31 ENCOUNTER — HOSPITAL ENCOUNTER (OUTPATIENT)
Dept: PREADMISSION TESTING | Facility: HOSPITAL | Age: 80
Discharge: HOME | End: 2025-03-31
Attending: INTERNAL MEDICINE
Payer: MEDICARE

## 2025-03-31 ENCOUNTER — DOCUMENTATION ONLY (OUTPATIENT)
Dept: REHABILITATION | Facility: HOSPITAL | Age: 80
End: 2025-03-31
Payer: MEDICARE

## 2025-03-31 VITALS
HEIGHT: 65 IN | OXYGEN SATURATION: 99 % | WEIGHT: 181.31 LBS | DIASTOLIC BLOOD PRESSURE: 77 MMHG | SYSTOLIC BLOOD PRESSURE: 139 MMHG | BODY MASS INDEX: 30.21 KG/M2 | TEMPERATURE: 97 F | RESPIRATION RATE: 18 BRPM

## 2025-03-31 DIAGNOSIS — I25.10 CAD (CORONARY ARTERY DISEASE): ICD-10-CM

## 2025-03-31 DIAGNOSIS — I70.0 THORACIC AORTIC ATHEROSCLEROSIS: ICD-10-CM

## 2025-03-31 DIAGNOSIS — R07.9 CHEST PAIN, UNSPECIFIED TYPE: ICD-10-CM

## 2025-03-31 DIAGNOSIS — I10 ESSENTIAL HYPERTENSION: ICD-10-CM

## 2025-03-31 DIAGNOSIS — E78.5 DYSLIPIDEMIA: ICD-10-CM

## 2025-03-31 DIAGNOSIS — Z01.818 PREOPERATIVE TESTING: Primary | ICD-10-CM

## 2025-03-31 LAB
ABSOLUTE EOSINOPHIL (OHS): 0.08 K/UL
ABSOLUTE MONOCYTE (OHS): 0.55 K/UL (ref 0.3–1)
ABSOLUTE NEUTROPHIL COUNT (OHS): 4.01 K/UL (ref 1.8–7.7)
ANION GAP (OHS): 6 MMOL/L (ref 8–16)
BASOPHILS # BLD AUTO: 0.05 K/UL
BASOPHILS NFR BLD AUTO: 0.8 %
BUN SERPL-MCNC: 9 MG/DL (ref 8–23)
CALCIUM SERPL-MCNC: 9.7 MG/DL (ref 8.7–10.5)
CHLORIDE SERPL-SCNC: 106 MMOL/L (ref 95–110)
CO2 SERPL-SCNC: 26 MMOL/L (ref 23–29)
CREAT SERPL-MCNC: 0.7 MG/DL (ref 0.5–1.4)
ERYTHROCYTE [DISTWIDTH] IN BLOOD BY AUTOMATED COUNT: 12.7 % (ref 11.5–14.5)
GFR SERPLBLD CREATININE-BSD FMLA CKD-EPI: >60 ML/MIN/1.73/M2
GLUCOSE SERPL-MCNC: 83 MG/DL (ref 70–110)
HCT VFR BLD AUTO: 37.4 % (ref 37–48.5)
HGB BLD-MCNC: 12.3 GM/DL (ref 12–16)
IMM GRANULOCYTES # BLD AUTO: 0.02 K/UL (ref 0–0.04)
IMM GRANULOCYTES NFR BLD AUTO: 0.3 % (ref 0–0.5)
LYMPHOCYTES # BLD AUTO: 1.91 K/UL (ref 1–4.8)
MCH RBC QN AUTO: 29.5 PG (ref 27–31)
MCHC RBC AUTO-ENTMCNC: 32.9 G/DL (ref 32–36)
MCV RBC AUTO: 90 FL (ref 82–98)
NUCLEATED RBC (/100WBC) (OHS): 0 /100 WBC
OHS QRS DURATION: 88 MS
OHS QTC CALCULATION: 377 MS
PLATELET # BLD AUTO: 318 K/UL (ref 150–450)
PMV BLD AUTO: 10 FL (ref 9.2–12.9)
POTASSIUM SERPL-SCNC: 4.2 MMOL/L (ref 3.5–5.1)
RBC # BLD AUTO: 4.17 M/UL (ref 4–5.4)
RELATIVE EOSINOPHIL (OHS): 1.2 %
RELATIVE LYMPHOCYTE (OHS): 28.9 % (ref 18–48)
RELATIVE MONOCYTE (OHS): 8.3 % (ref 4–15)
RELATIVE NEUTROPHIL (OHS): 60.5 % (ref 38–73)
SODIUM SERPL-SCNC: 138 MMOL/L (ref 136–145)
WBC # BLD AUTO: 6.62 K/UL (ref 3.9–12.7)

## 2025-03-31 PROCEDURE — 93005 ELECTROCARDIOGRAM TRACING: CPT

## 2025-03-31 PROCEDURE — 82435 ASSAY OF BLOOD CHLORIDE: CPT

## 2025-03-31 PROCEDURE — 36415 COLL VENOUS BLD VENIPUNCTURE: CPT

## 2025-03-31 PROCEDURE — 93010 ELECTROCARDIOGRAM REPORT: CPT | Mod: ,,, | Performed by: INTERNAL MEDICINE

## 2025-03-31 PROCEDURE — 85025 COMPLETE CBC W/AUTO DIFF WBC: CPT

## 2025-03-31 NOTE — DISCHARGE INSTRUCTIONS
YOUR PROCEDURE WILL BE AT [facility] at [address] [place] [address]                             Enter through the Main Entrance facing [address].      Your procedure  is scheduled for __4/2/2024_______________.    Call [phone] between 2pm and 5pm on __4/1/2025_____to find out your arrival time for the day of surgery.    You may have up to three visitors.  No children under 18 years old.      You will be going to the Same Day Surgery Unit on the 2nd floor of the hospital.    Report to the Same Day Surgery Registration Desk in the hallway.(Just beside the Same Day Surgery Unit)    Important instructions:  Do not eat anything after midnight.  You may have plain water, non carbonated.  You may also have Gatorade or Powerade after midnight.    Stop all fluids 2 hours before your surgery.    It is okay to brush your teeth.  Do not have gum, candy or mints.    Do not take any diabetic medication on the morning of surgery unless instructed to do so by your doctor or pre op nurse.    Metformin, Invokamet and Synjardy must be stopped two days before your procedure.  Do not take on the day of procedure.  Resume when instructed.    Please shower the night before and the morning of your surgery.        Use Chlorhexidine soap as instructed by your pre op nurse.   Please place clean linens on your bed the night before surgery. Please wear fresh clean clothing after each shower.    No shaving of procedural area at least 4-5 days before surgery due to increased risk of skin irritation and/or possible infection.    Contact lenses and removable denture work may not be worn during your procedure.    You may wear deodorant only.     Do not wear powder, body lotion, perfume/cologne or make-up.    Do not wear any jewelry or have any metal on your body.    You will be asked to remove any dentures or partials for the procedure.    If you are going home on the same day of surgery, you  must arrange for a family member or a friend to drive you home.  Public transportation is prohibited.  You will not be able to drive home if you were given anesthesia or sedation.    Please leave money and valuables home.      You may bring your cell phone.    Call the doctor if fever or illness should occur before your surgery.    Call [phone] to contact us here if needed.

## 2025-03-31 NOTE — PROGRESS NOTES
[facility]   Discharge Note    Name: Rosmery Cody [facility] Number: [id number]    Therapy Diagnosis: No diagnosis found.  Physician: No ref. provider found    Physician Orders: PT Eval and Treat   Medical Diagnosis from Referral: M17.12 (ICD-10-CM) - Primary osteoarthritis of left knee   Evaluation Date: 10/9/2024      Date of Last visit: 11/15/24  Total Visits Received: 8    ASSESSMENT          Discharge reason: Patient has not attended therapy since 11/15/24      Goals: not able to determine goal status     PLAN   This patient is discharged from Physical Therapy      Renae Brenner, PT

## 2025-04-01 ENCOUNTER — TELEPHONE (OUTPATIENT)
Dept: SURGERY | Facility: HOSPITAL | Age: 80
End: 2025-04-01
Payer: MEDICARE

## 2025-04-02 ENCOUNTER — HOSPITAL ENCOUNTER (OUTPATIENT)
Facility: HOSPITAL | Age: 80
Discharge: HOME | End: 2025-04-02
Attending: INTERNAL MEDICINE | Admitting: INTERNAL MEDICINE
Payer: MEDICARE

## 2025-04-02 VITALS
SYSTOLIC BLOOD PRESSURE: 145 MMHG | WEIGHT: 181.5 LBS | DIASTOLIC BLOOD PRESSURE: 67 MMHG | TEMPERATURE: 98 F | OXYGEN SATURATION: 96 % | RESPIRATION RATE: 19 BRPM | HEART RATE: 70 BPM | BODY MASS INDEX: 30.2 KG/M2

## 2025-04-02 DIAGNOSIS — E78.5 DYSLIPIDEMIA: ICD-10-CM

## 2025-04-02 DIAGNOSIS — I10 ESSENTIAL HYPERTENSION: ICD-10-CM

## 2025-04-02 DIAGNOSIS — I25.10 CAD (CORONARY ARTERY DISEASE): ICD-10-CM

## 2025-04-02 DIAGNOSIS — R07.9 CHEST PAIN, UNSPECIFIED TYPE: ICD-10-CM

## 2025-04-02 DIAGNOSIS — I70.0 THORACIC AORTIC ATHEROSCLEROSIS: ICD-10-CM

## 2025-04-02 LAB
ALLENS TEST: ABNORMAL
POC ACTIVATED CLOTTING TIME K: 354 SEC (ref 74–137)
SAMPLE: ABNORMAL
SITE: ABNORMAL

## 2025-04-02 PROCEDURE — 25500020 PHARM REV CODE 255: Performed by: INTERNAL MEDICINE

## 2025-04-02 PROCEDURE — 99153 MOD SED SAME PHYS/QHP EA: CPT | Performed by: INTERNAL MEDICINE

## 2025-04-02 PROCEDURE — 99152 MOD SED SAME PHYS/QHP 5/>YRS: CPT | Mod: ,,, | Performed by: INTERNAL MEDICINE

## 2025-04-02 PROCEDURE — C1874 STENT, COATED/COV W/DEL SYS: HCPCS | Performed by: INTERNAL MEDICINE

## 2025-04-02 PROCEDURE — 92978 ENDOLUMINL IVUS OCT C 1ST: CPT | Mod: 26,LD,, | Performed by: INTERNAL MEDICINE

## 2025-04-02 PROCEDURE — C9600 PERC DRUG-EL COR STENT SING: HCPCS | Mod: LD | Performed by: INTERNAL MEDICINE

## 2025-04-02 PROCEDURE — C1887 CATHETER, GUIDING: HCPCS | Performed by: INTERNAL MEDICINE

## 2025-04-02 PROCEDURE — 93010 ELECTROCARDIOGRAM REPORT: CPT | Mod: ,,, | Performed by: INTERNAL MEDICINE

## 2025-04-02 PROCEDURE — 63600175 PHARM REV CODE 636 W HCPCS: Performed by: INTERNAL MEDICINE

## 2025-04-02 PROCEDURE — C1725 CATH, TRANSLUMIN NON-LASER: HCPCS | Performed by: INTERNAL MEDICINE

## 2025-04-02 PROCEDURE — 93458 L HRT ARTERY/VENTRICLE ANGIO: CPT | Mod: XU | Performed by: INTERNAL MEDICINE

## 2025-04-02 PROCEDURE — C1894 INTRO/SHEATH, NON-LASER: HCPCS | Performed by: INTERNAL MEDICINE

## 2025-04-02 PROCEDURE — 25000003 PHARM REV CODE 250: Performed by: INTERNAL MEDICINE

## 2025-04-02 PROCEDURE — 92928 PRQ TCAT PLMT NTRAC ST 1 LES: CPT | Mod: LD,,, | Performed by: INTERNAL MEDICINE

## 2025-04-02 PROCEDURE — 92978 ENDOLUMINL IVUS OCT C 1ST: CPT | Mod: LD | Performed by: INTERNAL MEDICINE

## 2025-04-02 PROCEDURE — 85347 COAGULATION TIME ACTIVATED: CPT | Performed by: INTERNAL MEDICINE

## 2025-04-02 PROCEDURE — 93005 ELECTROCARDIOGRAM TRACING: CPT

## 2025-04-02 PROCEDURE — 93458 L HRT ARTERY/VENTRICLE ANGIO: CPT | Mod: 26,XU,51, | Performed by: INTERNAL MEDICINE

## 2025-04-02 PROCEDURE — 27201423 OPTIME MED/SURG SUP & DEVICES STERILE SUPPLY: Performed by: INTERNAL MEDICINE

## 2025-04-02 PROCEDURE — 99152 MOD SED SAME PHYS/QHP 5/>YRS: CPT | Performed by: INTERNAL MEDICINE

## 2025-04-02 PROCEDURE — C1753 CATH, INTRAVAS ULTRASOUND: HCPCS | Performed by: INTERNAL MEDICINE

## 2025-04-02 PROCEDURE — C1769 GUIDE WIRE: HCPCS | Performed by: INTERNAL MEDICINE

## 2025-04-02 DEVICE — EVEROLIMUS-ELUTING PLATINUM CHROMIUM CORONARY STENT SYSTEM
Type: IMPLANTABLE DEVICE | Site: HEART | Status: FUNCTIONAL
Brand: SYNERGY™ XD

## 2025-04-02 RX ORDER — MORPHINE SULFATE 4 MG/ML
3 INJECTION, SOLUTION INTRAMUSCULAR; INTRAVENOUS
Refills: 0 | Status: DISCONTINUED | OUTPATIENT
Start: 2025-04-02 | End: 2025-04-02 | Stop reason: HOSPADM

## 2025-04-02 RX ORDER — ACETAMINOPHEN 325 MG/1
650 TABLET ORAL EVERY 4 HOURS PRN
Status: DISCONTINUED | OUTPATIENT
Start: 2025-04-02 | End: 2025-04-02 | Stop reason: HOSPADM

## 2025-04-02 RX ORDER — OXYCODONE HYDROCHLORIDE 5 MG/1
5 TABLET ORAL EVERY 4 HOURS PRN
Refills: 0 | Status: DISCONTINUED | OUTPATIENT
Start: 2025-04-02 | End: 2025-04-02 | Stop reason: HOSPADM

## 2025-04-02 RX ORDER — HEPARIN SODIUM 1000 [USP'U]/ML
INJECTION, SOLUTION INTRAVENOUS; SUBCUTANEOUS
Status: DISCONTINUED | OUTPATIENT
Start: 2025-04-02 | End: 2025-04-02 | Stop reason: HOSPADM

## 2025-04-02 RX ORDER — DIPHENHYDRAMINE HCL 25 MG
50 CAPSULE ORAL ONCE
Status: COMPLETED | OUTPATIENT
Start: 2025-04-02 | End: 2025-04-02

## 2025-04-02 RX ORDER — FENTANYL CITRATE 50 UG/ML
INJECTION, SOLUTION INTRAMUSCULAR; INTRAVENOUS
Status: DISCONTINUED | OUTPATIENT
Start: 2025-04-02 | End: 2025-04-02 | Stop reason: HOSPADM

## 2025-04-02 RX ORDER — SODIUM CHLORIDE 9 MG/ML
INJECTION, SOLUTION INTRAVENOUS CONTINUOUS
Status: DISCONTINUED | OUTPATIENT
Start: 2025-04-02 | End: 2025-04-02 | Stop reason: HOSPADM

## 2025-04-02 RX ORDER — CLOPIDOGREL BISULFATE 300 MG/1
600 TABLET, FILM COATED ORAL ONCE
Status: COMPLETED | OUTPATIENT
Start: 2025-04-02 | End: 2025-04-02

## 2025-04-02 RX ORDER — HYDRALAZINE HYDROCHLORIDE 20 MG/ML
10 INJECTION INTRAMUSCULAR; INTRAVENOUS ONCE
Status: COMPLETED | OUTPATIENT
Start: 2025-04-02 | End: 2025-04-02

## 2025-04-02 RX ORDER — ONDANSETRON HYDROCHLORIDE 2 MG/ML
4 INJECTION, SOLUTION INTRAVENOUS EVERY 12 HOURS PRN
Status: DISCONTINUED | OUTPATIENT
Start: 2025-04-02 | End: 2025-04-02 | Stop reason: HOSPADM

## 2025-04-02 RX ORDER — LIDOCAINE HYDROCHLORIDE 10 MG/ML
INJECTION, SOLUTION EPIDURAL; INFILTRATION; INTRACAUDAL; PERINEURAL
Status: DISCONTINUED | OUTPATIENT
Start: 2025-04-02 | End: 2025-04-02 | Stop reason: HOSPADM

## 2025-04-02 RX ORDER — NITROGLYCERIN 5 MG/ML
INJECTION, SOLUTION INTRAVENOUS
Status: DISCONTINUED | OUTPATIENT
Start: 2025-04-02 | End: 2025-04-02 | Stop reason: HOSPADM

## 2025-04-02 RX ORDER — MIDAZOLAM HYDROCHLORIDE 1 MG/ML
INJECTION, SOLUTION INTRAMUSCULAR; INTRAVENOUS
Status: DISCONTINUED | OUTPATIENT
Start: 2025-04-02 | End: 2025-04-02 | Stop reason: HOSPADM

## 2025-04-02 RX ORDER — CLOPIDOGREL BISULFATE 75 MG/1
75 TABLET ORAL DAILY
Qty: 30 TABLET | Refills: 11 | Status: SHIPPED | OUTPATIENT
Start: 2025-04-02 | End: 2025-05-02

## 2025-04-02 RX ADMIN — DIPHENHYDRAMINE HYDROCHLORIDE 50 MG: 25 CAPSULE ORAL at 08:04

## 2025-04-02 RX ADMIN — SODIUM CHLORIDE: 9 INJECTION, SOLUTION INTRAVENOUS at 09:04

## 2025-04-02 RX ADMIN — SODIUM CHLORIDE: 9 INJECTION, SOLUTION INTRAVENOUS at 02:04

## 2025-04-02 RX ADMIN — HYDRALAZINE HYDROCHLORIDE 10 MG: 20 INJECTION INTRAMUSCULAR; INTRAVENOUS at 02:04

## 2025-04-02 RX ADMIN — CLOPIDOGREL BISULFATE 600 MG: 300 TABLET, FILM COATED ORAL at 08:04

## 2025-04-02 NOTE — Clinical Note
The catheter was inserted into the and was inserted over the wire into the proximal   left anterior descending. And removed

## 2025-04-02 NOTE — Clinical Note
The catheter was repositioned into the ostium   right coronary artery. An angiography was performed of the right coronary arteries. Multiple views were taken. The angiography was performed via power injection.   And removed.

## 2025-04-02 NOTE — Clinical Note
The catheter was inserted into the and was inserted over the wire into the ostium   left main. An angiography was performed of the left coronary arteries. Multiple views were taken. The angiography was performed via power injection.   And removed.

## 2025-04-02 NOTE — DISCHARGE INSTRUCTIONS
Limit movement of the wrist.  Do not bend wrist or perform heavy lifting for 24 hours.      NO blood pressure cuff or venipuncture to affected arm for 24 hours.    If bleeding occurs, apply pressure to site for 20 minutes. Apply band aid once bleeding stops.  Call 911 if unable to stop bleeding with pressure.     Keep your follow up appointment.      Do not drive, drink alcohol, or sign legal documents for 24 hours, or if taking narcotic pain medication.    Fall Prevention  Millions of people fall every year and injure themselves. You may have had anesthesia or sedation which may increase your risk of falling. You may have health issues that put you at an increased risk of falling.     Here are ways to reduce your risk of falling.    Make your home safe by keeping walkways clear of objects you may trip over.  Use non-slip pads under rugs. Do not use area rugs or small throw rugs.  Use non-slip mats in bathtubs and showers.  Install handrails and lights on staircases.  Do not walk in poorly lit areas.  Do not stand on chairs or wobbly ladders.  Use caution when reaching overhead or looking upward. This position can cause a loss of balance.  Be sure your shoes fit properly, have non-slip bottoms and are in good condition.   Wear shoes both inside and out. Avoid going barefoot or wearing slippers.  Be cautious when going up and down stairs, curbs, and when walking on uneven sidewalks.  If your balance is poor, consider using a cane or walker.  If your fall was related to alcohol use, stop or limit alcohol intake.   If your fall was related to use of sleeping medicines, talk to your doctor about this. You may need to reduce your dosage at bedtime if you awaken during the night to go to the bathroom.    To reduce the need for nighttime bathroom trips:  Avoid drinking fluids for several hours before going to bed  Empty your bladder before going to bed  Men can keep a urinal at the bedside  Stay as active as you can.  Balance, flexibility, strength, and endurance all come from exercise. They all play a role in preventing falls. Ask your healthcare provider which types of activity are right for you.  Get your vision checked on a regular basis.  If you have pets, know where they are before you stand up or walk so you don't trip over them.  Use night lights.

## 2025-04-02 NOTE — Clinical Note
The catheter was inserted into the and was inserted over the wire into the ostium   left main. An angiography was performed of the left coronary arteries. Multiple views were taken. The angiography was performed via power injection. Pt with MISA likely 2/2 pre renal and/or ATN in setting hypotension, less likely HRS (Concepcion>35, no response tx)    #Acute Kidney Injury  - MISA likely 2/2 pre renal ATN in setting hypotension, infection, decrease PO intake, less likely HRS (Concepcion>35, no response tx)  - Urine Na 112 ~ ATN, UPro/Cr 0.2 gram, UA showed UTI w/o protein/rbc.    - Borderline hypotension during hospitalization (on midodrine), no response to albumin/midodrine treated, less likely HRS given Concepcion>35. Good UOP during hospitalization, lasix/aldactone held.    - Baseline sCr 1.0 in 5/2020, on admission sCr 1.57 (6/11), uptrend to 1.75 on 6/13 and has since downtrended, Scr 1.15mg/dl today  - Prior MISA w/u Dec 2019 showed FLC elevated K/L ratio 2.91 (K>L) and immunofixation showing "one Weak IgA Lambda  Band and One Weak IgG Lambda Band Identified"  - C/w holding diuretics for now. Recommend to hold further albumin for now despite urine Na<35   - Monitor serial bladder sono to r/o urinary retention as bob was removed  - Avoid nephrotoxin meds such as NSAIDS, PPI, ACEI/ARB, and contrast agents.  - Renally dose medications per GFR.    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    - Borderline hypotension during hospitalization (on midodrine), no response to albumin/midodrine treated, less likely HRS given Concepcion>35. Good UOP during hospitalization, lasix/aldactone held.    - Baseline sCr 1.0 in 5/2020, on admission sCr 1.57 (6/11), uptrend to 1.75 on 6/13 and has since downtrended, Scr 1.15mg/dl today  - Prior MISA w/u Dec 2019 showed FLC elevated K/L ratio 2.91 (K>L) and immunofixation showing "one Weak IgA Lambda  Band and One Weak IgG Lambda Band Identified"  - C/w holding diuretics for now. Recommend to hold further albumin for now despite urine Na<35   - Monitor serial bladder sono to r/o urinary retention as bob was removed  - Avoid nephrotoxin meds such as NSAIDS, PPI, ACEI/ARB, and contrast agents.  - Renally dose medications per GFR.    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

## 2025-04-03 ENCOUNTER — TELEPHONE (OUTPATIENT)
Dept: ADMINISTRATIVE | Facility: CLINIC | Age: 80
End: 2025-04-03
Payer: MEDICARE

## 2025-04-03 DIAGNOSIS — E78.5 DYSLIPIDEMIA: Primary | ICD-10-CM

## 2025-04-03 LAB
OHS QRS DURATION: 86 MS
OHS QTC CALCULATION: 442 MS

## 2025-04-03 NOTE — TELEPHONE ENCOUNTER
Pharmacist Post Acute LDL evaluation and therapy optimization post cardiac cath ----- Date of last LDL: 24. Resulted: 67.6 mg/dL. Current Medication(s): Ms. Phillips's atorvastatin dose was recently increased from 10 mg hs to 40 mg hs. Lipid panel has been pended per protocol with results to be forwarded to the PCP. There are no changes at this time.    ----- Message from SHARYN Hampton sent at 2025  8:18 AM CDT -----  Regarding: Order for LAUREN PHILLIPS    Patient Name: LAUREN PHILLIPS([id number])  Sex: Female  : 1945      PCP: TRACIE BEAR    Center: [facility] CENTRAL BILLING OFFICE     Types of orders made on 2025: Diet, IV, Medications, Nursing, Transfer    Order Date:3/27/2025  Ordering User:JULIO HERNANDEZ [[id number]]  Attending Provider:Julio Hernandez MD [[id number]]  Authorizing Provider: Julio Hernandez MD [[id number]]  Department:[facility] CATH LAB[[id number]]    Order Specific Information  Order: Notify [facility] [Custom: [id number]]  Order #: [id number]: 1    Priority: Routine  Class: Hospital Performed    Associated Diagnoses      I25.10 CAD (coronary artery disease)      R07.9 Chest pain, unspecified type      E78.5 Dyslipidemia; statin myalgias; 2018 exercise stress echo neg for       ischemia      I10 Essential hypertension      I70.0 Thoracic aortic atherosclerosis incidental on CXR 2022    Released on: 2025  8:18 AM        Priority: Routine  Class: Hospital Performed    Associated Diagnoses      I25.10 CAD (coronary artery disease)      R07.9 Chest pain, unspecified type      E78.5 Dyslipidemia; statin myalgias; 2018 exercise stress echo neg for       ischemia      I10 Essential hypertension      I70.0 Thoracic aortic atherosclerosis incidental on CXR 2022    Released on: 2025  8:18 AM

## 2025-04-04 ENCOUNTER — OFFICE VISIT (OUTPATIENT)
Dept: INTERNAL MEDICINE | Facility: CLINIC | Age: 80
End: 2025-04-04
Payer: MEDICARE

## 2025-04-04 VITALS
DIASTOLIC BLOOD PRESSURE: 62 MMHG | HEART RATE: 62 BPM | HEIGHT: 65 IN | BODY MASS INDEX: 30.3 KG/M2 | SYSTOLIC BLOOD PRESSURE: 138 MMHG | WEIGHT: 181.88 LBS | OXYGEN SATURATION: 96 %

## 2025-04-04 DIAGNOSIS — T14.8XXA HEMATOMA: Primary | ICD-10-CM

## 2025-04-04 PROCEDURE — 1160F RVW MEDS BY RX/DR IN RCRD: CPT | Mod: CPTII,S$GLB,, | Performed by: FAMILY MEDICINE

## 2025-04-04 PROCEDURE — 3288F FALL RISK ASSESSMENT DOCD: CPT | Mod: CPTII,S$GLB,, | Performed by: FAMILY MEDICINE

## 2025-04-04 PROCEDURE — 3078F DIAST BP <80 MM HG: CPT | Mod: CPTII,S$GLB,, | Performed by: FAMILY MEDICINE

## 2025-04-04 PROCEDURE — 1159F MED LIST DOCD IN RCRD: CPT | Mod: CPTII,S$GLB,, | Performed by: FAMILY MEDICINE

## 2025-04-04 PROCEDURE — 99999 PR PBB SHADOW E&M-EST. PATIENT-LVL III: CPT | Mod: PBBFAC,,, | Performed by: FAMILY MEDICINE

## 2025-04-04 PROCEDURE — 3075F SYST BP GE 130 - 139MM HG: CPT | Mod: CPTII,S$GLB,, | Performed by: FAMILY MEDICINE

## 2025-04-04 PROCEDURE — 1101F PT FALLS ASSESS-DOCD LE1/YR: CPT | Mod: CPTII,S$GLB,, | Performed by: FAMILY MEDICINE

## 2025-04-04 PROCEDURE — 99214 OFFICE O/P EST MOD 30 MIN: CPT | Mod: S$GLB,,, | Performed by: FAMILY MEDICINE

## 2025-04-04 PROCEDURE — 1125F AMNT PAIN NOTED PAIN PRSNT: CPT | Mod: CPTII,S$GLB,, | Performed by: FAMILY MEDICINE

## 2025-04-04 NOTE — PROGRESS NOTES
"Subjective:     Patient ID: Rosmery Ramirez is a 80 y.o. female.   Chief Complaint: No chief complaint on file.    HPI:  History of Present Illness    CHIEF COMPLAINT:  Patient presents today for post-cardiac catheterization hematoma concerns.    She developed a hematoma in the arm following cardiac catheterization performed on Wednesday. She reports pain and limited range of motion in the affected arm, particularly with turning movements. She describes a knotty sensation in the area with pain on palpation. The site where compression band-aid was removed yesterday is significantly sore. She has been using ice for symptom management.         Review of Systems   Constitutional:  Negative for chills and fever.   HENT:  Negative for nasal congestion, ear pain and sore throat.    Eyes:  Negative for visual disturbance.   Respiratory:  Negative for cough and shortness of breath.    Cardiovascular:  Negative for chest pain.   Gastrointestinal:  Negative for constipation, diarrhea, nausea and vomiting.   Genitourinary:  Negative for dysuria and frequency.   Integumentary:  Negative for rash.   Neurological:  Negative for headaches.   Psychiatric/Behavioral:  Negative for suicidal ideas.    All other systems reviewed and are negative.         Objective:      Vitals:    04/04/25 1426   BP: 138/62   Pulse: 62   SpO2: 96%   Weight: 82.5 kg (181 lb 14.1 oz)   Height: 5' 5" (1.651 m)      Physical Exam  Constitutional:       General: She is not in acute distress.     Appearance: Normal appearance. She is not ill-appearing.   HENT:      Head: Normocephalic and atraumatic.      Right Ear: External ear normal. No middle ear effusion.      Left Ear: External ear normal.  No middle ear effusion.      Nose: Nose normal. No congestion or rhinorrhea.      Mouth/Throat:      Mouth: Mucous membranes are moist.      Pharynx: Oropharynx is clear. No oropharyngeal exudate or posterior oropharyngeal erythema.   Eyes:      General: No " scleral icterus.        Right eye: No discharge.         Left eye: No discharge.      Conjunctiva/sclera: Conjunctivae normal.   Cardiovascular:      Rate and Rhythm: Normal rate and regular rhythm.      Heart sounds: Normal heart sounds. No murmur heard.     No friction rub. No gallop.   Pulmonary:      Effort: No respiratory distress.      Breath sounds: Normal breath sounds. No wheezing, rhonchi or rales.   Abdominal:      General: Bowel sounds are normal. There is no distension.      Palpations: Abdomen is soft. There is no mass.      Tenderness: There is no abdominal tenderness. There is no guarding.   Musculoskeletal:         General: No swelling or deformity. Normal range of motion.      Cervical back: Normal range of motion and neck supple.   Lymphadenopathy:      Cervical: No cervical adenopathy.   Skin:     General: Skin is warm and dry.             Comments: Tenderness and notable hematoma on R arm associated w/ catheterization site   Neurological:      General: No focal deficit present.      Mental Status: She is alert and oriented to person, place, and time. Mental status is at baseline.   Psychiatric:         Mood and Affect: Mood normal.         Behavior: Behavior normal.         Thought Content: Thought content normal.         Judgment: Judgment normal.           Assessment/Plan:             ICD-10-CM ICD-9-CM   1. Hematoma  T14.8XXA 924.9     No orders of the defined types were placed in this encounter.      Assessment & Plan    - Assessed right arm post-cardiac catheterization, noting presence of hematoma and associated pain.  - Current symptoms (pain, bruising, limited range of motion) are within normal expectations for recent procedure.  - Arm strength and  equal bilaterally.  - Ruled out potential complications such as spreading infection or compromised circulation.    1. Hematoma (Primary)  Assessment as above  Apply heat to affected area using warm washcloth or heating pad  Take Tylenol as  needed for pain relief  Seek emergency care if signs of spreading complications occur  Contact on-call nurse over the weekend if experiencing increased arm movement difficulty, hand weakness, or inability to  objects        No follow-ups on file.     Yakov Guzman MD, [facility]  Family Medicine Physician  [facility]  07/01/2025      This note was generated with the assistance of ambient listening technology. Verbal consent was obtained by the patient and accompanying visitor(s) for the recording of patient appointment to facilitate this note. I attest to having reviewed and edited the generated note for accuracy, though some syntax or spelling errors may persist. Please contact the author of this note for any clarification.

## 2025-04-06 LAB
OHS QRS DURATION: 90 MS
OHS QTC CALCULATION: 409 MS

## 2025-04-08 ENCOUNTER — OFFICE VISIT (OUTPATIENT)
Dept: CARDIOLOGY | Facility: CLINIC | Age: 80
End: 2025-04-08
Payer: MEDICARE

## 2025-04-08 VITALS
RESPIRATION RATE: 15 BRPM | DIASTOLIC BLOOD PRESSURE: 60 MMHG | WEIGHT: 179.88 LBS | BODY MASS INDEX: 29.97 KG/M2 | HEART RATE: 59 BPM | SYSTOLIC BLOOD PRESSURE: 136 MMHG | OXYGEN SATURATION: 96 % | HEIGHT: 65 IN

## 2025-04-08 DIAGNOSIS — I70.0 THORACIC AORTIC ATHEROSCLEROSIS: ICD-10-CM

## 2025-04-08 DIAGNOSIS — I10 ESSENTIAL HYPERTENSION: ICD-10-CM

## 2025-04-08 DIAGNOSIS — E78.5 DYSLIPIDEMIA: ICD-10-CM

## 2025-04-08 DIAGNOSIS — I25.10 CORONARY ARTERY DISEASE, UNSPECIFIED VESSEL OR LESION TYPE, UNSPECIFIED WHETHER ANGINA PRESENT, UNSPECIFIED WHETHER NATIVE OR TRANSPLANTED HEART: Primary | ICD-10-CM

## 2025-04-08 DIAGNOSIS — G47.33 OSA (OBSTRUCTIVE SLEEP APNEA): ICD-10-CM

## 2025-04-08 DIAGNOSIS — R06.09 DOE (DYSPNEA ON EXERTION): ICD-10-CM

## 2025-04-08 DIAGNOSIS — E78.2 MIXED HYPERLIPIDEMIA: ICD-10-CM

## 2025-04-08 DIAGNOSIS — R00.1 BRADYCARDIA: ICD-10-CM

## 2025-04-08 PROCEDURE — 1101F PT FALLS ASSESS-DOCD LE1/YR: CPT | Mod: CPTII,S$GLB,, | Performed by: INTERNAL MEDICINE

## 2025-04-08 PROCEDURE — 1126F AMNT PAIN NOTED NONE PRSNT: CPT | Mod: CPTII,S$GLB,, | Performed by: INTERNAL MEDICINE

## 2025-04-08 PROCEDURE — 3288F FALL RISK ASSESSMENT DOCD: CPT | Mod: CPTII,S$GLB,, | Performed by: INTERNAL MEDICINE

## 2025-04-08 PROCEDURE — 99999 PR PBB SHADOW E&M-EST. PATIENT-LVL IV: CPT | Mod: PBBFAC,,, | Performed by: INTERNAL MEDICINE

## 2025-04-08 PROCEDURE — G2211 COMPLEX E/M VISIT ADD ON: HCPCS | Mod: S$GLB,,, | Performed by: INTERNAL MEDICINE

## 2025-04-08 PROCEDURE — 99214 OFFICE O/P EST MOD 30 MIN: CPT | Mod: S$GLB,,, | Performed by: INTERNAL MEDICINE

## 2025-04-08 PROCEDURE — 3078F DIAST BP <80 MM HG: CPT | Mod: CPTII,S$GLB,, | Performed by: INTERNAL MEDICINE

## 2025-04-08 PROCEDURE — 1159F MED LIST DOCD IN RCRD: CPT | Mod: CPTII,S$GLB,, | Performed by: INTERNAL MEDICINE

## 2025-04-08 PROCEDURE — 3075F SYST BP GE 130 - 139MM HG: CPT | Mod: CPTII,S$GLB,, | Performed by: INTERNAL MEDICINE

## 2025-04-08 RX ORDER — ESOMEPRAZOLE MAGNESIUM 40 MG/1
40 CAPSULE, DELAYED RELEASE ORAL
Qty: 30 CAPSULE | Refills: 11 | Status: SHIPPED | OUTPATIENT
Start: 2025-04-08 | End: 2026-04-08

## 2025-04-09 ENCOUNTER — PATIENT MESSAGE (OUTPATIENT)
Dept: CARDIOLOGY | Facility: CLINIC | Age: 80
End: 2025-04-09
Payer: MEDICARE

## 2025-04-09 ENCOUNTER — LAB VISIT (OUTPATIENT)
Dept: LAB | Facility: HOSPITAL | Age: 80
End: 2025-04-09
Attending: INTERNAL MEDICINE
Payer: MEDICARE

## 2025-04-09 ENCOUNTER — PATIENT MESSAGE (OUTPATIENT)
Dept: FAMILY MEDICINE | Facility: CLINIC | Age: 80
End: 2025-04-09
Payer: MEDICARE

## 2025-04-09 DIAGNOSIS — E78.5 DYSLIPIDEMIA: ICD-10-CM

## 2025-04-09 LAB
CHOLEST SERPL-MCNC: 94 MG/DL (ref 120–199)
CHOLEST/HDLC SERPL: 2.5 {RATIO} (ref 2–5)
HDLC SERPL-MCNC: 37 MG/DL (ref 40–75)
HDLC SERPL: 39.4 % (ref 20–50)
LDLC SERPL CALC-MCNC: 43.2 MG/DL (ref 63–159)
NONHDLC SERPL-MCNC: 57 MG/DL
TRIGL SERPL-MCNC: 69 MG/DL (ref 30–150)

## 2025-04-09 PROCEDURE — 36415 COLL VENOUS BLD VENIPUNCTURE: CPT | Mod: PO

## 2025-04-09 PROCEDURE — 80061 LIPID PANEL: CPT

## 2025-04-11 ENCOUNTER — RESULTS FOLLOW-UP (OUTPATIENT)
Dept: FAMILY MEDICINE | Facility: CLINIC | Age: 80
End: 2025-04-11
Payer: MEDICARE

## 2025-04-11 ENCOUNTER — PATIENT MESSAGE (OUTPATIENT)
Dept: FAMILY MEDICINE | Facility: CLINIC | Age: 80
End: 2025-04-11
Payer: MEDICARE

## 2025-04-11 DIAGNOSIS — I25.118 CORONARY ARTERY DISEASE OF NATIVE ARTERY OF NATIVE HEART WITH STABLE ANGINA PECTORIS: Primary | ICD-10-CM

## 2025-04-11 NOTE — PROGRESS NOTES
Lipid good on atorvastatin every other day   Apparently not taking zetia  Stay on lipitor 40 QOD    04/02/2025 [facility] with pLAD 95% stenosis.  S/p stenting; LCx 20-30%; RCA LI

## 2025-04-14 ENCOUNTER — PATIENT MESSAGE (OUTPATIENT)
Dept: CARDIOLOGY | Facility: CLINIC | Age: 80
End: 2025-04-14
Payer: MEDICARE

## 2025-04-14 ENCOUNTER — TELEPHONE (OUTPATIENT)
Dept: CARDIOLOGY | Facility: CLINIC | Age: 80
End: 2025-04-14
Payer: MEDICARE

## 2025-04-17 NOTE — PROGRESS NOTES
CARDIOVASCULAR CONSULTATION    REASON FOR CONSULT:   Rosmery Ramirez is a 79 y.o. female who presents for follow-up.      HISTORY OF PRESENT ILLNESS:     Patient is a pleasant 74-year-old lady here for follow-up.  Denies any chest pains at rest on exertion, orthopnea, PND.  Complains of paresthesias in bilateral feet and requesting referral to Neurology for that.  Blood pressure well controlled.  No other cardiovascular complaints.     Notes from December 2020:  Patient here for follow-up.  Complains of occasional dizziness.  Can occur once a week or less frequently than that.  No postural relationship.  Denies any chest pains at rest on exertion, orthopnea, PND.      Notes from December 2022: Patient here for follow-up after a long hiatus.  Occasionally gets short of breath when she lays down.  X-ray showed aortic atherosclerosis.  Has been referred to Cardiology to rule out significant ischemia      Jan 23:  Patient here for follow-up.  Patient here for follow-up after testing.  Stress test did not show any significant ischemia    The estimated ejection fraction is 55%.  The left ventricle is normal in size with mild concentric hypertrophy and normal systolic function.  Grade I left ventricular diastolic dysfunction.  Normal right ventricular size with normal right ventricular systolic function.  Mild to moderate left atrial enlargement.  Mild mitral regurgitation.  Mild tricuspid regurgitation.  Intermediate central venous pressure (8 mmHg).  The estimated PA systolic pressure is 28 mmHg.         Normal myocardial perfusion scan. There is no evidence of myocardial ischemia or infarction.    There is a mild to moderate intensity perfusion abnormality in the apical wall of the left ventricle, secondary to breast attenuation.    The gated perfusion images showed an ejection fraction of 71% at rest.    There is normal wall motion at rest and post stress.    The ECG portion of the study is positive for  ischemia. Specificity is reduced secondary to hypertensive response.    The patient reported no chest pain during the stress test.    There were no arrhythmias during stress.    The patient exercised for 6 minutes 15 seconds on a Jorge protocol, corresponding to a functional capacity of 7 METS, achieving a peak heart rate of 142 bpm, which is 102 % of the age predicted maximum heart rate.    Notes from October 2023: Patient here for follow-up.  Has been doing fine.  Denies any chest pains at rest on exertion, orthopnea, PND.  Doing fine.    December 24:    History of Present Illness    CHIEF COMPLAINT:  - Rosmery presents with complaints of fatigue and lack of energy, with associated dyspnea on exertion.    HPI:  Rosmery reports fatigue for 6 months, at times significant enough to impair her ability to get out of bed. Her primary care physician conducted extensive lab work to investigate the cause. She was referred to an endocrinologist for thyroid evaluation due to a thyroid nodule. The endocrinologist diagnosed Hashimoto's disease. TSH levels were normal.    She reports dyspnea, particularly during housework such as mopping. She attempts to exercise by walking on a track, noting initial dyspnea but improved endurance as activity continues.    She reports occasional lightheadedness upon standing, described as unsteadiness.    A sleep study was conducted with normal results. The cause of fatigue remains undetermined, leading to this follow-up visit.    She denies chest pain, tightness, dizziness, or LOC.    MEDICATIONS:  - ASA  - Atorvastatin  - HCTZ  - Irbesartan    JAN 25:    History of Present Illness    CHIEF COMPLAINT:  Rosmery presents today with palpitations.    CARDIAC SYMPTOMS:  She experiences palpitations 2-3 times per day without specific pattern. These palpitations are not new, having previously resolved before recurring. She confirms experiencing palpitations while wearing the Holter monitor. She  also reports shortness of breath on exertion, particularly when bending over to perform work, describing it as weakness in her chest. She denies chest pain, pressure, or chest tightness.    MEDICATIONS:  She currently takes aspirin, atorvastatin (increased to 40 mg daily from alternate day dosing), and Irbisartan (increased to 300 mg at night). She was started on Diamox for glaucoma and discontinued hydrochlorothiazide.       Results for orders placed or performed during the hospital encounter of 04/02/25   EKG 12-LEAD on arrival to floor    Collection Time: 04/02/25  3:46 PM   Result Value Ref Range    QRS Duration 86 ms    OHS QTC Calculation 442 ms    Narrative    Test Reason : I25.10,    Vent. Rate :  68 BPM     Atrial Rate :  68 BPM     P-R Int : 168 ms          QRS Dur :  86 ms      QT Int : 416 ms       P-R-T Axes :  73  22   3 degrees    QTcB Int : 442 ms    Normal sinus rhythm  Inferior infarct (cited on or before 02-Apr-2025)  Abnormal ECG  When compared with ECG of 02-Apr-2025 10:29,  No significant change was found  Confirmed by Neha Hernandez ([id number]) on 4/3/2025 12:31:36 PM    Referred By: NEHA HERNANDEZ           Confirmed By: Neha Hernandez       Results for orders placed during the hospital encounter of 01/06/25    Echo    Interpretation Summary    Left Ventricle: The left ventricle is normal in size. Normal wall thickness. There is normal systolic function with a visually estimated ejection fraction of 60 - 65%. There is normal diastolic function. Normal left ventricular filling pressure.    Right Ventricle: Normal right ventricular cavity size. Systolic function is normal.    Aortic Valve: There is mild aortic valve sclerosis.    Pulmonary Artery: The estimated pulmonary artery systolic pressure is 30 mmHg.    IVC/SVC: Normal venous pressure at 3 mmHg.      Results for orders placed during the hospital encounter of 01/06/25    Nuclear Stress - Cardiology Interpreted    Interpretation Summary    Abnormal  myocardial perfusion scan.    There is a mild to moderate intensity, small sized, reversible perfusion abnormality that is consistent with ischemia in the apical wall(s).    There are no other significant perfusion abnormalities.    The gated perfusion images showed an ejection fraction of 75% post stress.    The ECG portion of the study is negative for ischemia.    The patient reported no chest pain during the stress test.    There were no arrhythmias during stress.      No results found for this or any previous visit.    March 25:    CHIEF COMPLAINT:  Rosmery presents today for intermittent chest pain.    CHEST PAIN:  She experiences intermittent chest pain occurring at rest, specifically while sitting, and denies correlation with physical exertion. The pain resolves spontaneously within approximately one minute. Stress test was abnormal, indicating possible coronary artery blockage.    CARDIOVASCULAR:  She reports home blood pressure readings consistently in the 120s, measured every evening for the past two weeks.    MEDICATIONS:  Her current regimen includes Aspirin, Hydrochlorothiazide, herbicide, and Nitroglycerin as needed. She experienced severe leg pain with Atorvastatin 40mg daily and self-adjusted to taking it every other day, which resolved the leg pain.    DIET AND WEIGHT MANAGEMENT:  She has followed a plant-based diet for the last 3 months with occasional chicken consumption and has eliminated red meat. She reports significant weight loss and overall improvement in well-being due to these dietary changes.    MUSCULOSKELETAL:  She has a torn meniscus in her knee requiring surgical repair.         April 25:    History of Present Illness    CHIEF COMPLAINT:  Rosmery presents today for follow up after cardiac stent placement    CARDIAC HISTORY AND CURRENT STATUS:  She had 90% coronary artery stenosis with associated fatigue prior to stent placement. Her previous chest pain resolved since the  procedure. She currently reports lack of energy since the procedure.    GASTROINTESTINAL:  She experiences indigestion-like discomfort later in the day after taking clopidogrel with breakfast. She describes the sensation as not significant and denies stabbing quality.    MEDICATIONS:  She takes ASA, atorvastatin 40 mg, clopidogrel, ezetimibe, HCTZ, and irbesartan. She recently resumed daily atorvastatin 40 mg after previously taking it every other day due to leg pain. She has not started previously prescribed Imdur for chest pain.    DIET:  She follows a strict plant-based diet.    LABS:  LDL cholesterol was 67 mg/dL.         Results for orders placed or performed during the hospital encounter of 04/02/25   EKG 12-LEAD on arrival to floor    Collection Time: 04/02/25  3:46 PM   Result Value Ref Range    QRS Duration 86 ms    OHS QTC Calculation 442 ms    Narrative    Test Reason : I25.10,    Vent. Rate :  68 BPM     Atrial Rate :  68 BPM     P-R Int : 168 ms          QRS Dur :  86 ms      QT Int : 416 ms       P-R-T Axes :  73  22   3 degrees    QTcB Int : 442 ms    Normal sinus rhythm  Inferior infarct (cited on or before 02-Apr-2025)  Abnormal ECG  When compared with ECG of 02-Apr-2025 10:29,  No significant change was found  Confirmed by Neha Hernandez ([id number]) on 4/3/2025 12:31:36 PM    Referred By: NEHA HERNANDEZ           Confirmed By: Neha Hernandez       Results for orders placed during the hospital encounter of 01/06/25    Echo    Interpretation Summary    Left Ventricle: The left ventricle is normal in size. Normal wall thickness. There is normal systolic function with a visually estimated ejection fraction of 60 - 65%. There is normal diastolic function. Normal left ventricular filling pressure.    Right Ventricle: Normal right ventricular cavity size. Systolic function is normal.    Aortic Valve: There is mild aortic valve sclerosis.    Pulmonary Artery: The estimated pulmonary artery systolic pressure is 30 mmHg.     IVC/SVC: Normal venous pressure at 3 mmHg.      Results for orders placed during the hospital encounter of 01/06/25    Nuclear Stress - Cardiology Interpreted    Interpretation Summary    Abnormal myocardial perfusion scan.    There is a mild to moderate intensity, small sized, reversible perfusion abnormality that is consistent with ischemia in the apical wall(s).    There are no other significant perfusion abnormalities.    The gated perfusion images showed an ejection fraction of 75% post stress.    The ECG portion of the study is negative for ischemia.    The patient reported no chest pain during the stress test.    There were no arrhythmias during stress.      Results for orders placed during the hospital encounter of 04/02/25    Cardiac catheterization    Conclusion    The Prox LAD lesion was 95% stenosed with 0% stenosis post-intervention.    The pre-procedure left ventricular end diastolic pressure was 20.    Prox LAD lesion: A STENT SYNERGY XD [id number] stent was successfully placed at 12 BHAVIN for 10 sec.    The estimated blood loss was <50 mL.    PROCEDURES PERFORMED    1. CORONARY ANGIOGRAPHY    2. PCI OF MID LAD    3. IVUS OF MID LAD    DETAILS:    CORONARY ANGIOGRAPHY    LEFT MAIN: NO SIGNIFICANT STENOSIS    LAD: MID 95% STENOSIS.  SUCCESSFUL PCI PERFORMED.  IVUS POST PCI REVEALED WELL-EXPANDED APPOSED STENT    CIRCUMFLEX: 20-30% STENOSIS    RCA: LUMINAL IRREGULARITIES    ACCESS: RIGHT RADIAL ARTERY ACCESS    ASSESSMENT PLAN    ASPIRIN LIFELONG AND DUAL ANTIPLATELET THERAPY FOR AT LEAST 1 YEAR FROM DATE OF PCI.                  The procedure log was documented by Documenter: Brigette Kennedy RN and verified by Julio Dye MD.    Date: 4/8/2025  Time: 5:52 PM        PAST MEDICAL HISTORY:     Past Medical History:   Diagnosis Date    Allergy     Anxiety     Breast cyst     Edema of leg 10/05/2012    bilateral     Fractures 2011    thumb R    GERD (gastroesophageal reflux disease)     Glaucoma      History of colonic polyps     Hx-TIA (transient ischemic attack) 08/13/2012 Jan 2012: LLE and left facial numbness lasting 1 hour     Hyperlipidemia     Hypertension     Left shoulder tendonitis 08/08/2015    Primary open-angle glaucoma, mild stage - Both Eyes 06/03/2013    Thyroid disease        PAST SURGICAL HISTORY:     Past Surgical History:   Procedure Laterality Date    BREAST BIOPSY Left     core    BREAST SURGERY Left     lumpectomy    CATARACT EXTRACTION W/  INTRAOCULAR LENS IMPLANT Left 10/05/2016    Dr. Mike    CATARACT EXTRACTION W/  INTRAOCULAR LENS IMPLANT Right 11/30/2016    With Quinten (Dr. Mike)    COLONOSCOPY N/A 1/23/2017    Procedure: COLONOSCOPY;  Surgeon: Hany Mosquera MD;  Location: [facility] (4TH FLR);  Service: Endoscopy;  Laterality: N/A;    COLONOSCOPY N/A 3/28/2022    Procedure: COLONOSCOPY;  Surgeon: Jamison Chambers MD;  Location: [facility] (4TH FLR);  Service: Endoscopy;  Laterality: N/A;  fully vaccinated   instructions to portal-st  3/22 arrival time confirmed with pt/COVID test cancelled-RB    CORONARY STENT PLACEMENT N/A 4/2/2025    Procedure: INSERTION, STENT, CORONARY ARTERY;  Surgeon: Julio Dye MD;  Location: [facility] CATH LAB;  Service: Cardiology;  Laterality: N/A;    ESOPHAGOGASTRODUODENOSCOPY N/A 7/20/2023    Procedure: EGD (ESOPHAGOGASTRODUODENOSCOPY);  Surgeon: Jorge L Thompson MD;  [facility]  Service: Endoscopy;  Laterality: N/A;  inst handed to pre-call pt needs to be rescheduled to new day 03/21 bm.  4/26/23- precall- pt wants to cancel appt and reschedule for another day  6/7 pt r/s/instr. to sunita;-s7/14/23-pre-call completed-LS    HAND SURGERY Left 2011    thumb    HYSTERECTOMY  1980's    Total;    IMPLANTATION OF DEVICE FOR GLAUCOMA Right 3/17/2021    Procedure: INSERTION, DEVICE, FOR GLAUCOMA XEN GEL;  Surgeon: Yesica Mike MD;  Location: [address];  Service: Ophthalmology;  Laterality: Right;    IVUS, CORONARY   4/2/2025    Procedure: IVUS, Coronary;  Surgeon: Julio Dye MD;  Location: [facility] CATH LAB;  Service: Cardiology;;    KAHOOK GONIOTOMY Right 11/30/2016    WITH CE ()    Left Breast Lumpectomy Left     performed in 1960s    LEFT HEART CATHETERIZATION Left 4/2/2025    Procedure: Left heart cath;  Surgeon: Julio Dye MD;  Location: [facility] CATH LAB;  Service: Cardiology;  Laterality: Left;  RN PREOP 3/31/2025    OOPHORECTOMY      PTCA, SINGLE VESSEL  4/2/2025    Procedure: PTCA, Single Vessel;  Surgeon: Julio Dye MD;  Location: [facility] CATH LAB;  Service: Cardiology;;    SELECTIVE LASER TRABECUPLASTY  05/2014    OU w/ Dr. Mike    TRABECULECTOMY Right 3/17/2021    Procedure: TRABECULECTOMY/XEN GEL WITH MMC;  Surgeon: Yesica Mike MD;  [facility];  Service: Ophthalmology;  Laterality: Right;    TRABECULECTOMY Left 7/28/2021    Procedure: TRABECULECTOMY/ MMC and Xen OS;  Surgeon: Yesica Mike MD;  [facility];  Service: Ophthalmology;  Laterality: Left;    TUBAL LIGATION  1970's    VAGINAL DELIVERY      x3    YAG CAPSULOTOMY Left 11/29/2018           ALLERGIES AND MEDICATION:     Review of patient's allergies indicates:   Allergen Reactions    Lisinopril Swelling    Propofol analogues Other (See Comments)     Burning sensation all over her body    Amlodipine Edema    Combigan [brimonidine-timolol]      Causes itchy eyelids and contact dermatitis    Cosopt [dorzolamide-timolol]      Causes angular blepharitis of eyelids    Pravastatin      Myalgia and elevated CPK        Medication List            Accurate as of April 8, 2025 11:59 PM. If you have any questions, ask your nurse or doctor.                START taking these medications      esomeprazole 40 MG capsule  Commonly known as: NEXIUM  Take 1 capsule (40 mg total) by mouth before breakfast.  Started by: Julio Dye MD            CONTINUE taking these medications      aspirin 81 MG  EC tablet  Commonly known as: ECOTRIN     atorvastatin 40 MG tablet  Commonly known as: LIPITOR  Take 1 tablet (40 mg total) by mouth every evening.     azelastine 137 mcg (0.1 %) nasal spray  Commonly known as: ASTELIN  1 spray (137 mcg total) by Nasal route 2 (two) times daily.     clopidogreL 75 mg tablet  Commonly known as: PLAVIX  Take 1 tablet (75 mg total) by mouth once daily.     dorzolamide-timolol 2-0.5% 22.3-6.8 mg/mL ophthalmic solution  Commonly known as: COSOPT  Place 1 drop into both eyes 2 (two) times daily.     ezetimibe 10 mg tablet  Commonly known as: ZETIA  Take 1 tablet (10 mg total) by mouth once daily.     hydroCHLOROthiazide 12.5 MG Tab  Take 1 tablet (12.5 mg total) by mouth once daily. Do not take if taking diamox     irbesartan 150 MG tablet  Commonly known as: AVAPRO  Take 2 tablets (300 mg total) by mouth every evening.     isosorbide mononitrate 30 MG 24 hr tablet  Commonly known as: IMDUR  Take 1 tablet (30 mg total) by mouth once daily.     LUMIGAN 0.01 % Drop  Generic drug: bimatoprost  Place 1 drop into both eyes every evening.     nitroGLYCERIN 0.4 MG SL tablet  Commonly known as: NITROSTAT  Place 1 tablet (0.4 mg total) under the tongue every 5 (five) minutes as needed for Chest pain.     RHOPRESSA 0.02 % ophthalmic solution  Generic drug: netarsudiL               Where to Get Your Medications        These medications were sent to [facility] [address] [place] [address] [address] [facility]  [address] [facility] [address]      Phone: [phone]   esomeprazole 40 MG capsule         SOCIAL HISTORY:     Social History     Socioeconomic History    Marital status:     Number of children: 3   Occupational History    Occupation: retired - formerly pastoral care with [employer]   Tobacco Use    Smoking status: Never     Passive exposure: Never    Smokeless tobacco: Never   Substance and Sexual Activity    Alcohol use: Not Currently    Drug use: No    Sexual activity: Yes      Partners: Female, Male     Birth control/protection: Post-menopausal, See Surgical Hx     Social Drivers of Health     Financial Resource Strain: Low Risk  (1/9/2025)    Overall Financial Resource Strain (CARDIA)     Difficulty of Paying Living Expenses: Not hard at all   Food Insecurity: No Food Insecurity (1/26/2025)    Received from [facility]    Hunger Vital Sign     Worried About Running Out of Food in the Last Year: Never true     Ran Out of Food in the Last Year: Never true   Transportation Needs: No Transportation Needs (1/26/2025)    Received from [facility]    PRAPARE - Transportation     Lack of Transportation (Medical): No     Lack of Transportation (Non-Medical): No   Physical Activity: Insufficiently Active (1/26/2025)    Received from [facility]    Exercise Vital Sign     Days of Exercise per Week: 1 day     Minutes of Exercise per Session: 20 min   Stress: No Stress Concern Present (1/26/2025)    Received from [facility]    [employer] - Occupational Stress Questionnaire     Feeling of Stress : Not at all   Housing Stability: Unknown (1/26/2025)    Received from [facility]    Housing Stability Vital Sign     Unable to Pay for Housing in the Last Year: No       FAMILY HISTORY:     Family History   Problem Relation Name Age of Onset    Breast cancer Mother 78 y/o 50    Glaucoma Mother 78 y/o     Alzheimer's disease Mother 78 y/o     Blindness Mother 78 y/o     Cataracts Mother 78 y/o     Glaucoma Father 75 y/o     Prostate cancer Father 75 y/o     Blindness Father 75 y/o     Cataracts Father 75 y/o     Cancer Father 75 y/o 62        prostate    Breast cancer Sister 5 sisters     Glaucoma Sister 5 sisters     Alzheimer's disease Maternal Grandfather      No Known Problems Brother      No Known Problems Brother      Macular degeneration Neg Hx      Retinal detachment Neg Hx      Strabismus Neg Hx      Amblyopia Neg Hx      Heart attack Neg Hx      Heart disease Neg Hx    "   Ovarian cancer Neg Hx      Colon cancer Neg Hx         REVIEW OF SYSTEMS:   Review of Systems   Constitutional: Negative.    HENT: Negative.     Eyes: Negative.    Gastrointestinal: Negative.    Genitourinary: Negative.    Musculoskeletal: Negative.    Skin: Negative.    Neurological:  Positive for tingling.   Endo/Heme/Allergies: Negative.        A 10 point review of systems was performed and all the pertinent positives have been mentioned. Rest of review of systems was negative.        PHYSICAL EXAM:     Vitals:    04/08/25 1355   BP: 136/60   Pulse: (!) 59   Resp: 15    Body mass index is 29.94 kg/m².  Weight: 81.6 kg (179 lb 14.3 oz)   Height: 5' 5" (165.1 cm)     Physical Exam  Vitals and nursing note reviewed.   Constitutional:       Appearance: Normal appearance. She is well-developed.   HENT:      Head: Normocephalic and atraumatic.      Right Ear: Hearing normal.      Left Ear: Hearing normal.      Nose: Nose normal.   Eyes:      General: Lids are normal.      Conjunctiva/sclera: Conjunctivae normal.      Pupils: Pupils are equal, round, and reactive to light.   Cardiovascular:      Rate and Rhythm: Normal rate and regular rhythm.      Pulses: Normal pulses.      Heart sounds: Normal heart sounds.   Pulmonary:      Effort: Pulmonary effort is normal.      Breath sounds: Normal breath sounds.   Abdominal:      Palpations: Abdomen is soft.      Tenderness: There is no abdominal tenderness.   Musculoskeletal:         General: No deformity.      Cervical back: Normal range of motion and neck supple.   Skin:     General: Skin is warm and dry.   Neurological:      Mental Status: She is alert and oriented to person, place, and time.   Psychiatric:         Speech: Speech normal.           DATA:     Laboratory:  CBC:  Recent Labs   Lab 02/10/25  1308 02/26/25  0023 02/26/25  0028 03/31/25  1536   WBC 4.55 6.98  --  6.62   Hemoglobin 11.3 L 11.8 L  --   --    HGB  --   --   --  12.3   POC Hematocrit  --   --  35 L "  --    Hematocrit 35.3 L 37.0  --   --    HCT  --   --   --  37.4   Platelet Count  --   --   --  318   Platelets 270 270  --   --        CHEMISTRIES:  Recent Labs   Lab 11/08/24  1230 02/10/25  1308 02/26/25  0023 03/31/25  1536   Glucose 82 81  81 112 H  --    Sodium 140 139  139 132 L 138   Potassium 4.2 4.5  4.5 4.0 4.2   BUN 11 11  11 9 9   Creatinine 0.7 0.7  0.7 0.7 0.7   Calcium 10.0 9.5  9.5 9.7 9.7       CARDIAC BIOMARKERS:  Recent Labs   Lab 07/26/23  0655 07/26/23  1020   Troponin I <0.006 <0.006         COAGS:        LIPIDS/LFTS:  Recent Labs   Lab 12/01/22  0908 07/26/23  0655 01/26/24  0918 11/08/24  1230 02/10/25  1308 02/26/25  0023 04/09/25  0809   Cholesterol Total  --   --   --   --   --   --  94 L   Cholesterol 117 L  --  123 132  --   --   --    Triglycerides 78  --  93 77  --   --   --    Triglyceride  --   --   --   --   --   --  69   HDL 43  --  42 49  --   --   --    HDL Cholesterol  --   --   --   --   --   --  37 L   LDL Cholesterol 58.4 L  --  62.4 L 67.6  --   --   --    Non-HDL Cholesterol 74  --  81 83  --   --   --    Non HDL Cholesterol  --   --   --   --   --   --  57   AST 20   < > 19 17 18 21  --    ALT 27   < > 17 13 14 13  --     < > = values in this interval not displayed.       Hemoglobin A1C   Date Value Ref Range Status   02/10/2025 5.8 (H) 4.0 - 5.6 % Final     Comment:     ADA Screening Guidelines:  5.7-6.4%  Consistent with prediabetes  >or=6.5%  Consistent with diabetes    High levels of fetal hemoglobin interfere with the HbA1C  assay. Heterozygous hemoglobin variants (HbS, HgC, etc)do  not significantly interfere with this assay.   However, presence of multiple variants may affect accuracy.     11/08/2024 6.0 (H) 4.0 - 5.6 % Final     Comment:     ADA Screening Guidelines:  5.7-6.4%  Consistent with prediabetes  >or=6.5%  Consistent with diabetes    High levels of fetal hemoglobin interfere with the HbA1C  assay. Heterozygous hemoglobin variants (HbS, HgC,  etc)do  not significantly interfere with this assay.   However, presence of multiple variants may affect accuracy.     01/26/2024 5.9 (H) 4.0 - 5.6 % Final     Comment:     ADA Screening Guidelines:  5.7-6.4%  Consistent with prediabetes  >or=6.5%  Consistent with diabetes    High levels of fetal hemoglobin interfere with the HbA1C  assay. Heterozygous hemoglobin variants (HbS, HgC, etc)do  not significantly interfere with this assay.   However, presence of multiple variants may affect accuracy.         TSH  Recent Labs   Lab 06/06/23  0946 08/22/24  1452 11/08/24  1230   TSH 2.428 1.747 1.895       The ASCVD Risk score (Patricia BAUMAN, et al., 2019) failed to calculate for the following reasons:    The valid total cholesterol range is 130 to 320 mg/dL           Cardiovascular Testing:    Reviewed      ASSESSMENT AND PLAN     Patient Active Problem List   Diagnosis    Essential hypertension    Dyslipidemia; statin myalgias; 6/28/2018 exercise stress echo neg for ischemia    POAG (primary open-angle glaucoma)    Class 2 severe obesity due to excess calories with serious comorbidity in adult, unspecified BMI    Cataract    Nuclear sclerosis    Tubular adenoma of colon 3/28/22 colonoscopy 4 mm sigmoid TA    Bradycardia 6/2018 holter negative    Status post hysterectomy    Statin myopathy    Chronic bilateral low back pain without sciatica    Vitamin D deficiency    Thyroid nodule on US; followed by endo repeat 6/2026    Idiopathic peripheral neuropathy normal B12, TSH; A1c pre-DM. hx of lory changed to lyrica    Decreased independence with transfers    Impaired functional mobility, balance, gait, and endurance    Primary open angle glaucoma of right eye, mild stage    Primary open-angle glaucoma, left eye, moderate stage    Thoracic aortic atherosclerosis incidental on CXR 12/2022    CLARA (obstructive sleep apnea) no longer using CPAP    Esophageal dysphagia    Primary osteoarthritis of left knee    Chronic pain of left knee     Abnormal glucose    Left knee pain    Complex tear of lateral meniscus of left knee as current injury    Coronary artery disease of native artery of native heart with stable angina pectoris 04/02/2025 [facility] with pLAD 95% stenosis.  S/p stenting; LCx 20-30%; RCA LI     Assessment & Plan    IMPRESSION:  Reviewed recent cardiac procedure, confirming placement of stent in LAD artery to address 90% stenosis.  Patient is appropriate candidate for cardiac rehabilitation to support recovery and improve cardiovascular fitness.    PLAN SUMMARY:  - Continue HCTZ, irbesartan, ezetimibe, Plavix, aspirin, and atorvastatin 40 mg daily  - Discontinue Imdur (patient never started)  - Refer to cardiac rehabilitation program at [place]  - Rosmery to maintain plant-based diet  - Magdalene to resume gym activities gradually  - Rosmery instructed to call Annette if not contacted by cardiac rehab  - Follow-up to reassess cholesterol levels and medication regimen, including need for ezetimibe    CORONARY ARTERY DISEASE AND STENT:  - Explained anatomy of coronary arteries and their role in supplying blood to the heart muscle.  - Clarified the function of the stent in improving blood flow to the heart.  - Addressed patient's query about dietary impact on existing stenosis, explaining current scientific understanding and standard of care.  - Magdalene to continue with plant-based diet to support cardiovascular health.  - Advised resuming gym activities, starting slowly and gradually increasing intensity.  - Continued aspirin.  - Continued Plavix, taken at 8 o'clock with breakfast.  - Referred to cardiac rehabilitation program at [place].  - Erice instructed to call Annette if not contacted by cardiac rehab within a few weeks.    HYPERLIPIDEMIA:  - Continued atorvastatin 40 mg daily.  - Continued ezetimibe.  - Follow up to reassess cholesterol levels and medication regimen, including need for ezetimibe.    HYPERTENSION:  -  Continued HCTZ.  - Continued irbesartan.    LIFESTYLE MANAGEMENT:  - Magdalene to continue plant-based diet.       Lab Results   Component Value Date    LDLCALC 67.6 11/08/2024       Fu 3 m        Thank you very much for involving me in the care of your patient.  Please do not hesitate to contact me if there are any questions.      Julio Dye MD, FACC, [facility]  Interventional Cardiologist, [facility].     Visit today included increased complexity associated with the care of the episodic problem sinus bradycardia, dyspnea on exertion, history of statin myopathy, dyslipidemia, aortic atherosclerosis addressed and managing the longitudinal care of the patient due to the serious and/or complex managed problem(s)   Patient Active Problem List   Diagnosis    Essential hypertension    Dyslipidemia; statin myalgias; 6/28/2018 exercise stress echo neg for ischemia    POAG (primary open-angle glaucoma)    Class 2 severe obesity due to excess calories with serious comorbidity in adult, unspecified BMI    Cataract    Nuclear sclerosis    Tubular adenoma of colon 3/28/22 colonoscopy 4 mm sigmoid TA    Bradycardia 6/2018 holter negative    Status post hysterectomy    Statin myopathy    Chronic bilateral low back pain without sciatica    Vitamin D deficiency    Thyroid nodule on US; followed by endo repeat 6/2026    Idiopathic peripheral neuropathy normal B12, TSH; A1c pre-DM. hx of lory changed to lyrica    Decreased independence with transfers    Impaired functional mobility, balance, gait, and endurance    Primary open angle glaucoma of right eye, mild stage    Primary open-angle glaucoma, left eye, moderate stage    Thoracic aortic atherosclerosis incidental on CXR 12/2022    CLARA (obstructive sleep apnea) no longer using CPAP    Esophageal dysphagia    Primary osteoarthritis of left knee    Chronic pain of left knee    Abnormal glucose    Left knee pain    Complex tear of lateral meniscus of left knee as current  injury    Coronary artery disease of native artery of native heart with stable angina pectoris 04/02/2025 [facility] with pLAD 95% stenosis.  S/p stenting; LCx 20-30%; RCA LI     .     This note was generated with the assistance of ambient listening technology. Verbal consent was obtained by the patient and accompanying visitor(s) for the recording of patient appointment to facilitate this note. I attest to having reviewed and edited the generated note for accuracy, though some syntax or spelling errors may persist. Please contact the author of this note for any clarification.      This note was dictated with the help of speech recognition software.  There might be un-intended errors and/or substitutions.

## 2025-04-25 ENCOUNTER — TELEPHONE (OUTPATIENT)
Dept: CARDIOLOGY | Facility: CLINIC | Age: 80
End: 2025-04-25
Payer: MEDICARE

## 2025-04-25 NOTE — TELEPHONE ENCOUNTER
----- Message from Christos sent at 4/25/2025  1:07 PM CDT -----  Regarding: Self  Type: Patient Call Back What is the request in detail: pt would like a calll back regarding her cardiac rehab that was supposed to happen [facility] , pt stated [facility] doesn't have the referral , pt called today Can the clinic reply by [facility]? No Would the patient rather a call back or a response via [facility]? Call [facility] call back number: [phone] Information:Thank you.

## 2025-05-05 ENCOUNTER — PATIENT MESSAGE (OUTPATIENT)
Dept: FAMILY MEDICINE | Facility: CLINIC | Age: 80
End: 2025-05-05
Payer: MEDICARE

## 2025-05-08 NOTE — TELEPHONE ENCOUNTER
Chief Complaint   Patient presents with   • Office Visit   • Medicare Wellness Visit   • Referral     Ophthalmology   Colonoscopy   • Foot     Left foot       HPI: Jarrod is a 68 year old male who presents for his Subsequent Annual Medicare Wellness Visit.     Patient's medications, allergies, past medical, surgical, social and family histories were reviewed and updated as appropriate.    Patient Care Team:  Feliciano Bearden MD as PCP - General (General Practice)  Liya Lau RN as Chronic Disease Management (Heart Failure Clinician)     HPI:    History of Present Illness  The patient came in to get checked out for pain in his left heel and tingling in his feet and hands.    Pain in Left Heel  He describes the pain in his left heel as feeling like a bone spur. The pain is so bad that he can't wear anything soft on his feet. Gym shoes have helped a bit, but wearing work boots makes the pain worse.  - Onset: Not specified.  - Location: Left heel.  - Character: Feels like a bone spur.  - Alleviating/Aggravating Factors: Gym shoes help a bit; work boots make the pain worse.  - Severity: So bad that he can't wear anything soft on his feet.    Tingling in Feet and Hands  He also mentioned that he sometimes feels tingling in his toes, and right now, both of his toes are tingling.  - Onset: Not specified.  - Location: Toes, feet, and hands.  - Character: Tingling sensation.    He has no issues with breathing.      Review of Systems  Constitutional symptoms - No fever, no loss of appetite.   All systems were reviewed and are negative except as in HPI.     Preferred Pharmacy:    [facility]/pharmacy #[id number] - [facility] [address] [address]  Phone: [phone] Fax: [phone]    Current Outpatient Medications   Medication Sig Dispense Refill   • eplerenone (INSPRA) 25 MG tablet Take 1 tablet by mouth every Monday, Wednesday, and Friday 36 tablet 1   • Januvia 50 MG  Patient declined visit. Patient stated she will call and reschedule if she decides to do AWV.   tablet TAKE 1 TABLET BY MOUTH EVERY DAY 90 tablet 3   • metoPROLOL succinate (TOPROL-XL) 25 MG 24 hr tablet Take 1 tablet by mouth daily. Take with food 90 tablet 1   • furosemide (LASIX) 20 MG tablet Take 1 tablet by mouth daily. 90 tablet 3   • guaiFENesin-codeine (GUAIFENESIN AC) 100-10 MG/5ML liquid Take 5 mLs by mouth 3 times daily as needed for Cough. 300 mL 5   • atorvastatin (LIPITOR) 20 MG tablet Take 1 tablet by mouth daily. 90 tablet 3   • olmesartan (BENICAR) 20 MG tablet TAKE 1 TABLET BY MOUTH EVERY DAY 90 tablet 3   • sildenafil (VIAGRA) 100 MG tablet TAKE 1 TABLET BY MOUTH AS NEEDED FOR ERECTILE DYSFUNCTION 6 tablet 19   • amLODIPine (NORVASC) 10 MG tablet TAKE 1 TABLET BY MOUTH EVERY DAY 90 tablet 3   • Lancets 30G Misc 4 times daily. 200 each 3   • blood glucose test strip Test blood sugar 4 times daily. Diagnosis: Diabetes E11.9 200 strip 11   • Blood Glucose Monitoring Suppl w/Device Kit Check your blood sugars 4 times a day before meals and before bedtime 1 kit 0   • aspirin 81 MG EC tablet Take 1 tablet by mouth daily. With food 90 tablet 3   • turmeric 500 MG capsule TAKE 1 CAPSULE Every twelve hours       No current facility-administered medications for this visit.     Family History   Problem Relation Age of Onset   • Patient is unaware of any medical problems Mother    • Patient is unaware of any medical problems Father      History reviewed. No pertinent surgical history.                 Cognitive Assessment: no evidence of cognitive dysfunction by direct observation    @Hearing Impairment:    Vision/Hearing Screening:   Vision Screening    Right eye Left eye Both eyes   Without correction      With correction   20/25          [facility] OPIOID RISK ASSESSMENT (Last 72 Hours)     Opioid Risk Tool    No documentation.                 Advance care planning documents on file - yes       Recent PHQ 2/9 Score    PHQ 2:  PHQ 2 Score Adult PHQ 2 Score Adult PHQ 2 Interpretation Little interest or  pleasure in activity?   5/8/2025  10:53 AM 0 No further screening needed 0       PHQ 9:  PHQ 9 Score Able to Complete   3/2/2021  10:36 AM No       DEPRESSION ASSESSMENT/PLAN:  Depression screening is negative no further plan needed.     There is no height or weight on file to calculate BMI.  BMI ASSESSMENT/PLAN:  BMI is within normal range.     Needed Screening/Treatment:   None     Needed follow up:  None      Vitals: There were no vitals taken for this visit.  Physical Exam  Physical examination  GENERAL APPEARANCE: Well developed, well nourished, alert and cooperative, and appears to be in no acute distress.  HEAD: normocephalic  EYES: PERRL, EOMI.  vision is grossly intact, no icterus, pallor or cyanosis   EARS: External auditory canals and tympanic membranes clear, no local tenderness, hearing grossly intact.   NOSE:  No nasal discharge.   THROAT:  Oral cavity and pharynx normal. No inflammation, swelling, exudate, or lesions.    CARDIAC: Normal S1 and S2. No S3, S4 or murmurs. pulse is regular and good volume.   LUNGS: Clear to auscultation without rales, rhonchi, wheezing or diminished breath sounds.  No accessory muscle usage, no local tenderness  GI/abdomen: soft, non tender, no hepatosplenomegaly, bowel sounds normal,  MUSKULOSKELETAL: no joint swelling or redness.  NECK: Neck supple, non-tender without cervical or supraclavicular lymphadenopathy, or masses  EXTREMITIES: No edema  NEUROLOGICAL  CN II-XII intact. Good coordination, good memory and speech, no paralysis, no tremorDenies paresthesias.   Normal sensory, reflexes, and symmetrical extremity strength and dexterity. Normal speech.  SKIN: Skin normal color and temperature, no abnormal moles or lesions.  PSYCHIATRIC: oriented to person, place, and time.   No suicidal or homicidal ideation  Physical Exam  Respiratory: Clear to auscultation, no wheezing, rales or rhonchi  Extremities: Tenderness at the left heel, likely due to bone  spur    Results        Lab Services on 04/29/2025   Component Date Value   • Cholesterol 04/29/2025 181    • Triglycerides 04/29/2025 195 (H)    • HDL 04/29/2025 58    • LDL 04/29/2025 84    • Non-HDL Cholesterol 04/29/2025 123    • Cholesterol/ HDL Ratio 04/29/2025 3.1    • TSH 04/29/2025 2.210    • Hemoglobin A1C 04/29/2025 6.6 (H)    • Sodium 04/29/2025 138    • Potassium 04/29/2025 4.8    • Chloride 04/29/2025 105    • Carbon Dioxide 04/29/2025 27    • Anion Gap 04/29/2025 11    • Glucose 04/29/2025 118 (H)    • BUN 04/29/2025 25 (H)    • Creatinine 04/29/2025 1.85 (H)    • Glomerular Filtration Ra* 04/29/2025 39 (L)    • BUN/Cr 04/29/2025 14    • Calcium 04/29/2025 10.0    • Bilirubin, Total 04/29/2025 0.9    • GOT/AST 04/29/2025 35    • GPT/ALT 04/29/2025 34    • Alkaline Phosphatase 04/29/2025 79    • Albumin 04/29/2025 4.0    • Protein, Total 04/29/2025 8.4 (H)    • Globulin 04/29/2025 4.4 (H)    • A/G Ratio 04/29/2025 0.9 (L)    • Prostate Specific Antigen 04/29/2025 0.90    • WBC 04/29/2025 7.3    • RBC 04/29/2025 4.68    • HGB 04/29/2025 14.3    • HCT 04/29/2025 43.0    • MCV 04/29/2025 91.9    • MCH 04/29/2025 30.6    • MCHC 04/29/2025 33.3    • RDW-CV 04/29/2025 13.2    • RDW-SD 04/29/2025 44.4    • PLT 04/29/2025 289    • NRBC 04/29/2025 0    • Neutrophil, Percent 04/29/2025 54    • Lymphocytes, Percent 04/29/2025 33    • Mono, Percent 04/29/2025 10    • Eosinophils, Percent 04/29/2025 2    • Basophils, Percent 04/29/2025 1    • Immature Granulocytes 04/29/2025 0    • Absolute Neutrophils 04/29/2025 3.9    • Absolute Lymphocytes 04/29/2025 2.4    • Absolute Monocytes 04/29/2025 0.8    • Absolute Eosinophils  04/29/2025 0.1    • Absolute Basophils 04/29/2025 0.1    • Absolute Immature Granul* 04/29/2025 0.0    • Microalbumin, Urine 04/29/2025 0.74    • Creatinine, Urine 04/29/2025 147.0    • Microalbumin/ Creatinine* 04/29/2025 5.0        Problem List Items Addressed This Visit        Advance Directives  and General Issues     Full code status    Advance directive discussed with patient       Cardiac and Vasculature    Hypertensive heart disease with chronic systolic congestive heart failure (CMD)    Relevant Orders    SERVICE TO OPHTHALMOLOGY       Neuro    Diabetic polyneuropathy associated with type 2 diabetes mellitus (CMD)       Pulmonary and Pneumonias    Chronic bronchitis  (CMD)   Other Visit Diagnoses       Screen for colon cancer    -  Primary    Relevant Orders    OPEN ACCESS COLONOSCOPY      Pain of left heel        Relevant Orders    XR FOOT 3 OR MORE VIEWS LEFT    SERVICE TO PODIATRY      Medicare annual wellness visit, subsequent                Assessment & Plan  1. Left heel pain: Chronic.  - Symptoms suggest a possible bone spur or plantar fasciitis, with pain localized to one spot, making a bone spur more likely.  - Advised to wear supportive footwear and avoid high heels.  - Over-the-counter pain relievers such as ibuprofen or acetaminophen can be used as needed for pain management.  - If symptoms persist, further imaging studies may be considered.    2. Tingling in feet and hands.  - Reports occasional tingling in toes and hands, which could be related to peripheral neuropathy or another underlying condition.  - Monitoring symptoms is recommended.  - If the tingling becomes more frequent or severe, further evaluation and potential referral to a specialist may be necessary.    Follow-up  - Medicare wellness visit.      No follow-ups on file.        Screening schedule/checklist for next 5-10 years:  Health Maintenance Due   Topic Date Due   • Shingles Vaccine (1 of 2) Never done   • Colorectal Cancer Screening  09/02/2024   • Diabetes Eye Exam  01/02/2025        A written education, counseling, referral, and plan for obtaining appropriate screening services has been given to patient. See patient instructions.     Feliciano Bearden MD    @endstatement@      Patient Privacy Notice      The 21st Century  Cures Act makes medical notes like these available to patients in the interest of transparency.   Please be advised that this is a medical document.   Medical documents are intended to carry relevant information and the clinical opinion of the practitioner.   The medical note is intended as medical provider to provider communication, and may appear blunt or direct.   It is written in medical language, and may contain abbreviations or verbiage that are unfamiliar.

## 2025-05-30 ENCOUNTER — TELEPHONE (OUTPATIENT)
Dept: SURGERY | Facility: CLINIC | Age: 80
End: 2025-05-30
Payer: MEDICARE

## 2025-07-01 ENCOUNTER — PATIENT MESSAGE (OUTPATIENT)
Dept: FAMILY MEDICINE | Facility: CLINIC | Age: 80
End: 2025-07-01
Payer: MEDICARE

## 2025-07-02 NOTE — ASSESSMENT & PLAN NOTE
Has transitioned to completely plant based diet since January.  Feels like this is sustainable.  Feels like it is giving her better energy.  About 15 lb weight loss compared to last year.  Congratulated her on her success.    I have recommended she take a vitamin B12 1000 mcg daily   She also takes calcium vitamin-D supplement.  Most recent lipid done in April good with LDL well below 70.  Is taking atorvastatin every other day.  Never took the Zetia.  Never took the isosorbide.  No issues with chest pain   Has upcoming follow up with Cardiology   No changes  Orders:    Comprehensive Metabolic Panel; Future    Lipid Panel; Future    CBC Without Differential; Future    atorvastatin (LIPITOR) 40 MG tablet; Take 1 tablet (40 mg total) by mouth every other day.

## 2025-07-02 NOTE — PROGRESS NOTES
This note was created by combination of typed  and M-Modal dictation.  Transcription errors may be present.   This note was also generated with the assistance of ambient listening technology. Verbal consent was obtained by the patient and accompanying visitor(s) for the recording of patient appointment to facilitate this note. I attest to having reviewed and edited the generated note for accuracy, though some syntax or spelling errors may persist. Please contact the author of this note for any clarification.    Assessment and Plan:   Assessment and Plan    Assessment & Plan  Essential hypertension  BP today stable.  On HCTZ 12.5, irbesartan.  No longer taking Diamox.  No changes       Dyslipidemia; statin myalgias; 6/28/2018 exercise stress echo neg for ischemia  Statin myopathy  Thoracic aortic atherosclerosis incidental on CXR 12/2022  Coronary artery disease of native artery of native heart with stable angina pectoris 04/02/2025 [facility] with pLAD 95% stenosis.  S/p stenting; LCx 20-30%; RCA [place]  Has transitioned to completely plant based diet since January.  Feels like this is sustainable.  Feels like it is giving her better energy.  About 15 lb weight loss compared to last year.  Congratulated her on her success.    I have recommended she take a vitamin B12 1000 mcg daily   She also takes calcium vitamin-D supplement.  Most recent lipid done in April good with LDL well below 70.  Is taking atorvastatin every other day.  Never took the Zetia.  Never took the isosorbide.  No issues with chest pain   Has upcoming follow up with Cardiology   No changes  Orders:    Comprehensive Metabolic Panel; Future    Lipid Panel; Future    CBC Without Differential; Future    atorvastatin (LIPITOR) 40 MG tablet; Take 1 tablet (40 mg total) by mouth every other day.    Class 2 severe obesity due to excess calories with serious comorbidity in adult, unspecified BMI  Abnormal glucose  Has made significant dietary improvements.   Check future surveillance labs  Orders:    Comprehensive Metabolic Panel; Future    Hemoglobin A1C; Future    CBC Without Differential; Future    Idiopathic peripheral neuropathy normal B12, TSH; A1c pre-DM. hx of lory changed to lyrica  Stable.  Never took the Cymbalta.  Lyrica ineffective.         Thyroid nodule on US; followed by endo repeat 6/2026  Has upcoming endocrinology follow up.  Plan for thyroid ultrasound 06/2026  Orders:    TSH; Future    Primary osteoarthritis of left knee  Chronic bilateral low back pain without sciatica  Stable       Tubular adenoma of colon 3/28/22 colonoscopy 4 mm sigmoid TA  Aged out of repeat       Is going to see Urogynecology for occasional fecal incontinence.  Denies saddle dysesthesia.  We discussed self-directed Kegel exercises.    Medications Discontinued During This Encounter   Medication Reason    isosorbide mononitrate (IMDUR) 30 MG 24 hr tablet     atorvastatin (LIPITOR) 40 MG tablet        meds sent this encounter:  Medications Ordered This Encounter   Medications    atorvastatin (LIPITOR) 40 MG tablet     Sig: Take 1 tablet (40 mg total) by mouth every other day.         Follow Up:  Follow-up 6 months with labs  Future Appointments   Date Time Provider Department Center   7/10/2025  3:00 PM Juilo Dye MD [facility] CARDIO [place]   7/11/2025 10:30 AM Jagjit Feldman MD [facility] ENDOA [facility]   8/14/2025  2:00 PM Ramirez Encarnacion DO [facility] UROGYN [facility]         Subjective:   Subjective   Chief Complaint   Patient presents with    Hypertension    Hyperlipidemia       OVI Botello is a 80 y.o. female.    Social History     Socioeconomic History    Marital status:     Number of children: 3   Occupational History    Occupation: retired - formerly pastoral care with [employer]   Tobacco Use    Smoking status: Never     Passive exposure: Never    Smokeless tobacco: Never   Substance and Sexual Activity    Alcohol use: Not Currently    Drug use: No     Sexual activity: Yes     Partners: Female, Male     Birth control/protection: Post-menopausal, See Surgical Hx     Social Drivers of Health     Financial Resource Strain: Low Risk  (1/9/2025)    Overall Financial Resource Strain (CARDIA)     Difficulty of Paying Living Expenses: Not hard at all   Food Insecurity: No Food Insecurity (1/26/2025)    Received from [facility]    Hunger Vital Sign     Worried About Running Out of Food in the Last Year: Never true     Ran Out of Food in the Last Year: Never true   Transportation Needs: No Transportation Needs (1/26/2025)    Received from [facility]    PRAPARE - Transportation     Lack of Transportation (Medical): No     Lack of Transportation (Non-Medical): No   Physical Activity: Insufficiently Active (1/26/2025)    Received from [facility]    Exercise Vital Sign     Days of Exercise per Week: 1 day     Minutes of Exercise per Session: 20 min   Stress: No Stress Concern Present (1/26/2025)    Received from [facility]    [employer] - Occupational Stress Questionnaire     Feeling of Stress : Not at all   Housing Stability: Unknown (1/26/2025)    Received from [facility]    Housing Stability Vital Sign     Unable to Pay for Housing in the Last Year: No       No LMP recorded. Patient has had a hysterectomy.    Last appointment with this clinic was 2/10/2025. Last visit with me 1/9/2025   To summarize last visit and events leading up to today:  Hypertension, previously digital monitoring  1/9/25 Hypertension.  Glaucoma.  Ophthalmology started her on Diamox.  Stop HCTZ.  Stay on irbesartan.  Has room to increase irbesartan.  Pedal edema possibly worsened by Lyrica and naproxen.  Already on thiazide diuretic  Hyperlipidemia with statin myopathy  12/19/2024 saw cardiology.    01/06/2025 TTE LVEF 60-65% normal diastolic function.  Mild aortic sclerosis PIP 30  01/06/2025 nuclear stress test mild moderate intensity small size reversible  perfusion abnormality consistent with ischemia in the apical walls.  EKG portion negative for ischemia.  Abnormal glucose  CLARA not using APAP  Incidental pancreatic atrophy on CT. No symptoms. No further testing.   GERD mild esophageal dysmotility.  Seen by GI.    3/1/23 esophogram Findings suggestive of mild esophageal dysmotility. Spontaneous gastroesophageal reflux observed. No findings to suggest achalasia.  7/20/23 EGD normal duodenum; erythema gastric body, antrum, prepyloric region. 1 cm HH. Bx's neg for H pylori, sprue, no EE  Thyroid nodule on ultrasound.  Last seen by Endocrinology 06/2023 and plan was repeat thyroid ultrasound after her EGD.  TFTs at that time were normal  06/05/2024 thyroid ultrasound  Three right-sided nodules, none of which meet ACR criteria for follow-up.   Plan is repeat thyroid ultrasound in 2 years, 06/2026  Neuropathy, Lyrica with side effects and ineffective, wean down.    10/2023 follow-up with Neurology, trial of Cymbalta  Never started the Cymbalta.  Headache  08/22/2024 seen at another location for left-sided headache.  Ibuprofen and tizanidine.  TSH normal.  Sed rate normal  Per claims data saw outside neurology Dr. Hamilton 10/01/2024 for headache  10/10/2024 MRI  No acute intracranial pathology.   7/22/22 MRI L spine Mild multilevel degenerative change throughout the lower thoracic and lumbar spine.  No significant spinal canal stenosis but there is scattered lateral recess and neural foraminal encroachment as further detailed above.  Lyrica ineffective  Seen at another location 07/26/2024 for left knee pain with a history of arthritis.  X-ray with DJD.  Referred to PT and Orthopedics   08/02/2024 saw orthopedics.  Monitor  Saw orthopedics 09/30/2024 for aggravation of left knee pain.  Given Orthovisc injection  Post nasal drip, astelin. Avoid nasal steroids (glaucoma)     Last visit me 11/14/2024  Fatigue, dyspnea, times months.  Upcoming cardiology follow-up.  Ordered  chest x-ray.  Pre visit labs were unremarkable CBC borderline mild anemia.  Check iron stores  Hypertension stable   Hyperlipidemia with statin myopathy taking statin every other day no changes   Abnormal glucose pre diabetes stable A1c   TSH normal.  Ultrasound due for repeat June 2026  Idiopathic peripheral neuropathy saw outside neurology with negative evaluation    12/06/2024 HST negative; no in-lab unless pt requests     Last visit with me 01/09/2025  Hypertension.  Glaucoma.  Ophthalmology started her on Diamox.  Stop HCTZ.  Stay on irbesartan.  Has room to increase irbesartan.  Lipid statin myopathy on low-dose statin  Pre diabetes stable    01/10/2025 saw cardiology.  BP high increase irbesartan.  Challenge on atorvastatin 40    2/10/25 labs  CBC mild anemia seen previously, variable  CMP normal, normal potassium normal creatinine  A1c 5.8 from 6.0     Had BP check at time of labs, reportedly her Diamox was stopped.  At that visit restarted HCTZ 12.5     Results to patient via [employer].  Self-directed monitoring    She messaged me 02/22/2025.  Intolerant of atorvastatin daily.  Seems to improve with taking every other day    02/26/2025 ED for headache, chest pain and dizziness  Head CT negative    03/11/2025 saw cardiology  Stay on atorvastatin every other day add Zetia  Add isosorbide  Plan for [facility]    04/02/2025 [facility] with pLAD 95% stenosis.  S/p stenting; LCx 20-30%; RCA LI    04/08/2025 saw cardiology in follow-up.  She never took the isosorbide.  Okay not to take.  Takes Zetia and Lipitor daily.  Given Nexium 40    04/09/2025   Lipid good on atorvastatin every other day  Apparently not taking zetia  Stay on lipitor 40 QOD  04/02/2025 [facility] with pLAD 95% stenosis. S/p stenting; LCx 20-30%; RCA LI    She had messaged inquiring about evaluation for fecal incontinence    Cardiology follow up 7/10   Endocrinology follow-up 7/11  Seeing cardiac rehab    Today's visit:    History of Present Illness     HPI:  Rosmery reports adopting a plant-based diet since January, following her stent placement. She occasionally consumes chicken breast for additional protein but primarily adheres to a non-dairy diet. Rosmery notes feeling improved with increased energy since making these dietary changes, though she is uncertain if the improvement is due to the diet change or the stent placement.    Rosmery has lost approximately 15-20 lbs, dropping from 190 lbs last year to 176 lbs currently. She attributes this weight loss to cooking all her meals at home and following her new diet, rather than intentional efforts to lose weight.    Rosmery mentions ongoing issues with involuntary stool leakage. She is scheduled to see a urogynecologist to address this concern. Rosmery has attempted Kegel exercises after watching instructional videos but believes she may have been performing them incorrectly.    Rosmery reports episodes of fatigue after eating, and wonders if this is related to glucose. These episodes typically last for about an hour before resolving.    Rosmery denies any chest pain, numbness when wiping, or problems with sensation in the anal area. Rosmery denies wanting a COVID booster shot.    MEDICATIONS:  - Hydrochlorothiazide, for blood pressure  - Atorvastatin, every other day, for cholesterol  - Plavix  - Valsartan, for blood pressure  - Aspirin  - Nexium, for stomach  - Eye drops  - Calcium citrate (Citracal), 1 tablet in the morning and 1 in the evening, with food  - Discontinued Isosorbide, never started  - Discontinued Diamox, was for eyes  - Discontinued Zetia, patient decided not to take due to side effects    ROS:  General: reports increased energy levels, reports eating causes fatigue  Gastrointestinal: reports bowel incontinence  Neurological: no numbness        Patient Care Team:  Ancelmo Sumner MD as PCP - General (Internal Medicine)  Brooke Delong MD as Obstetrician  (Obstetrics)  Arnoldo Valentin MD as Consulting Physician (Otolaryngology)  Yesica Mike MD as Consulting Physician (Ophthalmology)  Brandan Nava MD as Consulting Physician (Orthopedic Surgery)  Jagjit Feldman MD as Consulting Physician (Endocrinology)  Ancelmo Sumner MD as Consulting Physician (Internal Medicine)      Patient Active Problem List    Diagnosis Date Noted    Coronary artery disease of native artery of native heart with stable angina pectoris 04/02/2025 [facility] with pLAD 95% stenosis.  S/p stenting; LCx 20-30%; RCA LI 04/11/2025 04/02/2025 [facility] with pLAD 95% stenosis.  S/p stenting; LCx 20-30%; RCA LI      Left knee pain 03/25/2025    Complex tear of lateral meniscus of left knee as current injury 03/25/2025    Abnormal glucose 11/13/2024    Chronic pain of left knee 10/09/2024    Primary osteoarthritis of left knee 08/08/2024    Esophageal dysphagia 06/06/2023     3/1/23 esophogram  Findings suggestive of mild esophageal dysmotility.  Spontaneous gastroesophageal reflux observed.  No findings to suggest achalasia.  7/20/23 EGD normal duodenum; erythema gastric body, antrum, prepyloric region. 1 cm HH. Bx's neg for H pylori, sprue, no EE      History of obstructive sleep apnea 12/06/2024 HST negative; no in-lab unless pt requests 12/13/2022     12/15/22 HST with CLARA AHI 9  12/06/2024 HST negative with AHI less than 5      Thoracic aortic atherosclerosis incidental on CXR 12/2022 12/01/2022    Primary open-angle glaucoma, left eye, moderate stage 07/28/2021    Primary open angle glaucoma of right eye, mild stage 03/17/2021    Decreased independence with transfers 06/18/2019    Impaired functional mobility, balance, gait, and endurance 06/18/2019    Idiopathic peripheral neuropathy normal B12, TSH; A1c pre-DM. hx of lory changed to lyrica 06/03/2019 07/07/2022 EMG showing sensory motor peripheral neuropathy.  Chronic denervation in the L4-S1 myotomes on the left.  7/22/22 MRI  L spine  Mild multilevel degenerative change throughout the lower thoracic and lumbar spine.  No significant spinal canal stenosis but there is scattered lateral recess and neural foraminal encroachment       Thyroid nodule on US; followed by endo repeat 6/2026 04/22/2019 2/21/20 thyroid US: 1.)  Thyroid gland is upper limits of normal in size with heterogeneous echotexture and normal vascularity  2.) 1.31 x 1.07 x 0.91 cm solid, heterogeneous predominately isoechoic nodule is seen in the right inferior pole  3.) 0.72 x 0.48 x 0.50 cm solid, hyperechoic nodule is seen in the right inferior pole  4.) 0.85 x 0.49 x 0.72 cm solid, isoechoic nodule is seen in the right inferior pole  RECOMMENDATIONS: Recommend repeat thyroid ultrasound in 1 year.  2/2021 thyroid US: 1.  Thyroid gland is normal in size with diffusely heterogenous echotexture. 2.  Two nodules in the right thyroid described above.  None meet criteria for fine-needle aspiration.  6/30/22 thyroid US:  1. Thyroid gland is normal in size with diffusely heterogenous echotexture consistent with Hashimoto's thyroiditis.  2. 2 nodules in the right thyroid described above. None meet criteria for fine-needle aspiration.  6/5/24 thyroid US  Three right-sided nodules, none of which meet ACR criteria for follow-up.       Vitamin D deficiency 04/04/2019 4/6/22 DEXA L-spine 1.2, total hip 0.7, femoral neck-0.8.  FRAX score 2.4/6.4%.      Chronic bilateral low back pain without sciatica 11/02/2018    Bradycardia 6/2018 holter negative 07/20/2018    Status post hysterectomy 07/20/2018    Statin myopathy 07/20/2018    Tubular adenoma of colon 3/28/22 colonoscopy 4 mm sigmoid TA 01/23/2017     3/28/22 colonoscopy 4 mm sigmoid TA      Nuclear sclerosis 11/30/2016    Cataract 09/07/2016     Dx updated per 2019 IMO Load      Class 2 severe obesity due to excess calories with serious comorbidity in adult, unspecified BMI     POAG (primary open-angle glaucoma) 01/05/2016     Essential hypertension 08/13/2012 01/13/2021 TTE LV normal size with normal systolic function.  LVEF 55%.  Normal diastolic function.  Normal RV size and normal function.  Normal CVP.  PA pressure 20.      Dyslipidemia; statin myalgias; 6/28/2018 exercise stress echo neg for ischemia 08/13/2012 6/28/2018 exercise stress echo CONCLUSIONS   1 - Normal left ventricular systolic function (EF 60-65%).   2 - No wall motion abnormalities.   3 - Normal left ventricular diastolic function.   4 - Normal right ventricular systolic function .          PAST MEDICAL PROBLEMS, PAST SURGICAL HISTORY: please see relevant portions of the electronic medical record    ALLERGIES AND MEDICATIONS: updated and reviewed.  Medication List with Changes/Refills   Current Medications    ASPIRIN (ECOTRIN) 81 MG EC TABLET    Take 81 mg by mouth once daily.    ATORVASTATIN (LIPITOR) 40 MG TABLET    Take 1 tablet (40 mg total) by mouth every evening.    AZELASTINE (ASTELIN) 137 MCG (0.1 %) NASAL SPRAY    1 spray (137 mcg total) by Nasal route 2 (two) times daily.    CLOPIDOGREL (PLAVIX) 75 MG TABLET    Take 1 tablet (75 mg total) by mouth once daily.    DORZOLAMIDE-TIMOLOL 2-0.5% (COSOPT) 22.3-6.8 MG/ML OPHTHALMIC SOLUTION    Place 1 drop into both eyes 2 (two) times daily.    ESOMEPRAZOLE (NEXIUM) 40 MG CAPSULE    Take 1 capsule (40 mg total) by mouth before breakfast.    HYDROCHLOROTHIAZIDE 12.5 MG TAB    Take 1 tablet (12.5 mg total) by mouth once daily. Do not take if taking diamox    IRBESARTAN (AVAPRO) 150 MG TABLET    Take 2 tablets (300 mg total) by mouth every evening.    ISOSORBIDE MONONITRATE (IMDUR) 30 MG 24 HR TABLET    Take 1 tablet (30 mg total) by mouth once daily.    LUMIGAN 0.01 % DROP    Place 1 drop into both eyes every evening.    NITROGLYCERIN (NITROSTAT) 0.4 MG SL TABLET    Place 1 tablet (0.4 mg total) under the tongue every 5 (five) minutes as needed for Chest pain.    RHOPRESSA 0.02 % OPHTHALMIC SOLUTION     INSTILL 1 DROP INTO LEFT EYE ONCE DAILY         Objective:   Objective   Physical Exam   Vitals:    07/03/25 0925   BP: 130/70   BP Location: Left arm   Patient Position: Sitting   Pulse: 64   Resp: 18   TempSrc: Oral   SpO2: 99%   Weight: 79.9 kg (176 lb 0.6 oz)    Body mass index is 29.29 kg/m².  Weight: 79.9 kg (176 lb 0.6 oz)         Physical Exam  Constitutional:       General: She is not in acute distress.     Appearance: She is well-developed.   Eyes:      Extraocular Movements: Extraocular movements intact.   Neck:      Comments: Thyroid fullness but I do not feel any discrete nodules  Cardiovascular:      Rate and Rhythm: Normal rate and regular rhythm.      Heart sounds: Normal heart sounds. No murmur heard.  Pulmonary:      Effort: Pulmonary effort is normal.      Breath sounds: Normal breath sounds.   Abdominal:      General: There is no distension.      Palpations: There is no mass.      Tenderness: There is no abdominal tenderness. There is no guarding.   Musculoskeletal:         General: Normal range of motion.      Right lower leg: No edema.      Left lower leg: No edema.   Lymphadenopathy:      Cervical: No cervical adenopathy.   Skin:     General: Skin is warm and dry.   Neurological:      Mental Status: She is alert and oriented to person, place, and time.      Coordination: Coordination normal.   Psychiatric:         Behavior: Behavior normal.         Thought Content: Thought content normal.

## 2025-07-02 NOTE — ASSESSMENT & PLAN NOTE
Has upcoming endocrinology follow up.  Plan for thyroid ultrasound 06/2026  Orders:    TSH; Future

## 2025-07-02 NOTE — ASSESSMENT & PLAN NOTE
Has made significant dietary improvements.  Check future surveillance labs  Orders:    Comprehensive Metabolic Panel; Future    Hemoglobin A1C; Future    CBC Without Differential; Future

## 2025-07-03 ENCOUNTER — OFFICE VISIT (OUTPATIENT)
Dept: FAMILY MEDICINE | Facility: CLINIC | Age: 80
End: 2025-07-03
Payer: MEDICARE

## 2025-07-03 VITALS
WEIGHT: 176.06 LBS | BODY MASS INDEX: 29.29 KG/M2 | RESPIRATION RATE: 18 BRPM | HEART RATE: 64 BPM | DIASTOLIC BLOOD PRESSURE: 70 MMHG | SYSTOLIC BLOOD PRESSURE: 130 MMHG | OXYGEN SATURATION: 99 %

## 2025-07-03 DIAGNOSIS — G72.0 STATIN MYOPATHY: ICD-10-CM

## 2025-07-03 DIAGNOSIS — E78.5 DYSLIPIDEMIA: ICD-10-CM

## 2025-07-03 DIAGNOSIS — T46.6X5A STATIN MYOPATHY: ICD-10-CM

## 2025-07-03 DIAGNOSIS — D12.6 TUBULAR ADENOMA OF COLON: ICD-10-CM

## 2025-07-03 DIAGNOSIS — E66.812 CLASS 2 SEVERE OBESITY DUE TO EXCESS CALORIES WITH SERIOUS COMORBIDITY IN ADULT, UNSPECIFIED BMI: ICD-10-CM

## 2025-07-03 DIAGNOSIS — I10 ESSENTIAL HYPERTENSION: Primary | ICD-10-CM

## 2025-07-03 DIAGNOSIS — E04.1 THYROID NODULE: Chronic | ICD-10-CM

## 2025-07-03 DIAGNOSIS — E66.01 CLASS 2 SEVERE OBESITY DUE TO EXCESS CALORIES WITH SERIOUS COMORBIDITY IN ADULT, UNSPECIFIED BMI: ICD-10-CM

## 2025-07-03 DIAGNOSIS — M54.50 CHRONIC BILATERAL LOW BACK PAIN WITHOUT SCIATICA: ICD-10-CM

## 2025-07-03 DIAGNOSIS — I70.0 THORACIC AORTIC ATHEROSCLEROSIS: ICD-10-CM

## 2025-07-03 DIAGNOSIS — G60.9 IDIOPATHIC PERIPHERAL NEUROPATHY: ICD-10-CM

## 2025-07-03 DIAGNOSIS — M17.12 PRIMARY OSTEOARTHRITIS OF LEFT KNEE: ICD-10-CM

## 2025-07-03 DIAGNOSIS — I25.118 CORONARY ARTERY DISEASE OF NATIVE ARTERY OF NATIVE HEART WITH STABLE ANGINA PECTORIS: ICD-10-CM

## 2025-07-03 DIAGNOSIS — R73.09 ABNORMAL GLUCOSE: ICD-10-CM

## 2025-07-03 DIAGNOSIS — G89.29 CHRONIC BILATERAL LOW BACK PAIN WITHOUT SCIATICA: ICD-10-CM

## 2025-07-03 PROBLEM — Z86.69 HISTORY OF OBSTRUCTIVE SLEEP APNEA: Status: ACTIVE | Noted: 2022-12-13

## 2025-07-03 PROCEDURE — 3078F DIAST BP <80 MM HG: CPT | Mod: CPTII,S$GLB,, | Performed by: INTERNAL MEDICINE

## 2025-07-03 PROCEDURE — G2211 COMPLEX E/M VISIT ADD ON: HCPCS | Mod: S$GLB,,, | Performed by: INTERNAL MEDICINE

## 2025-07-03 PROCEDURE — 1101F PT FALLS ASSESS-DOCD LE1/YR: CPT | Mod: CPTII,S$GLB,, | Performed by: INTERNAL MEDICINE

## 2025-07-03 PROCEDURE — 1126F AMNT PAIN NOTED NONE PRSNT: CPT | Mod: CPTII,S$GLB,, | Performed by: INTERNAL MEDICINE

## 2025-07-03 PROCEDURE — 1160F RVW MEDS BY RX/DR IN RCRD: CPT | Mod: CPTII,S$GLB,, | Performed by: INTERNAL MEDICINE

## 2025-07-03 PROCEDURE — 3075F SYST BP GE 130 - 139MM HG: CPT | Mod: CPTII,S$GLB,, | Performed by: INTERNAL MEDICINE

## 2025-07-03 PROCEDURE — 1159F MED LIST DOCD IN RCRD: CPT | Mod: CPTII,S$GLB,, | Performed by: INTERNAL MEDICINE

## 2025-07-03 PROCEDURE — 99999 PR PBB SHADOW E&M-EST. PATIENT-LVL IV: CPT | Mod: PBBFAC,,, | Performed by: INTERNAL MEDICINE

## 2025-07-03 PROCEDURE — 99214 OFFICE O/P EST MOD 30 MIN: CPT | Mod: S$GLB,,, | Performed by: INTERNAL MEDICINE

## 2025-07-03 PROCEDURE — 3288F FALL RISK ASSESSMENT DOCD: CPT | Mod: CPTII,S$GLB,, | Performed by: INTERNAL MEDICINE

## 2025-07-03 RX ORDER — LANOLIN ALCOHOL/MO/W.PET/CERES
1000 CREAM (GRAM) TOPICAL DAILY
COMMUNITY

## 2025-07-03 RX ORDER — BUTALB/ACETAMINOPHEN/CAFFEINE 50-325-40
2 TABLET ORAL DAILY
COMMUNITY

## 2025-07-03 RX ORDER — ATORVASTATIN CALCIUM 40 MG/1
40 TABLET, FILM COATED ORAL EVERY OTHER DAY
Start: 2025-07-03

## 2025-07-10 ENCOUNTER — OFFICE VISIT (OUTPATIENT)
Dept: CARDIOLOGY | Facility: CLINIC | Age: 80
End: 2025-07-10
Payer: MEDICARE

## 2025-07-10 VITALS
HEART RATE: 66 BPM | HEIGHT: 65 IN | SYSTOLIC BLOOD PRESSURE: 125 MMHG | DIASTOLIC BLOOD PRESSURE: 70 MMHG | RESPIRATION RATE: 15 BRPM | BODY MASS INDEX: 29.24 KG/M2 | WEIGHT: 175.5 LBS | OXYGEN SATURATION: 99 %

## 2025-07-10 DIAGNOSIS — E78.2 MIXED HYPERLIPIDEMIA: ICD-10-CM

## 2025-07-10 DIAGNOSIS — I70.0 THORACIC AORTIC ATHEROSCLEROSIS: ICD-10-CM

## 2025-07-10 DIAGNOSIS — I10 ESSENTIAL HYPERTENSION: ICD-10-CM

## 2025-07-10 DIAGNOSIS — I25.10 CORONARY ARTERY DISEASE, UNSPECIFIED VESSEL OR LESION TYPE, UNSPECIFIED WHETHER ANGINA PRESENT, UNSPECIFIED WHETHER NATIVE OR TRANSPLANTED HEART: Primary | ICD-10-CM

## 2025-07-10 DIAGNOSIS — R06.09 DOE (DYSPNEA ON EXERTION): ICD-10-CM

## 2025-07-10 DIAGNOSIS — E78.5 DYSLIPIDEMIA: ICD-10-CM

## 2025-07-10 PROCEDURE — 3288F FALL RISK ASSESSMENT DOCD: CPT | Mod: CPTII,S$GLB,, | Performed by: INTERNAL MEDICINE

## 2025-07-10 PROCEDURE — 99214 OFFICE O/P EST MOD 30 MIN: CPT | Mod: S$GLB,,, | Performed by: INTERNAL MEDICINE

## 2025-07-10 PROCEDURE — 1101F PT FALLS ASSESS-DOCD LE1/YR: CPT | Mod: CPTII,S$GLB,, | Performed by: INTERNAL MEDICINE

## 2025-07-10 PROCEDURE — 1126F AMNT PAIN NOTED NONE PRSNT: CPT | Mod: CPTII,S$GLB,, | Performed by: INTERNAL MEDICINE

## 2025-07-10 PROCEDURE — 3074F SYST BP LT 130 MM HG: CPT | Mod: CPTII,S$GLB,, | Performed by: INTERNAL MEDICINE

## 2025-07-10 PROCEDURE — 99999 PR PBB SHADOW E&M-EST. PATIENT-LVL IV: CPT | Mod: PBBFAC,,, | Performed by: INTERNAL MEDICINE

## 2025-07-10 PROCEDURE — 3078F DIAST BP <80 MM HG: CPT | Mod: CPTII,S$GLB,, | Performed by: INTERNAL MEDICINE

## 2025-07-10 PROCEDURE — G2211 COMPLEX E/M VISIT ADD ON: HCPCS | Mod: S$GLB,,, | Performed by: INTERNAL MEDICINE

## 2025-07-10 PROCEDURE — 1159F MED LIST DOCD IN RCRD: CPT | Mod: CPTII,S$GLB,, | Performed by: INTERNAL MEDICINE

## 2025-07-10 NOTE — PROGRESS NOTES
CARDIOVASCULAR CONSULTATION    REASON FOR CONSULT:   Rosmery Ramirez is a 80 y.o. female who presents for follow-up.      HISTORY OF PRESENT ILLNESS:     Patient is a pleasant 74-year-old lady here for follow-up.  Denies any chest pains at rest on exertion, orthopnea, PND.  Complains of paresthesias in bilateral feet and requesting referral to Neurology for that.  Blood pressure well controlled.  No other cardiovascular complaints.     Notes from December 2020:  Patient here for follow-up.  Complains of occasional dizziness.  Can occur once a week or less frequently than that.  No postural relationship.  Denies any chest pains at rest on exertion, orthopnea, PND.      Notes from December 2022: Patient here for follow-up after a long hiatus.  Occasionally gets short of breath when she lays down.  X-ray showed aortic atherosclerosis.  Has been referred to Cardiology to rule out significant ischemia      Jan 23:  Patient here for follow-up.  Patient here for follow-up after testing.  Stress test did not show any significant ischemia    The estimated ejection fraction is 55%.  The left ventricle is normal in size with mild concentric hypertrophy and normal systolic function.  Grade I left ventricular diastolic dysfunction.  Normal right ventricular size with normal right ventricular systolic function.  Mild to moderate left atrial enlargement.  Mild mitral regurgitation.  Mild tricuspid regurgitation.  Intermediate central venous pressure (8 mmHg).  The estimated PA systolic pressure is 28 mmHg.         Normal myocardial perfusion scan. There is no evidence of myocardial ischemia or infarction.    There is a mild to moderate intensity perfusion abnormality in the apical wall of the left ventricle, secondary to breast attenuation.    The gated perfusion images showed an ejection fraction of 71% at rest.    There is normal wall motion at rest and post stress.    The ECG portion of the study is positive for  ischemia. Specificity is reduced secondary to hypertensive response.    The patient reported no chest pain during the stress test.    There were no arrhythmias during stress.    The patient exercised for 6 minutes 15 seconds on a Jorge protocol, corresponding to a functional capacity of 7 METS, achieving a peak heart rate of 142 bpm, which is 102 % of the age predicted maximum heart rate.    Notes from October 2023: Patient here for follow-up.  Has been doing fine.  Denies any chest pains at rest on exertion, orthopnea, PND.  Doing fine.    December 24:    History of Present Illness    CHIEF COMPLAINT:  - Rosmery presents with complaints of fatigue and lack of energy, with associated dyspnea on exertion.    HPI:  Rosmery reports fatigue for 6 months, at times significant enough to impair her ability to get out of bed. Her primary care physician conducted extensive lab work to investigate the cause. She was referred to an endocrinologist for thyroid evaluation due to a thyroid nodule. The endocrinologist diagnosed Hashimoto's disease. TSH levels were normal.    She reports dyspnea, particularly during housework such as mopping. She attempts to exercise by walking on a track, noting initial dyspnea but improved endurance as activity continues.    She reports occasional lightheadedness upon standing, described as unsteadiness.    A sleep study was conducted with normal results. The cause of fatigue remains undetermined, leading to this follow-up visit.    She denies chest pain, tightness, dizziness, or LOC.    MEDICATIONS:  - ASA  - Atorvastatin  - HCTZ  - Irbesartan    JAN 25:    History of Present Illness    CHIEF COMPLAINT:  Rosmery presents today with palpitations.    CARDIAC SYMPTOMS:  She experiences palpitations 2-3 times per day without specific pattern. These palpitations are not new, having previously resolved before recurring. She confirms experiencing palpitations while wearing the Holter monitor. She  also reports shortness of breath on exertion, particularly when bending over to perform work, describing it as weakness in her chest. She denies chest pain, pressure, or chest tightness.    MEDICATIONS:  She currently takes aspirin, atorvastatin (increased to 40 mg daily from alternate day dosing), and Irbisartan (increased to 300 mg at night). She was started on Diamox for glaucoma and discontinued hydrochlorothiazide.       Results for orders placed or performed during the hospital encounter of 04/02/25   EKG 12-LEAD on arrival to floor    Collection Time: 04/02/25  3:46 PM   Result Value Ref Range    QRS Duration 86 ms    [facility]HS QTC Calculation 442 ms    Narrative    Test Reason : I25.10,    Vent. Rate :  68 BPM     Atrial Rate :  68 BPM     P-R Int : 168 ms          QRS Dur :  86 ms      QT Int : 416 ms       P-R-T Axes :  73  22   3 degrees    QTcB Int : 442 ms    Normal sinus rhythm  Inferior infarct (cited on or before 02-Apr-2025)  Abnormal ECG  When compared with ECG of 02-Apr-2025 10:29,  No significant change was found  Confirmed by Neha Hernandez ([id number]) on 4/3/2025 12:31:36 PM    Referred By: NEHA HERNANDEZ           Confirmed By: Neha Hernandez       Results for orders placed during the hospital encounter of 01/06/25    Echo    Interpretation Summary    Left Ventricle: The left ventricle is normal in size. Normal wall thickness. There is normal systolic function with a visually estimated ejection fraction of 60 - 65%. There is normal diastolic function. Normal left ventricular filling pressure.    Right Ventricle: Normal right ventricular cavity size. Systolic function is normal.    Aortic Valve: There is mild aortic valve sclerosis.    Pulmonary Artery: The estimated pulmonary artery systolic pressure is 30 mmHg.    IVC/SVC: Normal venous pressure at 3 mmHg.      Results for orders placed during the hospital encounter of 01/06/25    Nuclear Stress - Cardiology Interpreted    Interpretation Summary    Abnormal  myocardial perfusion scan.    There is a mild to moderate intensity, small sized, reversible perfusion abnormality that is consistent with ischemia in the apical wall(s).    There are no other significant perfusion abnormalities.    The gated perfusion images showed an ejection fraction of 75% post stress.    The ECG portion of the study is negative for ischemia.    The patient reported no chest pain during the stress test.    There were no arrhythmias during stress.      No results found for this or any previous visit.    March 25:    CHIEF COMPLAINT:  Rosmery presents today for intermittent chest pain.    CHEST PAIN:  She experiences intermittent chest pain occurring at rest, specifically while sitting, and denies correlation with physical exertion. The pain resolves spontaneously within approximately one minute. Stress test was abnormal, indicating possible coronary artery blockage.    CARDIOVASCULAR:  She reports home blood pressure readings consistently in the 120s, measured every evening for the past two weeks.    MEDICATIONS:  Her current regimen includes Aspirin, Hydrochlorothiazide, herbicide, and Nitroglycerin as needed. She experienced severe leg pain with Atorvastatin 40mg daily and self-adjusted to taking it every other day, which resolved the leg pain.    DIET AND WEIGHT MANAGEMENT:  She has followed a plant-based diet for the last 3 months with occasional chicken consumption and has eliminated red meat. She reports significant weight loss and overall improvement in well-being due to these dietary changes.    MUSCULOSKELETAL:  She has a torn meniscus in her knee requiring surgical repair.         April 25:    History of Present Illness    CHIEF COMPLAINT:  Rosmery presents today for follow up after cardiac stent placement    CARDIAC HISTORY AND CURRENT STATUS:  She had 90% coronary artery stenosis with associated fatigue prior to stent placement. Her previous chest pain resolved since the  procedure. She currently reports lack of energy since the procedure.    GASTROINTESTINAL:  She experiences indigestion-like discomfort later in the day after taking clopidogrel with breakfast. She describes the sensation as not significant and denies stabbing quality.    MEDICATIONS:  She takes ASA, atorvastatin 40 mg, clopidogrel, ezetimibe, HCTZ, and irbesartan. She recently resumed daily atorvastatin 40 mg after previously taking it every other day due to leg pain. She has not started previously prescribed Imdur for chest pain.    DIET:  She follows a strict plant-based diet.    LABS:  LDL cholesterol was 67 mg/dL.         Results for orders placed or performed during the hospital encounter of 04/02/25   EKG 12-LEAD on arrival to floor    Collection Time: 04/02/25  3:46 PM   Result Value Ref Range    QRS Duration 86 ms    OHS QTC Calculation 442 ms    Narrative    Test Reason : I25.10,    Vent. Rate :  68 BPM     Atrial Rate :  68 BPM     P-R Int : 168 ms          QRS Dur :  86 ms      QT Int : 416 ms       P-R-T Axes :  73  22   3 degrees    QTcB Int : 442 ms    Normal sinus rhythm  Inferior infarct (cited on or before 02-Apr-2025)  Abnormal ECG  When compared with ECG of 02-Apr-2025 10:29,  No significant change was found  Confirmed by Neha Hernandez ([id number]) on 4/3/2025 12:31:36 PM    Referred By: NEHA HERNANDEZ           Confirmed By: Neha Hernandez       Results for orders placed during the hospital encounter of 01/06/25    Echo    Interpretation Summary    Left Ventricle: The left ventricle is normal in size. Normal wall thickness. There is normal systolic function with a visually estimated ejection fraction of 60 - 65%. There is normal diastolic function. Normal left ventricular filling pressure.    Right Ventricle: Normal right ventricular cavity size. Systolic function is normal.    Aortic Valve: There is mild aortic valve sclerosis.    Pulmonary Artery: The estimated pulmonary artery systolic pressure is 30 mmHg.     IVC/SVC: Normal venous pressure at 3 mmHg.      Results for orders placed during the hospital encounter of 01/06/25    Nuclear Stress - Cardiology Interpreted    Interpretation Summary    Abnormal myocardial perfusion scan.    There is a mild to moderate intensity, small sized, reversible perfusion abnormality that is consistent with ischemia in the apical wall(s).    There are no other significant perfusion abnormalities.    The gated perfusion images showed an ejection fraction of 75% post stress.    The ECG portion of the study is negative for ischemia.    The patient reported no chest pain during the stress test.    There were no arrhythmias during stress.      Results for orders placed during the hospital encounter of 04/02/25    Cardiac catheterization    Conclusion    The Prox LAD lesion was 95% stenosed with 0% stenosis post-intervention.    The pre-procedure left ventricular end diastolic pressure was 20.    Prox LAD lesion: A STENT SYNERGY XD [id number] stent was successfully placed at 12 BHAVIN for 10 sec.    The estimated blood loss was <50 mL.    PROCEDURES PERFORMED    1. CORONARY ANGIOGRAPHY    2. PCI OF MID LAD    3. IVUS OF MID LAD    DETAILS:    CORONARY ANGIOGRAPHY    LEFT MAIN: NO SIGNIFICANT STENOSIS    LAD: MID 95% STENOSIS.  SUCCESSFUL PCI PERFORMED.  IVUS POST PCI REVEALED WELL-EXPANDED APPOSED STENT    CIRCUMFLEX: 20-30% STENOSIS    RCA: LUMINAL IRREGULARITIES    ACCESS: RIGHT RADIAL ARTERY ACCESS    ASSESSMENT PLAN    ASPIRIN LIFELONG AND DUAL ANTIPLATELET THERAPY FOR AT LEAST 1 YEAR FROM DATE OF PCI.                  The procedure log was documented by Documenter: Brigette Kennedy RN and verified by Julio Dye MD.    Date: 4/8/2025  Time: 5:52 PM    July 25:    History of Present Illness    CHIEF COMPLAINT:  Rosmery presents today for cardiac follow-up after stent placement.    CARDIOVASCULAR:  She denies chest pain, chest tightness, and dyspnea. BP has been well-controlled at home. She  attends cardiac rehabilitation 3 times per week, exercising for 45 minutes each session, and reports tolerating the program well without issues. Coronary stent was placed in April 2025.    MEDICATIONS:  She continues ASA, Plavix 75mg (planned until May 2026 for stent thrombosis prevention), Atorvastatin, HCTZ, and Irbesartan.    DIET AND WEIGHT MANAGEMENT:  She reports continued adherence to healthy eating habits and has maintained a 25-pound weight loss.    MUSCULOSKELETAL:  She reports ongoing knee issues She denies pain or tightness in legs with walking beyond the knee problem.         PAST MEDICAL HISTORY:     Past Medical History:   Diagnosis Date    Allergy     Anxiety     Breast cyst     Edema of leg 10/05/2012    bilateral     Fractures 2011    thumb R    GERD (gastroesophageal reflux disease)     Glaucoma     History of colonic polyps     Hx-TIA (transient ischemic attack) 08/13/2012 Jan 2012: LLE and left facial numbness lasting 1 hour     Hyperlipidemia     Hypertension     Left shoulder tendonitis 08/08/2015    Primary open-angle glaucoma, mild stage - Both Eyes 06/03/2013    Thyroid disease        PAST SURGICAL HISTORY:     Past Surgical History:   Procedure Laterality Date    BREAST BIOPSY Left     core    BREAST SURGERY Left     lumpectomy    CATARACT EXTRACTION W/  INTRAOCULAR LENS IMPLANT Left 10/05/2016    Dr. Mike    CATARACT EXTRACTION W/  INTRAOCULAR LENS IMPLANT Right 11/30/2016    With [place] (Dr. Mike)    COLONOSCOPY N/A 1/23/2017    Procedure: COLONOSCOPY;  Surgeon: Hany Mosquera MD;  Location: [facility] ([address] [place] [address]);  Service: Endoscopy;  Laterality: N/A;    COLONOSCOPY N/A 3/28/2022    Procedure: COLONOSCOPY;  Surgeon: Jamison Chambers MD;  Location: [facility] ([address] [place] [address]);  Service: Endoscopy;  Laterality: N/A;  fully vaccinated   instructions to portal-st  3/22 arrival time confirmed with pt/COVID test cancelled-RB    CORONARY STENT PLACEMENT N/A 4/2/2025    Procedure: INSERTION,  STENT, CORONARY ARTERY;  Surgeon: Julio Dye MD;  Location: [facility] CATH LAB;  Service: Cardiology;  Laterality: N/A;    ESOPHAGOGASTRODUODENOSCOPY N/A 7/20/2023    Procedure: EGD (ESOPHAGOGASTRODUODENOSCOPY);  Surgeon: Jorge L Thompson MD;  Location: [facility] (4TH FLR);  Service: Endoscopy;  Laterality: N/A;  inst handed to pre-call pt needs to be rescheduled to new day 03/21 bm.  4/26/23- precall- pt wants to cancel appt and reschedule for another day  6/7 pt r/s/instr. to [facility];-s7/14/23-pre-call completed-LS    HAND SURGERY Left 2011    thumb    HYSTERECTOMY  1980's    Total;    IMPLANTATION OF DEVICE FOR GLAUCOMA Right 3/17/2021    Procedure: INSERTION, DEVICE, FOR GLAUCOMA XEN GEL;  Surgeon: Yesica Mike MD;  Location: [facility] OR 1ST FLR;  Service: Ophthalmology;  Laterality: Right;    IVUS, CORONARY  4/2/2025    Procedure: IVUS, Coronary;  Surgeon: Julio Dye MD;  Location: [facility] CATH LAB;  Service: Cardiology;;    [place] GONIOTOMY Right 11/30/2016    WITH CE ()    Left Breast Lumpectomy Left     performed in [facility]    LEFT HEART CATHETERIZATION Left 4/2/2025    Procedure: Left heart cath;  Surgeon: Julio Dye MD;  Location: [facility] CATH LAB;  Service: Cardiology;  Laterality: Left;  RN PREOP 3/31/2025    OOPHORECTOMY      PTCA, SINGLE VESSEL  4/2/2025    Procedure: PTCA, Single Vessel;  Surgeon: Julio Dye MD;  Location: [facility] CATH LAB;  Service: Cardiology;;    SELECTIVE LASER TRABECUPLASTY  05/2014    OU w/ Dr. Mike    TRABECULECTOMY Right 3/17/2021    Procedure: TRABECULECTOMY/XEN GEL WITH MMC;  Surgeon: Yesica Mike MD;  [facility];  Service: Ophthalmology;  Laterality: Right;    TRABECULECTOMY Left 7/28/2021    Procedure: TRABECULECTOMY/ MMC and Xen OS;  Surgeon: Yesica Mike MD;  Location: [facility] OR [address] [place] [address];  Service: Ophthalmology;  Laterality: Left;    TUBAL LIGATION  1970's    VAGINAL DELIVERY      x3    YAG CAPSULOTOMY Left  11/29/2018           ALLERGIES AND MEDICATION:     Review of patient's allergies indicates:   Allergen Reactions    Lisinopril Swelling    Propofol analogues Other (See Comments)     Burning sensation all over her body    Amlodipine Edema    Combigan [brimonidine-timolol]      Causes itchy eyelids and contact dermatitis    Cosopt [dorzolamide-timolol]      Causes angular blepharitis of eyelids    Pravastatin      Myalgia and elevated CPK        Medication List            Accurate as of July 10, 2025  3:58 PM. If you have any questions, ask your nurse or doctor.                CONTINUE taking these medications      aspirin 81 MG EC tablet  Commonly known as: ECOTRIN     atorvastatin 40 MG tablet  Commonly known as: LIPITOR  Take 1 tablet (40 mg total) by mouth every other day.     azelastine 137 mcg (0.1 %) nasal spray  Commonly known as: ASTELIN  1 spray (137 mcg total) by Nasal route 2 (two) times daily.     calcium citrate-vitamin D3 315-200 mg 315 mg-5 mcg (200 unit) per tablet  Commonly known as: CITRACAL+D     clopidogreL 75 mg tablet  Commonly known as: PLAVIX  Take 1 tablet (75 mg total) by mouth once daily.     cyanocobalamin 1000 MCG tablet  Commonly known as: VITAMIN B-12     dorzolamide-timolol 2-0.5% 22.3-6.8 mg/mL ophthalmic solution  Commonly known as: COSOPT  Place 1 drop into both eyes 2 (two) times daily.     esomeprazole 40 MG capsule  Commonly known as: NEXIUM  Take 1 capsule (40 mg total) by mouth before breakfast.     hydroCHLOROthiazide 12.5 MG Tab  Take 1 tablet (12.5 mg total) by mouth once daily. Do not take if taking diamox     irbesartan 150 MG tablet  Commonly known as: AVAPRO  Take 2 tablets (300 mg total) by mouth every evening.     LUMIGAN 0.01 % Drop  Generic drug: bimatoprost  Place 1 drop into both eyes every evening.     nitroGLYCERIN 0.4 MG SL tablet  Commonly known as: NITROSTAT  Place 1 tablet (0.4 mg total) under the tongue every 5 (five) minutes as needed for Chest  pain.     RHOPRESSA 0.02 % ophthalmic solution  Generic drug: netarsudiL              SOCIAL HISTORY:     Social History     Socioeconomic History    Marital status:     Number of children: 3   Occupational History    Occupation: retired - formerly pastoral care with [employer]   Tobacco Use    Smoking status: Never     Passive exposure: Never    Smokeless tobacco: Never   Substance and Sexual Activity    Alcohol use: Not Currently    Drug use: No    Sexual activity: Yes     Partners: Female, Male     Birth control/protection: Post-menopausal, See Surgical Hx     Social Drivers of Health     Financial Resource Strain: Low Risk  (1/9/2025)    Overall Financial Resource Strain (CARDIA)     Difficulty of Paying Living Expenses: Not hard at all   Food Insecurity: No Food Insecurity (1/26/2025)    Received from [facility]    Hunger Vital Sign     Worried About Running Out of Food in the Last Year: Never true     Ran Out of Food in the Last Year: Never true   Transportation Needs: No Transportation Needs (1/26/2025)    Received from [facility]    PRAPARE - Transportation     Lack of Transportation (Medical): No     Lack of Transportation (Non-Medical): No   Physical Activity: Insufficiently Active (1/26/2025)    Received from [facility]    Exercise Vital Sign     Days of Exercise per Week: 1 day     Minutes of Exercise per Session: 20 min   Stress: No Stress Concern Present (1/26/2025)    Received from [facility]    [employer] [place] [employer] - Occupational Stress Questionnaire     Feeling of Stress : Not at all   Housing Stability: Unknown (1/26/2025)    Received from [facility]    Housing Stability Vital Sign     Unable to Pay for Housing in the Last Year: No       FAMILY HISTORY:     Family History   Problem Relation Name Age of Onset    Breast cancer Mother 76 y/o 50    Glaucoma Mother 76 y/o     Alzheimer's disease Mother 76 y/o     Blindness Mother 76 y/o     Cataracts Mother 76 y/o      "Glaucoma Father 77 y/o     Prostate cancer Father 77 y/o     Blindness Father 77 y/o     Cataracts Father 77 y/o     Cancer Father 77 y/o 62        prostate    Breast cancer Sister 5 sisters     Glaucoma Sister 5 sisters     Alzheimer's disease Maternal Grandfather      No Known Problems Brother      No Known Problems Brother      Macular degeneration Neg Hx      Retinal detachment Neg Hx      Strabismus Neg Hx      Amblyopia Neg Hx      Heart attack Neg Hx      Heart disease Neg Hx      Ovarian cancer Neg Hx      Colon cancer Neg Hx         REVIEW OF SYSTEMS:   Review of Systems   Constitutional: Negative.    HENT: Negative.     Eyes: Negative.    Gastrointestinal: Negative.    Genitourinary: Negative.    Musculoskeletal: Negative.    Skin: Negative.    Endo/Heme/Allergies: Negative.        A 10 point review of systems was performed and all the pertinent positives have been mentioned. Rest of review of systems was negative.        PHYSICAL EXAM:     Vitals:    07/10/25 1448   BP: 125/70   Pulse: 66   Resp: 15    Body mass index is 29.2 kg/m².  Weight: 79.6 kg (175 lb 7.8 oz)   Height: 5' 5" (165.1 cm)     Physical Exam  Vitals and nursing note reviewed.   Constitutional:       Appearance: Normal appearance. She is well-developed.   HENT:      Head: Normocephalic and atraumatic.      Right Ear: Hearing normal.      Left Ear: Hearing normal.      Nose: Nose normal.   Eyes:      General: Lids are normal.      Conjunctiva/sclera: Conjunctivae normal.      Pupils: Pupils are equal, round, and reactive to light.   Cardiovascular:      Rate and Rhythm: Normal rate and regular rhythm.      Pulses: Normal pulses.      Heart sounds: Normal heart sounds.   Pulmonary:      Effort: Pulmonary effort is normal.      Breath sounds: Normal breath sounds.   Abdominal:      Palpations: Abdomen is soft.      Tenderness: There is no abdominal tenderness.   Musculoskeletal:         General: No deformity.      Cervical back: Normal range " of motion and neck supple.   Skin:     General: Skin is warm and dry.   Neurological:      Mental Status: She is alert and oriented to person, place, and time.   Psychiatric:         Speech: Speech normal.           DATA:     Laboratory:  CBC:  Recent Labs   Lab 02/10/25  1308 02/26/25  0023 02/26/25  0028 03/31/25  1536   WBC 4.55 6.98  --  6.62   Hemoglobin 11.3 L 11.8 L  --   --    HGB  --   --   --  12.3   POC Hematocrit  --   --  35 L  --    Hematocrit 35.3 L 37.0  --   --    HCT  --   --   --  37.4   Platelet Count  --   --   --  318   Platelets 270 270  --   --        CHEMISTRIES:  Recent Labs   Lab 02/10/25  1308 02/26/25  0023 03/31/25  1536   Glucose 81  81 112 H 83   Sodium 139  139 132 L 138   Potassium 4.5  4.5 4.0 4.2   BUN 11  11 9 9   Creatinine 0.7  0.7 0.7 0.7   Calcium 9.5  9.5 9.7 9.7       CARDIAC BIOMARKERS:  Recent Labs   Lab 07/26/23  0655 07/26/23  1020   Troponin I <0.006 <0.006         COAGS:        LIPIDS/LFTS:  Recent Labs   Lab 01/26/24  0918 11/08/24  1230 02/10/25  1308 02/26/25  0023 04/09/25  0809   Cholesterol Total  --   --   --   --  94 L   Cholesterol 123 132  --   --   --    Triglycerides 93 77  --   --   --    Triglyceride  --   --   --   --  69   HDL 42 49  --   --   --    HDL Cholesterol  --   --   --   --  37 L   LDL Cholesterol 62.4 L 67.6  --   --  43.2 L   Non-HDL Cholesterol 81 83  --   --   --    Non HDL Cholesterol  --   --   --   --  57   AST 19 17 18 21  --    ALT 17 13 14 13  --        Hemoglobin A1C   Date Value Ref Range Status   02/10/2025 5.8 (H) 4.0 - 5.6 % Final     Comment:     ADA Screening Guidelines:  5.7-6.4%  Consistent with prediabetes  >or=6.5%  Consistent with diabetes    High levels of fetal hemoglobin interfere with the HbA1C  assay. Heterozygous hemoglobin variants (HbS, HgC, etc)do  not significantly interfere with this assay.   However, presence of multiple variants may affect accuracy.     11/08/2024 6.0 (H) 4.0 - 5.6 % Final     Comment:      ADA Screening Guidelines:  5.7-6.4%  Consistent with prediabetes  >or=6.5%  Consistent with diabetes    High levels of fetal hemoglobin interfere with the HbA1C  assay. Heterozygous hemoglobin variants (HbS, HgC, etc)do  not significantly interfere with this assay.   However, presence of multiple variants may affect accuracy.     01/26/2024 5.9 (H) 4.0 - 5.6 % Final     Comment:     ADA Screening Guidelines:  5.7-6.4%  Consistent with prediabetes  >or=6.5%  Consistent with diabetes    High levels of fetal hemoglobin interfere with the HbA1C  assay. Heterozygous hemoglobin variants (HbS, HgC, etc)do  not significantly interfere with this assay.   However, presence of multiple variants may affect accuracy.         TSH  Recent Labs   Lab 06/06/23  0946 08/22/24  1452 11/08/24  1230   TSH 2.428 1.747 1.895       The ASCVD Risk score (Patricia BAUMAN, et al., 2019) failed to calculate for the following reasons:    The valid total cholesterol range is 130 to 320 mg/dL           Cardiovascular Testing:    Reviewed      ASSESSMENT AND PLAN     Patient Active Problem List   Diagnosis    Essential hypertension    Dyslipidemia; statin myalgias; 6/28/2018 exercise stress echo neg for ischemia    POAG (primary open-angle glaucoma)    Class 2 severe obesity due to excess calories with serious comorbidity in adult, unspecified BMI    Cataract    Nuclear sclerosis    Tubular adenoma of colon 3/28/22 colonoscopy 4 mm sigmoid TA    Bradycardia 6/2018 holter negative    Status post hysterectomy    Statin myopathy    Chronic bilateral low back pain without sciatica    Vitamin D deficiency    Thyroid nodule on US; followed by endo repeat 6/2026    Idiopathic peripheral neuropathy normal B12, TSH; A1c pre-DM. hx of lory changed to lyrica    Decreased independence with transfers    Impaired functional mobility, balance, gait, and endurance    Primary open angle glaucoma of right eye, mild stage    Primary open-angle glaucoma, left eye,  moderate stage    Thoracic aortic atherosclerosis incidental on CXR 12/2022    History of obstructive sleep apnea 12/06/2024 HST negative; no in-lab unless pt requests    Esophageal dysphagia    Primary osteoarthritis of left knee    Chronic pain of left knee    Abnormal glucose    Left knee pain    Complex tear of lateral meniscus of left knee as current injury    Coronary artery disease of native artery of native heart with stable angina pectoris 04/02/2025 [facility] with pLAD 95% stenosis.  S/p stenting; LCx 20-30%; RCA LI     Assessment & Plan    Assessment & Plan    IMPRESSION:  Patient doing well post-stent placement.  Maintained current medication regimen, including dual antiplatelet therapy with aspirin and Plavix.  Will consider discontinuing Plavix in May 2026, one year after stent placement.    PLAN SUMMARY:  - Continue cardiac rehabilitation program  - Maintain exercise regimen: 45 minutes, 3 times per week  - Continue healthy eating habits and weight loss efforts  - Continue Plavix, HCTZ, Atorvastatin, Irbesartan, and aspirin  - Follow up in 6 months    CORONARY ARTERY DISEASE:  - Explained that premature discontinuation of Plavix could put patient at risk for stent thrombosis.  - Magdalene to continue participating in cardiac rehabilitation program, maintain current exercise regimen of 45 minutes 3 times per week, continue healthy eating habits, and continue weight loss efforts.  - Continued Plavix.  - Continued Atorvastatin.  - status post PCI of LAD in April 2024.  Continue aspirin and Plavix for at least 1 year from date of PCI.  Aspirin indefinitely.    HYPERTENSION:  - Continued HCTZ.  - Continued Irbesartan.    LONG-TERM ASPIRIN USE:  - Continued aspirin.    FOLLOW-UP:  - Follow up in 6 months.       HYPERLIPIDEMIA:  - Continued atorvastatin 40 mg daily.  - Continued ezetimibe.    LIFESTYLE MANAGEMENT:  - Magdalene to continue plant-based diet.       Lab Results   Component Value Date    LDLCALC 43.2  (L) 04/09/2025       Fu 6 m        Thank you very much for involving me in the care of your patient.  Please do not hesitate to contact me if there are any questions.      Julio Dye MD, [facility], [facility]  Interventional Cardiologist, [facility].     Visit today included increased complexity associated with the care of the episodic problem sinus bradycardia, dyspnea on exertion, history of statin myopathy, dyslipidemia, aortic atherosclerosis addressed and managing the longitudinal care of the patient due to the serious and/or complex managed problem(s)   Patient Active Problem List   Diagnosis    Essential hypertension    Dyslipidemia; statin myalgias; 6/28/2018 exercise stress echo neg for ischemia    POAG (primary open-angle glaucoma)    Class 2 severe obesity due to excess calories with serious comorbidity in adult, unspecified BMI    Cataract    Nuclear sclerosis    Tubular adenoma of colon 3/28/22 colonoscopy 4 mm sigmoid TA    Bradycardia 6/2018 holter negative    Status post hysterectomy    Statin myopathy    Chronic bilateral low back pain without sciatica    Vitamin D deficiency    Thyroid nodule on US; followed by endo repeat 6/2026    Idiopathic peripheral neuropathy normal B12, TSH; A1c pre-DM. hx of lory changed to lyrica    Decreased independence with transfers    Impaired functional mobility, balance, gait, and endurance    Primary open angle glaucoma of right eye, mild stage    Primary open-angle glaucoma, left eye, moderate stage    Thoracic aortic atherosclerosis incidental on CXR 12/2022    History of obstructive sleep apnea 12/06/2024 HST negative; no in-lab unless pt requests    Esophageal dysphagia    Primary osteoarthritis of left knee    Chronic pain of left knee    Abnormal glucose    Left knee pain    Complex tear of lateral meniscus of left knee as current injury    Coronary artery disease of native artery of native heart with stable angina pectoris 04/02/2025 [facility] with pLAD 95%  stenosis.  S/p stenting; LCx 20-30%; RCA LI     .     This note was generated with the assistance of ambient listening technology. Verbal consent was obtained by the patient and accompanying visitor(s) for the recording of patient appointment to facilitate this note. I attest to having reviewed and edited the generated note for accuracy, though some syntax or spelling errors may persist. Please contact the author of this note for any clarification.      This note was dictated with the help of speech recognition software.  There might be un-intended errors and/or substitutions.

## 2025-07-11 ENCOUNTER — OFFICE VISIT (OUTPATIENT)
Dept: ENDOCRINOLOGY | Facility: CLINIC | Age: 80
End: 2025-07-11
Payer: MEDICARE

## 2025-07-11 VITALS
BODY MASS INDEX: 29.41 KG/M2 | DIASTOLIC BLOOD PRESSURE: 62 MMHG | HEIGHT: 65 IN | WEIGHT: 176.5 LBS | HEART RATE: 56 BPM | SYSTOLIC BLOOD PRESSURE: 138 MMHG

## 2025-07-11 DIAGNOSIS — E04.1 THYROID NODULE: Primary | Chronic | ICD-10-CM

## 2025-07-11 DIAGNOSIS — E06.3 HASHIMOTO'S DISEASE: ICD-10-CM

## 2025-07-11 DIAGNOSIS — E04.2 MULTIPLE THYROID NODULES: ICD-10-CM

## 2025-07-11 PROCEDURE — 99999 PR PBB SHADOW E&M-EST. PATIENT-LVL III: CPT | Mod: PBBFAC,,, | Performed by: INTERNAL MEDICINE

## 2025-07-11 NOTE — ASSESSMENT & PLAN NOTE
She has a few small isoechoic to hyperechoic nodules in the right lobe with JOHNATHAN low risk imaging characteristics.  Discussed that these have been stable since at least 2019 and that the chance of any of these nodules turning out to be a clinically relevant thyroid cancer is very low.  Discussed options going forward including foregoing additional ultrasound surveillance or getting one more ultrasound now then make a call on whether or not to continue surveillance.  She prefers the latter option, so we will schedule an ultrasound now.  If there has been no significant change my plan is to discontinue ultrasound surveillance for her thyroid.

## 2025-07-11 NOTE — PROGRESS NOTES
According to what we have listed she is on losartan/hctz not the diovan hctz ( valsartan). Please verify.    FOLLOW-UP VISIT    Subjective:      Chief Complaint: Follow-up visit for hypothyroidism secondary to Hashimoto's disease and thyroid nodules    HPI: Rosmery Ramirez is a 80 y.o. female with    Past Medical History:   Diagnosis Date    Allergy     Anxiety     Breast cyst     Edema of leg 10/05/2012    bilateral     Fractures 2011    thumb R    GERD (gastroesophageal reflux disease)     Glaucoma     History of colonic polyps     Hx-TIA (transient ischemic attack) 08/13/2012 Jan 2012: LLE and left facial numbness lasting 1 hour     Hyperlipidemia     Hypertension     Left shoulder tendonitis 08/08/2015    Primary open-angle glaucoma, mild stage - Both Eyes 06/03/2013    Thyroid disease        The patient's last visit with me was on 5/2/2022.        With regards to Hashimoto's:    TPO Ab positive without thyroid hormone requirement.      Lab Results   Component Value Date/Time    TSH 1.895 11/08/2024 12:30 PM    TSH 1.747 08/22/2024 02:52 PM    TSH 2.428 06/06/2023 09:46 AM    FREET4 0.93 06/06/2023 09:46 AM    FREET4 0.99 04/16/2012 11:17 AM    FREET4 0.98 01/09/2012 05:45 AM    THYROPEROXID 1215.2 (H) 02/25/2021 09:55 AM       With regards to the thyroid nodule:    Thyroid nodules originally found in 2019, and subsequently evaluated by ultrasound.    Last ultrasound in 6/2024 was independently reviewed:     Few subcentimeter isoechoic or hyperechoic nodules in the right lobe with no suspicious features. These have been stable since 2020.        No   Yes  [x]    [] Preexisting thyroid disease    Compressive Symptoms:  No  Yes  [x]    [] Anterior neck pressure  [x]    [] Dysphagia - resolved, no longer a problem  []    [x] Voice changes - periodic hoarseness    Risk Factors:  No   Yes  [x]    [] Radiation to head or neck for any treatment of cancer or exposure to radiation  [x]    [] Tobacco use  [x]    [] Family history of thyroid cancer      Two sisters with benign goiter that required surgery     Previous  biopsy results:  None      Lab Results   Component Value Date    TSH 1.895 11/08/2024    TSH 1.747 08/22/2024    TSH 2.428 06/06/2023    TSH 2.742 12/01/2022    TSH 2.199 04/21/2022          Objective:     Vitals:    07/11/25 1033   BP: 138/62   Pulse: (!) 56         BP Readings from Last 5 Encounters:   07/11/25 138/62   07/10/25 125/70   07/03/25 130/70   04/08/25 136/60   04/04/25 138/62         Physical Exam  Vitals and nursing note reviewed.   Constitutional:       General: She is not in acute distress.     Appearance: She is well-developed. She is not ill-appearing.   HENT:      Head: Normocephalic and atraumatic.   Neck:      Thyroid: No thyromegaly.      Trachea: No tracheal deviation.   Pulmonary:      Effort: Pulmonary effort is normal. No respiratory distress.   Neurological:      Mental Status: She is alert and oriented to person, place, and time.      Coordination: Coordination normal.   Psychiatric:         Mood and Affect: Mood normal.         Behavior: Behavior normal.           Wt Readings from Last 3 Encounters:   07/11/25 1033 80 kg (176 lb 7.7 oz)   07/10/25 1448 79.6 kg (175 lb 7.8 oz)   07/03/25 0925 79.9 kg (176 lb 0.6 oz)         Assessment/Plan:       Multiple thyroid nodules  She has a few small isoechoic to hyperechoic nodules in the right lobe with JOHNATHAN low risk imaging characteristics.  Discussed that these have been stable since at least 2019 and that the chance of any of these nodules turning out to be a clinically relevant thyroid cancer is very low.  Discussed options going forward including foregoing additional ultrasound surveillance or getting one more ultrasound now then make a call on whether or not to continue surveillance.  She prefers the latter option, so we will schedule an ultrasound now.  If there has been no significant change my plan is to discontinue ultrasound surveillance for her thyroid.    Hashimoto's disease  She has positive TPO antibodies, but so far her thyroid  function has remained normal.  Discussed that there is a good chance her thyroid function will remain normal for the rest of her life, but she is at risk to develop hypothyroidism at a point.  Recommend yearly TSH.    Schedule thyroid ultrasound.  Should be okay to follow-up with primary care going forward unless we determined she will need additional ultrasound surveillance.

## 2025-07-11 NOTE — ASSESSMENT & PLAN NOTE
She has positive TPO antibodies, but so far her thyroid function has remained normal.  Discussed that there is a good chance her thyroid function will remain normal for the rest of her life, but she is at risk to develop hypothyroidism at a point.  Recommend yearly TSH.

## 2025-08-14 ENCOUNTER — OFFICE VISIT (OUTPATIENT)
Dept: UROGYNECOLOGY | Facility: CLINIC | Age: 80
End: 2025-08-14
Payer: MEDICARE

## 2025-08-14 VITALS
SYSTOLIC BLOOD PRESSURE: 125 MMHG | WEIGHT: 175.25 LBS | HEIGHT: 65 IN | DIASTOLIC BLOOD PRESSURE: 65 MMHG | BODY MASS INDEX: 29.2 KG/M2 | HEART RATE: 59 BPM

## 2025-08-14 DIAGNOSIS — R15.9 INCONTINENCE OF FECES, UNSPECIFIED FECAL INCONTINENCE TYPE: ICD-10-CM

## 2025-08-14 DIAGNOSIS — R39.15 URINARY URGENCY: Primary | ICD-10-CM

## 2025-08-14 DIAGNOSIS — N39.46 MIXED URGE AND STRESS INCONTINENCE: ICD-10-CM

## 2025-08-14 DIAGNOSIS — N95.2 ATROPHIC VAGINITIS: ICD-10-CM

## 2025-08-14 LAB
BILIRUB SERPL-MCNC: NORMAL MG/DL
BLOOD URINE, POC: NORMAL
CLARITY, POC UA: CLEAR
COLOR, POC UA: YELLOW
GLUCOSE UR QL STRIP: NORMAL
KETONES UR QL STRIP: NORMAL
LEUKOCYTE ESTERASE URINE, POC: NORMAL
NITRITE, POC UA: NORMAL
PH, POC UA: 6
PROTEIN, POC: NORMAL
SPECIFIC GRAVITY, POC UA: 1
UROBILINOGEN, POC UA: NORMAL

## 2025-08-14 PROCEDURE — 87086 URINE CULTURE/COLONY COUNT: CPT | Performed by: OBSTETRICS & GYNECOLOGY

## 2025-08-14 PROCEDURE — 99999 PR PBB SHADOW E&M-EST. PATIENT-LVL V: CPT | Mod: PBBFAC,,, | Performed by: OBSTETRICS & GYNECOLOGY

## 2025-08-14 RX ORDER — CONJUGATED ESTROGENS 0.62 MG/G
CREAM VAGINAL
Qty: 45 G | Refills: 12 | Status: SHIPPED | OUTPATIENT
Start: 2025-08-14

## 2025-08-14 RX ORDER — DIPHENOXYLATE HYDROCHLORIDE AND ATROPINE SULFATE 2.5; .025 MG/1; MG/1
1 TABLET ORAL 4 TIMES DAILY PRN
Qty: 30 TABLET | Refills: 0 | Status: SHIPPED | OUTPATIENT
Start: 2025-08-14 | End: 2025-08-24

## 2025-08-16 LAB — BACTERIA UR CULT: NO GROWTH

## 2025-08-19 ENCOUNTER — TELEPHONE (OUTPATIENT)
Dept: SURGERY | Facility: CLINIC | Age: 80
End: 2025-08-19
Payer: MEDICARE

## 2025-08-28 ENCOUNTER — HOSPITAL ENCOUNTER (OUTPATIENT)
Dept: ENDOCRINOLOGY | Facility: CLINIC | Age: 80
Discharge: HOME | End: 2025-08-28
Attending: INTERNAL MEDICINE
Payer: MEDICARE

## 2025-08-28 DIAGNOSIS — E04.1 THYROID NODULE: Chronic | ICD-10-CM

## 2025-08-28 PROCEDURE — 76536 US EXAM OF HEAD AND NECK: CPT | Mod: S$GLB,,, | Performed by: INTERNAL MEDICINE

## 2025-09-04 ENCOUNTER — OFFICE VISIT (OUTPATIENT)
Dept: CARDIOLOGY | Facility: CLINIC | Age: 80
End: 2025-09-04
Payer: MEDICARE

## 2025-09-04 VITALS
HEART RATE: 56 BPM | WEIGHT: 174.38 LBS | DIASTOLIC BLOOD PRESSURE: 83 MMHG | OXYGEN SATURATION: 97 % | SYSTOLIC BLOOD PRESSURE: 154 MMHG | RESPIRATION RATE: 15 BRPM | BODY MASS INDEX: 29.05 KG/M2 | HEIGHT: 65 IN

## 2025-09-04 DIAGNOSIS — I70.0 THORACIC AORTIC ATHEROSCLEROSIS: ICD-10-CM

## 2025-09-04 DIAGNOSIS — I25.10 CORONARY ARTERY DISEASE, UNSPECIFIED VESSEL OR LESION TYPE, UNSPECIFIED WHETHER ANGINA PRESENT, UNSPECIFIED WHETHER NATIVE OR TRANSPLANTED HEART: ICD-10-CM

## 2025-09-04 DIAGNOSIS — E78.2 MIXED HYPERLIPIDEMIA: ICD-10-CM

## 2025-09-04 DIAGNOSIS — R06.09 DOE (DYSPNEA ON EXERTION): ICD-10-CM

## 2025-09-04 DIAGNOSIS — I10 ESSENTIAL HYPERTENSION: Primary | ICD-10-CM

## 2025-09-04 DIAGNOSIS — E78.5 DYSLIPIDEMIA: ICD-10-CM

## 2025-09-04 LAB
OHS QRS DURATION: 86 MS
OHS QTC CALCULATION: 379 MS

## 2025-09-04 PROCEDURE — 3079F DIAST BP 80-89 MM HG: CPT | Mod: CPTII,S$GLB,, | Performed by: INTERNAL MEDICINE

## 2025-09-04 PROCEDURE — 99999 PR PBB SHADOW E&M-EST. PATIENT-LVL IV: CPT | Mod: PBBFAC,,, | Performed by: INTERNAL MEDICINE

## 2025-09-04 PROCEDURE — 93000 ELECTROCARDIOGRAM COMPLETE: CPT | Mod: S$GLB,,, | Performed by: INTERNAL MEDICINE

## 2025-09-04 PROCEDURE — 1159F MED LIST DOCD IN RCRD: CPT | Mod: CPTII,S$GLB,, | Performed by: INTERNAL MEDICINE

## 2025-09-04 PROCEDURE — 3288F FALL RISK ASSESSMENT DOCD: CPT | Mod: CPTII,S$GLB,, | Performed by: INTERNAL MEDICINE

## 2025-09-04 PROCEDURE — 1126F AMNT PAIN NOTED NONE PRSNT: CPT | Mod: CPTII,S$GLB,, | Performed by: INTERNAL MEDICINE

## 2025-09-04 PROCEDURE — 3077F SYST BP >= 140 MM HG: CPT | Mod: CPTII,S$GLB,, | Performed by: INTERNAL MEDICINE

## 2025-09-04 PROCEDURE — 1101F PT FALLS ASSESS-DOCD LE1/YR: CPT | Mod: CPTII,S$GLB,, | Performed by: INTERNAL MEDICINE

## 2025-09-04 PROCEDURE — G2211 COMPLEX E/M VISIT ADD ON: HCPCS | Mod: S$GLB,,, | Performed by: INTERNAL MEDICINE

## 2025-09-04 PROCEDURE — 99214 OFFICE O/P EST MOD 30 MIN: CPT | Mod: S$GLB,,, | Performed by: INTERNAL MEDICINE

## 2025-09-04 RX ORDER — SPIRONOLACTONE 25 MG/1
12.5 TABLET ORAL DAILY
Qty: 90 TABLET | Refills: 3 | Status: SHIPPED | OUTPATIENT
Start: 2025-09-04

## 2025-09-04 RX ORDER — CLOPIDOGREL BISULFATE 75 MG/1
75 TABLET ORAL DAILY
Qty: 90 TABLET | Refills: 3 | Status: SHIPPED | OUTPATIENT
Start: 2025-09-04 | End: 2025-10-04

## (undated) DEVICE — KIT MEROCEL INSTRUMENT WIPE

## (undated) DEVICE — SHEILD PLASTIC EYE

## (undated) DEVICE — Device

## (undated) DEVICE — PAD EYE OVAL CNTOUR 1.62X2.62

## (undated) DEVICE — CATH NC EMERGE MR 3X20MM

## (undated) DEVICE — GAUZE SPONGE 4X4 12PLY

## (undated) DEVICE — GUIDEWIRE RUNTHROUGH EF 180CM

## (undated) DEVICE — KIT ESSENTIALS W/ Y ADAPTER

## (undated) DEVICE — SUT 6/0 18IN COATED VICRYL

## (undated) DEVICE — NDL 22GA X1 1/2 REG BEVEL

## (undated) DEVICE — TUBING VENTED 90IN

## (undated) DEVICE — KIT MANIFOLD LOW PRESS TUBING

## (undated) DEVICE — SPONGE WEC CEL SPEARS

## (undated) DEVICE — SUT 10/0 12IN VICRYL VIO M

## (undated) DEVICE — SUT 10/0 1X12IN BLK TG160-6

## (undated) DEVICE — CATH NC EMERGE MR 3X15MM

## (undated) DEVICE — GUIDEWIRE ANGIO J SHAPE 150CM

## (undated) DEVICE — WIRE GUIDE SAFE-T-J .035 260CM

## (undated) DEVICE — VALVE CONTROL COPILOT

## (undated) DEVICE — SHIELD COLLAGEN 12HR CORNEAL

## (undated) DEVICE — DRESSING EYE OVAL LF

## (undated) DEVICE — NDL RETROBULAR AKTKINSON 23G

## (undated) DEVICE — BLADE EYE

## (undated) DEVICE — SOL BETADINE 5%

## (undated) DEVICE — SEE MEDLINE ITEM 146313

## (undated) DEVICE — PRESTO INFLATION DEVICE

## (undated) DEVICE — CATH DXTERITY JR40 100CM 5FR

## (undated) DEVICE — ERASER HEMSTAT BPLR 23G BLUNT

## (undated) DEVICE — PACK CATH LAB

## (undated) DEVICE — CATH DXTERITY JL35 100CM 5FR

## (undated) DEVICE — SEE MEDLINE ITEM 157131

## (undated) DEVICE — DRESSING TRANS 2X2 TEGADERM

## (undated) DEVICE — NDL FILTER 19GA X 1-1/2

## (undated) DEVICE — DRAPE STERI-DRAPE APERTURE

## (undated) DEVICE — CATH EMERGE MR 15 X 3.00

## (undated) DEVICE — GUIDE LAUNCHER 6FR EBU 3.5

## (undated) DEVICE — GLOVE BIOGEL ECLIPSE SZ 6.5

## (undated) DEVICE — NDL FLTR 5MCRN BLNT TIP 18GX1

## (undated) DEVICE — SUT VICRYL 9-0 VIL MONO

## (undated) DEVICE — PAD DEFIB CADENCE ADULT R2

## (undated) DEVICE — BAND TR COMP DEVICE REG 24CM

## (undated) DEVICE — OMNIPAQUE CONTRAST 350MG/100ML

## (undated) DEVICE — KIT GLIDESHEATH SLEND 6FR 10CM

## (undated) DEVICE — CATH EAGLE EYE PLATINUM

## (undated) DEVICE — CORD BIPOLAR 12 FOOT

## (undated) DEVICE — SINGLE MIRROR LENS

## (undated) DEVICE — KIT PROBE COVER WITH GEL

## (undated) DEVICE — ANGIOTOUCH KIT

## (undated) DEVICE — PAD RADI FEMORAL

## (undated) DEVICE — KIT SYR REUSABLE

## (undated) DEVICE — SYR 3CC 21 X 1 LUER LOCK